# Patient Record
Sex: FEMALE | Race: BLACK OR AFRICAN AMERICAN | NOT HISPANIC OR LATINO | Employment: OTHER | RURAL
[De-identification: names, ages, dates, MRNs, and addresses within clinical notes are randomized per-mention and may not be internally consistent; named-entity substitution may affect disease eponyms.]

---

## 2019-08-05 ENCOUNTER — HISTORICAL (OUTPATIENT)
Dept: ADMINISTRATIVE | Facility: HOSPITAL | Age: 84
End: 2019-08-05

## 2019-08-06 LAB
LAB AP CLINICAL INFORMATION: NORMAL
LAB AP DIAGNOSIS - HISTORICAL: NORMAL
LAB AP GROSS PATHOLOGY - HISTORICAL: NORMAL
LAB AP SPECIMEN SUBMITTED - HISTORICAL: NORMAL

## 2019-10-01 ENCOUNTER — HISTORICAL (OUTPATIENT)
Dept: ADMINISTRATIVE | Facility: HOSPITAL | Age: 84
End: 2019-10-01

## 2020-09-03 ENCOUNTER — HISTORICAL (OUTPATIENT)
Dept: ADMINISTRATIVE | Facility: HOSPITAL | Age: 85
End: 2020-09-03

## 2020-09-03 LAB
ANION GAP SERPL CALCULATED.3IONS-SCNC: 13 MMOL/L
APTT PPP: 39.4 SECONDS (ref 25.2–37.3)
BACTERIA #/AREA URNS HPF: ABNORMAL /HPF
BASOPHILS # BLD AUTO: 0.02 X10E3/UL (ref 0–0.2)
BASOPHILS NFR BLD AUTO: 0.2 % (ref 0–1)
BILIRUB UR QL STRIP: NEGATIVE MG/DL
BUN SERPL-MCNC: 20 MG/DL (ref 7–18)
CALCIUM SERPL-MCNC: 8.8 MG/DL (ref 8.5–10.1)
CHLORIDE SERPL-SCNC: 97 MMOL/L (ref 98–107)
CK MB SERPL-MCNC: <1 NG/ML (ref 1–3.6)
CK SERPL-CCNC: 97 U/L (ref 26–192)
CLARITY UR: CLEAR
CLARITY UR: CLEAR
CO2 SERPL-SCNC: 25 MMOL/L (ref 21–32)
COLOR UR: ABNORMAL
COLOR UR: ABNORMAL
CREAT SERPL-MCNC: 1.18 MG/DL (ref 0.55–1.02)
EOSINOPHIL # BLD AUTO: 0.22 X10E3/UL (ref 0–0.5)
EOSINOPHIL NFR BLD AUTO: 2.3 % (ref 1–4)
ERYTHROCYTE [DISTWIDTH] IN BLOOD BY AUTOMATED COUNT: 16.4 % (ref 11.5–14.5)
GLUCOSE SERPL-MCNC: 207 MG/DL (ref 74–106)
GLUCOSE SERPL-MCNC: 220 MG/DL (ref 70–105)
GLUCOSE UR STRIP-MCNC: NEGATIVE MG/DL
HCT VFR BLD AUTO: 31.2 % (ref 38–47)
HGB BLD-MCNC: 10 G/DL (ref 12–16)
IMM GRANULOCYTES # BLD AUTO: 0.03 X10E3/UL (ref 0–0.04)
IMM GRANULOCYTES NFR BLD: 0.3 % (ref 0–0.4)
INR BLD: 1.08 (ref 0–3.3)
KETONES UR STRIP-SCNC: NEGATIVE MG/DL
LEUKOCYTE ESTERASE UR QL STRIP: NEGATIVE LEU/UL
LYMPHOCYTES # BLD AUTO: 0.87 X10E3/UL (ref 1–4.8)
LYMPHOCYTES NFR BLD AUTO: 9.1 % (ref 27–41)
MCH RBC QN AUTO: 24.6 PG (ref 27–31)
MCHC RBC AUTO-ENTMCNC: 32.1 G/DL (ref 32–36)
MCV RBC AUTO: 76.8 FL (ref 80–96)
MONOCYTES # BLD AUTO: 0.57 X10E3/UL (ref 0–0.8)
MONOCYTES NFR BLD AUTO: 6 % (ref 2–6)
MPC BLD CALC-MCNC: 8 FL (ref 9.4–12.4)
MUCOUS THREADS #/AREA URNS HPF: ABNORMAL /HPF
MYOGLOBIN SERPL-MCNC: 83 NG/ML (ref 13–71)
NEUTROPHILS # BLD AUTO: 7.8 X10E3/UL (ref 1.8–7.7)
NEUTROPHILS NFR BLD AUTO: 82.1 % (ref 53–65)
NITRITE UR QL STRIP: NEGATIVE
NRBC # BLD AUTO: 0 X10E3/UL (ref 0–0)
NRBC, AUTO (.00): 0 /100 (ref 0–0)
NT-PROBNP SERPL-MCNC: 1169 PG/ML (ref 1–450)
PH UR STRIP: 7 PH UNITS (ref 5–8)
PLATELET # BLD AUTO: 295 X10E3/UL (ref 150–400)
POTASSIUM SERPL-SCNC: 4.5 MMOL/L (ref 3.5–5.1)
PROT UR QL STRIP: 100 MG/DL
PROTHROMBIN TIME: 13.5 SECONDS (ref 11.7–14.7)
RBC # BLD AUTO: 4.06 X10E6/UL (ref 4.2–5.4)
RBC # UR STRIP: ABNORMAL ERY/UL
RBC #/AREA URNS HPF: ABNORMAL /HPF (ref 0–3)
SARS-COV-2 RNA AMPLIFICATION, QUAL: NEGATIVE
SODIUM SERPL-SCNC: 130 MMOL/L (ref 136–145)
SP GR UR STRIP: 1.01 (ref 1–1.03)
SQUAMOUS #/AREA URNS LPF: ABNORMAL /LPF
TRICHOMONAS #/AREA URNS HPF: ABNORMAL /HPF
TROPONIN I SERPL-MCNC: <0.017 NG/ML (ref 0–0.06)
UROBILINOGEN UR STRIP-ACNC: 0.2 EU/DL
WBC # BLD AUTO: 9.51 X10E3/UL (ref 4.5–11)
WBC #/AREA URNS HPF: ABNORMAL /HPF (ref 0–5)
YEAST #/AREA URNS HPF: ABNORMAL /HPF

## 2020-09-04 ENCOUNTER — HISTORICAL (OUTPATIENT)
Dept: ADMINISTRATIVE | Facility: HOSPITAL | Age: 85
End: 2020-09-04

## 2020-09-04 LAB
GLUCOSE SERPL-MCNC: 134 MG/DL (ref 70–105)
GLUCOSE SERPL-MCNC: 176 MG/DL (ref 70–105)
GLUCOSE SERPL-MCNC: 197 MG/DL (ref 70–105)
GLUCOSE SERPL-MCNC: 238 MG/DL (ref 70–105)

## 2020-09-05 ENCOUNTER — HISTORICAL (OUTPATIENT)
Dept: ADMINISTRATIVE | Facility: HOSPITAL | Age: 85
End: 2020-09-05

## 2020-09-05 LAB
ALBUMIN SERPL BCP-MCNC: 3 G/DL (ref 3.5–5)
ALBUMIN/GLOB SERPL: 0.8 {RATIO}
ALP SERPL-CCNC: 94 U/L (ref 55–142)
ALT SERPL W P-5'-P-CCNC: 27 U/L (ref 13–56)
ANION GAP SERPL CALCULATED.3IONS-SCNC: 13 MMOL/L
AST SERPL W P-5'-P-CCNC: 19 U/L (ref 15–37)
BASOPHILS # BLD AUTO: 0.03 X10E3/UL (ref 0–0.2)
BASOPHILS NFR BLD AUTO: 0.4 % (ref 0–1)
BILIRUB SERPL-MCNC: 0.4 MG/DL (ref 0–1.2)
BUN SERPL-MCNC: 19 MG/DL (ref 7–18)
BUN/CREAT SERPL: 15.7
CALCIUM SERPL-MCNC: 8.6 MG/DL (ref 8.5–10.1)
CHLORIDE SERPL-SCNC: 99 MMOL/L (ref 98–107)
CO2 SERPL-SCNC: 26 MMOL/L (ref 21–32)
CREAT SERPL-MCNC: 1.21 MG/DL (ref 0.55–1.02)
EOSINOPHIL # BLD AUTO: 0.31 X10E3/UL (ref 0–0.5)
EOSINOPHIL NFR BLD AUTO: 4.5 % (ref 1–4)
ERYTHROCYTE [DISTWIDTH] IN BLOOD BY AUTOMATED COUNT: 16.3 % (ref 11.5–14.5)
GLOBULIN SER-MCNC: 3.6 G/DL (ref 2–4)
GLUCOSE SERPL-MCNC: 138 MG/DL (ref 74–106)
GLUCOSE SERPL-MCNC: 154 MG/DL (ref 70–105)
GLUCOSE SERPL-MCNC: 165 MG/DL (ref 70–105)
GLUCOSE SERPL-MCNC: 186 MG/DL (ref 70–105)
GLUCOSE SERPL-MCNC: 243 MG/DL (ref 70–105)
HCT VFR BLD AUTO: 30.8 % (ref 38–47)
HGB BLD-MCNC: 9.9 G/DL (ref 12–16)
IMM GRANULOCYTES # BLD AUTO: 0.01 X10E3/UL (ref 0–0.04)
IMM GRANULOCYTES NFR BLD: 0.1 % (ref 0–0.4)
LYMPHOCYTES # BLD AUTO: 0.99 X10E3/UL (ref 1–4.8)
LYMPHOCYTES NFR BLD AUTO: 14.5 % (ref 27–41)
MCH RBC QN AUTO: 25.2 PG (ref 27–31)
MCHC RBC AUTO-ENTMCNC: 32.1 G/DL (ref 32–36)
MCV RBC AUTO: 78.4 FL (ref 80–96)
MONOCYTES # BLD AUTO: 0.59 X10E3/UL (ref 0–0.8)
MONOCYTES NFR BLD AUTO: 8.7 % (ref 2–6)
MPC BLD CALC-MCNC: 8.2 FL (ref 9.4–12.4)
NEUTROPHILS # BLD AUTO: 4.89 X10E3/UL (ref 1.8–7.7)
NEUTROPHILS NFR BLD AUTO: 71.8 % (ref 53–65)
NRBC # BLD AUTO: 0 X10E3/UL (ref 0–0)
NRBC, AUTO (.00): 0 /100 (ref 0–0)
PLATELET # BLD AUTO: 282 X10E3/UL (ref 150–400)
POTASSIUM SERPL-SCNC: 4.2 MMOL/L (ref 3.5–5.1)
PROT SERPL-MCNC: 6.6 G/DL (ref 6.4–8.2)
RBC # BLD AUTO: 3.93 X10E6/UL (ref 4.2–5.4)
SODIUM SERPL-SCNC: 134 MMOL/L (ref 136–145)
WBC # BLD AUTO: 6.82 X10E3/UL (ref 4.5–11)

## 2020-09-06 ENCOUNTER — HISTORICAL (OUTPATIENT)
Dept: ADMINISTRATIVE | Facility: HOSPITAL | Age: 85
End: 2020-09-06

## 2020-09-06 LAB
ALBUMIN SERPL BCP-MCNC: 3 G/DL (ref 3.5–5)
ALBUMIN/GLOB SERPL: 0.9 {RATIO}
ALP SERPL-CCNC: 98 U/L (ref 55–142)
ALT SERPL W P-5'-P-CCNC: 27 U/L (ref 13–56)
ANION GAP SERPL CALCULATED.3IONS-SCNC: 13 MMOL/L
AST SERPL W P-5'-P-CCNC: 20 U/L (ref 15–37)
BASOPHILS # BLD AUTO: 0.03 X10E3/UL (ref 0–0.2)
BASOPHILS NFR BLD AUTO: 0.5 % (ref 0–1)
BILIRUB SERPL-MCNC: 0.4 MG/DL (ref 0–1.2)
BUN SERPL-MCNC: 25 MG/DL (ref 7–18)
BUN/CREAT SERPL: 14.6
CALCIUM SERPL-MCNC: 8.6 MG/DL (ref 8.5–10.1)
CHLORIDE SERPL-SCNC: 100 MMOL/L (ref 98–107)
CO2 SERPL-SCNC: 27 MMOL/L (ref 21–32)
CREAT SERPL-MCNC: 1.71 MG/DL (ref 0.55–1.02)
EOSINOPHIL # BLD AUTO: 0.31 X10E3/UL (ref 0–0.5)
EOSINOPHIL NFR BLD AUTO: 4.7 % (ref 1–4)
ERYTHROCYTE [DISTWIDTH] IN BLOOD BY AUTOMATED COUNT: 16.1 % (ref 11.5–14.5)
GLOBULIN SER-MCNC: 3.5 G/DL (ref 2–4)
GLUCOSE SERPL-MCNC: 140 MG/DL (ref 74–106)
GLUCOSE SERPL-MCNC: 153 MG/DL (ref 70–105)
GLUCOSE SERPL-MCNC: 168 MG/DL (ref 70–105)
GLUCOSE SERPL-MCNC: 268 MG/DL (ref 70–105)
GLUCOSE SERPL-MCNC: 280 MG/DL (ref 70–105)
HCT VFR BLD AUTO: 35.2 % (ref 38–47)
HGB BLD-MCNC: 9.7 G/DL (ref 12–16)
IMM GRANULOCYTES # BLD AUTO: 0.02 X10E3/UL (ref 0–0.04)
IMM GRANULOCYTES NFR BLD: 0.3 % (ref 0–0.4)
LYMPHOCYTES # BLD AUTO: 1.05 X10E3/UL (ref 1–4.8)
LYMPHOCYTES NFR BLD AUTO: 16.1 % (ref 27–41)
MCH RBC QN AUTO: 24.7 PG (ref 27–31)
MCHC RBC AUTO-ENTMCNC: 27.6 G/DL (ref 32–36)
MCV RBC AUTO: 89.6 FL (ref 80–96)
MONOCYTES # BLD AUTO: 0.65 X10E3/UL (ref 0–0.8)
MONOCYTES NFR BLD AUTO: 10 % (ref 2–6)
MPC BLD CALC-MCNC: 8.3 FL (ref 9.4–12.4)
NEUTROPHILS # BLD AUTO: 4.47 X10E3/UL (ref 1.8–7.7)
NEUTROPHILS NFR BLD AUTO: 68.4 % (ref 53–65)
NRBC # BLD AUTO: 0 X10E3/UL (ref 0–0)
NRBC, AUTO (.00): 0 /100 (ref 0–0)
PLATELET # BLD AUTO: 312 X10E3/UL (ref 150–400)
POTASSIUM SERPL-SCNC: 4.5 MMOL/L (ref 3.5–5.1)
PROT SERPL-MCNC: 6.5 G/DL (ref 6.4–8.2)
RBC # BLD AUTO: 3.93 X10E6/UL (ref 4.2–5.4)
SODIUM SERPL-SCNC: 135 MMOL/L (ref 136–145)
WBC # BLD AUTO: 6.53 X10E3/UL (ref 4.5–11)

## 2020-09-07 ENCOUNTER — HISTORICAL (OUTPATIENT)
Dept: ADMINISTRATIVE | Facility: HOSPITAL | Age: 85
End: 2020-09-07

## 2020-09-07 LAB
ALBUMIN SERPL BCP-MCNC: 2.8 G/DL (ref 3.5–5)
ALBUMIN/GLOB SERPL: 0.8 {RATIO}
ALP SERPL-CCNC: 95 U/L (ref 55–142)
ALT SERPL W P-5'-P-CCNC: 25 U/L (ref 13–56)
ANION GAP SERPL CALCULATED.3IONS-SCNC: 12 MMOL/L
AST SERPL W P-5'-P-CCNC: 21 U/L (ref 15–37)
BILIRUB SERPL-MCNC: 0.4 MG/DL (ref 0–1.2)
BUN SERPL-MCNC: 32 MG/DL (ref 7–18)
BUN/CREAT SERPL: 19.2
CALCIUM SERPL-MCNC: 8.4 MG/DL (ref 8.5–10.1)
CHLORIDE SERPL-SCNC: 101 MMOL/L (ref 98–107)
CO2 SERPL-SCNC: 26 MMOL/L (ref 21–32)
CREAT SERPL-MCNC: 1.67 MG/DL (ref 0.55–1.02)
GLOBULIN SER-MCNC: 3.4 G/DL (ref 2–4)
GLUCOSE SERPL-MCNC: 135 MG/DL (ref 70–105)
GLUCOSE SERPL-MCNC: 141 MG/DL (ref 74–106)
GLUCOSE SERPL-MCNC: 161 MG/DL (ref 70–105)
GLUCOSE SERPL-MCNC: 249 MG/DL (ref 70–105)
GLUCOSE SERPL-MCNC: 286 MG/DL (ref 70–105)
POTASSIUM SERPL-SCNC: 4.7 MMOL/L (ref 3.5–5.1)
PROT SERPL-MCNC: 6.2 G/DL (ref 6.4–8.2)
SODIUM SERPL-SCNC: 134 MMOL/L (ref 136–145)

## 2020-09-08 ENCOUNTER — HISTORICAL (OUTPATIENT)
Dept: ADMINISTRATIVE | Facility: HOSPITAL | Age: 85
End: 2020-09-08

## 2020-09-08 LAB
ALBUMIN SERPL BCP-MCNC: 2.7 G/DL (ref 3.5–5)
ALBUMIN/GLOB SERPL: 0.8 {RATIO}
ALP SERPL-CCNC: 90 U/L (ref 55–142)
ALT SERPL W P-5'-P-CCNC: 25 U/L (ref 13–56)
ANION GAP SERPL CALCULATED.3IONS-SCNC: 12 MMOL/L
AST SERPL W P-5'-P-CCNC: 37 U/L (ref 15–37)
BILIRUB SERPL-MCNC: 0.4 MG/DL (ref 0–1.2)
BUN SERPL-MCNC: 33 MG/DL (ref 7–18)
BUN/CREAT SERPL: 24.3
CALCIUM SERPL-MCNC: 8.6 MG/DL (ref 8.5–10.1)
CHLORIDE SERPL-SCNC: 100 MMOL/L (ref 98–107)
CO2 SERPL-SCNC: 24 MMOL/L (ref 21–32)
CREAT SERPL-MCNC: 1.36 MG/DL (ref 0.55–1.02)
GLOBULIN SER-MCNC: 3.6 G/DL (ref 2–4)
GLUCOSE SERPL-MCNC: 206 MG/DL (ref 70–105)
GLUCOSE SERPL-MCNC: 208 MG/DL (ref 70–105)
GLUCOSE SERPL-MCNC: 214 MG/DL (ref 74–106)
GLUCOSE SERPL-MCNC: 217 MG/DL (ref 70–105)
POTASSIUM SERPL-SCNC: 5.3 MMOL/L (ref 3.5–5.1)
PROT SERPL-MCNC: 6.3 G/DL (ref 6.4–8.2)
SODIUM SERPL-SCNC: 131 MMOL/L (ref 136–145)

## 2021-03-22 RX ORDER — ALBUTEROL SULFATE 90 UG/1
AEROSOL, METERED RESPIRATORY (INHALATION)
Qty: 18 G | Refills: 2 | Status: SHIPPED | OUTPATIENT
Start: 2021-03-22 | End: 2021-07-26

## 2021-04-12 RX ORDER — TRAMADOL HYDROCHLORIDE 50 MG/1
TABLET ORAL
Qty: 100 TABLET | Refills: 1 | Status: SHIPPED | OUTPATIENT
Start: 2021-04-12 | End: 2021-04-12 | Stop reason: SDUPTHER

## 2021-04-12 RX ORDER — TRAMADOL HYDROCHLORIDE 50 MG/1
TABLET ORAL
Qty: 120 TABLET | Refills: 5 | Status: SHIPPED | OUTPATIENT
Start: 2021-04-12 | End: 2021-07-22 | Stop reason: SDUPTHER

## 2021-04-20 RX ORDER — BISOPROLOL FUMARATE AND HYDROCHLOROTHIAZIDE 5; 6.25 MG/1; MG/1
1 TABLET ORAL 2 TIMES DAILY
COMMUNITY
End: 2021-07-22 | Stop reason: SDUPTHER

## 2021-04-20 RX ORDER — BUDESONIDE AND FORMOTEROL FUMARATE DIHYDRATE 160; 4.5 UG/1; UG/1
2 AEROSOL RESPIRATORY (INHALATION) 2 TIMES DAILY
COMMUNITY
Start: 2021-03-02 | End: 2022-08-02 | Stop reason: SDUPTHER

## 2021-04-20 RX ORDER — METHOCARBAMOL 750 MG/1
2 TABLET, FILM COATED ORAL 4 TIMES DAILY PRN
COMMUNITY
End: 2022-03-07

## 2021-04-20 RX ORDER — LISINOPRIL 40 MG/1
1 TABLET ORAL 2 TIMES DAILY
COMMUNITY
End: 2021-07-22 | Stop reason: SDUPTHER

## 2021-04-20 RX ORDER — FUROSEMIDE 40 MG/1
1 TABLET ORAL DAILY
COMMUNITY
End: 2021-06-23 | Stop reason: SDUPTHER

## 2021-04-20 RX ORDER — PANTOPRAZOLE SODIUM 40 MG/1
1 TABLET, DELAYED RELEASE ORAL 2 TIMES DAILY
COMMUNITY
End: 2021-04-22 | Stop reason: SDUPTHER

## 2021-04-20 RX ORDER — HYDROCODONE BITARTRATE AND ACETAMINOPHEN 10; 325 MG/1; MG/1
1 TABLET ORAL 3 TIMES DAILY
COMMUNITY
Start: 2021-01-22 | End: 2021-04-22 | Stop reason: SDUPTHER

## 2021-04-20 RX ORDER — GABAPENTIN 400 MG/1
1 CAPSULE ORAL 4 TIMES DAILY PRN
COMMUNITY
End: 2021-07-22 | Stop reason: SDUPTHER

## 2021-04-20 RX ORDER — LACTULOSE 10 G/15ML
30 SOLUTION ORAL; RECTAL DAILY
COMMUNITY
Start: 2021-01-11 | End: 2022-05-02 | Stop reason: SDUPTHER

## 2021-04-20 RX ORDER — LEVOTHYROXINE SODIUM 112 UG/1
1 TABLET ORAL EVERY MORNING
COMMUNITY
Start: 2021-03-09 | End: 2021-07-22 | Stop reason: SDUPTHER

## 2021-04-20 RX ORDER — POTASSIUM CHLORIDE 750 MG/1
1 TABLET, EXTENDED RELEASE ORAL DAILY
COMMUNITY
End: 2022-11-02 | Stop reason: SDUPTHER

## 2021-04-20 RX ORDER — HYDRALAZINE HYDROCHLORIDE 25 MG/1
1 TABLET, FILM COATED ORAL 2 TIMES DAILY
COMMUNITY
End: 2021-07-22 | Stop reason: SDUPTHER

## 2021-04-20 RX ORDER — AMLODIPINE BESYLATE 5 MG/1
1 TABLET ORAL DAILY
COMMUNITY
End: 2021-04-22 | Stop reason: SDUPTHER

## 2021-04-22 ENCOUNTER — OFFICE VISIT (OUTPATIENT)
Dept: PRIMARY CARE CLINIC | Facility: CLINIC | Age: 86
End: 2021-04-22
Payer: COMMERCIAL

## 2021-04-22 VITALS
WEIGHT: 170 LBS | DIASTOLIC BLOOD PRESSURE: 66 MMHG | HEIGHT: 62 IN | RESPIRATION RATE: 18 BRPM | OXYGEN SATURATION: 97 % | SYSTOLIC BLOOD PRESSURE: 152 MMHG | HEART RATE: 64 BPM | BODY MASS INDEX: 31.28 KG/M2

## 2021-04-22 DIAGNOSIS — E78.5 HYPERLIPIDEMIA, UNSPECIFIED HYPERLIPIDEMIA TYPE: ICD-10-CM

## 2021-04-22 DIAGNOSIS — I10 HYPERTENSION, UNSPECIFIED TYPE: ICD-10-CM

## 2021-04-22 DIAGNOSIS — E10.9 TYPE 1 DIABETES MELLITUS WITHOUT COMPLICATION: Primary | ICD-10-CM

## 2021-04-22 DIAGNOSIS — M19.90 OSTEOARTHRITIS, UNSPECIFIED OSTEOARTHRITIS TYPE, UNSPECIFIED SITE: ICD-10-CM

## 2021-04-22 PROCEDURE — 99214 PR OFFICE/OUTPT VISIT, EST, LEVL IV, 30-39 MIN: ICD-10-PCS | Mod: ,,, | Performed by: FAMILY MEDICINE

## 2021-04-22 PROCEDURE — 99214 OFFICE O/P EST MOD 30 MIN: CPT | Mod: ,,, | Performed by: FAMILY MEDICINE

## 2021-04-22 RX ORDER — PANTOPRAZOLE SODIUM 40 MG/1
40 TABLET, DELAYED RELEASE ORAL 2 TIMES DAILY
Qty: 180 TABLET | Refills: 3 | Status: SHIPPED | OUTPATIENT
Start: 2021-04-22 | End: 2022-06-06

## 2021-04-22 RX ORDER — HYDROCODONE BITARTRATE AND ACETAMINOPHEN 10; 325 MG/1; MG/1
1 TABLET ORAL 3 TIMES DAILY
Qty: 90 TABLET | Refills: 0 | Status: SHIPPED | OUTPATIENT
Start: 2021-04-22 | End: 2021-07-22 | Stop reason: SDUPTHER

## 2021-04-22 RX ORDER — AMLODIPINE BESYLATE 5 MG/1
5 TABLET ORAL DAILY
Qty: 90 TABLET | Refills: 3 | Status: SHIPPED | OUTPATIENT
Start: 2021-04-22 | End: 2021-07-22 | Stop reason: SDUPTHER

## 2021-06-23 RX ORDER — FUROSEMIDE 40 MG/1
40 TABLET ORAL DAILY
Qty: 90 TABLET | Refills: 3 | Status: SHIPPED | OUTPATIENT
Start: 2021-06-23 | End: 2022-05-02 | Stop reason: SDUPTHER

## 2021-07-22 ENCOUNTER — OFFICE VISIT (OUTPATIENT)
Dept: PRIMARY CARE CLINIC | Facility: CLINIC | Age: 86
End: 2021-07-22
Payer: COMMERCIAL

## 2021-07-22 VITALS
RESPIRATION RATE: 14 BRPM | OXYGEN SATURATION: 96 % | TEMPERATURE: 98 F | HEART RATE: 63 BPM | SYSTOLIC BLOOD PRESSURE: 138 MMHG | DIASTOLIC BLOOD PRESSURE: 60 MMHG | HEIGHT: 65 IN | WEIGHT: 177 LBS | BODY MASS INDEX: 29.49 KG/M2

## 2021-07-22 DIAGNOSIS — E78.5 HYPERLIPIDEMIA, UNSPECIFIED HYPERLIPIDEMIA TYPE: Primary | ICD-10-CM

## 2021-07-22 DIAGNOSIS — I10 HYPERTENSION, UNSPECIFIED TYPE: ICD-10-CM

## 2021-07-22 DIAGNOSIS — M19.90 OSTEOARTHRITIS, UNSPECIFIED OSTEOARTHRITIS TYPE, UNSPECIFIED SITE: ICD-10-CM

## 2021-07-22 DIAGNOSIS — E10.9 TYPE 1 DIABETES MELLITUS WITHOUT COMPLICATION: ICD-10-CM

## 2021-07-22 PROCEDURE — 99214 PR OFFICE/OUTPT VISIT, EST, LEVL IV, 30-39 MIN: ICD-10-PCS | Mod: ,,, | Performed by: FAMILY MEDICINE

## 2021-07-22 PROCEDURE — 99214 OFFICE O/P EST MOD 30 MIN: CPT | Mod: ,,, | Performed by: FAMILY MEDICINE

## 2021-07-22 RX ORDER — GABAPENTIN 400 MG/1
400 CAPSULE ORAL 4 TIMES DAILY PRN
Qty: 360 CAPSULE | Refills: 3 | Status: SHIPPED | OUTPATIENT
Start: 2021-07-22 | End: 2022-08-02 | Stop reason: SDUPTHER

## 2021-07-22 RX ORDER — HYDRALAZINE HYDROCHLORIDE 25 MG/1
25 TABLET, FILM COATED ORAL 2 TIMES DAILY
Qty: 180 TABLET | Refills: 3 | Status: SHIPPED | OUTPATIENT
Start: 2021-07-22 | End: 2022-07-31

## 2021-07-22 RX ORDER — AMLODIPINE BESYLATE 5 MG/1
5 TABLET ORAL DAILY
Qty: 90 TABLET | Refills: 3 | Status: SHIPPED | OUTPATIENT
Start: 2021-07-22 | End: 2022-01-31 | Stop reason: SDUPTHER

## 2021-07-22 RX ORDER — TRAMADOL HYDROCHLORIDE 50 MG/1
TABLET ORAL
Qty: 120 TABLET | Refills: 5
Start: 2021-07-22 | End: 2022-02-09

## 2021-07-22 RX ORDER — LISINOPRIL 40 MG/1
40 TABLET ORAL 2 TIMES DAILY
Qty: 180 TABLET | Refills: 3 | Status: SHIPPED | OUTPATIENT
Start: 2021-07-22 | End: 2022-06-27

## 2021-07-22 RX ORDER — HYDROCODONE BITARTRATE AND ACETAMINOPHEN 10; 325 MG/1; MG/1
1 TABLET ORAL 3 TIMES DAILY
Qty: 90 TABLET | Refills: 0
Start: 2021-07-22 | End: 2021-10-28 | Stop reason: SDUPTHER

## 2021-07-22 RX ORDER — HUMAN INSULIN 100 [IU]/ML
30 INJECTION, SUSPENSION SUBCUTANEOUS 2 TIMES DAILY
COMMUNITY
Start: 2021-04-25 | End: 2022-05-02 | Stop reason: SDUPTHER

## 2021-07-22 RX ORDER — AMLODIPINE BESYLATE 5 MG/1
5 TABLET ORAL DAILY
Qty: 90 TABLET | Refills: 3 | Status: SHIPPED | OUTPATIENT
Start: 2021-07-22 | End: 2021-07-22 | Stop reason: SDUPTHER

## 2021-07-22 RX ORDER — LEVOTHYROXINE SODIUM 112 UG/1
112 TABLET ORAL EVERY MORNING
Qty: 90 TABLET | Refills: 3 | Status: SHIPPED | OUTPATIENT
Start: 2021-07-22 | End: 2021-10-27

## 2021-07-22 RX ORDER — BISOPROLOL FUMARATE AND HYDROCHLOROTHIAZIDE 5; 6.25 MG/1; MG/1
1 TABLET ORAL 2 TIMES DAILY
Qty: 180 TABLET | Refills: 3 | Status: SHIPPED | OUTPATIENT
Start: 2021-07-22 | End: 2022-07-31

## 2021-07-26 RX ORDER — ALBUTEROL SULFATE 90 UG/1
AEROSOL, METERED RESPIRATORY (INHALATION)
Qty: 18 G | Refills: 12 | Status: SHIPPED | OUTPATIENT
Start: 2021-07-26 | End: 2022-08-02 | Stop reason: SDUPTHER

## 2021-10-27 RX ORDER — LEVOTHYROXINE SODIUM 112 UG/1
TABLET ORAL
Qty: 30 TABLET | Refills: 5 | Status: SHIPPED | OUTPATIENT
Start: 2021-10-27 | End: 2022-01-11 | Stop reason: SDUPTHER

## 2021-10-28 ENCOUNTER — OFFICE VISIT (OUTPATIENT)
Dept: PRIMARY CARE CLINIC | Facility: CLINIC | Age: 86
End: 2021-10-28
Payer: COMMERCIAL

## 2021-10-28 VITALS
RESPIRATION RATE: 18 BRPM | WEIGHT: 175 LBS | BODY MASS INDEX: 29.88 KG/M2 | HEART RATE: 56 BPM | HEIGHT: 64 IN | DIASTOLIC BLOOD PRESSURE: 60 MMHG | OXYGEN SATURATION: 97 % | SYSTOLIC BLOOD PRESSURE: 132 MMHG

## 2021-10-28 DIAGNOSIS — I10 HYPERTENSION, UNSPECIFIED TYPE: ICD-10-CM

## 2021-10-28 DIAGNOSIS — M19.90 OSTEOARTHRITIS, UNSPECIFIED OSTEOARTHRITIS TYPE, UNSPECIFIED SITE: ICD-10-CM

## 2021-10-28 DIAGNOSIS — E78.5 HYPERLIPIDEMIA, UNSPECIFIED HYPERLIPIDEMIA TYPE: ICD-10-CM

## 2021-10-28 DIAGNOSIS — E10.9 TYPE 1 DIABETES MELLITUS WITHOUT COMPLICATION: ICD-10-CM

## 2021-10-28 DIAGNOSIS — Z23 NEED FOR VACCINATION: Primary | ICD-10-CM

## 2021-10-28 PROCEDURE — 99214 PR OFFICE/OUTPT VISIT, EST, LEVL IV, 30-39 MIN: ICD-10-PCS | Mod: ,,, | Performed by: FAMILY MEDICINE

## 2021-10-28 PROCEDURE — 99214 OFFICE O/P EST MOD 30 MIN: CPT | Mod: ,,, | Performed by: FAMILY MEDICINE

## 2021-10-28 PROCEDURE — G0008 ADMIN INFLUENZA VIRUS VAC: HCPCS | Mod: ,,, | Performed by: FAMILY MEDICINE

## 2021-10-28 PROCEDURE — 90662 IIV NO PRSV INCREASED AG IM: CPT | Mod: ,,, | Performed by: FAMILY MEDICINE

## 2021-10-28 PROCEDURE — 90662 FLU VACCINE - QUADRIVALENT - HIGH DOSE (65+) PRESERVATIVE FREE IM: ICD-10-PCS | Mod: ,,, | Performed by: FAMILY MEDICINE

## 2021-10-28 PROCEDURE — G0008 FLU VACCINE - QUADRIVALENT - HIGH DOSE (65+) PRESERVATIVE FREE IM: ICD-10-PCS | Mod: ,,, | Performed by: FAMILY MEDICINE

## 2021-10-28 RX ORDER — HYDROCODONE BITARTRATE AND ACETAMINOPHEN 10; 325 MG/1; MG/1
1 TABLET ORAL 3 TIMES DAILY
Qty: 90 TABLET | Refills: 0 | Status: SHIPPED | OUTPATIENT
Start: 2021-10-28 | End: 2022-01-31 | Stop reason: SDUPTHER

## 2021-12-14 ENCOUNTER — TELEPHONE (OUTPATIENT)
Dept: PRIMARY CARE CLINIC | Facility: CLINIC | Age: 86
End: 2021-12-14
Payer: COMMERCIAL

## 2022-01-11 RX ORDER — LEVOTHYROXINE SODIUM 112 UG/1
112 TABLET ORAL EVERY MORNING
Qty: 30 TABLET | Refills: 5 | Status: SHIPPED | OUTPATIENT
Start: 2022-01-11 | End: 2022-01-31 | Stop reason: SDUPTHER

## 2022-01-31 ENCOUNTER — OFFICE VISIT (OUTPATIENT)
Dept: PRIMARY CARE CLINIC | Facility: CLINIC | Age: 87
End: 2022-01-31
Payer: COMMERCIAL

## 2022-01-31 VITALS
BODY MASS INDEX: 29.71 KG/M2 | DIASTOLIC BLOOD PRESSURE: 70 MMHG | HEIGHT: 64 IN | RESPIRATION RATE: 16 BRPM | WEIGHT: 174 LBS | HEART RATE: 60 BPM | OXYGEN SATURATION: 95 % | SYSTOLIC BLOOD PRESSURE: 158 MMHG

## 2022-01-31 DIAGNOSIS — E78.5 HYPERLIPIDEMIA, UNSPECIFIED HYPERLIPIDEMIA TYPE: Primary | ICD-10-CM

## 2022-01-31 DIAGNOSIS — L60.0 INGROWN NAIL OF FIFTH TOE: ICD-10-CM

## 2022-01-31 DIAGNOSIS — E10.9 TYPE 1 DIABETES MELLITUS WITHOUT COMPLICATION: ICD-10-CM

## 2022-01-31 DIAGNOSIS — I10 HYPERTENSION, UNSPECIFIED TYPE: ICD-10-CM

## 2022-01-31 DIAGNOSIS — E11.9 TYPE 2 DIABETES MELLITUS WITHOUT COMPLICATION, WITHOUT LONG-TERM CURRENT USE OF INSULIN: ICD-10-CM

## 2022-01-31 PROCEDURE — 1101F PR PT FALLS ASSESS DOC 0-1 FALLS W/OUT INJ PAST YR: ICD-10-PCS | Mod: ,,, | Performed by: FAMILY MEDICINE

## 2022-01-31 PROCEDURE — 1101F PT FALLS ASSESS-DOCD LE1/YR: CPT | Mod: ,,, | Performed by: FAMILY MEDICINE

## 2022-01-31 PROCEDURE — 96372 PR INJECTION,THERAP/PROPH/DIAG2ST, IM OR SUBCUT: ICD-10-PCS | Mod: ,,, | Performed by: FAMILY MEDICINE

## 2022-01-31 PROCEDURE — 1159F PR MEDICATION LIST DOCUMENTED IN MEDICAL RECORD: ICD-10-PCS | Mod: ,,, | Performed by: FAMILY MEDICINE

## 2022-01-31 PROCEDURE — 3288F FALL RISK ASSESSMENT DOCD: CPT | Mod: ,,, | Performed by: FAMILY MEDICINE

## 2022-01-31 PROCEDURE — 99214 PR OFFICE/OUTPT VISIT, EST, LEVL IV, 30-39 MIN: ICD-10-PCS | Mod: 25,,, | Performed by: FAMILY MEDICINE

## 2022-01-31 PROCEDURE — 99214 OFFICE O/P EST MOD 30 MIN: CPT | Mod: 25,,, | Performed by: FAMILY MEDICINE

## 2022-01-31 PROCEDURE — 1159F MED LIST DOCD IN RCRD: CPT | Mod: ,,, | Performed by: FAMILY MEDICINE

## 2022-01-31 PROCEDURE — 3052F HG A1C>EQUAL 8.0%<EQUAL 9.0%: CPT | Mod: ,,, | Performed by: FAMILY MEDICINE

## 2022-01-31 PROCEDURE — 3288F PR FALLS RISK ASSESSMENT DOCUMENTED: ICD-10-PCS | Mod: ,,, | Performed by: FAMILY MEDICINE

## 2022-01-31 PROCEDURE — 3052F PR MOST RECENT HEMOGLOBIN A1C LEVEL 8.0 - < 9.0%: ICD-10-PCS | Mod: ,,, | Performed by: FAMILY MEDICINE

## 2022-01-31 PROCEDURE — 96372 THER/PROPH/DIAG INJ SC/IM: CPT | Mod: ,,, | Performed by: FAMILY MEDICINE

## 2022-01-31 RX ORDER — METHYLPREDNISOLONE ACETATE 80 MG/ML
80 INJECTION, SUSPENSION INTRA-ARTICULAR; INTRALESIONAL; INTRAMUSCULAR; SOFT TISSUE
Status: COMPLETED | OUTPATIENT
Start: 2022-01-31 | End: 2022-01-31

## 2022-01-31 RX ORDER — HYDROCODONE BITARTRATE AND ACETAMINOPHEN 10; 325 MG/1; MG/1
1 TABLET ORAL 3 TIMES DAILY
Qty: 90 TABLET | Refills: 0 | Status: SHIPPED | OUTPATIENT
Start: 2022-01-31 | End: 2022-05-02 | Stop reason: SDUPTHER

## 2022-01-31 RX ORDER — AMLODIPINE BESYLATE 5 MG/1
5 TABLET ORAL DAILY
Qty: 90 TABLET | Refills: 3 | Status: SHIPPED | OUTPATIENT
Start: 2022-01-31 | End: 2022-04-20

## 2022-01-31 RX ORDER — LEVOTHYROXINE SODIUM 100 UG/1
100 TABLET ORAL
Qty: 90 TABLET | Refills: 11 | Status: SHIPPED | OUTPATIENT
Start: 2022-01-31 | End: 2023-01-10

## 2022-01-31 RX ORDER — LEVOTHYROXINE SODIUM 112 UG/1
112 TABLET ORAL EVERY MORNING
Qty: 90 TABLET | Refills: 3 | Status: SHIPPED | OUTPATIENT
Start: 2022-01-31 | End: 2022-01-31

## 2022-01-31 RX ADMIN — METHYLPREDNISOLONE ACETATE 80 MG: 80 INJECTION, SUSPENSION INTRA-ARTICULAR; INTRALESIONAL; INTRAMUSCULAR; SOFT TISSUE at 10:01

## 2022-01-31 NOTE — PROGRESS NOTES
Subjective:      Patient ID: Vandana Allison is a 89 y.o. female.    Chief Complaint: Follow-up (3mon. Ck-up)    Vandana Allison a 89 y.o. female presents for follow up on all regular problems which are reviewed and discussed.   Ingrown toe nail  Problem List Items Addressed This Visit        Cardiac/Vascular    Hyperlipidemia - Primary    Hypertension       Endocrine    Type 1 diabetes mellitus without complication    Diabetes mellitus, type 2       Other    Ingrown nail of fifth toe          Past Medical History:  Past Medical History:   Diagnosis Date    Arthritis     Diabetes mellitus, type 2     Hyperlipidemia     Hypertension     Neuropathy      Past Surgical History:   Procedure Laterality Date    HYSTERECTOMY      right kidney removed      THYROIDECTOMY       Review of patient's allergies indicates:   Allergen Reactions    Aspirin      Current Outpatient Medications on File Prior to Visit   Medication Sig Dispense Refill    albuterol (PROVENTIL/VENTOLIN HFA) 90 mcg/actuation inhaler TAKE 2 PUFFS BY MOUTH EVERY 4 HOURS AS NEEDED 18 g 12    bisoprolol-hydrochlorothiazide 5-6.25 mg (ZIAC) 5-6.25 mg Tab Take 1 tablet by mouth 2 (two) times a day. 180 tablet 3    furosemide (LASIX) 40 MG tablet Take 1 tablet (40 mg total) by mouth once daily. 90 tablet 3    gabapentin (NEURONTIN) 400 MG capsule Take 1 capsule (400 mg total) by mouth 4 (four) times daily as needed (grgrgff). 360 capsule 3    hydrALAZINE (APRESOLINE) 25 MG tablet Take 1 tablet (25 mg total) by mouth 2 (two) times a day. 180 tablet 3    insulin NPH-insulin regular, 70/30, (NOVOLIN 70/30) 100 unit/mL (70-30) injection Inject 12 Units into the skin 2 (two) times a day.      lactulose (CHRONULAC) 10 gram/15 mL solution Take 30 mLs by mouth once daily.      lisinopriL (PRINIVIL,ZESTRIL) 40 MG tablet Take 1 tablet (40 mg total) by mouth 2 (two) times a day. 180 tablet 3    methocarbamoL (ROBAXIN) 750 MG Tab Take 2 tablets by mouth 4  (four) times daily as needed.      NOVOLIN N NPH U-100 INSULIN 100 unit/mL injection Inject 30 Units into the skin 2 (two) times daily.      pantoprazole (PROTONIX) 40 MG tablet Take 1 tablet (40 mg total) by mouth 2 (two) times a day. 180 tablet 3    potassium chloride SA (K-DUR,KLOR-CON) 10 MEQ tablet Take 1 tablet by mouth once daily.      SYMBICORT 160-4.5 mcg/actuation HFAA Inhale 2 puffs into the lungs 2 (two) times a day.      traMADoL (ULTRAM) 50 mg tablet TAKE 1-2 TABLETS BY MOUTH 3-4 TIMES A  tablet 5    [DISCONTINUED] amLODIPine (NORVASC) 5 MG tablet Take 1 tablet (5 mg total) by mouth once daily. 90 tablet 3    [DISCONTINUED] empagliflozin (JARDIANCE) 10 mg tablet Take 1 tablet by mouth once daily.      [DISCONTINUED] HYDROcodone-acetaminophen (NORCO)  mg per tablet Take 1 tablet by mouth 3 (three) times daily. 90 tablet 0    [DISCONTINUED] levothyroxine (SYNTHROID) 112 MCG tablet Take 1 tablet (112 mcg total) by mouth every morning. 30 tablet 5     No current facility-administered medications on file prior to visit.     Social History     Socioeconomic History    Marital status:    Tobacco Use    Smoking status: Never Smoker    Smokeless tobacco: Never Used   Substance and Sexual Activity    Alcohol use: Never    Drug use: Never     Family History   Problem Relation Age of Onset    Diabetes Mother     Heart disease Father     Diabetes Sister     Cancer Sister     Diabetes Brother        Review of Systems   Constitutional: Negative.  Negative for activity change, appetite change, chills and diaphoresis.   HENT: Negative.  Negative for congestion, ear pain, hearing loss and postnasal drip.    Eyes: Negative for itching.   Respiratory: Negative for chest tightness and shortness of breath.    Cardiovascular: Negative for chest pain.   Gastrointestinal: Negative for abdominal pain.   Endocrine: Negative for polydipsia.   Genitourinary: Negative for frequency.  "  Musculoskeletal: Negative for back pain.   Neurological: Negative for headaches.       Objective:     BP (!) 158/70 (BP Location: Left arm, Patient Position: Sitting, BP Method: Large (Manual))   Pulse 60   Resp 16   Ht 5' 4" (1.626 m)   Wt 78.9 kg (174 lb)   SpO2 95%   BMI 29.87 kg/m²     Physical Exam  Constitutional:       Appearance: Normal appearance. She is obese.   HENT:      Head: Normocephalic and atraumatic.      Right Ear: External ear normal.      Left Ear: External ear normal.      Nose: Nose normal.      Mouth/Throat:      Mouth: Mucous membranes are moist.      Pharynx: Oropharynx is clear.   Eyes:      Pupils: Pupils are equal, round, and reactive to light.   Cardiovascular:      Rate and Rhythm: Normal rate and regular rhythm.      Heart sounds: Normal heart sounds.   Pulmonary:      Effort: Pulmonary effort is normal.      Breath sounds: Normal breath sounds.   Abdominal:      Palpations: Abdomen is soft.   Musculoskeletal:      Cervical back: Normal range of motion and neck supple.   Skin:     General: Skin is warm and dry.   Neurological:      General: No focal deficit present.      Mental Status: She is alert and oriented to person, place, and time. Mental status is at baseline.   Psychiatric:         Mood and Affect: Mood normal.         Behavior: Behavior normal.         Thought Content: Thought content normal.         Judgment: Judgment normal.       Assessment:     1. Hyperlipidemia, unspecified hyperlipidemia type    2. Ingrown nail of fifth toe    3. Type 2 diabetes mellitus without complication, without long-term current use of insulin    4. Hypertension, unspecified type    5. Type 1 diabetes mellitus without complication        Plan:     Problem List Items Addressed This Visit        Cardiac/Vascular    Hyperlipidemia - Primary    Hypertension       Endocrine    Type 1 diabetes mellitus without complication    Diabetes mellitus, type 2       Other    Ingrown nail of fifth toe    "     No follow-ups on file.    decr synthroid to 100mcg  3m fu  I have discontinued Vandana Allison's levothyroxine and levothyroxine. I have also changed her empagliflozin. Additionally, I am having her start on levothyroxine. Lastly, I am having her maintain her SYMBICORT, lactulose, potassium chloride SA, methocarbamoL, insulin NPH-insulin regular (70/30), pantoprazole, furosemide, NovoLIN N NPH U-100 Insulin, lisinopriL, bisoprolol-hydrochlorothiazide 5-6.25 mg, hydrALAZINE, gabapentin, traMADoL, albuterol, amLODIPine, and HYDROcodone-acetaminophen. We will continue to administer methylPREDNISolone acetate.    Vandana was seen today for follow-up.    Diagnoses and all orders for this visit:    Hyperlipidemia, unspecified hyperlipidemia type    Ingrown nail of fifth toe    Type 2 diabetes mellitus without complication, without long-term current use of insulin    Hypertension, unspecified type    Type 1 diabetes mellitus without complication    Other orders  -     Discontinue: levothyroxine (SYNTHROID) 112 MCG tablet; Take 1 tablet (112 mcg total) by mouth every morning.  -     empagliflozin (JARDIANCE) 10 mg tablet; Take 1 tablet (10 mg total) by mouth once daily.  -     amLODIPine (NORVASC) 5 MG tablet; Take 1 tablet (5 mg total) by mouth once daily.  -     HYDROcodone-acetaminophen (NORCO)  mg per tablet; Take 1 tablet by mouth 3 (three) times daily.  -     methylPREDNISolone acetate injection 80 mg  -     levothyroxine (SYNTHROID) 100 MCG tablet; Take 1 tablet (100 mcg total) by mouth before breakfast.      Medications Ordered This Encounter   Medications    amLODIPine (NORVASC) 5 MG tablet     Sig: Take 1 tablet (5 mg total) by mouth once daily.     Dispense:  90 tablet     Refill:  3     .    empagliflozin (JARDIANCE) 10 mg tablet     Sig: Take 1 tablet (10 mg total) by mouth once daily.     Dispense:  90 tablet     Refill:  3    HYDROcodone-acetaminophen (NORCO)  mg per tablet     Sig: Take 1  tablet by mouth 3 (three) times daily.     Dispense:  90 tablet     Refill:  0     Order Specific Question:   I have reviewed the Prescription Drug Monitoring Program (PDMP) database for this patient prior to prescribing the above opioid medication     Answer:   Yes    levothyroxine (SYNTHROID) 100 MCG tablet     Sig: Take 1 tablet (100 mcg total) by mouth before breakfast.     Dispense:  90 tablet     Refill:  11    methylPREDNISolone acetate injection 80 mg     [unfilled]  No orders of the defined types were placed in this encounter.

## 2022-02-09 RX ORDER — TRAMADOL HYDROCHLORIDE 50 MG/1
TABLET ORAL
Qty: 120 TABLET | Refills: 5 | Status: SHIPPED | OUTPATIENT
Start: 2022-02-09 | End: 2022-09-07 | Stop reason: SDUPTHER

## 2022-03-07 RX ORDER — METHOCARBAMOL 750 MG/1
TABLET, FILM COATED ORAL
Qty: 240 TABLET | Refills: 1 | Status: SHIPPED | OUTPATIENT
Start: 2022-03-07 | End: 2023-01-12 | Stop reason: SDUPTHER

## 2022-03-29 RX ORDER — HYDROCORTISONE ACETATE 25 MG/1
25 SUPPOSITORY RECTAL 2 TIMES DAILY
Qty: 20 SUPPOSITORY | Refills: 0 | Status: SHIPPED | OUTPATIENT
Start: 2022-03-29 | End: 2022-04-08

## 2022-04-20 RX ORDER — AMLODIPINE BESYLATE 5 MG/1
TABLET ORAL
Qty: 90 TABLET | Refills: 3 | Status: SHIPPED | OUTPATIENT
Start: 2022-04-20 | End: 2023-04-17

## 2022-05-02 ENCOUNTER — OFFICE VISIT (OUTPATIENT)
Dept: PRIMARY CARE CLINIC | Facility: CLINIC | Age: 87
End: 2022-05-02
Payer: COMMERCIAL

## 2022-05-02 VITALS
RESPIRATION RATE: 18 BRPM | BODY MASS INDEX: 30.05 KG/M2 | OXYGEN SATURATION: 95 % | HEART RATE: 62 BPM | SYSTOLIC BLOOD PRESSURE: 148 MMHG | WEIGHT: 176 LBS | DIASTOLIC BLOOD PRESSURE: 70 MMHG | HEIGHT: 64 IN

## 2022-05-02 DIAGNOSIS — E03.9 HYPOTHYROIDISM, UNSPECIFIED TYPE: Primary | ICD-10-CM

## 2022-05-02 DIAGNOSIS — M19.90 OSTEOARTHRITIS, UNSPECIFIED OSTEOARTHRITIS TYPE, UNSPECIFIED SITE: ICD-10-CM

## 2022-05-02 DIAGNOSIS — E78.5 HYPERLIPIDEMIA, UNSPECIFIED HYPERLIPIDEMIA TYPE: ICD-10-CM

## 2022-05-02 DIAGNOSIS — E10.9 TYPE 1 DIABETES MELLITUS WITHOUT COMPLICATION: ICD-10-CM

## 2022-05-02 DIAGNOSIS — I10 HYPERTENSION, UNSPECIFIED TYPE: ICD-10-CM

## 2022-05-02 DIAGNOSIS — I95.1 ORTHOSTASIS: ICD-10-CM

## 2022-05-02 PROCEDURE — 3288F PR FALLS RISK ASSESSMENT DOCUMENTED: ICD-10-PCS | Mod: ,,, | Performed by: FAMILY MEDICINE

## 2022-05-02 PROCEDURE — 99215 PR OFFICE/OUTPT VISIT, EST, LEVL V, 40-54 MIN: ICD-10-PCS | Mod: 25,,, | Performed by: FAMILY MEDICINE

## 2022-05-02 PROCEDURE — 1159F MED LIST DOCD IN RCRD: CPT | Mod: ,,, | Performed by: FAMILY MEDICINE

## 2022-05-02 PROCEDURE — 1101F PT FALLS ASSESS-DOCD LE1/YR: CPT | Mod: ,,, | Performed by: FAMILY MEDICINE

## 2022-05-02 PROCEDURE — 99215 OFFICE O/P EST HI 40 MIN: CPT | Mod: 25,,, | Performed by: FAMILY MEDICINE

## 2022-05-02 PROCEDURE — 1101F PR PT FALLS ASSESS DOC 0-1 FALLS W/OUT INJ PAST YR: ICD-10-PCS | Mod: ,,, | Performed by: FAMILY MEDICINE

## 2022-05-02 PROCEDURE — 3052F HG A1C>EQUAL 8.0%<EQUAL 9.0%: CPT | Mod: ,,, | Performed by: FAMILY MEDICINE

## 2022-05-02 PROCEDURE — 96372 THER/PROPH/DIAG INJ SC/IM: CPT | Mod: ,,, | Performed by: FAMILY MEDICINE

## 2022-05-02 PROCEDURE — 1159F PR MEDICATION LIST DOCUMENTED IN MEDICAL RECORD: ICD-10-PCS | Mod: ,,, | Performed by: FAMILY MEDICINE

## 2022-05-02 PROCEDURE — 3052F PR MOST RECENT HEMOGLOBIN A1C LEVEL 8.0 - < 9.0%: ICD-10-PCS | Mod: ,,, | Performed by: FAMILY MEDICINE

## 2022-05-02 PROCEDURE — 96372 PR INJECTION,THERAP/PROPH/DIAG2ST, IM OR SUBCUT: ICD-10-PCS | Mod: ,,, | Performed by: FAMILY MEDICINE

## 2022-05-02 PROCEDURE — 3288F FALL RISK ASSESSMENT DOCD: CPT | Mod: ,,, | Performed by: FAMILY MEDICINE

## 2022-05-02 RX ORDER — HUMAN INSULIN 100 [IU]/ML
30 INJECTION, SUSPENSION SUBCUTANEOUS 2 TIMES DAILY
Qty: 10 ML | Refills: 11 | Status: SHIPPED | OUTPATIENT
Start: 2022-05-02 | End: 2022-08-03

## 2022-05-02 RX ORDER — METHYLPREDNISOLONE ACETATE 40 MG/ML
40 INJECTION, SUSPENSION INTRA-ARTICULAR; INTRALESIONAL; INTRAMUSCULAR; SOFT TISSUE
Status: COMPLETED | OUTPATIENT
Start: 2022-05-02 | End: 2022-05-02

## 2022-05-02 RX ORDER — HYDROCODONE BITARTRATE AND ACETAMINOPHEN 10; 325 MG/1; MG/1
1 TABLET ORAL 3 TIMES DAILY
Qty: 90 TABLET | Refills: 0 | Status: SHIPPED | OUTPATIENT
Start: 2022-05-02 | End: 2022-08-02 | Stop reason: SDUPTHER

## 2022-05-02 RX ORDER — FUROSEMIDE 40 MG/1
40 TABLET ORAL DAILY
Qty: 90 TABLET | Refills: 3 | Status: ON HOLD | OUTPATIENT
Start: 2022-05-02 | End: 2022-10-11 | Stop reason: SDUPTHER

## 2022-05-02 RX ORDER — LACTULOSE 10 G/15ML
30 SOLUTION ORAL; RECTAL DAILY
Qty: 946 ML | Refills: 11 | Status: SHIPPED | OUTPATIENT
Start: 2022-05-02 | End: 2023-12-04 | Stop reason: SDUPTHER

## 2022-05-02 RX ORDER — DEXAMETHASONE SODIUM PHOSPHATE 4 MG/ML
4 INJECTION, SOLUTION INTRA-ARTICULAR; INTRALESIONAL; INTRAMUSCULAR; INTRAVENOUS; SOFT TISSUE
Status: COMPLETED | OUTPATIENT
Start: 2022-05-02 | End: 2022-05-02

## 2022-05-02 RX ADMIN — DEXAMETHASONE SODIUM PHOSPHATE 4 MG: 4 INJECTION, SOLUTION INTRA-ARTICULAR; INTRALESIONAL; INTRAMUSCULAR; INTRAVENOUS; SOFT TISSUE at 12:05

## 2022-05-02 RX ADMIN — METHYLPREDNISOLONE ACETATE 40 MG: 40 INJECTION, SUSPENSION INTRA-ARTICULAR; INTRALESIONAL; INTRAMUSCULAR; SOFT TISSUE at 12:05

## 2022-05-02 NOTE — PROGRESS NOTES
Subjective:      Patient ID: Vandana Allison is a 89 y.o. female.    Chief Complaint: Follow-up (3mon.ck-up), Diabetes, and Hypertension    Vandana Allison a 89 y.o. female presents for follow up on all regular problems which are reviewed and discussed.   Pre syncope going to bathroom  Problem List Items Addressed This Visit        Cardiac/Vascular    Hyperlipidemia    Hypertension    Orthostasis       Endocrine    Type 1 diabetes mellitus without complication    Relevant Medications    insulin  unit/mL injection    NOVOLIN N NPH U-100 INSULIN 100 unit/mL injection       Orthopedic    Osteoarthritis      Other Visit Diagnoses     Hypothyroidism, unspecified type    -  Primary    Relevant Orders    TSH          Past Medical History:  Past Medical History:   Diagnosis Date    Arthritis     Diabetes mellitus, type 2     Hyperlipidemia     Hypertension     Neuropathy      Past Surgical History:   Procedure Laterality Date    BREAST SURGERY      Biopsy    EYE SURGERY      Remove cataracts both eyes    HYSTERECTOMY      right kidney removed      THYROIDECTOMY       Review of patient's allergies indicates:   Allergen Reactions    Aspirin      Current Outpatient Medications on File Prior to Visit   Medication Sig Dispense Refill    albuterol (PROVENTIL/VENTOLIN HFA) 90 mcg/actuation inhaler TAKE 2 PUFFS BY MOUTH EVERY 4 HOURS AS NEEDED 18 g 12    amLODIPine (NORVASC) 5 MG tablet TAKE 1 TABLET BY MOUTH EVERY DAY 90 tablet 3    bisoprolol-hydrochlorothiazide 5-6.25 mg (ZIAC) 5-6.25 mg Tab Take 1 tablet by mouth 2 (two) times a day. 180 tablet 3    empagliflozin (JARDIANCE) 10 mg tablet Take 1 tablet (10 mg total) by mouth once daily. 90 tablet 3    gabapentin (NEURONTIN) 400 MG capsule Take 1 capsule (400 mg total) by mouth 4 (four) times daily as needed (grgrgff). 360 capsule 3    hydrALAZINE (APRESOLINE) 25 MG tablet Take 1 tablet (25 mg total) by mouth 2 (two) times a day. 180 tablet 3     insulin  unit/mL injection Inject 30 Units into the skin 2 (two) times daily before meals.      insulin NPH-insulin regular, 70/30, (NOVOLIN 70/30) 100 unit/mL (70-30) injection Inject 12 Units into the skin 2 (two) times a day.      levothyroxine (SYNTHROID) 100 MCG tablet Take 1 tablet (100 mcg total) by mouth before breakfast. 90 tablet 11    lisinopriL (PRINIVIL,ZESTRIL) 40 MG tablet Take 1 tablet (40 mg total) by mouth 2 (two) times a day. 180 tablet 3    methocarbamoL (ROBAXIN) 750 MG Tab TAKE 2 TABLETS BY MOUTH 4 TIMES A DAY AS NEEDED FOR MUSCLE SPASMS 240 tablet 1    pantoprazole (PROTONIX) 40 MG tablet Take 1 tablet (40 mg total) by mouth 2 (two) times a day. 180 tablet 3    potassium chloride SA (K-DUR,KLOR-CON) 10 MEQ tablet Take 1 tablet by mouth once daily.      SYMBICORT 160-4.5 mcg/actuation HFAA Inhale 2 puffs into the lungs 2 (two) times a day.      traMADoL (ULTRAM) 50 mg tablet TAKE 1 TABLET BY MOUTH FOUR TIMES A  tablet 5    [DISCONTINUED] furosemide (LASIX) 40 MG tablet Take 1 tablet (40 mg total) by mouth once daily. 90 tablet 3    [DISCONTINUED] HYDROcodone-acetaminophen (NORCO)  mg per tablet Take 1 tablet by mouth 3 (three) times daily. 90 tablet 0    [DISCONTINUED] lactulose (CHRONULAC) 10 gram/15 mL solution Take 30 mLs by mouth once daily.      [DISCONTINUED] NOVOLIN N NPH U-100 INSULIN 100 unit/mL injection Inject 30 Units into the skin 2 (two) times daily.       No current facility-administered medications on file prior to visit.     Social History     Socioeconomic History    Marital status:    Tobacco Use    Smoking status: Never Smoker    Smokeless tobacco: Never Used   Substance and Sexual Activity    Alcohol use: Never    Drug use: Never    Sexual activity: Not Currently     Birth control/protection: None     Family History   Problem Relation Age of Onset    Diabetes Mother         Diabetic    Heart disease Father     Diabetes Sister   "   Cancer Sister         Breast cancer    Diabetes Brother     Cancer Brother         Prostate cancer    Cancer Daughter         Uterine cancer    Diabetes Sister         Diabetic       Review of Systems   Constitutional: Negative for activity change and unexpected weight change.   HENT: Negative for hearing loss, rhinorrhea and trouble swallowing.    Eyes: Negative for discharge and visual disturbance.   Respiratory: Positive for wheezing. Negative for chest tightness.    Cardiovascular: Negative for chest pain and palpitations.   Gastrointestinal: Positive for constipation. Negative for blood in stool, diarrhea and vomiting.   Endocrine: Negative for polydipsia and polyuria.   Genitourinary: Negative for difficulty urinating, dysuria, hematuria and menstrual problem.   Musculoskeletal: Positive for arthralgias and neck pain. Negative for joint swelling.   Neurological: Positive for dizziness, weakness, light-headedness and headaches.   Psychiatric/Behavioral: Negative for confusion and dysphoric mood.       Objective:     BP (!) 148/70 (BP Location: Left arm, Patient Position: Sitting, BP Method: Large (Manual))   Pulse 62   Resp 18   Ht 5' 4" (1.626 m)   Wt 79.8 kg (176 lb)   SpO2 95%   BMI 30.21 kg/m²     Physical Exam  Constitutional:       Appearance: Normal appearance. She is obese.   HENT:      Head: Normocephalic and atraumatic.      Right Ear: External ear normal.      Left Ear: External ear normal.      Nose: Nose normal.      Mouth/Throat:      Mouth: Mucous membranes are moist.      Pharynx: Oropharynx is clear.   Eyes:      Pupils: Pupils are equal, round, and reactive to light.   Cardiovascular:      Rate and Rhythm: Normal rate and regular rhythm.      Heart sounds: Normal heart sounds.   Pulmonary:      Effort: Pulmonary effort is normal.      Breath sounds: Normal breath sounds.   Abdominal:      Palpations: Abdomen is soft.   Musculoskeletal:      Cervical back: Normal range of motion " and neck supple.   Skin:     General: Skin is warm and dry.   Neurological:      General: No focal deficit present.      Mental Status: She is alert and oriented to person, place, and time. Mental status is at baseline.   Psychiatric:         Mood and Affect: Mood normal.         Behavior: Behavior normal.         Thought Content: Thought content normal.         Judgment: Judgment normal.       Assessment:     1. Hypothyroidism, unspecified type    2. Hyperlipidemia, unspecified hyperlipidemia type    3. Hypertension, unspecified type    4. Type 1 diabetes mellitus without complication    5. Osteoarthritis, unspecified osteoarthritis type, unspecified site    6. Orthostasis        Plan:     Problem List Items Addressed This Visit        Cardiac/Vascular    Hyperlipidemia    Hypertension    Orthostasis       Endocrine    Type 1 diabetes mellitus without complication    Relevant Medications    insulin  unit/mL injection    NOVOLIN N NPH U-100 INSULIN 100 unit/mL injection       Orthopedic    Osteoarthritis      Other Visit Diagnoses     Hypothyroidism, unspecified type    -  Primary    Relevant Orders    TSH        No follow-ups on file.  Lying bp 150/68  Standing 132/60  Start support hose 8a-8p daily  Shots given  Long visit >50min    I have changed Vandana CarrizalesEstefany's lactulose. I am also having her maintain her SYMBICORT, potassium chloride SA, insulin NPH-insulin regular (70/30), pantoprazole, lisinopriL, bisoprolol-hydrochlorothiazide 5-6.25 mg, hydrALAZINE, gabapentin, albuterol, empagliflozin, levothyroxine, traMADoL, methocarbamoL, amLODIPine, HYDROcodone-acetaminophen, furosemide, insulin NPH, and NovoLIN N NPH U-100 Insulin.    Vandana was seen today for follow-up, diabetes and hypertension.    Diagnoses and all orders for this visit:    Hypothyroidism, unspecified type  -     TSH; Future    Hyperlipidemia, unspecified hyperlipidemia type    Hypertension, unspecified type    Type 1 diabetes mellitus  without complication    Osteoarthritis, unspecified osteoarthritis type, unspecified site    Orthostasis    Other orders  -     HYDROcodone-acetaminophen (NORCO)  mg per tablet; Take 1 tablet by mouth 3 (three) times daily.  -     lactulose (CHRONULAC) 10 gram/15 mL solution; Take 30 mLs (20 g total) by mouth once daily.  -     furosemide (LASIX) 40 MG tablet; Take 1 tablet (40 mg total) by mouth once daily.  -     NOVOLIN N NPH U-100 INSULIN 100 unit/mL injection; Inject 30 Units into the skin 2 (two) times daily.      Medications Ordered This Encounter   Medications    furosemide (LASIX) 40 MG tablet     Sig: Take 1 tablet (40 mg total) by mouth once daily.     Dispense:  90 tablet     Refill:  3    HYDROcodone-acetaminophen (NORCO)  mg per tablet     Sig: Take 1 tablet by mouth 3 (three) times daily.     Dispense:  90 tablet     Refill:  0     Order Specific Question:   I have reviewed the Prescription Drug Monitoring Program (PDMP) database for this patient prior to prescribing the above opioid medication     Answer:   Yes    lactulose (CHRONULAC) 10 gram/15 mL solution     Sig: Take 30 mLs (20 g total) by mouth once daily.     Dispense:  946 mL     Refill:  11    NOVOLIN N NPH U-100 INSULIN 100 unit/mL injection     Sig: Inject 30 Units into the skin 2 (two) times daily.     Dispense:  10 mL     Refill:  11     [unfilled]  Orders Placed This Encounter   Procedures    TSH     Standing Status:   Future     Standing Expiration Date:   7/1/2023

## 2022-06-06 RX ORDER — PANTOPRAZOLE SODIUM 40 MG/1
TABLET, DELAYED RELEASE ORAL
Qty: 180 TABLET | Refills: 3 | Status: SHIPPED | OUTPATIENT
Start: 2022-06-06 | End: 2023-06-19

## 2022-06-27 RX ORDER — LISINOPRIL 40 MG/1
TABLET ORAL
Qty: 180 TABLET | Refills: 3 | Status: ON HOLD | OUTPATIENT
Start: 2022-06-27 | End: 2022-10-11 | Stop reason: SDUPTHER

## 2022-08-02 ENCOUNTER — OFFICE VISIT (OUTPATIENT)
Dept: PRIMARY CARE CLINIC | Facility: CLINIC | Age: 87
End: 2022-08-02
Payer: COMMERCIAL

## 2022-08-02 VITALS
HEART RATE: 64 BPM | BODY MASS INDEX: 29.53 KG/M2 | DIASTOLIC BLOOD PRESSURE: 62 MMHG | WEIGHT: 173 LBS | RESPIRATION RATE: 18 BRPM | HEIGHT: 64 IN | OXYGEN SATURATION: 98 % | SYSTOLIC BLOOD PRESSURE: 138 MMHG

## 2022-08-02 DIAGNOSIS — E10.9 TYPE 1 DIABETES MELLITUS WITHOUT COMPLICATION: ICD-10-CM

## 2022-08-02 DIAGNOSIS — I95.1 ORTHOSTASIS: ICD-10-CM

## 2022-08-02 DIAGNOSIS — I10 HYPERTENSION, UNSPECIFIED TYPE: ICD-10-CM

## 2022-08-02 DIAGNOSIS — E78.5 HYPERLIPIDEMIA, UNSPECIFIED HYPERLIPIDEMIA TYPE: Primary | ICD-10-CM

## 2022-08-02 PROCEDURE — 1159F PR MEDICATION LIST DOCUMENTED IN MEDICAL RECORD: ICD-10-PCS | Mod: ,,, | Performed by: FAMILY MEDICINE

## 2022-08-02 PROCEDURE — 1101F PR PT FALLS ASSESS DOC 0-1 FALLS W/OUT INJ PAST YR: ICD-10-PCS | Mod: ,,, | Performed by: FAMILY MEDICINE

## 2022-08-02 PROCEDURE — 1101F PT FALLS ASSESS-DOCD LE1/YR: CPT | Mod: ,,, | Performed by: FAMILY MEDICINE

## 2022-08-02 PROCEDURE — 99214 PR OFFICE/OUTPT VISIT, EST, LEVL IV, 30-39 MIN: ICD-10-PCS | Mod: 25,,, | Performed by: FAMILY MEDICINE

## 2022-08-02 PROCEDURE — 3288F PR FALLS RISK ASSESSMENT DOCUMENTED: ICD-10-PCS | Mod: ,,, | Performed by: FAMILY MEDICINE

## 2022-08-02 PROCEDURE — 1159F MED LIST DOCD IN RCRD: CPT | Mod: ,,, | Performed by: FAMILY MEDICINE

## 2022-08-02 PROCEDURE — 96372 PR INJECTION,THERAP/PROPH/DIAG2ST, IM OR SUBCUT: ICD-10-PCS | Mod: ,,, | Performed by: FAMILY MEDICINE

## 2022-08-02 PROCEDURE — 3288F FALL RISK ASSESSMENT DOCD: CPT | Mod: ,,, | Performed by: FAMILY MEDICINE

## 2022-08-02 PROCEDURE — 96372 THER/PROPH/DIAG INJ SC/IM: CPT | Mod: ,,, | Performed by: FAMILY MEDICINE

## 2022-08-02 PROCEDURE — 99214 OFFICE O/P EST MOD 30 MIN: CPT | Mod: 25,,, | Performed by: FAMILY MEDICINE

## 2022-08-02 RX ORDER — PREDNISONE 2.5 MG/1
2.5 TABLET ORAL DAILY
COMMUNITY
Start: 2022-07-22 | End: 2023-01-17

## 2022-08-02 RX ORDER — HYDRALAZINE HYDROCHLORIDE 25 MG/1
25 TABLET, FILM COATED ORAL 2 TIMES DAILY
Qty: 180 TABLET | Refills: 3 | Status: SHIPPED | OUTPATIENT
Start: 2022-08-02 | End: 2023-07-06

## 2022-08-02 RX ORDER — HYDROCORTISONE ACETATE 25 MG/1
1 SUPPOSITORY RECTAL 2 TIMES DAILY
COMMUNITY
End: 2022-08-02 | Stop reason: SDUPTHER

## 2022-08-02 RX ORDER — BISOPROLOL FUMARATE AND HYDROCHLOROTHIAZIDE 5; 6.25 MG/1; MG/1
TABLET ORAL
Qty: 180 TABLET | Refills: 3 | Status: ON HOLD | OUTPATIENT
Start: 2022-08-02 | End: 2022-10-11 | Stop reason: SDUPTHER

## 2022-08-02 RX ORDER — HYDROCODONE BITARTRATE AND ACETAMINOPHEN 10; 325 MG/1; MG/1
1 TABLET ORAL 3 TIMES DAILY
Qty: 90 TABLET | Refills: 0 | Status: SHIPPED | OUTPATIENT
Start: 2022-08-02 | End: 2022-09-06 | Stop reason: SDUPTHER

## 2022-08-02 RX ORDER — KETOROLAC TROMETHAMINE 30 MG/ML
30 INJECTION, SOLUTION INTRAMUSCULAR; INTRAVENOUS
Status: COMPLETED | OUTPATIENT
Start: 2022-08-02 | End: 2022-08-02

## 2022-08-02 RX ORDER — ALBUTEROL SULFATE 90 UG/1
2 AEROSOL, METERED RESPIRATORY (INHALATION) EVERY 4 HOURS PRN
Qty: 18 G | Refills: 12 | Status: SHIPPED | OUTPATIENT
Start: 2022-08-02

## 2022-08-02 RX ORDER — HYDROCORTISONE ACETATE 25 MG/1
25 SUPPOSITORY RECTAL 2 TIMES DAILY
Qty: 20 SUPPOSITORY | Refills: 1 | Status: SHIPPED | OUTPATIENT
Start: 2022-08-02 | End: 2023-11-02 | Stop reason: SDUPTHER

## 2022-08-02 RX ORDER — BUDESONIDE AND FORMOTEROL FUMARATE DIHYDRATE 160; 4.5 UG/1; UG/1
2 AEROSOL RESPIRATORY (INHALATION) 2 TIMES DAILY
Qty: 10.2 G | Refills: 11 | Status: SHIPPED | OUTPATIENT
Start: 2022-08-02 | End: 2023-11-29 | Stop reason: SDUPTHER

## 2022-08-02 RX ORDER — METHYLPREDNISOLONE ACETATE 40 MG/ML
40 INJECTION, SUSPENSION INTRA-ARTICULAR; INTRALESIONAL; INTRAMUSCULAR; SOFT TISSUE
Status: COMPLETED | OUTPATIENT
Start: 2022-08-02 | End: 2022-08-02

## 2022-08-02 RX ORDER — GABAPENTIN 400 MG/1
400 CAPSULE ORAL 4 TIMES DAILY PRN
Qty: 360 CAPSULE | Refills: 3 | Status: ON HOLD | OUTPATIENT
Start: 2022-08-02 | End: 2022-10-11 | Stop reason: HOSPADM

## 2022-08-02 RX ADMIN — KETOROLAC TROMETHAMINE 30 MG: 30 INJECTION, SOLUTION INTRAMUSCULAR; INTRAVENOUS at 11:08

## 2022-08-02 RX ADMIN — METHYLPREDNISOLONE ACETATE 40 MG: 40 INJECTION, SUSPENSION INTRA-ARTICULAR; INTRALESIONAL; INTRAMUSCULAR; SOFT TISSUE at 11:08

## 2022-08-02 NOTE — PROGRESS NOTES
Subjective:      Patient ID: Vandana Allison is a 89 y.o. female.    Chief Complaint: Follow-up (3mon. Ck-up), Diabetes, and Hypertension    Vandana Allison a 89 y.o. female presents for follow up on all regular problems which are reviewed and discussed.     Problem List Items Addressed This Visit        Cardiac/Vascular    Hyperlipidemia - Primary    Hypertension    Orthostasis       Endocrine    Type 1 diabetes mellitus without complication    Relevant Orders    Hemoglobin A1C    CBC Auto Differential    Comprehensive Metabolic Panel    Lipid Panel    TSH    Urinalysis          Past Medical History:  Past Medical History:   Diagnosis Date    Arthritis     Diabetes mellitus, type 2     Hyperlipidemia     Hypertension     Neuropathy      Past Surgical History:   Procedure Laterality Date    BREAST SURGERY      Biopsy    EYE SURGERY      Remove cataracts both eyes    HYSTERECTOMY      right kidney removed      THYROIDECTOMY       Review of patient's allergies indicates:   Allergen Reactions    Aspirin      Current Outpatient Medications on File Prior to Visit   Medication Sig Dispense Refill    amLODIPine (NORVASC) 5 MG tablet TAKE 1 TABLET BY MOUTH EVERY DAY 90 tablet 3    empagliflozin (JARDIANCE) 10 mg tablet Take 1 tablet (10 mg total) by mouth once daily. 90 tablet 3    furosemide (LASIX) 40 MG tablet Take 1 tablet (40 mg total) by mouth once daily. 90 tablet 3    insulin  unit/mL injection Inject 30 Units into the skin 2 (two) times daily before meals.      insulin NPH-insulin regular, 70/30, (NOVOLIN 70/30) 100 unit/mL (70-30) injection Inject 12 Units into the skin 2 (two) times a day.      lactulose (CHRONULAC) 10 gram/15 mL solution Take 30 mLs (20 g total) by mouth once daily. 946 mL 11    levothyroxine (SYNTHROID) 100 MCG tablet Take 1 tablet (100 mcg total) by mouth before breakfast. 90 tablet 11    lisinopriL (PRINIVIL,ZESTRIL) 40 MG tablet TAKE 1 TABLET TWICE A   tablet 3    methocarbamoL (ROBAXIN) 750 MG Tab TAKE 2 TABLETS BY MOUTH 4 TIMES A DAY AS NEEDED FOR MUSCLE SPASMS 240 tablet 1    NOVOLIN N NPH U-100 INSULIN 100 unit/mL injection Inject 30 Units into the skin 2 (two) times daily. 10 mL 11    pantoprazole (PROTONIX) 40 MG tablet TAKE 1 TABLET BY MOUTH TWICE A  tablet 3    potassium chloride SA (K-DUR,KLOR-CON) 10 MEQ tablet Take 1 tablet by mouth once daily.      predniSONE (DELTASONE) 2.5 MG tablet Take 2.5 mg by mouth once daily.      traMADoL (ULTRAM) 50 mg tablet TAKE 1 TABLET BY MOUTH FOUR TIMES A  tablet 5    [DISCONTINUED] albuterol (PROVENTIL/VENTOLIN HFA) 90 mcg/actuation inhaler TAKE 2 PUFFS BY MOUTH EVERY 4 HOURS AS NEEDED 18 g 12    [DISCONTINUED] bisoprolol-hydrochlorothiazide 5-6.25 mg (ZIAC) 5-6.25 mg Tab TAKE 1 TABLET TWICE A  tablet 3    [DISCONTINUED] gabapentin (NEURONTIN) 400 MG capsule Take 1 capsule (400 mg total) by mouth 4 (four) times daily as needed (grgrgff). 360 capsule 3    [DISCONTINUED] hydrALAZINE (APRESOLINE) 25 MG tablet TAKE 1 TABLET TWICE A  tablet 3    [DISCONTINUED] HYDROcodone-acetaminophen (NORCO)  mg per tablet Take 1 tablet by mouth 3 (three) times daily. 90 tablet 0    [DISCONTINUED] hydrocortisone (ANUSOL-HC) 25 mg suppository Place 1 suppository rectally 2 (two) times daily.      [DISCONTINUED] SYMBICORT 160-4.5 mcg/actuation HFAA Inhale 2 puffs into the lungs 2 (two) times a day.       No current facility-administered medications on file prior to visit.     Social History     Socioeconomic History    Marital status:    Tobacco Use    Smoking status: Never Smoker    Smokeless tobacco: Never Used   Substance and Sexual Activity    Alcohol use: Never    Drug use: Never    Sexual activity: Not Currently     Birth control/protection: None     Family History   Problem Relation Age of Onset    Diabetes Mother         Diabetic    Heart disease Father     Diabetes  "Sister     Cancer Sister         Breast cancer    Diabetes Brother     Cancer Brother         Prostate cancer    Cancer Daughter         Uterine cancer    Diabetes Sister         Diabetic       Review of Systems   Constitutional: Negative for activity change and unexpected weight change.   HENT: Negative for hearing loss, rhinorrhea and trouble swallowing.    Eyes: Negative for discharge and visual disturbance.   Respiratory: Positive for wheezing. Negative for chest tightness.    Cardiovascular: Negative for chest pain and palpitations.   Gastrointestinal: Negative for blood in stool, constipation, diarrhea and vomiting.   Endocrine: Negative for polydipsia and polyuria.   Genitourinary: Negative for difficulty urinating, dysuria, hematuria and menstrual problem.   Musculoskeletal: Positive for arthralgias. Negative for joint swelling and neck pain.   Neurological: Positive for weakness and headaches.   Hematological: Bruises/bleeds easily.   Psychiatric/Behavioral: Negative for confusion and dysphoric mood.       Objective:     /62 (BP Location: Right arm, Patient Position: Sitting, BP Method: Large (Manual))   Pulse 64   Resp 18   Ht 5' 4" (1.626 m)   Wt 78.5 kg (173 lb)   SpO2 98%   BMI 29.70 kg/m²     Physical Exam  Constitutional:       Appearance: Normal appearance. She is obese.   HENT:      Head: Normocephalic and atraumatic.      Right Ear: External ear normal.      Left Ear: External ear normal.      Nose: Nose normal.      Mouth/Throat:      Mouth: Mucous membranes are moist.      Pharynx: Oropharynx is clear.   Eyes:      Pupils: Pupils are equal, round, and reactive to light.   Cardiovascular:      Rate and Rhythm: Normal rate and regular rhythm.      Heart sounds: Normal heart sounds.   Pulmonary:      Effort: Pulmonary effort is normal.      Breath sounds: Normal breath sounds.   Abdominal:      Palpations: Abdomen is soft.   Musculoskeletal:      Cervical back: Normal range of " motion and neck supple.   Skin:     General: Skin is warm and dry.      Findings: Bruising present.   Neurological:      General: No focal deficit present.      Mental Status: She is alert and oriented to person, place, and time. Mental status is at baseline.   Psychiatric:         Mood and Affect: Mood normal.         Behavior: Behavior normal.         Thought Content: Thought content normal.         Judgment: Judgment normal.       Assessment:     1. Hyperlipidemia, unspecified hyperlipidemia type    2. Hypertension, unspecified type    3. Orthostasis    4. Type 1 diabetes mellitus without complication        Plan:     Problem List Items Addressed This Visit        Cardiac/Vascular    Hyperlipidemia - Primary    Hypertension    Orthostasis       Endocrine    Type 1 diabetes mellitus without complication    Relevant Orders    Hemoglobin A1C    CBC Auto Differential    Comprehensive Metabolic Panel    Lipid Panel    TSH    Urinalysis        No follow-ups on file.    Education on falls  Lab  3m fu  I have changed Vandana Allison's albuterol, hydrocortisone, and hydrALAZINE. I am also having her maintain her potassium chloride SA, insulin NPH-insulin regular (70/30), empagliflozin, levothyroxine, traMADoL, methocarbamoL, amLODIPine, lactulose, furosemide, insulin NPH, NovoLIN N NPH U-100 Insulin, pantoprazole, lisinopriL, predniSONE, HYDROcodone-acetaminophen, gabapentin, bisoprolol-hydrochlorothiazide 5-6.25 mg, and SYMBICORT. We will continue to administer methylPREDNISolone acetate and ketorolac.    Vandana was seen today for follow-up, diabetes and hypertension.    Diagnoses and all orders for this visit:    Hyperlipidemia, unspecified hyperlipidemia type    Hypertension, unspecified type    Orthostasis    Type 1 diabetes mellitus without complication  -     Hemoglobin A1C; Future  -     CBC Auto Differential; Future  -     Comprehensive Metabolic Panel; Future  -     Lipid Panel; Future  -     TSH; Future  -      Urinalysis; Future    Other orders  -     HYDROcodone-acetaminophen (NORCO)  mg per tablet; Take 1 tablet by mouth 3 (three) times daily.  -     albuterol (PROVENTIL/VENTOLIN HFA) 90 mcg/actuation inhaler; Inhale 2 puffs into the lungs every 4 (four) hours as needed. Rescue  -     hydrocortisone (ANUSOL-HC) 25 mg suppository; Place 1 suppository (25 mg total) rectally 2 (two) times daily.  -     gabapentin (NEURONTIN) 400 MG capsule; Take 1 capsule (400 mg total) by mouth 4 (four) times daily as needed (grgrgff).  -     hydrALAZINE (APRESOLINE) 25 MG tablet; Take 1 tablet (25 mg total) by mouth 2 (two) times daily.  -     bisoprolol-hydrochlorothiazide 5-6.25 mg (ZIAC) 5-6.25 mg Tab; TAKE 1 TABLET TWICE A DAY  -     SYMBICORT 160-4.5 mcg/actuation HFAA; Inhale 2 puffs into the lungs 2 (two) times a day.  -     methylPREDNISolone acetate injection 40 mg  -     ketorolac injection 30 mg      Medications Ordered This Encounter   Medications    albuterol (PROVENTIL/VENTOLIN HFA) 90 mcg/actuation inhaler     Sig: Inhale 2 puffs into the lungs every 4 (four) hours as needed. Rescue     Dispense:  18 g     Refill:  12    bisoprolol-hydrochlorothiazide 5-6.25 mg (ZIAC) 5-6.25 mg Tab     Sig: TAKE 1 TABLET TWICE A DAY     Dispense:  180 tablet     Refill:  3     .    gabapentin (NEURONTIN) 400 MG capsule     Sig: Take 1 capsule (400 mg total) by mouth 4 (four) times daily as needed (grgrgff).     Dispense:  360 capsule     Refill:  3    hydrALAZINE (APRESOLINE) 25 MG tablet     Sig: Take 1 tablet (25 mg total) by mouth 2 (two) times daily.     Dispense:  180 tablet     Refill:  3     .    HYDROcodone-acetaminophen (NORCO)  mg per tablet     Sig: Take 1 tablet by mouth 3 (three) times daily.     Dispense:  90 tablet     Refill:  0     Order Specific Question:   I have reviewed the Prescription Drug Monitoring Program (PDMP) database for this patient prior to prescribing the above opioid medication     Answer:    Yes    hydrocortisone (ANUSOL-HC) 25 mg suppository     Sig: Place 1 suppository (25 mg total) rectally 2 (two) times daily.     Dispense:  20 suppository     Refill:  1    ketorolac injection 30 mg    methylPREDNISolone acetate injection 40 mg    SYMBICORT 160-4.5 mcg/actuation HFAA     Sig: Inhale 2 puffs into the lungs 2 (two) times a day.     Dispense:  10.2 g     Refill:  11     [unfilled]  Orders Placed This Encounter   Procedures    Hemoglobin A1C     Standing Status:   Future     Standing Expiration Date:   10/1/2023    CBC Auto Differential     Standing Status:   Future     Standing Expiration Date:   10/1/2023    Comprehensive Metabolic Panel     Standing Status:   Future     Standing Expiration Date:   10/1/2023    Lipid Panel     Standing Status:   Future     Standing Expiration Date:   10/1/2023    TSH     Standing Status:   Future     Standing Expiration Date:   10/1/2023    Urinalysis     Standing Status:   Future     Standing Expiration Date:   10/1/2023

## 2022-08-03 ENCOUNTER — TELEPHONE (OUTPATIENT)
Dept: PRIMARY CARE CLINIC | Facility: CLINIC | Age: 87
End: 2022-08-03
Payer: COMMERCIAL

## 2022-08-03 ENCOUNTER — PATIENT MESSAGE (OUTPATIENT)
Dept: PRIMARY CARE CLINIC | Facility: CLINIC | Age: 87
End: 2022-08-03
Payer: COMMERCIAL

## 2022-08-03 RX ORDER — HUMAN INSULIN 100 [IU]/ML
40 INJECTION, SUSPENSION SUBCUTANEOUS 2 TIMES DAILY
Qty: 10 ML | Refills: 11 | Status: SHIPPED | OUTPATIENT
Start: 2022-08-03 | End: 2023-09-28

## 2022-08-03 NOTE — TELEPHONE ENCOUNTER
----- Message from Cm Larson III, DO sent at 8/3/2022  8:06 AM CDT -----  Increase N insulin from 30 bid to 40u bid

## 2022-09-06 RX ORDER — HYDROCODONE BITARTRATE AND ACETAMINOPHEN 10; 325 MG/1; MG/1
1 TABLET ORAL 3 TIMES DAILY
Qty: 90 TABLET | Refills: 0 | Status: SHIPPED | OUTPATIENT
Start: 2022-09-06 | End: 2022-11-02 | Stop reason: SDUPTHER

## 2022-09-06 RX ORDER — TRAMADOL HYDROCHLORIDE 50 MG/1
TABLET ORAL
Qty: 120 TABLET | Refills: 5 | Status: CANCELLED | OUTPATIENT
Start: 2022-09-06

## 2022-09-07 ENCOUNTER — OFFICE VISIT (OUTPATIENT)
Dept: SURGERY | Facility: CLINIC | Age: 87
End: 2022-09-07
Payer: COMMERCIAL

## 2022-09-07 VITALS — HEART RATE: 60 BPM | OXYGEN SATURATION: 98 % | WEIGHT: 173 LBS | BODY MASS INDEX: 28.82 KG/M2 | HEIGHT: 65 IN

## 2022-09-07 DIAGNOSIS — K62.5 RECTAL BLEEDING: Primary | ICD-10-CM

## 2022-09-07 PROCEDURE — 1159F MED LIST DOCD IN RCRD: CPT | Mod: CPTII,,, | Performed by: SURGERY

## 2022-09-07 PROCEDURE — 1159F PR MEDICATION LIST DOCUMENTED IN MEDICAL RECORD: ICD-10-PCS | Mod: CPTII,,, | Performed by: SURGERY

## 2022-09-07 PROCEDURE — 99213 PR OFFICE/OUTPT VISIT, EST, LEVL III, 20-29 MIN: ICD-10-PCS | Mod: S$PBB,,, | Performed by: SURGERY

## 2022-09-07 PROCEDURE — 99215 OFFICE O/P EST HI 40 MIN: CPT | Mod: PBBFAC | Performed by: SURGERY

## 2022-09-07 PROCEDURE — 1160F RVW MEDS BY RX/DR IN RCRD: CPT | Mod: CPTII,,, | Performed by: SURGERY

## 2022-09-07 PROCEDURE — 99213 OFFICE O/P EST LOW 20 MIN: CPT | Mod: S$PBB,,, | Performed by: SURGERY

## 2022-09-07 PROCEDURE — 1160F PR REVIEW ALL MEDS BY PRESCRIBER/CLIN PHARMACIST DOCUMENTED: ICD-10-PCS | Mod: CPTII,,, | Performed by: SURGERY

## 2022-09-07 RX ORDER — TRAMADOL HYDROCHLORIDE 50 MG/1
TABLET ORAL
Qty: 120 TABLET | Refills: 5 | Status: SHIPPED | OUTPATIENT
Start: 2022-09-07 | End: 2022-11-02 | Stop reason: SDUPTHER

## 2022-09-07 NOTE — PROGRESS NOTES
"Subjective:       Patient ID: Vandana Allison is a 89 y.o. female.    Chief Complaint: Consult (Rectal bleeding. Hx of hemorrhoidectomy.last C scope 5 years ago)  Patient states painless rectal bleeding.  Intermittent.  Previous hemorrhoid surgery.  Last scope greater than 5 years ago.  Denies any blood thinners.  No unexplained weight loss.  family history includes Cancer in her brother, daughter, and sister; Diabetes in her brother, mother, sister, and sister; Heart disease in her father.  Past Medical History:   Diagnosis Date    Arthritis     Diabetes mellitus, type 2     Hyperlipidemia     Hypertension     Neuropathy       Past Surgical History:   Procedure Laterality Date    BREAST SURGERY      Biopsy    EYE SURGERY      Remove cataracts both eyes    HYSTERECTOMY      right kidney removed      THYROIDECTOMY         reports that she has never smoked. She has never used smokeless tobacco. She reports that she does not drink alcohol and does not use drugs.   HPI  Review of Systems      Objective:      Pulse 60   Ht 5' 5" (1.651 m)   Wt 78.5 kg (173 lb)   SpO2 98%   BMI 28.79 kg/m²    Physical Exam  Constitutional:       Appearance: Normal appearance.   Cardiovascular:      Rate and Rhythm: Normal rate.   Genitourinary:     Rectum: Normal.      Comments: Perianal skin tags without any evidence of excoriation or bleeding or thrombosis  Musculoskeletal:         General: No swelling.      Right lower leg: No edema.      Left lower leg: No edema.   Neurological:      Mental Status: She is alert.       Assessment:       1. Rectal bleeding          Plan:       Patient is colonoscopy will range        "

## 2022-10-05 ENCOUNTER — HOSPITAL ENCOUNTER (INPATIENT)
Facility: HOSPITAL | Age: 87
LOS: 6 days | Discharge: HOME HEALTH | DRG: 378 | End: 2022-10-11
Attending: HOSPITALIST | Admitting: INTERNAL MEDICINE
Payer: COMMERCIAL

## 2022-10-05 ENCOUNTER — OFFICE VISIT (OUTPATIENT)
Dept: GASTROENTEROLOGY | Facility: CLINIC | Age: 87
End: 2022-10-05
Payer: COMMERCIAL

## 2022-10-05 VITALS
OXYGEN SATURATION: 95 % | DIASTOLIC BLOOD PRESSURE: 39 MMHG | WEIGHT: 183 LBS | HEART RATE: 53 BPM | HEIGHT: 65 IN | BODY MASS INDEX: 30.49 KG/M2 | SYSTOLIC BLOOD PRESSURE: 96 MMHG

## 2022-10-05 DIAGNOSIS — K64.4 EXTERNAL HEMORRHOIDS: ICD-10-CM

## 2022-10-05 DIAGNOSIS — T50.905A DRUG-INDUCED BRADYCARDIA: ICD-10-CM

## 2022-10-05 DIAGNOSIS — K57.30 COLON, DIVERTICULOSIS: ICD-10-CM

## 2022-10-05 DIAGNOSIS — N18.32 TYPE 2 DIABETES MELLITUS WITH STAGE 3B CHRONIC KIDNEY DISEASE, UNSPECIFIED WHETHER LONG TERM INSULIN USE: ICD-10-CM

## 2022-10-05 DIAGNOSIS — R00.1 DRUG-INDUCED BRADYCARDIA: ICD-10-CM

## 2022-10-05 DIAGNOSIS — G62.9 NEUROPATHY: ICD-10-CM

## 2022-10-05 DIAGNOSIS — I95.2 HYPOTENSION DUE TO DRUGS: ICD-10-CM

## 2022-10-05 DIAGNOSIS — N17.9 ACUTE KIDNEY INJURY: ICD-10-CM

## 2022-10-05 DIAGNOSIS — E78.5 HYPERLIPIDEMIA, UNSPECIFIED HYPERLIPIDEMIA TYPE: ICD-10-CM

## 2022-10-05 DIAGNOSIS — D62 ACUTE BLOOD LOSS ANEMIA: ICD-10-CM

## 2022-10-05 DIAGNOSIS — E87.1 DEHYDRATION WITH HYPONATREMIA: ICD-10-CM

## 2022-10-05 DIAGNOSIS — K44.9 HH (HIATUS HERNIA): ICD-10-CM

## 2022-10-05 DIAGNOSIS — I95.9 HYPOTENSION: ICD-10-CM

## 2022-10-05 DIAGNOSIS — R06.09 DYSPNEA ON EXERTION: ICD-10-CM

## 2022-10-05 DIAGNOSIS — E11.9 TYPE 2 DIABETES MELLITUS WITHOUT COMPLICATION, WITHOUT LONG-TERM CURRENT USE OF INSULIN: ICD-10-CM

## 2022-10-05 DIAGNOSIS — D50.0 IRON DEFICIENCY ANEMIA DUE TO CHRONIC BLOOD LOSS: ICD-10-CM

## 2022-10-05 DIAGNOSIS — K31.7 POLYP OF STOMACH: ICD-10-CM

## 2022-10-05 DIAGNOSIS — E07.9 THYROID DISEASE: ICD-10-CM

## 2022-10-05 DIAGNOSIS — R00.1 BRADYCARDIA: ICD-10-CM

## 2022-10-05 DIAGNOSIS — E11.22 TYPE 2 DIABETES MELLITUS WITH STAGE 3B CHRONIC KIDNEY DISEASE, UNSPECIFIED WHETHER LONG TERM INSULIN USE: ICD-10-CM

## 2022-10-05 DIAGNOSIS — M19.90 ARTHRITIS: ICD-10-CM

## 2022-10-05 DIAGNOSIS — E87.1 HYPONATREMIA: ICD-10-CM

## 2022-10-05 DIAGNOSIS — I10 HYPERTENSION, UNSPECIFIED TYPE: ICD-10-CM

## 2022-10-05 DIAGNOSIS — K62.5 RECTAL BLEEDING: ICD-10-CM

## 2022-10-05 DIAGNOSIS — R06.02 SHORTNESS OF BREATH: ICD-10-CM

## 2022-10-05 DIAGNOSIS — D64.9 ANEMIA, UNSPECIFIED TYPE: Primary | ICD-10-CM

## 2022-10-05 DIAGNOSIS — E86.0 DEHYDRATION WITH HYPONATREMIA: ICD-10-CM

## 2022-10-05 LAB
ABO + RH BLD: NORMAL
ABORH RETYPE: NORMAL
ALBUMIN SERPL BCP-MCNC: 2.9 G/DL (ref 3.5–5)
ALBUMIN/GLOB SERPL: 1 {RATIO}
ALP SERPL-CCNC: 161 U/L (ref 55–142)
ALT SERPL W P-5'-P-CCNC: 29 U/L (ref 13–56)
ANION GAP SERPL CALCULATED.3IONS-SCNC: 14 MMOL/L (ref 7–16)
APTT PPP: 25.4 SECONDS (ref 25.2–37.3)
AST SERPL W P-5'-P-CCNC: 31 U/L (ref 15–37)
BACTERIA #/AREA URNS HPF: ABNORMAL /HPF
BASOPHILS # BLD AUTO: 0.03 K/UL (ref 0–0.2)
BASOPHILS NFR BLD AUTO: 0.3 % (ref 0–1)
BILIRUB SERPL-MCNC: 0.4 MG/DL (ref ?–1.2)
BILIRUB UR QL STRIP: NEGATIVE
BLD PROD TYP BPU: NORMAL
BLOOD UNIT EXPIRATION DATE: NORMAL
BLOOD UNIT TYPE CODE: 5100
BUN SERPL-MCNC: 42 MG/DL (ref 7–18)
BUN/CREAT SERPL: 14 (ref 6–20)
CALCIUM SERPL-MCNC: 8.4 MG/DL (ref 8.5–10.1)
CHLORIDE SERPL-SCNC: 89 MMOL/L (ref 98–107)
CLARITY UR: CLEAR
CO2 SERPL-SCNC: 20 MMOL/L (ref 21–32)
COLOR UR: YELLOW
CREAT SERPL-MCNC: 3.08 MG/DL (ref 0.55–1.02)
CROSSMATCH INTERPRETATION: NORMAL
DIFFERENTIAL METHOD BLD: ABNORMAL
DISPENSE STATUS: NORMAL
EGFR (NO RACE VARIABLE) (RUSH/TITUS): 13 ML/MIN/1.73M²
EOSINOPHIL # BLD AUTO: 0.08 K/UL (ref 0–0.5)
EOSINOPHIL NFR BLD AUTO: 0.7 % (ref 1–4)
ERYTHROCYTE [DISTWIDTH] IN BLOOD BY AUTOMATED COUNT: 19.2 % (ref 11.5–14.5)
FERRITIN SERPL-MCNC: 79 NG/ML (ref 8–252)
FOLATE SERPL-MCNC: 15.9 NG/ML (ref 3.1–17.5)
GLOBULIN SER-MCNC: 2.8 G/DL (ref 2–4)
GLUCOSE SERPL-MCNC: 193 MG/DL (ref 70–105)
GLUCOSE SERPL-MCNC: 193 MG/DL (ref 74–106)
GLUCOSE UR STRIP-MCNC: 300 MG/DL
HCT VFR BLD AUTO: 20.8 % (ref 38–47)
HCT VFR BLD AUTO: 27.3 % (ref 38–47)
HGB BLD-MCNC: 6.7 G/DL (ref 12–16)
HGB BLD-MCNC: 8.9 G/DL (ref 12–16)
HYALINE CASTS #/AREA URNS LPF: ABNORMAL /LPF
IMM GRANULOCYTES # BLD AUTO: 0.12 K/UL (ref 0–0.04)
IMM GRANULOCYTES NFR BLD: 1 % (ref 0–0.4)
INDIRECT COOMBS: NORMAL
IRON SATN MFR SERPL: 4 % (ref 14–50)
IRON SERPL-MCNC: 11 ΜG/DL (ref 50–170)
KETONES UR STRIP-SCNC: NEGATIVE MG/DL
LEUKOCYTE ESTERASE UR QL STRIP: NEGATIVE
LYMPHOCYTES # BLD AUTO: 0.78 K/UL (ref 1–4.8)
LYMPHOCYTES NFR BLD AUTO: 6.5 % (ref 27–41)
MAGNESIUM SERPL-MCNC: 3 MG/DL (ref 1.7–2.3)
MCH RBC QN AUTO: 24.7 PG (ref 27–31)
MCHC RBC AUTO-ENTMCNC: 32.2 G/DL (ref 32–36)
MCV RBC AUTO: 76.8 FL (ref 80–96)
MONOCYTES # BLD AUTO: 0.58 K/UL (ref 0–0.8)
MONOCYTES NFR BLD AUTO: 4.8 % (ref 2–6)
MPC BLD CALC-MCNC: 8.2 FL (ref 9.4–12.4)
MUCOUS THREADS #/AREA URNS HPF: ABNORMAL /HPF
NEUTROPHILS # BLD AUTO: 10.41 K/UL (ref 1.8–7.7)
NEUTROPHILS NFR BLD AUTO: 86.7 % (ref 53–65)
NITRITE UR QL STRIP: NEGATIVE
NRBC # BLD AUTO: 0.07 X10E3/UL
NRBC, AUTO (.00): 0.6 %
NT-PROBNP SERPL-MCNC: 4575 PG/ML (ref 1–450)
PH UR STRIP: 5.5 PH UNITS
PLATELET # BLD AUTO: 343 K/UL (ref 150–400)
POC OCCULT BLOOD STOOL: POSITIVE
POTASSIUM SERPL-SCNC: 5.4 MMOL/L (ref 3.5–5.1)
PROT SERPL-MCNC: 5.7 G/DL (ref 6.4–8.2)
PROT UR QL STRIP: 30
RBC # BLD AUTO: 2.71 M/UL (ref 4.2–5.4)
RBC # UR STRIP: NEGATIVE /UL
RBC #/AREA URNS HPF: ABNORMAL /HPF
RH BLD: NORMAL
SARS-COV+SARS-COV-2 AG RESP QL IA.RAPID: NEGATIVE
SODIUM SERPL-SCNC: 118 MMOL/L (ref 136–145)
SP GR UR STRIP: 1.02
SQUAMOUS #/AREA URNS LPF: ABNORMAL /LPF
TIBC SERPL-MCNC: 245 ΜG/DL (ref 250–450)
TROPONIN I SERPL HS-MCNC: 8.2 PG/ML
TROPONIN I SERPL HS-MCNC: 9.1 PG/ML
TSH SERPL DL<=0.005 MIU/L-ACNC: 2.38 UIU/ML (ref 0.36–3.74)
UNIT NUMBER: NORMAL
UROBILINOGEN UR STRIP-ACNC: NORMAL MG/DL
VIT B12 SERPL-MCNC: 2223 PG/ML (ref 193–986)
WBC # BLD AUTO: 12 K/UL (ref 4.5–11)
WBC #/AREA URNS HPF: ABNORMAL /HPF

## 2022-10-05 PROCEDURE — 81001 URINALYSIS AUTO W/SCOPE: CPT | Performed by: NURSE PRACTITIONER

## 2022-10-05 PROCEDURE — 99223 1ST HOSP IP/OBS HIGH 75: CPT | Mod: ,,, | Performed by: HOSPITALIST

## 2022-10-05 PROCEDURE — 3288F FALL RISK ASSESSMENT DOCD: CPT | Mod: CPTII,,, | Performed by: NURSE PRACTITIONER

## 2022-10-05 PROCEDURE — 99283 PR EMERGENCY DEPT VISIT,LEVEL III: ICD-10-PCS | Mod: ,,, | Performed by: NURSE PRACTITIONER

## 2022-10-05 PROCEDURE — 25000003 PHARM REV CODE 250: Performed by: NURSE PRACTITIONER

## 2022-10-05 PROCEDURE — 82607 VITAMIN B-12: CPT | Performed by: HOSPITALIST

## 2022-10-05 PROCEDURE — 99283 EMERGENCY DEPT VISIT LOW MDM: CPT | Mod: ,,, | Performed by: NURSE PRACTITIONER

## 2022-10-05 PROCEDURE — 85014 HEMATOCRIT: CPT | Performed by: HOSPITALIST

## 2022-10-05 PROCEDURE — 96374 THER/PROPH/DIAG INJ IV PUSH: CPT

## 2022-10-05 PROCEDURE — 86920 COMPATIBILITY TEST SPIN: CPT | Performed by: NURSE PRACTITIONER

## 2022-10-05 PROCEDURE — 83880 ASSAY OF NATRIURETIC PEPTIDE: CPT | Performed by: NURSE PRACTITIONER

## 2022-10-05 PROCEDURE — 99223 PR INITIAL HOSPITAL CARE,LEVL III: ICD-10-PCS | Mod: ,,, | Performed by: HOSPITALIST

## 2022-10-05 PROCEDURE — 85025 COMPLETE CBC W/AUTO DIFF WBC: CPT | Performed by: NURSE PRACTITIONER

## 2022-10-05 PROCEDURE — 1159F PR MEDICATION LIST DOCUMENTED IN MEDICAL RECORD: ICD-10-PCS | Mod: CPTII,,, | Performed by: NURSE PRACTITIONER

## 2022-10-05 PROCEDURE — 36415 COLL VENOUS BLD VENIPUNCTURE: CPT | Performed by: NURSE PRACTITIONER

## 2022-10-05 PROCEDURE — 1100F PR PT FALLS ASSESS DOC 2+ FALLS/FALL W/INJURY/YR: ICD-10-PCS | Mod: CPTII,,, | Performed by: NURSE PRACTITIONER

## 2022-10-05 PROCEDURE — P9016 RBC LEUKOCYTES REDUCED: HCPCS | Performed by: NURSE PRACTITIONER

## 2022-10-05 PROCEDURE — 1159F MED LIST DOCD IN RCRD: CPT | Mod: CPTII,,, | Performed by: NURSE PRACTITIONER

## 2022-10-05 PROCEDURE — 96361 HYDRATE IV INFUSION ADD-ON: CPT

## 2022-10-05 PROCEDURE — 11000001 HC ACUTE MED/SURG PRIVATE ROOM

## 2022-10-05 PROCEDURE — 63600175 PHARM REV CODE 636 W HCPCS

## 2022-10-05 PROCEDURE — 87426 SARSCOV CORONAVIRUS AG IA: CPT | Performed by: NURSE PRACTITIONER

## 2022-10-05 PROCEDURE — 84484 ASSAY OF TROPONIN QUANT: CPT | Performed by: NURSE PRACTITIONER

## 2022-10-05 PROCEDURE — 86901 BLOOD TYPING SEROLOGIC RH(D): CPT | Performed by: NURSE PRACTITIONER

## 2022-10-05 PROCEDURE — 82728 ASSAY OF FERRITIN: CPT | Performed by: HOSPITALIST

## 2022-10-05 PROCEDURE — 84484 ASSAY OF TROPONIN QUANT: CPT | Performed by: HOSPITALIST

## 2022-10-05 PROCEDURE — 80053 COMPREHEN METABOLIC PANEL: CPT | Performed by: NURSE PRACTITIONER

## 2022-10-05 PROCEDURE — 93010 EKG 12-LEAD: ICD-10-PCS | Mod: ,,, | Performed by: INTERNAL MEDICINE

## 2022-10-05 PROCEDURE — 93010 ELECTROCARDIOGRAM REPORT: CPT | Mod: ,,, | Performed by: INTERNAL MEDICINE

## 2022-10-05 PROCEDURE — 99499 NO LOS: ICD-10-PCS | Mod: ,,, | Performed by: NURSE PRACTITIONER

## 2022-10-05 PROCEDURE — 36415 COLL VENOUS BLD VENIPUNCTURE: CPT | Performed by: HOSPITALIST

## 2022-10-05 PROCEDURE — 1100F PTFALLS ASSESS-DOCD GE2>/YR: CPT | Mod: CPTII,,, | Performed by: NURSE PRACTITIONER

## 2022-10-05 PROCEDURE — 82962 GLUCOSE BLOOD TEST: CPT

## 2022-10-05 PROCEDURE — 84443 ASSAY THYROID STIM HORMONE: CPT | Performed by: HOSPITALIST

## 2022-10-05 PROCEDURE — 36430 TRANSFUSION BLD/BLD COMPNT: CPT

## 2022-10-05 PROCEDURE — 99285 EMERGENCY DEPT VISIT HI MDM: CPT | Mod: 25

## 2022-10-05 PROCEDURE — 82746 ASSAY OF FOLIC ACID SERUM: CPT | Performed by: HOSPITALIST

## 2022-10-05 PROCEDURE — 85730 THROMBOPLASTIN TIME PARTIAL: CPT | Performed by: HOSPITALIST

## 2022-10-05 PROCEDURE — 99499 UNLISTED E&M SERVICE: CPT | Mod: ,,, | Performed by: NURSE PRACTITIONER

## 2022-10-05 PROCEDURE — 93005 ELECTROCARDIOGRAM TRACING: CPT

## 2022-10-05 PROCEDURE — 83540 ASSAY OF IRON: CPT | Performed by: HOSPITALIST

## 2022-10-05 PROCEDURE — 25000003 PHARM REV CODE 250: Performed by: HOSPITALIST

## 2022-10-05 PROCEDURE — 83735 ASSAY OF MAGNESIUM: CPT | Performed by: NURSE PRACTITIONER

## 2022-10-05 PROCEDURE — 82272 OCCULT BLD FECES 1-3 TESTS: CPT

## 2022-10-05 PROCEDURE — 3288F PR FALLS RISK ASSESSMENT DOCUMENTED: ICD-10-PCS | Mod: CPTII,,, | Performed by: NURSE PRACTITIONER

## 2022-10-05 RX ORDER — SODIUM CHLORIDE 9 MG/ML
1000 INJECTION, SOLUTION INTRAVENOUS
Status: COMPLETED | OUTPATIENT
Start: 2022-10-05 | End: 2022-10-05

## 2022-10-05 RX ORDER — LEVOTHYROXINE SODIUM 100 UG/1
100 TABLET ORAL
Status: DISCONTINUED | OUTPATIENT
Start: 2022-10-06 | End: 2022-10-11 | Stop reason: HOSPADM

## 2022-10-05 RX ORDER — SIMETHICONE 80 MG
1 TABLET,CHEWABLE ORAL 3 TIMES DAILY PRN
Status: DISCONTINUED | OUTPATIENT
Start: 2022-10-05 | End: 2022-10-11 | Stop reason: HOSPADM

## 2022-10-05 RX ORDER — GUAIFENESIN/DEXTROMETHORPHAN 100-10MG/5
10 SYRUP ORAL EVERY 6 HOURS PRN
Status: DISCONTINUED | OUTPATIENT
Start: 2022-10-05 | End: 2022-10-11 | Stop reason: HOSPADM

## 2022-10-05 RX ORDER — ATROPINE SULFATE 0.1 MG/ML
0.5 INJECTION INTRAVENOUS
Status: COMPLETED | OUTPATIENT
Start: 2022-10-05 | End: 2022-10-05

## 2022-10-05 RX ORDER — ONDANSETRON 2 MG/ML
8 INJECTION INTRAMUSCULAR; INTRAVENOUS EVERY 6 HOURS PRN
Status: DISCONTINUED | OUTPATIENT
Start: 2022-10-05 | End: 2022-10-11 | Stop reason: HOSPADM

## 2022-10-05 RX ORDER — ACETAMINOPHEN 500 MG
1000 TABLET ORAL EVERY 6 HOURS PRN
Status: DISCONTINUED | OUTPATIENT
Start: 2022-10-05 | End: 2022-10-11 | Stop reason: HOSPADM

## 2022-10-05 RX ORDER — HYDROCODONE BITARTRATE AND ACETAMINOPHEN 500; 5 MG/1; MG/1
TABLET ORAL
Status: DISCONTINUED | OUTPATIENT
Start: 2022-10-05 | End: 2022-10-11 | Stop reason: HOSPADM

## 2022-10-05 RX ORDER — ATROPINE SULFATE 0.1 MG/ML
INJECTION INTRAVENOUS
Status: COMPLETED
Start: 2022-10-05 | End: 2022-10-05

## 2022-10-05 RX ORDER — FERROUS SULFATE 325(65) MG
TABLET ORAL 2 TIMES DAILY
COMMUNITY
Start: 2022-09-26 | End: 2023-02-06 | Stop reason: SDUPTHER

## 2022-10-05 RX ORDER — SODIUM CHLORIDE 9 MG/ML
INJECTION, SOLUTION INTRAVENOUS CONTINUOUS
Status: DISCONTINUED | OUTPATIENT
Start: 2022-10-05 | End: 2022-10-07

## 2022-10-05 RX ORDER — DOPAMINE HYDROCHLORIDE 320 MG/100ML
0-20 INJECTION, SOLUTION INTRAVENOUS CONTINUOUS
Status: DISCONTINUED | OUTPATIENT
Start: 2022-10-05 | End: 2022-10-05

## 2022-10-05 RX ORDER — TRAZODONE HYDROCHLORIDE 50 MG/1
50 TABLET ORAL NIGHTLY PRN
Status: DISCONTINUED | OUTPATIENT
Start: 2022-10-05 | End: 2022-10-11 | Stop reason: HOSPADM

## 2022-10-05 RX ORDER — FUROSEMIDE 10 MG/ML
40 INJECTION INTRAMUSCULAR; INTRAVENOUS
Status: DISCONTINUED | OUTPATIENT
Start: 2022-10-06 | End: 2022-10-06

## 2022-10-05 RX ORDER — GLUCAGON 1 MG
1 KIT INJECTION
Status: DISCONTINUED | OUTPATIENT
Start: 2022-10-05 | End: 2022-10-11 | Stop reason: HOSPADM

## 2022-10-05 RX ORDER — BISACODYL 5 MG
10 TABLET, DELAYED RELEASE (ENTERIC COATED) ORAL DAILY PRN
Status: DISCONTINUED | OUTPATIENT
Start: 2022-10-05 | End: 2022-10-11 | Stop reason: HOSPADM

## 2022-10-05 RX ADMIN — SODIUM CHLORIDE: 9 INJECTION, SOLUTION INTRAVENOUS at 09:10

## 2022-10-05 RX ADMIN — ATROPINE SULFATE 0.5 MG: 0.1 INJECTION INTRAVENOUS at 05:10

## 2022-10-05 RX ADMIN — SODIUM CHLORIDE 1000 ML: 9 INJECTION, SOLUTION INTRAVENOUS at 04:10

## 2022-10-05 RX ADMIN — SODIUM CHLORIDE: 9 INJECTION, SOLUTION INTRAVENOUS at 10:10

## 2022-10-05 NOTE — PROGRESS NOTES
Vandana Allison is a 89 y.o. female here for Rectal Bleeding        PCP: Cm Larson III  Referring Provider: No referring provider defined for this encounter.     HPI:  Presents for intermittent rectal bleeding. Patient is accompanied by daughter. Patient is lethargic. Daughter reports that she has had two fall. States that she normally has HTN. BP today is 96/39 and pulse is 53. Patient states she feels like she is unable to hold her head up. Daughter states that this is a change in the patient. Advised to go to the ER. Nurse will take the patient downstairs. No ROS or physical exam performed. Patient is directed to the ER for evaluation.        ROS:  Review of Systems       PMHX:  has a past medical history of Arthritis, Diabetes mellitus, type 2, Hyperlipidemia, Hypertension, and Neuropathy.    PSHX:  has a past surgical history that includes Thyroidectomy; Hysterectomy; right kidney removed; Eye surgery; and Breast surgery.    PFHX: family history includes Cancer in her brother, daughter, and sister; Diabetes in her brother, mother, sister, and sister; Heart disease in her father.    PSlHX:  reports that she has never smoked. She has never used smokeless tobacco. She reports that she does not drink alcohol and does not use drugs.        Review of patient's allergies indicates:   Allergen Reactions    Aspirin        Medication List with Changes/Refills   Current Medications    ALBUTEROL (PROVENTIL/VENTOLIN HFA) 90 MCG/ACTUATION INHALER    Inhale 2 puffs into the lungs every 4 (four) hours as needed. Rescue    AMLODIPINE (NORVASC) 5 MG TABLET    TAKE 1 TABLET BY MOUTH EVERY DAY    BISOPROLOL-HYDROCHLOROTHIAZIDE 5-6.25 MG (ZIAC) 5-6.25 MG TAB    TAKE 1 TABLET TWICE A DAY    EMPAGLIFLOZIN (JARDIANCE) 10 MG TABLET    Take 1 tablet (10 mg total) by mouth once daily.    FERROUS SULFATE (FEOSOL) 325 MG (65 MG IRON) TAB TABLET    Take by mouth 2 (two) times daily.    FUROSEMIDE (LASIX) 40 MG TABLET    Take 1  "tablet (40 mg total) by mouth once daily.    GABAPENTIN (NEURONTIN) 400 MG CAPSULE    Take 1 capsule (400 mg total) by mouth 4 (four) times daily as needed (grgrgff).    HYDRALAZINE (APRESOLINE) 25 MG TABLET    Take 1 tablet (25 mg total) by mouth 2 (two) times daily.    HYDROCODONE-ACETAMINOPHEN (NORCO)  MG PER TABLET    Take 1 tablet by mouth 3 (three) times daily.    HYDROCORTISONE (ANUSOL-HC) 25 MG SUPPOSITORY    Place 1 suppository (25 mg total) rectally 2 (two) times daily.    INSULIN  UNIT/ML INJECTION    Inject 30 Units into the skin 2 (two) times daily before meals.    INSULIN NPH-INSULIN REGULAR, 70/30, (NOVOLIN 70/30) 100 UNIT/ML (70-30) INJECTION    Inject 12 Units into the skin 2 (two) times a day.    LACTULOSE (CHRONULAC) 10 GRAM/15 ML SOLUTION    Take 30 mLs (20 g total) by mouth once daily.    LEVOTHYROXINE (SYNTHROID) 100 MCG TABLET    Take 1 tablet (100 mcg total) by mouth before breakfast.    LISINOPRIL (PRINIVIL,ZESTRIL) 40 MG TABLET    TAKE 1 TABLET TWICE A DAY    METHOCARBAMOL (ROBAXIN) 750 MG TAB    TAKE 2 TABLETS BY MOUTH 4 TIMES A DAY AS NEEDED FOR MUSCLE SPASMS    NOVOLIN N NPH U-100 INSULIN 100 UNIT/ML INJECTION    Inject 40 Units into the skin 2 (two) times daily.    PANTOPRAZOLE (PROTONIX) 40 MG TABLET    TAKE 1 TABLET BY MOUTH TWICE A DAY    POTASSIUM CHLORIDE SA (K-DUR,KLOR-CON) 10 MEQ TABLET    Take 1 tablet by mouth once daily.    PREDNISONE (DELTASONE) 2.5 MG TABLET    Take 2.5 mg by mouth once daily.    SYMBICORT 160-4.5 MCG/ACTUATION HFAA    Inhale 2 puffs into the lungs 2 (two) times a day.    TRAMADOL (ULTRAM) 50 MG TABLET    TAKE 1 TABLET BY MOUTH FOUR TIMES A DAY        Objective Findings:  Vital Signs:  BP (!) 96/39   Pulse (!) 53   Ht 5' 5" (1.651 m)   Wt 83 kg (183 lb)   SpO2 95%   BMI 30.45 kg/m²  Body mass index is 30.45 kg/m².    Physical Exam:  Physical Exam     Labs:  Lab Results   Component Value Date    WBC 7.80 08/02/2022    HGB 11.3 (L) 08/02/2022 "    HCT 34.4 (L) 08/02/2022    MCV 76.1 (L) 08/02/2022    RDW 16.4 (H) 08/02/2022     08/02/2022    LYMPH 12.8 (L) 08/02/2022    LYMPH 1.00 08/02/2022    MONO 6.9 (H) 08/02/2022    EOS 0.21 08/02/2022    BASO 0.03 08/02/2022     Lab Results   Component Value Date     (L) 08/02/2022    K 4.7 08/02/2022    CL 93 (L) 08/02/2022    CO2 25 08/02/2022     (H) 08/02/2022    BUN 46 (H) 08/02/2022    CREATININE 1.95 (H) 08/02/2022    CALCIUM 8.9 08/02/2022    PROT 6.8 08/02/2022    ALBUMIN 3.5 08/02/2022    BILITOT 0.4 08/02/2022    ALKPHOS 161 (H) 08/02/2022    AST 15 08/02/2022    ALT 22 08/02/2022         Imaging: No results found.      Assessment:  Vandana Allison is a 89 y.o. female here with:  No diagnosis found.      Recommendations:  1.   2.     No follow-ups on file.      Order summary:       Thank you for allowing me to participate in the care of Vandana Allison.      SANJUANA Jones

## 2022-10-05 NOTE — Clinical Note
Diagnosis: Anemia, unspecified type [1855092]   Admitting Provider:: ELOY RINCON [[id number]]   Future Attending Provider: LUIS ENRIQUE ALBRECHT [[id number]]   Reason for IP Medical Treatment  (Clinical interventions that can only be accomplished in the IP setting? ) :: anemia, rectal bleeding, hyptension, bradycardia   Estimated Length of Stay:: 2 midnights   I certify that Inpatient services for greater than or equal to 2 midnights are medically necessary:: Yes   Plans for Post-Acute care--if anticipated (pick the single best option):: C. Discharge home with home health services

## 2022-10-05 NOTE — ED PROVIDER NOTES
"Encounter Date: 10/5/2022       History     Chief Complaint   Patient presents with    Hypotension     89 y/o AAF with PMH of HLD, HTN, DM, Arthritis and Neuropathy presents to the ED from GI lab with c/o weakness, fatigue and hypotension. Patient and daughter reports she was being seen today for some intermittent painless rectal bleeding. States she has hx of hemorrhoids in the past. Last colonoscopy >5 years ago and reports it was "normal". Was seen by Dr. Beatty and sent to GI by him. Daughter reports patient was out of town in [place] for 3 weeks, states she was taken to the doctor while there and treated for a UTI and also was told she was iron deficient and started on iron supplementation. Daughter reports she returned home on Sunday and since that time the patient has been weak and lethargic at times. The patient c/o right sided back pain and does have a rash to her right flank area. Patient otherwise denies any complaints besides feeling "weak". Patient does take medication for hypertension and daughter reports she took all routine medications this AM.     The history is provided by the patient, medical records and a relative.   Review of patient's allergies indicates:   Allergen Reactions    Aspirin      Past Medical History:   Diagnosis Date    Arthritis     Chronic kidney disease, stage 3b     Diabetes mellitus, type 2     Hyperlipidemia     Hypertension     Neuropathy     Thyroid disease      Past Surgical History:   Procedure Laterality Date    BREAST SURGERY      Biopsy    EYE SURGERY      Remove cataracts both eyes    HYSTERECTOMY      NEPHRECTOMY Right     THYROIDECTOMY       Family History   Problem Relation Age of Onset    Diabetes Mother         Diabetic    Heart disease Father     Diabetes Sister     Cancer Sister         Breast cancer    Diabetes Brother     Cancer Brother         Prostate cancer    Cancer Daughter         Uterine cancer    Diabetes Sister         Diabetic     Social History "     Tobacco Use    Smoking status: Never    Smokeless tobacco: Never   Substance Use Topics    Alcohol use: Never    Drug use: Never     Review of Systems   Constitutional:  Positive for fatigue. Negative for chills and fever.   Respiratory:  Negative for chest tightness and shortness of breath.    Cardiovascular:  Negative for chest pain and palpitations.   Gastrointestinal:  Positive for blood in stool. Negative for abdominal pain, constipation, diarrhea, nausea and vomiting.   Genitourinary:  Negative for dysuria and hematuria.   Musculoskeletal:  Positive for back pain.   Skin:  Positive for rash.   Neurological:  Positive for weakness. Negative for dizziness, speech difficulty, light-headedness and headaches.   All other systems reviewed and are negative.    Physical Exam     Initial Vitals [10/05/22 1517]   BP Pulse Resp Temp SpO2   (!) 90/40 (!) 45 16 97.8 °F (36.6 °C) 96 %      MAP       --         Physical Exam    Constitutional: She appears well-developed and well-nourished. She is cooperative.   Elderly AAF in no acute distress   Cardiovascular:  Regular rhythm, normal heart sounds and normal pulses.   Bradycardia present.         1+ pitting edema BLE   Pulmonary/Chest: Effort normal and breath sounds normal.   Abdominal: Abdomen is soft. Bowel sounds are normal. There is no abdominal tenderness.     Neurological: She is alert and oriented to person, place, and time. GCS eye subscore is 4. GCS verbal subscore is 5. GCS motor subscore is 6.   Skin: Skin is warm and dry. Capillary refill takes less than 2 seconds. Rash noted. Rash is vesicular (right flank).   Psychiatric: She has a normal mood and affect. Her speech is normal and behavior is normal. Judgment and thought content normal. Cognition and memory are normal.       Medical Screening Exam   See Full Note    ED Course   Procedures  Labs Reviewed   COMPREHENSIVE METABOLIC PANEL - Abnormal; Notable for the following components:       Result Value     Sodium 118 (*)     Potassium 5.4 (*)     Chloride 89 (*)     CO2 20 (*)     Glucose 193 (*)     BUN 42 (*)     Creatinine 3.08 (*)     Calcium 8.4 (*)     Total Protein 5.7 (*)     Albumin 2.9 (*)     Alk Phos 161 (*)     eGFR 13 (*)     All other components within normal limits   URINALYSIS, REFLEX TO URINE CULTURE - Abnormal; Notable for the following components:    Protein, UA 30 (*)     Glucose,  (*)     All other components within normal limits   MAGNESIUM - Abnormal; Notable for the following components:    Magnesium 3.0 (*)     All other components within normal limits   CBC WITH DIFFERENTIAL - Abnormal; Notable for the following components:    WBC 12.00 (*)     RBC 2.71 (*)     Hemoglobin 6.7 (*)     Hematocrit 20.8 (*)     MCV 76.8 (*)     MCH 24.7 (*)     RDW 19.2 (*)     MPV 8.2 (*)     Neutrophils % 86.7 (*)     Lymphocytes % 6.5 (*)     Eosinophils % 0.7 (*)     Immature Granulocytes % 1.0 (*)     nRBC, Auto 0.6 (*)     Neutrophils, Abs 10.41 (*)     Lymphocytes, Absolute 0.78 (*)     Immature Granulocytes, Absolute 0.12 (*)     nRBC, Absolute 0.07 (*)     All other components within normal limits   NT-PRO NATRIURETIC PEPTIDE - Abnormal; Notable for the following components:    ProBNP 4,575 (*)     All other components within normal limits   URINALYSIS, MICROSCOPIC - Abnormal; Notable for the following components:    Bacteria, UA Few (*)     Squamous Epithelial Cells, UA Occasional (*)     Mucus, UA Moderate (*)     Hyaline Casts, UA 25-50 (*)     All other components within normal limits   IRON AND TIBC - Abnormal; Notable for the following components:    Iron 11 (*)     Iron Saturation 4 (*)     TIBC 245 (*)     All other components within normal limits   VITAMIN B12 - Abnormal; Notable for the following components:    Vitamin B12 2,223 (*)     All other components within normal limits   TROPONIN I - Normal   SARS ANTIGEN(FORD) - Normal    Narrative:     Negative SARS-CoV results should not be  used as the sole basis for treatment or patient management decisions; negative results should be considered in the context of a patient's recent exposures, history and the presene of clinical signs and symptoms consistent with COVID-19.  Negative results should be treated as presumptive and confirmed by molecular assay, if necessary for patient management.   APTT - Normal   FERRITIN - Normal   FOLATE - Normal   TSH - Normal   CBC W/ AUTO DIFFERENTIAL    Narrative:     The following orders were created for panel order CBC auto differential.  Procedure                               Abnormality         Status                     ---------                               -----------         ------                     CBC with Differential[[id number]]        Abnormal            Final result                 Please view results for these tests on the individual orders.   EXTRA TUBES    Narrative:     The following orders were created for panel order EXTRA TUBES.  Procedure                               Abnormality         Status                     ---------                               -----------         ------                     Light Blue Top Hold[[id number]]                              In process                   Please view results for these tests on the individual orders.   LIGHT BLUE TOP HOLD   EXTRA TUBES    Narrative:     The following orders were created for panel order EXTRA TUBES.  Procedure                               Abnormality         Status                     ---------                               -----------         ------                     Lavender Top Hold[[id number]]                                In process                   Please view results for these tests on the individual orders.   LAVENDER TOP HOLD   TROPONIN I   EXTRA TUBES    Narrative:     The following orders were created for panel order EXTRA TUBES.  Procedure                               Abnormality         Status                      ---------                               -----------         ------                     Light Blue Top Hold[[id number]]                              In process                 Lavender Top Hold[[id number]]                                In process                   Please view results for these tests on the individual orders.   LIGHT BLUE TOP HOLD   LAVENDER TOP HOLD   TYPE & SCREEN   ABORH RETYPE   POCT OCCULT BLOOD (STOOL)   POCT OCCULT BLOOD (STOOL)   POCT GLUCOSE MONITORING CONTINUOUS   PREPARE RBC SOFT     EKG Readings: (Independently Interpreted)   Rhythm: Sinus Bradycardia.   ECG Results              EKG 12-lead (Final result)  Result time 10/05/22 17:13:12      Final result by Interface, [facility] (10/05/22 17:13:12)                   Narrative:    Test Reason : R00.1,    Vent. Rate : 042 BPM     Atrial Rate : 000 BPM     P-R Int : 000 ms          QRS Dur : 106 ms      QT Int : 530 ms       P-R-T Axes : 000 -41 057 degrees     QTc Int : 499 ms     SIGNIFICANT ARRHYTHMIA   Marked sinus bradycardia  Prolonged QT - consider ischemia, electrolyte imbalance, drug effects  Possible left anterior fascicular block  Abnormal ECG    Confirmed by Gopal Tamayo MD ([id number]) on 10/5/2022 5:13:03 PM    Referred By: [email or web address]   SELF           Confirmed By:Gopal Tamayo MD                                  Imaging Results              X-Ray Chest AP Portable (Final result)  Result time 10/05/22 16:04:02      Final result by Alex Padgett II, MD (10/05/22 16:04:02)                   Impression:      Findings suggest cardiac decompensation and / or pneumonitis.      Electronically signed by: Alex Padgett  Date:    10/05/2022  Time:    16:04               Narrative:    EXAMINATION:  XR CHEST AP PORTABLE    CLINICAL HISTORY:  Hypotension, unspecified    COMPARISON:  8 September 2020    FINDINGS:  The heart and mediastinum are stable in size and configuration.  The pulmonary vascularity is slightly increased with  bilateral increased interstitial lung density.  No other lung infiltrates, effusions, pneumothorax or other abnormality is demonstrated.                                       Medications   0.9%  NaCl infusion (for blood administration) (has no administration in time range)   levothyroxine tablet 100 mcg (has no administration in time range)   0.9%  NaCl infusion (has no administration in time range)   dextrose 50% injection 12.5 g (has no administration in time range)   dextrose 50% injection 25 g (has no administration in time range)   glucagon (human recombinant) injection 1 mg (has no administration in time range)   acetaminophen tablet 1,000 mg (has no administration in time range)   bisacodyL EC tablet 10 mg (has no administration in time range)   dextromethorphan-guaiFENesin  mg/5 ml liquid 10 mL (has no administration in time range)   ondansetron injection 8 mg (has no administration in time range)   simethicone chewable tablet 80 mg (has no administration in time range)   traZODone tablet 50 mg (has no administration in time range)   0.9%  NaCl infusion (0 mLs Intravenous Stopped 10/5/22 1830)   atropine injection 0.5 mg (0.5 mg Intravenous Given 10/5/22 1756)     Medical Decision Making:   ED Management:  Spoke with Dr. Sainz, hospitalist on call, will admit.                 Clinical Impression:   Final diagnoses:  [I95.9] Hypotension  [R00.1] Bradycardia  [D64.9] Anemia, unspecified type (Primary)  [K62.5] Rectal bleeding  [E87.1] Hyponatremia  [N17.9] Acute kidney injury        ED Disposition Condition    Admit Stable                NIKOS Pandya  10/05/22 2043

## 2022-10-06 ENCOUNTER — ANESTHESIA (OUTPATIENT)
Dept: GASTROENTEROLOGY | Facility: HOSPITAL | Age: 87
DRG: 378 | End: 2022-10-06
Payer: COMMERCIAL

## 2022-10-06 ENCOUNTER — ANESTHESIA EVENT (OUTPATIENT)
Dept: GASTROENTEROLOGY | Facility: HOSPITAL | Age: 87
DRG: 378 | End: 2022-10-06
Payer: COMMERCIAL

## 2022-10-06 PROBLEM — D50.0 IRON DEFICIENCY ANEMIA DUE TO CHRONIC BLOOD LOSS: Status: ACTIVE | Noted: 2022-10-06

## 2022-10-06 PROBLEM — I95.2 HYPOTENSION DUE TO DRUGS: Status: ACTIVE | Noted: 2022-10-06

## 2022-10-06 PROBLEM — K31.7 POLYP OF STOMACH: Status: ACTIVE | Noted: 2022-10-06

## 2022-10-06 PROBLEM — K44.9 HH (HIATUS HERNIA): Status: ACTIVE | Noted: 2022-10-06

## 2022-10-06 LAB
ANION GAP SERPL CALCULATED.3IONS-SCNC: 14 MMOL/L (ref 7–16)
BASOPHILS # BLD AUTO: 0.05 K/UL (ref 0–0.2)
BASOPHILS NFR BLD AUTO: 0.4 % (ref 0–1)
BUN SERPL-MCNC: 37 MG/DL (ref 7–18)
BUN/CREAT SERPL: 16 (ref 6–20)
CALCIUM SERPL-MCNC: 8.7 MG/DL (ref 8.5–10.1)
CHLORIDE SERPL-SCNC: 100 MMOL/L (ref 98–107)
CO2 SERPL-SCNC: 17 MMOL/L (ref 21–32)
CREAT SERPL-MCNC: 2.27 MG/DL (ref 0.55–1.02)
DIFFERENTIAL METHOD BLD: ABNORMAL
EGFR (NO RACE VARIABLE) (RUSH/TITUS): 19 ML/MIN/1.73M²
EOSINOPHIL # BLD AUTO: 0.2 K/UL (ref 0–0.5)
EOSINOPHIL NFR BLD AUTO: 1.8 % (ref 1–4)
ERYTHROCYTE [DISTWIDTH] IN BLOOD BY AUTOMATED COUNT: 19.3 % (ref 11.5–14.5)
GLUCOSE SERPL-MCNC: 103 MG/DL (ref 70–105)
GLUCOSE SERPL-MCNC: 109 MG/DL (ref 70–105)
GLUCOSE SERPL-MCNC: 130 MG/DL (ref 70–105)
GLUCOSE SERPL-MCNC: 150 MG/DL (ref 70–105)
GLUCOSE SERPL-MCNC: 151 MG/DL (ref 70–105)
GLUCOSE SERPL-MCNC: 75 MG/DL (ref 70–105)
GLUCOSE SERPL-MCNC: 90 MG/DL (ref 74–106)
HCT VFR BLD AUTO: 29.2 % (ref 38–47)
HGB BLD-MCNC: 9.3 G/DL (ref 12–16)
IMM GRANULOCYTES # BLD AUTO: 0.06 K/UL (ref 0–0.04)
IMM GRANULOCYTES NFR BLD: 0.5 % (ref 0–0.4)
LYMPHOCYTES # BLD AUTO: 1.19 K/UL (ref 1–4.8)
LYMPHOCYTES NFR BLD AUTO: 10.4 % (ref 27–41)
MCH RBC QN AUTO: 25.5 PG (ref 27–31)
MCHC RBC AUTO-ENTMCNC: 31.8 G/DL (ref 32–36)
MCV RBC AUTO: 80.2 FL (ref 80–96)
MONOCYTES # BLD AUTO: 0.81 K/UL (ref 0–0.8)
MONOCYTES NFR BLD AUTO: 7.1 % (ref 2–6)
MPC BLD CALC-MCNC: 8.2 FL (ref 9.4–12.4)
NEUTROPHILS # BLD AUTO: 9.11 K/UL (ref 1.8–7.7)
NEUTROPHILS NFR BLD AUTO: 79.8 % (ref 53–65)
NRBC # BLD AUTO: 0.06 X10E3/UL
NRBC, AUTO (.00): 0.5 %
PLATELET # BLD AUTO: 388 K/UL (ref 150–400)
POTASSIUM SERPL-SCNC: 4.9 MMOL/L (ref 3.5–5.1)
RBC # BLD AUTO: 3.64 M/UL (ref 4.2–5.4)
SODIUM SERPL-SCNC: 126 MMOL/L (ref 136–145)
WBC # BLD AUTO: 11.42 K/UL (ref 4.5–11)

## 2022-10-06 PROCEDURE — 99233 SBSQ HOSP IP/OBS HIGH 50: CPT | Mod: ,,, | Performed by: HOSPITALIST

## 2022-10-06 PROCEDURE — 93010 EKG 12-LEAD: ICD-10-PCS | Mod: ,,, | Performed by: HOSPITALIST

## 2022-10-06 PROCEDURE — 25000003 PHARM REV CODE 250: Performed by: HOSPITALIST

## 2022-10-06 PROCEDURE — 36415 COLL VENOUS BLD VENIPUNCTURE: CPT | Performed by: HOSPITALIST

## 2022-10-06 PROCEDURE — 88305 TISSUE EXAM BY PATHOLOGIST: CPT | Mod: 26,,, | Performed by: PATHOLOGY

## 2022-10-06 PROCEDURE — 11000001 HC ACUTE MED/SURG PRIVATE ROOM

## 2022-10-06 PROCEDURE — 99233 PR SUBSEQUENT HOSPITAL CARE,LEVL III: ICD-10-PCS | Mod: ,,, | Performed by: HOSPITALIST

## 2022-10-06 PROCEDURE — 88342 SURGICAL PATHOLOGY: ICD-10-PCS | Mod: 26,,, | Performed by: PATHOLOGY

## 2022-10-06 PROCEDURE — D9220A PRA ANESTHESIA: Mod: ,,, | Performed by: NURSE ANESTHETIST, CERTIFIED REGISTERED

## 2022-10-06 PROCEDURE — 93005 ELECTROCARDIOGRAM TRACING: CPT

## 2022-10-06 PROCEDURE — 88305 SURGICAL PATHOLOGY: ICD-10-PCS | Mod: 26,,, | Performed by: PATHOLOGY

## 2022-10-06 PROCEDURE — 88305 TISSUE EXAM BY PATHOLOGIST: CPT | Mod: SUR | Performed by: INTERNAL MEDICINE

## 2022-10-06 PROCEDURE — 63600175 PHARM REV CODE 636 W HCPCS: Performed by: NURSE ANESTHETIST, CERTIFIED REGISTERED

## 2022-10-06 PROCEDURE — 85025 COMPLETE CBC W/AUTO DIFF WBC: CPT | Performed by: HOSPITALIST

## 2022-10-06 PROCEDURE — 25000003 PHARM REV CODE 250: Performed by: NURSE ANESTHETIST, CERTIFIED REGISTERED

## 2022-10-06 PROCEDURE — 25000003 PHARM REV CODE 250: Performed by: INTERNAL MEDICINE

## 2022-10-06 PROCEDURE — 93010 ELECTROCARDIOGRAM REPORT: CPT | Mod: ,,, | Performed by: HOSPITALIST

## 2022-10-06 PROCEDURE — 88342 IMHCHEM/IMCYTCHM 1ST ANTB: CPT | Mod: 26,,, | Performed by: PATHOLOGY

## 2022-10-06 PROCEDURE — 63600175 PHARM REV CODE 636 W HCPCS: Performed by: HOSPITALIST

## 2022-10-06 PROCEDURE — D9220A PRA ANESTHESIA: ICD-10-PCS | Mod: ,,, | Performed by: NURSE ANESTHETIST, CERTIFIED REGISTERED

## 2022-10-06 PROCEDURE — 43239 EGD BIOPSY SINGLE/MULTIPLE: CPT

## 2022-10-06 PROCEDURE — 80048 BASIC METABOLIC PNL TOTAL CA: CPT | Performed by: HOSPITALIST

## 2022-10-06 PROCEDURE — 63600175 PHARM REV CODE 636 W HCPCS: Mod: JG | Performed by: INTERNAL MEDICINE

## 2022-10-06 PROCEDURE — 82962 GLUCOSE BLOOD TEST: CPT

## 2022-10-06 RX ORDER — PANTOPRAZOLE SODIUM 40 MG/1
40 TABLET, DELAYED RELEASE ORAL DAILY
Status: DISCONTINUED | OUTPATIENT
Start: 2022-10-07 | End: 2022-10-11 | Stop reason: HOSPADM

## 2022-10-06 RX ORDER — PROPOFOL 10 MG/ML
VIAL (ML) INTRAVENOUS
Status: DISCONTINUED | OUTPATIENT
Start: 2022-10-06 | End: 2022-10-06

## 2022-10-06 RX ORDER — LIDOCAINE HYDROCHLORIDE 20 MG/ML
INJECTION, SOLUTION EPIDURAL; INFILTRATION; INTRACAUDAL; PERINEURAL
Status: DISCONTINUED | OUTPATIENT
Start: 2022-10-06 | End: 2022-10-06

## 2022-10-06 RX ORDER — INSULIN ASPART 100 [IU]/ML
1-10 INJECTION, SOLUTION INTRAVENOUS; SUBCUTANEOUS
Status: DISCONTINUED | OUTPATIENT
Start: 2022-10-06 | End: 2022-10-11 | Stop reason: HOSPADM

## 2022-10-06 RX ORDER — GLUCAGON 1 MG
1 KIT INJECTION
Status: DISCONTINUED | OUTPATIENT
Start: 2022-10-06 | End: 2022-10-11 | Stop reason: HOSPADM

## 2022-10-06 RX ORDER — POLYETHYLENE GLYCOL 3350, SODIUM SULFATE ANHYDROUS, SODIUM BICARBONATE, SODIUM CHLORIDE, POTASSIUM CHLORIDE 236; 22.74; 6.74; 5.86; 2.97 G/4L; G/4L; G/4L; G/4L; G/4L
4000 POWDER, FOR SOLUTION ORAL ONCE
Status: COMPLETED | OUTPATIENT
Start: 2022-10-06 | End: 2022-10-06

## 2022-10-06 RX ORDER — GABAPENTIN 100 MG/1
100 CAPSULE ORAL 3 TIMES DAILY
Status: DISCONTINUED | OUTPATIENT
Start: 2022-10-06 | End: 2022-10-11 | Stop reason: HOSPADM

## 2022-10-06 RX ADMIN — SODIUM CHLORIDE: 9 INJECTION, SOLUTION INTRAVENOUS at 09:10

## 2022-10-06 RX ADMIN — INSULIN DETEMIR 10 UNITS: 100 INJECTION, SOLUTION SUBCUTANEOUS at 09:10

## 2022-10-06 RX ADMIN — DEXTROSE MONOHYDRATE 12.5 G: 25 INJECTION, SOLUTION INTRAVENOUS at 09:10

## 2022-10-06 RX ADMIN — SODIUM CHLORIDE 25 MG: 9 INJECTION, SOLUTION INTRAVENOUS at 12:10

## 2022-10-06 RX ADMIN — FUROSEMIDE 40 MG: 10 INJECTION, SOLUTION INTRAMUSCULAR; INTRAVENOUS at 03:10

## 2022-10-06 RX ADMIN — POLYETHYLENE GLYCOL 3350, SODIUM SULFATE ANHYDROUS, SODIUM BICARBONATE, SODIUM CHLORIDE, POTASSIUM CHLORIDE 4000 ML: 236; 22.74; 6.74; 5.86; 2.97 POWDER, FOR SOLUTION ORAL at 05:10

## 2022-10-06 RX ADMIN — ACETAMINOPHEN 1000 MG: 500 TABLET ORAL at 09:10

## 2022-10-06 RX ADMIN — GABAPENTIN 100 MG: 100 CAPSULE ORAL at 09:10

## 2022-10-06 RX ADMIN — GABAPENTIN 100 MG: 100 CAPSULE ORAL at 02:10

## 2022-10-06 RX ADMIN — LEVOTHYROXINE SODIUM 100 MCG: 100 TABLET ORAL at 05:10

## 2022-10-06 RX ADMIN — PROPOFOL 40 MG: 10 INJECTION, EMULSION INTRAVENOUS at 09:10

## 2022-10-06 RX ADMIN — SODIUM CHLORIDE 500 ML: 9 INJECTION, SOLUTION INTRAVENOUS at 05:10

## 2022-10-06 RX ADMIN — SODIUM CHLORIDE 275 MG: 9 INJECTION, SOLUTION INTRAVENOUS at 05:10

## 2022-10-06 RX ADMIN — GABAPENTIN 100 MG: 100 CAPSULE ORAL at 08:10

## 2022-10-06 RX ADMIN — LIDOCAINE HYDROCHLORIDE 50 MG: 20 INJECTION, SOLUTION EPIDURAL; INFILTRATION; INTRACAUDAL; PERINEURAL at 09:10

## 2022-10-06 NOTE — ASSESSMENT & PLAN NOTE
Patient on ultram as OP.    Will continue but will encourage tylenol usage first.    No NSAIDS.

## 2022-10-06 NOTE — DISCHARGE INSTRUCTIONS
Procedure Date  10/7/22     Impression  Overall Impression: No bleeding was seen. Pan-diverticulosis was present. Inflamed external hemorrhoids were noted without active bleeding.     Recommendation    No further screening colonoscopies necessary      High fiber diet, stool softener as needed; topical hemorrhoid ointment as needed; resume po iron supplement. Obtain tagged RBC bleeding scan if pt has further evidence of gi bleeding.     Indication  Iron deficiency anemia, blood per rectum

## 2022-10-06 NOTE — H&P
[facility] - [address] [place] [address] Medicine  History & Physical    Patient Name: Vandana Allison  MRN: [id number]  Patient Class: IP- Inpatient  Admission Date: 10/5/2022  Attending Physician: Cornel Cardoza MD   Primary Care Provider: Cm Larson III, DO         Patient information was obtained from patient, relative(s), past medical records and ER records.     Subjective:     Principal Problem:Rectal bleeding    Chief Complaint:   Chief Complaint   Patient presents with    Hypotension        HPI: 89 yo F, who appears much younger than stated age, presents to the ED from Dr. Wood's office for hypotension and bradycardia.  Patient was sent for rectal bleeding that hs been occurring since June.  Patient has same problem five years ago and had internal hemorrhoids so was referred to Dr. Beatty.  Dr. Beatty referred patient for colonoscopy to Dr. Wood.      Patient EKG shows sinus bradycardia (patient on bisoprolol) and BMP shows TRISTAN and Na 118 (patient on lasix and HCT).  She really does not have any complaints except she states that he bottoms hurts where she is bleeding.  She is very active and doesn't report generalized weakness or syncope.  Her daughter is present and says they were just following up on their clinic appointments for the rectal bleeding.  She has had no weight loss or dysphagia.  She states she was working full time up until age 82.  Her hgb is 6.7 and a transfusion of pRBC going.  I will order anemia panel and coags ASAP.      Painful blisters noted at about T12 dermatome.  Patient is on gabapentin and will continue and will consult wound care.      Past Medical History:   Diagnosis Date    Arthritis     Chronic kidney disease, stage 3b     Diabetes mellitus, type 2     Hyperlipidemia     Hypertension     Neuropathy     Thyroid disease        Past Surgical History:   Procedure Laterality Date    BREAST SURGERY      Biopsy    EYE SURGERY      Remove cataracts both  eyes    HYSTERECTOMY      NEPHRECTOMY Right     THYROIDECTOMY         Review of patient's allergies indicates:   Allergen Reactions    Aspirin        No current facility-administered medications on file prior to encounter.     Current Outpatient Medications on File Prior to Encounter   Medication Sig    albuterol (PROVENTIL/VENTOLIN HFA) 90 mcg/actuation inhaler Inhale 2 puffs into the lungs every 4 (four) hours as needed. Rescue    amLODIPine (NORVASC) 5 MG tablet TAKE 1 TABLET BY MOUTH EVERY DAY    bisoprolol-hydrochlorothiazide 5-6.25 mg (ZIAC) 5-6.25 mg Tab TAKE 1 TABLET TWICE A DAY    empagliflozin (JARDIANCE) 10 mg tablet Take 1 tablet (10 mg total) by mouth once daily.    ferrous sulfate (FEOSOL) 325 mg (65 mg iron) Tab tablet Take by mouth 2 (two) times daily.    furosemide (LASIX) 40 MG tablet Take 1 tablet (40 mg total) by mouth once daily.    gabapentin (NEURONTIN) 400 MG capsule Take 1 capsule (400 mg total) by mouth 4 (four) times daily as needed (grgrgff).    hydrALAZINE (APRESOLINE) 25 MG tablet Take 1 tablet (25 mg total) by mouth 2 (two) times daily.    HYDROcodone-acetaminophen (NORCO)  mg per tablet Take 1 tablet by mouth 3 (three) times daily.    hydrocortisone (ANUSOL-HC) 25 mg suppository Place 1 suppository (25 mg total) rectally 2 (two) times daily.    insulin  unit/mL injection Inject 30 Units into the skin 2 (two) times daily before meals.    insulin NPH-insulin regular, 70/30, (NOVOLIN 70/30) 100 unit/mL (70-30) injection Inject 12 Units into the skin 2 (two) times a day.    lactulose (CHRONULAC) 10 gram/15 mL solution Take 30 mLs (20 g total) by mouth once daily.    levothyroxine (SYNTHROID) 100 MCG tablet Take 1 tablet (100 mcg total) by mouth before breakfast.    lisinopriL (PRINIVIL,ZESTRIL) 40 MG tablet TAKE 1 TABLET TWICE A DAY    methocarbamoL (ROBAXIN) 750 MG Tab TAKE 2 TABLETS BY MOUTH 4 TIMES A DAY AS NEEDED FOR MUSCLE SPASMS    NOVOLIN N NPH U-100 INSULIN 100  unit/mL injection Inject 40 Units into the skin 2 (two) times daily.    pantoprazole (PROTONIX) 40 MG tablet TAKE 1 TABLET BY MOUTH TWICE A DAY    potassium chloride SA (K-DUR,KLOR-CON) 10 MEQ tablet Take 1 tablet by mouth once daily.    predniSONE (DELTASONE) 2.5 MG tablet Take 2.5 mg by mouth once daily.    SYMBICORT 160-4.5 mcg/actuation HFAA Inhale 2 puffs into the lungs 2 (two) times a day.    traMADoL (ULTRAM) 50 mg tablet TAKE 1 TABLET BY MOUTH FOUR TIMES A DAY     Family History       Problem Relation (Age of Onset)    Cancer Sister, Brother, Daughter    Diabetes Mother, Sister, Brother, Sister    Heart disease Father          Tobacco Use    Smoking status: Never    Smokeless tobacco: Never   Substance and Sexual Activity    Alcohol use: Never    Drug use: Never    Sexual activity: Not Currently     Birth control/protection: None     Review of Systems   Constitutional:  Negative for appetite change, chills, fatigue, fever and unexpected weight change.   HENT:  Negative for congestion, mouth sores, nosebleeds, sinus pain, sore throat and trouble swallowing.    Respiratory:  Negative for apnea, cough, chest tightness and shortness of breath.    Cardiovascular:  Negative for chest pain, palpitations and leg swelling.   Gastrointestinal:  Positive for anal bleeding and rectal pain. Negative for abdominal pain, blood in stool, constipation, diarrhea, nausea and vomiting.   Genitourinary:  Negative for decreased urine volume, difficulty urinating, dysuria and frequency.   Musculoskeletal:  Negative for arthralgias, back pain and neck pain.   Skin:  Negative for rash.   Neurological:  Negative for syncope, light-headedness and headaches.   Hematological:  Does not bruise/bleed easily.   Psychiatric/Behavioral:  Negative for confusion and suicidal ideas.    Objective:     Vital Signs (Most Recent):  Temp: 98.1 °F (36.7 °C) (10/05/22 2345)  Pulse: (!) 41 (10/05/22 2345)  Resp: 18 (10/05/22 2345)  BP: 119/77 (10/05/22  2345)  SpO2: 95 % (10/05/22 2345)   Vital Signs (24h Range):  Temp:  [97.4 °F (36.3 °C)-98.1 °F (36.7 °C)] 98.1 °F (36.7 °C)  Pulse:  [39-53] 41  Resp:  [12-21] 18  SpO2:  [88 %-98 %] 95 %  BP: ()/(39-93) 119/77     Weight: 85.4 kg (188 lb 4.8 oz)  Body mass index is 31.33 kg/m².    Physical Exam  Constitutional:       Appearance: Normal appearance.   HENT:      Head: Atraumatic.      Nose: Nose normal.      Mouth/Throat:      Mouth: Mucous membranes are moist.      Pharynx: Oropharynx is clear.   Eyes:      Conjunctiva/sclera: Conjunctivae normal.      Pupils: Pupils are equal, round, and reactive to light.   Neck:      Vascular: No carotid bruit.   Cardiovascular:      Rate and Rhythm: Normal rate and regular rhythm.      Pulses: Normal pulses.      Heart sounds: Normal heart sounds.   Pulmonary:      Effort: Pulmonary effort is normal.      Breath sounds: Normal breath sounds.   Abdominal:      General: Abdomen is flat. Bowel sounds are normal. There is no distension.      Palpations: Abdomen is soft.      Tenderness: There is no abdominal tenderness. There is no guarding.   Musculoskeletal:         General: Normal range of motion.      Cervical back: Neck supple.      Right lower leg: Edema present.      Left lower leg: Edema present.   Skin:     General: Skin is warm and dry.      Capillary Refill: Capillary refill takes less than 2 seconds.      Coloration: Skin is not jaundiced or pale.      Findings: No bruising, lesion or rash.   Neurological:      General: No focal deficit present.      Mental Status: She is alert and oriented to person, place, and time.   Psychiatric:         Mood and Affect: Mood normal.         CRANIAL NERVES     CN III, IV, VI   Pupils are equal, round, and reactive to light.     Significant Labs: All pertinent labs within the past 24 hours have been reviewed.    Significant Imaging: I have reviewed all pertinent imaging results/findings within the past 24  hours.    Assessment/Plan:     * Rectal bleeding  Hold asa  MAREN for DVT prophylaxis which will also help with the peripheral edema.    Appreciate GI eval for endoscopy      Acute blood loss anemia  Transfuse as clinically indicated  Trend H/H  Check anemia panel and coags    TRISTAN  Renally dose all meds.    No NSAIDS  Stop ACEI  IVF and follow renal function.        Drug-induced bradycardia  Stop BB.  Monitor on telemetry  Trend troponins  Check TSH      Neuropathy  Continue gabapentin      Thyroid disease  Continue synthroid  Check TSH      Dehydration with hyponatremia  Hold diuretics  Gentle hydration  Follow electrolytes      Diabetes mellitus, type 2  Patient's FSGs are uncontrolled due to hyperglycemia on current medication regimen.  Last A1c reviewed-   Lab Results   Component Value Date    HGBA1C 10.5 (H) 08/02/2022     Most recent fingerstick glucose reviewed- No results for input(s): POCTGLUCOSE in the last 24 hours.  Current correctional scale  Low  Maintain anti-hyperglycemic dose as follows-   Antihyperglycemics (From admission, onward)      None          Hold Oral hypoglycemics while patient is in the hospital.  Basal/correctional insulin while NPO for endoscopic evaluation.      Arthritis  Patient on ultram as OP.    Will continue but will encourage tylenol usage first.    No NSAIDS.        Hypertension  Hold antihypertensive agents due to hypotension.        Hyperlipidemia  Continue statin.          VTE Risk Mitigation (From admission, onward)           Ordered     Place sequential compression device  Until discontinued         10/05/22 1918                       Carlene Noe MD  Department of Hospital Medicine   [facility] - [facility] [address] [facility] Tele[facility]

## 2022-10-06 NOTE — PROGRESS NOTES
[facility] - [facility] Telemetry  Wound Care    Patient Name:  Vandana Allison   MRN:  [id number]  Date: 10/6/2022  Diagnosis: Rectal bleeding    History:     Past Medical History:   Diagnosis Date    Arthritis     Chronic kidney disease, stage 3b     Diabetes mellitus, type 2     Hyperlipidemia     Hypertension     Neuropathy     Thyroid disease        Social History     Socioeconomic History    Marital status:    Tobacco Use    Smoking status: Never    Smokeless tobacco: Never   Substance and Sexual Activity    Alcohol use: Never    Drug use: Never    Sexual activity: Not Currently     Birth control/protection: None     Social Determinants of Health     Financial Resource Strain: Low Risk     Difficulty of Paying Living Expenses: Not hard at all   Food Insecurity: No Food Insecurity    Worried About Running Out of Food in the Last Year: Never true    Ran Out of Food in the Last Year: Never true   Transportation Needs: No Transportation Needs    Lack of Transportation (Medical): No    Lack of Transportation (Non-Medical): No   Physical Activity: Insufficiently Active    Days of Exercise per Week: 1 day    Minutes of Exercise per Session: 20 min   Stress: No Stress Concern Present    Feeling of Stress : Not at all   Social Connections: Moderately Isolated    Frequency of Communication with Friends and Family: More than three times a week    Frequency of Social Gatherings with Friends and Family: More than three times a week    Attends Latter-day Services: Never    Active Member of Clubs or Organizations: Yes    Attends Club or Organization Meetings: Never    Marital Status:    Housing Stability: Low Risk     Unable to Pay for Housing in the Last Year: No    Number of Places Lived in the Last Year: 1    Unstable Housing in the Last Year: No       Precautions:     Allergies as of 10/05/2022 - Reviewed 10/05/2022   Allergen Reaction Noted    Aspirin  08/30/2017       WOC Assessment  Details/Treatment        10/06/22 1448   WOCN Assessment   WOCN Total Time (mins) 45   Visit Date 10/06/22   Visit Time 1448   Consult Type New   WOCN Speciality Wound   Wound other  (unkown etiology)   Number of Wounds 1   Intervention assessed;chart review;orders   Teaching on-going;complication;discharge        Altered Skin Integrity 10/06/22 1449 Right Buttocks Other (comment)   Date First Assessed/Time First Assessed: 10/06/22 1449   Altered Skin Integrity Present on Admission: yes  Side: Right  Location: Buttocks  Primary Wound Type: (c) Other (comment)   Wound Image    Dressing Appearance Open to air   Drainage Amount Scant   Drainage Characteristics/Odor Serous   Appearance Pink;Moist   Tissue loss description Partial thickness   Periwound Area Intact;Dry   Wound Edges Irregular;Undefined       WOC Team consulted for blisters to right flank    Narrative: Pt awake and alert lying in bed. Pt stated the blisters were on her right buttocks. See above assessment findings.      Recommendations made to primary team for Right buttocks: DAILY  1. Spray pH balancing foam onto skin the gently wipe dry.  2. Apply vitamin A&D ointment to area    *DOCUMENT ASSESSMENT FINDINGS AND DRESSING CHANGE UNDER THE FLOWSHEETS/ADULT PCS ON THE APPROPRIATE LDA* .     Orders placed.    We will sign off. Please reconsult for any additional needs.    10/06/2022

## 2022-10-06 NOTE — CONSULTS
[facility]  Gastroenterology  Consult Note    Patient Name: Vandana Allison  MRN: [id number]  Admission Date: 10/5/2022  Hospital Length of Stay: 1 days  Code Status: Full Code   Attending Provider: David Garcia MD   Consulting Provider: Jerry Wood MD  Primary Care Physician: Cm Larson III, DO  Principal Problem:Rectal bleeding    Inpatient consult to Gastroenterology  Consult performed by: Jerry Wood MD  Consult ordered by: Carlene Noe MD      Subjective:     HPI: GI consult was received re: iron def anemia and gi bleeding. EGD reveals a hiatus hernia and large gastric polyps, biopsies were obtained. Pt gives history of prior hemorrhoid surgery and more recent fall followed by lower gi bleeding.    Past Medical History:   Diagnosis Date    Arthritis     Chronic kidney disease, stage 3b     Diabetes mellitus, type 2     Hyperlipidemia     Hypertension     Neuropathy     Thyroid disease        Past Surgical History:   Procedure Laterality Date    BREAST SURGERY      Biopsy    EYE SURGERY      Remove cataracts both eyes    HYSTERECTOMY      NEPHRECTOMY Right     THYROIDECTOMY         Review of patient's allergies indicates:   Allergen Reactions    Aspirin      Family History       Problem Relation (Age of Onset)    Cancer Sister, Brother, Daughter    Diabetes Mother, Sister, Brother, Sister    Heart disease Father          Tobacco Use    Smoking status: Never    Smokeless tobacco: Never   Substance and Sexual Activity    Alcohol use: Never    Drug use: Never    Sexual activity: Not Currently     Birth control/protection: None     Review of Systems  Objective:     Vital Signs (Most Recent):  Temp: 97 °F (36.1 °C) (10/06/22 0950)  Pulse: 64 (10/06/22 0953)  Resp: (!) 23 (10/06/22 0953)  BP: (!) 128/44 (10/06/22 0950)  SpO2: 98 % (10/06/22 0953)   Vital Signs (24h Range):  Temp:  [97 °F (36.1 °C)-98.2 °F (36.8 °C)] 97 °F (36.1 °C)  Pulse:  [39-67] 64  Resp:  [12-23] 23  SpO2:   [88 %-99 %] 98 %  BP: ()/(39-93) 128/44     Weight: 85.4 kg (188 lb 4.4 oz) (10/06/22 0927)  Body mass index is 31.33 kg/m².      Intake/Output Summary (Last 24 hours) at 10/6/2022 0955  Last data filed at 10/6/2022 0949  Gross per 24 hour   Intake 412.5 ml   Output --   Net 412.5 ml       Lines/Drains/Airways       Peripheral Intravenous Line  Duration                  Peripheral IV - Single Lumen 10/06/22 0615 22 G Distal;Posterior;Left Forearm <1 day                    Physical Exam    Significant Labs:  CBC:   Recent Labs   Lab 10/05/22  1641 10/05/22  2253 10/06/22  0800   WBC 12.00*  --  11.42*   HGB 6.7* 8.9* 9.3*   HCT 20.8* 27.3* 29.2*     --  388     CMP:   Recent Labs   Lab 10/05/22  1641 10/06/22  0408   * 90   CALCIUM 8.4* 8.7   ALBUMIN 2.9*  --    PROT 5.7*  --    * 126*   K 5.4* 4.9   CO2 20* 17*   CL 89* 100   BUN 42* 37*   CREATININE 3.08* 2.27*   ALKPHOS 161*  --    ALT 29  --    AST 31  --    BILITOT 0.4  --        Significant Imaging:  Imaging results within the past 24 hours have been reviewed.    Assessment/Plan:     Active Diagnoses:    Diagnosis Date Noted POA    PRINCIPAL PROBLEM:  Rectal bleeding [K62.5] 10/05/2022 Yes    Hypotension due to drugs [I95.2] 10/06/2022 Yes    Polyp of stomach [K31.7] 10/06/2022 Unknown    HH (hiatus hernia) [K44.9] 10/06/2022 Unknown    Iron deficiency anemia due to chronic blood loss [D50.0] 10/06/2022 Unknown    Thyroid disease [E07.9]  Yes    Neuropathy [G62.9]  Yes    Acute blood loss anemia [D62] 10/05/2022 Yes    Dehydration with hyponatremia [E86.0, E87.1] 10/05/2022 Yes    Acute kidney injury [N17.9] 10/05/2022 Yes    Bradycardia [R00.1] 10/05/2022 Yes    Diabetes mellitus, type 2 [E11.9]  Yes    Arthritis [M19.90] 04/22/2021 Yes    Hyperlipidemia [E78.5] 04/22/2021 Yes    Hypertension [I10] 04/22/2021 Yes      Problems Resolved During this Admission:       Imp: iron deficiency anemia; lower gi bleeding, hiatus hernia, gastric  polyps  Rec: IV iron supplement; follow-up gastric polyp pathology; prep for colonoscopy tomorrow.    Thank you for your consult. I will follow-up with patient. Please contact us if you have any additional questions.    Jerry Wood MD  Gastroenterology  [facility]

## 2022-10-06 NOTE — ASSESSMENT & PLAN NOTE
Hold asa  MAREN for DVT prophylaxis which will also help with the peripheral edema.    Appreciate GI eval for endoscopy

## 2022-10-06 NOTE — HPI
89 yo F, who appears much younger than stated age, presents to the ED from Dr. Wood's office for hypotension and bradycardia.  Patient was sent for rectal bleeding that hs been occurring since June.  Patient has same problem five years ago and had internal hemorrhoids so was referred to Dr. Beatty.  Dr. Beatty referred patient for colonoscopy to Dr. Wood.      Patient EKG shows sinus bradycardia (patient on bisoprolol) and BMP shows TRISTAN and Na 118 (patient on lasix and HCT).  She really does not have any complaints except she states that he bottoms hurts where she is bleeding.  She is very active and doesn't report generalized weakness or syncope.  Her daughter is present and says they were just following up on their clinic appointments for the rectal bleeding.  She has had no weight loss or dysphagia.  She states she was working full time up until age 82.  Her hgb is 6.7 and a transfusion of pRBC going.  I will order anemia panel and coags ASAP.

## 2022-10-06 NOTE — PLAN OF CARE
[facility] - [address] [place], [place] [address]etry  Initial Discharge Assessment       Primary Care Provider: Cm Larson III, DO    Admission Diagnosis: Bradycardia [R00.1]  Hyponatremia [E87.1]  Rectal bleeding [K62.5]  Hypotension [I95.9]  Acute kidney injury [N17.9]  Anemia, unspecified type [D64.9]    Admission Date: 10/5/2022  Expected Discharge Date:     Discharge Barriers Identified: None    Payor: [employer] / Plan: [employer] MEDICARE HMO / Product Type: Medicare Advantage /     Extended Emergency Contact Information  Primary Emergency Contact: Claribel Morelos  Mobile Phone: [phone]  Relation: Daughter  Preferred language: English   needed? No    Discharge Plan A: Home with family  Discharge Plan B: Home with family      [id number] [address] [address]  Phone: [phone] Fax: [phone]    [facility] - [place], [place] - [address] AT [facility] Reg[facility]  [address]  [facility] [address]  Phone: [phone] Fax: [phone]    [id number] - [place], [place] - [address] AT [facility]  [address]  [facility] [address]  Phone: [phone] Fax: [phone]      Initial Assessment (most recent)       Adult Discharge Assessment - 10/06/22 1401          Discharge Assessment    Assessment Type Discharge Planning Assessment     Source of Information patient     Communicated MARTHA with patient/caregiver Date not available/Unable to determine     Lives With child(radha), adult     Do you expect to return to your current living situation? Yes     Do you have help at home or someone to help you manage your care at home? Yes     Who are your caregiver(s) and their phone number(s)? Claribel Morelos (daughter) [phone]     Prior to hospitilization cognitive status: Alert/Oriented     Current cognitive status: Alert/Oriented     Walking or Climbing Stairs Difficulty  ambulation difficulty, requires equipment     Mobility Management walker     Dressing/Bathing Difficulty none     Home Accessibility wheelchair accessible     Home Layout Able to live on 1st floor     Equipment Currently Used at Home cane, straight;walker, rolling     Readmission within 30 days? No     Patient currently being followed by outpatient case management? No     Do you currently have service(s) that help you manage your care at home? No     Do you take prescription medications? Yes     Do you have prescription coverage? Yes     Do you have any problems affording any of your prescribed medications? No     Is the patient taking medications as prescribed? yes     Who is going to help you get home at discharge? Claribel Morelos (daughter) [phone]     How do you get to doctors appointments? family or friend will provide     Are you on dialysis? No     Do you take coumadin? No     Discharge Plan A Home with family     Discharge Plan B Home with family     DME Needed Upon Discharge  none     Discharge Plan discussed with: Patient     Discharge Barriers Identified None        Physical Activity    On average, how many days per week do you engage in moderate to strenuous exercise (like a brisk walk)? 1 day     On average, how many minutes do you engage in exercise at this level? 20 min        Financial Resource Strain    How hard is it for you to pay for the very basics like food, housing, medical care, and heating? Not hard at all        Housing Stability    In the last 12 months, was there a time when you were not able to pay the mortgage or rent on time? No     In the last 12 months, how many places have you lived? 1     In the last 12 months, was there a time when you did not have a steady place to sleep or slept in a shelter (including now)? No        Transportation Needs    In the past 12 months, has lack of transportation kept you from medical appointments or from getting medications? No     In the past 12  months, has lack of transportation kept you from meetings, work, or from getting things needed for daily living? No        Food Insecurity    Within the past 12 months, you worried that your food would run out before you got the money to buy more. Never true     Within the past 12 months, the food you bought just didn't last and you didn't have money to get more. Never true        Stress    Do you feel stress - tense, restless, nervous, or anxious, or unable to sleep at night because your mind is troubled all the time - these days? Not at all        Social Connections    In a typical week, how many times do you talk on the phone with family, friends, or neighbors? More than three times a week     How often do you get together with friends or relatives? More than three times a week     How often do you attend Jewish or Sikh services? Never     Do you belong to any clubs or organizations such as Jewish groups, unions, fraternal or athletic groups, or school groups? Yes     How often do you attend meetings of the clubs or organizations you belong to? Never     Are you , , , , never , or living with a partner?         Alcohol Use    Q1: How often do you have a drink containing alcohol? Never     Q2: How many drinks containing alcohol do you have on a typical day when you are drinking? Patient does not drink     Q3: How often do you have six or more drinks on one occasion? Never        Relationship/Environment    Name(s) of Who Lives With Patient Claribel Morelos (daughter) [phone]                      Patient lives at home with her daughter. She has a walker and cane for home use. Plans to return home at discharge. IMM reviewed and signature obtained. SDOH complete. SS following for discharge needs as arise.

## 2022-10-06 NOTE — H&P
[facility]  Gastroenterology  H&P    Patient Name: Vandana Allison  MRN: [id number]  Admission Date: 10/5/2022  Code Status: Full Code    Attending Provider: David Garcia MD   Primary Care Physician: Cm Larson III, DO  Principal Problem:Rectal bleeding    Subjective:     History of Present Illness: Pt has iron deficiency anemia and hx of blood per rectum; for egd.    Past Medical History:   Diagnosis Date    Arthritis     Chronic kidney disease, stage 3b     Diabetes mellitus, type 2     Hyperlipidemia     Hypertension     Neuropathy     Thyroid disease        Past Surgical History:   Procedure Laterality Date    BREAST SURGERY      Biopsy    EYE SURGERY      Remove cataracts both eyes    HYSTERECTOMY      NEPHRECTOMY Right     THYROIDECTOMY         Review of patient's allergies indicates:   Allergen Reactions    Aspirin      Family History       Problem Relation (Age of Onset)    Cancer Sister, Brother, Daughter    Diabetes Mother, Sister, Brother, Sister    Heart disease Father          Tobacco Use    Smoking status: Never    Smokeless tobacco: Never   Substance and Sexual Activity    Alcohol use: Never    Drug use: Never    Sexual activity: Not Currently     Birth control/protection: None     Review of Systems   Respiratory: Negative.     Cardiovascular: Negative.    Gastrointestinal:  Positive for blood in stool.   Musculoskeletal:  Positive for arthralgias and back pain.   Objective:     Vital Signs (Most Recent):  Temp: 98.2 °F (36.8 °C) (10/06/22 0710)  Pulse: 67 (10/06/22 0927)  Resp: 16 (10/06/22 0927)  BP: (!) 150/51 (10/06/22 0927)  SpO2: 98 % (10/06/22 0927)   Vital Signs (24h Range):  Temp:  [97.4 °F (36.3 °C)-98.2 °F (36.8 °C)] 98.2 °F (36.8 °C)  Pulse:  [39-67] 67  Resp:  [12-21] 16  SpO2:  [88 %-98 %] 98 %  BP: ()/(39-93) 150/51     Weight: 85.4 kg (188 lb 4.4 oz) (10/06/22 0927)  Body mass index is 31.33 kg/m².      Intake/Output Summary (Last 24 hours) at 10/6/2022  0941  Last data filed at 10/5/2022 2125  Gross per 24 hour   Intake 312.5 ml   Output --   Net 312.5 ml       Lines/Drains/Airways       Peripheral Intravenous Line  Duration                  Peripheral IV - Single Lumen 10/06/22 0615 22 G Distal;Posterior;Left Forearm <1 day                    Physical Exam  Vitals reviewed.   Constitutional:       General: She is not in acute distress.     Appearance: Normal appearance. She is well-developed. She is ill-appearing.   HENT:      Head: Normocephalic and atraumatic.      Nose: Nose normal.   Eyes:      Pupils: Pupils are equal, round, and reactive to light.   Cardiovascular:      Rate and Rhythm: Normal rate and regular rhythm.   Pulmonary:      Effort: Pulmonary effort is normal.      Breath sounds: Normal breath sounds. No wheezing.   Abdominal:      General: Abdomen is flat. Bowel sounds are normal. There is no distension.      Palpations: Abdomen is soft.      Tenderness: There is no abdominal tenderness. There is no guarding.   Skin:     General: Skin is warm and dry.      Coloration: Skin is not jaundiced.   Neurological:      Mental Status: She is alert.   Psychiatric:         Attention and Perception: Attention normal.         Mood and Affect: Affect normal.         Speech: Speech normal.         Behavior: Behavior is cooperative.      Comments: Pt was calm while speaking.       Significant Labs:  CBC:   Recent Labs   Lab 10/05/22  1641 10/05/22  2253 10/06/22  0800   WBC 12.00*  --  11.42*   HGB 6.7* 8.9* 9.3*   HCT 20.8* 27.3* 29.2*     --  388     CMP:   Recent Labs   Lab 10/05/22  1641 10/06/22  0408   * 90   CALCIUM 8.4* 8.7   ALBUMIN 2.9*  --    PROT 5.7*  --    * 126*   K 5.4* 4.9   CO2 20* 17*   CL 89* 100   BUN 42* 37*   CREATININE 3.08* 2.27*   ALKPHOS 161*  --    ALT 29  --    AST 31  --    BILITOT 0.4  --        Significant Imaging:  Imaging results within the past 24 hours have been reviewed.    Assessment/Plan:     Active  Diagnoses:    Diagnosis Date Noted POA    PRINCIPAL PROBLEM:  Rectal bleeding [K62.5] 10/05/2022 Yes    Hypotension due to drugs [I95.2] 10/06/2022 Yes    Thyroid disease [E07.9]  Yes    Neuropathy [G62.9]  Yes    Acute blood loss anemia [D62] 10/05/2022 Yes    Dehydration with hyponatremia [E86.0, E87.1] 10/05/2022 Yes    Acute kidney injury [N17.9] 10/05/2022 Yes    Bradycardia [R00.1] 10/05/2022 Yes    Diabetes mellitus, type 2 [E11.9]  Yes    Arthritis [M19.90] 04/22/2021 Yes    Hyperlipidemia [E78.5] 04/22/2021 Yes    Hypertension [I10] 04/22/2021 Yes      Problems Resolved During this Admission:       Imp: iron deficiency anemia; blood per rectum  Plan: egd    Jerry Wood MD  Gastroenterology  [facility]

## 2022-10-06 NOTE — ANESTHESIA PREPROCEDURE EVALUATION
10/06/2022  Vandana Allison is a 89 y.o., female.      Pre-op Assessment    I have reviewed the Patient Summary Reports.     I have reviewed the Nursing Notes. I have reviewed the NPO Status.   I have reviewed the Medications.     Review of Systems  Anesthesia Hx:  History of prior surgery of interest to airway management or planning: Denies Family Hx of Anesthesia complications.   Denies Personal Hx of Anesthesia complications.   Hematology/Oncology:         -- Anemia:   Cardiovascular:   Hypertension hyperlipidemia    Renal/:   Chronic Renal Disease, CKD    Musculoskeletal:   Arthritis     Endocrine:   Diabetes, well controlled, type 2, using insulin Hypothyroidism           Anesthesia Plan  Type of Anesthesia, risks & benefits discussed:    Anesthesia Type: Gen Natural Airway  Intra-op Monitoring Plan: Standard ASA Monitors  Post Op Pain Control Plan: multimodal analgesia  Induction:  IV  Informed Consent: Informed consent signed with the Patient and all parties understand the risks and agree with anesthesia plan.  All questions answered. Patient consented to blood products? Yes  ASA Score: 3  Day of Surgery Review of History & Physical: I have interviewed and examined the patient. I have reviewed the patient's H&P dated: There are no significant changes.     Ready For Surgery From Anesthesia Perspective.     .    Past Medical History:   Diagnosis Date    Arthritis     Chronic kidney disease, stage 3b     Diabetes mellitus, type 2     Hyperlipidemia     Hypertension     Neuropathy     Thyroid disease        Past Surgical History:   Procedure Laterality Date    BREAST SURGERY      Biopsy    EYE SURGERY      Remove cataracts both eyes    HYSTERECTOMY      NEPHRECTOMY Right     THYROIDECTOMY         Family History   Problem Relation Age of Onset    Diabetes Mother         Diabetic    Heart  disease Father     Diabetes Sister     Cancer Sister         Breast cancer    Diabetes Brother     Cancer Brother         Prostate cancer    Cancer Daughter         Uterine cancer    Diabetes Sister         Diabetic       Social History     Socioeconomic History    Marital status:    Tobacco Use    Smoking status: Never    Smokeless tobacco: Never   Substance and Sexual Activity    Alcohol use: Never    Drug use: Never    Sexual activity: Not Currently     Birth control/protection: None       Current Facility-Administered Medications   Medication Dose Route Frequency Provider Last Rate Last Admin    0.9%  NaCl infusion (for blood administration)   Intravenous Q24H PRN NIKOS Pandya   New Bag at 10/05/22 2125    0.9%  NaCl infusion   Intravenous Continuous Carlene Noe  mL/hr at 10/05/22 2224 New Bag at 10/05/22 2224    acetaminophen tablet 1,000 mg  1,000 mg Oral Q6H PRN Carlene Noe MD        bisacodyL EC tablet 10 mg  10 mg Oral Daily PRN Carlene Noe MD        dextromethorphan-guaiFENesin  mg/5 ml liquid 10 mL  10 mL Oral Q6H PRN Carlene Noe MD        dextrose 50% injection 12.5 g  12.5 g Intravenous PRN Carlene Noe MD        dextrose 50% injection 12.5 g  12.5 g Intravenous PRN Carelne Noe MD   12.5 g at 10/06/22 0904    dextrose 50% injection 25 g  25 g Intravenous PRN Carlene Noe MD        dextrose 50% injection 25 g  25 g Intravenous PRN Carlene Noe MD        gabapentin capsule 100 mg  100 mg Oral TID Carlene Noe MD   100 mg at 10/06/22 0859    glucagon (human recombinant) injection 1 mg  1 mg Intramuscular PRN Carlene Noe MD        glucagon (human recombinant) injection 1 mg  1 mg Intramuscular PRN Carlene Noe MD        insulin aspart U-100 injection 1-10 Units  1-10 Units Subcutaneous QID (AC + HS) PRN Carlene Noe MD        insulin detemir U-100 injection 10 Units   10 Units Subcutaneous QHS Carlene Noe MD        levothyroxine tablet 100 mcg  100 mcg Oral Before breakfast Carlene Noe MD   100 mcg at 10/06/22 0523    ondansetron injection 8 mg  8 mg Intravenous Q6H PRN Carlene Noe MD        simethicone chewable tablet 80 mg  1 tablet Oral TID PRN Carlene Noe MD        traZODone tablet 50 mg  50 mg Oral Nightly PRN Carlene Noe MD           Review of patient's allergies indicates:   Allergen Reactions    Aspirin        Facility-Administered Medications as of 10/6/2022   Medication Dose Route Frequency Provider Last Rate Last Admin    0.9%  NaCl infusion (for blood administration)   Intravenous Q24H PRN NIKOS Pandya   New Bag at 10/05/22 2125    [COMPLETED] 0.9%  NaCl infusion  1,000 mL Intravenous ED 1 Time NIKOS Pandya   Stopped at 10/05/22 1830    0.9%  NaCl infusion   Intravenous Continuous Carlene Noe  mL/hr at 10/05/22 2224 New Bag at 10/05/22 2224    acetaminophen tablet 1,000 mg  1,000 mg Oral Q6H PRN Carlene Noe MD        [COMPLETED] atropine injection 0.5 mg  0.5 mg Intravenous ED 1 Time Vadim Sainz MD   0.5 mg at 10/05/22 1756    bisacodyL EC tablet 10 mg  10 mg Oral Daily PRN Carlene Noe MD        dextromethorphan-guaiFENesin  mg/5 ml liquid 10 mL  10 mL Oral Q6H PRN Carlene Noe MD        dextrose 50% injection 12.5 g  12.5 g Intravenous PRN Carlene Noe MD        dextrose 50% injection 12.5 g  12.5 g Intravenous PRN Carlene Noe MD   12.5 g at 10/06/22 0904    dextrose 50% injection 25 g  25 g Intravenous PRN Carlene Noe MD        dextrose 50% injection 25 g  25 g Intravenous PRN Carlene Noe MD        gabapentin capsule 100 mg  100 mg Oral TID Carlene Noe MD   100 mg at 10/06/22 0859    glucagon (human recombinant) injection 1 mg  1 mg Intramuscular PRN Carlene Noe MD        glucagon (human  recombinant) injection 1 mg  1 mg Intramuscular PRN Carlene Noe MD        insulin aspart U-100 injection 1-10 Units  1-10 Units Subcutaneous QID (AC + HS) PRN Carlene Noe MD        insulin detemir U-100 injection 10 Units  10 Units Subcutaneous QHS Carlene Noe MD        levothyroxine tablet 100 mcg  100 mcg Oral Before breakfast Carlene Noe MD   100 mcg at 10/06/22 0523    ondansetron injection 8 mg  8 mg Intravenous Q6H PRN Carlene Noe MD        simethicone chewable tablet 80 mg  1 tablet Oral TID PRN Carlene Noe MD        [COMPLETED] sodium chloride 0.9% bolus 500 mL  500 mL Intravenous Once Carlene Noe MD   Stopped at 10/06/22 0723    traZODone tablet 50 mg  50 mg Oral Nightly PRN Carlene Noe MD         Outpatient Medications as of 10/6/2022   Medication Sig Dispense Refill    albuterol (PROVENTIL/VENTOLIN HFA) 90 mcg/actuation inhaler Inhale 2 puffs into the lungs every 4 (four) hours as needed. Rescue 18 g 12    amLODIPine (NORVASC) 5 MG tablet TAKE 1 TABLET BY MOUTH EVERY DAY 90 tablet 3    bisoprolol-hydrochlorothiazide 5-6.25 mg (ZIAC) 5-6.25 mg Tab TAKE 1 TABLET TWICE A  tablet 3    empagliflozin (JARDIANCE) 10 mg tablet Take 1 tablet (10 mg total) by mouth once daily. 90 tablet 3    ferrous sulfate (FEOSOL) 325 mg (65 mg iron) Tab tablet Take by mouth 2 (two) times daily.      furosemide (LASIX) 40 MG tablet Take 1 tablet (40 mg total) by mouth once daily. 90 tablet 3    gabapentin (NEURONTIN) 400 MG capsule Take 1 capsule (400 mg total) by mouth 4 (four) times daily as needed (grgrgff). 360 capsule 3    hydrALAZINE (APRESOLINE) 25 MG tablet Take 1 tablet (25 mg total) by mouth 2 (two) times daily. 180 tablet 3    HYDROcodone-acetaminophen (NORCO)  mg per tablet Take 1 tablet by mouth 3 (three) times daily. 90 tablet 0    hydrocortisone (ANUSOL-HC) 25 mg suppository Place 1 suppository (25 mg total) rectally 2  (two) times daily. 20 suppository 1    insulin  unit/mL injection Inject 30 Units into the skin 2 (two) times daily before meals.      insulin NPH-insulin regular, 70/30, (NOVOLIN 70/30) 100 unit/mL (70-30) injection Inject 12 Units into the skin 2 (two) times a day.      lactulose (CHRONULAC) 10 gram/15 mL solution Take 30 mLs (20 g total) by mouth once daily. 946 mL 11    levothyroxine (SYNTHROID) 100 MCG tablet Take 1 tablet (100 mcg total) by mouth before breakfast. 90 tablet 11    lisinopriL (PRINIVIL,ZESTRIL) 40 MG tablet TAKE 1 TABLET TWICE A  tablet 3    methocarbamoL (ROBAXIN) 750 MG Tab TAKE 2 TABLETS BY MOUTH 4 TIMES A DAY AS NEEDED FOR MUSCLE SPASMS 240 tablet 1    NOVOLIN N NPH U-100 INSULIN 100 unit/mL injection Inject 40 Units into the skin 2 (two) times daily. 10 mL 11    pantoprazole (PROTONIX) 40 MG tablet TAKE 1 TABLET BY MOUTH TWICE A  tablet 3    potassium chloride SA (K-DUR,KLOR-CON) 10 MEQ tablet Take 1 tablet by mouth once daily.      predniSONE (DELTASONE) 2.5 MG tablet Take 2.5 mg by mouth once daily.      SYMBICORT 160-4.5 mcg/actuation HFAA Inhale 2 puffs into the lungs 2 (two) times a day. 10.2 g 11    traMADoL (ULTRAM) 50 mg tablet TAKE 1 TABLET BY MOUTH FOUR TIMES A  tablet 5

## 2022-10-06 NOTE — ANESTHESIA POSTPROCEDURE EVALUATION
Anesthesia Post Evaluation    Patient: Vandana Allison    Procedure(s) Performed: EGD    Final Anesthesia Type: general      Patient location during evaluation: GI PACU  Patient participation: Yes- Able to Participate  Level of consciousness: awake and alert  Post-procedure vital signs: reviewed and stable  Pain management: adequate  Airway patency: patent  JEFF mitigation strategies: Multimodal analgesia  PONV status at discharge: No PONV  Anesthetic complications: no      Cardiovascular status: blood pressure returned to baseline  Respiratory status: room air  Hydration status: euvolemic  Follow-up not needed.          Vitals Value Taken Time   /68 10/06/22 1030   Temp 36.6 °C (97.8 °F) 10/06/22 1030   Pulse 65 10/06/22 1030   Resp 20 10/06/22 1030   SpO2 96 % 10/06/22 1030         Event Time   Out of Recovery 10/06/2022 10:42:25         Pain/Dulce Score: Dulce Score: 10 (10/6/2022  9:54 AM)

## 2022-10-06 NOTE — PLAN OF CARE
Problem: Adult Inpatient Plan of Care  Goal: Plan of Care Review  Outcome: Ongoing, Progressing  Goal: Patient-Specific Goal (Individualized)  Outcome: Ongoing, Progressing  Goal: Absence of Hospital-Acquired Illness or Injury  Outcome: Ongoing, Progressing  Goal: Optimal Comfort and Wellbeing  Outcome: Ongoing, Progressing  Goal: Readiness for Transition of Care  Outcome: Ongoing, Progressing     Problem: Fall Injury Risk  Goal: Absence of Fall and Fall-Related Injury  Outcome: Ongoing, Progressing     Problem: Diabetes Comorbidity  Goal: Blood Glucose Level Within Targeted Range  Outcome: Ongoing, Progressing     Problem: Fluid and Electrolyte Imbalance (Acute Kidney Injury/Impairment)  Goal: Fluid and Electrolyte Balance  Outcome: Ongoing, Progressing     Problem: Oral Intake Inadequate (Acute Kidney Injury/Impairment)  Goal: Optimal Nutrition Intake  Outcome: Ongoing, Progressing     Problem: Renal Function Impairment (Acute Kidney Injury/Impairment)  Goal: Effective Renal Function  Outcome: Ongoing, Progressing     Problem: Impaired Wound Healing  Goal: Optimal Wound Healing  Outcome: Ongoing, Progressing

## 2022-10-06 NOTE — SUBJECTIVE & OBJECTIVE
Past Medical History:   Diagnosis Date    Arthritis     Chronic kidney disease, stage 3b     Diabetes mellitus, type 2     Hyperlipidemia     Hypertension     Neuropathy     Thyroid disease        Past Surgical History:   Procedure Laterality Date    BREAST SURGERY      Biopsy    EYE SURGERY      Remove cataracts both eyes    HYSTERECTOMY      NEPHRECTOMY Right     THYROIDECTOMY         Review of patient's allergies indicates:   Allergen Reactions    Aspirin        No current facility-administered medications on file prior to encounter.     Current Outpatient Medications on File Prior to Encounter   Medication Sig    albuterol (PROVENTIL/VENTOLIN HFA) 90 mcg/actuation inhaler Inhale 2 puffs into the lungs every 4 (four) hours as needed. Rescue    amLODIPine (NORVASC) 5 MG tablet TAKE 1 TABLET BY MOUTH EVERY DAY    bisoprolol-hydrochlorothiazide 5-6.25 mg (ZIAC) 5-6.25 mg Tab TAKE 1 TABLET TWICE A DAY    empagliflozin (JARDIANCE) 10 mg tablet Take 1 tablet (10 mg total) by mouth once daily.    ferrous sulfate (FEOSOL) 325 mg (65 mg iron) Tab tablet Take by mouth 2 (two) times daily.    furosemide (LASIX) 40 MG tablet Take 1 tablet (40 mg total) by mouth once daily.    gabapentin (NEURONTIN) 400 MG capsule Take 1 capsule (400 mg total) by mouth 4 (four) times daily as needed (grgrgff).    hydrALAZINE (APRESOLINE) 25 MG tablet Take 1 tablet (25 mg total) by mouth 2 (two) times daily.    HYDROcodone-acetaminophen (NORCO)  mg per tablet Take 1 tablet by mouth 3 (three) times daily.    hydrocortisone (ANUSOL-HC) 25 mg suppository Place 1 suppository (25 mg total) rectally 2 (two) times daily.    insulin  unit/mL injection Inject 30 Units into the skin 2 (two) times daily before meals.    insulin NPH-insulin regular, 70/30, (NOVOLIN 70/30) 100 unit/mL (70-30) injection Inject 12 Units into the skin 2 (two) times a day.    lactulose (CHRONULAC) 10 gram/15 mL solution Take 30 mLs (20 g total) by mouth once  daily.    levothyroxine (SYNTHROID) 100 MCG tablet Take 1 tablet (100 mcg total) by mouth before breakfast.    lisinopriL (PRINIVIL,ZESTRIL) 40 MG tablet TAKE 1 TABLET TWICE A DAY    methocarbamoL (ROBAXIN) 750 MG Tab TAKE 2 TABLETS BY MOUTH 4 TIMES A DAY AS NEEDED FOR MUSCLE SPASMS    NOVOLIN N NPH U-100 INSULIN 100 unit/mL injection Inject 40 Units into the skin 2 (two) times daily.    pantoprazole (PROTONIX) 40 MG tablet TAKE 1 TABLET BY MOUTH TWICE A DAY    potassium chloride SA (K-DUR,KLOR-CON) 10 MEQ tablet Take 1 tablet by mouth once daily.    predniSONE (DELTASONE) 2.5 MG tablet Take 2.5 mg by mouth once daily.    SYMBICORT 160-4.5 mcg/actuation HFAA Inhale 2 puffs into the lungs 2 (two) times a day.    traMADoL (ULTRAM) 50 mg tablet TAKE 1 TABLET BY MOUTH FOUR TIMES A DAY     Family History       Problem Relation (Age of Onset)    Cancer Sister, Brother, Daughter    Diabetes Mother, Sister, Brother, Sister    Heart disease Father          Tobacco Use    Smoking status: Never    Smokeless tobacco: Never   Substance and Sexual Activity    Alcohol use: Never    Drug use: Never    Sexual activity: Not Currently     Birth control/protection: None     Review of Systems   Constitutional:  Negative for appetite change, chills, fatigue, fever and unexpected weight change.   HENT:  Negative for congestion, mouth sores, nosebleeds, sinus pain, sore throat and trouble swallowing.    Respiratory:  Negative for apnea, cough, chest tightness and shortness of breath.    Cardiovascular:  Negative for chest pain, palpitations and leg swelling.   Gastrointestinal:  Positive for anal bleeding and rectal pain. Negative for abdominal pain, blood in stool, constipation, diarrhea, nausea and vomiting.   Genitourinary:  Negative for decreased urine volume, difficulty urinating, dysuria and frequency.   Musculoskeletal:  Negative for arthralgias, back pain and neck pain.   Skin:  Negative for rash.   Neurological:  Negative for  syncope, light-headedness and headaches.   Hematological:  Does not bruise/bleed easily.   Psychiatric/Behavioral:  Negative for confusion and suicidal ideas.    Objective:     Vital Signs (Most Recent):  Temp: 98.1 °F (36.7 °C) (10/05/22 2345)  Pulse: (!) 41 (10/05/22 2345)  Resp: 18 (10/05/22 2345)  BP: 119/77 (10/05/22 2345)  SpO2: 95 % (10/05/22 2345)   Vital Signs (24h Range):  Temp:  [97.4 °F (36.3 °C)-98.1 °F (36.7 °C)] 98.1 °F (36.7 °C)  Pulse:  [39-53] 41  Resp:  [12-21] 18  SpO2:  [88 %-98 %] 95 %  BP: ()/(39-93) 119/77     Weight: 85.4 kg (188 lb 4.8 oz)  Body mass index is 31.33 kg/m².    Physical Exam  Constitutional:       Appearance: Normal appearance.   HENT:      Head: Atraumatic.      Nose: Nose normal.      Mouth/Throat:      Mouth: Mucous membranes are moist.      Pharynx: Oropharynx is clear.   Eyes:      Conjunctiva/sclera: Conjunctivae normal.      Pupils: Pupils are equal, round, and reactive to light.   Neck:      Vascular: No carotid bruit.   Cardiovascular:      Rate and Rhythm: Normal rate and regular rhythm.      Pulses: Normal pulses.      Heart sounds: Normal heart sounds.   Pulmonary:      Effort: Pulmonary effort is normal.      Breath sounds: Normal breath sounds.   Abdominal:      General: Abdomen is flat. Bowel sounds are normal. There is no distension.      Palpations: Abdomen is soft.      Tenderness: There is no abdominal tenderness. There is no guarding.   Musculoskeletal:         General: Normal range of motion.      Cervical back: Neck supple.      Right lower leg: Edema present.      Left lower leg: Edema present.   Skin:     General: Skin is warm and dry.      Capillary Refill: Capillary refill takes less than 2 seconds.      Coloration: Skin is not jaundiced or pale.      Findings: No bruising, lesion or rash.   Neurological:      General: No focal deficit present.      Mental Status: She is alert and oriented to person, place, and time.   Psychiatric:         Mood  and Affect: Mood normal.         CRANIAL NERVES     CN III, IV, VI   Pupils are equal, round, and reactive to light.     Significant Labs: All pertinent labs within the past 24 hours have been reviewed.    Significant Imaging: I have reviewed all pertinent imaging results/findings within the past 24 hours.

## 2022-10-06 NOTE — TRANSFER OF CARE
"Anesthesia Transfer of Care Note    Patient: Vandana Allison    Procedure(s) Performed: EGD    Patient location: GI    Anesthesia Type: general    Transport from OR: Transported from OR on room air with adequate spontaneous ventilation    Post pain: adequate analgesia    Post assessment: no apparent anesthetic complications    Post vital signs: stable    Level of consciousness: awake and alert    Nausea/Vomiting: no nausea/vomiting    Complications: none    Transfer of care protocol was followed      Last vitals:   Visit Vitals  BP (!) 128/44   Pulse 65   Temp 36.1 °C (97 °F) (Oral)   Resp 14   Ht 5' 5" (1.651 m)   Wt 85.4 kg (188 lb 4.4 oz)   SpO2 99%   Breastfeeding No   BMI 31.33 kg/m²     "

## 2022-10-06 NOTE — ASSESSMENT & PLAN NOTE
Patient's FSGs are uncontrolled due to hyperglycemia on current medication regimen.  Last A1c reviewed-   Lab Results   Component Value Date    HGBA1C 10.5 (H) 08/02/2022     Most recent fingerstick glucose reviewed- No results for input(s): POCTGLUCOSE in the last 24 hours.  Current correctional scale  Low  Maintain anti-hyperglycemic dose as follows-   Antihyperglycemics (From admission, onward)    None        Hold Oral hypoglycemics while patient is in the hospital.  Basal/correctional insulin while NPO for endoscopic evaluation.

## 2022-10-06 NOTE — PLAN OF CARE
Problem: Adult Inpatient Plan of Care  Goal: Plan of Care Review  Outcome: Ongoing, Progressing  Flowsheets (Taken 10/6/2022 0015)  Plan of Care Reviewed With: patient  Goal: Optimal Comfort and Wellbeing  Outcome: Ongoing, Progressing  Intervention: Monitor Pain and Promote Comfort  Flowsheets (Taken 10/6/2022 0015)  Pain Management Interventions:   pillow support provided   position adjusted   pain management plan reviewed with patient/caregiver   quiet environment facilitated   relaxation techniques promoted  Intervention: Provide Person-Centered Care  Flowsheets (Taken 10/6/2022 0015)  Trust Relationship/Rapport:   care explained   choices provided   emotional support provided   empathic listening provided   questions answered   questions encouraged   reassurance provided   thoughts/feelings acknowledged     Problem: Fall Injury Risk  Goal: Absence of Fall and Fall-Related Injury  Outcome: Ongoing, Progressing  Intervention: Identify and Manage Contributors  Flowsheets (Taken 10/6/2022 0015)  Self-Care Promotion:   independence encouraged   safe use of adaptive equipment encouraged   BADL personal objects within reach   BADL personal routines maintained   meal set-up provided  Medication Review/Management: medications reviewed     Problem: Diabetes Comorbidity  Goal: Blood Glucose Level Within Targeted Range  Outcome: Ongoing, Progressing  Intervention: Monitor and Manage Glycemia  Flowsheets (Taken 10/6/2022 0015)  Glycemic Management:   blood glucose monitored   supplemental insulin given     Problem: Fluid and Electrolyte Imbalance (Acute Kidney Injury/Impairment)  Goal: Fluid and Electrolyte Balance  Outcome: Ongoing, Progressing  Intervention: Monitor and Manage Fluid and Electrolyte Balance  Flowsheets (Taken 10/6/2022 0015)  Fluid/Electrolyte Management: fluids provided     Problem: Oral Intake Inadequate (Acute Kidney Injury/Impairment)  Goal: Optimal Nutrition Intake  Outcome: Ongoing,  Progressing  Intervention: Promote and Optimize Nutrition  Flowsheets (Taken 10/6/2022 0015)  Oral Nutrition Promotion:   physical activity promoted   rest periods promoted   safe use of adaptive equipment encouraged   social interaction promoted     Problem: Renal Function Impairment (Acute Kidney Injury/Impairment)  Goal: Effective Renal Function  Outcome: Ongoing, Progressing  Intervention: Monitor and Support Renal Function  Flowsheets (Taken 10/6/2022 0015)  Medication Review/Management: medications reviewed

## 2022-10-07 ENCOUNTER — ANESTHESIA (OUTPATIENT)
Dept: GASTROENTEROLOGY | Facility: HOSPITAL | Age: 87
DRG: 378 | End: 2022-10-07
Payer: COMMERCIAL

## 2022-10-07 ENCOUNTER — ANESTHESIA EVENT (OUTPATIENT)
Dept: GASTROENTEROLOGY | Facility: HOSPITAL | Age: 87
DRG: 378 | End: 2022-10-07
Payer: COMMERCIAL

## 2022-10-07 PROBLEM — K57.30 COLON, DIVERTICULOSIS: Status: ACTIVE | Noted: 2022-10-07

## 2022-10-07 PROBLEM — K64.4 EXTERNAL HEMORRHOIDS: Status: ACTIVE | Noted: 2022-10-07

## 2022-10-07 LAB
ANION GAP SERPL CALCULATED.3IONS-SCNC: 13 MMOL/L (ref 7–16)
BASOPHILS # BLD AUTO: 0.03 K/UL (ref 0–0.2)
BASOPHILS NFR BLD AUTO: 0.4 % (ref 0–1)
BUN SERPL-MCNC: 24 MG/DL (ref 7–18)
BUN/CREAT SERPL: 19 (ref 6–20)
CALCIUM SERPL-MCNC: 8.2 MG/DL (ref 8.5–10.1)
CHLORIDE SERPL-SCNC: 105 MMOL/L (ref 98–107)
CO2 SERPL-SCNC: 23 MMOL/L (ref 21–32)
CREAT SERPL-MCNC: 1.27 MG/DL (ref 0.55–1.02)
DIFFERENTIAL METHOD BLD: ABNORMAL
EGFR (NO RACE VARIABLE) (RUSH/TITUS): 39 ML/MIN/1.73M²
EOSINOPHIL # BLD AUTO: 0.15 K/UL (ref 0–0.5)
EOSINOPHIL NFR BLD AUTO: 1.9 % (ref 1–4)
ERYTHROCYTE [DISTWIDTH] IN BLOOD BY AUTOMATED COUNT: 19.2 % (ref 11.5–14.5)
ESTROGEN SERPL-MCNC: NORMAL PG/ML
GLUCOSE SERPL-MCNC: 106 MG/DL (ref 74–106)
GLUCOSE SERPL-MCNC: 114 MG/DL (ref 70–105)
GLUCOSE SERPL-MCNC: 122 MG/DL (ref 70–105)
GLUCOSE SERPL-MCNC: 124 MG/DL (ref 70–105)
GLUCOSE SERPL-MCNC: 144 MG/DL (ref 70–105)
GLUCOSE SERPL-MCNC: 241 MG/DL (ref 70–105)
GLUCOSE SERPL-MCNC: 78 MG/DL (ref 70–105)
HCT VFR BLD AUTO: 25.6 % (ref 38–47)
HGB BLD-MCNC: 8.4 G/DL (ref 12–16)
IMM GRANULOCYTES # BLD AUTO: 0.05 K/UL (ref 0–0.04)
IMM GRANULOCYTES NFR BLD: 0.6 % (ref 0–0.4)
INSULIN SERPL-ACNC: NORMAL U[IU]/ML
LAB AP GROSS DESCRIPTION: NORMAL
LAB AP LABORATORY NOTES: NORMAL
LYMPHOCYTES # BLD AUTO: 0.54 K/UL (ref 1–4.8)
LYMPHOCYTES NFR BLD AUTO: 6.7 % (ref 27–41)
MCH RBC QN AUTO: 25.8 PG (ref 27–31)
MCHC RBC AUTO-ENTMCNC: 32.8 G/DL (ref 32–36)
MCV RBC AUTO: 78.5 FL (ref 80–96)
MONOCYTES # BLD AUTO: 0.57 K/UL (ref 0–0.8)
MONOCYTES NFR BLD AUTO: 7.1 % (ref 2–6)
MPC BLD CALC-MCNC: 8.1 FL (ref 9.4–12.4)
NEUTROPHILS # BLD AUTO: 6.72 K/UL (ref 1.8–7.7)
NEUTROPHILS NFR BLD AUTO: 83.3 % (ref 53–65)
NRBC # BLD AUTO: 0.05 X10E3/UL
NRBC, AUTO (.00): 0.6 %
PLATELET # BLD AUTO: 379 K/UL (ref 150–400)
POTASSIUM SERPL-SCNC: 4.4 MMOL/L (ref 3.5–5.1)
RBC # BLD AUTO: 3.26 M/UL (ref 4.2–5.4)
SODIUM SERPL-SCNC: 137 MMOL/L (ref 136–145)
T3RU NFR SERPL: NORMAL %
WBC # BLD AUTO: 8.06 K/UL (ref 4.5–11)

## 2022-10-07 PROCEDURE — 80048 BASIC METABOLIC PNL TOTAL CA: CPT | Performed by: HOSPITALIST

## 2022-10-07 PROCEDURE — 63600175 PHARM REV CODE 636 W HCPCS: Performed by: HOSPITALIST

## 2022-10-07 PROCEDURE — 11000001 HC ACUTE MED/SURG PRIVATE ROOM

## 2022-10-07 PROCEDURE — 36415 COLL VENOUS BLD VENIPUNCTURE: CPT | Performed by: HOSPITALIST

## 2022-10-07 PROCEDURE — 25000003 PHARM REV CODE 250: Performed by: NURSE ANESTHETIST, CERTIFIED REGISTERED

## 2022-10-07 PROCEDURE — 85025 COMPLETE CBC W/AUTO DIFF WBC: CPT | Performed by: HOSPITALIST

## 2022-10-07 PROCEDURE — 99232 SBSQ HOSP IP/OBS MODERATE 35: CPT | Mod: ,,, | Performed by: HOSPITALIST

## 2022-10-07 PROCEDURE — 63600175 PHARM REV CODE 636 W HCPCS: Performed by: NURSE ANESTHETIST, CERTIFIED REGISTERED

## 2022-10-07 PROCEDURE — 25000003 PHARM REV CODE 250: Performed by: INTERNAL MEDICINE

## 2022-10-07 PROCEDURE — D9220A PRA ANESTHESIA: ICD-10-PCS | Mod: ,,, | Performed by: NURSE ANESTHETIST, CERTIFIED REGISTERED

## 2022-10-07 PROCEDURE — 25000003 PHARM REV CODE 250: Performed by: HOSPITALIST

## 2022-10-07 PROCEDURE — 45378 DIAGNOSTIC COLONOSCOPY: CPT

## 2022-10-07 PROCEDURE — 82962 GLUCOSE BLOOD TEST: CPT

## 2022-10-07 PROCEDURE — D9220A PRA ANESTHESIA: Mod: ,,, | Performed by: NURSE ANESTHETIST, CERTIFIED REGISTERED

## 2022-10-07 PROCEDURE — 99232 PR SUBSEQUENT HOSPITAL CARE,LEVL II: ICD-10-PCS | Mod: ,,, | Performed by: HOSPITALIST

## 2022-10-07 RX ORDER — LANOLIN ALCOHOL/MO/W.PET/CERES
1 CREAM (GRAM) TOPICAL 2 TIMES DAILY
Status: DISCONTINUED | OUTPATIENT
Start: 2022-10-07 | End: 2022-10-11 | Stop reason: HOSPADM

## 2022-10-07 RX ORDER — HYDROCORTISONE 25 MG/G
CREAM TOPICAL 2 TIMES DAILY
Status: DISCONTINUED | OUTPATIENT
Start: 2022-10-07 | End: 2022-10-11 | Stop reason: HOSPADM

## 2022-10-07 RX ORDER — SODIUM CHLORIDE 0.9 % (FLUSH) 0.9 %
10 SYRINGE (ML) INJECTION
Status: DISCONTINUED | OUTPATIENT
Start: 2022-10-07 | End: 2022-10-11 | Stop reason: HOSPADM

## 2022-10-07 RX ORDER — PROPOFOL 10 MG/ML
VIAL (ML) INTRAVENOUS
Status: DISCONTINUED | OUTPATIENT
Start: 2022-10-07 | End: 2022-10-07

## 2022-10-07 RX ORDER — LIDOCAINE HYDROCHLORIDE 20 MG/ML
INJECTION, SOLUTION EPIDURAL; INFILTRATION; INTRACAUDAL; PERINEURAL
Status: DISCONTINUED | OUTPATIENT
Start: 2022-10-07 | End: 2022-10-07

## 2022-10-07 RX ADMIN — ACETAMINOPHEN 1000 MG: 500 TABLET ORAL at 05:10

## 2022-10-07 RX ADMIN — LEVOTHYROXINE SODIUM 100 MCG: 100 TABLET ORAL at 05:10

## 2022-10-07 RX ADMIN — GABAPENTIN 100 MG: 100 CAPSULE ORAL at 08:10

## 2022-10-07 RX ADMIN — PROPOFOL 60 MG: 10 INJECTION, EMULSION INTRAVENOUS at 03:10

## 2022-10-07 RX ADMIN — FERROUS SULFATE TAB 325 MG (65 MG ELEMENTAL FE) 1 EACH: 325 (65 FE) TAB at 08:10

## 2022-10-07 RX ADMIN — PANTOPRAZOLE SODIUM 40 MG: 40 TABLET, DELAYED RELEASE ORAL at 08:10

## 2022-10-07 RX ADMIN — PROPOFOL 30 MG: 10 INJECTION, EMULSION INTRAVENOUS at 03:10

## 2022-10-07 RX ADMIN — INSULIN DETEMIR 10 UNITS: 100 INJECTION, SOLUTION SUBCUTANEOUS at 08:10

## 2022-10-07 RX ADMIN — GABAPENTIN 100 MG: 100 CAPSULE ORAL at 09:10

## 2022-10-07 RX ADMIN — SODIUM CHLORIDE: 9 INJECTION, SOLUTION INTRAVENOUS at 03:10

## 2022-10-07 RX ADMIN — DEXTROSE MONOHYDRATE 12.5 G: 25 INJECTION, SOLUTION INTRAVENOUS at 01:10

## 2022-10-07 RX ADMIN — HYDROCORTISONE 2.5%: 25 CREAM TOPICAL at 11:10

## 2022-10-07 RX ADMIN — LIDOCAINE HYDROCHLORIDE 60 MG: 20 INJECTION, SOLUTION EPIDURAL; INFILTRATION; INTRACAUDAL; PERINEURAL at 03:10

## 2022-10-07 RX ADMIN — INSULIN ASPART 2 UNITS: 100 INJECTION, SOLUTION INTRAVENOUS; SUBCUTANEOUS at 08:10

## 2022-10-07 NOTE — PLAN OF CARE
Problem: Adult Inpatient Plan of Care  Goal: Plan of Care Review  Outcome: Ongoing, Progressing  Goal: Patient-Specific Goal (Individualized)  Outcome: Ongoing, Progressing  Goal: Absence of Hospital-Acquired Illness or Injury  Outcome: Ongoing, Progressing  Goal: Optimal Comfort and Wellbeing  Outcome: Ongoing, Progressing  Goal: Readiness for Transition of Care  Outcome: Ongoing, Progressing     Problem: Fall Injury Risk  Goal: Absence of Fall and Fall-Related Injury  Outcome: Ongoing, Progressing     Problem: Diabetes Comorbidity  Goal: Blood Glucose Level Within Targeted Range  Outcome: Ongoing, Progressing     Problem: Fluid and Electrolyte Imbalance (Acute Kidney Injury/Impairment)  Goal: Fluid and Electrolyte Balance  Outcome: Ongoing, Progressing     Problem: Oral Intake Inadequate (Acute Kidney Injury/Impairment)  Goal: Optimal Nutrition Intake  Outcome: Ongoing, Progressing     Problem: Renal Function Impairment (Acute Kidney Injury/Impairment)  Goal: Effective Renal Function  Outcome: Ongoing, Progressing     Problem: Impaired Wound Healing  Goal: Optimal Wound Healing  Outcome: Ongoing, Progressing     Problem: Violence Risk or Actual  Goal: Anger and Impulse Control  Outcome: Ongoing, Progressing  POC reviewed and continues

## 2022-10-07 NOTE — TRANSFER OF CARE
"Anesthesia Transfer of Care Note    Patient: Vandana Allison    Procedure(s) Performed: * No procedures listed *    Patient location: Other: [facility]    Anesthesia Type: general    Transport from OR: Transported from OR on room air with adequate spontaneous ventilation    Post pain: adequate analgesia    Post assessment: no apparent anesthetic complications    Post vital signs: stable    Level of consciousness: sedated and responds to stimulation    Nausea/Vomiting: no nausea/vomiting    Complications: none    Transfer of care protocol was followedComments: Good SV continue, NAD noted, VSS, RTRN      Last vitals:   Visit Vitals  BP (!) 106/51   Pulse 77   Temp 36.4 °C (97.6 °F)   Resp 20   Ht 5' 5" (1.651 m)   Wt 85.3 kg (188 lb)   SpO2 97%   Breastfeeding No   BMI 31.28 kg/m²     "

## 2022-10-07 NOTE — HOSPITAL COURSE
10/06 Records reviewed. Daughter in room. Pt last hx hemorrhoidal bleeding. More BRB pre rectum and JCM III referred to Dr Beatty and Dr Beatty to Dr Wood. BP low in office and referred to hospital EGD without source and plan colonoscopy in am. Asymptomatic bradycardia. Stopped ziac, Watch electrolytes also asymptomatic.   10/07 Seen prior to coloscopy but since report reviewed. If does well home soon. F/U again with Dr Beatty.  10/08 considering discharging patient.  When staff getting her up out of bed, she became really short of breath.  Did not drop oxygen saturation.  Similar episode earlier during her stay before going to endoscopy study.  No chest pain.  Patient states she has had these issues for several weeks.  No history of any heart disease.  Will hold discharge and check into further.  At 89 years old will try to treat conservative because of increased complications with any aggressive procedures.  10/09 SOB episode but better with breathing treatments. No chest pain. Up in room. Rule out PE. Bruce'd for VQ scan.   10/10 Looks better. Feels has wheezing episode NM VQ scan low prob. If doing well plan dc soon.    10/11: Patient sitting comfortably on side of bed this AM. She states she is feeling better and ready to d/c home. VQ scan with low probability of PE and U/S BLE negative. Denies any further bleeding. Her O2 sat on room air was 94% after standing and being off for a few minutes, patient does not qualify for home O2.

## 2022-10-07 NOTE — SUBJECTIVE & OBJECTIVE
Past Medical History:   Diagnosis Date    Arthritis     Chronic kidney disease, stage 3b     Diabetes mellitus, type 2     Hiatal hernia     Hyperlipidemia     Hypertension     Neuropathy     Thyroid disease        Past Surgical History:   Procedure Laterality Date    BREAST SURGERY      Biopsy    EYE SURGERY      Remove cataracts both eyes    HYSTERECTOMY      NEPHRECTOMY Right     THYROIDECTOMY         Review of patient's allergies indicates:   Allergen Reactions    Aspirin        No current facility-administered medications on file prior to encounter.     Current Outpatient Medications on File Prior to Encounter   Medication Sig    albuterol (PROVENTIL/VENTOLIN HFA) 90 mcg/actuation inhaler Inhale 2 puffs into the lungs every 4 (four) hours as needed. Rescue    amLODIPine (NORVASC) 5 MG tablet TAKE 1 TABLET BY MOUTH EVERY DAY    bisoprolol-hydrochlorothiazide 5-6.25 mg (ZIAC) 5-6.25 mg Tab TAKE 1 TABLET TWICE A DAY    empagliflozin (JARDIANCE) 10 mg tablet Take 1 tablet (10 mg total) by mouth once daily.    ferrous sulfate (FEOSOL) 325 mg (65 mg iron) Tab tablet Take by mouth 2 (two) times daily.    furosemide (LASIX) 40 MG tablet Take 1 tablet (40 mg total) by mouth once daily.    gabapentin (NEURONTIN) 400 MG capsule Take 1 capsule (400 mg total) by mouth 4 (four) times daily as needed (grgrgff).    hydrALAZINE (APRESOLINE) 25 MG tablet Take 1 tablet (25 mg total) by mouth 2 (two) times daily.    HYDROcodone-acetaminophen (NORCO)  mg per tablet Take 1 tablet by mouth 3 (three) times daily.    hydrocortisone (ANUSOL-HC) 25 mg suppository Place 1 suppository (25 mg total) rectally 2 (two) times daily.    insulin  unit/mL injection Inject 30 Units into the skin 2 (two) times daily before meals.    insulin NPH-insulin regular, 70/30, (NOVOLIN 70/30) 100 unit/mL (70-30) injection Inject 12 Units into the skin 2 (two) times a day.    lactulose (CHRONULAC) 10 gram/15 mL solution Take 30 mLs (20 g total)  by mouth once daily.    levothyroxine (SYNTHROID) 100 MCG tablet Take 1 tablet (100 mcg total) by mouth before breakfast.    lisinopriL (PRINIVIL,ZESTRIL) 40 MG tablet TAKE 1 TABLET TWICE A DAY    methocarbamoL (ROBAXIN) 750 MG Tab TAKE 2 TABLETS BY MOUTH 4 TIMES A DAY AS NEEDED FOR MUSCLE SPASMS    NOVOLIN N NPH U-100 INSULIN 100 unit/mL injection Inject 40 Units into the skin 2 (two) times daily.    pantoprazole (PROTONIX) 40 MG tablet TAKE 1 TABLET BY MOUTH TWICE A DAY    potassium chloride SA (K-DUR,KLOR-CON) 10 MEQ tablet Take 1 tablet by mouth once daily.    predniSONE (DELTASONE) 2.5 MG tablet Take 2.5 mg by mouth once daily.    SYMBICORT 160-4.5 mcg/actuation HFAA Inhale 2 puffs into the lungs 2 (two) times a day.    traMADoL (ULTRAM) 50 mg tablet TAKE 1 TABLET BY MOUTH FOUR TIMES A DAY     Family History       Problem Relation (Age of Onset)    Cancer Sister, Brother, Daughter    Diabetes Mother, Sister, Brother, Sister    Heart disease Father          Tobacco Use    Smoking status: Never    Smokeless tobacco: Never   Substance and Sexual Activity    Alcohol use: Never    Drug use: Never    Sexual activity: Not Currently     Birth control/protection: None     Review of Systems   Constitutional:  Negative for appetite change, chills, fatigue, fever and unexpected weight change.   HENT:  Negative for congestion, mouth sores, nosebleeds, sinus pain, sore throat and trouble swallowing.    Respiratory:  Negative for apnea, cough, chest tightness and shortness of breath.    Cardiovascular:  Negative for chest pain, palpitations and leg swelling.   Gastrointestinal:  Positive for anal bleeding and rectal pain. Negative for abdominal pain, blood in stool, constipation, diarrhea, nausea and vomiting.   Genitourinary:  Negative for decreased urine volume, difficulty urinating, dysuria and frequency.   Musculoskeletal:  Negative for arthralgias, back pain and neck pain.   Skin:  Negative for rash.   Neurological:   Negative for syncope, light-headedness and headaches.   Hematological:  Does not bruise/bleed easily.   Psychiatric/Behavioral:  Negative for confusion and suicidal ideas.    Objective:     Vital Signs (Most Recent):  Temp: 97.9 °F (36.6 °C) (10/06/22 1635)  Pulse: 68 (10/06/22 1635)  Resp: 20 (10/06/22 1635)  BP: (!) 163/66 (10/06/22 1635)  SpO2: 95 % (10/06/22 1635)   Vital Signs (24h Range):  Temp:  [97 °F (36.1 °C)-98.2 °F (36.8 °C)] 97.9 °F (36.6 °C)  Pulse:  [40-68] 68  Resp:  [14-23] 20  SpO2:  [95 %-99 %] 95 %  BP: (119-163)/(44-77) 163/66     Weight: 85.4 kg (188 lb 4.4 oz)  Body mass index is 31.33 kg/m².    Physical Exam  Constitutional:       Appearance: Normal appearance.   HENT:      Head: Atraumatic.      Nose: Nose normal.      Mouth/Throat:      Mouth: Mucous membranes are moist.      Pharynx: Oropharynx is clear.   Eyes:      Conjunctiva/sclera: Conjunctivae normal.      Pupils: Pupils are equal, round, and reactive to light.   Neck:      Vascular: No carotid bruit.   Cardiovascular:      Rate and Rhythm: Normal rate and regular rhythm.      Pulses: Normal pulses.      Heart sounds: Normal heart sounds.   Pulmonary:      Effort: Pulmonary effort is normal.      Breath sounds: Normal breath sounds.   Abdominal:      General: Abdomen is flat. Bowel sounds are normal. There is no distension.      Palpations: Abdomen is soft.      Tenderness: There is no abdominal tenderness. There is no guarding.   Musculoskeletal:         General: Normal range of motion.      Cervical back: Neck supple.      Right lower leg: Edema present.      Left lower leg: Edema present.   Skin:     General: Skin is warm and dry.      Capillary Refill: Capillary refill takes less than 2 seconds.      Coloration: Skin is not jaundiced or pale.      Findings: No bruising, lesion or rash.   Neurological:      General: No focal deficit present.      Mental Status: She is alert and oriented to person, place, and time.   Psychiatric:          Mood and Affect: Mood normal.         CRANIAL NERVES     CN III, IV, VI   Pupils are equal, round, and reactive to light.     Significant Labs: All pertinent labs within the past 24 hours have been reviewed.    Significant Imaging: I have reviewed all pertinent imaging results/findings within the past 24 hours.

## 2022-10-07 NOTE — PROGRESS NOTES
[facility] Hospital Medicine  Progress Note    Patient Name: Vandana Allison  MRN: [id number]  Patient Class: IP- Inpatient   Admission Date: 10/5/2022  Length of Stay: 1 days  Attending Physician: David Garcia MD  Primary Care Provider: Cm Larson III, DO        Subjective:     Principal Problem:Rectal bleeding        HPI:  89 yo F, who appears much younger than stated age, presents to the ED from Dr. Wood's office for hypotension and bradycardia.  Patient was sent for rectal bleeding that hs been occurring since June.  Patient has same problem five years ago and had internal hemorrhoids so was referred to Dr. Beatty.  Dr. Beatty referred patient for colonoscopy to Dr. Wood.      Patient EKG shows sinus bradycardia (patient on bisoprolol) and BMP shows TRISTAN and Na 118 (patient on lasix and HCT).  She really does not have any complaints except she states that he bottoms hurts where she is bleeding.  She is very active and doesn't report generalized weakness or syncope.  Her daughter is present and says they were just following up on their clinic appointments for the rectal bleeding.  She has had no weight loss or dysphagia.  She states she was working full time up until age 82.  Her hgb is 6.7 and a transfusion of pRBC going.  I will order anemia panel and coags ASAP.        Overview/Hospital Course:  10/06 Records reviewed. Daughter in room. Pt last hx hemorrhoidal bleeding. More BRB pre rectum and JCM III referred to Dr Beatty and Dr Beatty to Dr Wood. BP low in office and referred to hospital EGD without source and plan colonoscopy in am. Asymptomatic bradycardia. Stopped ziac, Watch electrolytes also asymptomatic.       Past Medical History:   Diagnosis Date    Arthritis     Chronic kidney disease, stage 3b     Diabetes mellitus, type 2     Hiatal hernia     Hyperlipidemia     Hypertension     Neuropathy     Thyroid disease        Past Surgical History:    Procedure Laterality Date    BREAST SURGERY      Biopsy    EYE SURGERY      Remove cataracts both eyes    HYSTERECTOMY      NEPHRECTOMY Right     THYROIDECTOMY         Review of patient's allergies indicates:   Allergen Reactions    Aspirin        No current facility-administered medications on file prior to encounter.     Current Outpatient Medications on File Prior to Encounter   Medication Sig    albuterol (PROVENTIL/VENTOLIN HFA) 90 mcg/actuation inhaler Inhale 2 puffs into the lungs every 4 (four) hours as needed. Rescue    amLODIPine (NORVASC) 5 MG tablet TAKE 1 TABLET BY MOUTH EVERY DAY    bisoprolol-hydrochlorothiazide 5-6.25 mg (ZIAC) 5-6.25 mg Tab TAKE 1 TABLET TWICE A DAY    empagliflozin (JARDIANCE) 10 mg tablet Take 1 tablet (10 mg total) by mouth once daily.    ferrous sulfate (FEOSOL) 325 mg (65 mg iron) Tab tablet Take by mouth 2 (two) times daily.    furosemide (LASIX) 40 MG tablet Take 1 tablet (40 mg total) by mouth once daily.    gabapentin (NEURONTIN) 400 MG capsule Take 1 capsule (400 mg total) by mouth 4 (four) times daily as needed (grgrgff).    hydrALAZINE (APRESOLINE) 25 MG tablet Take 1 tablet (25 mg total) by mouth 2 (two) times daily.    HYDROcodone-acetaminophen (NORCO)  mg per tablet Take 1 tablet by mouth 3 (three) times daily.    hydrocortisone (ANUSOL-HC) 25 mg suppository Place 1 suppository (25 mg total) rectally 2 (two) times daily.    insulin  unit/mL injection Inject 30 Units into the skin 2 (two) times daily before meals.    insulin NPH-insulin regular, 70/30, (NOVOLIN 70/30) 100 unit/mL (70-30) injection Inject 12 Units into the skin 2 (two) times a day.    lactulose (CHRONULAC) 10 gram/15 mL solution Take 30 mLs (20 g total) by mouth once daily.    levothyroxine (SYNTHROID) 100 MCG tablet Take 1 tablet (100 mcg total) by mouth before breakfast.    lisinopriL (PRINIVIL,ZESTRIL) 40 MG tablet TAKE 1 TABLET TWICE A DAY    methocarbamoL  (ROBAXIN) 750 MG Tab TAKE 2 TABLETS BY MOUTH 4 TIMES A DAY AS NEEDED FOR MUSCLE SPASMS    NOVOLIN N NPH U-100 INSULIN 100 unit/mL injection Inject 40 Units into the skin 2 (two) times daily.    pantoprazole (PROTONIX) 40 MG tablet TAKE 1 TABLET BY MOUTH TWICE A DAY    potassium chloride SA (K-DUR,KLOR-CON) 10 MEQ tablet Take 1 tablet by mouth once daily.    predniSONE (DELTASONE) 2.5 MG tablet Take 2.5 mg by mouth once daily.    SYMBICORT 160-4.5 mcg/actuation HFAA Inhale 2 puffs into the lungs 2 (two) times a day.    traMADoL (ULTRAM) 50 mg tablet TAKE 1 TABLET BY MOUTH FOUR TIMES A DAY     Family History       Problem Relation (Age of Onset)    Cancer Sister, Brother, Daughter    Diabetes Mother, Sister, Brother, Sister    Heart disease Father          Tobacco Use    Smoking status: Never    Smokeless tobacco: Never   Substance and Sexual Activity    Alcohol use: Never    Drug use: Never    Sexual activity: Not Currently     Birth control/protection: None     Review of Systems   Constitutional:  Negative for appetite change, chills, fatigue, fever and unexpected weight change.   HENT:  Negative for congestion, mouth sores, nosebleeds, sinus pain, sore throat and trouble swallowing.    Respiratory:  Negative for apnea, cough, chest tightness and shortness of breath.    Cardiovascular:  Negative for chest pain, palpitations and leg swelling.   Gastrointestinal:  Positive for anal bleeding and rectal pain. Negative for abdominal pain, blood in stool, constipation, diarrhea, nausea and vomiting.   Genitourinary:  Negative for decreased urine volume, difficulty urinating, dysuria and frequency.   Musculoskeletal:  Negative for arthralgias, back pain and neck pain.   Skin:  Negative for rash.   Neurological:  Negative for syncope, light-headedness and headaches.   Hematological:  Does not bruise/bleed easily.   Psychiatric/Behavioral:  Negative for confusion and suicidal ideas.    Objective:     Vital Signs  (Most Recent):  Temp: 97.9 °F (36.6 °C) (10/06/22 1635)  Pulse: 68 (10/06/22 1635)  Resp: 20 (10/06/22 1635)  BP: (!) 163/66 (10/06/22 1635)  SpO2: 95 % (10/06/22 1635)   Vital Signs (24h Range):  Temp:  [97 °F (36.1 °C)-98.2 °F (36.8 °C)] 97.9 °F (36.6 °C)  Pulse:  [40-68] 68  Resp:  [14-23] 20  SpO2:  [95 %-99 %] 95 %  BP: (119-163)/(44-77) 163/66     Weight: 85.4 kg (188 lb 4.4 oz)  Body mass index is 31.33 kg/m².    Physical Exam  Constitutional:       Appearance: Normal appearance.   HENT:      Head: Atraumatic.      Nose: Nose normal.      Mouth/Throat:      Mouth: Mucous membranes are moist.      Pharynx: Oropharynx is clear.   Eyes:      Conjunctiva/sclera: Conjunctivae normal.      Pupils: Pupils are equal, round, and reactive to light.   Neck:      Vascular: No carotid bruit.   Cardiovascular:      Rate and Rhythm: Normal rate and regular rhythm.      Pulses: Normal pulses.      Heart sounds: Normal heart sounds.   Pulmonary:      Effort: Pulmonary effort is normal.      Breath sounds: Normal breath sounds.   Abdominal:      General: Abdomen is flat. Bowel sounds are normal. There is no distension.      Palpations: Abdomen is soft.      Tenderness: There is no abdominal tenderness. There is no guarding.   Musculoskeletal:         General: Normal range of motion.      Cervical back: Neck supple.      Right lower leg: Edema present.      Left lower leg: Edema present.   Skin:     General: Skin is warm and dry.      Capillary Refill: Capillary refill takes less than 2 seconds.      Coloration: Skin is not jaundiced or pale.      Findings: No bruising, lesion or rash.   Neurological:      General: No focal deficit present.      Mental Status: She is alert and oriented to person, place, and time.   Psychiatric:         Mood and Affect: Mood normal.         CRANIAL NERVES     CN III, IV, VI   Pupils are equal, round, and reactive to light.     Significant Labs: All pertinent labs within the past 24 hours have  been reviewed.    Significant Imaging: I have reviewed all pertinent imaging results/findings within the past 24 hours.      Assessment/Plan:      * Rectal bleeding  Hold asa  MAREN for DVT prophylaxis which will also help with the peripheral edema.    Appreciate GI eval for endoscopy      Iron deficiency anemia due to chronic blood loss        Polyp of stomach        Hypotension due to drugs        Neuropathy  Continue gabapentin      Thyroid disease  Continue synthroid  Check TSH      Bradycardia  Stop BB.  Monitor on telemetry  Trend troponins  Check TSH      Dehydration with hyponatremia  Hold diuretics  Gentle hydration  Follow electrolytes      Acute blood loss anemia  Renally dose all meds.    No NSAIDS  Stop ACEI  IVF and follow renal function.        Diabetes mellitus, type 2  Patient's FSGs are uncontrolled due to hyperglycemia on current medication regimen.  Last A1c reviewed-   Lab Results   Component Value Date    HGBA1C 10.5 (H) 08/02/2022     Most recent fingerstick glucose reviewed- No results for input(s): POCTGLUCOSE in the last 24 hours.  Current correctional scale  Low  Maintain anti-hyperglycemic dose as follows-   Antihyperglycemics (From admission, onward)    None        Hold Oral hypoglycemics while patient is in the hospital.  Basal/correctional insulin while NPO for endoscopic evaluation.      Arthritis  Patient on ultram as OP.    Will continue but will encourage tylenol usage first.    No NSAIDS.        Hypertension  Hold antihypertensive agents due to hypotension.        Hyperlipidemia  Continue statin.          VTE Risk Mitigation (From admission, onward)         Ordered     Place MAREN hose  Until discontinued         10/06/22 3016                Discharge Planning   MARTHA:      Code Status: Full Code   Is the patient medically ready for discharge?:     Reason for patient still in hospital (select all that apply): Laboratory test, Treatment, Imaging and Consult recommendations  Discharge  Plan A: Home with family                  David Garcia MD  Department of Hospital Medicine   [facility] - [address] [place] [address]

## 2022-10-07 NOTE — H&P
[facility]  Gastroenterology  H&P    Patient Name: Vandana Allison  MRN: [id number]  Admission Date: 10/5/2022  Code Status: Full Code    Attending Provider: David Garcia MD   Primary Care Physician: Cm Larson III, DO  Principal Problem:Rectal bleeding    Subjective:     History of Present Illness: Pt has blood per rectum with iron deficiency anemia and rectal pain. Date of last colonoscopy is unknown.    Past Medical History:   Diagnosis Date    Arthritis     Chronic kidney disease, stage 3b     Diabetes mellitus, type 2     Hiatal hernia     Hyperlipidemia     Hypertension     Neuropathy     Thyroid disease        Past Surgical History:   Procedure Laterality Date    BREAST SURGERY      Biopsy    EYE SURGERY      Remove cataracts both eyes    HYSTERECTOMY      NEPHRECTOMY Right     THYROIDECTOMY         Review of patient's allergies indicates:   Allergen Reactions    Aspirin      Family History       Problem Relation (Age of Onset)    Cancer Sister, Brother, Daughter    Diabetes Mother, Sister, Brother, Sister    Heart disease Father          Tobacco Use    Smoking status: Never    Smokeless tobacco: Never   Substance and Sexual Activity    Alcohol use: Never    Drug use: Never    Sexual activity: Not Currently     Birth control/protection: None     Review of Systems   Respiratory: Negative.     Cardiovascular: Negative.    Gastrointestinal:  Positive for blood in stool.   Objective:     Vital Signs (Most Recent):  Temp: 97.5 °F (36.4 °C) (10/07/22 1214)  Pulse: 96 (10/07/22 1419)  Resp: 20 (10/07/22 1419)  BP: (!) 162/74 (10/07/22 1419)  SpO2: (!) 93 % (10/07/22 1419)   Vital Signs (24h Range):  Temp:  [97.5 °F (36.4 °C)-98.7 °F (37.1 °C)] 97.5 °F (36.4 °C)  Pulse:  [67-96] 96  Resp:  [18-20] 20  SpO2:  [93 %-97 %] 93 %  BP: (152-167)/(66-92) 162/74     Weight: 85.3 kg (188 lb) (10/07/22 1214)  Body mass index is 31.28 kg/m².    No intake or output data in the 24  hours ending 10/07/22 1500    Lines/Drains/Airways       Peripheral Intravenous Line  Duration                  Peripheral IV - Single Lumen 10/06/22 0615 22 G Distal;Posterior;Left Forearm 1 day                    Physical Exam  Vitals reviewed.   Constitutional:       General: She is not in acute distress.     Appearance: Normal appearance. She is well-developed. She is not ill-appearing.   HENT:      Head: Normocephalic and atraumatic.      Nose: Nose normal.   Eyes:      Pupils: Pupils are equal, round, and reactive to light.   Cardiovascular:      Rate and Rhythm: Normal rate and regular rhythm.   Pulmonary:      Effort: Pulmonary effort is normal.      Breath sounds: Normal breath sounds. No wheezing.   Abdominal:      General: Abdomen is flat. Bowel sounds are normal. There is no distension.      Palpations: Abdomen is soft.      Tenderness: There is no abdominal tenderness. There is no guarding.   Skin:     General: Skin is warm and dry.      Coloration: Skin is not jaundiced.   Neurological:      Mental Status: She is alert.   Psychiatric:         Attention and Perception: Attention normal.         Mood and Affect: Affect normal.         Speech: Speech normal.         Behavior: Behavior is cooperative.      Comments: Pt was calm while speaking.       Significant Labs:  CBC:   Recent Labs   Lab 10/05/22  1641 10/05/22  2253 10/06/22  0800 10/07/22  0323   WBC 12.00*  --  11.42* 8.06   HGB 6.7* 8.9* 9.3* 8.4*   HCT 20.8* 27.3* 29.2* 25.6*     --  388 379     CMP:   Recent Labs   Lab 10/05/22  1641 10/06/22  0408 10/07/22  0323   *   < > 106   CALCIUM 8.4*   < > 8.2*   ALBUMIN 2.9*  --   --    PROT 5.7*  --   --    *   < > 137   K 5.4*   < > 4.4   CO2 20*   < > 23   CL 89*   < > 105   BUN 42*   < > 24*   CREATININE 3.08*   < > 1.27*   ALKPHOS 161*  --   --    ALT 29  --   --    AST 31  --   --    BILITOT 0.4  --   --     < > = values in this interval not displayed.       Significant  Imaging:  Imaging results within the past 24 hours have been reviewed.    Assessment/Plan:     Active Diagnoses:    Diagnosis Date Noted POA    PRINCIPAL PROBLEM:  Rectal bleeding [K62.5] 10/05/2022 Yes    Hypotension due to drugs [I95.2] 10/06/2022 Yes    Polyp of stomach [K31.7] 10/06/2022 Yes    HH (hiatus hernia) [K44.9] 10/06/2022 Yes    Iron deficiency anemia due to chronic blood loss [D50.0] 10/06/2022 Yes    Thyroid disease [E07.9]  Yes    Neuropathy [G62.9]  Yes    Acute blood loss anemia [D62] 10/05/2022 Yes    Dehydration with hyponatremia [E86.0, E87.1] 10/05/2022 Yes    Acute kidney injury [N17.9] 10/05/2022 Yes    Bradycardia [R00.1] 10/05/2022 Yes    Diabetes mellitus, type 2 [E11.9]  Yes    Arthritis [M19.90] 04/22/2021 Yes    Hyperlipidemia [E78.5] 04/22/2021 Yes    Hypertension [I10] 04/22/2021 Yes      Problems Resolved During this Admission:       Imp: blood per rectum, rectal pain, iron deficiency anemia  Plan: colonoscopy    Jerry Wood MD  Gastroenterology  [facility] - [facility]

## 2022-10-07 NOTE — ANESTHESIA POSTPROCEDURE EVALUATION
Anesthesia Post Evaluation    Patient: Vandana Allison    Procedure(s) Performed: * No procedures listed *    Final Anesthesia Type: general      Patient location during evaluation: GI PACU  Patient participation: Yes- Able to Participate  Level of consciousness: awake and alert  Post-procedure vital signs: reviewed and stable  Pain management: adequate  Airway patency: patent    PONV status at discharge: No PONV  Anesthetic complications: no      Cardiovascular status: blood pressure returned to baseline and hemodynamically stable  Respiratory status: spontaneous ventilation  Hydration status: euvolemic  Follow-up not needed.  Comments: Pt voices appreciation for care          Vitals Value Taken Time   /54 10/07/22 1534   Temp 97.4 10/07/22 1535   Pulse 83 10/07/22 1535   Resp 25 10/07/22 1535   SpO2 96 % 10/07/22 1535   Vitals shown include unvalidated device data.      No case tracking events are documented in the log.      Pain/Dulce Score: Pain Rating Prior to Med Admin: 4 (10/6/2022  9:05 PM)  Dulce Score: 8 (10/7/2022  3:28 PM)

## 2022-10-07 NOTE — NURSING
Patient called me to the room to inquire about when she would be headed down to GI for her scope. Patient stated she hasn't eaten and she is a diabetic. I asked her was she beginning to feel bad and she said yes. Patient said she was starting to feel weak and a slight tremor was noticed. I checked patients blood sugar which resulted at 78 which she said was low for her. I called GI to see how much longer they would be and what there recommendations were. I gave her PRN 12.5 mg D50. Rechecked blood sugar and it has risen to 144. Patient reports she feels better. Patient just left floor headed down to GI. Sats 95% on room air.

## 2022-10-07 NOTE — NURSING
Patient returned from GI. No distress noted. Waiting on supper tray. Safety checks in place. Family at bedside.

## 2022-10-07 NOTE — ANESTHESIA PREPROCEDURE EVALUATION
10/07/2022  Vandana Allison is a 89 y.o., female.      Pre-op Assessment    I have reviewed the Patient Summary Reports.     I have reviewed the Nursing Notes. I have reviewed the NPO Status.   I have reviewed the Medications.     Review of Systems  Anesthesia Hx:  No problems with previous Anesthesia    Social:  Non-Smoker    Cardiovascular:   Hypertension hyperlipidemia    Renal/:   Chronic Renal Disease, CKD    Hepatic/GI:   Hiatal Hernia,    Endocrine:   Diabetes  Obesity / BMI > 30         Anesthesia Plan  Type of Anesthesia, risks & benefits discussed:    Anesthesia Type: Gen Natural Airway  Intra-op Monitoring Plan: Standard ASA Monitors  Post Op Pain Control Plan: IV/PO Opioids PRN  Induction:  IV  Informed Consent: Informed consent signed with the Patient and all parties understand the risks and agree with anesthesia plan.  All questions answered. Patient consented to blood products? Yes  ASA Score: 3  Day of Surgery Review of History & Physical: H&P Update referred to the surgeon/provider.I have interviewed and examined the patient. I have reviewed the patient's H&P dated: There are no significant changes.     Ready For Surgery From Anesthesia Perspective.     .

## 2022-10-08 LAB
ANION GAP SERPL CALCULATED.3IONS-SCNC: 10 MMOL/L (ref 7–16)
BASOPHILS # BLD AUTO: 0.02 K/UL (ref 0–0.2)
BASOPHILS NFR BLD AUTO: 0.3 % (ref 0–1)
BUN SERPL-MCNC: 17 MG/DL (ref 7–18)
BUN/CREAT SERPL: 14 (ref 6–20)
CALCIUM SERPL-MCNC: 8.4 MG/DL (ref 8.5–10.1)
CHLORIDE SERPL-SCNC: 110 MMOL/L (ref 98–107)
CO2 SERPL-SCNC: 22 MMOL/L (ref 21–32)
CREAT SERPL-MCNC: 1.24 MG/DL (ref 0.55–1.02)
CRP SERPL-MCNC: 10.7 MG/DL (ref 0–0.8)
D DIMER PPP FEU-MCNC: 4.7 ΜG/ML (ref 0–0.47)
DIFFERENTIAL METHOD BLD: ABNORMAL
EGFR (NO RACE VARIABLE) (RUSH/TITUS): 40 ML/MIN/1.73M²
EOSINOPHIL # BLD AUTO: 0.09 K/UL (ref 0–0.5)
EOSINOPHIL NFR BLD AUTO: 1.2 % (ref 1–4)
ERYTHROCYTE [DISTWIDTH] IN BLOOD BY AUTOMATED COUNT: 19.9 % (ref 11.5–14.5)
GLUCOSE SERPL-MCNC: 131 MG/DL (ref 70–105)
GLUCOSE SERPL-MCNC: 144 MG/DL (ref 70–105)
GLUCOSE SERPL-MCNC: 188 MG/DL (ref 70–105)
GLUCOSE SERPL-MCNC: 189 MG/DL (ref 74–106)
GLUCOSE SERPL-MCNC: 252 MG/DL (ref 70–105)
GLUCOSE SERPL-MCNC: 285 MG/DL (ref 70–105)
HCT VFR BLD AUTO: 26.3 % (ref 38–47)
HGB BLD-MCNC: 8.3 G/DL (ref 12–16)
IMM GRANULOCYTES # BLD AUTO: 0.04 K/UL (ref 0–0.04)
IMM GRANULOCYTES NFR BLD: 0.5 % (ref 0–0.4)
LYMPHOCYTES # BLD AUTO: 0.76 K/UL (ref 1–4.8)
LYMPHOCYTES NFR BLD AUTO: 10 % (ref 27–41)
MAGNESIUM SERPL-MCNC: 2.5 MG/DL (ref 1.7–2.3)
MCH RBC QN AUTO: 25.5 PG (ref 27–31)
MCHC RBC AUTO-ENTMCNC: 31.6 G/DL (ref 32–36)
MCV RBC AUTO: 80.7 FL (ref 80–96)
MONOCYTES # BLD AUTO: 0.79 K/UL (ref 0–0.8)
MONOCYTES NFR BLD AUTO: 10.4 % (ref 2–6)
MPC BLD CALC-MCNC: 8.1 FL (ref 9.4–12.4)
NEUTROPHILS # BLD AUTO: 5.87 K/UL (ref 1.8–7.7)
NEUTROPHILS NFR BLD AUTO: 77.6 % (ref 53–65)
NRBC # BLD AUTO: 0.04 X10E3/UL
NRBC, AUTO (.00): 0.5 %
NT-PROBNP SERPL-MCNC: 2363 PG/ML (ref 1–450)
PLATELET # BLD AUTO: 372 K/UL (ref 150–400)
POTASSIUM SERPL-SCNC: 4.3 MMOL/L (ref 3.5–5.1)
RBC # BLD AUTO: 3.26 M/UL (ref 4.2–5.4)
SODIUM SERPL-SCNC: 138 MMOL/L (ref 136–145)
TROPONIN I SERPL HS-MCNC: 16.1 PG/ML
WBC # BLD AUTO: 7.57 K/UL (ref 4.5–11)

## 2022-10-08 PROCEDURE — 84484 ASSAY OF TROPONIN QUANT: CPT | Performed by: HOSPITALIST

## 2022-10-08 PROCEDURE — 36415 COLL VENOUS BLD VENIPUNCTURE: CPT | Performed by: HOSPITALIST

## 2022-10-08 PROCEDURE — 94761 N-INVAS EAR/PLS OXIMETRY MLT: CPT

## 2022-10-08 PROCEDURE — 83880 ASSAY OF NATRIURETIC PEPTIDE: CPT | Performed by: HOSPITALIST

## 2022-10-08 PROCEDURE — 86140 C-REACTIVE PROTEIN: CPT | Performed by: HOSPITALIST

## 2022-10-08 PROCEDURE — 25000003 PHARM REV CODE 250: Performed by: HOSPITALIST

## 2022-10-08 PROCEDURE — 85025 COMPLETE CBC W/AUTO DIFF WBC: CPT | Performed by: HOSPITALIST

## 2022-10-08 PROCEDURE — 97165 OT EVAL LOW COMPLEX 30 MIN: CPT

## 2022-10-08 PROCEDURE — 97161 PT EVAL LOW COMPLEX 20 MIN: CPT

## 2022-10-08 PROCEDURE — 63600175 PHARM REV CODE 636 W HCPCS: Performed by: HOSPITALIST

## 2022-10-08 PROCEDURE — 25000242 PHARM REV CODE 250 ALT 637 W/ HCPCS: Performed by: HOSPITALIST

## 2022-10-08 PROCEDURE — 85378 FIBRIN DEGRADE SEMIQUANT: CPT | Performed by: HOSPITALIST

## 2022-10-08 PROCEDURE — 82962 GLUCOSE BLOOD TEST: CPT

## 2022-10-08 PROCEDURE — 25000003 PHARM REV CODE 250: Performed by: INTERNAL MEDICINE

## 2022-10-08 PROCEDURE — 83735 ASSAY OF MAGNESIUM: CPT | Performed by: HOSPITALIST

## 2022-10-08 PROCEDURE — 80048 BASIC METABOLIC PNL TOTAL CA: CPT | Performed by: HOSPITALIST

## 2022-10-08 PROCEDURE — 99233 PR SUBSEQUENT HOSPITAL CARE,LEVL III: ICD-10-PCS | Mod: ,,, | Performed by: HOSPITALIST

## 2022-10-08 PROCEDURE — 99233 SBSQ HOSP IP/OBS HIGH 50: CPT | Mod: ,,, | Performed by: HOSPITALIST

## 2022-10-08 PROCEDURE — 94640 AIRWAY INHALATION TREATMENT: CPT

## 2022-10-08 PROCEDURE — 11000001 HC ACUTE MED/SURG PRIVATE ROOM

## 2022-10-08 RX ORDER — HYDROCODONE BITARTRATE AND ACETAMINOPHEN 7.5; 325 MG/1; MG/1
1 TABLET ORAL EVERY 8 HOURS PRN
Status: DISCONTINUED | OUTPATIENT
Start: 2022-10-08 | End: 2022-10-11 | Stop reason: HOSPADM

## 2022-10-08 RX ORDER — IPRATROPIUM BROMIDE AND ALBUTEROL SULFATE 2.5; .5 MG/3ML; MG/3ML
3 SOLUTION RESPIRATORY (INHALATION) 4 TIMES DAILY
Status: DISCONTINUED | OUTPATIENT
Start: 2022-10-08 | End: 2022-10-10

## 2022-10-08 RX ADMIN — FERROUS SULFATE TAB 325 MG (65 MG ELEMENTAL FE) 1 EACH: 325 (65 FE) TAB at 09:10

## 2022-10-08 RX ADMIN — METHYLPREDNISOLONE SODIUM SUCCINATE 40 MG: 40 INJECTION, POWDER, FOR SOLUTION INTRAMUSCULAR; INTRAVENOUS at 12:10

## 2022-10-08 RX ADMIN — HYDROCORTISONE 2.5%: 25 CREAM TOPICAL at 09:10

## 2022-10-08 RX ADMIN — TRAZODONE HYDROCHLORIDE 50 MG: 50 TABLET ORAL at 09:10

## 2022-10-08 RX ADMIN — ACETAMINOPHEN 1000 MG: 500 TABLET ORAL at 12:10

## 2022-10-08 RX ADMIN — ACETAMINOPHEN 1000 MG: 500 TABLET ORAL at 09:10

## 2022-10-08 RX ADMIN — IPRATROPIUM BROMIDE AND ALBUTEROL SULFATE 3 ML: .5; 3 SOLUTION RESPIRATORY (INHALATION) at 06:10

## 2022-10-08 RX ADMIN — INSULIN ASPART 6 UNITS: 100 INJECTION, SOLUTION INTRAVENOUS; SUBCUTANEOUS at 12:10

## 2022-10-08 RX ADMIN — PANTOPRAZOLE SODIUM 40 MG: 40 TABLET, DELAYED RELEASE ORAL at 09:10

## 2022-10-08 RX ADMIN — IPRATROPIUM BROMIDE AND ALBUTEROL SULFATE 3 ML: .5; 3 SOLUTION RESPIRATORY (INHALATION) at 09:10

## 2022-10-08 RX ADMIN — INSULIN DETEMIR 10 UNITS: 100 INJECTION, SOLUTION SUBCUTANEOUS at 09:10

## 2022-10-08 RX ADMIN — LEVOTHYROXINE SODIUM 100 MCG: 100 TABLET ORAL at 05:10

## 2022-10-08 RX ADMIN — GABAPENTIN 100 MG: 100 CAPSULE ORAL at 09:10

## 2022-10-08 RX ADMIN — INSULIN ASPART 3 UNITS: 100 INJECTION, SOLUTION INTRAVENOUS; SUBCUTANEOUS at 09:10

## 2022-10-08 RX ADMIN — GABAPENTIN 100 MG: 100 CAPSULE ORAL at 03:10

## 2022-10-08 NOTE — ASSESSMENT & PLAN NOTE
Patient's FSGs are uncontrolled due to hyperglycemia on current medication regimen.  Last A1c reviewed-   Lab Results   Component Value Date    HGBA1C 10.5 (H) 08/02/2022     Most recent fingerstick glucose reviewed- No results for input(s): POCTGLUCOSE in the last 24 hours.  Current correctional scale  Low  Maintain anti-hyperglycemic dose as follows-   Antihyperglycemics (From admission, onward)    Start     Stop Route Frequency Ordered    10/06/22 2100  insulin detemir U-100 injection 10 Units         -- SubQ Nightly 10/06/22 0344    10/06/22 0443  insulin aspart U-100 injection 1-10 Units         -- SubQ Before meals & nightly PRN 10/06/22 0344        Hold Oral hypoglycemics while patient is in the hospital.  Basal/correctional insulin while NPO for endoscopic evaluation.      10/08 blood sugar better but will need to watch because of shortness of breath given a couple doses of steroids

## 2022-10-08 NOTE — ASSESSMENT & PLAN NOTE
Hold asa  MAREN for DVT prophylaxis which will also help with the peripheral edema.    Appreciate GI eval for endoscopy  Colonoscopy with inflamed non bleeding hemorrhoids . Also diverticulitis. If no bleeding home soon. Some SOB and check CXR.

## 2022-10-08 NOTE — SUBJECTIVE & OBJECTIVE
Past Medical History:   Diagnosis Date    Arthritis     Chronic kidney disease, stage 3b     Diabetes mellitus, type 2     Hiatal hernia     Hyperlipidemia     Hypertension     Neuropathy     Thyroid disease        Past Surgical History:   Procedure Laterality Date    BREAST SURGERY      Biopsy    EYE SURGERY      Remove cataracts both eyes    HYSTERECTOMY      NEPHRECTOMY Right     THYROIDECTOMY         Review of patient's allergies indicates:   Allergen Reactions    Aspirin        No current facility-administered medications on file prior to encounter.     Current Outpatient Medications on File Prior to Encounter   Medication Sig    albuterol (PROVENTIL/VENTOLIN HFA) 90 mcg/actuation inhaler Inhale 2 puffs into the lungs every 4 (four) hours as needed. Rescue    amLODIPine (NORVASC) 5 MG tablet TAKE 1 TABLET BY MOUTH EVERY DAY    bisoprolol-hydrochlorothiazide 5-6.25 mg (ZIAC) 5-6.25 mg Tab TAKE 1 TABLET TWICE A DAY    empagliflozin (JARDIANCE) 10 mg tablet Take 1 tablet (10 mg total) by mouth once daily.    ferrous sulfate (FEOSOL) 325 mg (65 mg iron) Tab tablet Take by mouth 2 (two) times daily.    furosemide (LASIX) 40 MG tablet Take 1 tablet (40 mg total) by mouth once daily.    gabapentin (NEURONTIN) 400 MG capsule Take 1 capsule (400 mg total) by mouth 4 (four) times daily as needed (grgrgff).    hydrALAZINE (APRESOLINE) 25 MG tablet Take 1 tablet (25 mg total) by mouth 2 (two) times daily.    HYDROcodone-acetaminophen (NORCO)  mg per tablet Take 1 tablet by mouth 3 (three) times daily.    hydrocortisone (ANUSOL-HC) 25 mg suppository Place 1 suppository (25 mg total) rectally 2 (two) times daily.    insulin  unit/mL injection Inject 30 Units into the skin 2 (two) times daily before meals.    insulin NPH-insulin regular, 70/30, (NOVOLIN 70/30) 100 unit/mL (70-30) injection Inject 12 Units into the skin 2 (two) times a day.    lactulose (CHRONULAC) 10 gram/15 mL solution Take 30 mLs (20 g total)  by mouth once daily.    levothyroxine (SYNTHROID) 100 MCG tablet Take 1 tablet (100 mcg total) by mouth before breakfast.    lisinopriL (PRINIVIL,ZESTRIL) 40 MG tablet TAKE 1 TABLET TWICE A DAY    methocarbamoL (ROBAXIN) 750 MG Tab TAKE 2 TABLETS BY MOUTH 4 TIMES A DAY AS NEEDED FOR MUSCLE SPASMS    NOVOLIN N NPH U-100 INSULIN 100 unit/mL injection Inject 40 Units into the skin 2 (two) times daily.    pantoprazole (PROTONIX) 40 MG tablet TAKE 1 TABLET BY MOUTH TWICE A DAY    potassium chloride SA (K-DUR,KLOR-CON) 10 MEQ tablet Take 1 tablet by mouth once daily.    predniSONE (DELTASONE) 2.5 MG tablet Take 2.5 mg by mouth once daily.    SYMBICORT 160-4.5 mcg/actuation HFAA Inhale 2 puffs into the lungs 2 (two) times a day.    traMADoL (ULTRAM) 50 mg tablet TAKE 1 TABLET BY MOUTH FOUR TIMES A DAY     Family History       Problem Relation (Age of Onset)    Cancer Sister, Brother, Daughter    Diabetes Mother, Sister, Brother, Sister    Heart disease Father          Tobacco Use    Smoking status: Never    Smokeless tobacco: Never   Substance and Sexual Activity    Alcohol use: Never    Drug use: Never    Sexual activity: Not Currently     Birth control/protection: None     Review of Systems   Constitutional:  Negative for appetite change, chills, fatigue, fever and unexpected weight change.   HENT:  Negative for congestion, mouth sores, nosebleeds, sinus pain, sore throat and trouble swallowing.    Respiratory:  Negative for apnea, cough, chest tightness and shortness of breath.    Cardiovascular:  Negative for chest pain, palpitations and leg swelling.   Gastrointestinal:  Positive for anal bleeding and rectal pain. Negative for abdominal pain, blood in stool, constipation, diarrhea, nausea and vomiting.   Genitourinary:  Negative for decreased urine volume, difficulty urinating, dysuria and frequency.   Musculoskeletal:  Negative for arthralgias, back pain and neck pain.   Skin:  Negative for rash.   Neurological:   Negative for syncope, light-headedness and headaches.   Hematological:  Does not bruise/bleed easily.   Psychiatric/Behavioral:  Negative for confusion and suicidal ideas.    Objective:     Vital Signs (Most Recent):  Temp: 99.3 °F (37.4 °C) (10/07/22 1915)  Pulse: 88 (10/07/22 1915)  Resp: 19 (10/07/22 1915)  BP: 106/76 (10/07/22 1915)  SpO2: 97 % (10/07/22 1915)   Vital Signs (24h Range):  Temp:  [97.5 °F (36.4 °C)-99.3 °F (37.4 °C)] 99.3 °F (37.4 °C)  Pulse:  [72-96] 88  Resp:  [18-20] 19  SpO2:  [93 %-97 %] 97 %  BP: (106-167)/(51-92) 106/76     Weight: 85.3 kg (188 lb)  Body mass index is 31.28 kg/m².    Physical Exam  Constitutional:       Appearance: Normal appearance.   HENT:      Head: Atraumatic.      Nose: Nose normal.      Mouth/Throat:      Mouth: Mucous membranes are moist.      Pharynx: Oropharynx is clear.   Eyes:      Conjunctiva/sclera: Conjunctivae normal.      Pupils: Pupils are equal, round, and reactive to light.   Neck:      Vascular: No carotid bruit.   Cardiovascular:      Rate and Rhythm: Normal rate and regular rhythm.      Pulses: Normal pulses.      Heart sounds: Normal heart sounds.   Pulmonary:      Effort: Pulmonary effort is normal.      Breath sounds: Normal breath sounds.   Abdominal:      General: Abdomen is flat. Bowel sounds are normal. There is no distension.      Palpations: Abdomen is soft.      Tenderness: There is no abdominal tenderness. There is no guarding.   Musculoskeletal:         General: Normal range of motion.      Cervical back: Neck supple.      Right lower leg: Edema present.      Left lower leg: Edema present.   Skin:     General: Skin is warm and dry.      Capillary Refill: Capillary refill takes less than 2 seconds.      Coloration: Skin is not jaundiced or pale.      Findings: No bruising, lesion or rash.   Neurological:      General: No focal deficit present.      Mental Status: She is alert and oriented to person, place, and time.   Psychiatric:          Mood and Affect: Mood normal.         CRANIAL NERVES     CN III, IV, VI   Pupils are equal, round, and reactive to light.     Significant Labs: All pertinent labs within the past 24 hours have been reviewed.    Significant Imaging: I have reviewed all pertinent imaging results/findings within the past 24 hours.    Imaging Results              X-Ray Chest AP Portable (Final result)  Result time 10/05/22 16:04:02      Final result by Alex Padgett II, MD (10/05/22 16:04:02)                   Impression:      Findings suggest cardiac decompensation and / or pneumonitis.      Electronically signed by: Alex Padgett  Date:    10/05/2022  Time:    16:04               Narrative:    EXAMINATION:  XR CHEST AP PORTABLE    CLINICAL HISTORY:  Hypotension, unspecified    COMPARISON:  8 September 2020    FINDINGS:  The heart and mediastinum are stable in size and configuration.  The pulmonary vascularity is slightly increased with bilateral increased interstitial lung density.  No other lung infiltrates, effusions, pneumothorax or other abnormality is demonstrated.                                    Intake/Output - Last 3 Shifts         10/06 0700  10/07 0659 10/07 0700  10/08 0659    Blood      IV Piggyback 100     Total Intake(mL/kg) 100 (1.2)     Net +100           Urine Occurrence 5 x 1 x    Stool Occurrence 2 x

## 2022-10-08 NOTE — NURSING
Patient called nurse to room due to bleeding noted upon wiping after bowel movement. Patient had bright red blood noted in bedside commode on tissue minimum. Patient had colonoscopy done on today and stated they scraped some of her hemmrroids , Nurse verbalized understanding and stated will report. No hemmroid cream ordered at this time    2243 nurse notified hospitalist on call and reported findings and informed of no cream available. Stated she will order    2355 Cream ordered for hemmroids and administered [facility] patient of frequency [facility]

## 2022-10-08 NOTE — SUBJECTIVE & OBJECTIVE
Past Medical History:   Diagnosis Date    Arthritis     Chronic kidney disease, stage 3b     Diabetes mellitus, type 2     Hiatal hernia     Hyperlipidemia     Hypertension     Neuropathy     Thyroid disease        Past Surgical History:   Procedure Laterality Date    BREAST SURGERY      Biopsy    EYE SURGERY      Remove cataracts both eyes    HYSTERECTOMY      NEPHRECTOMY Right     THYROIDECTOMY         Review of patient's allergies indicates:   Allergen Reactions    Aspirin        No current facility-administered medications on file prior to encounter.     Current Outpatient Medications on File Prior to Encounter   Medication Sig    albuterol (PROVENTIL/VENTOLIN HFA) 90 mcg/actuation inhaler Inhale 2 puffs into the lungs every 4 (four) hours as needed. Rescue    amLODIPine (NORVASC) 5 MG tablet TAKE 1 TABLET BY MOUTH EVERY DAY    bisoprolol-hydrochlorothiazide 5-6.25 mg (ZIAC) 5-6.25 mg Tab TAKE 1 TABLET TWICE A DAY    empagliflozin (JARDIANCE) 10 mg tablet Take 1 tablet (10 mg total) by mouth once daily.    ferrous sulfate (FEOSOL) 325 mg (65 mg iron) Tab tablet Take by mouth 2 (two) times daily.    furosemide (LASIX) 40 MG tablet Take 1 tablet (40 mg total) by mouth once daily.    gabapentin (NEURONTIN) 400 MG capsule Take 1 capsule (400 mg total) by mouth 4 (four) times daily as needed (grgrgff).    hydrALAZINE (APRESOLINE) 25 MG tablet Take 1 tablet (25 mg total) by mouth 2 (two) times daily.    HYDROcodone-acetaminophen (NORCO)  mg per tablet Take 1 tablet by mouth 3 (three) times daily.    hydrocortisone (ANUSOL-HC) 25 mg suppository Place 1 suppository (25 mg total) rectally 2 (two) times daily.    insulin  unit/mL injection Inject 30 Units into the skin 2 (two) times daily before meals.    insulin NPH-insulin regular, 70/30, (NOVOLIN 70/30) 100 unit/mL (70-30) injection Inject 12 Units into the skin 2 (two) times a day.    lactulose (CHRONULAC) 10 gram/15 mL solution  Take 30 mLs (20 g total) by mouth once daily.    levothyroxine (SYNTHROID) 100 MCG tablet Take 1 tablet (100 mcg total) by mouth before breakfast.    lisinopriL (PRINIVIL,ZESTRIL) 40 MG tablet TAKE 1 TABLET TWICE A DAY    methocarbamoL (ROBAXIN) 750 MG Tab TAKE 2 TABLETS BY MOUTH 4 TIMES A DAY AS NEEDED FOR MUSCLE SPASMS    NOVOLIN N NPH U-100 INSULIN 100 unit/mL injection Inject 40 Units into the skin 2 (two) times daily.    pantoprazole (PROTONIX) 40 MG tablet TAKE 1 TABLET BY MOUTH TWICE A DAY    potassium chloride SA (K-DUR,KLOR-CON) 10 MEQ tablet Take 1 tablet by mouth once daily.    predniSONE (DELTASONE) 2.5 MG tablet Take 2.5 mg by mouth once daily.    SYMBICORT 160-4.5 mcg/actuation HFAA Inhale 2 puffs into the lungs 2 (two) times a day.    traMADoL (ULTRAM) 50 mg tablet TAKE 1 TABLET BY MOUTH FOUR TIMES A DAY     Family History       Problem Relation (Age of Onset)    Cancer Sister, Brother, Daughter    Diabetes Mother, Sister, Brother, Sister    Heart disease Father          Tobacco Use    Smoking status: Never    Smokeless tobacco: Never   Substance and Sexual Activity    Alcohol use: Never    Drug use: Never    Sexual activity: Not Currently     Birth control/protection: None     Review of Systems   Constitutional:  Negative for appetite change, chills, fatigue, fever and unexpected weight change.   HENT:  Negative for congestion, mouth sores, nosebleeds, sinus pain, sore throat and trouble swallowing.    Respiratory:  Negative for apnea, cough, chest tightness and shortness of breath.    Cardiovascular:  Negative for chest pain, palpitations and leg swelling.   Gastrointestinal:  Positive for anal bleeding and rectal pain. Negative for abdominal pain, blood in stool, constipation, diarrhea, nausea and vomiting.   Genitourinary:  Negative for decreased urine volume, difficulty urinating, dysuria and frequency.   Musculoskeletal:  Negative for arthralgias, back pain and neck pain.   Skin:   Negative for rash.   Neurological:  Negative for syncope, light-headedness and headaches.   Hematological:  Does not bruise/bleed easily.   Psychiatric/Behavioral:  Negative for confusion and suicidal ideas.    Objective:     Vital Signs (Most Recent):  Temp: 98.4 °F (36.9 °C) (10/08/22 0730)  Pulse: 83 (10/08/22 0730)  Resp: (!) 22 (10/08/22 0730)  BP: (!) 171/78 (10/08/22 0730)  SpO2: 96 % (10/08/22 0730)   Vital Signs (24h Range):  Temp:  [97.5 °F (36.4 °C)-99.3 °F (37.4 °C)] 98.4 °F (36.9 °C)  Pulse:  [72-96] 83  Resp:  [18-22] 22  SpO2:  [93 %-98 %] 96 %  BP: (106-171)/(51-92) 171/78     Weight: 83.5 kg (184 lb 1.4 oz)  Body mass index is 30.63 kg/m².    Physical Exam  Constitutional:       Appearance: Normal appearance.   HENT:      Head: Atraumatic.      Nose: Nose normal.      Mouth/Throat:      Mouth: Mucous membranes are moist.      Pharynx: Oropharynx is clear.   Eyes:      Conjunctiva/sclera: Conjunctivae normal.      Pupils: Pupils are equal, round, and reactive to light.   Neck:      Vascular: No carotid bruit.   Cardiovascular:      Rate and Rhythm: Normal rate and regular rhythm.      Pulses: Normal pulses.      Heart sounds: Normal heart sounds.   Pulmonary:      Effort: Pulmonary effort is normal.      Breath sounds: Normal breath sounds.   Abdominal:      General: Abdomen is flat. Bowel sounds are normal. There is no distension.      Palpations: Abdomen is soft.      Tenderness: There is no abdominal tenderness. There is no guarding.   Musculoskeletal:         General: Normal range of motion.      Cervical back: Neck supple.      Right lower leg: Edema present.      Left lower leg: Edema present.   Skin:     General: Skin is warm and dry.      Capillary Refill: Capillary refill takes less than 2 seconds.      Coloration: Skin is not jaundiced or pale.      Findings: No bruising, lesion or rash.   Neurological:      General: No focal deficit present.      Mental Status: She is alert and oriented to  person, place, and time.   Psychiatric:         Mood and Affect: Mood normal.         CRANIAL NERVES     CN III, IV, VI   Pupils are equal, round, and reactive to light.     Significant Labs: All pertinent labs within the past 24 hours have been reviewed.    Significant Imaging: I have reviewed all pertinent imaging results/findings within the past 24 hours.    Imaging Results              X-Ray Chest AP Portable (Final result)  Result time 10/05/22 16:04:02      Final result by Alex Padgett II, MD (10/05/22 16:04:02)                   Impression:      Findings suggest cardiac decompensation and / or pneumonitis.      Electronically signed by: Alex Padgett  Date:    10/05/2022  Time:    16:04               Narrative:    EXAMINATION:  XR CHEST AP PORTABLE    CLINICAL HISTORY:  Hypotension, unspecified    COMPARISON:  8 September 2020    FINDINGS:  The heart and mediastinum are stable in size and configuration.  The pulmonary vascularity is slightly increased with bilateral increased interstitial lung density.  No other lung infiltrates, effusions, pneumothorax or other abnormality is demonstrated.                                    Intake/Output - Last 3 Shifts         10/06 0700  10/07 0659 10/07 0700  10/08 0659 10/08 0700  10/09 0659    Blood       IV Piggyback 100      Total Intake(mL/kg) 100 (1.2)      Net +100             Urine Occurrence 5 x 1 x 1 x    Stool Occurrence 2 x  1 x

## 2022-10-08 NOTE — PROGRESS NOTES
[facility] - [address] [place] [address] Medicine  Progress Note    Patient Name: Vandana Allison  MRN: [id number]  Patient Class: IP- Inpatient   Admission Date: 10/5/2022  Length of Stay: 3 days  Attending Physician: David Garcia MD  Primary Care Provider: Cm Larson III, DO        Subjective:     Principal Problem:Rectal bleeding        HPI:  89 yo F, who appears much younger than stated age, presents to the ED from Dr. Wood's office for hypotension and bradycardia.  Patient was sent for rectal bleeding that hs been occurring since June.  Patient has same problem five years ago and had internal hemorrhoids so was referred to Dr. Beatty.  Dr. Beatty referred patient for colonoscopy to Dr. Wood.      Patient EKG shows sinus bradycardia (patient on bisoprolol) and BMP shows TRISTAN and Na 118 (patient on lasix and HCT).  She really does not have any complaints except she states that he bottoms hurts where she is bleeding.  She is very active and doesn't report generalized weakness or syncope.  Her daughter is present and says they were just following up on their clinic appointments for the rectal bleeding.  She has had no weight loss or dysphagia.  She states she was working full time up until age 82.  Her hgb is 6.7 and a transfusion of pRBC going.  I will order anemia panel and coags ASAP.        Overview/Hospital Course:  10/06 Records reviewed. Daughter in room. Pt last hx hemorrhoidal bleeding. More BRB pre rectum and JCM III referred to Dr Beatty and Dr Beatty to Dr Wood. BP low in office and referred to hospital EGD without source and plan colonoscopy in am. Asymptomatic bradycardia. Stopped ziac, Watch electrolytes also asymptomatic.   10/07 Seen prior to coloscopy but since report reviewed. If does well home soon. F/U again with Dr Beatty.  10/08 considering discharging patient.  When staff getting her up out of bed, she became really short of breath.  Did not drop oxygen saturation.   Similar episode earlier during her stay before going to endoscopy study.  No chest pain.  Patient states she has had these issues for several weeks.  No history of any heart disease.  Will hold discharge and check into further.  At 89 years old will try to treat conservative because of increased complications with any aggressive procedures.      Past Medical History:   Diagnosis Date    Arthritis     Chronic kidney disease, stage 3b     Diabetes mellitus, type 2     Hiatal hernia     Hyperlipidemia     Hypertension     Neuropathy     Thyroid disease        Past Surgical History:   Procedure Laterality Date    BREAST SURGERY      Biopsy    EYE SURGERY      Remove cataracts both eyes    HYSTERECTOMY      NEPHRECTOMY Right     THYROIDECTOMY         Review of patient's allergies indicates:   Allergen Reactions    Aspirin        No current facility-administered medications on file prior to encounter.     Current Outpatient Medications on File Prior to Encounter   Medication Sig    albuterol (PROVENTIL/VENTOLIN HFA) 90 mcg/actuation inhaler Inhale 2 puffs into the lungs every 4 (four) hours as needed. Rescue    amLODIPine (NORVASC) 5 MG tablet TAKE 1 TABLET BY MOUTH EVERY DAY    bisoprolol-hydrochlorothiazide 5-6.25 mg (ZIAC) 5-6.25 mg Tab TAKE 1 TABLET TWICE A DAY    empagliflozin (JARDIANCE) 10 mg tablet Take 1 tablet (10 mg total) by mouth once daily.    ferrous sulfate (FEOSOL) 325 mg (65 mg iron) Tab tablet Take by mouth 2 (two) times daily.    furosemide (LASIX) 40 MG tablet Take 1 tablet (40 mg total) by mouth once daily.    gabapentin (NEURONTIN) 400 MG capsule Take 1 capsule (400 mg total) by mouth 4 (four) times daily as needed (grgrgff).    hydrALAZINE (APRESOLINE) 25 MG tablet Take 1 tablet (25 mg total) by mouth 2 (two) times daily.    HYDROcodone-acetaminophen (NORCO)  mg per tablet Take 1 tablet by mouth 3 (three) times daily.    hydrocortisone (ANUSOL-HC) 25 mg suppository  Place 1 suppository (25 mg total) rectally 2 (two) times daily.    insulin  unit/mL injection Inject 30 Units into the skin 2 (two) times daily before meals.    insulin NPH-insulin regular, 70/30, (NOVOLIN 70/30) 100 unit/mL (70-30) injection Inject 12 Units into the skin 2 (two) times a day.    lactulose (CHRONULAC) 10 gram/15 mL solution Take 30 mLs (20 g total) by mouth once daily.    levothyroxine (SYNTHROID) 100 MCG tablet Take 1 tablet (100 mcg total) by mouth before breakfast.    lisinopriL (PRINIVIL,ZESTRIL) 40 MG tablet TAKE 1 TABLET TWICE A DAY    methocarbamoL (ROBAXIN) 750 MG Tab TAKE 2 TABLETS BY MOUTH 4 TIMES A DAY AS NEEDED FOR MUSCLE SPASMS    NOVOLIN N NPH U-100 INSULIN 100 unit/mL injection Inject 40 Units into the skin 2 (two) times daily.    pantoprazole (PROTONIX) 40 MG tablet TAKE 1 TABLET BY MOUTH TWICE A DAY    potassium chloride SA (K-DUR,KLOR-CON) 10 MEQ tablet Take 1 tablet by mouth once daily.    predniSONE (DELTASONE) 2.5 MG tablet Take 2.5 mg by mouth once daily.    SYMBICORT 160-4.5 mcg/actuation HFAA Inhale 2 puffs into the lungs 2 (two) times a day.    traMADoL (ULTRAM) 50 mg tablet TAKE 1 TABLET BY MOUTH FOUR TIMES A DAY     Family History       Problem Relation (Age of Onset)    Cancer Sister, Brother, Daughter    Diabetes Mother, Sister, Brother, Sister    Heart disease Father          Tobacco Use    Smoking status: Never    Smokeless tobacco: Never   Substance and Sexual Activity    Alcohol use: Never    Drug use: Never    Sexual activity: Not Currently     Birth control/protection: None     Review of Systems   Constitutional:  Negative for appetite change, chills, fatigue, fever and unexpected weight change.   HENT:  Negative for congestion, mouth sores, nosebleeds, sinus pain, sore throat and trouble swallowing.    Respiratory:  Negative for apnea, cough, chest tightness and shortness of breath.    Cardiovascular:  Negative for chest pain, palpitations  and leg swelling.   Gastrointestinal:  Positive for anal bleeding and rectal pain. Negative for abdominal pain, blood in stool, constipation, diarrhea, nausea and vomiting.   Genitourinary:  Negative for decreased urine volume, difficulty urinating, dysuria and frequency.   Musculoskeletal:  Negative for arthralgias, back pain and neck pain.   Skin:  Negative for rash.   Neurological:  Negative for syncope, light-headedness and headaches.   Hematological:  Does not bruise/bleed easily.   Psychiatric/Behavioral:  Negative for confusion and suicidal ideas.    Objective:     Vital Signs (Most Recent):  Temp: 98.4 °F (36.9 °C) (10/08/22 0730)  Pulse: 83 (10/08/22 0730)  Resp: (!) 22 (10/08/22 0730)  BP: (!) 171/78 (10/08/22 0730)  SpO2: 96 % (10/08/22 0730)   Vital Signs (24h Range):  Temp:  [97.5 °F (36.4 °C)-99.3 °F (37.4 °C)] 98.4 °F (36.9 °C)  Pulse:  [72-96] 83  Resp:  [18-22] 22  SpO2:  [93 %-98 %] 96 %  BP: (106-171)/(51-92) 171/78     Weight: 83.5 kg (184 lb 1.4 oz)  Body mass index is 30.63 kg/m².    Physical Exam  Constitutional:       Appearance: Normal appearance.   HENT:      Head: Atraumatic.      Nose: Nose normal.      Mouth/Throat:      Mouth: Mucous membranes are moist.      Pharynx: Oropharynx is clear.   Eyes:      Conjunctiva/sclera: Conjunctivae normal.      Pupils: Pupils are equal, round, and reactive to light.   Neck:      Vascular: No carotid bruit.   Cardiovascular:      Rate and Rhythm: Normal rate and regular rhythm.      Pulses: Normal pulses.      Heart sounds: Normal heart sounds.   Pulmonary:      Effort: Pulmonary effort is normal.      Breath sounds: Normal breath sounds.   Abdominal:      General: Abdomen is flat. Bowel sounds are normal. There is no distension.      Palpations: Abdomen is soft.      Tenderness: There is no abdominal tenderness. There is no guarding.   Musculoskeletal:         General: Normal range of motion.      Cervical back: Neck supple.      Right lower leg:  Edema present.      Left lower leg: Edema present.   Skin:     General: Skin is warm and dry.      Capillary Refill: Capillary refill takes less than 2 seconds.      Coloration: Skin is not jaundiced or pale.      Findings: No bruising, lesion or rash.   Neurological:      General: No focal deficit present.      Mental Status: She is alert and oriented to person, place, and time.   Psychiatric:         Mood and Affect: Mood normal.         CRANIAL NERVES     CN III, IV, VI   Pupils are equal, round, and reactive to light.     Significant Labs: All pertinent labs within the past 24 hours have been reviewed.    Significant Imaging: I have reviewed all pertinent imaging results/findings within the past 24 hours.    Imaging Results              X-Ray Chest AP Portable (Final result)  Result time 10/05/22 16:04:02      Final result by Alex Padgett II, MD (10/05/22 16:04:02)                   Impression:      Findings suggest cardiac decompensation and / or pneumonitis.      Electronically signed by: Alex Padgett  Date:    10/05/2022  Time:    16:04               Narrative:    EXAMINATION:  XR CHEST AP PORTABLE    CLINICAL HISTORY:  Hypotension, unspecified    COMPARISON:  8 September 2020    FINDINGS:  The heart and mediastinum are stable in size and configuration.  The pulmonary vascularity is slightly increased with bilateral increased interstitial lung density.  No other lung infiltrates, effusions, pneumothorax or other abnormality is demonstrated.                                    Intake/Output - Last 3 Shifts         10/06 0700  10/07 0659 10/07 0700  10/08 0659 10/08 0700  10/09 0659    Blood       IV Piggyback 100      Total Intake(mL/kg) 100 (1.2)      Net +100             Urine Occurrence 5 x 1 x 1 x    Stool Occurrence 2 x  1 x                Assessment/Plan:      * Rectal bleeding  Hold asa  MAREN for DVT prophylaxis which will also help with the peripheral edema.    Appreciate GI eval for  endoscopy  Colonoscopy with inflamed non bleeding hemorrhoids . Also diverticulitis. If no bleeding home soon. Some SOB and check CXR.       External hemorrhoids        Colon, diverticulosis        Iron deficiency anemia due to chronic blood loss        HH (hiatus hernia)        Polyp of stomach        Hypotension due to drugs        Neuropathy  Continue gabapentin      Thyroid disease  Continue synthroid  Check TSH      Bradycardia  Stop BB.  Monitor on telemetry  Trend troponins  Check TSH      Acute kidney injury  Patient with acute kidney injury likely due to IVVD/dehydration TRISTAN is currently improving. Labs reviewed- Renal function/electrolytes with Estimated Creatinine Clearance: 29 mL/min (A) (based on SCr of 1.24 mg/dL (H)). according to latest data. Monitor urine output and serial BMP and adjust therapy as needed. Avoid nephrotoxins and renally dose meds for GFR listed above.       Dehydration with hyponatremia  Hold diuretics  Gentle hydration  Follow electrolytes      Acute blood loss anemia  Renally dose all meds.    No NSAIDS  Stop ACEI  IVF and follow renal function.        Diabetes mellitus, type 2  Patient's FSGs are uncontrolled due to hyperglycemia on current medication regimen.  Last A1c reviewed-   Lab Results   Component Value Date    HGBA1C 10.5 (H) 08/02/2022     Most recent fingerstick glucose reviewed- No results for input(s): POCTGLUCOSE in the last 24 hours.  Current correctional scale  Low  Maintain anti-hyperglycemic dose as follows-   Antihyperglycemics (From admission, onward)    Start     Stop Route Frequency Ordered    10/06/22 2100  insulin detemir U-100 injection 10 Units         -- SubQ Nightly 10/06/22 0344    10/06/22 0443  insulin aspart U-100 injection 1-10 Units         -- SubQ Before meals & nightly PRN 10/06/22 0344        Hold Oral hypoglycemics while patient is in the hospital.  Basal/correctional insulin while NPO for endoscopic evaluation.      10/08 blood sugar better  but will need to watch because of shortness of breath given a couple doses of steroids    Arthritis  Patient on ultram as OP.    Will continue but will encourage tylenol usage first.    No NSAIDS.        Hypertension  Hold antihypertensive agents due to hypotension.        Hyperlipidemia  Continue statin.          VTE Risk Mitigation (From admission, onward)         Ordered     Place MAREN hose  Until discontinued         10/06/22 0453                Discharge Planning   MARTHA: 10/8/2022     Code Status: Full Code   Is the patient medically ready for discharge?:     Reason for patient still in hospital (select all that apply): Laboratory test, Treatment, Imaging and Pending disposition  Discharge Plan A: Home with family                  David Garcia MD  Department of Hospital Medicine   [facility] - [address] [place] [address]

## 2022-10-08 NOTE — PROGRESS NOTES
[facility] - [address] [place] [address] Medicine  Progress Note    Patient Name: Vandana Allison  MRN: [id number]  Patient Class: IP- Inpatient   Admission Date: 10/5/2022  Length of Stay: 2 days  Attending Physician: David Garcia MD  Primary Care Provider: Cm Larson III, DO        Subjective:     Principal Problem:Rectal bleeding        HPI:  89 yo F, who appears much younger than stated age, presents to the ED from Dr. Wood's office for hypotension and bradycardia.  Patient was sent for rectal bleeding that hs been occurring since June.  Patient has same problem five years ago and had internal hemorrhoids so was referred to Dr. Beatty.  Dr. Beatty referred patient for colonoscopy to Dr. Wood.      Patient EKG shows sinus bradycardia (patient on bisoprolol) and BMP shows TRISTAN and Na 118 (patient on lasix and HCT).  She really does not have any complaints except she states that he bottoms hurts where she is bleeding.  She is very active and doesn't report generalized weakness or syncope.  Her daughter is present and says they were just following up on their clinic appointments for the rectal bleeding.  She has had no weight loss or dysphagia.  She states she was working full time up until age 82.  Her hgb is 6.7 and a transfusion of pRBC going.  I will order anemia panel and coags ASAP.        Overview/Hospital Course:  10/06 Records reviewed. Daughter in room. Pt last hx hemorrhoidal bleeding. More BRB pre rectum and JCM III referred to Dr Beatty and Dr Beatty to Dr Wood. BP low in office and referred to hospital EGD without source and plan colonoscopy in am. Asymptomatic bradycardia. Stopped ziac, Watch electrolytes also asymptomatic.   10/07 Seen prior to coloscopy but since report reviewed. If does well home soon. F/U again with Dr Beatty.      Past Medical History:   Diagnosis Date    Arthritis     Chronic kidney disease, stage 3b     Diabetes mellitus, type 2     Hiatal hernia      Hyperlipidemia     Hypertension     Neuropathy     Thyroid disease        Past Surgical History:   Procedure Laterality Date    BREAST SURGERY      Biopsy    EYE SURGERY      Remove cataracts both eyes    HYSTERECTOMY      NEPHRECTOMY Right     THYROIDECTOMY         Review of patient's allergies indicates:   Allergen Reactions    Aspirin        No current facility-administered medications on file prior to encounter.     Current Outpatient Medications on File Prior to Encounter   Medication Sig    albuterol (PROVENTIL/VENTOLIN HFA) 90 mcg/actuation inhaler Inhale 2 puffs into the lungs every 4 (four) hours as needed. Rescue    amLODIPine (NORVASC) 5 MG tablet TAKE 1 TABLET BY MOUTH EVERY DAY    bisoprolol-hydrochlorothiazide 5-6.25 mg (ZIAC) 5-6.25 mg Tab TAKE 1 TABLET TWICE A DAY    empagliflozin (JARDIANCE) 10 mg tablet Take 1 tablet (10 mg total) by mouth once daily.    ferrous sulfate (FEOSOL) 325 mg (65 mg iron) Tab tablet Take by mouth 2 (two) times daily.    furosemide (LASIX) 40 MG tablet Take 1 tablet (40 mg total) by mouth once daily.    gabapentin (NEURONTIN) 400 MG capsule Take 1 capsule (400 mg total) by mouth 4 (four) times daily as needed (grgrgff).    hydrALAZINE (APRESOLINE) 25 MG tablet Take 1 tablet (25 mg total) by mouth 2 (two) times daily.    HYDROcodone-acetaminophen (NORCO)  mg per tablet Take 1 tablet by mouth 3 (three) times daily.    hydrocortisone (ANUSOL-HC) 25 mg suppository Place 1 suppository (25 mg total) rectally 2 (two) times daily.    insulin  unit/mL injection Inject 30 Units into the skin 2 (two) times daily before meals.    insulin NPH-insulin regular, 70/30, (NOVOLIN 70/30) 100 unit/mL (70-30) injection Inject 12 Units into the skin 2 (two) times a day.    lactulose (CHRONULAC) 10 gram/15 mL solution Take 30 mLs (20 g total) by mouth once daily.    levothyroxine (SYNTHROID) 100 MCG tablet Take 1 tablet (100 mcg total) by mouth before  breakfast.    lisinopriL (PRINIVIL,ZESTRIL) 40 MG tablet TAKE 1 TABLET TWICE A DAY    methocarbamoL (ROBAXIN) 750 MG Tab TAKE 2 TABLETS BY MOUTH 4 TIMES A DAY AS NEEDED FOR MUSCLE SPASMS    NOVOLIN N NPH U-100 INSULIN 100 unit/mL injection Inject 40 Units into the skin 2 (two) times daily.    pantoprazole (PROTONIX) 40 MG tablet TAKE 1 TABLET BY MOUTH TWICE A DAY    potassium chloride SA (K-DUR,KLOR-CON) 10 MEQ tablet Take 1 tablet by mouth once daily.    predniSONE (DELTASONE) 2.5 MG tablet Take 2.5 mg by mouth once daily.    SYMBICORT 160-4.5 mcg/actuation HFAA Inhale 2 puffs into the lungs 2 (two) times a day.    traMADoL (ULTRAM) 50 mg tablet TAKE 1 TABLET BY MOUTH FOUR TIMES A DAY     Family History       Problem Relation (Age of Onset)    Cancer Sister, Brother, Daughter    Diabetes Mother, Sister, Brother, Sister    Heart disease Father          Tobacco Use    Smoking status: Never    Smokeless tobacco: Never   Substance and Sexual Activity    Alcohol use: Never    Drug use: Never    Sexual activity: Not Currently     Birth control/protection: None     Review of Systems   Constitutional:  Negative for appetite change, chills, fatigue, fever and unexpected weight change.   HENT:  Negative for congestion, mouth sores, nosebleeds, sinus pain, sore throat and trouble swallowing.    Respiratory:  Negative for apnea, cough, chest tightness and shortness of breath.    Cardiovascular:  Negative for chest pain, palpitations and leg swelling.   Gastrointestinal:  Positive for anal bleeding and rectal pain. Negative for abdominal pain, blood in stool, constipation, diarrhea, nausea and vomiting.   Genitourinary:  Negative for decreased urine volume, difficulty urinating, dysuria and frequency.   Musculoskeletal:  Negative for arthralgias, back pain and neck pain.   Skin:  Negative for rash.   Neurological:  Negative for syncope, light-headedness and headaches.   Hematological:  Does not bruise/bleed easily.    Psychiatric/Behavioral:  Negative for confusion and suicidal ideas.    Objective:     Vital Signs (Most Recent):  Temp: 99.3 °F (37.4 °C) (10/07/22 1915)  Pulse: 88 (10/07/22 1915)  Resp: 19 (10/07/22 1915)  BP: 106/76 (10/07/22 1915)  SpO2: 97 % (10/07/22 1915)   Vital Signs (24h Range):  Temp:  [97.5 °F (36.4 °C)-99.3 °F (37.4 °C)] 99.3 °F (37.4 °C)  Pulse:  [72-96] 88  Resp:  [18-20] 19  SpO2:  [93 %-97 %] 97 %  BP: (106-167)/(51-92) 106/76     Weight: 85.3 kg (188 lb)  Body mass index is 31.28 kg/m².    Physical Exam  Constitutional:       Appearance: Normal appearance.   HENT:      Head: Atraumatic.      Nose: Nose normal.      Mouth/Throat:      Mouth: Mucous membranes are moist.      Pharynx: Oropharynx is clear.   Eyes:      Conjunctiva/sclera: Conjunctivae normal.      Pupils: Pupils are equal, round, and reactive to light.   Neck:      Vascular: No carotid bruit.   Cardiovascular:      Rate and Rhythm: Normal rate and regular rhythm.      Pulses: Normal pulses.      Heart sounds: Normal heart sounds.   Pulmonary:      Effort: Pulmonary effort is normal.      Breath sounds: Normal breath sounds.   Abdominal:      General: Abdomen is flat. Bowel sounds are normal. There is no distension.      Palpations: Abdomen is soft.      Tenderness: There is no abdominal tenderness. There is no guarding.   Musculoskeletal:         General: Normal range of motion.      Cervical back: Neck supple.      Right lower leg: Edema present.      Left lower leg: Edema present.   Skin:     General: Skin is warm and dry.      Capillary Refill: Capillary refill takes less than 2 seconds.      Coloration: Skin is not jaundiced or pale.      Findings: No bruising, lesion or rash.   Neurological:      General: No focal deficit present.      Mental Status: She is alert and oriented to person, place, and time.   Psychiatric:         Mood and Affect: Mood normal.         CRANIAL NERVES     CN III, IV, VI   Pupils are equal, round, and  reactive to light.     Significant Labs: All pertinent labs within the past 24 hours have been reviewed.    Significant Imaging: I have reviewed all pertinent imaging results/findings within the past 24 hours.    Imaging Results              X-Ray Chest AP Portable (Final result)  Result time 10/05/22 16:04:02      Final result by Alex Padgett II, MD (10/05/22 16:04:02)                   Impression:      Findings suggest cardiac decompensation and / or pneumonitis.      Electronically signed by: Alex Padgett  Date:    10/05/2022  Time:    16:04               Narrative:    EXAMINATION:  XR CHEST AP PORTABLE    CLINICAL HISTORY:  Hypotension, unspecified    COMPARISON:  8 September 2020    FINDINGS:  The heart and mediastinum are stable in size and configuration.  The pulmonary vascularity is slightly increased with bilateral increased interstitial lung density.  No other lung infiltrates, effusions, pneumothorax or other abnormality is demonstrated.                                    Intake/Output - Last 3 Shifts         10/06 0700  10/07 0659 10/07 0700  10/08 0659    Blood      IV Piggyback 100     Total Intake(mL/kg) 100 (1.2)     Net +100           Urine Occurrence 5 x 1 x    Stool Occurrence 2 x                 Assessment/Plan:      * Rectal bleeding  Hold asa  MAREN for DVT prophylaxis which will also help with the peripheral edema.    Appreciate GI eval for endoscopy  Colonoscopy with inflamed non bleeding hemorrhoids . Also diverticulitis. If no bleeding home soon. Some SOB and check CXR.       External hemorrhoids        Colon, diverticulosis        Iron deficiency anemia due to chronic blood loss        Polyp of stomach        Hypotension due to drugs        Neuropathy  Continue gabapentin      Thyroid disease  Continue synthroid  Check TSH      Bradycardia  Stop BB.  Monitor on telemetry  Trend troponins  Check TSH      Dehydration with hyponatremia  Hold diuretics  Gentle hydration  Follow  electrolytes      Acute blood loss anemia  Renally dose all meds.    No NSAIDS  Stop ACEI  IVF and follow renal function.        Diabetes mellitus, type 2  Patient's FSGs are uncontrolled due to hyperglycemia on current medication regimen.  Last A1c reviewed-   Lab Results   Component Value Date    HGBA1C 10.5 (H) 08/02/2022     Most recent fingerstick glucose reviewed- No results for input(s): POCTGLUCOSE in the last 24 hours.  Current correctional scale  Low  Maintain anti-hyperglycemic dose as follows-   Antihyperglycemics (From admission, onward)    None        Hold Oral hypoglycemics while patient is in the hospital.  Basal/correctional insulin while NPO for endoscopic evaluation.      Arthritis  Patient on ultram as OP.    Will continue but will encourage tylenol usage first.    No NSAIDS.        Hypertension  Hold antihypertensive agents due to hypotension.        Hyperlipidemia  Continue statin.          VTE Risk Mitigation (From admission, onward)         Ordered     Place MAREN hose  Until discontinued         10/06/22 1631                Discharge Planning   MARTHA:      Code Status: Full Code   Is the patient medically ready for discharge?:     Reason for patient still in hospital (select all that apply): Laboratory test, Treatment, Imaging and Consult recommendations  Discharge Plan A: Home with family                  David Garcia MD  Department of Hospital Medicine   [facility] - [facility] [address] [facility]

## 2022-10-08 NOTE — PLAN OF CARE
Problem: Physical Therapy  Goal: Physical Therapy Goal  Description: Short Term Goals to be met by:     Patient will increase functional independence with mobility by performin. Supine to sit with independently  2. Sit to stand transfer with independently using Rolling walker  3. Bed to chair transfer with independently using Rolling walker  4. Gait  x 100 feet with independently using Rolling walker  5. Lower extremity exercise program x30 reps per handout, with assistance as needed    Long Term Goals to be met by: 2022    Pt will regain full independent functional mobility with rollator walker to return to home situation and prior activities of daily living.   Outcome: Ongoing, Progressing       Patient participated well with PT interventions but c/o generalized weakness/fatigue during mobility. Patient SOB/GREENE exacerbated by her insistence on talking through entire gait trial. Patient will benefit from skilled PT to grade/advance activity whilst pt hospitalized and with home health once medically stable for discharge.

## 2022-10-08 NOTE — PT/OT/SLP EVAL
Occupational Therapy   Evaluation    Name: Vandana Allison  MRN: [id number]  Admitting Diagnosis:  Rectal bleeding  Recent Surgery: * No surgery found *      Recommendations:     Discharge Recommendations: home with home health, home health PT, home health OT  Discharge Equipment Recommendations:  none  Barriers to discharge:  None    Assessment:     Vandana Allison is a 89 y.o. female with a medical diagnosis of Rectal bleeding.  She presents with alert and agreeable to OT evaluation. Pt c/o pain in her (B) knees, (R) shoulder and (B) hands from arthritis. Pt states she has a regimen of medications that she takes each day to keep her able to function with her arthritis, but she has not been getting any of those medications since she has been hospitalized. Performance deficits affecting function: weakness, impaired cardiopulmonary response to activity, impaired endurance, impaired functional mobility, impaired self care skills, pain.      Rehab Prognosis: Good; patient would benefit from acute skilled OT services to address these deficits and reach maximum level of function.       Plan:     Patient to be seen 5 x/week to address the above listed problems via self-care/home management, therapeutic activities, therapeutic exercises  Plan of Care Expires: 10/29/22  Plan of Care Reviewed with: patient    Subjective     Chief Complaint: Pt was admitted with GI bleed and anemia. Pt was ready for D/C, but developed increased SOB with mobility. Pt is staying for cardiac workup.  Patient/Family Comments/goals: Pt wants to be able to return home with her daughter when she is discharged.    Occupational Profile:  Living Environment: Pt lives in a single story home with a single step to enter.   Previous level of function: Pt was able to ambulate with a rollator walker and performed most of her self-care independently. Pt's daughter provides assistance as needed and performs homemaking and meal prep.  Roles and Routines: Pt  lives with her grown daughter and performs most of her self-care  Equipment Used at Home:  cane, straight, rollator  Assistance upon Discharge: Pt will have assistance from her daughter and home health    Pain/Comfort:  Pain Rating 1: 6/10  Location - Side 1: Bilateral  Location 1: knee ((R) shoulder and (B) hands)  Pain Addressed 1: Reposition, Distraction, Cessation of Activity, Nurse notified  Pain Rating Post-Intervention 1: 6/10    Patients cultural, spiritual, Orthodoxy conflicts given the current situation: no    Objective:     Communicated with: VITO Segundo prior to session.  Patient found left sidelying with telemetry, peripheral IV upon OT entry to room.    General Precautions: Standard, fall, respiratory   Orthopedic Precautions:N/A   Braces: N/A  Respiratory Status: Room air    Occupational Performance:    Bed Mobility:    Patient completed Supine to Sit with stand by assistance  Patient completed Sit to Supine with stand by assistance    Functional Mobility/Transfers:  Patient completed Sit <> Stand Transfer with stand by assistance  with  rolling walker   Functional Mobility: Pt ambulated with RW in room. Pt became very SOB when trying to talk and walk at the same time.    Activities of Daily Living:  Lower Body Dressing: independence for slip in shoes    Cognitive/Visual Perceptual:  Cognitive/Psychosocial Skills:     -       Oriented to: Person, Place, and Situation   -       Follows Commands/attention:Follows two-step commands  -       Communication: clear/fluent  -       Safety awareness/insight to disability: intact   -       Mood/Affect/Coping skills/emotional control: Cooperative and Pleasant    Physical Exam:  Balance:    -       Pt had good sitting balance and good standing balance  Edema:  None noted  Motor Planning:    -       Intact  Dominant hand:    -       Right  Upper Extremity Range of Motion:     -       Right Upper Extremity: PROM is mildly impaired, AROM of shoulder is severely  impaired  -       Left Upper Extremity: WFL  Upper Extremity Strength:    -       Right Upper Extremity: shoulder is severely impaired, elbow wrist and hand are functional  -       Left Upper Extremity: WFL  Gross motor coordination:   WFL    AMPAC 6 Click ADL:  AMPAC Total Score: 19    Treatment & Education:  Pt educated on OT role/POC.   Importance of OOB activity with staff assistance.  Importance of sitting up in the chair throughout the day as tolerated, especially for meals   Safety during functional t/f and mobility with use of RW  Importance of assisting with self-care activities   All questions/concerns answered within OT scope of practice     Patient left left sidelying with all lines intact, call button in reach, and Alex Segundo RN notified    GOALS:   Multidisciplinary Problems       Occupational Therapy Goals          Problem: Occupational Therapy    Goal Priority Disciplines Outcome Interventions   Occupational Therapy Goal     OT, PT/OT Ongoing, Progressing    Description: STG:  Pt will perform grooming with setup  Pt will bathe with setup and CGA  Pt will perform UE dressing with setup  Pt will perform LE dressing with setup and SBA  Pt will sit EOB x 10 min without assistance during dynamic activity  Pt will transfer bed/chair/bsc with SBA with RW  Pt will perform standing task x 3 min with SBA with RW  Pt will tolerate 20 minutes of tx without fatigue      LT.Restore to max I with self care and mobility.                          History:     Past Medical History:   Diagnosis Date    Arthritis     Chronic kidney disease, stage 3b     Diabetes mellitus, type 2     Hiatal hernia     Hyperlipidemia     Hypertension     Neuropathy     Thyroid disease          Past Surgical History:   Procedure Laterality Date    BREAST SURGERY      Biopsy    EYE SURGERY      Remove cataracts both eyes    HYSTERECTOMY      NEPHRECTOMY Right     THYROIDECTOMY         Time Tracking:     OT Date of Treatment:  10/08/22  OT Start Time: 0948  OT Stop Time: 1014  OT Total Time (min): 26 min    Billable Minutes:Evaluation low complexity    10/8/2022

## 2022-10-08 NOTE — ASSESSMENT & PLAN NOTE
Patient with acute kidney injury likely due to IVVD/dehydration TRISTAN is currently improving. Labs reviewed- Renal function/electrolytes with Estimated Creatinine Clearance: 29 mL/min (A) (based on SCr of 1.24 mg/dL (H)). according to latest data. Monitor urine output and serial BMP and adjust therapy as needed. Avoid nephrotoxins and renally dose meds for GFR listed above.

## 2022-10-08 NOTE — PLAN OF CARE
CM consulted for HH. Spoke to pt in room and she is unsure of one she wants to use. Called her daughter Claribel Morelos and left a message now. Awaiting call back. Will follow.    0944  Spoke with pt's daughter Claribel now. Choice obtained for [facility], referral faxed now. Will follow dc needs as arise.

## 2022-10-08 NOTE — PT/OT/SLP EVAL
"Physical Therapy Evaluation    Patient Name:  Vandana Allison   MRN:  [id number]    Recommendations:     Discharge Recommendations:  home with home health, home health PT   Discharge Equipment Recommendations: none   Barriers to discharge:  ongoing medical work up    Assessment:     Vandana Allison is a 89 y.o. female admitted with a medical diagnosis of Rectal bleeding.  She presents with the following impairments/functional limitations:  weakness, impaired endurance, impaired self care skills, impaired functional mobility, gait instability, impaired balance, decreased upper extremity function, decreased lower extremity function, pain, impaired cardiopulmonary response to activity .    Patient participated well with PT interventions but c/o generalized weakness/fatigue during mobility. Patient SOB/GREENE exacerbated by her insistence on talking through entire gait trial. O2 sats remained >= 96% on room air. Patient will benefit from skilled PT to grade/advance activity whilst pt hospitalized and with home health once medically stable for discharge.    Rehab Prognosis: Good; patient would benefit from acute skilled PT services to address these deficits and reach maximum level of function.    Recent Surgery: * No surgery found *      Plan:     During this hospitalization, patient to be seen 5 x/week to address the identified rehab impairments via gait training, therapeutic activities, therapeutic exercises and progress toward the following goals:    Plan of Care Expires:  11/08/22    Subjective     Chief Complaint: rectal bleed; SOB/GREENE  Patient/Family Comments/goals: "I just started having trouble with being short of breath like this a few weeks ago."  Pain/Comfort:  Pain Rating 1: 6/10  Location - Side 1: Bilateral  Location 1: nose (right shoulder and hand)  Pain Addressed 1: Nurse notified, Reposition, Distraction, Cessation of Activity  Pain Rating Post-Intervention 1: 6/10    Patients cultural, spiritual, " Mandaen conflicts given the current situation: no    Living Environment:  Pt lives with her daughter in a single level home, no steps to enter  Prior to admission, patients level of function was independent with a rollator walker. Increasing SOB/GREENE x 2 weeks.  Equipment used at home: rollator, cane, straight.  DME owned (not currently used): none.  Upon discharge, patient will have assistance from daughter and HHPT.    Objective:     Communicated with Alex Segundo RN prior to session.  Patient found left sidelying with telemetry, peripheral IV  upon PT entry to room.    General Precautions: Standard, fall, respiratory   Orthopedic Precautions:N/A   Braces: N/A  Respiratory Status: Room air    Exams:  Cognitive Exam:  Patient is oriented to Person, Place, Time, and Situation  Sensation:    -       Intact  RLE ROM: WFL  RLE Strength: Deficits: hip 4/5; knee 4+/5; ankle 5/5  LLE ROM: WFL  LLE Strength: Deficits: as per right    Functional Mobility:  Bed Mobility:     Supine to Sit: contact guard assistance and increased time/effort  Sit to Supine: stand by assistance and increased time and effort  Transfers:     Sit to Stand:  stand by assistance with rolling walker  Gait: 40' SBA with RW; slow leia, short step lengths, moderate SOB/GREENE O2 sats >= 96%; no LOB    Therapeutic Activities and Exercises:   Pt educated on PT role/POC.   Importance of OOB activity with staff assistance.  Importance of sitting up in the chair throughout the day as tolerated, especially for meals   Safety during functional t/f and mobility with use of RW  White board updated   Multiple self-care tasks/functional mobility completed- assistance level noted above   All questions/concerns answered within PT scope of practice     AM-PAC 6 CLICK MOBILITY  Total Score:17     Patient left right sidelying with all lines intact, call button in reach, and Alex Segundo RN notified.    GOALS:   Multidisciplinary Problems       Physical Therapy  Goals          Problem: Physical Therapy    Goal Priority Disciplines Outcome Goal Variances Interventions   Physical Therapy Goal     PT, PT/OT Ongoing, Progressing     Description: Short Term Goals to be met by:     Patient will increase functional independence with mobility by performin. Supine to sit with independently  2. Sit to stand transfer with independently using Rolling walker  3. Bed to chair transfer with independently using Rolling walker  4. Gait  x 100 feet with independently using Rolling walker  5. Lower extremity exercise program x30 reps per handout, with assistance as needed    Long Term Goals to be met by: 2022    Pt will regain full independent functional mobility with rollator walker to return to home situation and prior activities of daily living.                        History:     Past Medical History:   Diagnosis Date    Arthritis     Chronic kidney disease, stage 3b     Diabetes mellitus, type 2     Hiatal hernia     Hyperlipidemia     Hypertension     Neuropathy     Thyroid disease        Past Surgical History:   Procedure Laterality Date    BREAST SURGERY      Biopsy    EYE SURGERY      Remove cataracts both eyes    HYSTERECTOMY      NEPHRECTOMY Right     THYROIDECTOMY         Time Tracking:     PT Received On: 10/08/22  PT Start Time: 0948     PT Stop Time: 1014  PT Total Time (min): 26 min     Billable Minutes: Evaluation Low complexity      10/08/2022

## 2022-10-08 NOTE — PLAN OF CARE
Problem: Occupational Therapy  Goal: Occupational Therapy Goal  Description: STG:  Pt will perform grooming with setup  Pt will bathe with setup and CGA  Pt will perform UE dressing with setup  Pt will perform LE dressing with setup and SBA  Pt will sit EOB x 10 min without assistance during dynamic activity  Pt will transfer bed/chair/bsc with SBA with RW  Pt will perform standing task x 3 min with SBA with RW  Pt will tolerate 20 minutes of tx without fatigue      LT.Restore to max I with self care and mobility.     Outcome: Ongoing, Progressing   OT low complexity evaluation was performed today. OT will treat pt while she is hospitalized to address the above treatment goals. Pt would likely benefit from home health OT/PT services at discharge.

## 2022-10-09 PROBLEM — R06.09 DYSPNEA ON EXERTION: Status: ACTIVE | Noted: 2022-10-09

## 2022-10-09 LAB
AORTIC VALVE CUSP SEPERATION: 2 CM
AV INDEX (PROSTH): 0.76
AV MEAN GRADIENT: 5 MMHG
AV PEAK GRADIENT: 12 MMHG
AV VALVE AREA: 1.96 CM2
BSA FOR ECHO PROCEDURE: 1.98 M2
CV ECHO LV RWT: 0.61 CM
DOP CALC AO PEAK VEL: 1.7 M/S
DOP CALC AO VTI: 31.47 CM
DOP CALC LVOT AREA: 2.6 CM2
DOP CALC LVOT DIAMETER: 1.81 CM
DOP CALC LVOT STROKE VOLUME: 61.67 CM3
DOP CALCLVOT PEAK VEL VTI: 23.98 CM
E WAVE DECELERATION TIME: 125 MSEC
E/A RATIO: 1.7
E/E' RATIO: 12 M/S
ECHO LV POSTERIOR WALL: 1.02 CM (ref 0.6–1.1)
EJECTION FRACTION: 70 %
FRACTIONAL SHORTENING: 67 % (ref 28–44)
GLUCOSE SERPL-MCNC: 291 MG/DL (ref 70–105)
GLUCOSE SERPL-MCNC: 339 MG/DL (ref 70–105)
GLUCOSE SERPL-MCNC: 376 MG/DL (ref 70–105)
GLUCOSE SERPL-MCNC: 383 MG/DL (ref 70–105)
HCT VFR BLD AUTO: 25.1 % (ref 38–47)
HGB BLD-MCNC: 7.9 G/DL (ref 12–16)
INTERVENTRICULAR SEPTUM: 1.06 CM (ref 0.6–1.1)
LEFT ATRIUM SIZE: 4 CM
LEFT INTERNAL DIMENSION IN SYSTOLE: 1.1 CM (ref 2.1–4)
LEFT VENTRICLE DIASTOLIC VOLUME INDEX: 23.98 ML/M2
LEFT VENTRICLE DIASTOLIC VOLUME: 45.8 ML
LEFT VENTRICLE MASS INDEX: 54 G/M2
LEFT VENTRICLE SYSTOLIC VOLUME INDEX: 1.4 ML/M2
LEFT VENTRICLE SYSTOLIC VOLUME: 2.7 ML
LEFT VENTRICULAR INTERNAL DIMENSION IN DIASTOLE: 3.35 CM (ref 3.5–6)
LEFT VENTRICULAR MASS: 102.53 G
LV LATERAL E/E' RATIO: 12.55 M/S
LV SEPTAL E/E' RATIO: 11.5 M/S
MV PEAK A VEL: 0.81 M/S
MV PEAK E VEL: 1.38 M/S
PISA TR MAX VEL: 4.07 M/S
RIGHT ATRIAL AREA: 10.6 CM2
TDI LATERAL: 0.11 M/S
TDI SEPTAL: 0.12 M/S
TDI: 0.12 M/S
TR MAX PG: 66 MMHG
TRICUSPID ANNULAR PLANE SYSTOLIC EXCURSION: 2.57 CM
TROPONIN I SERPL HS-MCNC: 12.6 PG/ML

## 2022-10-09 PROCEDURE — 36415 COLL VENOUS BLD VENIPUNCTURE: CPT | Performed by: HOSPITALIST

## 2022-10-09 PROCEDURE — 11000001 HC ACUTE MED/SURG PRIVATE ROOM

## 2022-10-09 PROCEDURE — 82962 GLUCOSE BLOOD TEST: CPT

## 2022-10-09 PROCEDURE — 99232 SBSQ HOSP IP/OBS MODERATE 35: CPT | Mod: ,,, | Performed by: HOSPITALIST

## 2022-10-09 PROCEDURE — 94761 N-INVAS EAR/PLS OXIMETRY MLT: CPT

## 2022-10-09 PROCEDURE — 63600175 PHARM REV CODE 636 W HCPCS: Performed by: HOSPITALIST

## 2022-10-09 PROCEDURE — 94640 AIRWAY INHALATION TREATMENT: CPT

## 2022-10-09 PROCEDURE — 25000003 PHARM REV CODE 250: Performed by: NURSE PRACTITIONER

## 2022-10-09 PROCEDURE — 85018 HEMOGLOBIN: CPT | Performed by: HOSPITALIST

## 2022-10-09 PROCEDURE — 25000242 PHARM REV CODE 250 ALT 637 W/ HCPCS: Performed by: HOSPITALIST

## 2022-10-09 PROCEDURE — 84484 ASSAY OF TROPONIN QUANT: CPT | Performed by: HOSPITALIST

## 2022-10-09 PROCEDURE — 25000003 PHARM REV CODE 250: Performed by: HOSPITALIST

## 2022-10-09 PROCEDURE — 99232 PR SUBSEQUENT HOSPITAL CARE,LEVL II: ICD-10-PCS | Mod: ,,, | Performed by: HOSPITALIST

## 2022-10-09 PROCEDURE — 25000003 PHARM REV CODE 250: Performed by: INTERNAL MEDICINE

## 2022-10-09 PROCEDURE — 51798 US URINE CAPACITY MEASURE: CPT

## 2022-10-09 PROCEDURE — 85014 HEMATOCRIT: CPT | Performed by: HOSPITALIST

## 2022-10-09 RX ADMIN — METHYLPREDNISOLONE SODIUM SUCCINATE 40 MG: 40 INJECTION, POWDER, FOR SOLUTION INTRAMUSCULAR; INTRAVENOUS at 08:10

## 2022-10-09 RX ADMIN — INSULIN ASPART 5 UNITS: 100 INJECTION, SOLUTION INTRAVENOUS; SUBCUTANEOUS at 08:10

## 2022-10-09 RX ADMIN — PANTOPRAZOLE SODIUM 40 MG: 40 TABLET, DELAYED RELEASE ORAL at 08:10

## 2022-10-09 RX ADMIN — IPRATROPIUM BROMIDE AND ALBUTEROL SULFATE 3 ML: .5; 3 SOLUTION RESPIRATORY (INHALATION) at 12:10

## 2022-10-09 RX ADMIN — HYDROCORTISONE 2.5%: 25 CREAM TOPICAL at 08:10

## 2022-10-09 RX ADMIN — INSULIN ASPART 10 UNITS: 100 INJECTION, SOLUTION INTRAVENOUS; SUBCUTANEOUS at 05:10

## 2022-10-09 RX ADMIN — GABAPENTIN 100 MG: 100 CAPSULE ORAL at 08:10

## 2022-10-09 RX ADMIN — GABAPENTIN 100 MG: 100 CAPSULE ORAL at 02:10

## 2022-10-09 RX ADMIN — INSULIN ASPART 8 UNITS: 100 INJECTION, SOLUTION INTRAVENOUS; SUBCUTANEOUS at 06:10

## 2022-10-09 RX ADMIN — INSULIN ASPART 6 UNITS: 100 INJECTION, SOLUTION INTRAVENOUS; SUBCUTANEOUS at 11:10

## 2022-10-09 RX ADMIN — INSULIN DETEMIR 10 UNITS: 100 INJECTION, SOLUTION SUBCUTANEOUS at 08:10

## 2022-10-09 RX ADMIN — HYDROCODONE BITARTRATE AND ACETAMINOPHEN 1 TABLET: 7.5; 325 TABLET ORAL at 02:10

## 2022-10-09 RX ADMIN — IPRATROPIUM BROMIDE AND ALBUTEROL SULFATE 3 ML: .5; 3 SOLUTION RESPIRATORY (INHALATION) at 10:10

## 2022-10-09 RX ADMIN — FERROUS SULFATE TAB 325 MG (65 MG ELEMENTAL FE) 1 EACH: 325 (65 FE) TAB at 08:10

## 2022-10-09 RX ADMIN — IPRATROPIUM BROMIDE AND ALBUTEROL SULFATE 3 ML: .5; 3 SOLUTION RESPIRATORY (INHALATION) at 04:10

## 2022-10-09 RX ADMIN — LEVOTHYROXINE SODIUM 100 MCG: 100 TABLET ORAL at 06:10

## 2022-10-09 NOTE — PLAN OF CARE
Problem: Adult Inpatient Plan of Care  Goal: Plan of Care Review  Outcome: Ongoing, Progressing  Goal: Patient-Specific Goal (Individualized)  Outcome: Ongoing, Progressing  Flowsheets (Taken 10/9/2022 2819)  Anxieties, Fears or Concerns: leg swelling, pain management  Goal: Absence of Hospital-Acquired Illness or Injury  Outcome: Ongoing, Progressing  Goal: Optimal Comfort and Wellbeing  Outcome: Ongoing, Progressing  Goal: Readiness for Transition of Care  Outcome: Ongoing, Progressing     Problem: Fall Injury Risk  Goal: Absence of Fall and Fall-Related Injury  Outcome: Ongoing, Progressing     Problem: Diabetes Comorbidity  Goal: Blood Glucose Level Within Targeted Range  Outcome: Ongoing, Progressing     Problem: Fluid and Electrolyte Imbalance (Acute Kidney Injury/Impairment)  Goal: Fluid and Electrolyte Balance  Outcome: Ongoing, Progressing     Problem: Oral Intake Inadequate (Acute Kidney Injury/Impairment)  Goal: Optimal Nutrition Intake  Outcome: Ongoing, Progressing     Problem: Renal Function Impairment (Acute Kidney Injury/Impairment)  Goal: Effective Renal Function  Outcome: Ongoing, Progressing     Problem: Impaired Wound Healing  Goal: Optimal Wound Healing  Outcome: Ongoing, Progressing     Problem: Violence Risk or Actual  Goal: Anger and Impulse Control  Outcome: Ongoing, Progressing  POC reviewed and continues

## 2022-10-09 NOTE — NURSING
[facility] called to inform them of order placed for VQ lung scan. No answer. Will pass on to night shift to ensure they are notified tomorrow morning.

## 2022-10-09 NOTE — PLAN OF CARE
Patient is calm, cooperative, alert and oriented x3. Her blood glucose was 285-she received her scheduled 10 units and 3 units of the sliding scale. Transfers well to the bedside commode. Vitals are stable. Denied pain and additional needs.

## 2022-10-10 LAB
ANION GAP SERPL CALCULATED.3IONS-SCNC: 14 MMOL/L (ref 7–16)
ANISOCYTOSIS BLD QL SMEAR: ABNORMAL
BASOPHILS # BLD AUTO: 0.01 K/UL (ref 0–0.2)
BASOPHILS NFR BLD AUTO: 0.1 % (ref 0–1)
BUN SERPL-MCNC: 22 MG/DL (ref 7–18)
BUN/CREAT SERPL: 16 (ref 6–20)
CALCIUM SERPL-MCNC: 9.3 MG/DL (ref 8.5–10.1)
CHLORIDE SERPL-SCNC: 104 MMOL/L (ref 98–107)
CO2 SERPL-SCNC: 23 MMOL/L (ref 21–32)
CREAT SERPL-MCNC: 1.41 MG/DL (ref 0.55–1.02)
CRENATED CELLS: ABNORMAL
DIFFERENTIAL METHOD BLD: ABNORMAL
EGFR (NO RACE VARIABLE) (RUSH/TITUS): 34 ML/MIN/1.73M²
EOSINOPHIL # BLD AUTO: 0.03 K/UL (ref 0–0.5)
EOSINOPHIL NFR BLD AUTO: 0.2 % (ref 1–4)
ERYTHROCYTE [DISTWIDTH] IN BLOOD BY AUTOMATED COUNT: 20.6 % (ref 11.5–14.5)
GLUCOSE SERPL-MCNC: 191 MG/DL (ref 74–106)
GLUCOSE SERPL-MCNC: 231 MG/DL (ref 70–105)
GLUCOSE SERPL-MCNC: 390 MG/DL (ref 70–105)
GLUCOSE SERPL-MCNC: 441 MG/DL (ref 70–105)
HCT VFR BLD AUTO: 27.1 % (ref 38–47)
HGB BLD-MCNC: 8.5 G/DL (ref 12–16)
HYPOCHROMIA BLD QL SMEAR: ABNORMAL
IMM GRANULOCYTES # BLD AUTO: 0.1 K/UL (ref 0–0.04)
IMM GRANULOCYTES NFR BLD: 0.8 % (ref 0–0.4)
LYMPHOCYTES # BLD AUTO: 0.47 K/UL (ref 1–4.8)
LYMPHOCYTES NFR BLD AUTO: 3.5 % (ref 27–41)
LYMPHOCYTES NFR BLD MANUAL: 5 % (ref 27–41)
MCH RBC QN AUTO: 25.2 PG (ref 27–31)
MCHC RBC AUTO-ENTMCNC: 31.4 G/DL (ref 32–36)
MCV RBC AUTO: 80.4 FL (ref 80–96)
MONOCYTES # BLD AUTO: 0.41 K/UL (ref 0–0.8)
MONOCYTES NFR BLD AUTO: 3.1 % (ref 2–6)
MONOCYTES NFR BLD MANUAL: 3 % (ref 2–6)
MPC BLD CALC-MCNC: 7.9 FL (ref 9.4–12.4)
NEUTROPHILS # BLD AUTO: 12.22 K/UL (ref 1.8–7.7)
NEUTROPHILS NFR BLD AUTO: 92.3 % (ref 53–65)
NEUTS SEG NFR BLD MANUAL: 92 % (ref 50–62)
NRBC # BLD AUTO: 0.04 X10E3/UL
NRBC, AUTO (.00): 0.3 %
OVALOCYTES BLD QL SMEAR: ABNORMAL
PLATELET # BLD AUTO: 368 K/UL (ref 150–400)
PLATELET MORPHOLOGY: ABNORMAL
POLYCHROMASIA BLD QL SMEAR: ABNORMAL
POTASSIUM SERPL-SCNC: 5.2 MMOL/L (ref 3.5–5.1)
RBC # BLD AUTO: 3.37 M/UL (ref 4.2–5.4)
SODIUM SERPL-SCNC: 136 MMOL/L (ref 136–145)
TARGETS BLD QL SMEAR: ABNORMAL
WBC # BLD AUTO: 13.24 K/UL (ref 4.5–11)

## 2022-10-10 PROCEDURE — 11000001 HC ACUTE MED/SURG PRIVATE ROOM

## 2022-10-10 PROCEDURE — 63600175 PHARM REV CODE 636 W HCPCS: Performed by: HOSPITALIST

## 2022-10-10 PROCEDURE — 99232 SBSQ HOSP IP/OBS MODERATE 35: CPT | Mod: ,,, | Performed by: HOSPITALIST

## 2022-10-10 PROCEDURE — 36415 COLL VENOUS BLD VENIPUNCTURE: CPT | Performed by: NURSE PRACTITIONER

## 2022-10-10 PROCEDURE — 80048 BASIC METABOLIC PNL TOTAL CA: CPT | Performed by: NURSE PRACTITIONER

## 2022-10-10 PROCEDURE — 93005 ELECTROCARDIOGRAM TRACING: CPT

## 2022-10-10 PROCEDURE — 82962 GLUCOSE BLOOD TEST: CPT

## 2022-10-10 PROCEDURE — 94761 N-INVAS EAR/PLS OXIMETRY MLT: CPT

## 2022-10-10 PROCEDURE — 27000221 HC OXYGEN, UP TO 24 HOURS

## 2022-10-10 PROCEDURE — 25000003 PHARM REV CODE 250: Performed by: NURSE PRACTITIONER

## 2022-10-10 PROCEDURE — 25000003 PHARM REV CODE 250: Performed by: HOSPITALIST

## 2022-10-10 PROCEDURE — 99900035 HC TECH TIME PER 15 MIN (STAT)

## 2022-10-10 PROCEDURE — 99232 PR SUBSEQUENT HOSPITAL CARE,LEVL II: ICD-10-PCS | Mod: ,,, | Performed by: HOSPITALIST

## 2022-10-10 PROCEDURE — 93010 ELECTROCARDIOGRAM REPORT: CPT | Mod: ,,, | Performed by: HOSPITALIST

## 2022-10-10 PROCEDURE — 25000003 PHARM REV CODE 250: Performed by: INTERNAL MEDICINE

## 2022-10-10 PROCEDURE — 93010 EKG 12-LEAD: ICD-10-PCS | Mod: ,,, | Performed by: HOSPITALIST

## 2022-10-10 PROCEDURE — 25000242 PHARM REV CODE 250 ALT 637 W/ HCPCS: Performed by: HOSPITALIST

## 2022-10-10 PROCEDURE — 85025 COMPLETE CBC W/AUTO DIFF WBC: CPT | Performed by: NURSE PRACTITIONER

## 2022-10-10 PROCEDURE — 94640 AIRWAY INHALATION TREATMENT: CPT

## 2022-10-10 PROCEDURE — 97116 GAIT TRAINING THERAPY: CPT

## 2022-10-10 RX ORDER — IPRATROPIUM BROMIDE AND ALBUTEROL SULFATE 2.5; .5 MG/3ML; MG/3ML
3 SOLUTION RESPIRATORY (INHALATION)
Status: DISCONTINUED | OUTPATIENT
Start: 2022-10-10 | End: 2022-10-11 | Stop reason: HOSPADM

## 2022-10-10 RX ADMIN — HYDROCODONE BITARTRATE AND ACETAMINOPHEN 1 TABLET: 7.5; 325 TABLET ORAL at 01:10

## 2022-10-10 RX ADMIN — INSULIN ASPART 5 UNITS: 100 INJECTION, SOLUTION INTRAVENOUS; SUBCUTANEOUS at 09:10

## 2022-10-10 RX ADMIN — LEVOTHYROXINE SODIUM 100 MCG: 100 TABLET ORAL at 05:10

## 2022-10-10 RX ADMIN — IPRATROPIUM BROMIDE AND ALBUTEROL SULFATE 3 ML: .5; 3 SOLUTION RESPIRATORY (INHALATION) at 07:10

## 2022-10-10 RX ADMIN — GABAPENTIN 100 MG: 100 CAPSULE ORAL at 09:10

## 2022-10-10 RX ADMIN — FERROUS SULFATE TAB 325 MG (65 MG ELEMENTAL FE) 1 EACH: 325 (65 FE) TAB at 09:10

## 2022-10-10 RX ADMIN — METHYLPREDNISOLONE SODIUM SUCCINATE 40 MG: 40 INJECTION, POWDER, FOR SOLUTION INTRAMUSCULAR; INTRAVENOUS at 09:10

## 2022-10-10 RX ADMIN — IPRATROPIUM BROMIDE AND ALBUTEROL SULFATE 3 ML: .5; 3 SOLUTION RESPIRATORY (INHALATION) at 05:10

## 2022-10-10 RX ADMIN — INSULIN DETEMIR 10 UNITS: 100 INJECTION, SOLUTION SUBCUTANEOUS at 09:10

## 2022-10-10 RX ADMIN — INSULIN ASPART 4 UNITS: 100 INJECTION, SOLUTION INTRAVENOUS; SUBCUTANEOUS at 05:10

## 2022-10-10 RX ADMIN — GABAPENTIN 100 MG: 100 CAPSULE ORAL at 05:10

## 2022-10-10 RX ADMIN — HYDROCORTISONE 2.5%: 25 CREAM TOPICAL at 09:10

## 2022-10-10 RX ADMIN — IPRATROPIUM BROMIDE AND ALBUTEROL SULFATE 3 ML: .5; 3 SOLUTION RESPIRATORY (INHALATION) at 11:10

## 2022-10-10 RX ADMIN — PANTOPRAZOLE SODIUM 40 MG: 40 TABLET, DELAYED RELEASE ORAL at 09:10

## 2022-10-10 NOTE — SUBJECTIVE & OBJECTIVE
Past Medical History:   Diagnosis Date    Arthritis     Chronic kidney disease, stage 3b     Diabetes mellitus, type 2     Hiatal hernia     Hyperlipidemia     Hypertension     Neuropathy     Thyroid disease        Past Surgical History:   Procedure Laterality Date    BREAST SURGERY      Biopsy    EYE SURGERY      Remove cataracts both eyes    HYSTERECTOMY      NEPHRECTOMY Right     THYROIDECTOMY         Review of patient's allergies indicates:   Allergen Reactions    Aspirin        No current facility-administered medications on file prior to encounter.     Current Outpatient Medications on File Prior to Encounter   Medication Sig    albuterol (PROVENTIL/VENTOLIN HFA) 90 mcg/actuation inhaler Inhale 2 puffs into the lungs every 4 (four) hours as needed. Rescue    amLODIPine (NORVASC) 5 MG tablet TAKE 1 TABLET BY MOUTH EVERY DAY    bisoprolol-hydrochlorothiazide 5-6.25 mg (ZIAC) 5-6.25 mg Tab TAKE 1 TABLET TWICE A DAY    empagliflozin (JARDIANCE) 10 mg tablet Take 1 tablet (10 mg total) by mouth once daily.    ferrous sulfate (FEOSOL) 325 mg (65 mg iron) Tab tablet Take by mouth 2 (two) times daily.    furosemide (LASIX) 40 MG tablet Take 1 tablet (40 mg total) by mouth once daily.    gabapentin (NEURONTIN) 400 MG capsule Take 1 capsule (400 mg total) by mouth 4 (four) times daily as needed (grgrgff).    hydrALAZINE (APRESOLINE) 25 MG tablet Take 1 tablet (25 mg total) by mouth 2 (two) times daily.    HYDROcodone-acetaminophen (NORCO)  mg per tablet Take 1 tablet by mouth 3 (three) times daily.    hydrocortisone (ANUSOL-HC) 25 mg suppository Place 1 suppository (25 mg total) rectally 2 (two) times daily.    insulin  unit/mL injection Inject 30 Units into the skin 2 (two) times daily before meals.    insulin NPH-insulin regular, 70/30, (NOVOLIN 70/30) 100 unit/mL (70-30) injection Inject 12 Units into the skin 2 (two) times a day.    lactulose (CHRONULAC) 10 gram/15 mL solution  Take 30 mLs (20 g total) by mouth once daily.    levothyroxine (SYNTHROID) 100 MCG tablet Take 1 tablet (100 mcg total) by mouth before breakfast.    lisinopriL (PRINIVIL,ZESTRIL) 40 MG tablet TAKE 1 TABLET TWICE A DAY    methocarbamoL (ROBAXIN) 750 MG Tab TAKE 2 TABLETS BY MOUTH 4 TIMES A DAY AS NEEDED FOR MUSCLE SPASMS    NOVOLIN N NPH U-100 INSULIN 100 unit/mL injection Inject 40 Units into the skin 2 (two) times daily.    pantoprazole (PROTONIX) 40 MG tablet TAKE 1 TABLET BY MOUTH TWICE A DAY    potassium chloride SA (K-DUR,KLOR-CON) 10 MEQ tablet Take 1 tablet by mouth once daily.    predniSONE (DELTASONE) 2.5 MG tablet Take 2.5 mg by mouth once daily.    SYMBICORT 160-4.5 mcg/actuation HFAA Inhale 2 puffs into the lungs 2 (two) times a day.    traMADoL (ULTRAM) 50 mg tablet TAKE 1 TABLET BY MOUTH FOUR TIMES A DAY     Family History       Problem Relation (Age of Onset)    Cancer Sister, Brother, Daughter    Diabetes Mother, Sister, Brother, Sister    Heart disease Father          Tobacco Use    Smoking status: Never    Smokeless tobacco: Never   Substance and Sexual Activity    Alcohol use: Never    Drug use: Never    Sexual activity: Not Currently     Birth control/protection: None     Review of Systems   Constitutional:  Negative for appetite change, chills, fatigue, fever and unexpected weight change.   HENT:  Negative for congestion, mouth sores, nosebleeds, sinus pain, sore throat and trouble swallowing.    Respiratory:  Negative for apnea, cough, chest tightness and shortness of breath.    Cardiovascular:  Negative for chest pain, palpitations and leg swelling.   Gastrointestinal:  Positive for anal bleeding and rectal pain. Negative for abdominal pain, blood in stool, constipation, diarrhea, nausea and vomiting.   Genitourinary:  Negative for decreased urine volume, difficulty urinating, dysuria and frequency.   Musculoskeletal:  Negative for arthralgias, back pain and neck pain.   Skin:   Negative for rash.   Neurological:  Negative for syncope, light-headedness and headaches.   Hematological:  Does not bruise/bleed easily.   Psychiatric/Behavioral:  Negative for confusion and suicidal ideas.    Objective:     Vital Signs (Most Recent):  Temp: 98.2 °F (36.8 °C) (10/09/22 1910)  Pulse: 101 (10/09/22 1910)  Resp: 16 (10/09/22 1910)  BP: (!) 149/66 (10/09/22 1910)  SpO2: 96 % (10/09/22 1910)   Vital Signs (24h Range):  Temp:  [98.2 °F (36.8 °C)-98.6 °F (37 °C)] 98.2 °F (36.8 °C)  Pulse:  [] 101  Resp:  [16-20] 16  SpO2:  [95 %-98 %] 96 %  BP: (133-149)/(61-70) 149/66     Weight: 83.5 kg (184 lb 1.4 oz)  Body mass index is 30.63 kg/m².    Physical Exam  Constitutional:       Appearance: Normal appearance.   HENT:      Head: Atraumatic.      Nose: Nose normal.      Mouth/Throat:      Mouth: Mucous membranes are moist.      Pharynx: Oropharynx is clear.   Eyes:      Conjunctiva/sclera: Conjunctivae normal.      Pupils: Pupils are equal, round, and reactive to light.   Neck:      Vascular: No carotid bruit.   Cardiovascular:      Rate and Rhythm: Normal rate and regular rhythm.      Pulses: Normal pulses.      Heart sounds: Normal heart sounds.   Pulmonary:      Effort: Pulmonary effort is normal.      Breath sounds: Normal breath sounds.   Abdominal:      General: Abdomen is flat. Bowel sounds are normal. There is no distension.      Palpations: Abdomen is soft.      Tenderness: There is no abdominal tenderness. There is no guarding.   Musculoskeletal:         General: Normal range of motion.      Cervical back: Neck supple.      Right lower leg: Edema present.      Left lower leg: Edema present.   Skin:     General: Skin is warm and dry.      Capillary Refill: Capillary refill takes less than 2 seconds.      Coloration: Skin is not jaundiced or pale.      Findings: No bruising, lesion or rash.   Neurological:      General: No focal deficit present.      Mental Status: She is alert and oriented to  person, place, and time.   Psychiatric:         Mood and Affect: Mood normal.         CRANIAL NERVES     CN III, IV, VI   Pupils are equal, round, and reactive to light.     Significant Labs: All pertinent labs within the past 24 hours have been reviewed.    Significant Imaging: I have reviewed all pertinent imaging results/findings within the past 24 hours.    Imaging Results              X-Ray Chest AP Portable (Final result)  Result time 10/05/22 16:04:02      Final result by Alex Padgett II, MD (10/05/22 16:04:02)                   Impression:      Findings suggest cardiac decompensation and / or pneumonitis.      Electronically signed by: Alex Padgett  Date:    10/05/2022  Time:    16:04               Narrative:    EXAMINATION:  XR CHEST AP PORTABLE    CLINICAL HISTORY:  Hypotension, unspecified    COMPARISON:  8 September 2020    FINDINGS:  The heart and mediastinum are stable in size and configuration.  The pulmonary vascularity is slightly increased with bilateral increased interstitial lung density.  No other lung infiltrates, effusions, pneumothorax or other abnormality is demonstrated.                                    Intake/Output - Last 3 Shifts         10/08 0700  10/09 0659 10/09 0700  10/10 0659          Urine Occurrence 3 x     Stool Occurrence 4 x

## 2022-10-10 NOTE — PLAN OF CARE
Problem: Adult Inpatient Plan of Care  Goal: Plan of Care Review  Outcome: Ongoing, Progressing  Goal: Patient-Specific Goal (Individualized)  Outcome: Ongoing, Progressing  Goal: Absence of Hospital-Acquired Illness or Injury  Outcome: Ongoing, Progressing  Goal: Optimal Comfort and Wellbeing  Outcome: Ongoing, Progressing  Goal: Readiness for Transition of Care  Outcome: Ongoing, Progressing     Problem: Fall Injury Risk  Goal: Absence of Fall and Fall-Related Injury  Outcome: Ongoing, Progressing     Problem: Diabetes Comorbidity  Goal: Blood Glucose Level Within Targeted Range  Outcome: Ongoing, Progressing     Problem: Fluid and Electrolyte Imbalance (Acute Kidney Injury/Impairment)  Goal: Fluid and Electrolyte Balance  Outcome: Ongoing, Progressing     Problem: Oral Intake Inadequate (Acute Kidney Injury/Impairment)  Goal: Optimal Nutrition Intake  Outcome: Ongoing, Progressing     Problem: Renal Function Impairment (Acute Kidney Injury/Impairment)  Goal: Effective Renal Function  Outcome: Ongoing, Progressing     Problem: Impaired Wound Healing  Goal: Optimal Wound Healing  Outcome: Ongoing, Progressing     Problem: Violence Risk or Actual  Goal: Anger and Impulse Control  Outcome: Ongoing, Progressing

## 2022-10-10 NOTE — PLAN OF CARE
Problem: Adult Inpatient Plan of Care  Goal: Plan of Care Review  Outcome: Ongoing, Progressing  Flowsheets (Taken 10/9/2022 2012)  Plan of Care Reviewed With: patient  Goal: Optimal Comfort and Wellbeing  Outcome: Ongoing, Progressing  Intervention: Monitor Pain and Promote Comfort  Flowsheets (Taken 10/9/2022 2012)  Pain Management Interventions:   pillow support provided   position adjusted   pain management plan reviewed with patient/caregiver   quiet environment facilitated   relaxation techniques promoted  Intervention: Provide Person-Centered Care  Flowsheets (Taken 10/9/2022 2012)  Trust Relationship/Rapport:   care explained   questions encouraged   choices provided   reassurance provided   empathic listening provided   emotional support provided   thoughts/feelings acknowledged   questions answered     Problem: Fall Injury Risk  Goal: Absence of Fall and Fall-Related Injury  Outcome: Ongoing, Progressing  Intervention: Identify and Manage Contributors  Flowsheets (Taken 10/9/2022 2012)  Self-Care Promotion:   independence encouraged   BADL personal objects within reach   BADL personal routines maintained   meal set-up provided   safe use of adaptive equipment encouraged  Medication Review/Management: medications reviewed     Problem: Diabetes Comorbidity  Goal: Blood Glucose Level Within Targeted Range  Outcome: Ongoing, Progressing  Intervention: Monitor and Manage Glycemia  Flowsheets (Taken 10/9/2022 2012)  Glycemic Management:   blood glucose monitored   supplemental insulin given     Problem: Oral Intake Inadequate (Acute Kidney Injury/Impairment)  Goal: Optimal Nutrition Intake  Outcome: Ongoing, Progressing  Intervention: Promote and Optimize Nutrition  Flowsheets (Taken 10/9/2022 2012)  Oral Nutrition Promotion:   physical activity promoted   rest periods promoted   safe use of adaptive equipment encouraged   social interaction promoted     Problem: Impaired Wound Healing  Goal: Optimal Wound  Healing  Outcome: Ongoing, Progressing  Intervention: Promote Wound Healing  Flowsheets (Taken 10/9/2022 2012)  Oral Nutrition Promotion:   physical activity promoted   rest periods promoted   safe use of adaptive equipment encouraged   social interaction promoted  Pain Management Interventions:   pillow support provided   position adjusted   pain management plan reviewed with patient/caregiver   quiet environment facilitated   relaxation techniques promoted

## 2022-10-10 NOTE — PT/OT/SLP PROGRESS
"Physical Therapy Treatment    Patient Name:  Vandana Allison   MRN:  [id number]    Recommendations:     Discharge Recommendations:  home with home health, home health PT, home health OT   Discharge Equipment Recommendations: none   Barriers to discharge:  ongoing medical treatment/work up    Assessment:     Vandana Allison is a 89 y.o. female admitted with a medical diagnosis of Rectal bleeding.  She presents with the following impairments/functional limitations:  weakness, impaired endurance, impaired functional mobility, impaired self care skills, pain, impaired cardiopulmonary response to activity .    Patient demonstrated SOB/GREENE after gait trial but O2 sats >= 95% on room air. After gait trial, transporter arrived to take pt for VQ scan.     Rehab Prognosis: Good; patient would benefit from acute skilled PT services to address these deficits and reach maximum level of function.    Recent Surgery: * No surgery found *      Plan:     During this hospitalization, patient to be seen 5 x/week to address the identified rehab impairments via gait training, therapeutic activities, therapeutic exercises and progress toward the following goals:    Plan of Care Expires:  11/08/22    Subjective     Chief Complaint: rectal bleed  Patient/Family Comments/goals: "I just want to finish up these tests and go home."  Pain/Comfort:  Pain Rating 1: 4/10  Location - Side 1: Bilateral  Location 1: knee  Pain Addressed 1: Reposition, Distraction, Cessation of Activity  Pain Rating Post-Intervention 1: 2/10      Objective:     Communicated with Javi Augustine LPN prior to session.  Patient found sitting edge of bed with telemetry, peripheral IV upon PT entry to room. O2 sats 97% at rest on room air    General Precautions: Standard, fall, respiratory   Orthopedic Precautions:N/A   Braces: N/A  Respiratory Status: Room air     Functional Mobility:  Bed Mobility:     Sit to Supine: modified independence  Transfers:     Sit to Stand:  " modified independence with rolling walker  Gait: 40' with RW mod I; reciprocal pattern, mild axial flexion, slow leia, SOB/GREENE but O2 sats >= 95%; no LOB or lightheadedness      AM-PAC 6 CLICK MOBILITY  Turning over in bed (including adjusting bedclothes, sheets and blankets)?: 4  Sitting down on and standing up from a chair with arms (e.g., wheelchair, bedside commode, etc.): 4  Moving from lying on back to sitting on the side of the bed?: 4  Moving to and from a bed to a chair (including a wheelchair)?: 4  Need to walk in hospital room?: 4  Climbing 3-5 steps with a railing?: 3  Basic Mobility Total Score: 23       Therapeutic Activities and Exercises:   Gait as above  Planned LE ex deferred as transporter arrived to take pt to VQ scan.    Patient left supine with Javi Augustine LPN notified and transporter present to take pt for her test.    GOALS:   Multidisciplinary Problems       Physical Therapy Goals          Problem: Physical Therapy    Goal Priority Disciplines Outcome Goal Variances Interventions   Physical Therapy Goal     PT, PT/OT Ongoing, Progressing     Description: Short Term Goals to be met by:     Patient will increase functional independence with mobility by performin. Supine to sit with independently  2. Sit to stand transfer with independently using Rolling walker  3. Bed to chair transfer with independently using Rolling walker  4. Gait  x 100 feet with independently using Rolling walker  5. Lower extremity exercise program x30 reps per handout, with assistance as needed    Long Term Goals to be met by: 2022    Pt will regain full independent functional mobility with rollator walker to return to home situation and prior activities of daily living.                        Time Tracking:     PT Received On: 10/10/22  PT Start Time: 1117     PT Stop Time: 1132  PT Total Time (min): 15 min     Billable Minutes: Gait Training 15 minutes    Treatment Type: Treatment  PT/PTA:  PT     PTA Visit Number: 0     10/10/2022

## 2022-10-10 NOTE — PT/OT/SLP PROGRESS
"Occupational Therapy      Patient Name:  Vandana Allison   MRN:  [id number]    Patient not seen today secondary to Patient unwilling to participate (OT attempted 2x, pt declined each time due to having "breathing episodes", panic attacks.). Will follow-up 10/11/2022.    10/10/2022  "

## 2022-10-10 NOTE — PROGRESS NOTES
[facility] - [address] [place] [address] Medicine  Progress Note    Patient Name: Vandana Allison  MRN: [id number]  Patient Class: IP- Inpatient   Admission Date: 10/5/2022  Length of Stay: 4 days  Attending Physician: David Garcia MD  Primary Care Provider: Cm Larson III, DO        Subjective:     Principal Problem:Rectal bleeding        HPI:  89 yo F, who appears much younger than stated age, presents to the ED from Dr. Wood's office for hypotension and bradycardia.  Patient was sent for rectal bleeding that hs been occurring since June.  Patient has same problem five years ago and had internal hemorrhoids so was referred to Dr. Beatty.  Dr. Beatty referred patient for colonoscopy to Dr. Wood.      Patient EKG shows sinus bradycardia (patient on bisoprolol) and BMP shows TRISTAN and Na 118 (patient on lasix and HCT).  She really does not have any complaints except she states that he bottoms hurts where she is bleeding.  She is very active and doesn't report generalized weakness or syncope.  Her daughter is present and says they were just following up on their clinic appointments for the rectal bleeding.  She has had no weight loss or dysphagia.  She states she was working full time up until age 82.  Her hgb is 6.7 and a transfusion of pRBC going.  I will order anemia panel and coags ASAP.        Overview/Hospital Course:  10/06 Records reviewed. Daughter in room. Pt last hx hemorrhoidal bleeding. More BRB pre rectum and JCM III referred to Dr Beatty and Dr Beatty to Dr Wood. BP low in office and referred to hospital EGD without source and plan colonoscopy in am. Asymptomatic bradycardia. Stopped ziac, Watch electrolytes also asymptomatic.   10/07 Seen prior to coloscopy but since report reviewed. If does well home soon. F/U again with Dr Beatty.  10/08 considering discharging patient.  When staff getting her up out of bed, she became really short of breath.  Did not drop oxygen saturation.   Similar episode earlier during her stay before going to endoscopy study.  No chest pain.  Patient states she has had these issues for several weeks.  No history of any heart disease.  Will hold discharge and check into further.  At 89 years old will try to treat conservative because of increased complications with any aggressive procedures.  10/09 SOB episode but better with breathing treatments. No chest pain. Up in room. Rule out PE. Bruce'd for VQ scan.       Past Medical History:   Diagnosis Date    Arthritis     Chronic kidney disease, stage 3b     Diabetes mellitus, type 2     Hiatal hernia     Hyperlipidemia     Hypertension     Neuropathy     Thyroid disease        Past Surgical History:   Procedure Laterality Date    BREAST SURGERY      Biopsy    EYE SURGERY      Remove cataracts both eyes    HYSTERECTOMY      NEPHRECTOMY Right     THYROIDECTOMY         Review of patient's allergies indicates:   Allergen Reactions    Aspirin        No current facility-administered medications on file prior to encounter.     Current Outpatient Medications on File Prior to Encounter   Medication Sig    albuterol (PROVENTIL/VENTOLIN HFA) 90 mcg/actuation inhaler Inhale 2 puffs into the lungs every 4 (four) hours as needed. Rescue    amLODIPine (NORVASC) 5 MG tablet TAKE 1 TABLET BY MOUTH EVERY DAY    bisoprolol-hydrochlorothiazide 5-6.25 mg (ZIAC) 5-6.25 mg Tab TAKE 1 TABLET TWICE A DAY    empagliflozin (JARDIANCE) 10 mg tablet Take 1 tablet (10 mg total) by mouth once daily.    ferrous sulfate (FEOSOL) 325 mg (65 mg iron) Tab tablet Take by mouth 2 (two) times daily.    furosemide (LASIX) 40 MG tablet Take 1 tablet (40 mg total) by mouth once daily.    gabapentin (NEURONTIN) 400 MG capsule Take 1 capsule (400 mg total) by mouth 4 (four) times daily as needed (grgrgff).    hydrALAZINE (APRESOLINE) 25 MG tablet Take 1 tablet (25 mg total) by mouth 2 (two) times daily.    HYDROcodone-acetaminophen (NORCO)   mg per tablet Take 1 tablet by mouth 3 (three) times daily.    hydrocortisone (ANUSOL-HC) 25 mg suppository Place 1 suppository (25 mg total) rectally 2 (two) times daily.    insulin  unit/mL injection Inject 30 Units into the skin 2 (two) times daily before meals.    insulin NPH-insulin regular, 70/30, (NOVOLIN 70/30) 100 unit/mL (70-30) injection Inject 12 Units into the skin 2 (two) times a day.    lactulose (CHRONULAC) 10 gram/15 mL solution Take 30 mLs (20 g total) by mouth once daily.    levothyroxine (SYNTHROID) 100 MCG tablet Take 1 tablet (100 mcg total) by mouth before breakfast.    lisinopriL (PRINIVIL,ZESTRIL) 40 MG tablet TAKE 1 TABLET TWICE A DAY    methocarbamoL (ROBAXIN) 750 MG Tab TAKE 2 TABLETS BY MOUTH 4 TIMES A DAY AS NEEDED FOR MUSCLE SPASMS    NOVOLIN N NPH U-100 INSULIN 100 unit/mL injection Inject 40 Units into the skin 2 (two) times daily.    pantoprazole (PROTONIX) 40 MG tablet TAKE 1 TABLET BY MOUTH TWICE A DAY    potassium chloride SA (K-DUR,KLOR-CON) 10 MEQ tablet Take 1 tablet by mouth once daily.    predniSONE (DELTASONE) 2.5 MG tablet Take 2.5 mg by mouth once daily.    SYMBICORT 160-4.5 mcg/actuation HFAA Inhale 2 puffs into the lungs 2 (two) times a day.    traMADoL (ULTRAM) 50 mg tablet TAKE 1 TABLET BY MOUTH FOUR TIMES A DAY     Family History       Problem Relation (Age of Onset)    Cancer Sister, Brother, Daughter    Diabetes Mother, Sister, Brother, Sister    Heart disease Father          Tobacco Use    Smoking status: Never    Smokeless tobacco: Never   Substance and Sexual Activity    Alcohol use: Never    Drug use: Never    Sexual activity: Not Currently     Birth control/protection: None     Review of Systems   Constitutional:  Negative for appetite change, chills, fatigue, fever and unexpected weight change.   HENT:  Negative for congestion, mouth sores, nosebleeds, sinus pain, sore throat and trouble swallowing.    Respiratory:  Negative  for apnea, cough, chest tightness and shortness of breath.    Cardiovascular:  Negative for chest pain, palpitations and leg swelling.   Gastrointestinal:  Positive for anal bleeding and rectal pain. Negative for abdominal pain, blood in stool, constipation, diarrhea, nausea and vomiting.   Genitourinary:  Negative for decreased urine volume, difficulty urinating, dysuria and frequency.   Musculoskeletal:  Negative for arthralgias, back pain and neck pain.   Skin:  Negative for rash.   Neurological:  Negative for syncope, light-headedness and headaches.   Hematological:  Does not bruise/bleed easily.   Psychiatric/Behavioral:  Negative for confusion and suicidal ideas.    Objective:     Vital Signs (Most Recent):  Temp: 98.2 °F (36.8 °C) (10/09/22 1910)  Pulse: 101 (10/09/22 1910)  Resp: 16 (10/09/22 1910)  BP: (!) 149/66 (10/09/22 1910)  SpO2: 96 % (10/09/22 1910)   Vital Signs (24h Range):  Temp:  [98.2 °F (36.8 °C)-98.6 °F (37 °C)] 98.2 °F (36.8 °C)  Pulse:  [] 101  Resp:  [16-20] 16  SpO2:  [95 %-98 %] 96 %  BP: (133-149)/(61-70) 149/66     Weight: 83.5 kg (184 lb 1.4 oz)  Body mass index is 30.63 kg/m².    Physical Exam  Constitutional:       Appearance: Normal appearance.   HENT:      Head: Atraumatic.      Nose: Nose normal.      Mouth/Throat:      Mouth: Mucous membranes are moist.      Pharynx: Oropharynx is clear.   Eyes:      Conjunctiva/sclera: Conjunctivae normal.      Pupils: Pupils are equal, round, and reactive to light.   Neck:      Vascular: No carotid bruit.   Cardiovascular:      Rate and Rhythm: Normal rate and regular rhythm.      Pulses: Normal pulses.      Heart sounds: Normal heart sounds.   Pulmonary:      Effort: Pulmonary effort is normal.      Breath sounds: Normal breath sounds.   Abdominal:      General: Abdomen is flat. Bowel sounds are normal. There is no distension.      Palpations: Abdomen is soft.      Tenderness: There is no abdominal tenderness. There is no guarding.    Musculoskeletal:         General: Normal range of motion.      Cervical back: Neck supple.      Right lower leg: Edema present.      Left lower leg: Edema present.   Skin:     General: Skin is warm and dry.      Capillary Refill: Capillary refill takes less than 2 seconds.      Coloration: Skin is not jaundiced or pale.      Findings: No bruising, lesion or rash.   Neurological:      General: No focal deficit present.      Mental Status: She is alert and oriented to person, place, and time.   Psychiatric:         Mood and Affect: Mood normal.         CRANIAL NERVES     CN III, IV, VI   Pupils are equal, round, and reactive to light.     Significant Labs: All pertinent labs within the past 24 hours have been reviewed.    Significant Imaging: I have reviewed all pertinent imaging results/findings within the past 24 hours.    Imaging Results              X-Ray Chest AP Portable (Final result)  Result time 10/05/22 16:04:02      Final result by Alex Padgett II, MD (10/05/22 16:04:02)                   Impression:      Findings suggest cardiac decompensation and / or pneumonitis.      Electronically signed by: Alex Padgett  Date:    10/05/2022  Time:    16:04               Narrative:    EXAMINATION:  XR CHEST AP PORTABLE    CLINICAL HISTORY:  Hypotension, unspecified    COMPARISON:  8 September 2020    FINDINGS:  The heart and mediastinum are stable in size and configuration.  The pulmonary vascularity is slightly increased with bilateral increased interstitial lung density.  No other lung infiltrates, effusions, pneumothorax or other abnormality is demonstrated.                                    Intake/Output - Last 3 Shifts         10/08 0700  10/09 0659 10/09 0700  10/10 0659          Urine Occurrence 3 x     Stool Occurrence 4 x                 Assessment/Plan:      * Rectal bleeding  Hold asa  MAREN for DVT prophylaxis which will also help with the peripheral edema.    Appreciate GI eval for  endoscopy  Colonoscopy with inflamed non bleeding hemorrhoids . Also diverticulitis. If no bleeding home soon. Some SOB and check CXR.       Dyspnea on exertion  Helped with breathing treatment  Check VQ scan    External hemorrhoids        Colon, diverticulosis        Iron deficiency anemia due to chronic blood loss        HH (hiatus hernia)        Polyp of stomach        Hypotension due to drugs        Neuropathy  Continue gabapentin      Thyroid disease  Continue synthroid  Check TSH      Bradycardia  Stop BB.  Monitor on telemetry  Trend troponins  Check TSH      Acute kidney injury  Patient with acute kidney injury likely due to IVVD/dehydration TRISTAN is currently improving. Labs reviewed- Renal function/electrolytes with Estimated Creatinine Clearance: 29 mL/min (A) (based on SCr of 1.24 mg/dL (H)). according to latest data. Monitor urine output and serial BMP and adjust therapy as needed. Avoid nephrotoxins and renally dose meds for GFR listed above.       Dehydration with hyponatremia  Hold diuretics  Gentle hydration  Follow electrolytes      Acute blood loss anemia  Renally dose all meds.    No NSAIDS  Stop ACEI  IVF and follow renal function.        Diabetes mellitus, type 2  Patient's FSGs are uncontrolled due to hyperglycemia on current medication regimen.  Last A1c reviewed-   Lab Results   Component Value Date    HGBA1C 10.5 (H) 08/02/2022     Most recent fingerstick glucose reviewed- No results for input(s): POCTGLUCOSE in the last 24 hours.  Current correctional scale  Low  Maintain anti-hyperglycemic dose as follows-   Antihyperglycemics (From admission, onward)    Start     Stop Route Frequency Ordered    10/06/22 2100  insulin detemir U-100 injection 10 Units         -- SubQ Nightly 10/06/22 0344    10/06/22 0443  insulin aspart U-100 injection 1-10 Units         -- SubQ Before meals & nightly PRN 10/06/22 0344        Hold Oral hypoglycemics while patient is in the hospital.  Basal/correctional  insulin while NPO for endoscopic evaluation.      10/08 blood sugar better but will need to watch because of shortness of breath given a couple doses of steroids    Arthritis  Patient on ultram as OP.    Will continue but will encourage tylenol usage first.    No NSAIDS.        Hypertension  Hold antihypertensive agents due to hypotension.        Hyperlipidemia  Continue statin.          VTE Risk Mitigation (From admission, onward)         Ordered     Place MAREN hose  Until discontinued         10/06/22 0456                Discharge Planning   MARTHA: 10/8/2022     Code Status: Full Code   Is the patient medically ready for discharge?:     Reason for patient still in hospital (select all that apply): Laboratory test, Treatment, Imaging, PT / OT recommendations and Pending disposition  Discharge Plan A: Home with family                  David Garcia MD  Department of Hospital Medicine   [facility] - [facility] [address] [facility] Telemetry

## 2022-10-11 VITALS
SYSTOLIC BLOOD PRESSURE: 175 MMHG | DIASTOLIC BLOOD PRESSURE: 85 MMHG | HEART RATE: 98 BPM | OXYGEN SATURATION: 94 % | HEIGHT: 65 IN | BODY MASS INDEX: 30.67 KG/M2 | WEIGHT: 184.06 LBS | TEMPERATURE: 98 F | RESPIRATION RATE: 18 BRPM

## 2022-10-11 PROBLEM — K62.5 RECTAL BLEEDING: Status: RESOLVED | Noted: 2022-10-05 | Resolved: 2022-10-11

## 2022-10-11 PROBLEM — R00.1 BRADYCARDIA: Status: RESOLVED | Noted: 2022-10-05 | Resolved: 2022-10-11

## 2022-10-11 LAB
ANION GAP SERPL CALCULATED.3IONS-SCNC: 11 MMOL/L (ref 7–16)
BASOPHILS # BLD AUTO: 0.01 K/UL (ref 0–0.2)
BASOPHILS NFR BLD AUTO: 0.1 % (ref 0–1)
BUN SERPL-MCNC: 22 MG/DL (ref 7–18)
BUN/CREAT SERPL: 17 (ref 6–20)
CALCIUM SERPL-MCNC: 9.1 MG/DL (ref 8.5–10.1)
CHLORIDE SERPL-SCNC: 109 MMOL/L (ref 98–107)
CO2 SERPL-SCNC: 22 MMOL/L (ref 21–32)
CREAT SERPL-MCNC: 1.29 MG/DL (ref 0.55–1.02)
DIFFERENTIAL METHOD BLD: ABNORMAL
EGFR (NO RACE VARIABLE) (RUSH/TITUS): 38 ML/MIN/1.73M²
EOSINOPHIL # BLD AUTO: 0.02 K/UL (ref 0–0.5)
EOSINOPHIL NFR BLD AUTO: 0.2 % (ref 1–4)
ERYTHROCYTE [DISTWIDTH] IN BLOOD BY AUTOMATED COUNT: 20.6 % (ref 11.5–14.5)
GLUCOSE SERPL-MCNC: 123 MG/DL (ref 74–106)
GLUCOSE SERPL-MCNC: 140 MG/DL (ref 70–105)
GLUCOSE SERPL-MCNC: 255 MG/DL (ref 70–105)
HCT VFR BLD AUTO: 25.1 % (ref 38–47)
HGB BLD-MCNC: 7.9 G/DL (ref 12–16)
IMM GRANULOCYTES # BLD AUTO: 0.06 K/UL (ref 0–0.04)
IMM GRANULOCYTES NFR BLD: 0.7 % (ref 0–0.4)
LYMPHOCYTES # BLD AUTO: 0.6 K/UL (ref 1–4.8)
LYMPHOCYTES NFR BLD AUTO: 6.5 % (ref 27–41)
MCH RBC QN AUTO: 25.6 PG (ref 27–31)
MCHC RBC AUTO-ENTMCNC: 31.5 G/DL (ref 32–36)
MCV RBC AUTO: 81.2 FL (ref 80–96)
MONOCYTES # BLD AUTO: 0.83 K/UL (ref 0–0.8)
MONOCYTES NFR BLD AUTO: 9 % (ref 2–6)
MPC BLD CALC-MCNC: 8.1 FL (ref 9.4–12.4)
NEUTROPHILS # BLD AUTO: 7.68 K/UL (ref 1.8–7.7)
NEUTROPHILS NFR BLD AUTO: 83.5 % (ref 53–65)
NRBC # BLD AUTO: 0.02 X10E3/UL
NRBC, AUTO (.00): 0.2 %
PLATELET # BLD AUTO: 322 K/UL (ref 150–400)
POTASSIUM SERPL-SCNC: 4.7 MMOL/L (ref 3.5–5.1)
RBC # BLD AUTO: 3.09 M/UL (ref 4.2–5.4)
SODIUM SERPL-SCNC: 137 MMOL/L (ref 136–145)
WBC # BLD AUTO: 9.2 K/UL (ref 4.5–11)

## 2022-10-11 PROCEDURE — 25000242 PHARM REV CODE 250 ALT 637 W/ HCPCS: Performed by: HOSPITALIST

## 2022-10-11 PROCEDURE — 25000003 PHARM REV CODE 250: Performed by: HOSPITALIST

## 2022-10-11 PROCEDURE — 36415 COLL VENOUS BLD VENIPUNCTURE: CPT | Performed by: NURSE PRACTITIONER

## 2022-10-11 PROCEDURE — 99239 HOSP IP/OBS DSCHRG MGMT >30: CPT | Mod: ,,, | Performed by: HOSPITALIST

## 2022-10-11 PROCEDURE — 80048 BASIC METABOLIC PNL TOTAL CA: CPT | Performed by: NURSE PRACTITIONER

## 2022-10-11 PROCEDURE — 25000003 PHARM REV CODE 250: Performed by: INTERNAL MEDICINE

## 2022-10-11 PROCEDURE — 27000221 HC OXYGEN, UP TO 24 HOURS

## 2022-10-11 PROCEDURE — 82962 GLUCOSE BLOOD TEST: CPT

## 2022-10-11 PROCEDURE — 63600175 PHARM REV CODE 636 W HCPCS: Performed by: HOSPITALIST

## 2022-10-11 PROCEDURE — 25000003 PHARM REV CODE 250: Performed by: NURSE PRACTITIONER

## 2022-10-11 PROCEDURE — 94640 AIRWAY INHALATION TREATMENT: CPT

## 2022-10-11 PROCEDURE — 94761 N-INVAS EAR/PLS OXIMETRY MLT: CPT

## 2022-10-11 PROCEDURE — 85025 COMPLETE CBC W/AUTO DIFF WBC: CPT | Performed by: NURSE PRACTITIONER

## 2022-10-11 PROCEDURE — 99900035 HC TECH TIME PER 15 MIN (STAT)

## 2022-10-11 PROCEDURE — 99239 PR HOSPITAL DISCHARGE DAY,>30 MIN: ICD-10-PCS | Mod: ,,, | Performed by: HOSPITALIST

## 2022-10-11 RX ORDER — LISINOPRIL 40 MG/1
40 TABLET ORAL 2 TIMES DAILY
Qty: 180 TABLET | Refills: 3
Start: 2022-10-11 | End: 2023-02-27 | Stop reason: SDUPTHER

## 2022-10-11 RX ORDER — FUROSEMIDE 40 MG/1
40 TABLET ORAL DAILY
Qty: 90 TABLET | Refills: 3 | Status: SHIPPED | OUTPATIENT
Start: 2022-10-11 | End: 2023-11-02 | Stop reason: DRUGHIGH

## 2022-10-11 RX ORDER — BISOPROLOL FUMARATE AND HYDROCHLOROTHIAZIDE 5; 6.25 MG/1; MG/1
TABLET ORAL
Qty: 180 TABLET | Refills: 3
Start: 2022-10-11 | End: 2023-05-01 | Stop reason: SDUPTHER

## 2022-10-11 RX ORDER — GABAPENTIN 100 MG/1
100 CAPSULE ORAL 3 TIMES DAILY
Qty: 90 CAPSULE | Refills: 0 | Status: SHIPPED | OUTPATIENT
Start: 2022-10-11 | End: 2023-05-08 | Stop reason: DRUGHIGH

## 2022-10-11 RX ADMIN — INSULIN ASPART 6 UNITS: 100 INJECTION, SOLUTION INTRAVENOUS; SUBCUTANEOUS at 11:10

## 2022-10-11 RX ADMIN — IPRATROPIUM BROMIDE AND ALBUTEROL SULFATE 3 ML: .5; 3 SOLUTION RESPIRATORY (INHALATION) at 09:10

## 2022-10-11 RX ADMIN — GABAPENTIN 100 MG: 100 CAPSULE ORAL at 09:10

## 2022-10-11 RX ADMIN — IPRATROPIUM BROMIDE AND ALBUTEROL SULFATE 3 ML: .5; 3 SOLUTION RESPIRATORY (INHALATION) at 11:10

## 2022-10-11 RX ADMIN — HYDROCODONE BITARTRATE AND ACETAMINOPHEN 1 TABLET: 7.5; 325 TABLET ORAL at 09:10

## 2022-10-11 RX ADMIN — FERROUS SULFATE TAB 325 MG (65 MG ELEMENTAL FE) 1 EACH: 325 (65 FE) TAB at 09:10

## 2022-10-11 RX ADMIN — PANTOPRAZOLE SODIUM 40 MG: 40 TABLET, DELAYED RELEASE ORAL at 09:10

## 2022-10-11 RX ADMIN — LEVOTHYROXINE SODIUM 100 MCG: 100 TABLET ORAL at 05:10

## 2022-10-11 NOTE — ASSESSMENT & PLAN NOTE
Hold antihypertensive agents due to hypotension.      Hypotension resolved, will resume home meds that are not nephrotoxic.

## 2022-10-11 NOTE — PROGRESS NOTES
[facility] - [address] [place] [address] Medicine  Progress Note    Patient Name: Vandana Allison  MRN: [id number]  Patient Class: IP- Inpatient   Admission Date: 10/5/2022  Length of Stay: 5 days  Attending Physician: David Garcia MD  Primary Care Provider: Cm Larson III, DO        Subjective:     Principal Problem:Rectal bleeding        HPI:  89 yo F, who appears much younger than stated age, presents to the ED from Dr. Wood's office for hypotension and bradycardia.  Patient was sent for rectal bleeding that hs been occurring since June.  Patient has same problem five years ago and had internal hemorrhoids so was referred to Dr. Beatty.  Dr. Beatty referred patient for colonoscopy to Dr. Wood.      Patient EKG shows sinus bradycardia (patient on bisoprolol) and BMP shows TRISTAN and Na 118 (patient on lasix and HCT).  She really does not have any complaints except she states that he bottoms hurts where she is bleeding.  She is very active and doesn't report generalized weakness or syncope.  Her daughter is present and says they were just following up on their clinic appointments for the rectal bleeding.  She has had no weight loss or dysphagia.  She states she was working full time up until age 82.  Her hgb is 6.7 and a transfusion of pRBC going.  I will order anemia panel and coags ASAP.        Overview/Hospital Course:  10/06 Records reviewed. Daughter in room. Pt last hx hemorrhoidal bleeding. More BRB pre rectum and JCM III referred to Dr Beatty and Dr Beatty to Dr Wood. BP low in office and referred to hospital EGD without source and plan colonoscopy in am. Asymptomatic bradycardia. Stopped ziac, Watch electrolytes also asymptomatic.   10/07 Seen prior to coloscopy but since report reviewed. If does well home soon. F/U again with Dr Beatty.  10/08 considering discharging patient.  When staff getting her up out of bed, she became really short of breath.  Did not drop oxygen saturation.   Similar episode earlier during her stay before going to endoscopy study.  No chest pain.  Patient states she has had these issues for several weeks.  No history of any heart disease.  Will hold discharge and check into further.  At 89 years old will try to treat conservative because of increased complications with any aggressive procedures.  10/09 SOB episode but better with breathing treatments. No chest pain. Up in room. Rule out PE. Bruce'd for VQ scan.   10/10 Looks better. Feels has wheezing episode NM VQ scan low prob. If doing well plan dc soon.      Past Medical History:   Diagnosis Date    Arthritis     Chronic kidney disease, stage 3b     Diabetes mellitus, type 2     Hiatal hernia     Hyperlipidemia     Hypertension     Neuropathy     Thyroid disease        Past Surgical History:   Procedure Laterality Date    BREAST SURGERY      Biopsy    EYE SURGERY      Remove cataracts both eyes    HYSTERECTOMY      NEPHRECTOMY Right     THYROIDECTOMY         Review of patient's allergies indicates:   Allergen Reactions    Aspirin        No current facility-administered medications on file prior to encounter.     Current Outpatient Medications on File Prior to Encounter   Medication Sig    albuterol (PROVENTIL/VENTOLIN HFA) 90 mcg/actuation inhaler Inhale 2 puffs into the lungs every 4 (four) hours as needed. Rescue    amLODIPine (NORVASC) 5 MG tablet TAKE 1 TABLET BY MOUTH EVERY DAY    bisoprolol-hydrochlorothiazide 5-6.25 mg (ZIAC) 5-6.25 mg Tab TAKE 1 TABLET TWICE A DAY    empagliflozin (JARDIANCE) 10 mg tablet Take 1 tablet (10 mg total) by mouth once daily.    ferrous sulfate (FEOSOL) 325 mg (65 mg iron) Tab tablet Take by mouth 2 (two) times daily.    furosemide (LASIX) 40 MG tablet Take 1 tablet (40 mg total) by mouth once daily.    gabapentin (NEURONTIN) 400 MG capsule Take 1 capsule (400 mg total) by mouth 4 (four) times daily as needed (grgrgff).    hydrALAZINE (APRESOLINE) 25 MG tablet  Take 1 tablet (25 mg total) by mouth 2 (two) times daily.    HYDROcodone-acetaminophen (NORCO)  mg per tablet Take 1 tablet by mouth 3 (three) times daily.    hydrocortisone (ANUSOL-HC) 25 mg suppository Place 1 suppository (25 mg total) rectally 2 (two) times daily.    insulin  unit/mL injection Inject 30 Units into the skin 2 (two) times daily before meals.    insulin NPH-insulin regular, 70/30, (NOVOLIN 70/30) 100 unit/mL (70-30) injection Inject 12 Units into the skin 2 (two) times a day.    lactulose (CHRONULAC) 10 gram/15 mL solution Take 30 mLs (20 g total) by mouth once daily.    levothyroxine (SYNTHROID) 100 MCG tablet Take 1 tablet (100 mcg total) by mouth before breakfast.    lisinopriL (PRINIVIL,ZESTRIL) 40 MG tablet TAKE 1 TABLET TWICE A DAY    methocarbamoL (ROBAXIN) 750 MG Tab TAKE 2 TABLETS BY MOUTH 4 TIMES A DAY AS NEEDED FOR MUSCLE SPASMS    NOVOLIN N NPH U-100 INSULIN 100 unit/mL injection Inject 40 Units into the skin 2 (two) times daily.    pantoprazole (PROTONIX) 40 MG tablet TAKE 1 TABLET BY MOUTH TWICE A DAY    potassium chloride SA (K-DUR,KLOR-CON) 10 MEQ tablet Take 1 tablet by mouth once daily.    predniSONE (DELTASONE) 2.5 MG tablet Take 2.5 mg by mouth once daily.    SYMBICORT 160-4.5 mcg/actuation HFAA Inhale 2 puffs into the lungs 2 (two) times a day.    traMADoL (ULTRAM) 50 mg tablet TAKE 1 TABLET BY MOUTH FOUR TIMES A DAY     Family History       Problem Relation (Age of Onset)    Cancer Sister, Brother, Daughter    Diabetes Mother, Sister, Brother, Sister    Heart disease Father          Tobacco Use    Smoking status: Never    Smokeless tobacco: Never   Substance and Sexual Activity    Alcohol use: Never    Drug use: Never    Sexual activity: Not Currently     Birth control/protection: None     Review of Systems   Constitutional:  Negative for appetite change, chills, fatigue, fever and unexpected weight change.   HENT:  Negative for congestion, mouth  sores, nosebleeds, sinus pain, sore throat and trouble swallowing.    Respiratory:  Negative for apnea, cough, chest tightness and shortness of breath.    Cardiovascular:  Negative for chest pain, palpitations and leg swelling.   Gastrointestinal:  Positive for anal bleeding and rectal pain. Negative for abdominal pain, blood in stool, constipation, diarrhea, nausea and vomiting.   Genitourinary:  Negative for decreased urine volume, difficulty urinating, dysuria and frequency.   Musculoskeletal:  Negative for arthralgias, back pain and neck pain.   Skin:  Negative for rash.   Neurological:  Negative for syncope, light-headedness and headaches.   Hematological:  Does not bruise/bleed easily.   Psychiatric/Behavioral:  Negative for confusion and suicidal ideas.    Objective:     Vital Signs (Most Recent):  Temp: 98.4 °F (36.9 °C) (10/10/22 2000)  Pulse: 106 (10/10/22 2000)  Resp: 20 (10/10/22 2000)  BP: (!) 161/88 (notified nurse) (10/10/22 2000)  SpO2: 100 % (10/10/22 2000)   Vital Signs (24h Range):  Temp:  [98 °F (36.7 °C)-98.4 °F (36.9 °C)] 98.4 °F (36.9 °C)  Pulse:  [] 106  Resp:  [18-20] 20  SpO2:  [94 %-100 %] 100 %  BP: (135-182)/(67-90) 161/88     Weight: 83.5 kg (184 lb 1.4 oz)  Body mass index is 30.63 kg/m².    Physical Exam  Constitutional:       Appearance: Normal appearance.   HENT:      Head: Atraumatic.      Nose: Nose normal.      Mouth/Throat:      Mouth: Mucous membranes are moist.      Pharynx: Oropharynx is clear.   Eyes:      Conjunctiva/sclera: Conjunctivae normal.      Pupils: Pupils are equal, round, and reactive to light.   Neck:      Vascular: No carotid bruit.   Cardiovascular:      Rate and Rhythm: Normal rate and regular rhythm.      Pulses: Normal pulses.      Heart sounds: Normal heart sounds.   Pulmonary:      Effort: Pulmonary effort is normal.      Breath sounds: Normal breath sounds.   Abdominal:      General: Abdomen is flat. Bowel sounds are normal. There is no distension.       Palpations: Abdomen is soft.      Tenderness: There is no abdominal tenderness. There is no guarding.   Musculoskeletal:         General: Normal range of motion.      Cervical back: Neck supple.      Right lower leg: Edema present.      Left lower leg: Edema present.   Skin:     General: Skin is warm and dry.      Capillary Refill: Capillary refill takes less than 2 seconds.      Coloration: Skin is not jaundiced or pale.      Findings: No bruising, lesion or rash.   Neurological:      General: No focal deficit present.      Mental Status: She is alert and oriented to person, place, and time.   Psychiatric:         Mood and Affect: Mood normal.         CRANIAL NERVES     CN III, IV, VI   Pupils are equal, round, and reactive to light.     Significant Labs: All pertinent labs within the past 24 hours have been reviewed.    Significant Imaging: I have reviewed all pertinent imaging results/findings within the past 24 hours.    Imaging Results              X-Ray Chest AP Portable (Final result)  Result time 10/05/22 16:04:02      Final result by Alex Padgett II, MD (10/05/22 16:04:02)                   Impression:      Findings suggest cardiac decompensation and / or pneumonitis.      Electronically signed by: Alex Padgett  Date:    10/05/2022  Time:    16:04               Narrative:    EXAMINATION:  XR CHEST AP PORTABLE    CLINICAL HISTORY:  Hypotension, unspecified    COMPARISON:  8 September 2020    FINDINGS:  The heart and mediastinum are stable in size and configuration.  The pulmonary vascularity is slightly increased with bilateral increased interstitial lung density.  No other lung infiltrates, effusions, pneumothorax or other abnormality is demonstrated.                                    Intake/Output - Last 3 Shifts         10/09 0700  10/10 0659 10/10 0700  10/11 0659    P.O. 240     Total Intake(mL/kg) 240 (2.9)     Net +240           Stool Occurrence  2 x                Assessment/Plan:       * Rectal bleeding  Hold asa  MAREN for DVT prophylaxis which will also help with the peripheral edema.    Appreciate GI eval for endoscopy  Colonoscopy with inflamed non bleeding hemorrhoids . Also diverticulitis. If no bleeding home soon. Some SOB and check CXR.       Dyspnea on exertion  Helped with breathing treatment  Check VQ scan    External hemorrhoids        Colon, diverticulosis        Iron deficiency anemia due to chronic blood loss        HH (hiatus hernia)        Polyp of stomach        Hypotension due to drugs        Neuropathy  Continue gabapentin      Thyroid disease  Continue synthroid  Check TSH      Bradycardia  Stop BB.  Monitor on telemetry  Trend troponins  Check TSH      Acute kidney injury  Patient with acute kidney injury likely due to IVVD/dehydration TRISTAN is currently improving. Labs reviewed- Renal function/electrolytes with Estimated Creatinine Clearance: 29 mL/min (A) (based on SCr of 1.24 mg/dL (H)). according to latest data. Monitor urine output and serial BMP and adjust therapy as needed. Avoid nephrotoxins and renally dose meds for GFR listed above.       Dehydration with hyponatremia  Hold diuretics  Gentle hydration  Follow electrolytes      Acute blood loss anemia  Renally dose all meds.    No NSAIDS  Stop ACEI  IVF and follow renal function.        Diabetes mellitus, type 2  Patient's FSGs are uncontrolled due to hyperglycemia on current medication regimen.  Last A1c reviewed-   Lab Results   Component Value Date    HGBA1C 10.5 (H) 08/02/2022     Most recent fingerstick glucose reviewed- No results for input(s): POCTGLUCOSE in the last 24 hours.  Current correctional scale  Low  Maintain anti-hyperglycemic dose as follows-   Antihyperglycemics (From admission, onward)    Start     Stop Route Frequency Ordered    10/06/22 2100  insulin detemir U-100 injection 10 Units         -- SubQ Nightly 10/06/22 0344    10/06/22 0443  insulin aspart U-100 injection 1-10 Units         -- SubQ  Before meals & nightly PRN 10/06/22 0344        Hold Oral hypoglycemics while patient is in the hospital.  Basal/correctional insulin while NPO for endoscopic evaluation.      10/08 blood sugar better but will need to watch because of shortness of breath given a couple doses of steroids    Arthritis  Patient on ultram as OP.    Will continue but will encourage tylenol usage first.    No NSAIDS.        Hypertension  Hold antihypertensive agents due to hypotension.        Hyperlipidemia  Continue statin.          VTE Risk Mitigation (From admission, onward)         Ordered     Place MAREN hose  Until discontinued         10/06/22 0456                Discharge Planning   MARTHA: 10/8/2022     Code Status: Full Code   Is the patient medically ready for discharge?:     Reason for patient still in hospital (select all that apply): Laboratory test, Treatment and Imaging  Discharge Plan A: Home with family                  David Garcia MD  Department of [facility] Medicine   [facility] - [facility] Telemetry

## 2022-10-11 NOTE — PLAN OF CARE
Problem: Adult Inpatient Plan of Care  Goal: Plan of Care Review  Outcome: Met  Goal: Patient-Specific Goal (Individualized)  Outcome: Met  Goal: Absence of Hospital-Acquired Illness or Injury  Outcome: Met  Goal: Optimal Comfort and Wellbeing  Outcome: Met  Goal: Readiness for Transition of Care  Outcome: Met     Problem: Fall Injury Risk  Goal: Absence of Fall and Fall-Related Injury  Outcome: Met     Problem: Diabetes Comorbidity  Goal: Blood Glucose Level Within Targeted Range  Outcome: Met     Problem: Fluid and Electrolyte Imbalance (Acute Kidney Injury/Impairment)  Goal: Fluid and Electrolyte Balance  Outcome: Met     Problem: Oral Intake Inadequate (Acute Kidney Injury/Impairment)  Goal: Optimal Nutrition Intake  Outcome: Met     Problem: Renal Function Impairment (Acute Kidney Injury/Impairment)  Goal: Effective Renal Function  Outcome: Met     Problem: Impaired Wound Healing  Goal: Optimal Wound Healing  Outcome: Met     Problem: Violence Risk or Actual  Goal: Anger and Impulse Control  Outcome: Met

## 2022-10-11 NOTE — ASSESSMENT & PLAN NOTE
Patient with acute kidney injury likely due to IVVD/dehydration TRISTAN is currently improving. Labs reviewed- Renal function/electrolytes with Estimated Creatinine Clearance: 27.8 mL/min (A) (based on SCr of 1.29 mg/dL (H)). according to latest data. Monitor urine output and serial BMP and adjust therapy as needed. Avoid nephrotoxins and renally dose meds for GFR listed above.

## 2022-10-11 NOTE — PLAN OF CARE
[facility] - [facility] Telemetry  Discharge Final Note    Primary Care Provider: Cm Larson III, DO    Expected Discharge Date: 10/11/2022    Final Discharge Note (most recent)       Final Note - 10/11/22 1247          Final Note    Assessment Type Final Discharge Note     Anticipated Discharge Disposition Home-Health Care Svc        Post-Acute Status    Post-Acute Authorization Home Health     Home Health Status Set-up Complete/Auth obtained     Patient choice form signed by patient/caregiver List with quality metrics by geographic area provided;List from CMS Compare;List from System Post-Acute Care     Discharge Delays None known at this time                   Patient discharging home today. Discharge information faxed to [facility].     Important Message from Medicare  Important Message from Medicare regarding Discharge Appeal Rights: Given to patient/caregiver, Explained to patient/caregiver, Signed/date by patient/caregiver     Date IMM was signed: 10/11/22  Time IMM was signed: 1248     Follow-up providers       Cm Larson III, DO   Specialty: Family Medicine   Relationship: PCP - General    [address] [place] [address] [facility]  [place] [address]   Phone: [phone]       Next Steps: Schedule an appointment as soon as possible for a visit in 1 week(s)    Instructions: Hospital discharge follow up              After-discharge care                [facility] Medical Care       [facility]   Service: Home Health Services    [address] [facility] [place] [address]   Phone: [phone]

## 2022-10-11 NOTE — PLAN OF CARE
Problem: Adult Inpatient Plan of Care  Goal: Plan of Care Review  Outcome: Ongoing, Progressing  Flowsheets (Taken 10/10/2022 2247)  Plan of Care Reviewed With: patient  Goal: Optimal Comfort and Wellbeing  Outcome: Ongoing, Progressing  Intervention: Monitor Pain and Promote Comfort  Flowsheets (Taken 10/10/2022 2247)  Pain Management Interventions:   pillow support provided   position adjusted   pain management plan reviewed with patient/caregiver   quiet environment facilitated   relaxation techniques promoted  Intervention: Provide Person-Centered Care  Flowsheets (Taken 10/10/2022 2247)  Trust Relationship/Rapport:   care explained   choices provided   questions encouraged   reassurance provided   emotional support provided   thoughts/feelings acknowledged   empathic listening provided   questions answered     Problem: Fall Injury Risk  Goal: Absence of Fall and Fall-Related Injury  Outcome: Ongoing, Progressing  Intervention: Identify and Manage Contributors  Flowsheets (Taken 10/10/2022 2247)  Self-Care Promotion:   independence encouraged   BADL personal objects within reach   BADL personal routines maintained   meal set-up provided   safe use of adaptive equipment encouraged  Medication Review/Management: medications reviewed     Problem: Diabetes Comorbidity  Goal: Blood Glucose Level Within Targeted Range  Outcome: Ongoing, Progressing  Intervention: Monitor and Manage Glycemia  Flowsheets (Taken 10/10/2022 2247)  Glycemic Management:   blood glucose monitored   supplemental insulin given     Problem: Fluid and Electrolyte Imbalance (Acute Kidney Injury/Impairment)  Goal: Fluid and Electrolyte Balance  Outcome: Ongoing, Progressing  Intervention: Monitor and Manage Fluid and Electrolyte Balance  Flowsheets (Taken 10/10/2022 2247)  Fluid/Electrolyte Management: fluids provided     Problem: Impaired Wound Healing  Goal: Optimal Wound Healing  Outcome: Ongoing, Progressing  Intervention: Promote Wound  Healing  Flowsheets (Taken 10/10/2022 2243)  Oral Nutrition Promotion:   physical activity promoted   rest periods promoted   safe use of adaptive equipment encouraged   social interaction promoted  Sleep/Rest Enhancement:   awakenings minimized   noise level reduced   regular sleep/rest pattern promoted   relaxation techniques promoted  Pain Management Interventions:   pillow support provided   position adjusted   pain management plan reviewed with patient/caregiver   quiet environment facilitated   relaxation techniques promoted

## 2022-10-11 NOTE — SUBJECTIVE & OBJECTIVE
Past Medical History:   Diagnosis Date    Arthritis     Chronic kidney disease, stage 3b     Diabetes mellitus, type 2     Hiatal hernia     Hyperlipidemia     Hypertension     Neuropathy     Thyroid disease        Past Surgical History:   Procedure Laterality Date    BREAST SURGERY      Biopsy    EYE SURGERY      Remove cataracts both eyes    HYSTERECTOMY      NEPHRECTOMY Right     THYROIDECTOMY         Review of patient's allergies indicates:   Allergen Reactions    Aspirin        No current facility-administered medications on file prior to encounter.     Current Outpatient Medications on File Prior to Encounter   Medication Sig    albuterol (PROVENTIL/VENTOLIN HFA) 90 mcg/actuation inhaler Inhale 2 puffs into the lungs every 4 (four) hours as needed. Rescue    amLODIPine (NORVASC) 5 MG tablet TAKE 1 TABLET BY MOUTH EVERY DAY    bisoprolol-hydrochlorothiazide 5-6.25 mg (ZIAC) 5-6.25 mg Tab TAKE 1 TABLET TWICE A DAY    empagliflozin (JARDIANCE) 10 mg tablet Take 1 tablet (10 mg total) by mouth once daily.    ferrous sulfate (FEOSOL) 325 mg (65 mg iron) Tab tablet Take by mouth 2 (two) times daily.    furosemide (LASIX) 40 MG tablet Take 1 tablet (40 mg total) by mouth once daily.    gabapentin (NEURONTIN) 400 MG capsule Take 1 capsule (400 mg total) by mouth 4 (four) times daily as needed (grgrgff).    hydrALAZINE (APRESOLINE) 25 MG tablet Take 1 tablet (25 mg total) by mouth 2 (two) times daily.    HYDROcodone-acetaminophen (NORCO)  mg per tablet Take 1 tablet by mouth 3 (three) times daily.    hydrocortisone (ANUSOL-HC) 25 mg suppository Place 1 suppository (25 mg total) rectally 2 (two) times daily.    insulin  unit/mL injection Inject 30 Units into the skin 2 (two) times daily before meals.    insulin NPH-insulin regular, 70/30, (NOVOLIN 70/30) 100 unit/mL (70-30) injection Inject 12 Units into the skin 2 (two) times a day.    lactulose (CHRONULAC) 10 gram/15 mL solution  Take 30 mLs (20 g total) by mouth once daily.    levothyroxine (SYNTHROID) 100 MCG tablet Take 1 tablet (100 mcg total) by mouth before breakfast.    lisinopriL (PRINIVIL,ZESTRIL) 40 MG tablet TAKE 1 TABLET TWICE A DAY    methocarbamoL (ROBAXIN) 750 MG Tab TAKE 2 TABLETS BY MOUTH 4 TIMES A DAY AS NEEDED FOR MUSCLE SPASMS    NOVOLIN N NPH U-100 INSULIN 100 unit/mL injection Inject 40 Units into the skin 2 (two) times daily.    pantoprazole (PROTONIX) 40 MG tablet TAKE 1 TABLET BY MOUTH TWICE A DAY    potassium chloride SA (K-DUR,KLOR-CON) 10 MEQ tablet Take 1 tablet by mouth once daily.    predniSONE (DELTASONE) 2.5 MG tablet Take 2.5 mg by mouth once daily.    SYMBICORT 160-4.5 mcg/actuation HFAA Inhale 2 puffs into the lungs 2 (two) times a day.    traMADoL (ULTRAM) 50 mg tablet TAKE 1 TABLET BY MOUTH FOUR TIMES A DAY     Family History       Problem Relation (Age of Onset)    Cancer Sister, Brother, Daughter    Diabetes Mother, Sister, Brother, Sister    Heart disease Father          Tobacco Use    Smoking status: Never    Smokeless tobacco: Never   Substance and Sexual Activity    Alcohol use: Never    Drug use: Never    Sexual activity: Not Currently     Birth control/protection: None     Review of Systems   Constitutional:  Negative for appetite change, chills, fatigue, fever and unexpected weight change.   HENT:  Negative for congestion, mouth sores, nosebleeds, sinus pain, sore throat and trouble swallowing.    Respiratory:  Negative for apnea, cough, chest tightness and shortness of breath.    Cardiovascular:  Negative for chest pain, palpitations and leg swelling.   Gastrointestinal:  Positive for anal bleeding and rectal pain. Negative for abdominal pain, blood in stool, constipation, diarrhea, nausea and vomiting.   Genitourinary:  Negative for decreased urine volume, difficulty urinating, dysuria and frequency.   Musculoskeletal:  Negative for arthralgias, back pain and neck pain.   Skin:   Negative for rash.   Neurological:  Negative for syncope, light-headedness and headaches.   Hematological:  Does not bruise/bleed easily.   Psychiatric/Behavioral:  Negative for confusion and suicidal ideas.    Objective:     Vital Signs (Most Recent):  Temp: 98.4 °F (36.9 °C) (10/10/22 2000)  Pulse: 106 (10/10/22 2000)  Resp: 20 (10/10/22 2000)  BP: (!) 161/88 (notified nurse) (10/10/22 2000)  SpO2: 100 % (10/10/22 2000)   Vital Signs (24h Range):  Temp:  [98 °F (36.7 °C)-98.4 °F (36.9 °C)] 98.4 °F (36.9 °C)  Pulse:  [] 106  Resp:  [18-20] 20  SpO2:  [94 %-100 %] 100 %  BP: (135-182)/(67-90) 161/88     Weight: 83.5 kg (184 lb 1.4 oz)  Body mass index is 30.63 kg/m².    Physical Exam  Constitutional:       Appearance: Normal appearance.   HENT:      Head: Atraumatic.      Nose: Nose normal.      Mouth/Throat:      Mouth: Mucous membranes are moist.      Pharynx: Oropharynx is clear.   Eyes:      Conjunctiva/sclera: Conjunctivae normal.      Pupils: Pupils are equal, round, and reactive to light.   Neck:      Vascular: No carotid bruit.   Cardiovascular:      Rate and Rhythm: Normal rate and regular rhythm.      Pulses: Normal pulses.      Heart sounds: Normal heart sounds.   Pulmonary:      Effort: Pulmonary effort is normal.      Breath sounds: Normal breath sounds.   Abdominal:      General: Abdomen is flat. Bowel sounds are normal. There is no distension.      Palpations: Abdomen is soft.      Tenderness: There is no abdominal tenderness. There is no guarding.   Musculoskeletal:         General: Normal range of motion.      Cervical back: Neck supple.      Right lower leg: Edema present.      Left lower leg: Edema present.   Skin:     General: Skin is warm and dry.      Capillary Refill: Capillary refill takes less than 2 seconds.      Coloration: Skin is not jaundiced or pale.      Findings: No bruising, lesion or rash.   Neurological:      General: No focal deficit present.      Mental Status: She is  alert and oriented to person, place, and time.   Psychiatric:         Mood and Affect: Mood normal.         CRANIAL NERVES     CN III, IV, VI   Pupils are equal, round, and reactive to light.     Significant Labs: All pertinent labs within the past 24 hours have been reviewed.    Significant Imaging: I have reviewed all pertinent imaging results/findings within the past 24 hours.    Imaging Results              X-Ray Chest AP Portable (Final result)  Result time 10/05/22 16:04:02      Final result by Alex Padgett II, MD (10/05/22 16:04:02)                   Impression:      Findings suggest cardiac decompensation and / or pneumonitis.      Electronically signed by: Alex Padgett  Date:    10/05/2022  Time:    16:04               Narrative:    EXAMINATION:  XR CHEST AP PORTABLE    CLINICAL HISTORY:  Hypotension, unspecified    COMPARISON:  8 September 2020    FINDINGS:  The heart and mediastinum are stable in size and configuration.  The pulmonary vascularity is slightly increased with bilateral increased interstitial lung density.  No other lung infiltrates, effusions, pneumothorax or other abnormality is demonstrated.                                    Intake/Output - Last 3 Shifts         10/09 0700  10/10 0659 10/10 0700  10/11 0659    P.O. 240     Total Intake(mL/kg) 240 (2.9)     Net +240           Stool Occurrence  2 x

## 2022-10-11 NOTE — DISCHARGE SUMMARY
[facility] - [address] [place] [address] Medicine  Discharge Summary      Patient Name: Vandana Allison  MRN: [id number]  Patient Class: IP- Inpatient  Admission Date: 10/5/2022  Hospital Length of Stay: 6 days  Discharge Date and Time:  10/11/2022 12:02 PM  Attending Physician: David Garcia MD   Discharging Provider: NIKOS Dhaliwal  Primary Care Provider: Cm Larson III, DO      HPI:   89 yo F, who appears much younger than stated age, presents to the ED from Dr. Wood's office for hypotension and bradycardia.  Patient was sent for rectal bleeding that hs been occurring since June.  Patient has same problem five years ago and had internal hemorrhoids so was referred to Dr. Beatty.  Dr. Beatty referred patient for colonoscopy to Dr. Wood.      Patient EKG shows sinus bradycardia (patient on bisoprolol) and BMP shows TRISTAN and Na 118 (patient on lasix and HCT).  She really does not have any complaints except she states that he bottoms hurts where she is bleeding.  She is very active and doesn't report generalized weakness or syncope.  Her daughter is present and says they were just following up on their clinic appointments for the rectal bleeding.  She has had no weight loss or dysphagia.  She states she was working full time up until age 82.  Her hgb is 6.7 and a transfusion of pRBC going.  I will order anemia panel and coags ASAP.        * No surgery found *      Hospital Course:   10/06 Records reviewed. Daughter in room. Pt last hx hemorrhoidal bleeding. More BRB pre rectum and JCM III referred to Dr Beatty and Dr Beatty to Dr Wood. BP low in office and referred to hospital EGD without source and plan colonoscopy in am. Asymptomatic bradycardia. Stopped ziac, Watch electrolytes also asymptomatic.   10/07 Seen prior to coloscopy but since report reviewed. If does well home soon. F/U again with Dr Beatty.  10/08 considering discharging patient.  When staff getting her up out of bed, she  became really short of breath.  Did not drop oxygen saturation.  Similar episode earlier during her stay before going to endoscopy study.  No chest pain.  Patient states she has had these issues for several weeks.  No history of any heart disease.  Will hold discharge and check into further.  At 89 years old will try to treat conservative because of increased complications with any aggressive procedures.  10/09 SOB episode but better with breathing treatments. No chest pain. Up in room. Rule out PE. Bruce'd for VQ scan.   10/10 Looks better. Feels has wheezing episode NM VQ scan low prob. If doing well plan dc soon.    10/11: Patient sitting comfortably on side of bed this AM. She states she is feeling better and ready to d/c home. VQ scan with low probability of PE and U/S BLE negative. Denies any further bleeding. Her O2 sat on room air was 94% after standing and being off for a few minutes, patient does not qualify for home O2.        Goals of Care Treatment Preferences:  Code Status: Full Code      Consults:   Consults (From admission, onward)        Status Ordering Provider     Inpatient consult to Social Work  Once        Provider:  (Not yet assigned)    Completed SUSAN MAS     Inpatient consult to Gastroenterology  Once        Provider:  (Not yet assigned)    Completed ELOY RINCON          Dyspnea on exertion  Helped with breathing treatment  D-Dimer 4.7  VQ scan low probability  U/S BLE negative    Hypotension due to drugs  Resolved    Neuropathy  Continue gabapentin      Thyroid disease  Continue synthroid  TSH 2.380      Acute kidney injury  Patient with acute kidney injury likely due to IVVD/dehydration TRISTAN is currently improving. Labs reviewed- Renal function/electrolytes with Estimated Creatinine Clearance: 27.8 mL/min (A) (based on SCr of 1.29 mg/dL (H)). according to latest data. Monitor urine output and serial BMP and adjust therapy as needed. Avoid nephrotoxins and renally dose meds for  GFR listed above.       Dehydration with hyponatremia  Hold diuretics  Gentle hydration  Follow electrolytes      Acute blood loss anemia  Renally dose all meds.    No NSAIDS  Stop ACEI  IVF and follow renal function.        Diabetes mellitus, type 2  Patient's FSGs are uncontrolled due to hyperglycemia on current medication regimen.  Last A1c reviewed-   Lab Results   Component Value Date    HGBA1C 10.5 (H) 08/02/2022     Most recent fingerstick glucose reviewed- No results for input(s): POCTGLUCOSE in the last 24 hours.  Current correctional scale  Low  Maintain anti-hyperglycemic dose as follows-   Antihyperglycemics (From admission, onward)    Start     Stop Route Frequency Ordered    10/06/22 2100  insulin detemir U-100 injection 10 Units         -- SubQ Nightly 10/06/22 0344    10/06/22 0443  insulin aspart U-100 injection 1-10 Units         -- SubQ Before meals & nightly PRN 10/06/22 0344        Hold Oral hypoglycemics while patient is in the hospital.  Basal/correctional insulin while NPO for endoscopic evaluation.      10/08 blood sugar better but will need to watch because of shortness of breath given a couple doses of steroids    Arthritis  Patient on ultram as OP.    Will continue but will encourage tylenol usage first.    No NSAIDS.        Hypertension  Hold antihypertensive agents due to hypotension.      Hypotension resolved, will resume home meds that are not nephrotoxic.       Hyperlipidemia  Continue statin.          Final Active Diagnoses:    Diagnosis Date Noted POA    Dyspnea on exertion [R06.09] 10/09/2022 Yes    Colon, diverticulosis [K57.30] 10/07/2022 Yes    External hemorrhoids [K64.4] 10/07/2022 Yes    Hypotension due to drugs [I95.2] 10/06/2022 Yes    Polyp of stomach [K31.7] 10/06/2022 Yes    HH (hiatus hernia) [K44.9] 10/06/2022 Yes    Iron deficiency anemia due to chronic blood loss [D50.0] 10/06/2022 Yes    Thyroid disease [E07.9]  Yes    Neuropathy [G62.9]  Yes    Acute  blood loss anemia [D62] 10/05/2022 Yes    Dehydration with hyponatremia [E86.0, E87.1] 10/05/2022 Yes    Acute kidney injury [N17.9] 10/05/2022 Yes    Diabetes mellitus, type 2 [E11.9]  Yes    Arthritis [M19.90] 04/22/2021 Yes    Hyperlipidemia [E78.5] 04/22/2021 Yes    Hypertension [I10] 04/22/2021 Yes      Problems Resolved During this Admission:    Diagnosis Date Noted Date Resolved POA    PRINCIPAL PROBLEM:  Rectal bleeding [K62.5] 10/05/2022 10/11/2022 Yes    Bradycardia [R00.1] 10/05/2022 10/11/2022 Yes       Discharged Condition: stable    Disposition: Home or Self Care    Follow Up:   Follow-up Information     Cm Larson III, DO. Schedule an appointment as soon as possible for a visit in 1 week(s).    Specialty: Family Medicine  Why: Hospital discharge follow up  Contact information:  [address] [place] [address] Primary Care  [facility] [address]  [phone]                       Patient Instructions:      Diet diabetic     Notify your health care provider if you experience any of the following:  temperature >100.4     Notify your health care provider if you experience any of the following:  persistent nausea and vomiting or diarrhea     Notify your health care provider if you experience any of the following:  difficulty breathing or increased cough     Notify your health care provider if you experience any of the following:  persistent dizziness, light-headedness, or visual disturbances     Notify your health care provider if you experience any of the following:  increased confusion or weakness     Activity as tolerated       Significant Diagnostic Studies: Labs:   BMP:   Recent Labs   Lab 10/10/22  0818 10/11/22  0355   * 123*    137   K 5.2* 4.7    109*   CO2 23 22   BUN 22* 22*   CREATININE 1.41* 1.29*   CALCIUM 9.3 9.1   , CBC   Recent Labs   Lab 10/10/22  1123 10/11/22  0355   WBC 13.24* 9.20   HGB 8.5* 7.9*   HCT 27.1* 25.1*    322   , A1C:   Recent Labs    Lab 08/02/22  1153   HGBA1C 10.5*        Medications:  Reconciled Home Medications:      Medication List      CHANGE how you take these medications    bisoprolol-hydrochlorothiazide 5-6.25 mg 5-6.25 mg Tab  Commonly known as: ZIAC  TAKE 1 TABLET TWICE A DAY    HOLD THIS MEDICATION UNTIL YOU FOLLOW UP WITH YOUR PCP  What changed: additional instructions     empagliflozin 10 mg tablet  Commonly known as: JARDIANCE  Take 1 tablet (10 mg total) by mouth once daily. HOLD THIS MEDICATION UNTIL YOU FOLLOW UP WITH YOUR PCP.  What changed: additional instructions     furosemide 40 MG tablet  Commonly known as: LASIX  Take 1 tablet (40 mg total) by mouth once daily. HOLD THIS MEDICATION UNTIL YOUR FOLLOW UP WITH YOUR PCP  What changed: additional instructions     gabapentin 100 MG capsule  Commonly known as: NEURONTIN  Take 1 capsule (100 mg total) by mouth 3 (three) times daily.  What changed:   · medication strength  · how much to take  · when to take this  · reasons to take this     lisinopriL 40 MG tablet  Commonly known as: PRINIVIL,ZESTRIL  Take 1 tablet (40 mg total) by mouth 2 (two) times daily. HOLD THIS MEDICATION UNTIL YOU FOLLOW UP WITH YOUR PCP  What changed: additional instructions     NovoLIN N NPH U-100 Insulin 100 unit/mL injection  Generic drug: insulin NPH  Inject 40 Units into the skin 2 (two) times daily.  What changed: Another medication with the same name was removed. Continue taking this medication, and follow the directions you see here.        CONTINUE taking these medications    albuterol 90 mcg/actuation inhaler  Commonly known as: PROVENTIL/VENTOLIN HFA  Inhale 2 puffs into the lungs every 4 (four) hours as needed. Rescue     amLODIPine 5 MG tablet  Commonly known as: NORVASC  TAKE 1 TABLET BY MOUTH EVERY DAY     ferrous sulfate 325 mg (65 mg iron) Tab tablet  Commonly known as: FEOSOL  Take by mouth 2 (two) times daily.     hydrALAZINE 25 MG tablet  Commonly known as: APRESOLINE  Take 1 tablet  (25 mg total) by mouth 2 (two) times daily.     HYDROcodone-acetaminophen  mg per tablet  Commonly known as: NORCO  Take 1 tablet by mouth 3 (three) times daily.     hydrocortisone 25 mg suppository  Commonly known as: ANUSOL-HC  Place 1 suppository (25 mg total) rectally 2 (two) times daily.     lactulose 10 gram/15 mL solution  Commonly known as: CHRONULAC  Take 30 mLs (20 g total) by mouth once daily.     levothyroxine 100 MCG tablet  Commonly known as: SYNTHROID  Take 1 tablet (100 mcg total) by mouth before breakfast.     methocarbamoL 750 MG Tab  Commonly known as: ROBAXIN  TAKE 2 TABLETS BY MOUTH 4 TIMES A DAY AS NEEDED FOR MUSCLE SPASMS     pantoprazole 40 MG tablet  Commonly known as: PROTONIX  TAKE 1 TABLET BY MOUTH TWICE A DAY     potassium chloride SA 10 MEQ tablet  Commonly known as: K-DUR,KLOR-CON M  Take 1 tablet by mouth once daily.     predniSONE 2.5 MG tablet  Commonly known as: DELTASONE  Take 2.5 mg by mouth once daily.     SYMBICORT 160-4.5 mcg/actuation Hfaa  Generic drug: budesonide-formoterol 160-4.5 mcg  Inhale 2 puffs into the lungs 2 (two) times a day.     traMADoL 50 mg tablet  Commonly known as: ULTRAM  TAKE 1 TABLET BY MOUTH FOUR TIMES A DAY        STOP taking these medications    insulin NPH-insulin regular (70/30) 100 unit/mL (70-30) injection            Indwelling Lines/Drains at time of discharge:   Lines/Drains/Airways     None             Discussed/reviewed discharge plan/medications with Dr. Garcia prior to patient's discharge.     Time spent on the discharge of patient: Greater than 30 minutes         NIKOS Dhaliwal  Department of Hospital Medicine  [facility] - [facility] Tele[facility]

## 2022-10-12 ENCOUNTER — TELEPHONE (OUTPATIENT)
Dept: PRIMARY CARE CLINIC | Facility: CLINIC | Age: 87
End: 2022-10-12

## 2022-10-12 NOTE — TELEPHONE ENCOUNTER
Spoke with pt's daughter Claribel for TCC call. Daughter reports pt is doing okay. Daughter reports pt lives with her and she provides support. She reports pt is a little weaker after this hospital visit. She denies further rectal bleeding. She reports pt gets short winded with activity but states inhalers help. Pt was referred to , daughter states she has been contacted. Pt has a cane and RW, denies any other DME needs. Meds reconciled. Reminded to take meds to PCP visit. Daughter denies any needs at this time. Instructed to contact HH or pt's dr for changes in her condition.

## 2022-10-17 ENCOUNTER — HOSPITAL ENCOUNTER (OUTPATIENT)
Dept: RADIOLOGY | Facility: HOSPITAL | Age: 87
Discharge: HOME | End: 2022-10-17
Attending: FAMILY MEDICINE
Payer: COMMERCIAL

## 2022-10-17 ENCOUNTER — TELEPHONE (OUTPATIENT)
Dept: PRIMARY CARE CLINIC | Facility: CLINIC | Age: 87
End: 2022-10-17
Payer: COMMERCIAL

## 2022-10-17 ENCOUNTER — OFFICE VISIT (OUTPATIENT)
Dept: PRIMARY CARE CLINIC | Facility: CLINIC | Age: 87
End: 2022-10-17
Payer: COMMERCIAL

## 2022-10-17 VITALS
WEIGHT: 177 LBS | HEIGHT: 65 IN | HEART RATE: 97 BPM | OXYGEN SATURATION: 96 % | BODY MASS INDEX: 29.49 KG/M2 | RESPIRATION RATE: 18 BRPM | DIASTOLIC BLOOD PRESSURE: 80 MMHG | SYSTOLIC BLOOD PRESSURE: 156 MMHG

## 2022-10-17 DIAGNOSIS — I95.2 HYPOTENSION DUE TO DRUGS: ICD-10-CM

## 2022-10-17 DIAGNOSIS — E78.5 HYPERLIPIDEMIA, UNSPECIFIED HYPERLIPIDEMIA TYPE: ICD-10-CM

## 2022-10-17 DIAGNOSIS — N17.9 ACUTE KIDNEY INJURY: ICD-10-CM

## 2022-10-17 DIAGNOSIS — M79.10 MYALGIA: Primary | ICD-10-CM

## 2022-10-17 DIAGNOSIS — I10 HYPERTENSION, UNSPECIFIED TYPE: ICD-10-CM

## 2022-10-17 DIAGNOSIS — E86.0 DEHYDRATION WITH HYPONATREMIA: ICD-10-CM

## 2022-10-17 DIAGNOSIS — E87.1 DEHYDRATION WITH HYPONATREMIA: ICD-10-CM

## 2022-10-17 DIAGNOSIS — D62 ACUTE BLOOD LOSS ANEMIA: ICD-10-CM

## 2022-10-17 DIAGNOSIS — M79.10 MYALGIA: ICD-10-CM

## 2022-10-17 PROCEDURE — 1111F DSCHRG MED/CURRENT MED MERGE: CPT | Mod: ,,, | Performed by: FAMILY MEDICINE

## 2022-10-17 PROCEDURE — 1159F MED LIST DOCD IN RCRD: CPT | Mod: ,,, | Performed by: FAMILY MEDICINE

## 2022-10-17 PROCEDURE — 96372 PR INJECTION,THERAP/PROPH/DIAG2ST, IM OR SUBCUT: ICD-10-PCS | Mod: ,,, | Performed by: FAMILY MEDICINE

## 2022-10-17 PROCEDURE — 73502 XR HIP WITH PELVIS WHEN PERFORMED, 2 OR 3  VIEWS RIGHT: ICD-10-PCS | Mod: 26,RT,, | Performed by: STUDENT IN AN ORGANIZED HEALTH CARE EDUCATION/TRAINING PROGRAM

## 2022-10-17 PROCEDURE — 96372 THER/PROPH/DIAG INJ SC/IM: CPT | Mod: ,,, | Performed by: FAMILY MEDICINE

## 2022-10-17 PROCEDURE — 1100F PR PT FALLS ASSESS DOC 2+ FALLS/FALL W/INJURY/YR: ICD-10-PCS | Mod: ,,, | Performed by: FAMILY MEDICINE

## 2022-10-17 PROCEDURE — 99495 TRANSJ CARE MGMT MOD F2F 14D: CPT | Mod: 25,,, | Performed by: FAMILY MEDICINE

## 2022-10-17 PROCEDURE — 73502 X-RAY EXAM HIP UNI 2-3 VIEWS: CPT | Mod: TC,RT

## 2022-10-17 PROCEDURE — 1100F PTFALLS ASSESS-DOCD GE2>/YR: CPT | Mod: ,,, | Performed by: FAMILY MEDICINE

## 2022-10-17 PROCEDURE — 3288F FALL RISK ASSESSMENT DOCD: CPT | Mod: ,,, | Performed by: FAMILY MEDICINE

## 2022-10-17 PROCEDURE — 1111F PR DISCHARGE MEDS RECONCILED W/ CURRENT OUTPATIENT MED LIST: ICD-10-PCS | Mod: ,,, | Performed by: FAMILY MEDICINE

## 2022-10-17 PROCEDURE — 99495 TCM SERVICES (MODERATE COMPLEXITY): ICD-10-PCS | Mod: 25,,, | Performed by: FAMILY MEDICINE

## 2022-10-17 PROCEDURE — 1159F PR MEDICATION LIST DOCUMENTED IN MEDICAL RECORD: ICD-10-PCS | Mod: ,,, | Performed by: FAMILY MEDICINE

## 2022-10-17 PROCEDURE — 3288F PR FALLS RISK ASSESSMENT DOCUMENTED: ICD-10-PCS | Mod: ,,, | Performed by: FAMILY MEDICINE

## 2022-10-17 PROCEDURE — 73502 X-RAY EXAM HIP UNI 2-3 VIEWS: CPT | Mod: 26,RT,, | Performed by: STUDENT IN AN ORGANIZED HEALTH CARE EDUCATION/TRAINING PROGRAM

## 2022-10-17 RX ORDER — DEXAMETHASONE SODIUM PHOSPHATE 4 MG/ML
4 INJECTION, SOLUTION INTRA-ARTICULAR; INTRALESIONAL; INTRAMUSCULAR; INTRAVENOUS; SOFT TISSUE
Status: COMPLETED | OUTPATIENT
Start: 2022-10-17 | End: 2022-10-17

## 2022-10-17 RX ORDER — KETOROLAC TROMETHAMINE 30 MG/ML
30 INJECTION, SOLUTION INTRAMUSCULAR; INTRAVENOUS ONCE
Status: COMPLETED | OUTPATIENT
Start: 2022-10-17 | End: 2022-10-17

## 2022-10-17 RX ORDER — METHYLPREDNISOLONE ACETATE 40 MG/ML
40 INJECTION, SUSPENSION INTRA-ARTICULAR; INTRALESIONAL; INTRAMUSCULAR; SOFT TISSUE
Status: COMPLETED | OUTPATIENT
Start: 2022-10-17 | End: 2022-10-17

## 2022-10-17 RX ADMIN — METHYLPREDNISOLONE ACETATE 40 MG: 40 INJECTION, SUSPENSION INTRA-ARTICULAR; INTRALESIONAL; INTRAMUSCULAR; SOFT TISSUE at 02:10

## 2022-10-17 RX ADMIN — DEXAMETHASONE SODIUM PHOSPHATE 4 MG: 4 INJECTION, SOLUTION INTRA-ARTICULAR; INTRALESIONAL; INTRAMUSCULAR; INTRAVENOUS; SOFT TISSUE at 02:10

## 2022-10-17 RX ADMIN — KETOROLAC TROMETHAMINE 30 MG: 30 INJECTION, SOLUTION INTRAMUSCULAR; INTRAVENOUS at 02:10

## 2022-10-17 NOTE — PROGRESS NOTES
Subjective:      Patient ID: Vandana Allison is a 89 y.o. female.    Chief Complaint: Transitional Care    Vandana Allison a 89 y.o. female presents for follow up on all regular problems which are reviewed and discussed.   Admitted for sob, hypotension better now  Meds reconciled--restart jardiance and lisinopril.  Consider restart ziac, lasix next visit  Had been stable  R hip pain wants shot  Problem List Items Addressed This Visit          Cardiac/Vascular    Hyperlipidemia    Hypertension    Hypotension due to drugs       Renal/    Dehydration with hyponatremia    Acute kidney injury       Oncology    Acute blood loss anemia     Other Visit Diagnoses       Myalgia    -  Primary    Relevant Orders    SARS-CoV2 (COVID) with FLU Antigen    X-Ray Hip 2 or 3 views Right (with Pelvis when performed)            Past Medical History:  Past Medical History:   Diagnosis Date    Arthritis     Chronic kidney disease, stage 3b     Diabetes mellitus, type 2     Hiatal hernia     Hyperlipidemia     Hypertension     Neuropathy     Thyroid disease      Past Surgical History:   Procedure Laterality Date    BREAST SURGERY      Biopsy    EYE SURGERY      Remove cataracts both eyes    HYSTERECTOMY      NEPHRECTOMY Right     THYROIDECTOMY       Review of patient's allergies indicates:   Allergen Reactions    Aspirin      Current Outpatient Medications on File Prior to Visit   Medication Sig Dispense Refill    albuterol (PROVENTIL/VENTOLIN HFA) 90 mcg/actuation inhaler Inhale 2 puffs into the lungs every 4 (four) hours as needed. Rescue 18 g 12    amLODIPine (NORVASC) 5 MG tablet TAKE 1 TABLET BY MOUTH EVERY DAY 90 tablet 3    ferrous sulfate (FEOSOL) 325 mg (65 mg iron) Tab tablet Take by mouth 2 (two) times daily.      gabapentin (NEURONTIN) 100 MG capsule Take 1 capsule (100 mg total) by mouth 3 (three) times daily. 90 capsule 0    hydrALAZINE (APRESOLINE) 25 MG tablet Take 1 tablet (25 mg total) by mouth 2 (two) times daily.  180 tablet 3    HYDROcodone-acetaminophen (NORCO)  mg per tablet Take 1 tablet by mouth 3 (three) times daily. (Patient taking differently: Take 1 tablet by mouth every 8 (eight) hours as needed.) 90 tablet 0    lactulose (CHRONULAC) 10 gram/15 mL solution Take 30 mLs (20 g total) by mouth once daily. 946 mL 11    levothyroxine (SYNTHROID) 100 MCG tablet Take 1 tablet (100 mcg total) by mouth before breakfast. 90 tablet 11    methocarbamoL (ROBAXIN) 750 MG Tab TAKE 2 TABLETS BY MOUTH 4 TIMES A DAY AS NEEDED FOR MUSCLE SPASMS 240 tablet 1    NOVOLIN N NPH U-100 INSULIN 100 unit/mL injection Inject 40 Units into the skin 2 (two) times daily. 10 mL 11    pantoprazole (PROTONIX) 40 MG tablet TAKE 1 TABLET BY MOUTH TWICE A  tablet 3    potassium chloride SA (K-DUR,KLOR-CON) 10 MEQ tablet Take 1 tablet by mouth once daily.      predniSONE (DELTASONE) 2.5 MG tablet Take 2.5 mg by mouth once daily.      SYMBICORT 160-4.5 mcg/actuation HFAA Inhale 2 puffs into the lungs 2 (two) times a day. 10.2 g 11    traMADoL (ULTRAM) 50 mg tablet TAKE 1 TABLET BY MOUTH FOUR TIMES A  tablet 5    bisoprolol-hydrochlorothiazide 5-6.25 mg (ZIAC) 5-6.25 mg Tab TAKE 1 TABLET TWICE A DAY    HOLD THIS MEDICATION UNTIL YOU FOLLOW UP WITH YOUR PCP (Patient not taking: No sig reported) 180 tablet 3    empagliflozin (JARDIANCE) 10 mg tablet Take 1 tablet (10 mg total) by mouth once daily. HOLD THIS MEDICATION UNTIL YOU FOLLOW UP WITH YOUR PCP. (Patient not taking: No sig reported) 90 tablet 3    furosemide (LASIX) 40 MG tablet Take 1 tablet (40 mg total) by mouth once daily. HOLD THIS MEDICATION UNTIL YOUR FOLLOW UP WITH YOUR PCP (Patient not taking: No sig reported) 90 tablet 3    hydrocortisone (ANUSOL-HC) 25 mg suppository Place 1 suppository (25 mg total) rectally 2 (two) times daily. (Patient not taking: No sig reported) 20 suppository 1    lisinopriL (PRINIVIL,ZESTRIL) 40 MG tablet Take 1 tablet (40 mg total) by mouth 2  (two) times daily. HOLD THIS MEDICATION UNTIL YOU FOLLOW UP WITH YOUR PCP (Patient not taking: No sig reported) 180 tablet 3     No current facility-administered medications on file prior to visit.     Social History     Socioeconomic History    Marital status:    Tobacco Use    Smoking status: Never    Smokeless tobacco: Never   Substance and Sexual Activity    Alcohol use: Never    Drug use: Never    Sexual activity: Not Currently     Birth control/protection: None     Social Determinants of Health     Financial Resource Strain: Low Risk     Difficulty of Paying Living Expenses: Not hard at all   Food Insecurity: No Food Insecurity    Worried About Running Out of Food in the Last Year: Never true    Ran Out of Food in the Last Year: Never true   Transportation Needs: No Transportation Needs    Lack of Transportation (Medical): No    Lack of Transportation (Non-Medical): No   Physical Activity: Insufficiently Active    Days of Exercise per Week: 1 day    Minutes of Exercise per Session: 20 min   Stress: No Stress Concern Present    Feeling of Stress : Not at all   Social Connections: Moderately Isolated    Frequency of Communication with Friends and Family: More than three times a week    Frequency of Social Gatherings with Friends and Family: More than three times a week    Attends Alevism Services: Never    Active Member of Clubs or Organizations: Yes    Attends Club or Organization Meetings: Never    Marital Status:    Housing Stability: Low Risk     Unable to Pay for Housing in the Last Year: No    Number of Places Lived in the Last Year: 1    Unstable Housing in the Last Year: No     Family History   Problem Relation Age of Onset    Diabetes Mother         Diabetic    Heart disease Father     Diabetes Sister     Cancer Sister         Breast cancer    Diabetes Brother     Cancer Brother         Prostate cancer    Cancer Daughter         Uterine cancer    Diabetes Sister         Diabetic  "      Review of Systems   Constitutional: Negative.  Negative for activity change, appetite change, chills and diaphoresis.   HENT: Negative.  Negative for congestion, ear pain, hearing loss and postnasal drip.    Eyes:  Negative for itching.   Respiratory:  Positive for shortness of breath. Negative for chest tightness.    Cardiovascular:  Negative for chest pain.   Gastrointestinal:  Negative for abdominal pain.   Endocrine: Negative for polydipsia.   Genitourinary:  Negative for frequency.   Musculoskeletal:  Positive for arthralgias, joint swelling and myalgias. Negative for back pain.   Neurological:  Negative for headaches.     Objective:     BP (!) 156/80 (BP Location: Left arm, Patient Position: Sitting, BP Method: Large (Manual))   Pulse 97   Resp 18   Ht 5' 5" (1.651 m)   Wt 80.3 kg (177 lb)   SpO2 96%   BMI 29.45 kg/m²     Physical Exam  Constitutional:       Appearance: Normal appearance. She is obese.   HENT:      Head: Normocephalic and atraumatic.      Right Ear: External ear normal.      Left Ear: External ear normal.      Nose: Nose normal.      Mouth/Throat:      Mouth: Mucous membranes are moist.      Pharynx: Oropharynx is clear.   Eyes:      Pupils: Pupils are equal, round, and reactive to light.   Cardiovascular:      Rate and Rhythm: Normal rate and regular rhythm.      Heart sounds: Normal heart sounds.   Pulmonary:      Effort: Pulmonary effort is normal.      Breath sounds: Normal breath sounds.   Abdominal:      Palpations: Abdomen is soft.   Musculoskeletal:         General: Tenderness present.      Cervical back: Normal range of motion and neck supple.   Skin:     General: Skin is warm and dry.   Neurological:      General: No focal deficit present.      Mental Status: She is alert and oriented to person, place, and time. Mental status is at baseline.   Psychiatric:         Mood and Affect: Mood normal.         Behavior: Behavior normal.         Thought Content: Thought content " normal.         Judgment: Judgment normal.       1. Myalgia    2. Hyperlipidemia, unspecified hyperlipidemia type    3. Hypertension, unspecified type    4. Hypotension due to drugs    5. Dehydration with hyponatremia    6. Acute kidney injury    7. Acute blood loss anemia        Plan:     Problem List Items Addressed This Visit          Cardiac/Vascular    Hyperlipidemia    Hypertension    Hypotension due to drugs       Renal/    Dehydration with hyponatremia    Acute kidney injury       Oncology    Acute blood loss anemia     Other Visit Diagnoses       Myalgia    -  Primary    Relevant Orders    SARS-CoV2 (COVID) with FLU Antigen    X-Ray Hip 2 or 3 views Right (with Pelvis when performed)          No follow-ups on file.  2 week fu     I am having Vandana Allison maintain her potassium chloride SA, levothyroxine, methocarbamoL, amLODIPine, lactulose, pantoprazole, predniSONE, albuterol, hydrocortisone, hydrALAZINE, SYMBICORT, NovoLIN N NPH U-100 Insulin, HYDROcodone-acetaminophen, traMADoL, ferrous sulfate, lisinopriL, bisoprolol-hydrochlorothiazide 5-6.25 mg, gabapentin, empagliflozin, and furosemide. We administered ketorolac, dexAMETHasone, and methylPREDNISolone acetate.    Vandana was seen today for transitional care.    Diagnoses and all orders for this visit:    Myalgia  -     SARS-CoV2 (COVID) with FLU Antigen; Future  -     X-Ray Hip 2 or 3 views Right (with Pelvis when performed); Future    Hyperlipidemia, unspecified hyperlipidemia type    Hypertension, unspecified type    Hypotension due to drugs    Dehydration with hyponatremia    Acute kidney injury    Acute blood loss anemia    Other orders  -     ketorolac injection 30 mg  -     dexAMETHasone injection 4 mg  -     methylPREDNISolone acetate injection 40 mg    Medications Ordered This Encounter   Medications    dexAMETHasone injection 4 mg    ketorolac injection 30 mg    methylPREDNISolone acetate injection 40 mg     [unfilled]  Orders Placed  This Encounter   Procedures    X-Ray Hip 2 or 3 views Right (with Pelvis when performed)     Standing Status:   Future     Number of Occurrences:   1     Standing Expiration Date:   10/17/2023     Order Specific Question:   May the Radiologist modify the order per protocol to meet the clinical needs of the patient?     Answer:   Yes     Order Specific Question:   Release to patient     Answer:   Immediate    SARS-CoV2 (COVID) with FLU Antigen     Standing Status:   Future     Number of Occurrences:   1     Standing Expiration Date:   12/16/2023         Answers submitted by the patient for this visit:  Shortness of Breath Questionnaire (Submitted on 10/12/2022)  Chief Complaint: Shortness of breath  Aggravating factors: any activity  Treatments tried: beta agonist inhalers  asthma: Yes  allergies: Yes  COPD: Yes  pneumonia: No  aspirin allergies: Yes  CAD: No  DVT: No  heart failure: No  PE: No  recent surgery: No  bronchiolitis: No  chronic lung disease: No

## 2022-11-02 ENCOUNTER — OFFICE VISIT (OUTPATIENT)
Dept: PRIMARY CARE CLINIC | Facility: CLINIC | Age: 87
End: 2022-11-02
Payer: COMMERCIAL

## 2022-11-02 VITALS
WEIGHT: 172 LBS | HEIGHT: 65 IN | HEART RATE: 81 BPM | BODY MASS INDEX: 28.66 KG/M2 | RESPIRATION RATE: 18 BRPM | SYSTOLIC BLOOD PRESSURE: 140 MMHG | OXYGEN SATURATION: 98 % | DIASTOLIC BLOOD PRESSURE: 82 MMHG

## 2022-11-02 DIAGNOSIS — R06.09 DYSPNEA ON EXERTION: ICD-10-CM

## 2022-11-02 DIAGNOSIS — Z23 NEED FOR VACCINATION: Primary | ICD-10-CM

## 2022-11-02 DIAGNOSIS — E78.5 HYPERLIPIDEMIA, UNSPECIFIED HYPERLIPIDEMIA TYPE: ICD-10-CM

## 2022-11-02 DIAGNOSIS — I95.2 HYPOTENSION DUE TO DRUGS: ICD-10-CM

## 2022-11-02 DIAGNOSIS — I10 HYPERTENSION, UNSPECIFIED TYPE: ICD-10-CM

## 2022-11-02 PROCEDURE — 3288F FALL RISK ASSESSMENT DOCD: CPT | Mod: ,,, | Performed by: FAMILY MEDICINE

## 2022-11-02 PROCEDURE — 99214 OFFICE O/P EST MOD 30 MIN: CPT | Mod: ,,, | Performed by: FAMILY MEDICINE

## 2022-11-02 PROCEDURE — G0008 FLU VACCINE - QUADRIVALENT - ADJUVANTED: ICD-10-PCS | Mod: ,,, | Performed by: FAMILY MEDICINE

## 2022-11-02 PROCEDURE — 1101F PT FALLS ASSESS-DOCD LE1/YR: CPT | Mod: ,,, | Performed by: FAMILY MEDICINE

## 2022-11-02 PROCEDURE — 90694 FLU VACCINE - QUADRIVALENT - ADJUVANTED: ICD-10-PCS | Mod: ,,, | Performed by: FAMILY MEDICINE

## 2022-11-02 PROCEDURE — G0008 ADMIN INFLUENZA VIRUS VAC: HCPCS | Mod: ,,, | Performed by: FAMILY MEDICINE

## 2022-11-02 PROCEDURE — 3288F PR FALLS RISK ASSESSMENT DOCUMENTED: ICD-10-PCS | Mod: ,,, | Performed by: FAMILY MEDICINE

## 2022-11-02 PROCEDURE — 1159F PR MEDICATION LIST DOCUMENTED IN MEDICAL RECORD: ICD-10-PCS | Mod: ,,, | Performed by: FAMILY MEDICINE

## 2022-11-02 PROCEDURE — 90694 VACC AIIV4 NO PRSRV 0.5ML IM: CPT | Mod: ,,, | Performed by: FAMILY MEDICINE

## 2022-11-02 PROCEDURE — 1111F DSCHRG MED/CURRENT MED MERGE: CPT | Mod: ,,, | Performed by: FAMILY MEDICINE

## 2022-11-02 PROCEDURE — 1111F PR DISCHARGE MEDS RECONCILED W/ CURRENT OUTPATIENT MED LIST: ICD-10-PCS | Mod: ,,, | Performed by: FAMILY MEDICINE

## 2022-11-02 PROCEDURE — 1159F MED LIST DOCD IN RCRD: CPT | Mod: ,,, | Performed by: FAMILY MEDICINE

## 2022-11-02 PROCEDURE — 99214 PR OFFICE/OUTPT VISIT, EST, LEVL IV, 30-39 MIN: ICD-10-PCS | Mod: ,,, | Performed by: FAMILY MEDICINE

## 2022-11-02 PROCEDURE — 1101F PR PT FALLS ASSESS DOC 0-1 FALLS W/OUT INJ PAST YR: ICD-10-PCS | Mod: ,,, | Performed by: FAMILY MEDICINE

## 2022-11-02 RX ORDER — POTASSIUM CHLORIDE 750 MG/1
10 TABLET, EXTENDED RELEASE ORAL DAILY
Qty: 90 TABLET | Refills: 3 | Status: SHIPPED | OUTPATIENT
Start: 2022-11-02 | End: 2024-02-07

## 2022-11-02 RX ORDER — HYDROCODONE BITARTRATE AND ACETAMINOPHEN 10; 325 MG/1; MG/1
1 TABLET ORAL EVERY 8 HOURS PRN
Qty: 90 TABLET | Refills: 0 | Status: SHIPPED | OUTPATIENT
Start: 2022-11-02 | End: 2023-02-06 | Stop reason: SDUPTHER

## 2022-11-02 RX ORDER — TRAMADOL HYDROCHLORIDE 50 MG/1
TABLET ORAL
Qty: 120 TABLET | Refills: 5 | Status: SHIPPED | OUTPATIENT
Start: 2022-11-02 | End: 2023-05-08 | Stop reason: SDUPTHER

## 2022-11-02 NOTE — PROGRESS NOTES
Subjective:      Patient ID: Vandana Allison is a 89 y.o. female.    Chief Complaint: Follow-up (3mon. Ck-up)    Vandana Allison a 89 y.o. female presents for follow up on all regular problems which are reviewed and discussed.   -5 lbs  doing well  bp starting to elevate  daughter trish angelo here  Problem List Items Addressed This Visit          Cardiac/Vascular    Hyperlipidemia    Hypertension    Hypotension due to drugs    Dyspnea on exertion     Other Visit Diagnoses       Need for vaccination    -  Primary            Past Medical History:  Past Medical History:   Diagnosis Date    Arthritis     Chronic kidney disease, stage 3b     Diabetes mellitus, type 2     Hiatal hernia     Hyperlipidemia     Hypertension     Neuropathy     Thyroid disease      Past Surgical History:   Procedure Laterality Date    BREAST SURGERY      Biopsy    EYE SURGERY      Remove cataracts both eyes    HYSTERECTOMY      NEPHRECTOMY Right     THYROIDECTOMY       Review of patient's allergies indicates:   Allergen Reactions    Aspirin      Current Outpatient Medications on File Prior to Visit   Medication Sig Dispense Refill    albuterol (PROVENTIL/VENTOLIN HFA) 90 mcg/actuation inhaler Inhale 2 puffs into the lungs every 4 (four) hours as needed. Rescue 18 g 12    amLODIPine (NORVASC) 5 MG tablet TAKE 1 TABLET BY MOUTH EVERY DAY 90 tablet 3    ferrous sulfate (FEOSOL) 325 mg (65 mg iron) Tab tablet Take by mouth 2 (two) times daily.      gabapentin (NEURONTIN) 100 MG capsule Take 1 capsule (100 mg total) by mouth 3 (three) times daily. 90 capsule 0    hydrALAZINE (APRESOLINE) 25 MG tablet Take 1 tablet (25 mg total) by mouth 2 (two) times daily. 180 tablet 3    HYDROcodone-acetaminophen (NORCO)  mg per tablet Take 1 tablet by mouth 3 (three) times daily. (Patient taking differently: Take 1 tablet by mouth every 8 (eight) hours as needed.) 90 tablet 0    lactulose (CHRONULAC) 10 gram/15 mL solution Take 30 mLs (20 g total) by  mouth once daily. 946 mL 11    levothyroxine (SYNTHROID) 100 MCG tablet Take 1 tablet (100 mcg total) by mouth before breakfast. 90 tablet 11    methocarbamoL (ROBAXIN) 750 MG Tab TAKE 2 TABLETS BY MOUTH 4 TIMES A DAY AS NEEDED FOR MUSCLE SPASMS 240 tablet 1    NOVOLIN N NPH U-100 INSULIN 100 unit/mL injection Inject 40 Units into the skin 2 (two) times daily. 10 mL 11    pantoprazole (PROTONIX) 40 MG tablet TAKE 1 TABLET BY MOUTH TWICE A  tablet 3    potassium chloride SA (K-DUR,KLOR-CON) 10 MEQ tablet Take 1 tablet by mouth once daily.      predniSONE (DELTASONE) 2.5 MG tablet Take 2.5 mg by mouth once daily.      SYMBICORT 160-4.5 mcg/actuation HFAA Inhale 2 puffs into the lungs 2 (two) times a day. 10.2 g 11    traMADoL (ULTRAM) 50 mg tablet TAKE 1 TABLET BY MOUTH FOUR TIMES A  tablet 5    bisoprolol-hydrochlorothiazide 5-6.25 mg (ZIAC) 5-6.25 mg Tab TAKE 1 TABLET TWICE A DAY    HOLD THIS MEDICATION UNTIL YOU FOLLOW UP WITH YOUR PCP (Patient not taking: Reported on 10/12/2022) 180 tablet 3    empagliflozin (JARDIANCE) 10 mg tablet Take 1 tablet (10 mg total) by mouth once daily. HOLD THIS MEDICATION UNTIL YOU FOLLOW UP WITH YOUR PCP. (Patient not taking: Reported on 10/12/2022) 90 tablet 3    furosemide (LASIX) 40 MG tablet Take 1 tablet (40 mg total) by mouth once daily. HOLD THIS MEDICATION UNTIL YOUR FOLLOW UP WITH YOUR PCP (Patient not taking: Reported on 10/12/2022) 90 tablet 3    hydrocortisone (ANUSOL-HC) 25 mg suppository Place 1 suppository (25 mg total) rectally 2 (two) times daily. (Patient not taking: Reported on 10/12/2022) 20 suppository 1    lisinopriL (PRINIVIL,ZESTRIL) 40 MG tablet Take 1 tablet (40 mg total) by mouth 2 (two) times daily. HOLD THIS MEDICATION UNTIL YOU FOLLOW UP WITH YOUR PCP (Patient not taking: Reported on 10/12/2022) 180 tablet 3     No current facility-administered medications on file prior to visit.     Social History     Socioeconomic History    Marital  status:    Tobacco Use    Smoking status: Never    Smokeless tobacco: Never   Substance and Sexual Activity    Alcohol use: Never    Drug use: Never    Sexual activity: Not Currently     Birth control/protection: None     Social Determinants of Health     Financial Resource Strain: Low Risk     Difficulty of Paying Living Expenses: Not hard at all   Food Insecurity: No Food Insecurity    Worried About Running Out of Food in the Last Year: Never true    Ran Out of Food in the Last Year: Never true   Transportation Needs: No Transportation Needs    Lack of Transportation (Medical): No    Lack of Transportation (Non-Medical): No   Physical Activity: Insufficiently Active    Days of Exercise per Week: 1 day    Minutes of Exercise per Session: 20 min   Stress: No Stress Concern Present    Feeling of Stress : Not at all   Social Connections: Moderately Isolated    Frequency of Communication with Friends and Family: More than three times a week    Frequency of Social Gatherings with Friends and Family: More than three times a week    Attends Catholic Services: Never    Active Member of Clubs or Organizations: Yes    Attends Club or Organization Meetings: Never    Marital Status:    Housing Stability: Low Risk     Unable to Pay for Housing in the Last Year: No    Number of Places Lived in the Last Year: 1    Unstable Housing in the Last Year: No     Family History   Problem Relation Age of Onset    Diabetes Mother         Diabetic    Heart disease Father     Diabetes Sister     Cancer Sister         Breast cancer    Diabetes Brother     Cancer Brother         Prostate cancer    Cancer Daughter         Uterine cancer    Diabetes Sister         Diabetic       Review of Systems   Constitutional: Negative.  Negative for activity change, appetite change, chills, diaphoresis and unexpected weight change.   HENT: Negative.  Negative for congestion, ear pain, hearing loss, postnasal drip, rhinorrhea and trouble  "swallowing.    Eyes:  Negative for discharge, itching and visual disturbance.   Respiratory:  Negative for chest tightness, shortness of breath and wheezing.    Cardiovascular:  Negative for chest pain and palpitations.   Gastrointestinal:  Positive for blood in stool and constipation. Negative for abdominal pain, diarrhea and vomiting.   Endocrine: Negative for polydipsia and polyuria.   Genitourinary:  Negative for difficulty urinating, dysuria, frequency, hematuria and menstrual problem.   Musculoskeletal:  Positive for arthralgias and joint swelling. Negative for back pain and neck pain.   Neurological:  Negative for weakness and headaches.   Psychiatric/Behavioral:  Negative for confusion and dysphoric mood.      Objective:     BP (!) 140/82 (BP Location: Left arm, Patient Position: Sitting, BP Method: Large (Manual))   Pulse 81   Resp 18   Ht 5' 5" (1.651 m)   Wt 78 kg (172 lb)   SpO2 98%   BMI 28.62 kg/m²     Physical Exam  Constitutional:       Appearance: Normal appearance. She is obese.   HENT:      Head: Normocephalic and atraumatic.      Right Ear: External ear normal.      Left Ear: External ear normal.      Nose: Nose normal.      Mouth/Throat:      Mouth: Mucous membranes are moist.      Pharynx: Oropharynx is clear.   Eyes:      Pupils: Pupils are equal, round, and reactive to light.   Cardiovascular:      Rate and Rhythm: Normal rate and regular rhythm.      Heart sounds: Normal heart sounds.   Pulmonary:      Effort: Pulmonary effort is normal. No respiratory distress.      Breath sounds: No stridor. No wheezing, rhonchi or rales.   Chest:      Chest wall: No tenderness.   Abdominal:      Palpations: Abdomen is soft.   Musculoskeletal:      Cervical back: Normal range of motion and neck supple.   Skin:     General: Skin is warm and dry.   Neurological:      General: No focal deficit present.      Mental Status: She is alert and oriented to person, place, and time. Mental status is at " baseline.   Psychiatric:         Mood and Affect: Mood normal.         Behavior: Behavior normal.         Thought Content: Thought content normal.         Judgment: Judgment normal.       1. Need for vaccination    2. Hypertension, unspecified type    3. Hyperlipidemia, unspecified hyperlipidemia type    4. Hypotension due to drugs    5. Dyspnea on exertion        Plan:     Problem List Items Addressed This Visit          Cardiac/Vascular    Hyperlipidemia    Hypertension    Hypotension due to drugs    Dyspnea on exertion     Other Visit Diagnoses       Need for vaccination    -  Primary          No follow-ups on file.  Resume ziac  continue to hold lasix  3m fu  Pt has mobility limitation impairing feeding, toileting, bathing w/in home. Limitation can't be resolved using cane/walker due to fall risk.  Wheelchair will signif. Improve pt's.ability to do mradls. Pt is megha an able to self propel wheelchr.      I am having Vandana Allison maintain her potassium chloride SA, levothyroxine, methocarbamoL, amLODIPine, lactulose, pantoprazole, predniSONE, albuterol, hydrocortisone, hydrALAZINE, SYMBICORT, NovoLIN N NPH U-100 Insulin, HYDROcodone-acetaminophen, traMADoL, ferrous sulfate, lisinopriL, bisoprolol-hydrochlorothiazide 5-6.25 mg, gabapentin, empagliflozin, and furosemide.    Vandana was seen today for follow-up.    Diagnoses and all orders for this visit:    Need for vaccination  -     Influenza (FLUAD) - Quadrivalent (Adjuvanted) *Preferred* (65+) (PF)    Hypertension, unspecified type    Hyperlipidemia, unspecified hyperlipidemia type    Hypotension due to drugs    Dyspnea on exertion       [unfilled]  Orders Placed This Encounter   Procedures    Influenza (FLUAD) - Quadrivalent (Adjuvanted) *Preferred* (65+) (PF)

## 2022-11-09 DIAGNOSIS — Z71.89 COMPLEX CARE COORDINATION: ICD-10-CM

## 2023-01-12 RX ORDER — METHOCARBAMOL 750 MG/1
TABLET, FILM COATED ORAL
Qty: 240 TABLET | Refills: 1 | Status: SHIPPED | OUTPATIENT
Start: 2023-01-12 | End: 2023-08-08 | Stop reason: SDUPTHER

## 2023-01-16 PROBLEM — N17.9 ACUTE KIDNEY INJURY: Status: RESOLVED | Noted: 2022-10-05 | Resolved: 2023-01-16

## 2023-02-06 ENCOUNTER — OFFICE VISIT (OUTPATIENT)
Dept: PRIMARY CARE CLINIC | Facility: CLINIC | Age: 88
End: 2023-02-06
Payer: COMMERCIAL

## 2023-02-06 VITALS
HEIGHT: 65 IN | DIASTOLIC BLOOD PRESSURE: 60 MMHG | RESPIRATION RATE: 18 BRPM | WEIGHT: 165 LBS | OXYGEN SATURATION: 97 % | SYSTOLIC BLOOD PRESSURE: 136 MMHG | HEART RATE: 61 BPM | BODY MASS INDEX: 27.49 KG/M2

## 2023-02-06 DIAGNOSIS — E11.9 TYPE 2 DIABETES MELLITUS WITHOUT COMPLICATION, WITHOUT LONG-TERM CURRENT USE OF INSULIN: Primary | ICD-10-CM

## 2023-02-06 DIAGNOSIS — K64.4 EXTERNAL HEMORRHOIDS: ICD-10-CM

## 2023-02-06 DIAGNOSIS — E07.9 THYROID DISEASE: ICD-10-CM

## 2023-02-06 DIAGNOSIS — M19.90 ARTHRITIS: ICD-10-CM

## 2023-02-06 DIAGNOSIS — G62.9 NEUROPATHY: ICD-10-CM

## 2023-02-06 DIAGNOSIS — K44.9 HH (HIATUS HERNIA): ICD-10-CM

## 2023-02-06 PROCEDURE — 96372 PR INJECTION,THERAP/PROPH/DIAG2ST, IM OR SUBCUT: ICD-10-PCS | Mod: ,,, | Performed by: FAMILY MEDICINE

## 2023-02-06 PROCEDURE — 99214 OFFICE O/P EST MOD 30 MIN: CPT | Mod: 25,,, | Performed by: FAMILY MEDICINE

## 2023-02-06 PROCEDURE — 3288F FALL RISK ASSESSMENT DOCD: CPT | Mod: ,,, | Performed by: FAMILY MEDICINE

## 2023-02-06 PROCEDURE — 1101F PT FALLS ASSESS-DOCD LE1/YR: CPT | Mod: ,,, | Performed by: FAMILY MEDICINE

## 2023-02-06 PROCEDURE — 1159F PR MEDICATION LIST DOCUMENTED IN MEDICAL RECORD: ICD-10-PCS | Mod: ,,, | Performed by: FAMILY MEDICINE

## 2023-02-06 PROCEDURE — 1101F PR PT FALLS ASSESS DOC 0-1 FALLS W/OUT INJ PAST YR: ICD-10-PCS | Mod: ,,, | Performed by: FAMILY MEDICINE

## 2023-02-06 PROCEDURE — 99214 PR OFFICE/OUTPT VISIT, EST, LEVL IV, 30-39 MIN: ICD-10-PCS | Mod: 25,,, | Performed by: FAMILY MEDICINE

## 2023-02-06 PROCEDURE — 96372 THER/PROPH/DIAG INJ SC/IM: CPT | Mod: ,,, | Performed by: FAMILY MEDICINE

## 2023-02-06 PROCEDURE — 3288F PR FALLS RISK ASSESSMENT DOCUMENTED: ICD-10-PCS | Mod: ,,, | Performed by: FAMILY MEDICINE

## 2023-02-06 PROCEDURE — 1159F MED LIST DOCD IN RCRD: CPT | Mod: ,,, | Performed by: FAMILY MEDICINE

## 2023-02-06 RX ORDER — METHYLPREDNISOLONE ACETATE 80 MG/ML
80 INJECTION, SUSPENSION INTRA-ARTICULAR; INTRALESIONAL; INTRAMUSCULAR; SOFT TISSUE
Status: COMPLETED | OUTPATIENT
Start: 2023-02-06 | End: 2023-02-06

## 2023-02-06 RX ORDER — LEVOTHYROXINE SODIUM 100 UG/1
100 TABLET ORAL
Qty: 90 TABLET | Refills: 3 | Status: SHIPPED | OUTPATIENT
Start: 2023-02-06 | End: 2023-08-08 | Stop reason: SDUPTHER

## 2023-02-06 RX ORDER — HYDROCODONE BITARTRATE AND ACETAMINOPHEN 10; 325 MG/1; MG/1
1 TABLET ORAL EVERY 8 HOURS PRN
Qty: 90 TABLET | Refills: 0 | Status: SHIPPED | OUTPATIENT
Start: 2023-02-06 | End: 2023-03-23 | Stop reason: SDUPTHER

## 2023-02-06 RX ORDER — KETOROLAC TROMETHAMINE 30 MG/ML
30 INJECTION, SOLUTION INTRAMUSCULAR; INTRAVENOUS
Status: COMPLETED | OUTPATIENT
Start: 2023-02-06 | End: 2023-02-06

## 2023-02-06 RX ORDER — DEXAMETHASONE SODIUM PHOSPHATE 4 MG/ML
4 INJECTION, SOLUTION INTRA-ARTICULAR; INTRALESIONAL; INTRAMUSCULAR; INTRAVENOUS; SOFT TISSUE
Status: COMPLETED | OUTPATIENT
Start: 2023-02-06 | End: 2023-02-06

## 2023-02-06 RX ORDER — FERROUS SULFATE 325(65) MG
325 TABLET ORAL 2 TIMES DAILY
Qty: 120 TABLET | Refills: 3 | Status: SHIPPED | OUTPATIENT
Start: 2023-02-06

## 2023-02-06 RX ADMIN — DEXAMETHASONE SODIUM PHOSPHATE 4 MG: 4 INJECTION, SOLUTION INTRA-ARTICULAR; INTRALESIONAL; INTRAMUSCULAR; INTRAVENOUS; SOFT TISSUE at 01:02

## 2023-02-06 RX ADMIN — METHYLPREDNISOLONE ACETATE 80 MG: 80 INJECTION, SUSPENSION INTRA-ARTICULAR; INTRALESIONAL; INTRAMUSCULAR; SOFT TISSUE at 01:02

## 2023-02-06 RX ADMIN — KETOROLAC TROMETHAMINE 30 MG: 30 INJECTION, SOLUTION INTRAMUSCULAR; INTRAVENOUS at 01:02

## 2023-02-06 NOTE — PROGRESS NOTES
Subjective:      Patient ID: Vandana Allison is a 89 y.o. female.    Chief Complaint: Follow-up (3mon. Ck-up)    Vandana Allison a 89 y.o. female presents for follow up on all regular problems which are reviewed and discussed.     Problem List Items Addressed This Visit          Neuro    Neuropathy       Endocrine    Diabetes mellitus, type 2 - Primary    Relevant Orders    CBC Auto Differential    Comprehensive Metabolic Panel    Lipid Panel    TSH    Urinalysis    Hemoglobin A1C    Thyroid disease       GI    HH (hiatus hernia)    External hemorrhoids       Orthopedic    Arthritis       Past Medical History:  Past Medical History:   Diagnosis Date    Arthritis     Chronic kidney disease, stage 3b     Diabetes mellitus, type 2     Hiatal hernia     Hyperlipidemia     Hypertension     Neuropathy     Thyroid disease      Past Surgical History:   Procedure Laterality Date    BREAST SURGERY      Biopsy    EYE SURGERY      Remove cataracts both eyes    HYSTERECTOMY      NEPHRECTOMY Right     THYROIDECTOMY       Review of patient's allergies indicates:   Allergen Reactions    Aspirin      Current Outpatient Medications on File Prior to Visit   Medication Sig Dispense Refill    albuterol (PROVENTIL/VENTOLIN HFA) 90 mcg/actuation inhaler Inhale 2 puffs into the lungs every 4 (four) hours as needed. Rescue 18 g 12    amLODIPine (NORVASC) 5 MG tablet TAKE 1 TABLET BY MOUTH EVERY DAY 90 tablet 3    bisoprolol-hydrochlorothiazide 5-6.25 mg (ZIAC) 5-6.25 mg Tab TAKE 1 TABLET TWICE A DAY    HOLD THIS MEDICATION UNTIL YOU FOLLOW UP WITH YOUR  tablet 3    empagliflozin (JARDIANCE) 10 mg tablet Take 1 tablet (10 mg total) by mouth once daily. HOLD THIS MEDICATION UNTIL YOU FOLLOW UP WITH YOUR PCP. 90 tablet 3    gabapentin (NEURONTIN) 100 MG capsule Take 1 capsule (100 mg total) by mouth 3 (three) times daily. 90 capsule 0    hydrALAZINE (APRESOLINE) 25 MG tablet Take 1 tablet (25 mg total) by mouth 2 (two) times daily.  180 tablet 3    lisinopriL (PRINIVIL,ZESTRIL) 40 MG tablet Take 1 tablet (40 mg total) by mouth 2 (two) times daily. HOLD THIS MEDICATION UNTIL YOU FOLLOW UP WITH YOUR  tablet 3    methocarbamoL (ROBAXIN) 750 MG Tab TAKE 2 TABLETS BY MOUTH 4 TIMES A DAY AS NEEDED FOR MUSCLE SPASMS  Strength: 750 mg 240 tablet 1    NOVOLIN N NPH U-100 INSULIN 100 unit/mL injection Inject 40 Units into the skin 2 (two) times daily. 10 mL 11    pantoprazole (PROTONIX) 40 MG tablet TAKE 1 TABLET BY MOUTH TWICE A  tablet 3    potassium chloride SA (K-DUR,KLOR-CON M) 10 MEQ tablet Take 1 tablet (10 mEq total) by mouth once daily. 90 tablet 3    predniSONE (DELTASONE) 2.5 MG tablet TAKE 1 TABLET BY MOUTH EVERY DAY 90 tablet 4    SYMBICORT 160-4.5 mcg/actuation HFAA Inhale 2 puffs into the lungs 2 (two) times a day. 10.2 g 11    traMADoL (ULTRAM) 50 mg tablet TAKE 1 TABLET BY MOUTH FOUR TIMES A  tablet 5    [DISCONTINUED] ferrous sulfate (FEOSOL) 325 mg (65 mg iron) Tab tablet Take by mouth 2 (two) times daily.      [DISCONTINUED] HYDROcodone-acetaminophen (NORCO)  mg per tablet Take 1 tablet by mouth every 8 (eight) hours as needed. 90 tablet 0    [DISCONTINUED] levothyroxine (SYNTHROID) 100 MCG tablet TAKE 1 TABLET BEFORE       BREAKFAST 90 tablet 3    furosemide (LASIX) 40 MG tablet Take 1 tablet (40 mg total) by mouth once daily. HOLD THIS MEDICATION UNTIL YOUR FOLLOW UP WITH YOUR PCP (Patient not taking: Reported on 10/12/2022) 90 tablet 3    hydrocortisone (ANUSOL-HC) 25 mg suppository Place 1 suppository (25 mg total) rectally 2 (two) times daily. (Patient not taking: Reported on 10/12/2022) 20 suppository 1    lactulose (CHRONULAC) 10 gram/15 mL solution Take 30 mLs (20 g total) by mouth once daily. (Patient not taking: Reported on 2/6/2023) 946 mL 11     No current facility-administered medications on file prior to visit.     Social History     Socioeconomic History    Marital status:    Tobacco  Use    Smoking status: Never    Smokeless tobacco: Never   Substance and Sexual Activity    Alcohol use: Never    Drug use: Never    Sexual activity: Not Currently     Birth control/protection: None     Social Determinants of Health     Financial Resource Strain: Low Risk     Difficulty of Paying Living Expenses: Not hard at all   Food Insecurity: No Food Insecurity    Worried About Running Out of Food in the Last Year: Never true    Ran Out of Food in the Last Year: Never true   Transportation Needs: No Transportation Needs    Lack of Transportation (Medical): No    Lack of Transportation (Non-Medical): No   Physical Activity: Insufficiently Active    Days of Exercise per Week: 1 day    Minutes of Exercise per Session: 20 min   Stress: No Stress Concern Present    Feeling of Stress : Not at all   Social Connections: Moderately Isolated    Frequency of Communication with Friends and Family: More than three times a week    Frequency of Social Gatherings with Friends and Family: More than three times a week    Attends [employer] Services: Never    Active Member of Clubs or Organizations: Yes    Attends Club or Organization Meetings: Never    Marital Status:    Housing Stability: Low Risk     Unable to Pay for Housing in the Last Year: No    Number of Places Lived in the Last Year: 1    Unstable Housing in the Last Year: No     Family History   Problem Relation Age of Onset    Diabetes Mother         Diabetic    Heart disease Father     Diabetes Sister     Cancer Sister         Breast cancer    Diabetes Brother     Cancer Brother         Prostate cancer    Cancer Daughter         Uterine cancer    Diabetes Sister         Diabetic       Review of Systems   Constitutional: Negative.  Negative for activity change, appetite change, chills, diaphoresis and unexpected weight change.   HENT: Negative.  Negative for congestion, ear pain, hearing loss, postnasal drip, rhinorrhea and trouble swallowing.    Eyes:  Negative  "for discharge, itching and visual disturbance.   Respiratory:  Negative for chest tightness, shortness of breath and wheezing.    Cardiovascular:  Negative for chest pain and palpitations.   Gastrointestinal:  Negative for abdominal pain, blood in stool, constipation, diarrhea and vomiting.   Endocrine: Negative for polydipsia and polyuria.   Genitourinary:  Negative for difficulty urinating, dysuria, frequency, hematuria and menstrual problem.   Musculoskeletal:  Positive for arthralgias and joint swelling. Negative for back pain and neck pain.   Neurological:  Positive for weakness and headaches.   Psychiatric/Behavioral:  Negative for confusion and dysphoric mood.      Objective:     /60 (BP Location: Left arm, Patient Position: Sitting, BP Method: Large (Manual))   Pulse 61   Resp 18   Ht 5' 5" (1.651 m)   Wt 74.8 kg (165 lb)   SpO2 97%   BMI 27.46 kg/m²     Physical Exam  Constitutional:       Appearance: Normal appearance. She is obese.   HENT:      Head: Normocephalic and atraumatic.      Right Ear: External ear normal.      Left Ear: External ear normal.      Nose: Nose normal.      Mouth/Throat:      Mouth: Mucous membranes are moist.      Pharynx: Oropharynx is clear.   Eyes:      Pupils: Pupils are equal, round, and reactive to light.   Cardiovascular:      Rate and Rhythm: Normal rate and regular rhythm.      Heart sounds: Normal heart sounds.   Pulmonary:      Effort: Pulmonary effort is normal.      Breath sounds: Normal breath sounds.   Abdominal:      Palpations: Abdomen is soft.   Musculoskeletal:         General: Deformity present. No swelling, tenderness or signs of injury.      Cervical back: Normal range of motion and neck supple.   Skin:     General: Skin is warm and dry.   Neurological:      General: No focal deficit present.      Mental Status: She is alert and oriented to person, place, and time. Mental status is at baseline.   Psychiatric:         Mood and Affect: Mood normal.   "       Behavior: Behavior normal.         Thought Content: Thought content normal.         Judgment: Judgment normal.       1. Type 2 diabetes mellitus without complication, without long-term current use of insulin    2. Neuropathy    3. Thyroid disease    4. HH (hiatus hernia)    5. External hemorrhoids    6. Arthritis        Plan:     Problem List Items Addressed This Visit          Neuro    Neuropathy       Endocrine    Diabetes mellitus, type 2 - Primary    Relevant Orders    CBC Auto Differential    Comprehensive Metabolic Panel    Lipid Panel    TSH    Urinalysis    Hemoglobin A1C    Thyroid disease       GI    HH (hiatus hernia)    External hemorrhoids       Orthopedic    Arthritis     No follow-ups on file.  3m fu    I have changed Vandana Allison's ferrous sulfate, levothyroxine, and HYDROcodone-acetaminophen. I am also having her maintain her amLODIPine, lactulose, pantoprazole, albuterol, hydrocortisone, hydrALAZINE, SYMBICORT, NovoLIN N NPH U-100 Insulin, lisinopriL, bisoprolol-hydrochlorothiazide 5-6.25 mg, gabapentin, empagliflozin, furosemide, potassium chloride SA, traMADoL, methocarbamoL, and predniSONE. We will continue to administer dexAMETHasone, methylPREDNISolone acetate, and ketorolac.    Vandana was seen today for follow-up.    Diagnoses and all orders for this visit:    Type 2 diabetes mellitus without complication, without long-term current use of insulin  -     CBC Auto Differential; Future  -     Comprehensive Metabolic Panel; Future  -     Lipid Panel; Future  -     TSH; Future  -     Urinalysis; Future  -     Hemoglobin A1C; Future    Neuropathy    Thyroid disease    HH (hiatus hernia)    External hemorrhoids    Arthritis    Other orders  -     ferrous sulfate (FEOSOL) 325 mg (65 mg iron) Tab tablet; Take 1 tablet (325 mg total) by mouth 2 (two) times daily.  -     levothyroxine (SYNTHROID) 100 MCG tablet; Take 1 tablet (100 mcg total) by mouth before breakfast.  -      HYDROcodone-acetaminophen (NORCO)  mg per tablet; Take 1 tablet by mouth every 8 (eight) hours as needed for Pain.  -     dexAMETHasone injection 4 mg  -     methylPREDNISolone acetate injection 80 mg  -     ketorolac injection 30 mg      Medications Ordered This Encounter   Medications    dexAMETHasone injection 4 mg    ferrous sulfate (FEOSOL) 325 mg (65 mg iron) Tab tablet     Sig: Take 1 tablet (325 mg total) by mouth 2 (two) times daily.     Dispense:  120 tablet     Refill:  3    HYDROcodone-acetaminophen (NORCO)  mg per tablet     Sig: Take 1 tablet by mouth every 8 (eight) hours as needed for Pain.     Dispense:  90 tablet     Refill:  0     n/a      Order Specific Question:   I have reviewed the Prescription Drug Monitoring Program (PDMP) database for this patient prior to prescribing the above opioid medication     Answer:   Yes    ketorolac injection 30 mg    levothyroxine (SYNTHROID) 100 MCG tablet     Sig: Take 1 tablet (100 mcg total) by mouth before breakfast.     Dispense:  90 tablet     Refill:  3    methylPREDNISolone acetate injection 80 mg     [unfilled]  Orders Placed This Encounter   Procedures    CBC Auto Differential     Standing Status:   Future     Standing Expiration Date:   4/6/2024    Comprehensive Metabolic Panel     Standing Status:   Future     Standing Expiration Date:   4/6/2024    Lipid Panel     Standing Status:   Future     Standing Expiration Date:   4/6/2024    TSH     Standing Status:   Future     Standing Expiration Date:   4/6/2024    Urinalysis     Standing Status:   Future     Standing Expiration Date:   4/6/2024     Order Specific Question:   Collection Type     Answer:   Urine, Clean Catch    Hemoglobin A1C     Standing Status:   Future     Standing Expiration Date:   4/6/2024

## 2023-02-07 ENCOUNTER — TELEPHONE (OUTPATIENT)
Dept: PRIMARY CARE CLINIC | Facility: CLINIC | Age: 88
End: 2023-02-07
Payer: COMMERCIAL

## 2023-02-07 NOTE — TELEPHONE ENCOUNTER
----- Message from Cm Larson III, DO sent at 2/7/2023  8:08 AM CST -----  Labs look great let know thx

## 2023-02-27 RX ORDER — SYRINGE-NEEDLE,INSULIN,0.5 ML 30 GX5/16"
1 SYRINGE, EMPTY DISPOSABLE MISCELLANEOUS 2 TIMES DAILY
Qty: 100 EACH | Refills: 3 | Status: SHIPPED | OUTPATIENT
Start: 2023-02-27

## 2023-02-27 RX ORDER — LISINOPRIL 40 MG/1
40 TABLET ORAL 2 TIMES DAILY
Qty: 180 TABLET | Refills: 3 | Status: SHIPPED | OUTPATIENT
Start: 2023-02-27 | End: 2023-12-04

## 2023-03-27 RX ORDER — HYDROCODONE BITARTRATE AND ACETAMINOPHEN 10; 325 MG/1; MG/1
1 TABLET ORAL EVERY 8 HOURS PRN
Qty: 90 TABLET | Refills: 0 | Status: SHIPPED | OUTPATIENT
Start: 2023-03-27 | End: 2023-05-08 | Stop reason: SDUPTHER

## 2023-04-17 RX ORDER — AMLODIPINE BESYLATE 5 MG/1
TABLET ORAL
Qty: 90 TABLET | Refills: 3 | Status: SHIPPED | OUTPATIENT
Start: 2023-04-17 | End: 2023-08-08 | Stop reason: SDUPTHER

## 2023-05-01 RX ORDER — BISOPROLOL FUMARATE AND HYDROCHLOROTHIAZIDE 5; 6.25 MG/1; MG/1
TABLET ORAL
Qty: 180 TABLET | Refills: 3 | Status: ON HOLD | OUTPATIENT
Start: 2023-05-01 | End: 2024-02-12 | Stop reason: HOSPADM

## 2023-05-08 ENCOUNTER — OFFICE VISIT (OUTPATIENT)
Dept: PRIMARY CARE CLINIC | Facility: CLINIC | Age: 88
End: 2023-05-08
Payer: COMMERCIAL

## 2023-05-08 VITALS
DIASTOLIC BLOOD PRESSURE: 68 MMHG | OXYGEN SATURATION: 97 % | BODY MASS INDEX: 29.82 KG/M2 | HEIGHT: 65 IN | WEIGHT: 179 LBS | HEART RATE: 78 BPM | RESPIRATION RATE: 18 BRPM | SYSTOLIC BLOOD PRESSURE: 146 MMHG

## 2023-05-08 DIAGNOSIS — I10 HYPERTENSION, UNSPECIFIED TYPE: ICD-10-CM

## 2023-05-08 DIAGNOSIS — E78.5 HYPERLIPIDEMIA, UNSPECIFIED HYPERLIPIDEMIA TYPE: Primary | ICD-10-CM

## 2023-05-08 DIAGNOSIS — E11.9 TYPE 2 DIABETES MELLITUS WITHOUT COMPLICATION, WITHOUT LONG-TERM CURRENT USE OF INSULIN: ICD-10-CM

## 2023-05-08 DIAGNOSIS — I95.2 HYPOTENSION DUE TO DRUGS: ICD-10-CM

## 2023-05-08 PROCEDURE — 3288F FALL RISK ASSESSMENT DOCD: CPT | Mod: ,,, | Performed by: FAMILY MEDICINE

## 2023-05-08 PROCEDURE — 99214 OFFICE O/P EST MOD 30 MIN: CPT | Mod: 25,,, | Performed by: FAMILY MEDICINE

## 2023-05-08 PROCEDURE — 96372 PR INJECTION,THERAP/PROPH/DIAG2ST, IM OR SUBCUT: ICD-10-PCS | Mod: ,,, | Performed by: FAMILY MEDICINE

## 2023-05-08 PROCEDURE — 3288F PR FALLS RISK ASSESSMENT DOCUMENTED: ICD-10-PCS | Mod: ,,, | Performed by: FAMILY MEDICINE

## 2023-05-08 PROCEDURE — 96372 THER/PROPH/DIAG INJ SC/IM: CPT | Mod: ,,, | Performed by: FAMILY MEDICINE

## 2023-05-08 PROCEDURE — 1159F PR MEDICATION LIST DOCUMENTED IN MEDICAL RECORD: ICD-10-PCS | Mod: ,,, | Performed by: FAMILY MEDICINE

## 2023-05-08 PROCEDURE — 99214 PR OFFICE/OUTPT VISIT, EST, LEVL IV, 30-39 MIN: ICD-10-PCS | Mod: 25,,, | Performed by: FAMILY MEDICINE

## 2023-05-08 PROCEDURE — 1101F PR PT FALLS ASSESS DOC 0-1 FALLS W/OUT INJ PAST YR: ICD-10-PCS | Mod: ,,, | Performed by: FAMILY MEDICINE

## 2023-05-08 PROCEDURE — 1159F MED LIST DOCD IN RCRD: CPT | Mod: ,,, | Performed by: FAMILY MEDICINE

## 2023-05-08 PROCEDURE — 1101F PT FALLS ASSESS-DOCD LE1/YR: CPT | Mod: ,,, | Performed by: FAMILY MEDICINE

## 2023-05-08 RX ORDER — TRAMADOL HYDROCHLORIDE 50 MG/1
TABLET ORAL
Qty: 120 TABLET | Refills: 5 | Status: SHIPPED | OUTPATIENT
Start: 2023-05-08 | End: 2024-01-17

## 2023-05-08 RX ORDER — DEXAMETHASONE SODIUM PHOSPHATE 4 MG/ML
4 INJECTION, SOLUTION INTRA-ARTICULAR; INTRALESIONAL; INTRAMUSCULAR; INTRAVENOUS; SOFT TISSUE
Status: COMPLETED | OUTPATIENT
Start: 2023-05-08 | End: 2023-05-08

## 2023-05-08 RX ORDER — FUROSEMIDE 20 MG/1
20 TABLET ORAL 2 TIMES DAILY PRN
Qty: 60 TABLET | Refills: 11 | Status: SHIPPED | OUTPATIENT
Start: 2023-05-08 | End: 2023-12-04 | Stop reason: SDUPTHER

## 2023-05-08 RX ORDER — METHYLPREDNISOLONE ACETATE 80 MG/ML
80 INJECTION, SUSPENSION INTRA-ARTICULAR; INTRALESIONAL; INTRAMUSCULAR; SOFT TISSUE
Status: COMPLETED | OUTPATIENT
Start: 2023-05-08 | End: 2023-05-08

## 2023-05-08 RX ORDER — GABAPENTIN 400 MG/1
400 CAPSULE ORAL 4 TIMES DAILY PRN
COMMUNITY
Start: 2022-12-06 | End: 2023-05-22

## 2023-05-08 RX ORDER — HYDROCODONE BITARTRATE AND ACETAMINOPHEN 10; 325 MG/1; MG/1
1 TABLET ORAL EVERY 8 HOURS PRN
Qty: 90 TABLET | Refills: 0 | Status: SHIPPED | OUTPATIENT
Start: 2023-05-08 | End: 2023-07-31 | Stop reason: SDUPTHER

## 2023-05-08 RX ADMIN — DEXAMETHASONE SODIUM PHOSPHATE 4 MG: 4 INJECTION, SOLUTION INTRA-ARTICULAR; INTRALESIONAL; INTRAMUSCULAR; INTRAVENOUS; SOFT TISSUE at 01:05

## 2023-05-08 RX ADMIN — METHYLPREDNISOLONE ACETATE 80 MG: 80 INJECTION, SUSPENSION INTRA-ARTICULAR; INTRALESIONAL; INTRAMUSCULAR; SOFT TISSUE at 01:05

## 2023-05-08 NOTE — PROGRESS NOTES
Subjective:      Patient ID: Vandana Allison is a 89 y.o. female.    Chief Complaint: Follow-up (3mon. Ck-up) and Diabetes    Vandana Allison a 89 y.o. female presents for follow up on all regular problems which are reviewed and discussed.     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       Endocrine    Diabetes mellitus, type 2       Past Medical History:  Past Medical History:   Diagnosis Date    Arthritis     Chronic kidney disease, stage 3b     Diabetes mellitus, type 2     Hiatal hernia     Hyperlipidemia     Hypertension     Neuropathy     Thyroid disease      Past Surgical History:   Procedure Laterality Date    BREAST SURGERY      Biopsy    EYE SURGERY      Remove cataracts both eyes    HYSTERECTOMY      NEPHRECTOMY Right     THYROIDECTOMY       Review of patient's allergies indicates:   Allergen Reactions    Aspirin      Current Outpatient Medications on File Prior to Visit   Medication Sig Dispense Refill    albuterol (PROVENTIL/VENTOLIN HFA) 90 mcg/actuation inhaler Inhale 2 puffs into the lungs every 4 (four) hours as needed. Rescue 18 g 12    amLODIPine (NORVASC) 5 MG tablet TAKE 1 TABLET BY MOUTH EVERY DAY 90 tablet 3    bisoprolol-hydrochlorothiazide 5-6.25 mg (ZIAC) 5-6.25 mg Tab TAKE 1 TABLET TWICE A DAY    HOLD THIS MEDICATION UNTIL YOU FOLLOW UP WITH YOUR  tablet 3    ferrous sulfate (FEOSOL) 325 mg (65 mg iron) Tab tablet Take 1 tablet (325 mg total) by mouth 2 (two) times daily. 120 tablet 3    gabapentin (NEURONTIN) 400 MG capsule Take 400 mg by mouth 4 (four) times daily as needed.      hydrALAZINE (APRESOLINE) 25 MG tablet Take 1 tablet (25 mg total) by mouth 2 (two) times daily. 180 tablet 3    insulin syringe-needle U-100 1 mL 30 gauge x 5/16 Syrg 1 Syringe by Misc.(Non-Drug; Combo Route) route 2 (two) times daily. 100 each 3    lactulose (CHRONULAC) 10 gram/15 mL solution Take 30 mLs  (20 g total) by mouth once daily. 946 mL 11    levothyroxine (SYNTHROID) 100 MCG tablet Take 1 tablet (100 mcg total) by mouth before breakfast. 90 tablet 3    lisinopriL (PRINIVIL,ZESTRIL) 40 MG tablet Take 1 tablet (40 mg total) by mouth 2 (two) times daily. HOLD THIS MEDICATION UNTIL YOU FOLLOW UP WITH YOUR  tablet 3    methocarbamoL (ROBAXIN) 750 MG Tab TAKE 2 TABLETS BY MOUTH 4 TIMES A DAY AS NEEDED FOR MUSCLE SPASMS  Strength: 750 mg 240 tablet 1    NOVOLIN N NPH U-100 INSULIN 100 unit/mL injection Inject 40 Units into the skin 2 (two) times daily. 10 mL 11    pantoprazole (PROTONIX) 40 MG tablet TAKE 1 TABLET BY MOUTH TWICE A  tablet 3    potassium chloride SA (K-DUR,KLOR-CON M) 10 MEQ tablet Take 1 tablet (10 mEq total) by mouth once daily. 90 tablet 3    predniSONE (DELTASONE) 2.5 MG tablet TAKE 1 TABLET BY MOUTH EVERY DAY 90 tablet 4    SYMBICORT 160-4.5 mcg/actuation HFAA Inhale 2 puffs into the lungs 2 (two) times a day. 10.2 g 11    [DISCONTINUED] empagliflozin (JARDIANCE) 10 mg tablet Take 1 tablet (10 mg total) by mouth once daily. HOLD THIS MEDICATION UNTIL YOU FOLLOW UP WITH YOUR PCP. 90 tablet 3    [DISCONTINUED] HYDROcodone-acetaminophen (NORCO)  mg per tablet Take 1 tablet by mouth every 8 (eight) hours as needed for Pain. 90 tablet 0    [DISCONTINUED] traMADoL (ULTRAM) 50 mg tablet TAKE 1 TABLET BY MOUTH FOUR TIMES A  tablet 5    furosemide (LASIX) 40 MG tablet Take 1 tablet (40 mg total) by mouth once daily. HOLD THIS MEDICATION UNTIL YOUR FOLLOW UP WITH YOUR PCP (Patient not taking: Reported on 10/12/2022) 90 tablet 3    hydrocortisone (ANUSOL-HC) 25 mg suppository Place 1 suppository (25 mg total) rectally 2 (two) times daily. (Patient not taking: Reported on 10/12/2022) 20 suppository 1    [DISCONTINUED] gabapentin (NEURONTIN) 100 MG capsule Take 1 capsule (100 mg total) by mouth 3 (three) times daily. 90 capsule 0     No current facility-administered medications  on file prior to visit.     Social History     Socioeconomic History    Marital status:    Tobacco Use    Smoking status: Never    Smokeless tobacco: Never   Substance and Sexual Activity    Alcohol use: Never    Drug use: Never    Sexual activity: Not Currently     Birth control/protection: None     Social Determinants of Health     Financial Resource Strain: Low Risk     Difficulty of Paying Living Expenses: Not hard at all   Food Insecurity: No Food Insecurity    Worried About Running Out of Food in the Last Year: Never true    Ran Out of Food in the Last Year: Never true   Transportation Needs: No Transportation Needs    Lack of Transportation (Medical): No    Lack of Transportation (Non-Medical): No   Physical Activity: Insufficiently Active    Days of Exercise per Week: 1 day    Minutes of Exercise per Session: 20 min   Stress: No Stress Concern Present    Feeling of Stress : Not at all   Social Connections: Moderately Isolated    Frequency of Communication with Friends and Family: More than three times a week    Frequency of Social Gatherings with Friends and Family: More than three times a week    Attends [employer] Services: Never    Active Member of Clubs or Organizations: Yes    Attends Club or Organization Meetings: Never    Marital Status:    Housing Stability: Low Risk     Unable to Pay for Housing in the Last Year: No    Number of Places Lived in the Last Year: 1    Unstable Housing in the Last Year: No     Family History   Problem Relation Age of Onset    Diabetes Mother         Diabetic    Heart disease Father     Diabetes Sister     Cancer Sister         Breast cancer    Diabetes Brother     Cancer Brother         Prostate cancer    Cancer Daughter         Uterine cancer    Diabetes Sister         Diabetic       Review of Systems   Constitutional:  Negative for activity change and unexpected weight change.   HENT:  Positive for trouble swallowing. Negative for hearing loss and  "rhinorrhea.    Eyes:  Negative for discharge and visual disturbance.   Respiratory:  Negative for chest tightness and wheezing.    Cardiovascular:  Negative for chest pain and palpitations.   Gastrointestinal:  Positive for constipation. Negative for blood in stool, diarrhea and vomiting.   Endocrine: Negative for polydipsia and polyuria.   Genitourinary:  Negative for difficulty urinating, dysuria, hematuria and menstrual problem.   Musculoskeletal:  Positive for arthralgias, joint swelling and neck pain.   Neurological:  Negative for weakness and headaches.   Psychiatric/Behavioral:  Negative for confusion and dysphoric mood.      Objective:     BP (!) 146/68 (BP Location: Left arm)   Pulse 78   Resp 18   Ht 5' 5" (1.651 m)   Wt 81.2 kg (179 lb)   SpO2 97%   BMI 29.79 kg/m²     Physical Exam  Constitutional:       Appearance: Normal appearance. She is obese.   HENT:      Head: Normocephalic and atraumatic.      Right Ear: External ear normal.      Left Ear: External ear normal.      Nose: Nose normal.      Mouth/Throat:      Mouth: Mucous membranes are moist.      Pharynx: Oropharynx is clear.   Eyes:      Pupils: Pupils are equal, round, and reactive to light.   Cardiovascular:      Rate and Rhythm: Normal rate and regular rhythm.      Heart sounds: Normal heart sounds. No murmur heard.    No gallop.   Pulmonary:      Effort: Pulmonary effort is normal. No respiratory distress.      Breath sounds: Normal breath sounds. No wheezing.   Abdominal:      Palpations: Abdomen is soft.   Musculoskeletal:      Cervical back: Normal range of motion and neck supple.   Skin:     General: Skin is warm and dry.   Neurological:      General: No focal deficit present.      Mental Status: She is alert and oriented to person, place, and time. Mental status is at baseline.   Psychiatric:         Mood and Affect: Mood normal.         Behavior: Behavior normal.         Thought Content: Thought content normal.         Judgment: " Judgment normal.     Assessment:     1. Hyperlipidemia, unspecified hyperlipidemia type    2. Hypertension, unspecified type    3. 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  Lasix rx refilled  Fu prn    I am having Vandana Allison start on furosemide. I am also having her maintain her lactulose, pantoprazole, albuterol, hydrocortisone, hydrALAZINE, SYMBICORT, NovoLIN N NPH U-100 Insulin, furosemide, potassium chloride SA, methocarbamoL, predniSONE, ferrous sulfate, levothyroxine, lisinopriL, insulin syringe-needle U-100, amLODIPine, bisoprolol-hydrochlorothiazide 5-6.25 mg, gabapentin, empagliflozin, HYDROcodone-acetaminophen, and traMADoL. We administered dexAMETHasone and methylPREDNISolone acetate.    Vandana was seen today for follow-up and diabetes.    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 HOLD THIS MEDICATION UNTIL YOU FOLLOW UP WITH YOUR PCP.  -     HYDROcodone-acetaminophen (NORCO)  mg per tablet; Take 1 tablet by mouth every 8 (eight) hours as needed for Pain.  -     traMADoL (ULTRAM) 50 mg tablet; TAKE 1 TABLET BY MOUTH FOUR TIMES A DAY  -     dexAMETHasone injection 4 mg  -     methylPREDNISolone acetate injection 80 mg  -     furosemide (LASIX) 20 MG tablet; Take 1 tablet (20 mg total) by mouth 2 (two) times daily as needed.      Medications Ordered This Encounter   Medications     dexAMETHasone injection 4 mg    empagliflozin (JARDIANCE) 10 mg tablet     Sig: Take 1 tablet (10 mg total) by mouth once daily. HOLD THIS MEDICATION UNTIL YOU FOLLOW UP WITH YOUR PCP.     Dispense:  90 tablet     Refill:  3    furosemide (LASIX) 20 MG tablet     Sig: Take 1 tablet (20 mg total) by mouth 2 (two) times daily as needed.     Dispense:  60 tablet     Refill:  11    HYDROcodone-acetaminophen (NORCO)  mg per tablet     Sig: Take 1 tablet by mouth every 8 (eight) hours as needed for Pain.     Dispense:  90 tablet     Refill:  0     n/a      Order Specific Question:   I have reviewed the Prescription Drug Monitoring Program (PDMP) database for this patient prior to prescribing the above opioid medication     Answer:   Yes    methylPREDNISolone acetate injection 80 mg    traMADoL (ULTRAM) 50 mg tablet     Sig: TAKE 1 TABLET BY MOUTH FOUR TIMES A DAY     Dispense:  120 tablet     Refill:  5     This request is for a new prescription for a controlled substance as required by Federal/State law.     Order Specific Question:   I have reviewed the Prescription Drug Monitoring Program (PDMP) database for this patient prior to prescribing the above opioid medication     Answer:   Yes     [unfilled]  No orders of the defined types were placed in this encounter.

## 2023-05-18 RX ORDER — GABAPENTIN 400 MG/1
400 CAPSULE ORAL 4 TIMES DAILY PRN
Qty: 120 CAPSULE | Refills: 2 | Status: CANCELLED | OUTPATIENT
Start: 2023-05-18

## 2023-05-22 RX ORDER — GABAPENTIN 400 MG/1
CAPSULE ORAL
Qty: 360 CAPSULE | Refills: 4 | Status: SHIPPED | OUTPATIENT
Start: 2023-05-22 | End: 2023-05-25 | Stop reason: SDUPTHER

## 2023-05-22 RX ORDER — GABAPENTIN 400 MG/1
CAPSULE ORAL
Qty: 360 CAPSULE | Refills: 4 | Status: SHIPPED | OUTPATIENT
Start: 2023-05-22 | End: 2023-05-22 | Stop reason: SDUPTHER

## 2023-05-25 RX ORDER — GABAPENTIN 400 MG/1
CAPSULE ORAL
Qty: 360 CAPSULE | Refills: 4 | Status: SHIPPED | OUTPATIENT
Start: 2023-05-25 | End: 2023-06-06 | Stop reason: SDUPTHER

## 2023-06-05 ENCOUNTER — TELEPHONE (OUTPATIENT)
Dept: PRIMARY CARE CLINIC | Facility: CLINIC | Age: 88
End: 2023-06-05
Payer: COMMERCIAL

## 2023-06-05 NOTE — TELEPHONE ENCOUNTER
----- Message from Kelsea Villegas sent at 6/5/2023  8:10 AM CDT -----  Patient is having rectal bleeding.  Please give a call back.  Phone number is [phone]

## 2023-06-06 ENCOUNTER — OFFICE VISIT (OUTPATIENT)
Dept: PRIMARY CARE CLINIC | Facility: CLINIC | Age: 88
End: 2023-06-06
Payer: COMMERCIAL

## 2023-06-06 VITALS
HEIGHT: 63 IN | OXYGEN SATURATION: 97 % | DIASTOLIC BLOOD PRESSURE: 66 MMHG | RESPIRATION RATE: 18 BRPM | HEART RATE: 61 BPM | BODY MASS INDEX: 30.48 KG/M2 | SYSTOLIC BLOOD PRESSURE: 160 MMHG | WEIGHT: 172 LBS

## 2023-06-06 DIAGNOSIS — D50.0 IRON DEFICIENCY ANEMIA DUE TO CHRONIC BLOOD LOSS: ICD-10-CM

## 2023-06-06 DIAGNOSIS — E07.9 THYROID DISEASE: ICD-10-CM

## 2023-06-06 DIAGNOSIS — K31.7 POLYP OF STOMACH: ICD-10-CM

## 2023-06-06 DIAGNOSIS — E11.9 TYPE 2 DIABETES MELLITUS WITHOUT COMPLICATION, WITHOUT LONG-TERM CURRENT USE OF INSULIN: Primary | ICD-10-CM

## 2023-06-06 DIAGNOSIS — K44.9 HIATAL HERNIA: ICD-10-CM

## 2023-06-06 DIAGNOSIS — D62 ACUTE BLOOD LOSS ANEMIA: ICD-10-CM

## 2023-06-06 PROCEDURE — 99214 OFFICE O/P EST MOD 30 MIN: CPT | Mod: ,,, | Performed by: FAMILY MEDICINE

## 2023-06-06 PROCEDURE — 1101F PT FALLS ASSESS-DOCD LE1/YR: CPT | Mod: ,,, | Performed by: FAMILY MEDICINE

## 2023-06-06 PROCEDURE — 3288F FALL RISK ASSESSMENT DOCD: CPT | Mod: ,,, | Performed by: FAMILY MEDICINE

## 2023-06-06 PROCEDURE — 99214 PR OFFICE/OUTPT VISIT, EST, LEVL IV, 30-39 MIN: ICD-10-PCS | Mod: ,,, | Performed by: FAMILY MEDICINE

## 2023-06-06 PROCEDURE — 1159F MED LIST DOCD IN RCRD: CPT | Mod: ,,, | Performed by: FAMILY MEDICINE

## 2023-06-06 PROCEDURE — 3288F PR FALLS RISK ASSESSMENT DOCUMENTED: ICD-10-PCS | Mod: ,,, | Performed by: FAMILY MEDICINE

## 2023-06-06 PROCEDURE — 1101F PR PT FALLS ASSESS DOC 0-1 FALLS W/OUT INJ PAST YR: ICD-10-PCS | Mod: ,,, | Performed by: FAMILY MEDICINE

## 2023-06-06 PROCEDURE — 1159F PR MEDICATION LIST DOCUMENTED IN MEDICAL RECORD: ICD-10-PCS | Mod: ,,, | Performed by: FAMILY MEDICINE

## 2023-06-06 RX ORDER — GABAPENTIN 400 MG/1
CAPSULE ORAL
Qty: 360 CAPSULE | Refills: 4 | Status: SHIPPED | OUTPATIENT
Start: 2023-06-06 | End: 2024-01-17

## 2023-06-06 RX ORDER — HYDROCORTISONE 25 MG/G
CREAM TOPICAL 2 TIMES DAILY
Qty: 28 G | Refills: 12 | Status: SHIPPED | OUTPATIENT
Start: 2023-06-06

## 2023-06-06 NOTE — PROGRESS NOTES
Subjective:      Patient ID: Vandana Allison is a 89 y.o. female.    Chief Complaint: Rectal Bleeding (Started Sunday)    Vandana chavez 89 y.o. female presents for follow up on all regular problems which are reviewed and discussed.   Not using lactulose regularly  Problem List Items Addressed This Visit          Oncology    Acute blood loss anemia    Iron deficiency anemia due to chronic blood loss       Endocrine    Diabetes mellitus, type 2 - Primary    Relevant Orders    CBC Auto Differential    Comprehensive Metabolic Panel    Lipid Panel    TSH    Urinalysis    Iron and TIBC    Reticulocytes    Methylmalonic Acid, Serum    Folate    Hemoglobin A1C    Type & Screen    Thyroid disease       GI    Polyp of stomach    Hiatal hernia       Past Medical History:  Past Medical History:   Diagnosis Date    Arthritis     Chronic kidney disease, stage 3b     Diabetes mellitus, type 2     Hiatal hernia     Hyperlipidemia     Hypertension     Neuropathy     Thyroid disease      Past Surgical History:   Procedure Laterality Date    BREAST SURGERY      Biopsy    EYE SURGERY      Remove cataracts both eyes    HYSTERECTOMY      NEPHRECTOMY Right     THYROIDECTOMY       Review of patient's allergies indicates:   Allergen Reactions    Aspirin      Current Outpatient Medications on File Prior to Visit   Medication Sig Dispense Refill    albuterol (PROVENTIL/VENTOLIN HFA) 90 mcg/actuation inhaler Inhale 2 puffs into the lungs every 4 (four) hours as needed. Rescue 18 g 12    amLODIPine (NORVASC) 5 MG tablet TAKE 1 TABLET BY MOUTH EVERY DAY 90 tablet 3    bisoprolol-hydrochlorothiazide 5-6.25 mg (ZIAC) 5-6.25 mg Tab TAKE 1 TABLET TWICE A DAY    HOLD THIS MEDICATION UNTIL YOU FOLLOW UP WITH YOUR  tablet 3    empagliflozin (JARDIANCE) 10 mg tablet Take 1 tablet (10 mg total) by mouth once daily. HOLD THIS MEDICATION UNTIL YOU FOLLOW UP WITH YOUR PCP. 90 tablet 3    ferrous sulfate (FEOSOL) 325 mg (65 mg iron) Tab tablet  Take 1 tablet (325 mg total) by mouth 2 (two) times daily. 120 tablet 3    furosemide (LASIX) 20 MG tablet Take 1 tablet (20 mg total) by mouth 2 (two) times daily as needed. 60 tablet 11    gabapentin (NEURONTIN) 400 MG capsule TAKE 1 CAPSULE BY MOUTH 4 TIMES A DAY AS NEEDED 360 capsule 4    hydrALAZINE (APRESOLINE) 25 MG tablet Take 1 tablet (25 mg total) by mouth 2 (two) times daily. 180 tablet 3    HYDROcodone-acetaminophen (NORCO)  mg per tablet Take 1 tablet by mouth every 8 (eight) hours as needed for Pain. 90 tablet 0    insulin syringe-needle U-100 1 mL 30 gauge x 5/16 Syrg 1 Syringe by Misc.(Non-Drug; Combo Route) route 2 (two) times daily. 100 each 3    lactulose (CHRONULAC) 10 gram/15 mL solution Take 30 mLs (20 g total) by mouth once daily. 946 mL 11    levothyroxine (SYNTHROID) 100 MCG tablet Take 1 tablet (100 mcg total) by mouth before breakfast. 90 tablet 3    lisinopriL (PRINIVIL,ZESTRIL) 40 MG tablet Take 1 tablet (40 mg total) by mouth 2 (two) times daily. HOLD THIS MEDICATION UNTIL YOU FOLLOW UP WITH YOUR  tablet 3    methocarbamoL (ROBAXIN) 750 MG Tab TAKE 2 TABLETS BY MOUTH 4 TIMES A DAY AS NEEDED FOR MUSCLE SPASMS  Strength: 750 mg 240 tablet 1    NOVOLIN N NPH U-100 INSULIN 100 unit/mL injection Inject 40 Units into the skin 2 (two) times daily. 10 mL 11    pantoprazole (PROTONIX) 40 MG tablet TAKE 1 TABLET BY MOUTH TWICE A  tablet 3    potassium chloride SA (K-DUR,KLOR-CON M) 10 MEQ tablet Take 1 tablet (10 mEq total) by mouth once daily. 90 tablet 3    predniSONE (DELTASONE) 2.5 MG tablet TAKE 1 TABLET BY MOUTH EVERY DAY 90 tablet 4    SYMBICORT 160-4.5 mcg/actuation HFAA Inhale 2 puffs into the lungs 2 (two) times a day. 10.2 g 11    traMADoL (ULTRAM) 50 mg tablet TAKE 1 TABLET BY MOUTH FOUR TIMES A  tablet 5    furosemide (LASIX) 40 MG tablet Take 1 tablet (40 mg total) by mouth once daily. HOLD THIS MEDICATION UNTIL YOUR FOLLOW UP WITH YOUR PCP (Patient not  taking: Reported on 10/12/2022) 90 tablet 3    hydrocortisone (ANUSOL-HC) 25 mg suppository Place 1 suppository (25 mg total) rectally 2 (two) times daily. (Patient not taking: Reported on 10/12/2022) 20 suppository 1     No current facility-administered medications on file prior to visit.     Social History     Socioeconomic History    Marital status:    Tobacco Use    Smoking status: Never    Smokeless tobacco: Never   Substance and Sexual Activity    Alcohol use: Never    Drug use: Never    Sexual activity: Not Currently     Birth control/protection: None     Social Determinants of Health     Financial Resource Strain: Low Risk     Difficulty of Paying Living Expenses: Not hard at all   Food Insecurity: No Food Insecurity    Worried About Running Out of Food in the Last Year: Never true    Ran Out of Food in the Last Year: Never true   Transportation Needs: No Transportation Needs    Lack of Transportation (Medical): No    Lack of Transportation (Non-Medical): No   Physical Activity: Insufficiently Active    Days of Exercise per Week: 1 day    Minutes of Exercise per Session: 20 min   Stress: No Stress Concern Present    Feeling of Stress : Not at all   Social Connections: Moderately Isolated    Frequency of Communication with Friends and Family: More than three times a week    Frequency of Social Gatherings with Friends and Family: More than three times a week    Attends Orthodox Services: Never    Active Member of Clubs or Organizations: Yes    Attends Club or Organization Meetings: Never    Marital Status:    Housing Stability: Low Risk     Unable to Pay for Housing in the Last Year: No    Number of Places Lived in the Last Year: 1    Unstable Housing in the Last Year: No     Family History   Problem Relation Age of Onset    Diabetes Mother         Diabetic    Heart disease Father     Diabetes Sister     Cancer Sister         Breast cancer    Diabetes Brother     Cancer Brother         Prostate  "cancer    Cancer Daughter         Uterine cancer    Diabetes Sister         Diabetic       Review of Systems   Constitutional: Negative.  Negative for activity change, appetite change, chills and diaphoresis.   HENT: Negative.  Negative for congestion, ear pain, hearing loss and postnasal drip.    Eyes:  Negative for itching.   Respiratory:  Negative for chest tightness and shortness of breath.    Cardiovascular:  Negative for chest pain.   Gastrointestinal:  Positive for abdominal pain, anal bleeding, constipation and rectal pain.   Endocrine: Negative for polydipsia.   Genitourinary:  Negative for frequency.   Musculoskeletal:  Negative for back pain.   Neurological:  Positive for headaches.     Objective:     BP (!) 160/66 (BP Location: Left arm)   Pulse 61   Resp 18   Ht 5' 3" (1.6 m)   Wt 78 kg (172 lb)   SpO2 97%   BMI 30.47 kg/m²     Physical Exam  Constitutional:       Appearance: Normal appearance. She is obese.   HENT:      Head: Normocephalic and atraumatic.      Right Ear: External ear normal.      Left Ear: External ear normal.      Nose: Nose normal.      Mouth/Throat:      Mouth: Mucous membranes are moist.      Pharynx: Oropharynx is clear.   Eyes:      Pupils: Pupils are equal, round, and reactive to light.   Cardiovascular:      Rate and Rhythm: Normal rate and regular rhythm.      Heart sounds: Normal heart sounds. No murmur heard.  Pulmonary:      Effort: Pulmonary effort is normal. No respiratory distress.      Breath sounds: Normal breath sounds. No wheezing.   Abdominal:      General: There is no distension.      Palpations: Abdomen is soft.      Tenderness: There is no abdominal tenderness.   Musculoskeletal:      Cervical back: Normal range of motion and neck supple.   Skin:     General: Skin is warm and dry.   Neurological:      General: No focal deficit present.      Mental Status: She is alert and oriented to person, place, and time. Mental status is at baseline.   Psychiatric:    "      Mood and Affect: Mood normal.         Behavior: Behavior normal.         Thought Content: Thought content normal.         Judgment: Judgment normal.       1. Type 2 diabetes mellitus without complication, without long-term current use of insulin    2. Acute blood loss anemia    3. Iron deficiency anemia due to chronic blood loss    4. Thyroid disease    5. Polyp of stomach    6. Hiatal hernia        Plan:     Problem List Items Addressed This Visit          Oncology    Acute blood loss anemia    Iron deficiency anemia due to chronic blood loss       Endocrine    Diabetes mellitus, type 2 - Primary    Relevant Orders    CBC Auto Differential    Comprehensive Metabolic Panel    Lipid Panel    TSH    Urinalysis    Iron and TIBC    Reticulocytes    Methylmalonic Acid, Serum    Folate    Hemoglobin A1C    Type & Screen    Thyroid disease       GI    Polyp of stomach    Hiatal hernia     No follow-ups on file.  Take lactulose daily  Lab  Has fu august    I am having Vandana Allison start on hydrocortisone. I am also having her maintain her lactulose, pantoprazole, albuterol, hydrocortisone, hydrALAZINE, SYMBICORT, NovoLIN N NPH U-100 Insulin, furosemide, potassium chloride SA, methocarbamoL, predniSONE, ferrous sulfate, levothyroxine, lisinopriL, insulin syringe-needle U-100, amLODIPine, bisoprolol-hydrochlorothiazide 5-6.25 mg, empagliflozin, HYDROcodone-acetaminophen, traMADoL, furosemide, and gabapentin.    Vandana was seen today for rectal bleeding.    Diagnoses and all orders for this visit:    Type 2 diabetes mellitus without complication, without long-term current use of insulin  -     CBC Auto Differential; Future  -     Comprehensive Metabolic Panel; Future  -     Lipid Panel; Future  -     TSH; Future  -     Urinalysis; Future  -     Iron and TIBC; Future  -     Reticulocytes; Future  -     Methylmalonic Acid, Serum; Future  -     Folate; Future  -     Hemoglobin A1C; Future  -     Type & Screen;  Future    Acute blood loss anemia    Iron deficiency anemia due to chronic blood loss    Thyroid disease    Polyp of stomach    Hiatal hernia    Other orders  -     hydrocortisone (ANUSOL-HC) 2.5 % rectal cream; Place rectally 2 (two) times daily.      Medications Ordered This Encounter   Medications    hydrocortisone (ANUSOL-HC) 2.5 % rectal cream     Sig: Place rectally 2 (two) times daily.     Dispense:  28 g     Refill:  12     [unfilled]  Orders Placed This Encounter   Procedures    CBC Auto Differential     Standing Status:   Future     Standing Expiration Date:   8/4/2024    Comprehensive Metabolic Panel     Standing Status:   Future     Standing Expiration Date:   8/4/2024    Lipid Panel     Standing Status:   Future     Standing Expiration Date:   8/4/2024    TSH     Standing Status:   Future     Standing Expiration Date:   8/4/2024    Urinalysis     Standing Status:   Future     Standing Expiration Date:   8/4/2024     Order Specific Question:   Collection Type     Answer:   Urine, Clean Catch    Iron and TIBC     Standing Status:   Future     Standing Expiration Date:   12/6/2024    Reticulocytes     Standing Status:   Future     Standing Expiration Date:   12/6/2024    Methylmalonic Acid, Serum     Standing Status:   Future     Standing Expiration Date:   8/4/2024    Folate     Standing Status:   Future     Standing Expiration Date:   8/4/2024    Hemoglobin A1C     Standing Status:   Future     Standing Expiration Date:   8/4/2024    Type & Screen     Standing Status:   Future     Standing Expiration Date:   8/4/2024

## 2023-06-07 ENCOUNTER — TELEPHONE (OUTPATIENT)
Dept: PRIMARY CARE CLINIC | Facility: CLINIC | Age: 88
End: 2023-06-07
Payer: COMMERCIAL

## 2023-06-07 ENCOUNTER — TELEPHONE (OUTPATIENT)
Dept: GASTROENTEROLOGY | Facility: CLINIC | Age: 88
End: 2023-06-07
Payer: COMMERCIAL

## 2023-06-07 NOTE — TELEPHONE ENCOUNTER
Patient saw PCP yesterday for this. See office visit encounter and todays telephone encounter.          ----- Message from Huyen Llanos sent at 6/6/2023  1:10 PM CDT -----  Pt left message if nurse would call about bleeding hemmorhoids and what to do. [phone]

## 2023-06-07 NOTE — TELEPHONE ENCOUNTER
----- Message from Cm Larson III, DO sent at 6/7/2023  8:00 AM CDT -----  Labs good. Blood count only down a hair. Iron level is coming up.  Sugar is great. Stay on regular meds

## 2023-06-09 DIAGNOSIS — Z71.89 COMPLEX CARE COORDINATION: ICD-10-CM

## 2023-06-19 RX ORDER — PANTOPRAZOLE SODIUM 40 MG/1
TABLET, DELAYED RELEASE ORAL
Qty: 180 TABLET | Refills: 3 | Status: SHIPPED | OUTPATIENT
Start: 2023-06-19

## 2023-07-31 RX ORDER — HYDROCODONE BITARTRATE AND ACETAMINOPHEN 10; 325 MG/1; MG/1
1 TABLET ORAL EVERY 8 HOURS PRN
Qty: 90 TABLET | Refills: 0 | Status: SHIPPED | OUTPATIENT
Start: 2023-07-31 | End: 2023-11-06 | Stop reason: SDUPTHER

## 2023-08-08 ENCOUNTER — OFFICE VISIT (OUTPATIENT)
Dept: PRIMARY CARE CLINIC | Facility: CLINIC | Age: 88
End: 2023-08-08
Payer: COMMERCIAL

## 2023-08-08 VITALS
DIASTOLIC BLOOD PRESSURE: 64 MMHG | SYSTOLIC BLOOD PRESSURE: 138 MMHG | BODY MASS INDEX: 31.54 KG/M2 | HEART RATE: 75 BPM | RESPIRATION RATE: 18 BRPM | WEIGHT: 178 LBS | OXYGEN SATURATION: 97 % | HEIGHT: 63 IN

## 2023-08-08 DIAGNOSIS — E11.9 TYPE 2 DIABETES MELLITUS WITHOUT COMPLICATION, WITHOUT LONG-TERM CURRENT USE OF INSULIN: ICD-10-CM

## 2023-08-08 DIAGNOSIS — G62.9 NEUROPATHY: ICD-10-CM

## 2023-08-08 DIAGNOSIS — I10 HYPERTENSION, UNSPECIFIED TYPE: ICD-10-CM

## 2023-08-08 DIAGNOSIS — M19.90 OSTEOARTHRITIS, UNSPECIFIED OSTEOARTHRITIS TYPE, UNSPECIFIED SITE: ICD-10-CM

## 2023-08-08 DIAGNOSIS — E78.5 HYPERLIPIDEMIA, UNSPECIFIED HYPERLIPIDEMIA TYPE: Primary | ICD-10-CM

## 2023-08-08 DIAGNOSIS — D50.0 IRON DEFICIENCY ANEMIA DUE TO CHRONIC BLOOD LOSS: ICD-10-CM

## 2023-08-08 PROCEDURE — 3288F FALL RISK ASSESSMENT DOCD: CPT | Mod: ,,, | Performed by: FAMILY MEDICINE

## 2023-08-08 PROCEDURE — 99214 OFFICE O/P EST MOD 30 MIN: CPT | Mod: 25,,, | Performed by: FAMILY MEDICINE

## 2023-08-08 PROCEDURE — 96372 PR INJECTION,THERAP/PROPH/DIAG2ST, IM OR SUBCUT: ICD-10-PCS | Mod: ,,, | Performed by: FAMILY MEDICINE

## 2023-08-08 PROCEDURE — 99214 PR OFFICE/OUTPT VISIT, EST, LEVL IV, 30-39 MIN: ICD-10-PCS | Mod: 25,,, | Performed by: FAMILY MEDICINE

## 2023-08-08 PROCEDURE — 1159F MED LIST DOCD IN RCRD: CPT | Mod: ,,, | Performed by: FAMILY MEDICINE

## 2023-08-08 PROCEDURE — 1159F PR MEDICATION LIST DOCUMENTED IN MEDICAL RECORD: ICD-10-PCS | Mod: ,,, | Performed by: FAMILY MEDICINE

## 2023-08-08 PROCEDURE — 1101F PR PT FALLS ASSESS DOC 0-1 FALLS W/OUT INJ PAST YR: ICD-10-PCS | Mod: ,,, | Performed by: FAMILY MEDICINE

## 2023-08-08 PROCEDURE — 1101F PT FALLS ASSESS-DOCD LE1/YR: CPT | Mod: ,,, | Performed by: FAMILY MEDICINE

## 2023-08-08 PROCEDURE — 3288F PR FALLS RISK ASSESSMENT DOCUMENTED: ICD-10-PCS | Mod: ,,, | Performed by: FAMILY MEDICINE

## 2023-08-08 PROCEDURE — 96372 THER/PROPH/DIAG INJ SC/IM: CPT | Mod: ,,, | Performed by: FAMILY MEDICINE

## 2023-08-08 RX ORDER — LEVOTHYROXINE SODIUM 100 UG/1
100 TABLET ORAL
Qty: 90 TABLET | Refills: 3 | Status: SHIPPED | OUTPATIENT
Start: 2023-08-08

## 2023-08-08 RX ORDER — METHYLPREDNISOLONE ACETATE 80 MG/ML
80 INJECTION, SUSPENSION INTRA-ARTICULAR; INTRALESIONAL; INTRAMUSCULAR; SOFT TISSUE
Status: COMPLETED | OUTPATIENT
Start: 2023-08-08 | End: 2023-08-08

## 2023-08-08 RX ORDER — METHOCARBAMOL 750 MG/1
TABLET, FILM COATED ORAL
Qty: 240 TABLET | Refills: 1 | Status: SHIPPED | OUTPATIENT
Start: 2023-08-08

## 2023-08-08 RX ORDER — AMLODIPINE BESYLATE 5 MG/1
5 TABLET ORAL DAILY
Qty: 90 TABLET | Refills: 3 | Status: SHIPPED | OUTPATIENT
Start: 2023-08-08

## 2023-08-08 RX ORDER — HYDRALAZINE HYDROCHLORIDE 25 MG/1
25 TABLET, FILM COATED ORAL 2 TIMES DAILY
Qty: 180 TABLET | Refills: 3 | Status: ON HOLD | OUTPATIENT
Start: 2023-08-08 | End: 2024-02-12

## 2023-08-08 RX ORDER — DEXAMETHASONE SODIUM PHOSPHATE 4 MG/ML
4 INJECTION, SOLUTION INTRA-ARTICULAR; INTRALESIONAL; INTRAMUSCULAR; INTRAVENOUS; SOFT TISSUE
Status: COMPLETED | OUTPATIENT
Start: 2023-08-08 | End: 2023-08-08

## 2023-08-08 RX ADMIN — METHYLPREDNISOLONE ACETATE 80 MG: 80 INJECTION, SUSPENSION INTRA-ARTICULAR; INTRALESIONAL; INTRAMUSCULAR; SOFT TISSUE at 01:08

## 2023-08-08 RX ADMIN — DEXAMETHASONE SODIUM PHOSPHATE 4 MG: 4 INJECTION, SOLUTION INTRA-ARTICULAR; INTRALESIONAL; INTRAMUSCULAR; INTRAVENOUS; SOFT TISSUE at 01:08

## 2023-08-09 NOTE — PROGRESS NOTES
Subjective:      Patient ID: Vandana Allison is a 89 y.o. female.    Chief Complaint: Follow-up (3mon. Ck-up) and Diabetes    Vandana Allison a 89 y.o. female presents for follow up on all regular problems which are reviewed and discussed.     Problem List Items Addressed This Visit          Neuro    Neuropathy       Cardiac/Vascular    Hyperlipidemia - Primary    Hypertension       Oncology    Iron deficiency anemia due to chronic blood loss       Endocrine    Diabetes mellitus, type 2       Orthopedic    DJD (degenerative joint disease)       Past Medical History:  Past Medical History:   Diagnosis Date    Arthritis     Chronic kidney disease, stage 3b     Diabetes mellitus, type 2     Hiatal hernia     Hyperlipidemia     Hypertension     Neuropathy     Thyroid disease      Past Surgical History:   Procedure Laterality Date    BREAST SURGERY      Biopsy    EYE SURGERY      Remove cataracts both eyes    HYSTERECTOMY      NEPHRECTOMY Right     THYROIDECTOMY       Review of patient's allergies indicates:   Allergen Reactions    Aspirin      Current Outpatient Medications on File Prior to Visit   Medication Sig Dispense Refill    albuterol (PROVENTIL/VENTOLIN HFA) 90 mcg/actuation inhaler Inhale 2 puffs into the lungs every 4 (four) hours as needed. Rescue 18 g 12    bisoprolol-hydrochlorothiazide 5-6.25 mg (ZIAC) 5-6.25 mg Tab TAKE 1 TABLET TWICE A DAY    HOLD THIS MEDICATION UNTIL YOU FOLLOW UP WITH YOUR  tablet 3    empagliflozin (JARDIANCE) 10 mg tablet Take 1 tablet (10 mg total) by mouth once daily. HOLD THIS MEDICATION UNTIL YOU FOLLOW UP WITH YOUR PCP. 90 tablet 3    ferrous sulfate (FEOSOL) 325 mg (65 mg iron) Tab tablet Take 1 tablet (325 mg total) by mouth 2 (two) times daily. 120 tablet 3    furosemide (LASIX) 20 MG tablet Take 1 tablet (20 mg total) by mouth 2 (two) times daily as needed. 60 tablet 11    gabapentin (NEURONTIN) 400 MG capsule TAKE 1 CAPSULE BY MOUTH 4 TIMES A DAY AS NEEDED 360  capsule 4    HYDROcodone-acetaminophen (NORCO)  mg per tablet Take 1 tablet by mouth every 8 (eight) hours as needed for Pain. 90 tablet 0    hydrocortisone (ANUSOL-HC) 2.5 % rectal cream Place rectally 2 (two) times daily. 28 g 12    insulin syringe-needle U-100 1 mL 30 gauge x 5/16 Syrg 1 Syringe by Misc.(Non-Drug; Combo Route) route 2 (two) times daily. 100 each 3    lactulose (CHRONULAC) 10 gram/15 mL solution Take 30 mLs (20 g total) by mouth once daily. 946 mL 11    lisinopriL (PRINIVIL,ZESTRIL) 40 MG tablet Take 1 tablet (40 mg total) by mouth 2 (two) times daily. HOLD THIS MEDICATION UNTIL YOU FOLLOW UP WITH YOUR  tablet 3    NOVOLIN N NPH U-100 INSULIN 100 unit/mL injection Inject 40 Units into the skin 2 (two) times daily. 10 mL 11    pantoprazole (PROTONIX) 40 MG tablet TAKE 1 TABLET BY MOUTH TWICE A  tablet 3    potassium chloride SA (K-DUR,KLOR-CON M) 10 MEQ tablet Take 1 tablet (10 mEq total) by mouth once daily. 90 tablet 3    predniSONE (DELTASONE) 2.5 MG tablet TAKE 1 TABLET BY MOUTH EVERY DAY 90 tablet 4    SYMBICORT 160-4.5 mcg/actuation HFAA Inhale 2 puffs into the lungs 2 (two) times a day. 10.2 g 11    traMADoL (ULTRAM) 50 mg tablet TAKE 1 TABLET BY MOUTH FOUR TIMES A  tablet 5    furosemide (LASIX) 40 MG tablet Take 1 tablet (40 mg total) by mouth once daily. HOLD THIS MEDICATION UNTIL YOUR FOLLOW UP WITH YOUR PCP (Patient not taking: Reported on 10/12/2022) 90 tablet 3    hydrocortisone (ANUSOL-HC) 25 mg suppository Place 1 suppository (25 mg total) rectally 2 (two) times daily. (Patient not taking: Reported on 10/12/2022) 20 suppository 1     No current facility-administered medications on file prior to visit.     Social History     Socioeconomic History    Marital status:    Tobacco Use    Smoking status: Never    Smokeless tobacco: Never   Substance and Sexual Activity    Alcohol use: Never    Drug use: Never    Sexual activity: Not Currently     Birth  control/protection: None     Social Determinants of Health     Financial Resource Strain: Low Risk  (10/6/2022)    Overall Financial Resource Strain (CARDIA)     Difficulty of Paying Living Expenses: Not hard at all   Food Insecurity: No Food Insecurity (10/6/2022)    Hunger Vital Sign     Worried About Running Out of Food in the Last Year: Never true     Ran Out of Food in the Last Year: Never true   Transportation Needs: No Transportation Needs (10/6/2022)    PRAPARE - Transportation     Lack of Transportation (Medical): No     Lack of Transportation (Non-Medical): No   Physical Activity: Insufficiently Active (10/6/2022)    Exercise Vital Sign     Days of Exercise per Week: 1 day     Minutes of Exercise per Session: 20 min   Stress: No Stress Concern Present (10/6/2022)    [employer] - Occupational Stress Questionnaire     Feeling of Stress : Not at all   Social Connections: Moderately Isolated (10/6/2022)    Social Connection and Isolation Panel [NHANES]     Frequency of Communication with Friends and Family: More than three times a week     Frequency of Social Gatherings with Friends and Family: More than three times a week     Attends Bahai Services: Never     Active Member of Clubs or Organizations: Yes     Attends Club or Organization Meetings: Never     Marital Status:    Housing Stability: Low Risk  (10/6/2022)    Housing Stability Vital Sign     Unable to Pay for Housing in the Last Year: No     Number of Places Lived in the Last Year: 1     Unstable Housing in the Last Year: No     Family History   Problem Relation Age of Onset    Diabetes Mother         Diabetic    Heart disease Father     Diabetes Sister     Cancer Sister         Breast cancer    Diabetes Brother     Cancer Brother         Prostate cancer    Cancer Daughter         Uterine cancer    Diabetes Sister         Diabetic       Review of Systems   Constitutional: Negative.  Negative for activity  "change, appetite change, chills and diaphoresis.   HENT: Negative.  Negative for congestion, ear pain, hearing loss and postnasal drip.    Eyes:  Negative for itching.   Respiratory:  Negative for chest tightness and shortness of breath.    Cardiovascular:  Negative for chest pain.   Gastrointestinal:  Negative for abdominal pain.   Endocrine: Negative for polydipsia.   Genitourinary:  Negative for frequency.   Musculoskeletal:  Negative for back pain.   Neurological:  Negative for headaches.       Objective:     /64 (BP Location: Left arm, Patient Position: Sitting, BP Method: Large (Manual))   Pulse 75   Resp 18   Ht 5' 3" (1.6 m)   Wt 80.7 kg (178 lb)   SpO2 97%   BMI 31.53 kg/m²     Physical Exam  Constitutional:       Appearance: Normal appearance. She is obese.   HENT:      Head: Normocephalic and atraumatic.      Right Ear: External ear normal.      Left Ear: External ear normal.      Nose: Nose normal.      Mouth/Throat:      Mouth: Mucous membranes are moist.      Pharynx: Oropharynx is clear.   Eyes:      Pupils: Pupils are equal, round, and reactive to light.   Neck:      Vascular: No carotid bruit.   Cardiovascular:      Rate and Rhythm: Normal rate and regular rhythm.      Heart sounds: Normal heart sounds. No murmur heard.     No gallop.   Pulmonary:      Effort: Pulmonary effort is normal. No respiratory distress.      Breath sounds: Normal breath sounds. No wheezing or rales.   Abdominal:      Palpations: Abdomen is soft.   Musculoskeletal:      Cervical back: Normal range of motion and neck supple.   Skin:     General: Skin is warm and dry.   Neurological:      General: No focal deficit present.      Mental Status: She is alert and oriented to person, place, and time. Mental status is at baseline.   Psychiatric:         Mood and Affect: Mood normal.         Behavior: Behavior normal.         Thought Content: Thought content normal.         Judgment: Judgment normal.         1. " Hyperlipidemia, unspecified hyperlipidemia type    2. Hypertension, unspecified type    3. Iron deficiency anemia due to chronic blood loss    4. Type 2 diabetes mellitus without complication, without long-term current use of insulin    5. Neuropathy    6. Osteoarthritis, unspecified osteoarthritis type, unspecified site        Plan:     Problem List Items Addressed This Visit          Neuro    Neuropathy       Cardiac/Vascular    Hyperlipidemia - Primary    Hypertension       Oncology    Iron deficiency anemia due to chronic blood loss       Endocrine    Diabetes mellitus, type 2       Orthopedic    DJD (degenerative joint disease)     No follow-ups on file.  Pain meds reviewed    I have changed Vandana Allison's amLODIPine and hydrALAZINE. I am also having her maintain her lactulose, albuterol, hydrocortisone, SYMBICORT, NovoLIN N NPH U-100 Insulin, furosemide, potassium chloride SA, predniSONE, ferrous sulfate, lisinopriL, insulin syringe-needle U-100, bisoprolol-hydrochlorothiazide 5-6.25 mg, empagliflozin, traMADoL, furosemide, gabapentin, hydrocortisone, pantoprazole, HYDROcodone-acetaminophen, methocarbamoL, and levothyroxine. We administered dexAMETHasone and methylPREDNISolone acetate.    Vandana was seen today for follow-up and diabetes.    Diagnoses and all orders for this visit:    Hyperlipidemia, unspecified hyperlipidemia type    Hypertension, unspecified type    Iron deficiency anemia due to chronic blood loss    Type 2 diabetes mellitus without complication, without long-term current use of insulin    Neuropathy    Osteoarthritis, unspecified osteoarthritis type, unspecified site    Other orders  -     amLODIPine (NORVASC) 5 MG tablet; Take 1 tablet (5 mg total) by mouth once daily.  -     methocarbamoL (ROBAXIN) 750 MG Tab; TAKE 2 TABLETS BY MOUTH 4 TIMES A DAY AS NEEDED FOR MUSCLE SPASMS  Strength: 750 mg  -     hydrALAZINE (APRESOLINE) 25 MG tablet; Take 1 tablet (25 mg total) by mouth 2 (two)  times daily.  -     levothyroxine (SYNTHROID) 100 MCG tablet; Take 1 tablet (100 mcg total) by mouth before breakfast.  -     dexAMETHasone injection 4 mg  -     methylPREDNISolone acetate injection 80 mg      Medications Ordered This Encounter   Medications    amLODIPine (NORVASC) 5 MG tablet     Sig: Take 1 tablet (5 mg total) by mouth once daily.     Dispense:  90 tablet     Refill:  3     .    dexAMETHasone injection 4 mg    hydrALAZINE (APRESOLINE) 25 MG tablet     Sig: Take 1 tablet (25 mg total) by mouth 2 (two) times daily.     Dispense:  180 tablet     Refill:  3     .    levothyroxine (SYNTHROID) 100 MCG tablet     Sig: Take 1 tablet (100 mcg total) by mouth before breakfast.     Dispense:  90 tablet     Refill:  3    methocarbamoL (ROBAXIN) 750 MG Tab     Sig: TAKE 2 TABLETS BY MOUTH 4 TIMES A DAY AS NEEDED FOR MUSCLE SPASMS  Strength: 750 mg     Dispense:  240 tablet     Refill:  1    methylPREDNISolone acetate injection 80 mg     [unfilled]  No orders of the defined types were placed in this encounter.

## 2023-09-28 RX ORDER — HUMAN INSULIN 100 [IU]/ML
40 INJECTION, SUSPENSION SUBCUTANEOUS 2 TIMES DAILY
Qty: 30 ML | Refills: 2 | Status: ON HOLD | OUTPATIENT
Start: 2023-09-28 | End: 2024-02-12

## 2023-11-02 ENCOUNTER — OFFICE VISIT (OUTPATIENT)
Dept: PRIMARY CARE CLINIC | Facility: CLINIC | Age: 88
End: 2023-11-02
Payer: COMMERCIAL

## 2023-11-02 VITALS
DIASTOLIC BLOOD PRESSURE: 60 MMHG | WEIGHT: 167 LBS | OXYGEN SATURATION: 97 % | SYSTOLIC BLOOD PRESSURE: 132 MMHG | HEART RATE: 93 BPM | HEIGHT: 63 IN | BODY MASS INDEX: 29.59 KG/M2 | RESPIRATION RATE: 18 BRPM

## 2023-11-02 DIAGNOSIS — K44.9 HIATAL HERNIA: ICD-10-CM

## 2023-11-02 DIAGNOSIS — E11.9 TYPE 2 DIABETES MELLITUS WITHOUT COMPLICATION, WITHOUT LONG-TERM CURRENT USE OF INSULIN: Primary | ICD-10-CM

## 2023-11-02 DIAGNOSIS — E07.9 THYROID DISEASE: ICD-10-CM

## 2023-11-02 DIAGNOSIS — M19.90 OSTEOARTHRITIS, UNSPECIFIED OSTEOARTHRITIS TYPE, UNSPECIFIED SITE: ICD-10-CM

## 2023-11-02 DIAGNOSIS — Z23 FLU VACCINE NEED: ICD-10-CM

## 2023-11-02 PROCEDURE — 96372 PR INJECTION,THERAP/PROPH/DIAG2ST, IM OR SUBCUT: ICD-10-PCS | Mod: ,,, | Performed by: FAMILY MEDICINE

## 2023-11-02 PROCEDURE — 3288F PR FALLS RISK ASSESSMENT DOCUMENTED: ICD-10-PCS | Mod: ,,, | Performed by: FAMILY MEDICINE

## 2023-11-02 PROCEDURE — 90694 FLU VACCINE - QUADRIVALENT - ADJUVANTED: ICD-10-PCS | Mod: ,,, | Performed by: FAMILY MEDICINE

## 2023-11-02 PROCEDURE — G0008 ADMIN INFLUENZA VIRUS VAC: HCPCS | Mod: ,,, | Performed by: FAMILY MEDICINE

## 2023-11-02 PROCEDURE — 90694 VACC AIIV4 NO PRSRV 0.5ML IM: CPT | Mod: ,,, | Performed by: FAMILY MEDICINE

## 2023-11-02 PROCEDURE — 3288F FALL RISK ASSESSMENT DOCD: CPT | Mod: ,,, | Performed by: FAMILY MEDICINE

## 2023-11-02 PROCEDURE — 1159F PR MEDICATION LIST DOCUMENTED IN MEDICAL RECORD: ICD-10-PCS | Mod: ,,, | Performed by: FAMILY MEDICINE

## 2023-11-02 PROCEDURE — 99214 PR OFFICE/OUTPT VISIT, EST, LEVL IV, 30-39 MIN: ICD-10-PCS | Mod: 25,,, | Performed by: FAMILY MEDICINE

## 2023-11-02 PROCEDURE — 99214 OFFICE O/P EST MOD 30 MIN: CPT | Mod: 25,,, | Performed by: FAMILY MEDICINE

## 2023-11-02 PROCEDURE — 1159F MED LIST DOCD IN RCRD: CPT | Mod: ,,, | Performed by: FAMILY MEDICINE

## 2023-11-02 PROCEDURE — 96372 THER/PROPH/DIAG INJ SC/IM: CPT | Mod: ,,, | Performed by: FAMILY MEDICINE

## 2023-11-02 PROCEDURE — G0008 FLU VACCINE - QUADRIVALENT - ADJUVANTED: ICD-10-PCS | Mod: ,,, | Performed by: FAMILY MEDICINE

## 2023-11-02 PROCEDURE — 1101F PT FALLS ASSESS-DOCD LE1/YR: CPT | Mod: ,,, | Performed by: FAMILY MEDICINE

## 2023-11-02 PROCEDURE — 1101F PR PT FALLS ASSESS DOC 0-1 FALLS W/OUT INJ PAST YR: ICD-10-PCS | Mod: ,,, | Performed by: FAMILY MEDICINE

## 2023-11-02 RX ORDER — DEXAMETHASONE SODIUM PHOSPHATE 4 MG/ML
4 INJECTION, SOLUTION INTRA-ARTICULAR; INTRALESIONAL; INTRAMUSCULAR; INTRAVENOUS; SOFT TISSUE
Status: COMPLETED | OUTPATIENT
Start: 2023-11-02 | End: 2023-11-02

## 2023-11-02 RX ORDER — METHYLPREDNISOLONE ACETATE 80 MG/ML
80 INJECTION, SUSPENSION INTRA-ARTICULAR; INTRALESIONAL; INTRAMUSCULAR; SOFT TISSUE
Status: COMPLETED | OUTPATIENT
Start: 2023-11-02 | End: 2023-11-02

## 2023-11-02 RX ADMIN — METHYLPREDNISOLONE ACETATE 80 MG: 80 INJECTION, SUSPENSION INTRA-ARTICULAR; INTRALESIONAL; INTRAMUSCULAR; SOFT TISSUE at 11:11

## 2023-11-02 RX ADMIN — DEXAMETHASONE SODIUM PHOSPHATE 4 MG: 4 INJECTION, SOLUTION INTRA-ARTICULAR; INTRALESIONAL; INTRAMUSCULAR; INTRAVENOUS; SOFT TISSUE at 11:11

## 2023-11-02 NOTE — PROGRESS NOTES
Subjective:      Patient ID: Vandana Allison is a 89 y.o. female.    Chief Complaint: Follow-up (3mon. Ck-up), Hyperlipidemia, and Diabetes    Vandana Allison a 89 y.o. female presents for follow up on all regular problems which are reviewed and discussed.   Tcc visit meds reconciled  Problem List Items Addressed This Visit          ID    Flu vaccine need    Relevant Orders    Influenza (FLUAD) - Quadrivalent (Adjuvanted) *Preferred* (65+) (PF) (Completed)       Endocrine    Diabetes mellitus, type 2 - Primary    Relevant Orders    CBC Auto Differential    Comprehensive Metabolic Panel    Lipid Panel    TSH    Urinalysis    Iron and TIBC    Hemoglobin A1C    Thyroid disease       GI    Hiatal hernia       Orthopedic    DJD (degenerative joint disease)       Past Medical History:  Past Medical History:   Diagnosis Date    Arthritis     Chronic kidney disease, stage 3b     Diabetes mellitus, type 2     Hiatal hernia     Hyperlipidemia     Hypertension     Neuropathy     Thyroid disease      Past Surgical History:   Procedure Laterality Date    BREAST SURGERY      Biopsy    EYE SURGERY      Remove cataracts both eyes    HYSTERECTOMY      NEPHRECTOMY Right     THYROIDECTOMY       Review of patient's allergies indicates:   Allergen Reactions    Aspirin      Current Outpatient Medications on File Prior to Visit   Medication Sig Dispense Refill    amLODIPine (NORVASC) 5 MG tablet Take 1 tablet (5 mg total) by mouth once daily. 90 tablet 3    empagliflozin (JARDIANCE) 10 mg tablet Take 1 tablet (10 mg total) by mouth once daily. HOLD THIS MEDICATION UNTIL YOU FOLLOW UP WITH YOUR PCP. 90 tablet 3    furosemide (LASIX) 20 MG tablet Take 1 tablet (20 mg total) by mouth 2 (two) times daily as needed. (Patient taking differently: Take 20 mg by mouth once daily.) 60 tablet 11    gabapentin (NEURONTIN) 400 MG capsule TAKE 1 CAPSULE BY MOUTH 4 TIMES A DAY AS NEEDED 360 capsule 4    hydrALAZINE (APRESOLINE) 25 MG tablet Take 1  tablet (25 mg total) by mouth 2 (two) times daily. (Patient taking differently: Take 2 tablets by mouth 2 (two) times daily.) 180 tablet 3    HYDROcodone-acetaminophen (NORCO)  mg per tablet Take 1 tablet by mouth every 8 (eight) hours as needed for Pain. 90 tablet 0    hydrocortisone (ANUSOL-HC) 2.5 % rectal cream Place rectally 2 (two) times daily. 28 g 12    insulin syringe-needle U-100 1 mL 30 gauge x 5/16 Syrg 1 Syringe by Misc.(Non-Drug; Combo Route) route 2 (two) times daily. 100 each 3    lactulose (CHRONULAC) 10 gram/15 mL solution Take 30 mLs (20 g total) by mouth once daily. 946 mL 11    levothyroxine (SYNTHROID) 100 MCG tablet Take 1 tablet (100 mcg total) by mouth before breakfast. 90 tablet 3    methocarbamoL (ROBAXIN) 750 MG Tab TAKE 2 TABLETS BY MOUTH 4 TIMES A DAY AS NEEDED FOR MUSCLE SPASMS  Strength: 750 mg 240 tablet 1    NOVOLIN N NPH U-100 INSULIN 100 unit/mL injection INJECT 40 UNITS INTO THE SKIN 2 (TWO) TIMES DAILY. 30 mL 2    pantoprazole (PROTONIX) 40 MG tablet TAKE 1 TABLET BY MOUTH TWICE A  tablet 3    predniSONE (DELTASONE) 2.5 MG tablet TAKE 1 TABLET BY MOUTH EVERY DAY 90 tablet 4    SYMBICORT 160-4.5 mcg/actuation HFAA Inhale 2 puffs into the lungs 2 (two) times a day. 10.2 g 11    traMADoL (ULTRAM) 50 mg tablet TAKE 1 TABLET BY MOUTH FOUR TIMES A  tablet 5    albuterol (PROVENTIL/VENTOLIN HFA) 90 mcg/actuation inhaler Inhale 2 puffs into the lungs every 4 (four) hours as needed. Rescue (Patient not taking: Reported on 11/2/2023) 18 g 12    bisoprolol-hydrochlorothiazide 5-6.25 mg (ZIAC) 5-6.25 mg Tab TAKE 1 TABLET TWICE A DAY    HOLD THIS MEDICATION UNTIL YOU FOLLOW UP WITH YOUR PCP (Patient not taking: Reported on 11/2/2023) 180 tablet 3    ferrous sulfate (FEOSOL) 325 mg (65 mg iron) Tab tablet Take 1 tablet (325 mg total) by mouth 2 (two) times daily. (Patient not taking: Reported on 11/2/2023) 120 tablet 3    lisinopriL (PRINIVIL,ZESTRIL) 40 MG tablet Take 1  tablet (40 mg total) by mouth 2 (two) times daily. HOLD THIS MEDICATION UNTIL YOU FOLLOW UP WITH YOUR PCP (Patient not taking: Reported on 11/2/2023) 180 tablet 3    potassium chloride SA (K-DUR,KLOR-CON M) 10 MEQ tablet Take 1 tablet (10 mEq total) by mouth once daily. (Patient not taking: Reported on 11/2/2023) 90 tablet 3    [DISCONTINUED] furosemide (LASIX) 40 MG tablet Take 1 tablet (40 mg total) by mouth once daily. HOLD THIS MEDICATION UNTIL YOUR FOLLOW UP WITH YOUR PCP (Patient not taking: Reported on 10/12/2022) 90 tablet 3    [DISCONTINUED] hydrocortisone (ANUSOL-HC) 25 mg suppository Place 1 suppository (25 mg total) rectally 2 (two) times daily. 20 suppository 1     No current facility-administered medications on file prior to visit.     Social History     Socioeconomic History    Marital status:    Tobacco Use    Smoking status: Never    Smokeless tobacco: Never   Substance and Sexual Activity    Alcohol use: Never    Drug use: Never    Sexual activity: Not Currently     Birth control/protection: None     Social Determinants of Health     Financial Resource Strain: Low Risk  (10/6/2022)    Overall Financial Resource Strain (CARDIA)     Difficulty of Paying Living Expenses: Not hard at all   Food Insecurity: No Food Insecurity (10/6/2022)    Hunger Vital Sign     Worried About Running Out of Food in the Last Year: Never true     Ran Out of Food in the Last Year: Never true   Transportation Needs: No Transportation Needs (10/6/2022)    PRAPARE - Transportation     Lack of Transportation (Medical): No     Lack of Transportation (Non-Medical): No   Physical Activity: Insufficiently Active (10/6/2022)    Exercise Vital Sign     Days of Exercise per Week: 1 day     Minutes of Exercise per Session: 20 min   Stress: No Stress Concern Present (10/6/2022)    [employer] - Occupational Stress Questionnaire     Feeling of Stress : Not at all   Social Connections: Moderately  "Isolated (10/6/2022)    Social Connection and Isolation Panel [NHANES]     Frequency of Communication with Friends and Family: More than three times a week     Frequency of Social Gatherings with Friends and Family: More than three times a week     Attends Hinduism Services: Never     Active Member of Clubs or Organizations: Yes     Attends Club or Organization Meetings: Never     Marital Status:    Housing Stability: Low Risk  (10/6/2022)    Housing Stability Vital Sign     Unable to Pay for Housing in the Last Year: No     Number of Places Lived in the Last Year: 1     Unstable Housing in the Last Year: No     Family History   Problem Relation Age of Onset    Diabetes Mother         Diabetic    Heart disease Father     Diabetes Sister     Cancer Sister         Breast cancer    Diabetes Brother     Cancer Brother         Prostate cancer    Cancer Daughter         Uterine cancer    Diabetes Sister         Diabetic       Review of Systems   Constitutional: Negative.  Negative for activity change, appetite change, chills and diaphoresis.   HENT: Negative.  Negative for congestion, ear pain, hearing loss and postnasal drip.    Eyes:  Negative for itching.   Respiratory:  Negative for chest tightness and shortness of breath.    Cardiovascular:  Negative for chest pain.   Gastrointestinal:  Negative for abdominal pain.   Endocrine: Negative for polydipsia.   Genitourinary:  Negative for frequency.   Musculoskeletal:  Negative for back pain.   Neurological:  Negative for headaches.     Objective:     /60 (BP Location: Left arm, Patient Position: Sitting, BP Method: Large (Manual))   Pulse 93   Resp 18   Ht 5' 3" (1.6 m)   Wt 75.8 kg (167 lb)   SpO2 97%   BMI 29.58 kg/m²     Physical Exam  Constitutional:       Appearance: Normal appearance. She is obese.   HENT:      Head: Normocephalic and atraumatic.      Right Ear: External ear normal.      Left Ear: External ear normal.      Nose: Nose normal.      " Mouth/Throat:      Mouth: Mucous membranes are moist.      Pharynx: Oropharynx is clear.   Eyes:      Pupils: Pupils are equal, round, and reactive to light.   Cardiovascular:      Rate and Rhythm: Normal rate and regular rhythm.      Heart sounds: Normal heart sounds. No murmur heard.     No gallop.   Pulmonary:      Effort: Pulmonary effort is normal. No respiratory distress.      Breath sounds: Normal breath sounds. No wheezing.   Abdominal:      Palpations: Abdomen is soft.   Musculoskeletal:      Cervical back: Normal range of motion and neck supple.   Skin:     General: Skin is warm and dry.   Neurological:      General: No focal deficit present.      Mental Status: She is alert and oriented to person, place, and time. Mental status is at baseline.   Psychiatric:         Mood and Affect: Mood normal.         Behavior: Behavior normal.         Thought Content: Thought content normal.         Judgment: Judgment normal.   Assessment:     1. Type 2 diabetes mellitus without complication, without long-term current use of insulin    2. Flu vaccine need    3. Osteoarthritis, unspecified osteoarthritis type, unspecified site    4. Hiatal hernia    5. Thyroid disease        Plan:     Problem List Items Addressed This Visit          ID    Flu vaccine need    Relevant Orders    Influenza (FLUAD) - Quadrivalent (Adjuvanted) *Preferred* (65+) (PF) (Completed)       Endocrine    Diabetes mellitus, type 2 - Primary    Relevant Orders    CBC Auto Differential    Comprehensive Metabolic Panel    Lipid Panel    TSH    Urinalysis    Iron and TIBC    Hemoglobin A1C    Thyroid disease       GI    Hiatal hernia       Orthopedic    DJD (degenerative joint disease)     No follow-ups on file.  Lab owo  Call pt/daughter  1m fu    I am having Vandana Katzms maintain her lactulose, albuterol, SYMBICORT, potassium chloride SA, predniSONE, ferrous sulfate, lisinopriL, insulin syringe-needle U-100, bisoprolol-hydrochlorothiazide 5-6.25  mg, empagliflozin, traMADoL, furosemide, gabapentin, hydrocortisone, pantoprazole, HYDROcodone-acetaminophen, amLODIPine, methocarbamoL, hydrALAZINE, levothyroxine, and NovoLIN N NPH U-100 Insulin. We administered dexAMETHasone and methylPREDNISolone acetate.    Vandana was seen today for follow-up, hyperlipidemia and diabetes.    Diagnoses and all orders for this visit:    Type 2 diabetes mellitus without complication, without long-term current use of insulin  -     CBC Auto Differential; Future  -     Comprehensive Metabolic Panel; Standing  -     Lipid Panel; Standing  -     TSH; Standing  -     Urinalysis; Standing  -     Iron and TIBC; Future  -     Hemoglobin A1C; Future    Flu vaccine need  -     Influenza (FLUAD) - Quadrivalent (Adjuvanted) *Preferred* (65+) (PF)    Osteoarthritis, unspecified osteoarthritis type, unspecified site    Hiatal hernia    Thyroid disease    Other orders  -     dexAMETHasone injection 4 mg  -     methylPREDNISolone acetate injection 80 mg      Medications Ordered This Encounter   Medications    dexAMETHasone injection 4 mg    methylPREDNISolone acetate injection 80 mg     [unfilled]  Orders Placed This Encounter   Procedures    Influenza (FLUAD) - Quadrivalent (Adjuvanted) *Preferred* (65+) (PF)    CBC Auto Differential     Standing Status:   Future     Number of Occurrences:   1     Standing Expiration Date:   12/31/2024    Comprehensive Metabolic Panel     Standing Status:   Standing     Number of Occurrences:   4     Standing Expiration Date:   11/2/2024    Lipid Panel     Standing Status:   Standing     Number of Occurrences:   2     Standing Expiration Date:   12/31/2024    TSH     Standing Status:   Standing     Number of Occurrences:   2     Standing Expiration Date:   12/31/2024    Urinalysis     Standing Status:   Standing     Number of Occurrences:   1     Standing Expiration Date:   5/2/2025     Order Specific Question:   Collection Type     Answer:   Urine, Clean Catch     Iron and TIBC     Standing Status:   Future     Number of Occurrences:   1     Standing Expiration Date:   5/2/2025    Hemoglobin A1C     Standing Status:   Future     Number of Occurrences:   1     Standing Expiration Date:   12/31/2024

## 2023-11-06 RX ORDER — HYDROCODONE BITARTRATE AND ACETAMINOPHEN 10; 325 MG/1; MG/1
1 TABLET ORAL EVERY 8 HOURS PRN
Qty: 90 TABLET | Refills: 0 | Status: SHIPPED | OUTPATIENT
Start: 2023-11-06 | End: 2023-12-04 | Stop reason: SDUPTHER

## 2023-11-06 NOTE — TELEPHONE ENCOUNTER
----- Message from Cm Larson III, DO sent at 11/3/2023  9:21 AM CDT -----  Labs look really good.  Resume ezfe 1 po on mwf  Resume ziac 1 qd  Stay off kcl and lisinopril for now

## 2023-11-14 ENCOUNTER — PATIENT MESSAGE (OUTPATIENT)
Dept: ADMINISTRATIVE | Facility: HOSPITAL | Age: 88
End: 2023-11-14

## 2023-11-27 ENCOUNTER — PATIENT OUTREACH (OUTPATIENT)
Dept: ADMINISTRATIVE | Facility: HOSPITAL | Age: 88
End: 2023-11-27

## 2023-11-27 NOTE — PROGRESS NOTES
Health maintenance record review for population health care gaps    Noted to upcoming appointment: 1 mo *Diabetes Urine Scree, Vaccines and Eye Exam due for compliance*     Vandana Allison  [employer] User13 days ago       Patient Questionnaire Submission  --------------------------------     Questionnaire: Preventative Care Screenings     Question: Would you like help scheduling your screenings?  Answer:   No     Question: Have you completed any of these tests at a non-[facility] location recently?  Answer:   Yes     Question: Tell us the test and location it was done.  Answer:   [facility], 2023       [employer] User  Vandana Allison13 days ago     CI  Dear Ms. Allison,     Regular health screenings are one of the most important things you can do for your health. These medical tests help find problems before they start.     You are due for the health screening(s) below:      Due Now  Topic Last Completed Next Steps   Urine Screening    Learn more   Eye Exam

## 2023-11-29 RX ORDER — BUDESONIDE AND FORMOTEROL FUMARATE DIHYDRATE 160; 4.5 UG/1; UG/1
2 AEROSOL RESPIRATORY (INHALATION) 2 TIMES DAILY
Qty: 10.2 G | Refills: 11 | Status: SHIPPED | OUTPATIENT
Start: 2023-11-29 | End: 2024-04-01

## 2023-12-04 ENCOUNTER — OFFICE VISIT (OUTPATIENT)
Dept: PRIMARY CARE CLINIC | Facility: CLINIC | Age: 88
End: 2023-12-04
Payer: COMMERCIAL

## 2023-12-04 VITALS
SYSTOLIC BLOOD PRESSURE: 138 MMHG | HEIGHT: 63 IN | OXYGEN SATURATION: 95 % | WEIGHT: 165 LBS | BODY MASS INDEX: 29.23 KG/M2 | RESPIRATION RATE: 18 BRPM | HEART RATE: 64 BPM | DIASTOLIC BLOOD PRESSURE: 60 MMHG

## 2023-12-04 DIAGNOSIS — E11.9 TYPE 2 DIABETES MELLITUS WITHOUT COMPLICATION, WITHOUT LONG-TERM CURRENT USE OF INSULIN: ICD-10-CM

## 2023-12-04 DIAGNOSIS — E86.0 DEHYDRATION WITH HYPONATREMIA: ICD-10-CM

## 2023-12-04 DIAGNOSIS — D62 ACUTE BLOOD LOSS ANEMIA: ICD-10-CM

## 2023-12-04 DIAGNOSIS — G62.9 NEUROPATHY: Primary | ICD-10-CM

## 2023-12-04 DIAGNOSIS — E78.5 HYPERLIPIDEMIA, UNSPECIFIED HYPERLIPIDEMIA TYPE: ICD-10-CM

## 2023-12-04 DIAGNOSIS — E87.1 DEHYDRATION WITH HYPONATREMIA: ICD-10-CM

## 2023-12-04 PROCEDURE — 99214 PR OFFICE/OUTPT VISIT, EST, LEVL IV, 30-39 MIN: ICD-10-PCS | Mod: ,,, | Performed by: FAMILY MEDICINE

## 2023-12-04 PROCEDURE — 1159F PR MEDICATION LIST DOCUMENTED IN MEDICAL RECORD: ICD-10-PCS | Mod: ,,, | Performed by: FAMILY MEDICINE

## 2023-12-04 PROCEDURE — 1101F PT FALLS ASSESS-DOCD LE1/YR: CPT | Mod: ,,, | Performed by: FAMILY MEDICINE

## 2023-12-04 PROCEDURE — 3288F FALL RISK ASSESSMENT DOCD: CPT | Mod: ,,, | Performed by: FAMILY MEDICINE

## 2023-12-04 PROCEDURE — 3288F PR FALLS RISK ASSESSMENT DOCUMENTED: ICD-10-PCS | Mod: ,,, | Performed by: FAMILY MEDICINE

## 2023-12-04 PROCEDURE — 99214 OFFICE O/P EST MOD 30 MIN: CPT | Mod: ,,, | Performed by: FAMILY MEDICINE

## 2023-12-04 PROCEDURE — 1159F MED LIST DOCD IN RCRD: CPT | Mod: ,,, | Performed by: FAMILY MEDICINE

## 2023-12-04 PROCEDURE — 1101F PR PT FALLS ASSESS DOC 0-1 FALLS W/OUT INJ PAST YR: ICD-10-PCS | Mod: ,,, | Performed by: FAMILY MEDICINE

## 2023-12-04 RX ORDER — HYDROCODONE BITARTRATE AND ACETAMINOPHEN 10; 325 MG/1; MG/1
1 TABLET ORAL EVERY 8 HOURS PRN
Qty: 90 TABLET | Refills: 0 | Status: SHIPPED | OUTPATIENT
Start: 2023-12-04 | End: 2024-02-05 | Stop reason: SDUPTHER

## 2023-12-04 RX ORDER — LACTULOSE 10 G/15ML
30 SOLUTION ORAL; RECTAL DAILY
Qty: 946 ML | Refills: 11 | Status: SHIPPED | OUTPATIENT
Start: 2023-12-04 | End: 2023-12-04 | Stop reason: SDUPTHER

## 2023-12-04 RX ORDER — LACTULOSE 10 G/15ML
30 SOLUTION ORAL; RECTAL DAILY
Qty: 946 ML | Refills: 11 | Status: SHIPPED | OUTPATIENT
Start: 2023-12-04

## 2023-12-04 RX ORDER — FUROSEMIDE 20 MG/1
20 TABLET ORAL DAILY
Qty: 90 TABLET | Refills: 3 | Status: SHIPPED | OUTPATIENT
Start: 2023-12-04 | End: 2024-12-03

## 2023-12-04 NOTE — PROGRESS NOTES
Subjective:      Patient ID: Vandana Allison is a 89 y.o. female.    Chief Complaint: Follow-up (1mon. Ck-up) and Diabetes    Vandana Allison a 89 y.o. female presents for follow up on all regular problems which are reviewed and discussed.     Problem List Items Addressed This Visit    None      Past Medical History:  Past Medical History:   Diagnosis Date    Arthritis     Chronic kidney disease, stage 3b     Diabetes mellitus, type 2     Hiatal hernia     Hyperlipidemia     Hypertension     Neuropathy     Thyroid disease      Past Surgical History:   Procedure Laterality Date    BREAST SURGERY      Biopsy    EYE SURGERY      Remove cataracts both eyes    HYSTERECTOMY      NEPHRECTOMY Right     THYROIDECTOMY       Review of patient's allergies indicates:   Allergen Reactions    Aspirin      Current Outpatient Medications on File Prior to Visit   Medication Sig Dispense Refill    albuterol (PROVENTIL/VENTOLIN HFA) 90 mcg/actuation inhaler Inhale 2 puffs into the lungs every 4 (four) hours as needed. Rescue 18 g 12    amLODIPine (NORVASC) 5 MG tablet Take 1 tablet (5 mg total) by mouth once daily. 90 tablet 3    bisoprolol-hydrochlorothiazide 5-6.25 mg (ZIAC) 5-6.25 mg Tab TAKE 1 TABLET TWICE A DAY    HOLD THIS MEDICATION UNTIL YOU FOLLOW UP WITH YOUR PCP (Patient taking differently: Take 1 tablet by mouth once daily. TAKE 1 TABLET TWICE A DAY    HOLD THIS MEDICATION UNTIL YOU FOLLOW UP WITH YOUR PCP) 180 tablet 3    empagliflozin (JARDIANCE) 10 mg tablet Take 1 tablet (10 mg total) by mouth once daily. HOLD THIS MEDICATION UNTIL YOU FOLLOW UP WITH YOUR PCP. 90 tablet 3    ferrous sulfate (FEOSOL) 325 mg (65 mg iron) Tab tablet Take 1 tablet (325 mg total) by mouth 2 (two) times daily. 120 tablet 3    gabapentin (NEURONTIN) 400 MG capsule TAKE 1 CAPSULE BY MOUTH 4 TIMES A DAY AS NEEDED 360 capsule 4    hydrALAZINE (APRESOLINE) 25 MG tablet Take 1 tablet (25 mg total) by mouth 2 (two) times  daily. (Patient taking differently: Take 2 tablets by mouth 2 (two) times daily.) 180 tablet 3    hydrocortisone (ANUSOL-HC) 2.5 % rectal cream Place rectally 2 (two) times daily. 28 g 12    insulin syringe-needle U-100 1 mL 30 gauge x 5/16 Syrg 1 Syringe by Misc.(Non-Drug; Combo Route) route 2 (two) times daily. 100 each 3    levothyroxine (SYNTHROID) 100 MCG tablet Take 1 tablet (100 mcg total) by mouth before breakfast. 90 tablet 3    methocarbamoL (ROBAXIN) 750 MG Tab TAKE 2 TABLETS BY MOUTH 4 TIMES A DAY AS NEEDED FOR MUSCLE SPASMS  Strength: 750 mg 240 tablet 1    NOVOLIN N NPH U-100 INSULIN 100 unit/mL injection INJECT 40 UNITS INTO THE SKIN 2 (TWO) TIMES DAILY. 30 mL 2    pantoprazole (PROTONIX) 40 MG tablet TAKE 1 TABLET BY MOUTH TWICE A  tablet 3    predniSONE (DELTASONE) 2.5 MG tablet TAKE 1 TABLET BY MOUTH EVERY DAY 90 tablet 4    SYMBICORT 160-4.5 mcg/actuation HFAA Inhale 2 puffs into the lungs 2 (two) times a day. 10.2 g 11    traMADoL (ULTRAM) 50 mg tablet TAKE 1 TABLET BY MOUTH FOUR TIMES A  tablet 5    [DISCONTINUED] furosemide (LASIX) 20 MG tablet Take 1 tablet (20 mg total) by mouth 2 (two) times daily as needed. (Patient taking differently: Take 20 mg by mouth once daily.) 60 tablet 11    [DISCONTINUED] HYDROcodone-acetaminophen (NORCO)  mg per tablet Take 1 tablet by mouth every 8 (eight) hours as needed for Pain. 90 tablet 0    [DISCONTINUED] lactulose (CHRONULAC) 10 gram/15 mL solution Take 30 mLs (20 g total) by mouth once daily. 946 mL 11    potassium chloride SA (K-DUR,KLOR-CON M) 10 MEQ tablet Take 1 tablet (10 mEq total) by mouth once daily. (Patient not taking: Reported on 11/2/2023) 90 tablet 3    [DISCONTINUED] lisinopriL (PRINIVIL,ZESTRIL) 40 MG tablet Take 1 tablet (40 mg total) by mouth 2 (two) times daily. HOLD THIS MEDICATION UNTIL YOU FOLLOW UP WITH YOUR PCP (Patient not taking: Reported on 11/2/2023) 180 tablet 3     No current  facility-administered medications on file prior to visit.     Social History     Socioeconomic History    Marital status:    Tobacco Use    Smoking status: Never    Smokeless tobacco: Never   Substance and Sexual Activity    Alcohol use: Never    Drug use: Never    Sexual activity: Not Currently     Birth control/protection: None     Social Determinants of Health     Financial Resource Strain: Low Risk  (10/6/2022)    Overall Financial Resource Strain (CARDIA)     Difficulty of Paying Living Expenses: Not hard at all   Food Insecurity: No Food Insecurity (10/6/2022)    Hunger Vital Sign     Worried About Running Out of Food in the Last Year: Never true     Ran Out of Food in the Last Year: Never true   Transportation Needs: No Transportation Needs (10/6/2022)    PRAPARE - Transportation     Lack of Transportation (Medical): No     Lack of Transportation (Non-Medical): No   Physical Activity: Insufficiently Active (10/6/2022)    Exercise Vital Sign     Days of Exercise per Week: 1 day     Minutes of Exercise per Session: 20 min   Stress: No Stress Concern Present (10/6/2022)    [employer] - Occupational Stress Questionnaire     Feeling of Stress : Not at all   Social Connections: Moderately Isolated (10/6/2022)    Social Connection and Isolation Panel [NHANES]     Frequency of Communication with Friends and Family: More than three times a week     Frequency of Social Gatherings with Friends and Family: More than three times a week     Attends Holiness Services: Never     Active Member of Clubs or Organizations: Yes     Attends Club or Organization Meetings: Never     Marital Status:    Housing Stability: Low Risk  (10/6/2022)    Housing Stability Vital Sign     Unable to Pay for Housing in the Last Year: No     Number of Places Lived in the Last Year: 1     Unstable Housing in the Last Year: No     Family History   Problem Relation Age of Onset     "Diabetes Mother         Diabetic    Heart disease Father     Diabetes Sister     Cancer Sister         Breast cancer    Diabetes Brother     Cancer Brother         Prostate cancer    Cancer Daughter         Uterine cancer    Diabetes Sister         Diabetic       Review of Systems   Constitutional: Negative.  Negative for activity change, appetite change, chills and diaphoresis.   HENT: Negative.  Negative for congestion, ear pain, hearing loss and postnasal drip.    Eyes:  Negative for itching.   Respiratory:  Negative for chest tightness and shortness of breath.    Cardiovascular:  Negative for chest pain.   Gastrointestinal:  Negative for abdominal pain.   Endocrine: Negative for polydipsia.   Genitourinary:  Negative for frequency.   Musculoskeletal:  Negative for back pain.   Neurological:  Negative for headaches.     Objective:     /60 (BP Location: Left arm, Patient Position: Sitting, BP Method: Large (Manual))   Pulse 64   Resp 18   Ht 5' 3" (1.6 m)   Wt 74.8 kg (165 lb)   SpO2 95%   BMI 29.23 kg/m²     Physical Exam  Constitutional:       Appearance: Normal appearance. She is obese. She is not ill-appearing or diaphoretic.   HENT:      Head: Normocephalic and atraumatic.      Right Ear: External ear normal.      Left Ear: External ear normal.      Nose: Nose normal.      Mouth/Throat:      Mouth: Mucous membranes are moist.      Pharynx: Oropharynx is clear.   Eyes:      Pupils: Pupils are equal, round, and reactive to light.   Cardiovascular:      Rate and Rhythm: Normal rate and regular rhythm.      Heart sounds: Normal heart sounds. No murmur heard.     No gallop.   Pulmonary:      Effort: Pulmonary effort is normal. No respiratory distress.      Breath sounds: Normal breath sounds. No wheezing or rales.   Abdominal:      Palpations: Abdomen is soft.   Musculoskeletal:      Cervical back: Normal range of motion and neck supple.   Skin:     General: Skin is warm and dry.   Neurological: "      General: No focal deficit present.      Mental Status: She is alert and oriented to person, place, and time. Mental status is at baseline.   Psychiatric:         Mood and Affect: Mood normal.         Behavior: Behavior normal.         Thought Content: Thought content normal.         Judgment: Judgment normal.   Assessment:     No diagnosis found.    Plan:     Problem List Items Addressed This Visit    None    No follow-ups on file.      I have changed Vandana Alliosn's furosemide. I am also having her maintain her albuterol, potassium chloride SA, predniSONE, ferrous sulfate, insulin syringe-needle U-100, bisoprolol-hydrochlorothiazide 5-6.25 mg, empagliflozin, traMADoL, gabapentin, hydrocortisone, pantoprazole, amLODIPine, methocarbamoL, hydrALAZINE, levothyroxine, NovoLIN N NPH U-100 Insulin, SYMBICORT, HYDROcodone-acetaminophen, and lactulose.    There are no diagnoses linked to this encounter.  Medications Ordered This Encounter   Medications    furosemide (LASIX) 20 MG tablet     Sig: Take 1 tablet (20 mg total) by mouth once daily.     Dispense:  90 tablet     Refill:  3    HYDROcodone-acetaminophen (NORCO)  mg per tablet     Sig: Take 1 tablet by mouth every 8 (eight) hours as needed for Pain.     Dispense:  90 tablet     Refill:  0     n/a      Order Specific Question:   I have reviewed the Prescription Drug Monitoring Program (PDMP) database for this patient prior to prescribing the above opioid medication     Answer:   Yes    lactulose (CHRONULAC) 10 gram/15 mL solution     Sig: Take 30 mLs (20 g total) by mouth once daily.     Dispense:  946 mL     Refill:  11     [unfilled]  No orders of the defined types were placed in this encounter.

## 2024-01-09 DIAGNOSIS — Z71.89 COMPLEX CARE COORDINATION: ICD-10-CM

## 2024-01-17 RX ORDER — TRAMADOL HYDROCHLORIDE 50 MG/1
TABLET ORAL
Qty: 120 TABLET | Refills: 4 | Status: SHIPPED | OUTPATIENT
Start: 2024-01-17 | End: 2024-02-21

## 2024-01-17 RX ORDER — GABAPENTIN 400 MG/1
CAPSULE ORAL
Qty: 360 CAPSULE | Refills: 4 | Status: SHIPPED | OUTPATIENT
Start: 2024-01-17

## 2024-02-05 ENCOUNTER — OFFICE VISIT (OUTPATIENT)
Dept: PRIMARY CARE CLINIC | Facility: CLINIC | Age: 89
End: 2024-02-05
Payer: COMMERCIAL

## 2024-02-05 VITALS
RESPIRATION RATE: 16 BRPM | WEIGHT: 182 LBS | BODY MASS INDEX: 32.25 KG/M2 | HEART RATE: 64 BPM | OXYGEN SATURATION: 95 % | SYSTOLIC BLOOD PRESSURE: 130 MMHG | DIASTOLIC BLOOD PRESSURE: 60 MMHG | HEIGHT: 63 IN

## 2024-02-05 DIAGNOSIS — K57.30 COLON, DIVERTICULOSIS: ICD-10-CM

## 2024-02-05 DIAGNOSIS — E87.1 DEHYDRATION WITH HYPONATREMIA: ICD-10-CM

## 2024-02-05 DIAGNOSIS — I10 HYPERTENSION, UNSPECIFIED TYPE: ICD-10-CM

## 2024-02-05 DIAGNOSIS — E78.5 HYPERLIPIDEMIA, UNSPECIFIED HYPERLIPIDEMIA TYPE: ICD-10-CM

## 2024-02-05 DIAGNOSIS — E86.0 DEHYDRATION WITH HYPONATREMIA: ICD-10-CM

## 2024-02-05 DIAGNOSIS — G62.9 NEUROPATHY: Primary | ICD-10-CM

## 2024-02-05 DIAGNOSIS — M19.90 OSTEOARTHRITIS, UNSPECIFIED OSTEOARTHRITIS TYPE, UNSPECIFIED SITE: ICD-10-CM

## 2024-02-05 DIAGNOSIS — E07.9 THYROID DISEASE: ICD-10-CM

## 2024-02-05 DIAGNOSIS — E11.9 TYPE 2 DIABETES MELLITUS WITHOUT COMPLICATION, WITHOUT LONG-TERM CURRENT USE OF INSULIN: ICD-10-CM

## 2024-02-05 PROCEDURE — 3288F FALL RISK ASSESSMENT DOCD: CPT | Mod: ,,, | Performed by: FAMILY MEDICINE

## 2024-02-05 PROCEDURE — 96372 THER/PROPH/DIAG INJ SC/IM: CPT | Mod: ,,, | Performed by: FAMILY MEDICINE

## 2024-02-05 PROCEDURE — 1101F PT FALLS ASSESS-DOCD LE1/YR: CPT | Mod: ,,, | Performed by: FAMILY MEDICINE

## 2024-02-05 PROCEDURE — 1159F MED LIST DOCD IN RCRD: CPT | Mod: ,,, | Performed by: FAMILY MEDICINE

## 2024-02-05 PROCEDURE — 99214 OFFICE O/P EST MOD 30 MIN: CPT | Mod: 25,,, | Performed by: FAMILY MEDICINE

## 2024-02-05 RX ORDER — DEXAMETHASONE SODIUM PHOSPHATE 4 MG/ML
4 INJECTION, SOLUTION INTRA-ARTICULAR; INTRALESIONAL; INTRAMUSCULAR; INTRAVENOUS; SOFT TISSUE
Status: COMPLETED | OUTPATIENT
Start: 2024-02-05 | End: 2024-02-05

## 2024-02-05 RX ORDER — HYDROCODONE BITARTRATE AND ACETAMINOPHEN 10; 325 MG/1; MG/1
1 TABLET ORAL EVERY 8 HOURS PRN
Qty: 90 TABLET | Refills: 0 | Status: SHIPPED | OUTPATIENT
Start: 2024-02-05 | End: 2024-04-01 | Stop reason: SDUPTHER

## 2024-02-05 RX ORDER — KETOROLAC TROMETHAMINE 30 MG/ML
60 INJECTION, SOLUTION INTRAMUSCULAR; INTRAVENOUS
Status: COMPLETED | OUTPATIENT
Start: 2024-02-05 | End: 2024-02-05

## 2024-02-05 RX ORDER — METHYLPREDNISOLONE ACETATE 80 MG/ML
80 INJECTION, SUSPENSION INTRA-ARTICULAR; INTRALESIONAL; INTRAMUSCULAR; SOFT TISSUE
Status: COMPLETED | OUTPATIENT
Start: 2024-02-05 | End: 2024-02-05

## 2024-02-05 RX ADMIN — METHYLPREDNISOLONE ACETATE 80 MG: 80 INJECTION, SUSPENSION INTRA-ARTICULAR; INTRALESIONAL; INTRAMUSCULAR; SOFT TISSUE at 02:02

## 2024-02-05 RX ADMIN — KETOROLAC TROMETHAMINE 60 MG: 30 INJECTION, SOLUTION INTRAMUSCULAR; INTRAVENOUS at 02:02

## 2024-02-05 RX ADMIN — DEXAMETHASONE SODIUM PHOSPHATE 4 MG: 4 INJECTION, SOLUTION INTRA-ARTICULAR; INTRALESIONAL; INTRAMUSCULAR; INTRAVENOUS; SOFT TISSUE at 02:02

## 2024-02-05 NOTE — PROGRESS NOTES
Subjective:      Patient ID: Vandana Allison is a 89 y.o. female.    Chief Complaint: Hip Pain, Leg Pain, Headache (Across forehead ), Knee Pain, Extremity Weakness, and Rectal Bleeding (Hospital couldn't figure out where it was coming from both times )    Vandana Allison a 89 y.o. female presents for follow up on all regular problems which are reviewed and discussed.     Problem List Items Addressed This Visit          Neuro    Neuropathy - Primary       Cardiac/Vascular    Hyperlipidemia    Hypertension       Renal/    Dehydration with hyponatremia       Endocrine    Diabetes mellitus, type 2    Thyroid disease       GI    Colon, diverticulosis       Orthopedic    DJD (degenerative joint disease)       Past Medical History:  Past Medical History:   Diagnosis Date    Arthritis     Chronic kidney disease, stage 3b     Diabetes mellitus, type 2     Hiatal hernia     Hyperlipidemia     Hypertension     Neuropathy     Thyroid disease      Past Surgical History:   Procedure Laterality Date    BREAST SURGERY      Biopsy    EYE SURGERY      Remove cataracts both eyes    HYSTERECTOMY      NEPHRECTOMY Right     THYROIDECTOMY       Review of patient's allergies indicates:   Allergen Reactions    Aspirin      Current Outpatient Medications on File Prior to Visit   Medication Sig Dispense Refill    albuterol (PROVENTIL/VENTOLIN HFA) 90 mcg/actuation inhaler Inhale 2 puffs into the lungs every 4 (four) hours as needed. Rescue 18 g 12    amLODIPine (NORVASC) 5 MG tablet Take 1 tablet (5 mg total) by mouth once daily. 90 tablet 3    bisoprolol-hydrochlorothiazide 5-6.25 mg (ZIAC) 5-6.25 mg Tab TAKE 1 TABLET TWICE A DAY    HOLD THIS MEDICATION UNTIL YOU FOLLOW UP WITH YOUR PCP (Patient taking differently: Take 1 tablet by mouth once daily. TAKE 1 TABLET TWICE A DAY    HOLD THIS MEDICATION UNTIL YOU FOLLOW UP WITH YOUR PCP) 180 tablet 3    empagliflozin (JARDIANCE) 10 mg tablet Take 1 tablet (10 mg total) by mouth once daily.  HOLD THIS MEDICATION UNTIL YOU FOLLOW UP WITH YOUR PCP. 90 tablet 3    ferrous sulfate (FEOSOL) 325 mg (65 mg iron) Tab tablet Take 1 tablet (325 mg total) by mouth 2 (two) times daily. 120 tablet 3    furosemide (LASIX) 20 MG tablet Take 1 tablet (20 mg total) by mouth once daily. 90 tablet 3    gabapentin (NEURONTIN) 400 MG capsule TAKE 1 CAPSULE BY MOUTH FOUR TIMES A DAY AS NEEDED 360 capsule 4    hydrALAZINE (APRESOLINE) 25 MG tablet Take 1 tablet (25 mg total) by mouth 2 (two) times daily. (Patient taking differently: Take 2 tablets by mouth 2 (two) times daily.) 180 tablet 3    hydrocortisone (ANUSOL-HC) 2.5 % rectal cream Place rectally 2 (two) times daily. 28 g 12    insulin syringe-needle U-100 1 mL 30 gauge x 5/16 Syrg 1 Syringe by Misc.(Non-Drug; Combo Route) route 2 (two) times daily. 100 each 3    lactulose (CHRONULAC) 10 gram/15 mL solution Take 30 mLs (20 g total) by mouth once daily. 946 mL 11    levothyroxine (SYNTHROID) 100 MCG tablet Take 1 tablet (100 mcg total) by mouth before breakfast. 90 tablet 3    methocarbamoL (ROBAXIN) 750 MG Tab TAKE 2 TABLETS BY MOUTH 4 TIMES A DAY AS NEEDED FOR MUSCLE SPASMS  Strength: 750 mg 240 tablet 1    NOVOLIN N NPH U-100 INSULIN 100 unit/mL injection INJECT 40 UNITS INTO THE SKIN 2 (TWO) TIMES DAILY. 30 mL 2    pantoprazole (PROTONIX) 40 MG tablet TAKE 1 TABLET BY MOUTH TWICE A  tablet 3    predniSONE (DELTASONE) 2.5 MG tablet TAKE 1 TABLET BY MOUTH EVERY DAY 90 tablet 4    SYMBICORT 160-4.5 mcg/actuation HFAA Inhale 2 puffs into the lungs 2 (two) times a day. 10.2 g 11    traMADoL (ULTRAM) 50 mg tablet TAKE 1 TABLET BY MOUTH FOUR TIMES A  tablet 4    [DISCONTINUED] HYDROcodone-acetaminophen (NORCO)  mg per tablet Take 1 tablet by mouth every 8 (eight) hours as needed for Pain. 90 tablet 0    potassium chloride SA (K-DUR,KLOR-CON M) 10 MEQ tablet Take 1 tablet (10 mEq total) by mouth once daily. (Patient not taking: Reported on 11/2/2023) 90  tablet 3     No current facility-administered medications on file prior to visit.     Social History     Socioeconomic History    Marital status:    Tobacco Use    Smoking status: Never    Smokeless tobacco: Never   Substance and Sexual Activity    Alcohol use: Never    Drug use: Never    Sexual activity: Not Currently     Birth control/protection: None     Social Determinants of Health     Financial Resource Strain: Low Risk  (10/6/2022)    Overall Financial Resource Strain (CARDIA)     Difficulty of Paying Living Expenses: Not hard at all   Food Insecurity: No Food Insecurity (2/5/2024)    Hunger Vital Sign     Worried About Running Out of Food in the Last Year: Never true     Ran Out of Food in the Last Year: Never true   Transportation Needs: No Transportation Needs (2/5/2024)    PRAPARE - Transportation     Lack of Transportation (Medical): No     Lack of Transportation (Non-Medical): No   Physical Activity: Insufficiently Active (10/6/2022)    Exercise Vital Sign     Days of Exercise per Week: 1 day     Minutes of Exercise per Session: 20 min   Stress: No Stress Concern Present (2/5/2024)    [employer] [place] - Occupational Stress Questionnaire     Feeling of Stress : Not at all   Social Connections: Unknown (2/5/2024)    Social Connection and Isolation Panel [NHANES]     Frequency of Communication with Friends and Family: More than three times a week     Frequency of Social Gatherings with Friends and Family: Once a week     Active Member of Clubs or Organizations: No     Attends Club or Organization Meetings: Never     Marital Status:    Housing Stability: Unknown (2/5/2024)    Housing Stability Vital Sign     Unable to Pay for Housing in the Last Year: No     Unstable Housing in the Last Year: No     Family History   Problem Relation Age of Onset    Diabetes Mother         Diabetic    Heart disease Father     Diabetes Sister     Cancer Sister         Breast cancer     "Diabetes Brother     Cancer Brother         Prostate cancer    Cancer Daughter         Uterine cancer    Diabetes Sister         Diabetic       Review of Systems   Constitutional: Negative.  Negative for activity change, appetite change, chills and diaphoresis.   HENT: Negative.  Negative for congestion, ear pain, hearing loss and postnasal drip.    Eyes:  Negative for itching.   Respiratory:  Negative for chest tightness and shortness of breath.    Cardiovascular:  Negative for chest pain.   Gastrointestinal:  Negative for abdominal pain.   Endocrine: Negative for polydipsia.   Genitourinary:  Negative for frequency.   Musculoskeletal:  Positive for arthralgias, gait problem, joint swelling and myalgias. Negative for back pain.   Neurological:  Negative for headaches.       Objective:     /60 (BP Location: Left arm, Patient Position: Sitting, BP Method: Large (Manual))   Pulse 64   Resp 16   Ht 5' 3" (1.6 m)   Wt 82.6 kg (182 lb)   SpO2 95%   BMI 32.24 kg/m²     Physical Exam  Constitutional:       Appearance: Normal appearance. She is obese.   HENT:      Head: Normocephalic and atraumatic.      Right Ear: External ear normal.      Left Ear: External ear normal.      Nose: Nose normal.      Mouth/Throat:      Mouth: Mucous membranes are moist.      Pharynx: Oropharynx is clear.   Eyes:      Pupils: Pupils are equal, round, and reactive to light.   Cardiovascular:      Rate and Rhythm: Normal rate and regular rhythm.      Heart sounds: Normal heart sounds. No murmur heard.     No gallop.   Pulmonary:      Effort: Pulmonary effort is normal. No respiratory distress.      Breath sounds: Normal breath sounds. No wheezing or rales.   Abdominal:      General: There is no distension.      Palpations: Abdomen is soft.      Tenderness: There is no abdominal tenderness.   Musculoskeletal:      Cervical back: Normal range of motion and neck supple.   Skin:     General: Skin is warm and dry.   Neurological:      " General: No focal deficit present.      Mental Status: She is alert and oriented to person, place, and time. Mental status is at baseline.   Psychiatric:         Mood and Affect: Mood normal.         Behavior: Behavior normal.         Thought Content: Thought content normal.         Judgment: Judgment normal.         1. Neuropathy    2. Hyperlipidemia, unspecified hyperlipidemia type    3. Hypertension, unspecified type    4. Dehydration with hyponatremia    5. Type 2 diabetes mellitus without complication, without long-term current use of insulin    6. Thyroid disease    7. Colon, diverticulosis    8. Osteoarthritis, unspecified osteoarthritis type, unspecified site        Plan:     Problem List Items Addressed This Visit          Neuro    Neuropathy - Primary       Cardiac/Vascular    Hyperlipidemia    Hypertension       Renal/    Dehydration with hyponatremia       Endocrine    Diabetes mellitus, type 2    Thyroid disease       GI    Colon, diverticulosis       Orthopedic    DJD (degenerative joint disease)     No follow-ups on file.  Shots  rx  has fu    I am having Vandana Allison maintain her albuterol, potassium chloride SA, predniSONE, ferrous sulfate, insulin syringe-needle U-100, bisoprolol-hydrochlorothiazide 5-6.25 mg, empagliflozin, hydrocortisone, pantoprazole, amLODIPine, methocarbamoL, hydrALAZINE, levothyroxine, NovoLIN N NPH U-100 Insulin, SYMBICORT, furosemide, lactulose, gabapentin, traMADoL, and HYDROcodone-acetaminophen. We will continue to administer ketorolac, dexAMETHasone, and methylPREDNISolone acetate.    Vandana was seen today for hip pain, leg pain, headache, knee pain, extremity weakness and rectal bleeding.    Diagnoses and all orders for this visit:    Neuropathy    Hyperlipidemia, unspecified hyperlipidemia type    Hypertension, unspecified type    Dehydration with hyponatremia    Type 2 diabetes mellitus without complication, without long-term current use of insulin    Thyroid  disease    Colon, diverticulosis    Osteoarthritis, unspecified osteoarthritis type, unspecified site    Other orders  -     ketorolac injection 60 mg  -     dexAMETHasone injection 4 mg  -     methylPREDNISolone acetate injection 80 mg  -     HYDROcodone-acetaminophen (NORCO)  mg per tablet; Take 1 tablet by mouth every 8 (eight) hours as needed for Pain.      Medications Ordered This Encounter   Medications    dexAMETHasone injection 4 mg    HYDROcodone-acetaminophen (NORCO)  mg per tablet     Sig: Take 1 tablet by mouth every 8 (eight) hours as needed for Pain.     Dispense:  90 tablet     Refill:  0     n/a      Order Specific Question:   I have reviewed the Prescription Drug Monitoring Program (PDMP) database for this patient prior to prescribing the above opioid medication     Answer:   Yes    ketorolac injection 60 mg    methylPREDNISolone acetate injection 80 mg     [unfilled]  No orders of the defined types were placed in this encounter.

## 2024-02-07 ENCOUNTER — HOSPITAL ENCOUNTER (INPATIENT)
Facility: HOSPITAL | Age: 89
LOS: 3 days | Discharge: HOME | DRG: 641 | End: 2024-02-12
Attending: FAMILY MEDICINE | Admitting: HOSPITALIST
Payer: COMMERCIAL

## 2024-02-07 DIAGNOSIS — I10 ESSENTIAL (PRIMARY) HYPERTENSION: ICD-10-CM

## 2024-02-07 DIAGNOSIS — N17.9 AKI (ACUTE KIDNEY INJURY): ICD-10-CM

## 2024-02-07 DIAGNOSIS — E11.9 TYPE 2 DIABETES MELLITUS WITHOUT COMPLICATION, WITHOUT LONG-TERM CURRENT USE OF INSULIN: ICD-10-CM

## 2024-02-07 DIAGNOSIS — I49.9 DYSRHYTHMIA: ICD-10-CM

## 2024-02-07 DIAGNOSIS — I07.1 TRICUSPID VALVE INSUFFICIENCY, UNSPECIFIED ETIOLOGY: ICD-10-CM

## 2024-02-07 DIAGNOSIS — E87.1 HYPONATREMIA WITH EXCESS EXTRACELLULAR FLUID VOLUME: ICD-10-CM

## 2024-02-07 DIAGNOSIS — R07.9 CHEST PAIN: ICD-10-CM

## 2024-02-07 DIAGNOSIS — K44.9 HIATAL HERNIA WITH GERD: ICD-10-CM

## 2024-02-07 DIAGNOSIS — E87.1 HYPONATREMIA: ICD-10-CM

## 2024-02-07 DIAGNOSIS — M19.90 ARTHRITIS: ICD-10-CM

## 2024-02-07 DIAGNOSIS — N18.32 CHRONIC KIDNEY DISEASE, STAGE 3B: ICD-10-CM

## 2024-02-07 DIAGNOSIS — G62.9 NEUROPATHY: ICD-10-CM

## 2024-02-07 DIAGNOSIS — K64.4 BLEEDING EXTERNAL HEMORRHOIDS: ICD-10-CM

## 2024-02-07 DIAGNOSIS — E11.22 TYPE 2 DIABETES MELLITUS WITH STAGE 3B CHRONIC KIDNEY DISEASE, WITHOUT LONG-TERM CURRENT USE OF INSULIN: ICD-10-CM

## 2024-02-07 DIAGNOSIS — I07.1 TRICUSPID REGURGITATION: ICD-10-CM

## 2024-02-07 DIAGNOSIS — N18.32 TYPE 2 DIABETES MELLITUS WITH STAGE 3B CHRONIC KIDNEY DISEASE, WITHOUT LONG-TERM CURRENT USE OF INSULIN: ICD-10-CM

## 2024-02-07 DIAGNOSIS — J44.9 CHRONIC OBSTRUCTIVE PULMONARY DISEASE, UNSPECIFIED COPD TYPE: Primary | ICD-10-CM

## 2024-02-07 DIAGNOSIS — E89.0 POST-OPERATIVE HYPOTHYROIDISM: ICD-10-CM

## 2024-02-07 DIAGNOSIS — R54 SENILE ASTHENIA: ICD-10-CM

## 2024-02-07 DIAGNOSIS — K21.9 HIATAL HERNIA WITH GERD: ICD-10-CM

## 2024-02-07 PROBLEM — E03.9 HYPOTHYROIDISM (ACQUIRED): Status: ACTIVE | Noted: 2024-02-07

## 2024-02-07 LAB
25(OH)D3 SERPL-MCNC: 38.1 NG/ML
ALBUMIN SERPL BCP-MCNC: 3.4 G/DL (ref 3.5–5)
ALBUMIN/GLOB SERPL: 0.9 {RATIO}
ALP SERPL-CCNC: 145 U/L (ref 55–142)
ALT SERPL W P-5'-P-CCNC: 42 U/L (ref 13–56)
ANION GAP SERPL CALCULATED.3IONS-SCNC: 11 MMOL/L (ref 7–16)
ANISOCYTOSIS BLD QL SMEAR: ABNORMAL
AST SERPL W P-5'-P-CCNC: 49 U/L (ref 15–37)
BASOPHILS # BLD AUTO: 0.03 K/UL (ref 0–0.2)
BASOPHILS NFR BLD AUTO: 0.2 % (ref 0–1)
BILIRUB SERPL-MCNC: 0.3 MG/DL (ref ?–1.2)
BUN SERPL-MCNC: 35 MG/DL (ref 7–18)
BUN/CREAT SERPL: 17 (ref 6–20)
CALCIUM SERPL-MCNC: 9 MG/DL (ref 8.5–10.1)
CHLORIDE SERPL-SCNC: 92 MMOL/L (ref 98–107)
CHOLEST SERPL-MCNC: 249 MG/DL (ref 0–200)
CHOLEST/HDLC SERPL: 3.5 {RATIO}
CO2 SERPL-SCNC: 22 MMOL/L (ref 21–32)
CREAT SERPL-MCNC: 2.01 MG/DL (ref 0.55–1.02)
DIFFERENTIAL METHOD BLD: ABNORMAL
EGFR (NO RACE VARIABLE) (RUSH/TITUS): 22 ML/MIN/1.73M2
EOSINOPHIL # BLD AUTO: 0.09 K/UL (ref 0–0.5)
EOSINOPHIL NFR BLD AUTO: 0.7 % (ref 1–4)
ERYTHROCYTE [DISTWIDTH] IN BLOOD BY AUTOMATED COUNT: 19.3 % (ref 11.5–14.5)
EST. AVERAGE GLUCOSE BLD GHB EST-MCNC: 174 MG/DL
FOLATE SERPL-MCNC: >20 NG/ML (ref 3.1–17.5)
GLOBULIN SER-MCNC: 3.6 G/DL (ref 2–4)
GLUCOSE SERPL-MCNC: 146 MG/DL (ref 74–106)
HBA1C MFR BLD HPLC: 7.7 % (ref 4.5–6.6)
HCT VFR BLD AUTO: 32.4 % (ref 38–47)
HDLC SERPL-MCNC: 71 MG/DL (ref 40–60)
HGB BLD-MCNC: 10.5 G/DL (ref 12–16)
IMM GRANULOCYTES # BLD AUTO: 0.06 K/UL (ref 0–0.04)
IMM GRANULOCYTES NFR BLD: 0.5 % (ref 0–0.4)
IRON SATN MFR SERPL: 21 % (ref 14–50)
IRON SERPL-MCNC: 78 ΜG/DL (ref 50–170)
LDLC SERPL CALC-MCNC: 137 MG/DL
LDLC/HDLC SERPL: 1.9 {RATIO}
LYMPHOCYTES # BLD AUTO: 0.65 K/UL (ref 1–4.8)
LYMPHOCYTES NFR BLD AUTO: 5.2 % (ref 27–41)
MCH RBC QN AUTO: 24.6 PG (ref 27–31)
MCHC RBC AUTO-ENTMCNC: 32.4 G/DL (ref 32–36)
MCV RBC AUTO: 75.9 FL (ref 80–96)
MICROCYTES BLD QL SMEAR: ABNORMAL
MONOCYTES # BLD AUTO: 0.71 K/UL (ref 0–0.8)
MONOCYTES NFR BLD AUTO: 5.7 % (ref 2–6)
MPC BLD CALC-MCNC: 7.8 FL (ref 9.4–12.4)
NEUTROPHILS # BLD AUTO: 11.02 K/UL (ref 1.8–7.7)
NEUTROPHILS NFR BLD AUTO: 87.7 % (ref 53–65)
NONHDLC SERPL-MCNC: 178 MG/DL
NRBC # BLD AUTO: 0 X10E3/UL
NRBC, AUTO (.00): 0 %
NT-PROBNP SERPL-MCNC: 2664 PG/ML (ref 1–450)
OHS QRS DURATION: 108 MS
OHS QTC CALCULATION: 471 MS
OVALOCYTES BLD QL SMEAR: ABNORMAL
PLATELET # BLD AUTO: 343 K/UL (ref 150–400)
PLATELET MORPHOLOGY: ABNORMAL
POLYCHROMASIA BLD QL SMEAR: ABNORMAL
POTASSIUM SERPL-SCNC: 4.8 MMOL/L (ref 3.5–5.1)
PROT SERPL-MCNC: 7 G/DL (ref 6.4–8.2)
RBC # BLD AUTO: 4.27 M/UL (ref 4.2–5.4)
SODIUM SERPL-SCNC: 120 MMOL/L (ref 136–145)
TARGETS BLD QL SMEAR: ABNORMAL
TIBC SERPL-MCNC: 368 ΜG/DL (ref 250–450)
TRIGL SERPL-MCNC: 206 MG/DL (ref 35–150)
TROPONIN I SERPL DL<=0.01 NG/ML-MCNC: 12.1 PG/ML
TROPONIN I SERPL DL<=0.01 NG/ML-MCNC: 12.8 PG/ML
TSH SERPL DL<=0.005 MIU/L-ACNC: 1.97 UIU/ML (ref 0.36–3.74)
VIT B12 SERPL-MCNC: >6000 PG/ML (ref 193–986)
VLDLC SERPL-MCNC: 41 MG/DL
WBC # BLD AUTO: 12.56 K/UL (ref 4.5–11)

## 2024-02-07 PROCEDURE — 82306 VITAMIN D 25 HYDROXY: CPT | Performed by: FAMILY MEDICINE

## 2024-02-07 PROCEDURE — 96374 THER/PROPH/DIAG INJ IV PUSH: CPT

## 2024-02-07 PROCEDURE — 80053 COMPREHEN METABOLIC PANEL: CPT | Performed by: FAMILY MEDICINE

## 2024-02-07 PROCEDURE — 82746 ASSAY OF FOLIC ACID SERUM: CPT | Performed by: FAMILY MEDICINE

## 2024-02-07 PROCEDURE — 93010 ELECTROCARDIOGRAM REPORT: CPT | Mod: ,,, | Performed by: HOSPITALIST

## 2024-02-07 PROCEDURE — 25000003 PHARM REV CODE 250: Performed by: HOSPITALIST

## 2024-02-07 PROCEDURE — 99223 1ST HOSP IP/OBS HIGH 75: CPT | Mod: ,,, | Performed by: HOSPITALIST

## 2024-02-07 PROCEDURE — 96361 HYDRATE IV INFUSION ADD-ON: CPT

## 2024-02-07 PROCEDURE — 85025 COMPLETE CBC W/AUTO DIFF WBC: CPT | Performed by: FAMILY MEDICINE

## 2024-02-07 PROCEDURE — 99285 EMERGENCY DEPT VISIT HI MDM: CPT | Mod: 25

## 2024-02-07 PROCEDURE — 93005 ELECTROCARDIOGRAM TRACING: CPT

## 2024-02-07 PROCEDURE — 83540 ASSAY OF IRON: CPT | Performed by: FAMILY MEDICINE

## 2024-02-07 PROCEDURE — 25000242 PHARM REV CODE 250 ALT 637 W/ HCPCS: Performed by: FAMILY MEDICINE

## 2024-02-07 PROCEDURE — 84443 ASSAY THYROID STIM HORMONE: CPT | Performed by: FAMILY MEDICINE

## 2024-02-07 PROCEDURE — 80061 LIPID PANEL: CPT | Performed by: FAMILY MEDICINE

## 2024-02-07 PROCEDURE — 63600175 PHARM REV CODE 636 W HCPCS: Performed by: FAMILY MEDICINE

## 2024-02-07 PROCEDURE — 94761 N-INVAS EAR/PLS OXIMETRY MLT: CPT

## 2024-02-07 PROCEDURE — 83036 HEMOGLOBIN GLYCOSYLATED A1C: CPT | Performed by: FAMILY MEDICINE

## 2024-02-07 PROCEDURE — G0378 HOSPITAL OBSERVATION PER HR: HCPCS

## 2024-02-07 PROCEDURE — 36415 COLL VENOUS BLD VENIPUNCTURE: CPT | Performed by: FAMILY MEDICINE

## 2024-02-07 PROCEDURE — 99900035 HC TECH TIME PER 15 MIN (STAT)

## 2024-02-07 PROCEDURE — 83880 ASSAY OF NATRIURETIC PEPTIDE: CPT | Performed by: FAMILY MEDICINE

## 2024-02-07 PROCEDURE — 99284 EMERGENCY DEPT VISIT MOD MDM: CPT | Mod: ,,, | Performed by: FAMILY MEDICINE

## 2024-02-07 PROCEDURE — 84484 ASSAY OF TROPONIN QUANT: CPT | Mod: 91 | Performed by: FAMILY MEDICINE

## 2024-02-07 RX ORDER — IPRATROPIUM BROMIDE AND ALBUTEROL SULFATE 2.5; .5 MG/3ML; MG/3ML
3 SOLUTION RESPIRATORY (INHALATION)
Status: COMPLETED | OUTPATIENT
Start: 2024-02-07 | End: 2024-02-07

## 2024-02-07 RX ORDER — PANTOPRAZOLE SODIUM 40 MG/1
40 TABLET, DELAYED RELEASE ORAL 2 TIMES DAILY
Status: DISCONTINUED | OUTPATIENT
Start: 2024-02-07 | End: 2024-02-12 | Stop reason: HOSPADM

## 2024-02-07 RX ORDER — TALC
6 POWDER (GRAM) TOPICAL NIGHTLY PRN
Status: DISCONTINUED | OUTPATIENT
Start: 2024-02-07 | End: 2024-02-12 | Stop reason: HOSPADM

## 2024-02-07 RX ORDER — INSULIN ASPART 100 [IU]/ML
0-10 INJECTION, SOLUTION INTRAVENOUS; SUBCUTANEOUS
Status: DISCONTINUED | OUTPATIENT
Start: 2024-02-07 | End: 2024-02-12 | Stop reason: HOSPADM

## 2024-02-07 RX ORDER — AMLODIPINE BESYLATE 5 MG/1
5 TABLET ORAL DAILY
Status: DISCONTINUED | OUTPATIENT
Start: 2024-02-08 | End: 2024-02-09

## 2024-02-07 RX ORDER — PREDNISONE 2.5 MG/1
2.5 TABLET ORAL DAILY
Status: DISCONTINUED | OUTPATIENT
Start: 2024-02-08 | End: 2024-02-08

## 2024-02-07 RX ORDER — BUDESONIDE 0.25 MG/2ML
0.5 INHALANT ORAL 2 TIMES DAILY
Status: DISCONTINUED | OUTPATIENT
Start: 2024-02-08 | End: 2024-02-07 | Stop reason: CLARIF

## 2024-02-07 RX ORDER — METHYLPREDNISOLONE SOD SUCC 125 MG
125 VIAL (EA) INJECTION
Status: COMPLETED | OUTPATIENT
Start: 2024-02-07 | End: 2024-02-07

## 2024-02-07 RX ORDER — LEVOTHYROXINE SODIUM 100 UG/1
100 TABLET ORAL
Status: DISCONTINUED | OUTPATIENT
Start: 2024-02-08 | End: 2024-02-12 | Stop reason: HOSPADM

## 2024-02-07 RX ORDER — OXYCODONE HYDROCHLORIDE 5 MG/1
5 TABLET ORAL ONCE
Status: COMPLETED | OUTPATIENT
Start: 2024-02-07 | End: 2024-02-07

## 2024-02-07 RX ORDER — PNV NO.95/FERROUS FUM/FOLIC AC 28MG-0.8MG
100 TABLET ORAL DAILY
Status: DISCONTINUED | OUTPATIENT
Start: 2024-02-08 | End: 2024-02-12 | Stop reason: HOSPADM

## 2024-02-07 RX ORDER — SODIUM CHLORIDE 9 MG/ML
INJECTION, SOLUTION INTRAVENOUS CONTINUOUS
Status: DISCONTINUED | OUTPATIENT
Start: 2024-02-07 | End: 2024-02-08

## 2024-02-07 RX ORDER — HYDRALAZINE HYDROCHLORIDE 50 MG/1
50 TABLET, FILM COATED ORAL 3 TIMES DAILY
Status: DISCONTINUED | OUTPATIENT
Start: 2024-02-08 | End: 2024-02-11

## 2024-02-07 RX ORDER — LEVALBUTEROL INHALATION SOLUTION 1.25 MG/3ML
1.25 SOLUTION RESPIRATORY (INHALATION) EVERY 12 HOURS
Status: DISCONTINUED | OUTPATIENT
Start: 2024-02-08 | End: 2024-02-12

## 2024-02-07 RX ORDER — HYDROCODONE BITARTRATE AND ACETAMINOPHEN 10; 325 MG/1; MG/1
1 TABLET ORAL EVERY 6 HOURS PRN
Status: DISCONTINUED | OUTPATIENT
Start: 2024-02-07 | End: 2024-02-09

## 2024-02-07 RX ORDER — AMOXICILLIN 500 MG
1 CAPSULE ORAL DAILY
COMMUNITY

## 2024-02-07 RX ORDER — ACETAMINOPHEN 500 MG
5000 TABLET ORAL DAILY
Status: DISCONTINUED | OUTPATIENT
Start: 2024-02-08 | End: 2024-02-12 | Stop reason: HOSPADM

## 2024-02-07 RX ORDER — DEXTROMETHORPHAN HYDROBROMIDE, GUAIFENESIN 5; 100 MG/5ML; MG/5ML
650 LIQUID ORAL EVERY 8 HOURS
COMMUNITY

## 2024-02-07 RX ORDER — SIMETHICONE 80 MG
1 TABLET,CHEWABLE ORAL 3 TIMES DAILY PRN
Status: DISCONTINUED | OUTPATIENT
Start: 2024-02-07 | End: 2024-02-12 | Stop reason: HOSPADM

## 2024-02-07 RX ORDER — ACETAMINOPHEN 500 MG
5000 TABLET ORAL DAILY
COMMUNITY

## 2024-02-07 RX ORDER — BISACODYL 5 MG
10 TABLET, DELAYED RELEASE (ENTERIC COATED) ORAL DAILY PRN
Status: DISCONTINUED | OUTPATIENT
Start: 2024-02-07 | End: 2024-02-12 | Stop reason: HOSPADM

## 2024-02-07 RX ORDER — GUAIFENESIN AND DEXTROMETHORPHAN HYDROBROMIDE 10; 100 MG/5ML; MG/5ML
10 SYRUP ORAL EVERY 6 HOURS PRN
Status: DISCONTINUED | OUTPATIENT
Start: 2024-02-07 | End: 2024-02-12 | Stop reason: HOSPADM

## 2024-02-07 RX ORDER — HYDRALAZINE HYDROCHLORIDE 50 MG/1
50 TABLET, FILM COATED ORAL 2 TIMES DAILY
Status: DISCONTINUED | OUTPATIENT
Start: 2024-02-07 | End: 2024-02-07

## 2024-02-07 RX ORDER — TRAZODONE HYDROCHLORIDE 50 MG/1
50 TABLET ORAL NIGHTLY PRN
Status: DISCONTINUED | OUTPATIENT
Start: 2024-02-07 | End: 2024-02-12 | Stop reason: HOSPADM

## 2024-02-07 RX ORDER — CYANOCOBALAMIN (VITAMIN B-12) 1000 MCG
1 TABLET, SUBLINGUAL SUBLINGUAL DAILY
COMMUNITY

## 2024-02-07 RX ORDER — BUDESONIDE 0.25 MG/2ML
0.5 INHALANT ORAL EVERY 12 HOURS
Status: DISCONTINUED | OUTPATIENT
Start: 2024-02-08 | End: 2024-02-12 | Stop reason: HOSPADM

## 2024-02-07 RX ORDER — CALCIUM CARBONATE 260MG(650)
400 TABLET,CHEWABLE ORAL
COMMUNITY

## 2024-02-07 RX ORDER — IBUPROFEN 200 MG
24 TABLET ORAL
Status: DISCONTINUED | OUTPATIENT
Start: 2024-02-07 | End: 2024-02-12 | Stop reason: HOSPADM

## 2024-02-07 RX ORDER — GLUCAGON 1 MG
1 KIT INJECTION
Status: DISCONTINUED | OUTPATIENT
Start: 2024-02-07 | End: 2024-02-12 | Stop reason: HOSPADM

## 2024-02-07 RX ORDER — ACETAMINOPHEN 500 MG
1000 TABLET ORAL EVERY 6 HOURS PRN
Status: DISCONTINUED | OUTPATIENT
Start: 2024-02-07 | End: 2024-02-07

## 2024-02-07 RX ORDER — ONDANSETRON HYDROCHLORIDE 2 MG/ML
8 INJECTION, SOLUTION INTRAVENOUS EVERY 6 HOURS PRN
Status: DISCONTINUED | OUTPATIENT
Start: 2024-02-07 | End: 2024-02-12 | Stop reason: HOSPADM

## 2024-02-07 RX ORDER — IBUPROFEN 200 MG
16 TABLET ORAL
Status: DISCONTINUED | OUTPATIENT
Start: 2024-02-07 | End: 2024-02-12 | Stop reason: HOSPADM

## 2024-02-07 RX ADMIN — IPRATROPIUM BROMIDE AND ALBUTEROL SULFATE 3 ML: .5; 3 SOLUTION RESPIRATORY (INHALATION) at 02:02

## 2024-02-07 RX ADMIN — PANTOPRAZOLE SODIUM 40 MG: 40 TABLET, DELAYED RELEASE ORAL at 10:02

## 2024-02-07 RX ADMIN — SODIUM CHLORIDE: 9 INJECTION, SOLUTION INTRAVENOUS at 07:02

## 2024-02-07 RX ADMIN — OXYCODONE 5 MG: 5 TABLET ORAL at 10:02

## 2024-02-07 RX ADMIN — METHYLPREDNISOLONE SODIUM SUCCINATE 125 MG: 125 INJECTION, POWDER, FOR SOLUTION INTRAMUSCULAR; INTRAVENOUS at 02:02

## 2024-02-07 RX ADMIN — ACETAMINOPHEN 1000 MG: 500 TABLET ORAL at 08:02

## 2024-02-07 NOTE — ED PROVIDER NOTES
Encounter Date: 2/7/2024       History     Chief Complaint   Patient presents with    Shortness of Breath     This is the patient comes in who is short of breath.  Oxygen saturations 94-95%.  Blood pressure stable patient with audible wheeze        Review of patient's allergies indicates:   Allergen Reactions    Aspirin      Past Medical History:   Diagnosis Date    Arthritis     Bleeding external hemorrhoids     Chronic kidney disease, stage 3b     baseline creat 1.5    COPD (chronic obstructive pulmonary disease)     senile emphysema    Diabetes mellitus, type 2     DNR (do not resuscitate) 02/07/2024    discussed with VITO Christianson present    Essential (primary) hypertension     Hiatal hernia with GERD     Neuropathy     Post-operative hypothyroidism     Severe pulmonary hypertension 10/08/2022    EF  70 % and normal diastolic function     Past Surgical History:   Procedure Laterality Date    BREAST SURGERY      Biopsy    EYE SURGERY      Remove cataracts both eyes    HYSTERECTOMY      NEPHRECTOMY Right     THYROIDECTOMY       Family History   Problem Relation Age of Onset    Diabetes Mother         Diabetic    Heart disease Father     Diabetes Sister     Cancer Sister         Breast cancer    Diabetes Brother     Cancer Brother         Prostate cancer    Cancer Daughter         Uterine cancer    Diabetes Sister         Diabetic     Social History     Tobacco Use    Smoking status: Never    Smokeless tobacco: Never   Substance Use Topics    Alcohol use: Never    Drug use: Never     Review of Systems   Constitutional: Negative.  Negative for fever.   HENT: Negative.  Negative for sore throat.    Eyes: Negative.    Respiratory:  Positive for wheezing. Negative for shortness of breath.    Cardiovascular: Negative.  Negative for chest pain.   Gastrointestinal: Negative.  Negative for nausea.   Endocrine: Negative.    Genitourinary: Negative.  Negative for dysuria.   Musculoskeletal: Negative.  Negative for back pain.    Skin: Negative.  Negative for rash.   Allergic/Immunologic: Negative.    Neurological: Negative.  Negative for weakness.   Hematological: Negative.  Does not bruise/bleed easily.   Psychiatric/Behavioral: Negative.     All other systems reviewed and are negative.      Physical Exam     Initial Vitals [02/07/24 1353]   BP Pulse Resp Temp SpO2   (!) 194/83 64 18 98 °F (36.7 °C) 95 %      MAP       --         Physical Exam    Constitutional: She appears well-developed and well-nourished.   HENT:   Head: Normocephalic and atraumatic.   Right Ear: External ear normal.   Left Ear: External ear normal.   Nose: Nose normal.   Mouth/Throat: Oropharynx is clear and moist.   Eyes: Conjunctivae and EOM are normal. Pupils are equal, round, and reactive to light.   Neck: Neck supple.   Normal range of motion.  Cardiovascular:  Normal rate, regular rhythm, normal heart sounds and intact distal pulses.           Pulmonary/Chest: She has wheezes.   Abdominal: Abdomen is soft. Bowel sounds are normal.   Genitourinary:    Vagina and uterus normal.     Musculoskeletal:         General: Normal range of motion.      Cervical back: Normal range of motion and neck supple.     Neurological: She is alert and oriented to person, place, and time. She has normal strength and normal reflexes.   Skin: Skin is warm. Capillary refill takes less than 2 seconds.   Psychiatric: She has a normal mood and affect. Her behavior is normal. Judgment and thought content normal.         Medical Screening Exam   See Full Note    ED Course   Procedures  Labs Reviewed   COMPREHENSIVE METABOLIC PANEL - Abnormal; Notable for the following components:       Result Value    Sodium 120 (*)     Chloride 92 (*)     Glucose 146 (*)     BUN 35 (*)     Creatinine 2.01 (*)     Albumin 3.4 (*)     Alk Phos 145 (*)     AST 49 (*)     eGFR 22 (*)     All other components within normal limits   NT-PRO NATRIURETIC PEPTIDE - Abnormal; Notable for the following components:     ProBNP 2,664 (*)     All other components within normal limits   TROPONIN I - Normal   TROPONIN I - Normal        ECG Results              EKG 12-lead (Final result)        Collection Time Result Time QRS Duration OHS QTC Calculation    02/07/24 14:41:40 02/07/24 15:21:46 108 471                     Final result by Interface, Lab In [facility] (02/07/24 15:21:55)                   Narrative:    Test Reason : R07.9,    Vent. Rate : 049 BPM     Atrial Rate : 000 BPM     P-R Int : 000 ms          QRS Dur : 108 ms      QT Int : 490 ms       P-R-T Axes : 000 -47 043 degrees     QTc Int : 471 ms    Atrial fibrillation with slow ventricular response  Borderline prolonged QT interval  Marked left axis deviation  Abnormal ECG    Confirmed by Jose JULIAN, David RAYGOZA ([id number]) on 2/7/2024 3:21:41 PM    Referred By: FELIPA   SELF           Confirmed By:David Garcia MD                                  Imaging Results              X-Ray Chest AP Portable (Final result)  Result time 02/07/24 13:57:19      Final result by Gurdeep Quiroga DO (02/07/24 13:57:19)                   Impression:      Diffuse interstitial and patchy airspace opacities scattered throughout the lungs, correlate with fluid status      Electronically signed by: Gurdeep Quiroga  Date:    02/07/2024  Time:    13:57               Narrative:    EXAMINATION:  XR CHEST AP PORTABLE    CLINICAL HISTORY:  Chest Pain    TECHNIQUE:  XR CHEST AP PORTABLE    COMPARISON:  10/8/22    FINDINGS:  No lines or tubes.    Diffuse interstitial and patchy airspace opacities scattered throughout the lungs, correlate with fluid status    Normal pleura.    Cardiac silhouette is similar to comparison exam.    No obvious acute bone findings.                                       Medications   bisacodyL EC tablet 10 mg (has no administration in time range)   dextromethorphan-guaiFENesin  mg/5 ml liquid 10 mL (has no administration in time range)   melatonin tablet 6 mg (has no  administration in time range)   ondansetron injection 8 mg (has no administration in time range)   simethicone chewable tablet 80 mg (has no administration in time range)   traZODone tablet 50 mg (has no administration in time range)   amLODIPine tablet 5 mg (5 mg Oral Given 2/8/24 0902)   cholecalciferol (vitamin D3) 125 mcg (5,000 unit) tablet 5,000 Units (5,000 Units Oral Given 2/8/24 0902)   cyanocobalamin tablet 100 mcg (100 mcg Oral Given 2/8/24 0902)   HYDROcodone-acetaminophen  mg per tablet 1 tablet (1 tablet Oral Given 2/8/24 1440)   levothyroxine tablet 100 mcg (100 mcg Oral Given 2/8/24 0617)   pantoprazole EC tablet 40 mg (40 mg Oral Given 2/8/24 0902)   levalbuterol nebulizer solution 1.25 mg (1.25 mg Nebulization Given 2/8/24 2002)   budesonide nebulizer solution 0.5 mg (0.5 mg Nebulization Given 2/8/24 2006)   hydrALAZINE tablet 50 mg (50 mg Oral Given 2/8/24 1440)   glucose chewable tablet 16 g (has no administration in time range)   glucose chewable tablet 24 g (has no administration in time range)   glucagon (human recombinant) injection 1 mg (has no administration in time range)   insulin aspart U-100 injection 0-10 Units (6 Units Subcutaneous Given 2/8/24 1253)   dextrose 10% bolus 125 mL 125 mL (has no administration in time range)   dextrose 10% bolus 250 mL 250 mL (has no administration in time range)   insulin detemir U-100 injection 12 Units (has no administration in time range)   furosemide injection 40 mg (has no administration in time range)   predniSONE tablet 20 mg (has no administration in time range)   cefTRIAXone (Rocephin) 1 g in dextrose 5 % in water (D5W) 100 mL IVPB (MB+) (has no administration in time range)   azithromycin (ZITHROMAX) 500 mg in dextrose 5 % (D5W) 250 mL IVPB (has no administration in time range)   albuterol-ipratropium 2.5 mg-0.5 mg/3 mL nebulizer solution 3 mL (3 mLs Nebulization Given 2/7/24 1441)   methylPREDNISolone sodium succinate injection 125 mg  (125 mg Intravenous Given 2/7/24 1441)   oxyCODONE immediate release tablet 5 mg (5 mg Oral Given 2/7/24 1483)     Medical Decision Making  Amount and/or Complexity of Data Reviewed  Labs: ordered.  Radiology: ordered.    Risk  Prescription drug management.                          Medical Decision Making:   Initial Assessment:   Patient in with shortness for breath audible wheezing  Differential Diagnosis:   Hyponatremia with shortness for breath             Clinical Impression:   Final diagnoses:  [R07.9] Chest pain  [E87.1] Hyponatremia  [I49.9] Dysrhythmia  [I07.1] Tricuspid regurgitation  [E87.1] Hyponatremia with excess extracellular fluid volume [E87.1] (Primary)  [I07.1] Tricuspid valve insufficiency, unspecified etiology [I07.1]  [N17.9] TRISTAN (acute kidney injury) [N17.9]  [R54] Senile asthenia [R54]  [M19.90] Arthritis [M19.90]  [K64.4] Bleeding external hemorrhoids [K64.4]  [N18.32] Chronic kidney disease, stage 3b [N18.32]  [J44.9] Chronic obstructive pulmonary disease, unspecified COPD type [J44.9]  [G62.9] Neuropathy [G62.9]  [E89.0] Post-operative hypothyroidism [E89.0]  [K44.9, K21.9] Hiatal hernia with GERD [K44.9, K21.9]  [E11.22, N18.32] Type 2 diabetes mellitus with stage 3b chronic kidney disease, without long-term current use of insulin [E11.22, N18.32]  [I10] Essential (primary) hypertension [I10]        ED Disposition Condition    Observation                 Juan Youngblood DO  02/08/24 2040

## 2024-02-08 PROBLEM — J44.9 COPD (CHRONIC OBSTRUCTIVE PULMONARY DISEASE): Status: ACTIVE | Noted: 2024-02-08

## 2024-02-08 PROBLEM — E89.0 POST-OPERATIVE HYPOTHYROIDISM: Status: ACTIVE | Noted: 2024-02-08

## 2024-02-08 PROBLEM — I07.1 TRICUSPID VALVE INSUFFICIENCY: Status: ACTIVE | Noted: 2024-02-08

## 2024-02-08 PROBLEM — N17.9 AKI (ACUTE KIDNEY INJURY): Status: ACTIVE | Noted: 2024-02-08

## 2024-02-08 LAB
ANION GAP SERPL CALCULATED.3IONS-SCNC: 10 MMOL/L (ref 7–16)
ANISOCYTOSIS BLD QL SMEAR: NORMAL
AORTIC ROOT ANNULUS: 2.22 CM
AORTIC VALVE CUSP SEPERATION: 1.86 CM
AV INDEX (PROSTH): 0.62
AV MEAN GRADIENT: 4 MMHG
AV PEAK GRADIENT: 8 MMHG
AV VALVE AREA BY VELOCITY RATIO: 1.84 CM²
AV VALVE AREA: 1.91 CM²
AV VELOCITY RATIO: 0.6
BASOPHILS # BLD AUTO: 0 K/UL (ref 0–0.2)
BASOPHILS NFR BLD AUTO: 0 % (ref 0–1)
BILIRUB UR QL STRIP: NEGATIVE
BSA FOR ECHO PROCEDURE: 1.95 M2
BUN SERPL-MCNC: 38 MG/DL (ref 7–18)
BUN/CREAT SERPL: 22 (ref 6–20)
CALCIUM SERPL-MCNC: 9 MG/DL (ref 8.5–10.1)
CHLORIDE SERPL-SCNC: 98 MMOL/L (ref 98–107)
CLARITY UR: CLEAR
CO2 SERPL-SCNC: 26 MMOL/L (ref 21–32)
COLOR UR: COLORLESS
CREAT SERPL-MCNC: 1.69 MG/DL (ref 0.55–1.02)
CRENATED CELLS: NORMAL
CV ECHO LV RWT: 0.49 CM
DIFFERENTIAL METHOD BLD: ABNORMAL
DOP CALC AO PEAK VEL: 1.37 M/S
DOP CALC AO VTI: 35.2 CM
DOP CALC LVOT AREA: 3.1 CM2
DOP CALC LVOT DIAMETER: 1.98 CM
DOP CALC LVOT PEAK VEL: 0.82 M/S
DOP CALC LVOT STROKE VOLUME: 67.09 CM3
DOP CALC MV VTI: 39.9 CM
DOP CALCLVOT PEAK VEL VTI: 21.8 CM
E WAVE DECELERATION TIME: 183.69 MSEC
E/A RATIO: 1.91
E/E' RATIO: 14.43 M/S
ECHO LV POSTERIOR WALL: 0.94 CM (ref 0.6–1.1)
EGFR (NO RACE VARIABLE) (RUSH/TITUS): 27 ML/MIN/1.73M2
EJECTION FRACTION: 60 %
EOSINOPHIL # BLD AUTO: 0 K/UL (ref 0–0.5)
EOSINOPHIL NFR BLD AUTO: 0 % (ref 1–4)
ERYTHROCYTE [DISTWIDTH] IN BLOOD BY AUTOMATED COUNT: 19.3 % (ref 11.5–14.5)
FRACTIONAL SHORTENING: 30 % (ref 28–44)
GLUCOSE SERPL-MCNC: 212 MG/DL (ref 70–105)
GLUCOSE SERPL-MCNC: 233 MG/DL (ref 70–105)
GLUCOSE SERPL-MCNC: 245 MG/DL (ref 74–106)
GLUCOSE SERPL-MCNC: 253 MG/DL (ref 70–105)
GLUCOSE SERPL-MCNC: 290 MG/DL (ref 70–105)
GLUCOSE UR STRIP-MCNC: >1000 MG/DL
HCT VFR BLD AUTO: 30.1 % (ref 38–47)
HGB BLD-MCNC: 9.9 G/DL (ref 12–16)
IMM GRANULOCYTES # BLD AUTO: 0.04 K/UL (ref 0–0.04)
IMM GRANULOCYTES NFR BLD: 0.6 % (ref 0–0.4)
INTERVENTRICULAR SEPTUM: 0.87 CM (ref 0.6–1.1)
IVC DIAMETER: 1.66 CM
KETONES UR STRIP-SCNC: NEGATIVE MG/DL
LA MAJOR: 3.57 CM
LEFT ATRIUM SIZE: 3.06 CM
LEFT INTERNAL DIMENSION IN SYSTOLE: 2.66 CM (ref 2.1–4)
LEFT VENTRICLE DIASTOLIC VOLUME INDEX: 32.24 ML/M2
LEFT VENTRICLE DIASTOLIC VOLUME: 61.9 ML
LEFT VENTRICLE MASS INDEX: 53 G/M2
LEFT VENTRICLE SYSTOLIC VOLUME INDEX: 13.5 ML/M2
LEFT VENTRICLE SYSTOLIC VOLUME: 25.94 ML
LEFT VENTRICULAR INTERNAL DIMENSION IN DIASTOLE: 3.8 CM (ref 3.5–6)
LEFT VENTRICULAR MASS: 101.84 G
LEUKOCYTE ESTERASE UR QL STRIP: NEGATIVE
LV LATERAL E/E' RATIO: 14.43 M/S
LV SEPTAL E/E' RATIO: 14.43 M/S
LVOT MG: 1.58 MMHG
LVOT MV: 0.61 CM/S
LYMPHOCYTES # BLD AUTO: 0.44 K/UL (ref 1–4.8)
LYMPHOCYTES NFR BLD AUTO: 6.1 % (ref 27–41)
MAGNESIUM SERPL-MCNC: 2.8 MG/DL (ref 1.7–2.3)
MCH RBC QN AUTO: 24.4 PG (ref 27–31)
MCHC RBC AUTO-ENTMCNC: 32.9 G/DL (ref 32–36)
MCV RBC AUTO: 74.3 FL (ref 80–96)
MICROCYTES BLD QL SMEAR: NORMAL
MONOCYTES # BLD AUTO: 0.22 K/UL (ref 0–0.8)
MONOCYTES NFR BLD AUTO: 3 % (ref 2–6)
MPC BLD CALC-MCNC: 8.6 FL (ref 9.4–12.4)
MV MEAN GRADIENT: 4 MMHG
MV PEAK A VEL: 0.53 M/S
MV PEAK E VEL: 1.01 M/S
MV PEAK GRADIENT: 11 MMHG
MV STENOSIS PRESSURE HALF TIME: 72.27 MS
MV VALVE AREA BY CONTINUITY EQUATION: 1.68 CM2
MV VALVE AREA P 1/2 METHOD: 3.04 CM2
NEUTROPHILS # BLD AUTO: 6.55 K/UL (ref 1.8–7.7)
NEUTROPHILS NFR BLD AUTO: 90.3 % (ref 53–65)
NITRITE UR QL STRIP: NEGATIVE
NRBC # BLD AUTO: 0 X10E3/UL
NRBC, AUTO (.00): 0 %
NT-PROBNP SERPL-MCNC: 3234 PG/ML (ref 1–450)
OVALOCYTES BLD QL SMEAR: NORMAL
PH UR STRIP: 7.5 PH UNITS
PISA TR MAX VEL: 2.9 M/S
PLATELET # BLD AUTO: 281 K/UL (ref 150–400)
PLATELET MORPHOLOGY: NORMAL
POLYCHROMASIA BLD QL SMEAR: NORMAL
POTASSIUM SERPL-SCNC: 5 MMOL/L (ref 3.5–5.1)
PREALB SERPL NEPH-MCNC: 23 MG/DL (ref 20–40)
PROT UR QL STRIP: 70
PV PEAK GRADIENT: 2 MMHG
PV PEAK VELOCITY: 0.71 M/S
RA MAJOR: 3.71 CM
RA PRESSURE ESTIMATED: 8 MMHG
RBC # BLD AUTO: 4.05 M/UL (ref 4.2–5.4)
RBC # UR STRIP: NEGATIVE /UL
RBC #/AREA URNS HPF: 1 /HPF
RIGHT VENTRICULAR END-DIASTOLIC DIMENSION: 3.5 CM
RV TB RVSP: 11 MMHG
SODIUM SERPL-SCNC: 129 MMOL/L (ref 136–145)
SP GR UR STRIP: 1.01
SQUAMOUS #/AREA URNS LPF: ABNORMAL /HPF
TDI LATERAL: 0.07 M/S
TDI SEPTAL: 0.07 M/S
TDI: 0.07 M/S
TR MAX PG: 34 MMHG
TRICUSPID ANNULAR PLANE SYSTOLIC EXCURSION: 2.05 CM
TV REST PULMONARY ARTERY PRESSURE: 42 MMHG
UROBILINOGEN UR STRIP-ACNC: NORMAL MG/DL
WBC # BLD AUTO: 7.25 K/UL (ref 4.5–11)
WBC #/AREA URNS HPF: <1 /HPF
Z-SCORE OF LEFT VENTRICULAR DIMENSION IN END DIASTOLE: -3.55
Z-SCORE OF LEFT VENTRICULAR DIMENSION IN END SYSTOLE: -1.79

## 2024-02-08 PROCEDURE — 94640 AIRWAY INHALATION TREATMENT: CPT | Mod: XB

## 2024-02-08 PROCEDURE — 80048 BASIC METABOLIC PNL TOTAL CA: CPT | Performed by: HOSPITALIST

## 2024-02-08 PROCEDURE — 25000003 PHARM REV CODE 250: Performed by: HOSPITALIST

## 2024-02-08 PROCEDURE — 83735 ASSAY OF MAGNESIUM: CPT | Performed by: HOSPITALIST

## 2024-02-08 PROCEDURE — 25000242 PHARM REV CODE 250 ALT 637 W/ HCPCS: Performed by: HOSPITALIST

## 2024-02-08 PROCEDURE — 81001 URINALYSIS AUTO W/SCOPE: CPT | Performed by: HOSPITALIST

## 2024-02-08 PROCEDURE — 93005 ELECTROCARDIOGRAM TRACING: CPT

## 2024-02-08 PROCEDURE — 96361 HYDRATE IV INFUSION ADD-ON: CPT

## 2024-02-08 PROCEDURE — 83880 ASSAY OF NATRIURETIC PEPTIDE: CPT | Performed by: HOSPITALIST

## 2024-02-08 PROCEDURE — 82962 GLUCOSE BLOOD TEST: CPT

## 2024-02-08 PROCEDURE — 94761 N-INVAS EAR/PLS OXIMETRY MLT: CPT

## 2024-02-08 PROCEDURE — 93010 ELECTROCARDIOGRAM REPORT: CPT | Mod: ,,, | Performed by: HOSPITALIST

## 2024-02-08 PROCEDURE — 99233 SBSQ HOSP IP/OBS HIGH 50: CPT | Mod: ,,, | Performed by: HOSPITALIST

## 2024-02-08 PROCEDURE — 63600175 PHARM REV CODE 636 W HCPCS: Performed by: HOSPITALIST

## 2024-02-08 PROCEDURE — 96367 TX/PROPH/DG ADDL SEQ IV INF: CPT

## 2024-02-08 PROCEDURE — 99900035 HC TECH TIME PER 15 MIN (STAT)

## 2024-02-08 PROCEDURE — 96375 TX/PRO/DX INJ NEW DRUG ADDON: CPT

## 2024-02-08 PROCEDURE — 85025 COMPLETE CBC W/AUTO DIFF WBC: CPT | Performed by: HOSPITALIST

## 2024-02-08 PROCEDURE — G0378 HOSPITAL OBSERVATION PER HR: HCPCS

## 2024-02-08 PROCEDURE — 84134 ASSAY OF PREALBUMIN: CPT | Performed by: HOSPITALIST

## 2024-02-08 PROCEDURE — 96365 THER/PROPH/DIAG IV INF INIT: CPT | Mod: 59

## 2024-02-08 PROCEDURE — 96372 THER/PROPH/DIAG INJ SC/IM: CPT | Performed by: HOSPITALIST

## 2024-02-08 RX ORDER — PREDNISONE 20 MG/1
20 TABLET ORAL DAILY
Status: DISCONTINUED | OUTPATIENT
Start: 2024-02-09 | End: 2024-02-10

## 2024-02-08 RX ORDER — FUROSEMIDE 10 MG/ML
40 INJECTION INTRAMUSCULAR; INTRAVENOUS
Status: DISCONTINUED | OUTPATIENT
Start: 2024-02-08 | End: 2024-02-10

## 2024-02-08 RX ADMIN — HYDRALAZINE HYDROCHLORIDE 50 MG: 50 TABLET, FILM COATED ORAL at 02:02

## 2024-02-08 RX ADMIN — AMLODIPINE BESYLATE 5 MG: 5 TABLET ORAL at 09:02

## 2024-02-08 RX ADMIN — PANTOPRAZOLE SODIUM 40 MG: 40 TABLET, DELAYED RELEASE ORAL at 09:02

## 2024-02-08 RX ADMIN — BUDESONIDE 0.5 MG: 0.25 INHALANT RESPIRATORY (INHALATION) at 08:02

## 2024-02-08 RX ADMIN — HYDROCODONE BITARTRATE AND ACETAMINOPHEN 1 TABLET: 10; 325 TABLET ORAL at 06:02

## 2024-02-08 RX ADMIN — LEVALBUTEROL HYDROCHLORIDE 1.25 MG: 1.25 SOLUTION RESPIRATORY (INHALATION) at 08:02

## 2024-02-08 RX ADMIN — INSULIN DETEMIR 12 UNITS: 100 INJECTION, SOLUTION SUBCUTANEOUS at 11:02

## 2024-02-08 RX ADMIN — FUROSEMIDE 40 MG: 10 INJECTION, SOLUTION INTRAVENOUS at 09:02

## 2024-02-08 RX ADMIN — INSULIN ASPART 4 UNITS: 100 INJECTION, SOLUTION INTRAVENOUS; SUBCUTANEOUS at 09:02

## 2024-02-08 RX ADMIN — CEFTRIAXONE 1 G: 1 INJECTION, POWDER, FOR SOLUTION INTRAMUSCULAR; INTRAVENOUS at 09:02

## 2024-02-08 RX ADMIN — SODIUM CHLORIDE: 9 INJECTION, SOLUTION INTRAVENOUS at 12:02

## 2024-02-08 RX ADMIN — CHOLECALCIFEROL TAB 125 MCG (5000 UNIT) 5000 UNITS: 125 TAB at 09:02

## 2024-02-08 RX ADMIN — LEVOTHYROXINE SODIUM 100 MCG: 100 TABLET ORAL at 06:02

## 2024-02-08 RX ADMIN — INSULIN DETEMIR 10 UNITS: 100 INJECTION, SOLUTION SUBCUTANEOUS at 09:02

## 2024-02-08 RX ADMIN — PREDNISONE 2.5 MG: 2.5 TABLET ORAL at 09:02

## 2024-02-08 RX ADMIN — HYDRALAZINE HYDROCHLORIDE 50 MG: 50 TABLET, FILM COATED ORAL at 09:02

## 2024-02-08 RX ADMIN — INSULIN ASPART 6 UNITS: 100 INJECTION, SOLUTION INTRAVENOUS; SUBCUTANEOUS at 12:02

## 2024-02-08 RX ADMIN — INSULIN ASPART 3 UNITS: 100 INJECTION, SOLUTION INTRAVENOUS; SUBCUTANEOUS at 11:02

## 2024-02-08 RX ADMIN — AZITHROMYCIN MONOHYDRATE 500 MG: 500 INJECTION, POWDER, LYOPHILIZED, FOR SOLUTION INTRAVENOUS at 10:02

## 2024-02-08 RX ADMIN — HYDROCODONE BITARTRATE AND ACETAMINOPHEN 1 TABLET: 10; 325 TABLET ORAL at 10:02

## 2024-02-08 RX ADMIN — Medication 100 MCG: at 09:02

## 2024-02-08 RX ADMIN — HYDROCODONE BITARTRATE AND ACETAMINOPHEN 1 TABLET: 10; 325 TABLET ORAL at 02:02

## 2024-02-08 NOTE — ASSESSMENT & PLAN NOTE
Chronic, controlled. Latest blood pressure and vitals reviewed-     Temp:  [97.8 °F (36.6 °C)-98.2 °F (36.8 °C)]   Pulse:  [52-72]   Resp:  [14-22]   BP: (149-194)/(68-85)   SpO2:  [94 %-96 %] .   Home meds for hypertension were reviewed and noted below.   Hypertension Medications               amLODIPine (NORVASC) 5 MG tablet Take 1 tablet (5 mg total) by mouth once daily.    bisoprolol-hydrochlorothiazide 5-6.25 mg (ZIAC) 5-6.25 mg Tab TAKE 1 TABLET TWICE A DAY    HOLD THIS MEDICATION UNTIL YOU FOLLOW UP WITH YOUR PCP    furosemide (LASIX) 20 MG tablet Take 1 tablet (20 mg total) by mouth once daily.    hydrALAZINE (APRESOLINE) 25 MG tablet Take 1 tablet (25 mg total) by mouth 2 (two) times daily.            While in the hospital, will manage blood pressure as follows; Adjust home antihypertensive regimen as follows- stop BB and lasix and HCT but increase hydralazine to TID and continue norvasc for PHTN.      Will utilize p.r.n. blood pressure medication only if patient's blood pressure greater than 140/90 and she develops symptoms such as worsening chest pain or shortness of breath.

## 2024-02-08 NOTE — ASSESSMENT & PLAN NOTE
"Patient's FSGs are controlled on current medication regimen.  Last A1c reviewed-   Lab Results   Component Value Date    HGBA1C 7.7 (H) 02/07/2024     Most recent fingerstick glucose reviewed- No results for input(s): "POCTGLUCOSE" in the last 24 hours.  Current correctional scale  Medium  Maintain anti-hyperglycemic dose as follows-   Antihyperglycemics (From admission, onward)      Start     Stop Route Frequency Ordered    02/08/24 0900  insulin detemir U-100 injection 10 Units         -- SubQ Daily 02/07/24 2132 02/07/24 2232  insulin aspart U-100 injection 0-10 Units         -- SubQ Before meals & nightly PRN 02/07/24 2132          Hold Oral hypoglycemics while patient is in the hospital.  "

## 2024-02-08 NOTE — H&P
[facility]  [facility] Medicine  History & Physical    Patient Name: Vandana Allison  MRN: [id number]  Patient Class: OP- Observation  Admission Date: 2/7/2024  Attending Physician: Vadim Sainz MD   Primary Care Provider: mC Larson III, DO         Patient information was obtained from patient, relative(s), past medical records, and ER records.     Subjective:     Principal Problem:Hyponatremia with excess extracellular fluid volume    Chief Complaint:   Chief Complaint   Patient presents with    Shortness of Breath        HPI: 89 yo F (looks younger than stated age) presents to [facility] ED with worsening dyspnea and fatigue.  Daughter states that her mother has COPD and her PCP has her on symbicort and she had moved the MDI and patient had missed several doses and thought she hear some wheezing.  Patient does have rales bilaterally.  No productive cough, chest pain or palpitations.  No syncope or lightheadedness but does get an occasional HA across forehead with no N/V or vision change or focal weakness.  Patient had CXR that showed pulmonary edema bilaterally.  Will check a BNP.  Initial trops 12 with no change in trend.  EKG shows sinus rhythm with PACs but read as atrial fib with SVR (on bisoprolol).  Will monitor on telemetry and repeat EKG in AM.      Patient has severe pulmonary hypertension (echo 10/3/22) which is most likely the etiology of her fatigue and worsening dyspnea.  Severe tricuspid regurgitation noted.  She is on norvasc as an OP and will continue. She was hospitalized in [place] late last year and went to SNF rehab and really seemed to blossom with the social activity.  Her daughter states that her sister lives in [facility] and brother in [place] and her mom has lived with her for the past six years.  Her Na had been low and K high when hospitalized at that time and had resolved with dc of K and she was on lasix and HCT at that time as well.  She states that both  were recently restarted by her PCP and now her Na is 120 again.  She also has creat of 2 and normally is 1.55.   She had been on zestoretic and the ACEI was discontinued due to concern for cough.  She was started on bisoprolol and HCT and now she is bradycardic, which may also be contributing to the fatigue.          Past Medical History:   Diagnosis Date    Arthritis     Bleeding external hemorrhoids     Chronic kidney disease, stage 3b     baseline creat 1.5    Diabetes mellitus, type 2     DNR (do not resuscitate) 02/07/2024    discussed with VITO Christianson present    Essential (primary) hypertension     Hiatal hernia with GERD     Neuropathy     Post-operative hypothyroidism     Severe pulmonary hypertension 10/08/2022    EF  70 % and normal diastolic function       Past Surgical History:   Procedure Laterality Date    BREAST SURGERY      Biopsy    EYE SURGERY      Remove cataracts both eyes    HYSTERECTOMY      NEPHRECTOMY Right     THYROIDECTOMY         Review of patient's allergies indicates:   Allergen Reactions    Aspirin        No current facility-administered medications on file prior to encounter.     Current Outpatient Medications on File Prior to Encounter   Medication Sig    acetaminophen (TYLENOL) 650 MG TbSR Take 650 mg by mouth every 8 (eight) hours.    albuterol (PROVENTIL/VENTOLIN HFA) 90 mcg/actuation inhaler Inhale 2 puffs into the lungs every 4 (four) hours as needed. Rescue    amLODIPine (NORVASC) 5 MG tablet Take 1 tablet (5 mg total) by mouth once daily.    bisoprolol-hydrochlorothiazide 5-6.25 mg (ZIAC) 5-6.25 mg Tab TAKE 1 TABLET TWICE A DAY    HOLD THIS MEDICATION UNTIL YOU FOLLOW UP WITH YOUR PCP (Patient taking differently: Take 1 tablet by mouth once daily. TAKE 1 TABLET TWICE A DAY    HOLD THIS MEDICATION UNTIL YOU FOLLOW UP WITH YOUR PCP)    cholecalciferol, vitamin D3, (VITAMIN D3) 125 mcg (5,000 unit) Tab Take 5,000 Units by mouth once daily.    cyanocobalamin, vitamin B-12, 500  mcg Subl Place 1 tablet under the tongue once daily.    empagliflozin (JARDIANCE) 10 mg tablet Take 1 tablet (10 mg total) by mouth once daily. HOLD THIS MEDICATION UNTIL YOU FOLLOW UP WITH YOUR PCP.    ferrous sulfate (FEOSOL) 325 mg (65 mg iron) Tab tablet Take 1 tablet (325 mg total) by mouth 2 (two) times daily. (Patient taking differently: Take 325 mg by mouth every Mon, Wed, Fri.)    furosemide (LASIX) 20 MG tablet Take 1 tablet (20 mg total) by mouth once daily.    gabapentin (NEURONTIN) 400 MG capsule TAKE 1 CAPSULE BY MOUTH FOUR TIMES A DAY AS NEEDED    hydrALAZINE (APRESOLINE) 25 MG tablet Take 1 tablet (25 mg total) by mouth 2 (two) times daily. (Patient taking differently: Take 2 tablets by mouth 2 (two) times daily.)    HYDROcodone-acetaminophen (NORCO)  mg per tablet Take 1 tablet by mouth every 8 (eight) hours as needed for Pain.    hydrocortisone (ANUSOL-HC) 2.5 % rectal cream Place rectally 2 (two) times daily.    insulin syringe-needle U-100 1 mL 30 gauge x 5/16 Syrg 1 Syringe by Misc.(Non-Drug; Combo Route) route 2 (two) times daily.    lactulose (CHRONULAC) 10 gram/15 mL solution Take 30 mLs (20 g total) by mouth once daily.    levothyroxine (SYNTHROID) 100 MCG tablet Take 1 tablet (100 mcg total) by mouth before breakfast.    magnesium citrate 100 mg Tab Take 400 mg by mouth as needed.    methocarbamoL (ROBAXIN) 750 MG Tab TAKE 2 TABLETS BY MOUTH 4 TIMES A DAY AS NEEDED FOR MUSCLE SPASMS  Strength: 750 mg (Patient taking differently: Take 750 mg by mouth once daily. TAKE 2 TABLETS BY MOUTH 4 TIMES A DAY AS NEEDED FOR MUSCLE SPASMS  Strength: 750 mg)    NOVOLIN N NPH U-100 INSULIN 100 unit/mL injection INJECT 40 UNITS INTO THE SKIN 2 (TWO) TIMES DAILY. (Patient taking differently: Inject 20-25 Units into the skin once daily.)    omega-3 fatty acids/fish oil (FISH OIL-OMEGA-3 FATTY ACIDS) 300-1,000 mg capsule Take 1 capsule by mouth once daily.    pantoprazole (PROTONIX) 40 MG tablet TAKE 1  TABLET BY MOUTH TWICE A DAY    predniSONE (DELTASONE) 2.5 MG tablet TAKE 1 TABLET BY MOUTH EVERY DAY    SYMBICORT 160-4.5 mcg/actuation HFAA Inhale 2 puffs into the lungs 2 (two) times a day.    traMADoL (ULTRAM) 50 mg tablet TAKE 1 TABLET BY MOUTH FOUR TIMES A DAY (Patient taking differently: Take 50 mg by mouth every 12 (twelve) hours as needed. TAKE 1 TABLET BY MOUTH FOUR TIMES A DAY)     Family History       Problem Relation (Age of Onset)    Cancer Sister, Brother, Daughter    Diabetes Mother, Sister, Brother, Sister    Heart disease Father          Tobacco Use    Smoking status: Never    Smokeless tobacco: Never   Substance and Sexual Activity    Alcohol use: Never    Drug use: Never    Sexual activity: Not Currently     Birth control/protection: None     Review of Systems   Constitutional:  Positive for fatigue. Negative for appetite change, chills, fever and unexpected weight change.   HENT:  Negative for congestion, mouth sores, nosebleeds, sinus pain, sore throat and trouble swallowing.    Eyes:  Negative for visual disturbance.   Respiratory:  Positive for shortness of breath. Negative for apnea, cough and chest tightness.    Cardiovascular:  Negative for chest pain, palpitations and leg swelling.   Gastrointestinal:  Negative for abdominal pain, blood in stool, constipation, diarrhea, nausea and vomiting.   Endocrine: Negative for polyuria.   Genitourinary:  Negative for decreased urine volume, difficulty urinating, dysuria and frequency.   Musculoskeletal:  Negative for arthralgias, back pain and neck pain.   Skin:  Negative for rash.   Neurological:  Negative for syncope, light-headedness and headaches.   Hematological:  Does not bruise/bleed easily.   Psychiatric/Behavioral:  Negative for confusion and suicidal ideas.      Objective:     Vital Signs (Most Recent):  Temp: 97.8 °F (36.6 °C) (02/07/24 2255)  Pulse: 67 (02/07/24 2255)  Resp: 20 (02/07/24 2255)  BP: (!) 163/79 (02/07/24 2255)  SpO2: (!) 94  % (02/07/24 2303) Vital Signs (24h Range):  Temp:  [97.8 °F (36.6 °C)-98.2 °F (36.8 °C)] 97.8 °F (36.6 °C)  Pulse:  [52-72] 67  Resp:  [14-22] 20  SpO2:  [94 %-96 %] 94 %  BP: (149-194)/(68-85) 163/79     Weight: 82.6 kg (182 lb)  Body mass index is 30.29 kg/m².     Physical Exam  Vitals and nursing note reviewed. Exam conducted with a chaperone present.   Constitutional:       General: She is not in acute distress.     Appearance: She is ill-appearing. She is not toxic-appearing.   HENT:      Head: Atraumatic.      Mouth/Throat:      Mouth: Mucous membranes are moist.      Pharynx: Oropharynx is clear.   Eyes:      Conjunctiva/sclera: Conjunctivae normal.      Pupils: Pupils are equal, round, and reactive to light.   Cardiovascular:      Rate and Rhythm: Bradycardia present. Rhythm irregular.      Pulses: Normal pulses.      Heart sounds: Murmur heard.   Pulmonary:      Effort: Pulmonary effort is normal.      Breath sounds: Rhonchi and rales present. No wheezing.   Abdominal:      General: Abdomen is flat. Bowel sounds are normal. There is no distension.      Palpations: Abdomen is soft.      Tenderness: There is no abdominal tenderness. There is no right CVA tenderness, left CVA tenderness or guarding.   Musculoskeletal:         General: No deformity or signs of injury. Normal range of motion.      Right lower leg: Edema present.      Left lower leg: Edema present.   Skin:     General: Skin is warm and dry.      Coloration: Skin is not jaundiced or pale.      Findings: No bruising, lesion or rash.   Neurological:      General: No focal deficit present.      Mental Status: She is alert and oriented to person, place, and time.   Psychiatric:         Mood and Affect: Mood normal.              CRANIAL NERVES     CN III, IV, VI   Pupils are equal, round, and reactive to light.       Significant Labs: All pertinent labs within the past 24 hours have been reviewed.    Significant Imaging: I have reviewed all pertinent  imaging results/findings within the past 24 hours.  Assessment/Plan:     * Hyponatremia with excess extracellular fluid volume  Will stop lasix and hct and gentle hydrate.  Patient has some mild BLE edema and bilateral crackles are auscultated.  Will check BNP and an echo to eval PHTN and worsening of tricuspid valve regurge.  If Na not improving, I would recommend checking a serum osmol, urine osmol and urine Na after being off the double diuretics and any salt/water wasting drugs  for 24 hours for accurate data.      Recent Labs   Lab 02/07/24  1442   *     Patient with ongoing condition that without treatment could result in loss of life or bodily function.       TRISTAN (acute kidney injury)  Will hold lasix and hctz and gently hydrate and follow renal function and electrolytes.  Will hold all nephrotoxic agents and continue norco for arthritis avoiding NSAIDS.   Daughter states patient was given lasix by PCP because she had been having decreased UOP.  Apparently had some urinary retention when hospitalized in [place] and had a rivera with large amount of UOP.  Will check a post void residual.  Creat 2 and baseline is 1.5.  Will check UA with reflex culture.      Senile asthenia  Most likely a combination of worsening pulmonary hypertension and polypharmacy with norco, high dose gabapentin and ultram and BB.        Essential (primary) hypertension  Chronic, controlled. Latest blood pressure and vitals reviewed-     Temp:  [97.8 °F (36.6 °C)-98.2 °F (36.8 °C)]   Pulse:  [52-72]   Resp:  [14-22]   BP: (149-194)/(68-85)   SpO2:  [94 %-96 %] .   Home meds for hypertension were reviewed and noted below.   Hypertension Medications               amLODIPine (NORVASC) 5 MG tablet Take 1 tablet (5 mg total) by mouth once daily.    bisoprolol-hydrochlorothiazide 5-6.25 mg (ZIAC) 5-6.25 mg Tab TAKE 1 TABLET TWICE A DAY    HOLD THIS MEDICATION UNTIL YOU FOLLOW UP WITH YOUR PCP    furosemide (LASIX) 20 MG tablet Take 1 tablet  (20 mg total) by mouth once daily.    hydrALAZINE (APRESOLINE) 25 MG tablet Take 1 tablet (25 mg total) by mouth 2 (two) times daily.            While in the hospital, will manage blood pressure as follows; Adjust home antihypertensive regimen as follows- stop BB and lasix and HCT but increase hydralazine to TID and continue norvasc for PHTN.      Will utilize p.r.n. blood pressure medication only if patient's blood pressure greater than 140/90 and she develops symptoms such as worsening chest pain or shortness of breath.    COPD (chronic obstructive pulmonary disease)  Patient will need PFTs as OP.  May be senile emphysema but most likely dypnea from PHTN.  Will continue inhaled steriods and bronchodilators.  Patient is on symbicort as OP and daughter feels this has helped the wheeze/dyspnea.      Post-operative hypothyroidism  TSH normal.  Will continue home synthroid.        Bleeding external hemorrhoids  Patient is on lactulose to soften stool and has been having multilpe bowel movement daily that may also be contributing to the fatigue and possible volume depletion.  Will hold lactulose and consider stool softner or miralax.        Arthritis  Continue home norco and avoid NSAIDS.  Patient is on low dose steriods daily and may have been started for presumed COPD.        Hiatal hernia with GERD  Continue home PPI      Chronic kidney disease, stage 3b  Creatine stable for now. BMP reviewed- noted Estimated Creatinine Clearance: 17 mL/min (A) (based on SCr of 2.01 mg/dL (H)). according to latest data. Based on current GFR, CKD stage is stage 3 - GFR 30-59.  Monitor UOP and serial BMP and adjust therapy as needed. Renally dose meds. Avoid nephrotoxic medications and procedures.    Neuropathy  Decrease gabapentin dose.        Diabetes mellitus, type 2  Patient's FSGs are controlled on current medication regimen.  Last A1c reviewed-   Lab Results   Component Value Date    HGBA1C 7.7 (H) 02/07/2024     Most recent  "fingerstick glucose reviewed- No results for input(s): "POCTGLUCOSE" in the last 24 hours.  Current correctional scale  Medium  Maintain anti-hyperglycemic dose as follows-   Antihyperglycemics (From admission, onward)      Start     Stop Route Frequency Ordered    02/08/24 0900  insulin detemir U-100 injection 10 Units         -- SubQ Daily 02/07/24 2132 02/07/24 2232  insulin aspart U-100 injection 0-10 Units         -- SubQ Before meals & nightly PRN 02/07/24 2132          Hold Oral hypoglycemics while patient is in the hospital.      VTE Risk Mitigation (From admission, onward)           Ordered     Place MAREN hose  Until discontinued         02/07/24 1824                       On 02/08/2024, patient should be placed in hospital observation services under my care.             Carlene Noe MD  Department of Hospital Medicine  [facility]          "

## 2024-02-08 NOTE — ASSESSMENT & PLAN NOTE
Will stop lasix and hct and gentle hydrate.  Patient has some mild BLE edema and bilateral crackles are auscultated.  Will check BNP and an echo to eval PHTN and worsening of tricuspid valve regurge.  If Na not improving, I would recommend checking a serum osmol, urine osmol and urine Na after being off the double diuretics and any salt/water wasting drugs  for 24 hours for accurate data.      Recent Labs   Lab 02/07/24  1442   *

## 2024-02-08 NOTE — ASSESSMENT & PLAN NOTE
Most likely a combination of worsening pulmonary hypertension and polypharmacy with norco, high dose gabapentin and ultram and BB.

## 2024-02-08 NOTE — ASSESSMENT & PLAN NOTE
Continue home norco and avoid NSAIDS.  Patient is on low dose steriods daily and may have been started for presumed COPD.

## 2024-02-08 NOTE — ASSESSMENT & PLAN NOTE
Patient will need PFTs as OP.  May be senile emphysema but most likely dypnea from PHTN.  Will continue inhaled steriods and bronchodilators.  Patient is on symbicort as OP and daughter feels this has helped the wheeze/dyspnea.

## 2024-02-08 NOTE — ASSESSMENT & PLAN NOTE
Creatine stable for now. BMP reviewed- noted Estimated Creatinine Clearance: 17 mL/min (A) (based on SCr of 2.01 mg/dL (H)). according to latest data. Based on current GFR, CKD stage is stage 3 - GFR 30-59.  Monitor UOP and serial BMP and adjust therapy as needed. Renally dose meds. Avoid nephrotoxic medications and procedures.

## 2024-02-08 NOTE — PLAN OF CARE
[facility]  Initial Discharge Assessment       Primary Care Provider: Cm Larson III, DO    Admission Diagnosis: Hyponatremia [E87.1]  Chest pain [R07.9]    Admission Date: 2/7/2024  Expected Discharge Date:     Transition of Care Barriers: None    Payor: [employer] / Plan: [employer] MEDICARE HMO / Product Type: Medicare Advantage /     Extended Emergency Contact Information  Primary Emergency Contact: Claribel Morelos  Mobile Phone: [phone]  Relation: Daughter  Preferred language: English   needed? No    Discharge Plan A: Home with family  Discharge Plan B: Home with family      [id number] [place] [id number]  Phone: [phone] Fax: [phone]    [facility] [place] - [facility] AT [facility]  [facility]  [place] [address]  Phone: [phone] Fax: [phone]    [id number] [place] - [address] AT [facility]  [address]  [facility] [address]  Phone: [phone] Fax: [phone]    [facility] [address] [place]  [address] [facility] [address]  Phone: [phone] Fax: [phone]      Initial Assessment (most recent)       Adult Discharge Assessment - 02/08/24 1204          Discharge Assessment    Assessment Type Discharge Planning Assessment     Source of Information patient     Communicated MARTHA with patient/caregiver Date not available/Unable to determine     People in Home child(radha), adult     Do you expect to return to your current living situation? Yes     Prior to hospitilization cognitive status: Alert/Oriented     Current cognitive status: Alert/Oriented     Walking or Climbing Stairs Difficulty no     Dressing/Bathing Difficulty no     Home Accessibility wheelchair accessible     Home Layout Able to live on 1st floor     Equipment Currently Used  at Home cane, straight;walker, rolling;rollator;wheelchair;shower chair     Patient currently being followed by outpatient case management? No     Do you currently have service(s) that help you manage your care at home? No     Do you take prescription medications? Yes     Do you have prescription coverage? Yes     Coverage [employer]     Do you have any problems affording any of your prescribed medications? No     Is the patient taking medications as prescribed? yes     Who is going to help you get home at discharge? daughter     How do you get to doctors appointments? family or friend will provide     Are you on dialysis? No     Do you take coumadin? No     Discharge Plan A Home with family     Discharge Plan B Home with family     DME Needed Upon Discharge  none     Discharge Plan discussed with: Patient     Transition of Care Barriers None        Physical Activity    On average, how many days per week do you engage in moderate to strenuous exercise (like a brisk walk)? 0 days     On average, how many minutes do you engage in exercise at this level? 0 min        Financial Resource Strain    How hard is it for you to pay for the very basics like food, housing, medical care, and heating? Not hard at all        Housing Stability    In the last 12 months, was there a time when you were not able to pay the mortgage or rent on time? No     In the last 12 months, how many places have you lived? 1     In the last 12 months, was there a time when you did not have a steady place to sleep or slept in a shelter (including now)? No        Transportation Needs    In the past 12 months, has lack of transportation kept you from medical appointments or from getting medications? No     In the past 12 months, has lack of transportation kept you from meetings, work, or from getting things needed for daily living? No        Food Insecurity    Within the past 12 months, you worried that your food would run out before you got the money to  buy more. Never true     Within the past 12 months, the food you bought just didn't last and you didn't have money to get more. Never true        Stress    Do you feel stress - tense, restless, nervous, or anxious, or unable to sleep at night because your mind is troubled all the time - these days? Not at all        Social Connections    In a typical week, how many times do you talk on the phone with family, friends, or neighbors? More than three times a week     How often do you get together with friends or relatives? More than three times a week     How often do you attend [employer]ian or Jewish services? Never     Do you belong to any clubs or organizations such as [employer]ian groups, unions, fraternal or athletic groups, or school groups? No     How often do you attend meetings of the clubs or organizations you belong to? Never     Are you , , , , never , or living with a partner?         Alcohol Use    Q1: How often do you have a drink containing alcohol? Never     Q2: How many drinks containing alcohol do you have on a typical day when you are drinking? Patient does not drink     Q3: How often do you have six or more drinks on one occasion? Never                   Pt lives at home with her daughter. Pt is not current with home health. Pt has needed dme pta. Discharge plan is to return home with no needs at this time. SS will follow for discharge needs as they arise.

## 2024-02-08 NOTE — HPI
89 yo F (looks younger than stated age) presents to [facility] ED with worsening dyspnea and fatigue.  Daughter states that her mother has COPD and her PCP has her on symbicort and she had moved the MDI and patient had missed several doses and thought she hear some wheezing.  Patient does have rales bilaterally.  No productive cough, chest pain or palpitations.  No syncope or lightheadedness but does get an occasional HA across forehead with no N/V or vision change or focal weakness.  Patient had CXR that showed pulmonary edema bilaterally.  Will check a BNP.  Initial trops 12 with no change in trend.  EKG shows sinus rhythm with PACs but read as atrial fib with SVR (on bisoprolol).  Will monitor on telemetry and repeat EKG in AM.      Patient has severe pulmonary hypertension (echo 10/3/22) which is most likely the etiology of her fatigue and worsening dyspnea.  Severe tricuspid regurgitation noted.  She is on norvasc as an OP and will continue. She was hospitalized in [place] late last year and went to SNF rehab and really seemed to blossom with the social activity.  Her daughter states that her sister lives in [facility] and brother in [place] and her mom has lived with her for the past six years.  Her Na had been low and K high when hospitalized at that time and had resolved with dc of K and she was on lasix and HCT at that time as well.  She states that both were recently restarted by her PCP and now her Na is 120 again.  She also has creat of 2 and normally is 1.55.   She had been on zestoretic and the ACEI was discontinued due to concern for cough.  She was started on bisoprolol and HCT and now she is bradycardic, which may also be contributing to the fatigue.

## 2024-02-08 NOTE — ASSESSMENT & PLAN NOTE
Patient is on lactulose to soften stool and has been having multilpe bowel movement daily that may also be contributing to the fatigue and possible volume depletion.  Will hold lactulose and consider stool softner or miralax.

## 2024-02-08 NOTE — ASSESSMENT & PLAN NOTE
Will hold lasix and hctz and gently hydrate and follow renal function and electrolytes.  Will hold all nephrotoxic agents and continue norco for arthritis avoiding NSAIDS.   Daughter states patient was given lasix by PCP because she had been having decreased UOP.  Apparently had some urinary retention when hospitalized in [place] and had a rivera with large amount of UOP.  Will check a post void residual.  Creat 2 and baseline is 1.5.  Will check UA with reflex culture.

## 2024-02-08 NOTE — PHARMACY MED REC
"Admission Medication History     The home medication history was taken by Chelsea Ngo.    You may go to "Admission" then "Reconcile Home Medications" tabs to review and/or act upon these items.     The home medication list has been updated by the Pharmacy department.   Please read ALL comments highlighted in yellow.   Please address this information as you see fit.    Feel free to contact us if you have any questions or require assistance.  Medications Added:  Vitamin D3 5,000 units  Fish Oil  Tylenol 650 mg  Vitamin B12 sublingual  Magnesium citrate      The medications listed below were removed from the home medication list. Please reorder if appropriate:  Potassium 10 MEQ    Medications listed below were obtained from: Patient/family and Analytic software- [employer]  (Not in a hospital admission)        Current Outpatient Medications on File Prior to Encounter   Medication Sig Dispense Refill Last Dose    acetaminophen (TYLENOL) 650 MG TbSR Take 650 mg by mouth every 8 (eight) hours.   2/7/2024    albuterol (PROVENTIL/VENTOLIN HFA) 90 mcg/actuation inhaler Inhale 2 puffs into the lungs every 4 (four) hours as needed. Rescue 18 g 12 2/7/2024    amLODIPine (NORVASC) 5 MG tablet Take 1 tablet (5 mg total) by mouth once daily. 90 tablet 3 2/7/2024    bisoprolol-hydrochlorothiazide 5-6.25 mg (ZIAC) 5-6.25 mg Tab TAKE 1 TABLET TWICE A DAY    HOLD THIS MEDICATION UNTIL YOU FOLLOW UP WITH YOUR PCP (Patient taking differently: Take 1 tablet by mouth once daily. TAKE 1 TABLET TWICE A DAY    HOLD THIS MEDICATION UNTIL YOU FOLLOW UP WITH YOUR PCP) 180 tablet 3 2/7/2024    cholecalciferol, vitamin D3, (VITAMIN D3) 125 mcg (5,000 unit) Tab Take 5,000 Units by mouth once daily.   2/7/2024    cyanocobalamin, vitamin B-12, 500 mcg Subl Place 1 tablet under the tongue once daily.   2/7/2024    empagliflozin (JARDIANCE) 10 mg tablet Take 1 tablet (10 mg total) by mouth once daily. HOLD THIS MEDICATION UNTIL YOU FOLLOW UP WITH YOUR " PCP. 90 tablet 3 2/7/2024    ferrous sulfate (FEOSOL) 325 mg (65 mg iron) Tab tablet Take 1 tablet (325 mg total) by mouth 2 (two) times daily. (Patient taking differently: Take 325 mg by mouth every Mon, Wed, Fri.) 120 tablet 3 2/7/2024    furosemide (LASIX) 20 MG tablet Take 1 tablet (20 mg total) by mouth once daily. 90 tablet 3 2/7/2024    gabapentin (NEURONTIN) 400 MG capsule TAKE 1 CAPSULE BY MOUTH FOUR TIMES A DAY AS NEEDED 360 capsule 4 2/7/2024    hydrALAZINE (APRESOLINE) 25 MG tablet Take 1 tablet (25 mg total) by mouth 2 (two) times daily. (Patient taking differently: Take 2 tablets by mouth 2 (two) times daily.) 180 tablet 3 2/7/2024    HYDROcodone-acetaminophen (NORCO)  mg per tablet Take 1 tablet by mouth every 8 (eight) hours as needed for Pain. 90 tablet 0 2/7/2024    hydrocortisone (ANUSOL-HC) 2.5 % rectal cream Place rectally 2 (two) times daily. 28 g 12 2/7/2024    insulin syringe-needle U-100 1 mL 30 gauge x 5/16 Syrg 1 Syringe by Misc.(Non-Drug; Combo Route) route 2 (two) times daily. 100 each 3 2/7/2024    lactulose (CHRONULAC) 10 gram/15 mL solution Take 30 mLs (20 g total) by mouth once daily. 946 mL 11 2/7/2024    levothyroxine (SYNTHROID) 100 MCG tablet Take 1 tablet (100 mcg total) by mouth before breakfast. 90 tablet 3 2/7/2024    magnesium citrate 100 mg Tab Take 400 mg by mouth as needed.   2/7/2024    methocarbamoL (ROBAXIN) 750 MG Tab TAKE 2 TABLETS BY MOUTH 4 TIMES A DAY AS NEEDED FOR MUSCLE SPASMS  Strength: 750 mg (Patient taking differently: Take 750 mg by mouth once daily. TAKE 2 TABLETS BY MOUTH 4 TIMES A DAY AS NEEDED FOR MUSCLE SPASMS  Strength: 750 mg) 240 tablet 1 2/7/2024    NOVOLIN N NPH U-100 INSULIN 100 unit/mL injection INJECT 40 UNITS INTO THE SKIN 2 (TWO) TIMES DAILY. (Patient taking differently: Inject 20-25 Units into the skin once daily.) 30 mL 2 2/7/2024    omega-3 fatty acids/fish oil (FISH OIL-OMEGA-3 FATTY ACIDS) 300-1,000 mg capsule Take 1 capsule by  mouth once daily.   2/7/2024    pantoprazole (PROTONIX) 40 MG tablet TAKE 1 TABLET BY MOUTH TWICE A  tablet 3 2/7/2024    predniSONE (DELTASONE) 2.5 MG tablet TAKE 1 TABLET BY MOUTH EVERY DAY 90 tablet 4 2/7/2024    SYMBICORT 160-4.5 mcg/actuation HFAA Inhale 2 puffs into the lungs 2 (two) times a day. 10.2 g 11 2/7/2024    traMADoL (ULTRAM) 50 mg tablet TAKE 1 TABLET BY MOUTH FOUR TIMES A DAY (Patient taking differently: Take 50 mg by mouth every 12 (twelve) hours as needed. TAKE 1 TABLET BY MOUTH FOUR TIMES A DAY) 120 tablet 4 2/7/2024    [DISCONTINUED] potassium chloride SA (K-DUR,KLOR-CON M) 10 MEQ tablet Take 1 tablet (10 mEq total) by mouth once daily. 90 tablet 3          Potential issues to be addressed PRIOR TO DISCHARGE  No issues identified.    Chelsea Ngo  Pharmacy Tech Specialist - Medication History  EXT. [phone]    .

## 2024-02-08 NOTE — SUBJECTIVE & OBJECTIVE
Past Medical History:   Diagnosis Date    Arthritis     Bleeding external hemorrhoids     Chronic kidney disease, stage 3b     baseline creat 1.5    Diabetes mellitus, type 2     DNR (do not resuscitate) 02/07/2024    discussed with VITO Christianson present    Essential (primary) hypertension     Hiatal hernia with GERD     Neuropathy     Post-operative hypothyroidism     Severe pulmonary hypertension 10/08/2022    EF  70 % and normal diastolic function       Past Surgical History:   Procedure Laterality Date    BREAST SURGERY      Biopsy    EYE SURGERY      Remove cataracts both eyes    HYSTERECTOMY      NEPHRECTOMY Right     THYROIDECTOMY         Review of patient's allergies indicates:   Allergen Reactions    Aspirin        No current facility-administered medications on file prior to encounter.     Current Outpatient Medications on File Prior to Encounter   Medication Sig    acetaminophen (TYLENOL) 650 MG TbSR Take 650 mg by mouth every 8 (eight) hours.    albuterol (PROVENTIL/VENTOLIN HFA) 90 mcg/actuation inhaler Inhale 2 puffs into the lungs every 4 (four) hours as needed. Rescue    amLODIPine (NORVASC) 5 MG tablet Take 1 tablet (5 mg total) by mouth once daily.    bisoprolol-hydrochlorothiazide 5-6.25 mg (ZIAC) 5-6.25 mg Tab TAKE 1 TABLET TWICE A DAY    HOLD THIS MEDICATION UNTIL YOU FOLLOW UP WITH YOUR PCP (Patient taking differently: Take 1 tablet by mouth once daily. TAKE 1 TABLET TWICE A DAY    HOLD THIS MEDICATION UNTIL YOU FOLLOW UP WITH YOUR PCP)    cholecalciferol, vitamin D3, (VITAMIN D3) 125 mcg (5,000 unit) Tab Take 5,000 Units by mouth once daily.    cyanocobalamin, vitamin B-12, 500 mcg Subl Place 1 tablet under the tongue once daily.    empagliflozin (JARDIANCE) 10 mg tablet Take 1 tablet (10 mg total) by mouth once daily. HOLD THIS MEDICATION UNTIL YOU FOLLOW UP WITH YOUR PCP.    ferrous sulfate (FEOSOL) 325 mg (65 mg iron) Tab tablet Take 1 tablet (325 mg total) by mouth 2 (two) times daily.  (Patient taking differently: Take 325 mg by mouth every Mon, Wed, Fri.)    furosemide (LASIX) 20 MG tablet Take 1 tablet (20 mg total) by mouth once daily.    gabapentin (NEURONTIN) 400 MG capsule TAKE 1 CAPSULE BY MOUTH FOUR TIMES A DAY AS NEEDED    hydrALAZINE (APRESOLINE) 25 MG tablet Take 1 tablet (25 mg total) by mouth 2 (two) times daily. (Patient taking differently: Take 2 tablets by mouth 2 (two) times daily.)    HYDROcodone-acetaminophen (NORCO)  mg per tablet Take 1 tablet by mouth every 8 (eight) hours as needed for Pain.    hydrocortisone (ANUSOL-HC) 2.5 % rectal cream Place rectally 2 (two) times daily.    insulin syringe-needle U-100 1 mL 30 gauge x 5/16 Syrg 1 Syringe by Misc.(Non-Drug; Combo Route) route 2 (two) times daily.    lactulose (CHRONULAC) 10 gram/15 mL solution Take 30 mLs (20 g total) by mouth once daily.    levothyroxine (SYNTHROID) 100 MCG tablet Take 1 tablet (100 mcg total) by mouth before breakfast.    magnesium citrate 100 mg Tab Take 400 mg by mouth as needed.    methocarbamoL (ROBAXIN) 750 MG Tab TAKE 2 TABLETS BY MOUTH 4 TIMES A DAY AS NEEDED FOR MUSCLE SPASMS  Strength: 750 mg (Patient taking differently: Take 750 mg by mouth once daily. TAKE 2 TABLETS BY MOUTH 4 TIMES A DAY AS NEEDED FOR MUSCLE SPASMS  Strength: 750 mg)    NOVOLIN N NPH U-100 INSULIN 100 unit/mL injection INJECT 40 UNITS INTO THE SKIN 2 (TWO) TIMES DAILY. (Patient taking differently: Inject 20-25 Units into the skin once daily.)    omega-3 fatty acids/fish oil (FISH OIL-OMEGA-3 FATTY ACIDS) 300-1,000 mg capsule Take 1 capsule by mouth once daily.    pantoprazole (PROTONIX) 40 MG tablet TAKE 1 TABLET BY MOUTH TWICE A DAY    predniSONE (DELTASONE) 2.5 MG tablet TAKE 1 TABLET BY MOUTH EVERY DAY    SYMBICORT 160-4.5 mcg/actuation HFAA Inhale 2 puffs into the lungs 2 (two) times a day.    traMADoL (ULTRAM) 50 mg tablet TAKE 1 TABLET BY MOUTH FOUR TIMES A DAY (Patient taking differently: Take 50 mg by mouth  every 12 (twelve) hours as needed. TAKE 1 TABLET BY MOUTH FOUR TIMES A DAY)     Family History       Problem Relation (Age of Onset)    Cancer Sister, Brother, Daughter    Diabetes Mother, Sister, Brother, Sister    Heart disease Father          Tobacco Use    Smoking status: Never    Smokeless tobacco: Never   Substance and Sexual Activity    Alcohol use: Never    Drug use: Never    Sexual activity: Not Currently     Birth control/protection: None     Review of Systems   Constitutional:  Positive for fatigue. Negative for appetite change, chills, fever and unexpected weight change.   HENT:  Negative for congestion, mouth sores, nosebleeds, sinus pain, sore throat and trouble swallowing.    Eyes:  Negative for visual disturbance.   Respiratory:  Positive for shortness of breath. Negative for apnea, cough and chest tightness.    Cardiovascular:  Negative for chest pain, palpitations and leg swelling.   Gastrointestinal:  Negative for abdominal pain, blood in stool, constipation, diarrhea, nausea and vomiting.   Endocrine: Negative for polyuria.   Genitourinary:  Negative for decreased urine volume, difficulty urinating, dysuria and frequency.   Musculoskeletal:  Negative for arthralgias, back pain and neck pain.   Skin:  Negative for rash.   Neurological:  Negative for syncope, light-headedness and headaches.   Hematological:  Does not bruise/bleed easily.   Psychiatric/Behavioral:  Negative for confusion and suicidal ideas.      Objective:     Vital Signs (Most Recent):  Temp: 97.8 °F (36.6 °C) (02/07/24 2255)  Pulse: 67 (02/07/24 2255)  Resp: 20 (02/07/24 2255)  BP: (!) 163/79 (02/07/24 2255)  SpO2: (!) 94 % (02/07/24 2303) Vital Signs (24h Range):  Temp:  [97.8 °F (36.6 °C)-98.2 °F (36.8 °C)] 97.8 °F (36.6 °C)  Pulse:  [52-72] 67  Resp:  [14-22] 20  SpO2:  [94 %-96 %] 94 %  BP: (149-194)/(68-85) 163/79     Weight: 82.6 kg (182 lb)  Body mass index is 30.29 kg/m².     Physical Exam  Vitals and nursing note  reviewed. Exam conducted with a chaperone present.   Constitutional:       General: She is not in acute distress.     Appearance: She is ill-appearing. She is not toxic-appearing.   HENT:      Head: Atraumatic.      Mouth/Throat:      Mouth: Mucous membranes are moist.      Pharynx: Oropharynx is clear.   Eyes:      Conjunctiva/sclera: Conjunctivae normal.      Pupils: Pupils are equal, round, and reactive to light.   Cardiovascular:      Rate and Rhythm: Bradycardia present. Rhythm irregular.      Pulses: Normal pulses.      Heart sounds: Murmur heard.   Pulmonary:      Effort: Pulmonary effort is normal.      Breath sounds: Rhonchi and rales present. No wheezing.   Abdominal:      General: Abdomen is flat. Bowel sounds are normal. There is no distension.      Palpations: Abdomen is soft.      Tenderness: There is no abdominal tenderness. There is no right CVA tenderness, left CVA tenderness or guarding.   Musculoskeletal:         General: No deformity or signs of injury. Normal range of motion.      Right lower leg: Edema present.      Left lower leg: Edema present.   Skin:     General: Skin is warm and dry.      Coloration: Skin is not jaundiced or pale.      Findings: No bruising, lesion or rash.   Neurological:      General: No focal deficit present.      Mental Status: She is alert and oriented to person, place, and time.   Psychiatric:         Mood and Affect: Mood normal.              CRANIAL NERVES     CN III, IV, VI   Pupils are equal, round, and reactive to light.       Significant Labs: All pertinent labs within the past 24 hours have been reviewed.    Significant Imaging: I have reviewed all pertinent imaging results/findings within the past 24 hours.

## 2024-02-09 LAB
ALBUMIN SERPL BCP-MCNC: 3.5 G/DL (ref 3.5–5)
ANION GAP SERPL CALCULATED.3IONS-SCNC: 7 MMOL/L (ref 7–16)
BUN SERPL-MCNC: 36 MG/DL (ref 7–18)
BUN/CREAT SERPL: 22 (ref 6–20)
CALCIUM SERPL-MCNC: 9.6 MG/DL (ref 8.5–10.1)
CHLORIDE SERPL-SCNC: 99 MMOL/L (ref 98–107)
CO2 SERPL-SCNC: 30 MMOL/L (ref 21–32)
CREAT SERPL-MCNC: 1.61 MG/DL (ref 0.55–1.02)
EGFR (NO RACE VARIABLE) (RUSH/TITUS): 29 ML/MIN/1.73M2
GLUCOSE SERPL-MCNC: 110 MG/DL (ref 70–105)
GLUCOSE SERPL-MCNC: 259 MG/DL (ref 70–105)
GLUCOSE SERPL-MCNC: 295 MG/DL (ref 70–105)
GLUCOSE SERPL-MCNC: 379 MG/DL (ref 70–105)
GLUCOSE SERPL-MCNC: 97 MG/DL (ref 74–106)
PHOSPHATE SERPL-MCNC: 3.2 MG/DL (ref 2.5–4.5)
POTASSIUM SERPL-SCNC: 3.9 MMOL/L (ref 3.5–5.1)
SODIUM SERPL-SCNC: 132 MMOL/L (ref 136–145)

## 2024-02-09 PROCEDURE — 99233 SBSQ HOSP IP/OBS HIGH 50: CPT | Mod: ,,, | Performed by: HOSPITALIST

## 2024-02-09 PROCEDURE — G0378 HOSPITAL OBSERVATION PER HR: HCPCS

## 2024-02-09 PROCEDURE — 94640 AIRWAY INHALATION TREATMENT: CPT

## 2024-02-09 PROCEDURE — 96376 TX/PRO/DX INJ SAME DRUG ADON: CPT

## 2024-02-09 PROCEDURE — 80069 RENAL FUNCTION PANEL: CPT | Performed by: HOSPITALIST

## 2024-02-09 PROCEDURE — 82962 GLUCOSE BLOOD TEST: CPT

## 2024-02-09 PROCEDURE — 25000242 PHARM REV CODE 250 ALT 637 W/ HCPCS: Performed by: HOSPITALIST

## 2024-02-09 PROCEDURE — 63600175 PHARM REV CODE 636 W HCPCS: Performed by: HOSPITALIST

## 2024-02-09 PROCEDURE — 25000003 PHARM REV CODE 250: Performed by: HOSPITALIST

## 2024-02-09 PROCEDURE — 11000001 HC ACUTE MED/SURG PRIVATE ROOM

## 2024-02-09 PROCEDURE — 99900035 HC TECH TIME PER 15 MIN (STAT)

## 2024-02-09 PROCEDURE — 94761 N-INVAS EAR/PLS OXIMETRY MLT: CPT

## 2024-02-09 PROCEDURE — 97165 OT EVAL LOW COMPLEX 30 MIN: CPT

## 2024-02-09 PROCEDURE — 97161 PT EVAL LOW COMPLEX 20 MIN: CPT

## 2024-02-09 RX ORDER — METOPROLOL TARTRATE 25 MG/1
12.5 TABLET ORAL 2 TIMES DAILY
Status: DISCONTINUED | OUTPATIENT
Start: 2024-02-09 | End: 2024-02-11

## 2024-02-09 RX ORDER — AMLODIPINE BESYLATE 10 MG/1
10 TABLET ORAL DAILY
Status: DISCONTINUED | OUTPATIENT
Start: 2024-02-10 | End: 2024-02-12 | Stop reason: HOSPADM

## 2024-02-09 RX ORDER — HYDROCODONE BITARTRATE AND ACETAMINOPHEN 5; 325 MG/1; MG/1
2 TABLET ORAL EVERY 4 HOURS PRN
Status: DISCONTINUED | OUTPATIENT
Start: 2024-02-09 | End: 2024-02-12 | Stop reason: HOSPADM

## 2024-02-09 RX ADMIN — AZITHROMYCIN MONOHYDRATE 500 MG: 500 INJECTION, POWDER, LYOPHILIZED, FOR SOLUTION INTRAVENOUS at 09:02

## 2024-02-09 RX ADMIN — PANTOPRAZOLE SODIUM 40 MG: 40 TABLET, DELAYED RELEASE ORAL at 09:02

## 2024-02-09 RX ADMIN — FUROSEMIDE 40 MG: 10 INJECTION, SOLUTION INTRAVENOUS at 04:02

## 2024-02-09 RX ADMIN — HYDRALAZINE HYDROCHLORIDE 50 MG: 50 TABLET, FILM COATED ORAL at 04:02

## 2024-02-09 RX ADMIN — INSULIN DETEMIR 12 UNITS: 100 INJECTION, SOLUTION SUBCUTANEOUS at 09:02

## 2024-02-09 RX ADMIN — INSULIN ASPART 10 UNITS: 100 INJECTION, SOLUTION INTRAVENOUS; SUBCUTANEOUS at 05:02

## 2024-02-09 RX ADMIN — CEFTRIAXONE 1 G: 1 INJECTION, POWDER, FOR SOLUTION INTRAMUSCULAR; INTRAVENOUS at 09:02

## 2024-02-09 RX ADMIN — BUDESONIDE 0.5 MG: 0.25 INHALANT RESPIRATORY (INHALATION) at 07:02

## 2024-02-09 RX ADMIN — HYDROCODONE BITARTRATE AND ACETAMINOPHEN 1 TABLET: 10; 325 TABLET ORAL at 11:02

## 2024-02-09 RX ADMIN — TRAZODONE HYDROCHLORIDE 50 MG: 50 TABLET ORAL at 09:02

## 2024-02-09 RX ADMIN — HYDROCODONE BITARTRATE AND ACETAMINOPHEN 2 TABLET: 5; 325 TABLET ORAL at 09:02

## 2024-02-09 RX ADMIN — CHOLECALCIFEROL TAB 125 MCG (5000 UNIT) 5000 UNITS: 125 TAB at 08:02

## 2024-02-09 RX ADMIN — LEVALBUTEROL HYDROCHLORIDE 1.25 MG: 1.25 SOLUTION RESPIRATORY (INHALATION) at 08:02

## 2024-02-09 RX ADMIN — Medication 100 MCG: at 08:02

## 2024-02-09 RX ADMIN — CEFTRIAXONE 1 G: 1 INJECTION, POWDER, FOR SOLUTION INTRAMUSCULAR; INTRAVENOUS at 11:02

## 2024-02-09 RX ADMIN — METOPROLOL TARTRATE 12.5 MG: 25 TABLET, FILM COATED ORAL at 09:02

## 2024-02-09 RX ADMIN — LEVOTHYROXINE SODIUM 100 MCG: 100 TABLET ORAL at 06:02

## 2024-02-09 RX ADMIN — AMLODIPINE BESYLATE 5 MG: 5 TABLET ORAL at 08:02

## 2024-02-09 RX ADMIN — LEVALBUTEROL HYDROCHLORIDE 1.25 MG: 1.25 SOLUTION RESPIRATORY (INHALATION) at 07:02

## 2024-02-09 RX ADMIN — HYDRALAZINE HYDROCHLORIDE 50 MG: 50 TABLET, FILM COATED ORAL at 08:02

## 2024-02-09 RX ADMIN — FUROSEMIDE 40 MG: 10 INJECTION, SOLUTION INTRAVENOUS at 06:02

## 2024-02-09 RX ADMIN — PREDNISONE 20 MG: 20 TABLET ORAL at 08:02

## 2024-02-09 RX ADMIN — PANTOPRAZOLE SODIUM 40 MG: 40 TABLET, DELAYED RELEASE ORAL at 08:02

## 2024-02-09 RX ADMIN — BUDESONIDE 0.5 MG: 0.25 INHALANT RESPIRATORY (INHALATION) at 08:02

## 2024-02-09 RX ADMIN — HYDRALAZINE HYDROCHLORIDE 50 MG: 50 TABLET, FILM COATED ORAL at 09:02

## 2024-02-09 RX ADMIN — HYDROCODONE BITARTRATE AND ACETAMINOPHEN 1 TABLET: 10; 325 TABLET ORAL at 04:02

## 2024-02-09 RX ADMIN — INSULIN ASPART 3 UNITS: 100 INJECTION, SOLUTION INTRAVENOUS; SUBCUTANEOUS at 09:02

## 2024-02-09 NOTE — ASSESSMENT & PLAN NOTE
Will stop lasix and hct and gentle hydrate.  Patient has some mild BLE edema and bilateral crackles are auscultated.  Will check BNP and an echo to eval PHTN and worsening of tricuspid valve regurge.  If Na not improving, I would recommend checking a serum osmol, urine osmol and urine Na after being off the double diuretics and any salt/water wasting drugs  for 24 hours for accurate data.      2/8: started lasix back and will monitor renal function carefully.  She states her main concern was shortness of breath likely related to diastolic heart failure.  BNP elevated.      2/9: improving.  Off HCTZ and Jardiance.     Recent Labs   Lab 02/09/24  0838   *

## 2024-02-09 NOTE — SUBJECTIVE & OBJECTIVE
Interval History:     Review of Systems   Constitutional:  Positive for fatigue. Negative for appetite change, chills, fever and unexpected weight change.   HENT:  Negative for congestion, mouth sores, nosebleeds, sinus pain, sore throat and trouble swallowing.    Eyes:  Negative for visual disturbance.   Respiratory:  Positive for shortness of breath. Negative for apnea, cough and chest tightness.    Cardiovascular:  Negative for chest pain, palpitations and leg swelling.   Gastrointestinal:  Negative for abdominal pain, blood in stool, constipation, diarrhea, nausea and vomiting.   Endocrine: Negative for polyuria.   Genitourinary:  Negative for decreased urine volume, difficulty urinating, dysuria and frequency.   Musculoskeletal:  Negative for arthralgias, back pain and neck pain.   Skin:  Negative for rash.   Neurological:  Negative for syncope, light-headedness and headaches.   Hematological:  Does not bruise/bleed easily.   Psychiatric/Behavioral:  Negative for confusion and suicidal ideas.      Objective:     Vital Signs (Most Recent):  Temp: 98.6 °F (37 °C) (02/08/24 1944)  Pulse: 66 (02/08/24 2006)  Resp: 16 (02/08/24 2006)  BP: (!) 149/68 (02/08/24 1944)  SpO2: 100 % (02/08/24 2006) Vital Signs (24h Range):  Temp:  [97.2 °F (36.2 °C)-98.6 °F (37 °C)] 98.6 °F (37 °C)  Pulse:  [66-71] 66  Resp:  [16-20] 16  SpO2:  [93 %-100 %] 100 %  BP: (149-185)/(68-79) 149/68     Weight: 82.6 kg (182 lb)  Body mass index is 29.38 kg/m².    Intake/Output Summary (Last 24 hours) at 2/8/2024 2030  Last data filed at 2/8/2024 0620  Gross per 24 hour   Intake --   Output 1800 ml   Net -1800 ml         Physical Exam  Vitals and nursing note reviewed. Exam conducted with a chaperone present.   Constitutional:       General: She is not in acute distress.     Appearance: She is ill-appearing. She is not toxic-appearing.   HENT:      Head: Atraumatic.      Mouth/Throat:      Mouth: Mucous membranes are moist.      Pharynx:  Oropharynx is clear.   Eyes:      Conjunctiva/sclera: Conjunctivae normal.      Pupils: Pupils are equal, round, and reactive to light.   Cardiovascular:      Rate and Rhythm: Bradycardia present. Rhythm irregular.      Pulses: Normal pulses.      Heart sounds: Murmur heard.   Pulmonary:      Effort: Pulmonary effort is normal.      Breath sounds: Rhonchi and rales present. No wheezing.   Abdominal:      General: Abdomen is flat. Bowel sounds are normal. There is no distension.      Palpations: Abdomen is soft.      Tenderness: There is no abdominal tenderness. There is no right CVA tenderness, left CVA tenderness or guarding.   Musculoskeletal:         General: No deformity or signs of injury. Normal range of motion.      Right lower leg: Edema present.      Left lower leg: Edema present.   Skin:     General: Skin is warm and dry.      Coloration: Skin is not jaundiced or pale.      Findings: No bruising, lesion or rash.   Neurological:      General: No focal deficit present.      Mental Status: She is alert and oriented to person, place, and time.   Psychiatric:         Mood and Affect: Mood normal.             Significant Labs: All pertinent labs within the past 24 hours have been reviewed.    Significant Imaging: I have reviewed all pertinent imaging results/findings within the past 24 hours.

## 2024-02-09 NOTE — PLAN OF CARE
Problem: Adult Inpatient Plan of Care  Goal: Absence of Hospital-Acquired Illness or Injury  Outcome: Ongoing, Progressing     Problem: Adult Inpatient Plan of Care  Goal: Optimal Comfort and Wellbeing  Outcome: Ongoing, Progressing     Problem: Diabetes Comorbidity  Goal: Blood Glucose Level Within Targeted Range  Outcome: Ongoing, Progressing     Problem: Fall Injury Risk  Goal: Absence of Fall and Fall-Related Injury  Outcome: Ongoing, Progressing

## 2024-02-09 NOTE — PT/OT/SLP EVAL
Physical Therapy Evaluation     Patient Name: Vandana Allison   MRN: [id number]  Recent Surgery: * No surgery found *      Recommendations:     Discharge Recommendations: Low Intensity Therapy   Discharge Equipment Recommendations: none   Barriers to discharge: Ongoing medical treatment    Assessment:     Vandana Allison is a 89 y.o. female admitted with a medical diagnosis of Hyponatremia with excess extracellular fluid volume. She presents with the following impairments/functional limitations: weakness, impaired endurance, impaired functional mobility, gait instability. Pt demonstrates good mobility with rolling walker. Pt plans to d/c home tomorrow. She has all needed equipment. Should be okay to return home when medically ready    Rehab Prognosis: Good; patient would benefit from acute PT services to address these deficits and reach maximum level of function.    Plan:     During this hospitalization, patient to be seen 5 x/week to address the above listed problems via gait training, therapeutic activities, therapeutic exercises    Plan of Care Expires: 03/09/24    Subjective     Chief Complaint: weakness  Patient Comments/Goals: Pt is agreeable to PT   Pain/Comfort:  Pain Rating 1: 0/10  Location - Side 1: Bilateral  Location 1: thigh  Pain Addressed 1: Pre-medicate for activity  Pain Rating Post-Intervention 1: 2/10    Social History:  Living Environment: Patient lives with their daughter in a single story home with number of outside stair(s): 1  Prior Level of Function: Prior to admission, patient ambulated household distances using rollator. Daughter assisted with some ADLs  Equipment Used at Home: rollator, shower chair, raised toilet  DME owned (not currently used): none  Assistance Upon Discharge: family    Objective:     Communicated with CHAI Zavaleta RN prior to session. Patient found HOB elevated with peripheral IV, PureWick, telemetry upon PT entry to room.    General Precautions: Standard, fall    Orthopedic Precautions: N/A   Braces: N/A    Respiratory Status: Room air    Exams:  Cognition: Patient is oriented to Person, Place, Time, Situation  RLE ROM: WFL  RLE Strength:  4/5  LLE ROM: WFL  LLE Strength:  4/5  Gross Motor Coordination: WFL  Sensation:    -       Intact    Functional Mobility:  Gait belt applied - Yes  Bed Mobility  Scooting: contact guard assistance  Supine to Sit: contact guard assistance for trunk management  Sit to Supine: contact guard assistance for LE management  Transfers  Sit to Stand: contact guard assistance with rolling walker  Gait  Patient ambulated 30 ft with rolling walker and contact guard assistance. Patient demonstrates decreased step length and decreased leia. . All lines remained intact throughout ambulation trail.  Balance  Sitting: independence  Standing: contact guard assistance      Therapeutic Activities and Exercises:   Patient educated on role of acute care PT and PT POC, safety while in hospital including calling nurse for mobility, and call light usage      AM-PAC 6 CLICK MOBILITY  Total Score:21    Patient left HOB elevated with all lines intact and call button in reach.    GOALS:   Multidisciplinary Problems       Physical Therapy Goals          Problem: Physical Therapy    Goal Priority Disciplines Outcome Goal Variances Interventions   Physical Therapy Goal     PT, PT/OT Ongoing, Progressing     Description: Short term goals:  1. Supine to sit with Modified Archer  2. Sit to stand transfer with Modified Archer  3. Bed to chair transfer with Modified Archer using Rolling Walker  4. Gait  x 100 feet with Modified Archer using Rolling Walker.     Long term goals:  Pt will return home to [facility] with all mobility                         History:     Past Medical History:   Diagnosis Date    Arthritis     Bleeding external hemorrhoids     Chronic kidney disease, stage 3b     baseline creat 1.5    COPD (chronic obstructive pulmonary  disease)     senile emphysema    Diabetes mellitus, type 2     DNR (do not resuscitate) 02/07/2024    discussed with RN Stephenie Christianson present    Essential (primary) hypertension     Hiatal hernia with GERD     Neuropathy     Post-operative hypothyroidism     Severe pulmonary hypertension 10/08/2022    EF  70 % and normal diastolic function       Past Surgical History:   Procedure Laterality Date    BREAST SURGERY      Biopsy    EYE SURGERY      Remove cataracts both eyes    HYSTERECTOMY      NEPHRECTOMY Right     THYROIDECTOMY         Time Tracking:     PT Received On: 02/09/24  PT Start Time: 1309  PT Stop Time: 1325  PT Total Time (min): 16 min     Billable Minutes: Evaluation low complexity    2/9/2024

## 2024-02-09 NOTE — PLAN OF CARE
Problem: Occupational Therapy  Goal: Occupational Therapy Goal  Description: STG:  Pt will perform grooming with setup  Pt will bathe with [place]o with setup at EOB  Pt will perform UE dressing with Josselin  Pt will perform LE dressing with Jericho  Pt will sit EOB x 10 min with SBA   Pt will transfer bed/chair/bsc with SBA with RW  Pt will perform standing task x 2 min with SBA with RW assistance  Pt will tolerate 15 minutes of tx without fatigue      LT.Restore to max I with self care and mobility.      Outcome: Ongoing, Progressing

## 2024-02-09 NOTE — PROGRESS NOTES
[facility]  [facility] Medicine  Progress Note    Patient Name: Vandana Allison  MRN: [id number]  Patient Class: OP- Observation   Admission Date: 2/7/2024  Length of Stay: 0 days  Attending Physician: Vadim Sainz MD  Primary Care Provider: Cm Larson III, DO        Subjective:     Principal Problem:Hyponatremia with excess extracellular fluid volume    HPI:  89 yo F (looks younger than stated age) presents to [facility] ED with worsening dyspnea and fatigue.  Daughter states that her mother has COPD and her PCP has her on symbicort and she had moved the MDI and patient had missed several doses and thought she hear some wheezing.  Patient does have rales bilaterally.  No productive cough, chest pain or palpitations.  No syncope or lightheadedness but does get an occasional HA across forehead with no N/V or vision change or focal weakness.  Patient had CXR that showed pulmonary edema bilaterally.  Will check a BNP.  Initial trops 12 with no change in trend.  EKG shows sinus rhythm with PACs but read as atrial fib with SVR (on bisoprolol).  Will monitor on telemetry and repeat EKG in AM.      Patient has severe pulmonary hypertension (echo 10/3/22) which is most likely the etiology of her fatigue and worsening dyspnea.  Severe tricuspid regurgitation noted.  She is on norvasc as an OP and will continue. She was hospitalized in [place] late last year and went to SNF rehab and really seemed to blossom with the social activity.  Her daughter states that her sister lives in [facility] and brother in [place] and her mom has lived with her for the past six years.  Her Na had been low and K high when hospitalized at that time and had resolved with dc of K and she was on lasix and HCT at that time as well.  She states that both were recently restarted by her PCP and now her Na is 120 again.  She also has creat of 2 and normally is 1.55.   She had been on zestoretic and the ACEI was discontinued due  to concern for cough.  She was started on bisoprolol and HCT and now she is bradycardic, which may also be contributing to the fatigue.          Overview/Hospital Course:  No notes on file    Interval History:     Review of Systems   Constitutional:  Positive for fatigue. Negative for appetite change, chills, fever and unexpected weight change.   HENT:  Negative for congestion, mouth sores, nosebleeds, sinus pain, sore throat and trouble swallowing.    Eyes:  Negative for visual disturbance.   Respiratory:  Positive for shortness of breath. Negative for apnea, cough and chest tightness.    Cardiovascular:  Negative for chest pain, palpitations and leg swelling.   Gastrointestinal:  Negative for abdominal pain, blood in stool, constipation, diarrhea, nausea and vomiting.   Endocrine: Negative for polyuria.   Genitourinary:  Negative for decreased urine volume, difficulty urinating, dysuria and frequency.   Musculoskeletal:  Negative for arthralgias, back pain and neck pain.   Skin:  Negative for rash.   Neurological:  Negative for syncope, light-headedness and headaches.   Hematological:  Does not bruise/bleed easily.   Psychiatric/Behavioral:  Negative for confusion and suicidal ideas.      Objective:     Vital Signs (Most Recent):  Temp: 98.6 °F (37 °C) (02/08/24 1944)  Pulse: 66 (02/08/24 2006)  Resp: 16 (02/08/24 2006)  BP: (!) 149/68 (02/08/24 1944)  SpO2: 100 % (02/08/24 2006) Vital Signs (24h Range):  Temp:  [97.2 °F (36.2 °C)-98.6 °F (37 °C)] 98.6 °F (37 °C)  Pulse:  [66-71] 66  Resp:  [16-20] 16  SpO2:  [93 %-100 %] 100 %  BP: (149-185)/(68-79) 149/68     Weight: 82.6 kg (182 lb)  Body mass index is 29.38 kg/m².    Intake/Output Summary (Last 24 hours) at 2/8/2024 2030  Last data filed at 2/8/2024 0620  Gross per 24 hour   Intake --   Output 1800 ml   Net -1800 ml         Physical Exam  Vitals and nursing note reviewed. Exam conducted with a chaperone present.   Constitutional:       General: She is not in  acute distress.     Appearance: She is ill-appearing. She is not toxic-appearing.   HENT:      Head: Atraumatic.      Mouth/Throat:      Mouth: Mucous membranes are moist.      Pharynx: Oropharynx is clear.   Eyes:      Conjunctiva/sclera: Conjunctivae normal.      Pupils: Pupils are equal, round, and reactive to light.   Cardiovascular:      Rate and Rhythm: Bradycardia present. Rhythm irregular.      Pulses: Normal pulses.      Heart sounds: Murmur heard.   Pulmonary:      Effort: Pulmonary effort is normal.      Breath sounds: Rhonchi and rales present. No wheezing.   Abdominal:      General: Abdomen is flat. Bowel sounds are normal. There is no distension.      Palpations: Abdomen is soft.      Tenderness: There is no abdominal tenderness. There is no right CVA tenderness, left CVA tenderness or guarding.   Musculoskeletal:         General: No deformity or signs of injury. Normal range of motion.      Right lower leg: Edema present.      Left lower leg: Edema present.   Skin:     General: Skin is warm and dry.      Coloration: Skin is not jaundiced or pale.      Findings: No bruising, lesion or rash.   Neurological:      General: No focal deficit present.      Mental Status: She is alert and oriented to person, place, and time.   Psychiatric:         Mood and Affect: Mood normal.             Significant Labs: All pertinent labs within the past 24 hours have been reviewed.    Significant Imaging: I have reviewed all pertinent imaging results/findings within the past 24 hours.    Assessment/Plan:      * Hyponatremia with excess extracellular fluid volume  Will stop lasix and hct and gentle hydrate.  Patient has some mild BLE edema and bilateral crackles are auscultated.  Will check BNP and an echo to eval PHTN and worsening of tricuspid valve regurge.  If Na not improving, I would recommend checking a serum osmol, urine osmol and urine Na after being off the double diuretics and any salt/water wasting drugs  for  24 hours for accurate data.      2/8: started lasix back and will monitor renal function carefully.  She states her main concern was shortness of breath likely related to diastolic heart failure.  BNP elevated.      Recent Labs   Lab 02/08/24  0529   *         COPD (chronic obstructive pulmonary disease)  Patient will need PFTs as OP.  May be senile emphysema but most likely dypnea from PHTN.  Will continue inhaled steriods and bronchodilators.  Patient is on symbicort as OP and daughter feels this has helped the wheeze/dyspnea.      2/8: add ceftriaxone/azithromycin to prevent readmission for COPD exacerbation.      Post-operative hypothyroidism  TSH normal.  Will continue home synthroid.        TRISTAN (acute kidney injury)  Will hold lasix and hctz and gently hydrate and follow renal function and electrolytes.  Will hold all nephrotoxic agents and continue norco for arthritis avoiding NSAIDS.   Daughter states patient was given lasix by PCP because she had been having decreased UOP.  Apparently had some urinary retention when hospitalized in [place] and had a rivera with large amount of UOP.  Will check a post void residual.  Creat 2 and baseline is 1.5.  Will check UA with reflex culture.      Senile asthenia  Most likely a combination of worsening pulmonary hypertension and polypharmacy with norco, high dose gabapentin and ultram and BB.        Bleeding external hemorrhoids  Patient is on lactulose to soften stool and has been having multilpe bowel movement daily that may also be contributing to the fatigue and possible volume depletion.  Will hold lactulose and consider stool softner or miralax.        Essential (primary) hypertension  Chronic, controlled. Latest blood pressure and vitals reviewed-     Temp:  [97.8 °F (36.6 °C)-98.2 °F (36.8 °C)]   Pulse:  [52-72]   Resp:  [14-22]   BP: (149-194)/(68-85)   SpO2:  [94 %-96 %] .   Home meds for hypertension were reviewed and noted below.   Hypertension  "Medications               amLODIPine (NORVASC) 5 MG tablet Take 1 tablet (5 mg total) by mouth once daily.    bisoprolol-hydrochlorothiazide 5-6.25 mg (ZIAC) 5-6.25 mg Tab TAKE 1 TABLET TWICE A DAY    HOLD THIS MEDICATION UNTIL YOU FOLLOW UP WITH YOUR PCP    furosemide (LASIX) 20 MG tablet Take 1 tablet (20 mg total) by mouth once daily.    hydrALAZINE (APRESOLINE) 25 MG tablet Take 1 tablet (25 mg total) by mouth 2 (two) times daily.            While in the hospital, will manage blood pressure as follows; Adjust home antihypertensive regimen as follows- stop BB and lasix and HCT but increase hydralazine to TID and continue norvasc for PHTN.      Will utilize p.r.n. blood pressure medication only if patient's blood pressure greater than 140/90 and she develops symptoms such as worsening chest pain or shortness of breath.    Arthritis  Continue home norco and avoid NSAIDS.  Patient is on low dose steriods daily and may have been started for presumed COPD.        Hiatal hernia with GERD  Continue home PPI      Chronic kidney disease, stage 3b  Creatine stable for now. BMP reviewed- noted Estimated Creatinine Clearance: 17 mL/min (A) (based on SCr of 2.01 mg/dL (H)). according to latest data. Based on current GFR, CKD stage is stage 3 - GFR 30-59.  Monitor UOP and serial BMP and adjust therapy as needed. Renally dose meds. Avoid nephrotoxic medications and procedures.    Neuropathy  Decrease gabapentin dose.        Diabetes mellitus, type 2  Patient's FSGs are controlled on current medication regimen.  Last A1c reviewed-   Lab Results   Component Value Date    HGBA1C 7.7 (H) 02/07/2024     Most recent fingerstick glucose reviewed- No results for input(s): "POCTGLUCOSE" in the last 24 hours.  Current correctional scale  Medium  Maintain anti-hyperglycemic dose as follows-   Antihyperglycemics (From admission, onward)      Start     Stop Route Frequency Ordered    02/08/24 0900  insulin detemir U-100 injection 10 Units   "       -- SubQ Daily 02/07/24 2132 02/07/24 2232  insulin aspart U-100 injection 0-10 Units         -- SubQ Before meals & nightly PRN 02/07/24 2132          Hold Oral hypoglycemics while patient is in the hospital.      VTE Risk Mitigation (From admission, onward)           Ordered     Place MAREN hose  Until discontinued         02/07/24 1824                    Discharge Planning   MARTHA:      Code Status: DNR   Is the patient medically ready for discharge?:     Reason for patient still in hospital (select all that apply): Treatment and Imaging  Discharge Plan A: Home with family                  Vadim Sainz MD  Department of Hospital Medicine   [facility]

## 2024-02-09 NOTE — SUBJECTIVE & OBJECTIVE
Interval History:     Review of Systems   Constitutional:  Positive for fatigue. Negative for appetite change, chills, fever and unexpected weight change.   HENT:  Negative for congestion, mouth sores, nosebleeds, sinus pain, sore throat and trouble swallowing.    Eyes:  Negative for visual disturbance.   Respiratory:  Positive for shortness of breath. Negative for apnea, cough and chest tightness.    Cardiovascular:  Negative for chest pain, palpitations and leg swelling.   Gastrointestinal:  Negative for abdominal pain, blood in stool, constipation, diarrhea, nausea and vomiting.   Endocrine: Negative for polyuria.   Genitourinary:  Negative for decreased urine volume, difficulty urinating, dysuria and frequency.   Musculoskeletal:  Negative for arthralgias, back pain and neck pain.   Skin:  Negative for rash.   Neurological:  Negative for syncope, light-headedness and headaches.   Hematological:  Does not bruise/bleed easily.   Psychiatric/Behavioral:  Negative for confusion and suicidal ideas.      Objective:     Vital Signs (Most Recent):  Temp: 98.1 °F (36.7 °C) (02/09/24 1513)  Pulse: 77 (02/09/24 1513)  Resp: 18 (02/09/24 1624)  BP: (!) 163/77 (02/09/24 1513)  SpO2: 97 % (02/09/24 1513) Vital Signs (24h Range):  Temp:  [97.9 °F (36.6 °C)-98.6 °F (37 °C)] 98.1 °F (36.7 °C)  Pulse:  [66-82] 77  Resp:  [16-18] 18  SpO2:  [95 %-100 %] 97 %  BP: (141-190)/(68-79) 163/77     Weight: 82.6 kg (182 lb)  Body mass index is 29.38 kg/m².    Intake/Output Summary (Last 24 hours) at 2/9/2024 1736  Last data filed at 2/9/2024 1620  Gross per 24 hour   Intake 1400 ml   Output 4100 ml   Net -2700 ml         Physical Exam  Vitals and nursing note reviewed. Exam conducted with a chaperone present.   Constitutional:       General: She is not in acute distress.     Appearance: She is ill-appearing. She is not toxic-appearing.   HENT:      Head: Atraumatic.      Mouth/Throat:      Mouth: Mucous membranes are moist.      Pharynx:  Oropharynx is clear.   Eyes:      Conjunctiva/sclera: Conjunctivae normal.      Pupils: Pupils are equal, round, and reactive to light.   Cardiovascular:      Rate and Rhythm: Bradycardia present. Rhythm irregular.      Pulses: Normal pulses.      Heart sounds: Murmur heard.   Pulmonary:      Effort: Pulmonary effort is normal.      Breath sounds: Rhonchi and rales present. No wheezing.   Abdominal:      General: Abdomen is flat. Bowel sounds are normal. There is no distension.      Palpations: Abdomen is soft.      Tenderness: There is no abdominal tenderness. There is no right CVA tenderness, left CVA tenderness or guarding.   Musculoskeletal:         General: No deformity or signs of injury. Normal range of motion.      Right lower leg: Edema present.      Left lower leg: Edema present.   Skin:     General: Skin is warm and dry.      Coloration: Skin is not jaundiced or pale.      Findings: No bruising, lesion or rash.   Neurological:      General: No focal deficit present.      Mental Status: She is alert and oriented to person, place, and time.   Psychiatric:         Mood and Affect: Mood normal.             Significant Labs: All pertinent labs within the past 24 hours have been reviewed.    Significant Imaging: I have reviewed all pertinent imaging results/findings within the past 24 hours.

## 2024-02-09 NOTE — ASSESSMENT & PLAN NOTE
Patient will need PFTs as OP.  May be senile emphysema but most likely dypnea from PHTN.  Will continue inhaled steriods and bronchodilators.  Patient is on symbicort as OP and daughter feels this has helped the wheeze/dyspnea.      2/8: add ceftriaxone/azithromycin to prevent readmission for COPD exacerbation.      2/9: f/u with Pulmonology as outpatient.  Orders placed.

## 2024-02-09 NOTE — ASSESSMENT & PLAN NOTE
Will stop lasix and hct and gentle hydrate.  Patient has some mild BLE edema and bilateral crackles are auscultated.  Will check BNP and an echo to eval PHTN and worsening of tricuspid valve regurge.  If Na not improving, I would recommend checking a serum osmol, urine osmol and urine Na after being off the double diuretics and any salt/water wasting drugs  for 24 hours for accurate data.      2/8: started lasix back and will monitor renal function carefully.  She states her main concern was shortness of breath likely related to diastolic heart failure.  BNP elevated.      Recent Labs   Lab 02/08/24  0529   *

## 2024-02-09 NOTE — PT/OT/SLP EVAL
Occupational Therapy   Evaluation    Name: Vandana Allison  MRN: [id number]  Admitting Diagnosis: Hyponatremia with excess extracellular fluid volume  Recent Surgery: * No surgery found *      Recommendations:     Discharge Recommendations: Low Intensity Therapy  Discharge Equipment Recommendations:  none  Barriers to discharge:  None    Assessment:     Vandana Allison is a 89 y.o. female with a medical diagnosis of Hyponatremia with excess extracellular fluid volume.  She presents with weakness and decline in ADLs. Performance deficits affecting function: impaired endurance, weakness, impaired self care skills, impaired functional mobility, gait instability, impaired balance.      Rehab Prognosis: Good; patient would benefit from acute skilled OT services to address these deficits and reach maximum level of function.       Plan:     Patient to be seen 5 x/week to address the above listed problems via self-care/home management, therapeutic activities, therapeutic exercises  Plan of Care Expires: 03/08/24  Plan of Care Reviewed with: patient    Subjective     Chief Complaint: hyponatremia   Patient/Family Comments/goals: pt agreeable to OT eval    Occupational Profile:  Living Environment: pt lives with daughter in one story home with one step to enter  Previous level of function: utilized rollator walker for functional mobility; receives assist with ADL tasks as needed  Roles and Routines: assist with self care  Equipment Used at Home: rollator, shower chair, raised toilet  Assistance upon Discharge: home with home health    Pain/Comfort:  Pain Rating 1: 0/10    Patients cultural, spiritual, Uatsdin conflicts given the current situation: no    Objective:     Communicated with: VITO Broderick prior to session.  Patient found HOB elevated with peripheral IV, PureWick, telemetry upon OT entry to room.    General Precautions: Standard, fall  Orthopedic Precautions: N/A  Braces: N/A  Respiratory Status: Room  air    Occupational Performance:    Bed Mobility:    Patient completed Supine to Sit with stand by assistance  Patient completed Sit to Supine with stand by assistance    Functional Mobility/Transfers:  Patient completed Sit <> Stand Transfer with contact guard assistance  with  rolling walker   Functional Mobility: pt performed functional mobility to door and back to bed with CGA with RW    Activities of Daily Living:  Upper Body Dressing: minimum assistance to rich gown as robe    Cognitive/Visual Perceptual:  Cognitive/Psychosocial Skills:     -       Oriented to: Person, Place, Time, and Situation   -       Follows Commands/attention:Follows multistep  commands  -       Mood/Affect/Coping skills/emotional control: Cooperative    Physical Exam:  Balance:    -       WFL with RW  Upper Extremity Range of Motion:     -       Right Upper Extremity: WFL  -       Left Upper Extremity: WFL  Upper Extremity Strength:    -       Right Upper Extremity: WFL  -       Left Upper Extremity: WFL  Gross motor coordination:   WFL    AMPAC 6 Click ADL:  AMPAC Total Score: 22    Treatment & Education:  Pt educated on OT role/POC.   Importance of OOB activity with staff assistance.  Importance of sitting up in the chair throughout the day as tolerated, especially for meals   Safety during functional t/f and mobility with use of RW  Importance of assisting with self-care activities   All questions/concerns answered within OT scope of practice      Patient left HOB elevated with all lines intact, call button in reach, and VITO Broderick notified    GOALS:   Multidisciplinary Problems       Occupational Therapy Goals          Problem: Occupational Therapy    Goal Priority Disciplines Outcome Interventions   Occupational Therapy Goal     OT, PT/OT Ongoing, Progressing    Description: STG:  Pt will perform grooming with setup  Pt will bathe with [place] with setup at EOB  Pt will perform UE dressing with Josselin  Pt will perform LE dressing with  [place]  Pt will sit EOB x 10 min with SBA   Pt will transfer bed/chair/bsc with SBA with RW  Pt will perform standing task x 2 min with SBA with RW assistance  Pt will tolerate 15 minutes of tx without fatigue      LT.Restore to max I with self care and mobility.                           History:     Past Medical History:   Diagnosis Date    Arthritis     Bleeding external hemorrhoids     Chronic kidney disease, stage 3b     baseline creat 1.5    COPD (chronic obstructive pulmonary disease)     senile emphysema    Diabetes mellitus, type 2     DNR (do not resuscitate) 2024    discussed with VITO Christianson present    Essential (primary) hypertension     Hiatal hernia with GERD     Neuropathy     Post-operative hypothyroidism     Severe pulmonary hypertension 10/08/2022    EF  70 % and normal diastolic function         Past Surgical History:   Procedure Laterality Date    BREAST SURGERY      Biopsy    EYE SURGERY      Remove cataracts both eyes    HYSTERECTOMY      NEPHRECTOMY Right     THYROIDECTOMY         Time Tracking:     OT Date of Treatment: 24  OT Start Time: 1309  OT Stop Time: 1325  OT Total Time (min): 16 min    Billable Minutes:Evaluation OT min complexity eval    2024

## 2024-02-10 PROBLEM — M06.9 RHEUMATOID ARTHRITIS: Status: ACTIVE | Noted: 2024-02-07

## 2024-02-10 LAB
ANION GAP SERPL CALCULATED.3IONS-SCNC: 7 MMOL/L (ref 7–16)
ANISOCYTOSIS BLD QL SMEAR: ABNORMAL
BASOPHILS # BLD AUTO: 0.01 K/UL (ref 0–0.2)
BASOPHILS NFR BLD AUTO: 0.1 % (ref 0–1)
BUN SERPL-MCNC: 40 MG/DL (ref 7–18)
BUN/CREAT SERPL: 26 (ref 6–20)
CALCIUM SERPL-MCNC: 9.1 MG/DL (ref 8.5–10.1)
CHLORIDE SERPL-SCNC: 100 MMOL/L (ref 98–107)
CO2 SERPL-SCNC: 29 MMOL/L (ref 21–32)
CREAT SERPL-MCNC: 1.56 MG/DL (ref 0.55–1.02)
DIFFERENTIAL METHOD BLD: ABNORMAL
EGFR (NO RACE VARIABLE) (RUSH/TITUS): 30 ML/MIN/1.73M2
EOSINOPHIL # BLD AUTO: 0.03 K/UL (ref 0–0.5)
EOSINOPHIL NFR BLD AUTO: 0.3 % (ref 1–4)
ERYTHROCYTE [DISTWIDTH] IN BLOOD BY AUTOMATED COUNT: 19.6 % (ref 11.5–14.5)
GLUCOSE SERPL-MCNC: 184 MG/DL (ref 70–105)
GLUCOSE SERPL-MCNC: 209 MG/DL (ref 74–106)
GLUCOSE SERPL-MCNC: 317 MG/DL (ref 70–105)
GLUCOSE SERPL-MCNC: 456 MG/DL (ref 70–105)
HCT VFR BLD AUTO: 32.7 % (ref 38–47)
HGB BLD-MCNC: 10.8 G/DL (ref 12–16)
IMM GRANULOCYTES # BLD AUTO: 0.04 K/UL (ref 0–0.04)
IMM GRANULOCYTES NFR BLD: 0.4 % (ref 0–0.4)
LYMPHOCYTES # BLD AUTO: 0.75 K/UL (ref 1–4.8)
LYMPHOCYTES NFR BLD AUTO: 8.4 % (ref 27–41)
MCH RBC QN AUTO: 24.3 PG (ref 27–31)
MCHC RBC AUTO-ENTMCNC: 33 G/DL (ref 32–36)
MCV RBC AUTO: 73.6 FL (ref 80–96)
MICROCYTES BLD QL SMEAR: ABNORMAL
MONOCYTES # BLD AUTO: 0.83 K/UL (ref 0–0.8)
MONOCYTES NFR BLD AUTO: 9.2 % (ref 2–6)
MPC BLD CALC-MCNC: 8.1 FL (ref 9.4–12.4)
NEUTROPHILS # BLD AUTO: 7.32 K/UL (ref 1.8–7.7)
NEUTROPHILS NFR BLD AUTO: 81.6 % (ref 53–65)
NRBC # BLD AUTO: 0 X10E3/UL
NRBC, AUTO (.00): 0 %
PLATELET # BLD AUTO: 310 K/UL (ref 150–400)
PLATELET MORPHOLOGY: ABNORMAL
POTASSIUM SERPL-SCNC: 4.2 MMOL/L (ref 3.5–5.1)
RBC # BLD AUTO: 4.44 M/UL (ref 4.2–5.4)
SODIUM SERPL-SCNC: 132 MMOL/L (ref 136–145)
WBC # BLD AUTO: 8.98 K/UL (ref 4.5–11)

## 2024-02-10 PROCEDURE — 25000003 PHARM REV CODE 250: Performed by: HOSPITALIST

## 2024-02-10 PROCEDURE — 63600175 PHARM REV CODE 636 W HCPCS: Performed by: HOSPITALIST

## 2024-02-10 PROCEDURE — 25000242 PHARM REV CODE 250 ALT 637 W/ HCPCS: Performed by: HOSPITALIST

## 2024-02-10 PROCEDURE — 80048 BASIC METABOLIC PNL TOTAL CA: CPT | Performed by: HOSPITALIST

## 2024-02-10 PROCEDURE — 99900035 HC TECH TIME PER 15 MIN (STAT)

## 2024-02-10 PROCEDURE — 82962 GLUCOSE BLOOD TEST: CPT

## 2024-02-10 PROCEDURE — 99233 SBSQ HOSP IP/OBS HIGH 50: CPT | Mod: ,,, | Performed by: HOSPITALIST

## 2024-02-10 PROCEDURE — 25000003 PHARM REV CODE 250

## 2024-02-10 PROCEDURE — 11000001 HC ACUTE MED/SURG PRIVATE ROOM

## 2024-02-10 PROCEDURE — 94761 N-INVAS EAR/PLS OXIMETRY MLT: CPT

## 2024-02-10 PROCEDURE — 94640 AIRWAY INHALATION TREATMENT: CPT

## 2024-02-10 PROCEDURE — 85025 COMPLETE CBC W/AUTO DIFF WBC: CPT | Performed by: HOSPITALIST

## 2024-02-10 RX ORDER — PREDNISONE 5 MG/1
5 TABLET ORAL DAILY
Status: DISCONTINUED | OUTPATIENT
Start: 2024-02-10 | End: 2024-02-12 | Stop reason: HOSPADM

## 2024-02-10 RX ORDER — LACTULOSE 10 G/15ML
20 SOLUTION ORAL DAILY
Status: DISCONTINUED | OUTPATIENT
Start: 2024-02-10 | End: 2024-02-10

## 2024-02-10 RX ORDER — DICLOFENAC SODIUM 10 MG/G
4 GEL TOPICAL 2 TIMES DAILY
Status: DISCONTINUED | OUTPATIENT
Start: 2024-02-10 | End: 2024-02-12 | Stop reason: HOSPADM

## 2024-02-10 RX ORDER — FUROSEMIDE 20 MG/1
20 TABLET ORAL DAILY
Status: DISCONTINUED | OUTPATIENT
Start: 2024-02-10 | End: 2024-02-10

## 2024-02-10 RX ORDER — LIDOCAINE HYDROCHLORIDE 20 MG/ML
JELLY TOPICAL 2 TIMES DAILY
Status: DISCONTINUED | OUTPATIENT
Start: 2024-02-10 | End: 2024-02-12 | Stop reason: HOSPADM

## 2024-02-10 RX ORDER — FUROSEMIDE 40 MG/1
40 TABLET ORAL DAILY
Status: DISCONTINUED | OUTPATIENT
Start: 2024-02-10 | End: 2024-02-12 | Stop reason: HOSPADM

## 2024-02-10 RX ORDER — GABAPENTIN 100 MG/1
200 CAPSULE ORAL 3 TIMES DAILY
Status: DISCONTINUED | OUTPATIENT
Start: 2024-02-10 | End: 2024-02-12 | Stop reason: HOSPADM

## 2024-02-10 RX ORDER — LIDOCAINE HYDROCHLORIDE 20 MG/ML
JELLY TOPICAL 2 TIMES DAILY
Status: DISCONTINUED | OUTPATIENT
Start: 2024-02-10 | End: 2024-02-10

## 2024-02-10 RX ADMIN — INSULIN DETEMIR 6 UNITS: 100 INJECTION, SOLUTION SUBCUTANEOUS at 08:02

## 2024-02-10 RX ADMIN — Medication 100 MCG: at 08:02

## 2024-02-10 RX ADMIN — PREDNISONE 5 MG: 5 TABLET ORAL at 08:02

## 2024-02-10 RX ADMIN — AMLODIPINE BESYLATE 10 MG: 10 TABLET ORAL at 08:02

## 2024-02-10 RX ADMIN — LEVALBUTEROL HYDROCHLORIDE 1.25 MG: 1.25 SOLUTION RESPIRATORY (INHALATION) at 08:02

## 2024-02-10 RX ADMIN — FUROSEMIDE 40 MG: 10 INJECTION, SOLUTION INTRAVENOUS at 06:02

## 2024-02-10 RX ADMIN — LACTULOSE 20 G: 20 SOLUTION ORAL at 08:02

## 2024-02-10 RX ADMIN — GABAPENTIN 200 MG: 100 CAPSULE ORAL at 12:02

## 2024-02-10 RX ADMIN — BUDESONIDE 0.5 MG: 0.25 INHALANT RESPIRATORY (INHALATION) at 07:02

## 2024-02-10 RX ADMIN — BUDESONIDE 0.5 MG: 0.25 INHALANT RESPIRATORY (INHALATION) at 08:02

## 2024-02-10 RX ADMIN — INSULIN ASPART 2 UNITS: 100 INJECTION, SOLUTION INTRAVENOUS; SUBCUTANEOUS at 05:02

## 2024-02-10 RX ADMIN — LEVALBUTEROL HYDROCHLORIDE 1.25 MG: 1.25 SOLUTION RESPIRATORY (INHALATION) at 07:02

## 2024-02-10 RX ADMIN — LIDOCAINE HYDROCHLORIDE 11 ML: 20 JELLY TOPICAL at 09:02

## 2024-02-10 RX ADMIN — HYDRALAZINE HYDROCHLORIDE 50 MG: 50 TABLET, FILM COATED ORAL at 05:02

## 2024-02-10 RX ADMIN — GABAPENTIN 200 MG: 100 CAPSULE ORAL at 02:02

## 2024-02-10 RX ADMIN — HYDRALAZINE HYDROCHLORIDE 50 MG: 50 TABLET, FILM COATED ORAL at 08:02

## 2024-02-10 RX ADMIN — METOPROLOL TARTRATE 12.5 MG: 25 TABLET, FILM COATED ORAL at 08:02

## 2024-02-10 RX ADMIN — PANTOPRAZOLE SODIUM 40 MG: 40 TABLET, DELAYED RELEASE ORAL at 08:02

## 2024-02-10 RX ADMIN — CEFTRIAXONE 1 G: 1 INJECTION, POWDER, FOR SOLUTION INTRAMUSCULAR; INTRAVENOUS at 09:02

## 2024-02-10 RX ADMIN — HYDROCODONE BITARTRATE AND ACETAMINOPHEN 2 TABLET: 5; 325 TABLET ORAL at 02:02

## 2024-02-10 RX ADMIN — AZITHROMYCIN MONOHYDRATE 500 MG: 500 INJECTION, POWDER, LYOPHILIZED, FOR SOLUTION INTRAVENOUS at 08:02

## 2024-02-10 RX ADMIN — METOPROLOL TARTRATE 12.5 MG: 25 TABLET, FILM COATED ORAL at 09:02

## 2024-02-10 RX ADMIN — GABAPENTIN 200 MG: 100 CAPSULE ORAL at 08:02

## 2024-02-10 RX ADMIN — CHOLECALCIFEROL TAB 125 MCG (5000 UNIT) 5000 UNITS: 125 TAB at 08:02

## 2024-02-10 RX ADMIN — INSULIN DETEMIR 20 UNITS: 100 INJECTION, SOLUTION SUBCUTANEOUS at 09:02

## 2024-02-10 RX ADMIN — LEVOTHYROXINE SODIUM 100 MCG: 100 TABLET ORAL at 06:02

## 2024-02-10 RX ADMIN — DICLOFENAC SODIUM 4 G: 10 GEL TOPICAL at 09:02

## 2024-02-10 RX ADMIN — HYDRALAZINE HYDROCHLORIDE 50 MG: 50 TABLET, FILM COATED ORAL at 09:02

## 2024-02-10 RX ADMIN — GABAPENTIN 200 MG: 100 CAPSULE ORAL at 09:02

## 2024-02-10 RX ADMIN — FUROSEMIDE 40 MG: 40 TABLET ORAL at 02:02

## 2024-02-10 RX ADMIN — HYDROCODONE BITARTRATE AND ACETAMINOPHEN 2 TABLET: 5; 325 TABLET ORAL at 08:02

## 2024-02-10 RX ADMIN — PANTOPRAZOLE SODIUM 40 MG: 40 TABLET, DELAYED RELEASE ORAL at 09:02

## 2024-02-10 RX ADMIN — INSULIN ASPART 8 UNITS: 100 INJECTION, SOLUTION INTRAVENOUS; SUBCUTANEOUS at 12:02

## 2024-02-10 NOTE — PROGRESS NOTES
[facility]  [facility] Medicine  Progress Note    Patient Name: Vandaan Allison  MRN: [id number]  Patient Class: IP- Inpatient   Admission Date: 2/7/2024  Length of Stay: 0 days  Attending Physician: Vadim Sainz MD  Primary Care Provider: Cm Larson III, DO        Subjective:     Principal Problem:Hyponatremia with excess extracellular fluid volume    HPI:  89 yo F (looks younger than stated age) presents to [facility] ED with worsening dyspnea and fatigue.  Daughter states that her mother has COPD and her PCP has her on symbicort and she had moved the MDI and patient had missed several doses and thought she hear some wheezing.  Patient does have rales bilaterally.  No productive cough, chest pain or palpitations.  No syncope or lightheadedness but does get an occasional HA across forehead with no N/V or vision change or focal weakness.  Patient had CXR that showed pulmonary edema bilaterally.  Will check a BNP.  Initial trops 12 with no change in trend.  EKG shows sinus rhythm with PACs but read as atrial fib with SVR (on bisoprolol).  Will monitor on telemetry and repeat EKG in AM.      Patient has severe pulmonary hypertension (echo 10/3/22) which is most likely the etiology of her fatigue and worsening dyspnea.  Severe tricuspid regurgitation noted.  She is on norvasc as an OP and will continue. She was hospitalized in [place] late last year and went to SNF rehab and really seemed to blossom with the social activity.  Her daughter states that her sister lives in [facility] and brother in [place] and her mom has lived with her for the past six years.  Her Na had been low and K high when hospitalized at that time and had resolved with dc of K and she was on lasix and HCT at that time as well.  She states that both were recently restarted by her PCP and now her Na is 120 again.  She also has creat of 2 and normally is 1.55.   She had been on zestoretic and the ACEI was discontinued due to  concern for cough.  She was started on bisoprolol and HCT and now she is bradycardic, which may also be contributing to the fatigue.          Overview/Hospital Course:  No notes on file    Interval History:     Review of Systems   Constitutional:  Positive for fatigue. Negative for appetite change, chills, fever and unexpected weight change.   HENT:  Negative for congestion, mouth sores, nosebleeds, sinus pain, sore throat and trouble swallowing.    Eyes:  Negative for visual disturbance.   Respiratory:  Positive for shortness of breath. Negative for apnea, cough and chest tightness.    Cardiovascular:  Negative for chest pain, palpitations and leg swelling.   Gastrointestinal:  Negative for abdominal pain, blood in stool, constipation, diarrhea, nausea and vomiting.   Endocrine: Negative for polyuria.   Genitourinary:  Negative for decreased urine volume, difficulty urinating, dysuria and frequency.   Musculoskeletal:  Negative for arthralgias, back pain and neck pain.   Skin:  Negative for rash.   Neurological:  Negative for syncope, light-headedness and headaches.   Hematological:  Does not bruise/bleed easily.   Psychiatric/Behavioral:  Negative for confusion and suicidal ideas.      Objective:     Vital Signs (Most Recent):  Temp: 98.1 °F (36.7 °C) (02/09/24 1513)  Pulse: 77 (02/09/24 1513)  Resp: 18 (02/09/24 1624)  BP: (!) 163/77 (02/09/24 1513)  SpO2: 97 % (02/09/24 1513) Vital Signs (24h Range):  Temp:  [97.9 °F (36.6 °C)-98.6 °F (37 °C)] 98.1 °F (36.7 °C)  Pulse:  [66-82] 77  Resp:  [16-18] 18  SpO2:  [95 %-100 %] 97 %  BP: (141-190)/(68-79) 163/77     Weight: 82.6 kg (182 lb)  Body mass index is 29.38 kg/m².    Intake/Output Summary (Last 24 hours) at 2/9/2024 1736  Last data filed at 2/9/2024 1620  Gross per 24 hour   Intake 1400 ml   Output 4100 ml   Net -2700 ml         Physical Exam  Vitals and nursing note reviewed. Exam conducted with a chaperone present.   Constitutional:       General: She is not  in acute distress.     Appearance: She is ill-appearing. She is not toxic-appearing.   HENT:      Head: Atraumatic.      Mouth/Throat:      Mouth: Mucous membranes are moist.      Pharynx: Oropharynx is clear.   Eyes:      Conjunctiva/sclera: Conjunctivae normal.      Pupils: Pupils are equal, round, and reactive to light.   Cardiovascular:      Rate and Rhythm: Bradycardia present. Rhythm irregular.      Pulses: Normal pulses.      Heart sounds: Murmur heard.   Pulmonary:      Effort: Pulmonary effort is normal.      Breath sounds: Rhonchi and rales present. No wheezing.   Abdominal:      General: Abdomen is flat. Bowel sounds are normal. There is no distension.      Palpations: Abdomen is soft.      Tenderness: There is no abdominal tenderness. There is no right CVA tenderness, left CVA tenderness or guarding.   Musculoskeletal:         General: No deformity or signs of injury. Normal range of motion.      Right lower leg: Edema present.      Left lower leg: Edema present.   Skin:     General: Skin is warm and dry.      Coloration: Skin is not jaundiced or pale.      Findings: No bruising, lesion or rash.   Neurological:      General: No focal deficit present.      Mental Status: She is alert and oriented to person, place, and time.   Psychiatric:         Mood and Affect: Mood normal.             Significant Labs: All pertinent labs within the past 24 hours have been reviewed.    Significant Imaging: I have reviewed all pertinent imaging results/findings within the past 24 hours.    Assessment/Plan:      * Hyponatremia with excess extracellular fluid volume  Will stop lasix and hct and gentle hydrate.  Patient has some mild BLE edema and bilateral crackles are auscultated.  Will check BNP and an echo to eval PHTN and worsening of tricuspid valve regurge.  If Na not improving, I would recommend checking a serum osmol, urine osmol and urine Na after being off the double diuretics and any salt/water wasting drugs   for 24 hours for accurate data.      2/8: started lasix back and will monitor renal function carefully.  She states her main concern was shortness of breath likely related to diastolic heart failure.  BNP elevated.      2/9: improving.  Off HCTZ and Jardiance.     Recent Labs   Lab 02/09/24  0838   *         COPD (chronic obstructive pulmonary disease)  Patient will need PFTs as OP.  May be senile emphysema but most likely dypnea from PHTN.  Will continue inhaled steriods and bronchodilators.  Patient is on symbicort as OP and daughter feels this has helped the wheeze/dyspnea.      2/8: add ceftriaxone/azithromycin to prevent readmission for COPD exacerbation.      2/9: f/u with Pulmonology as outpatient.  Orders placed.     Post-operative hypothyroidism  TSH normal.  Will continue home synthroid.        TRISTAN (acute kidney injury)  Will hold lasix and hctz and gently hydrate and follow renal function and electrolytes.  Will hold all nephrotoxic agents and continue norco for arthritis avoiding NSAIDS.   Daughter states patient was given lasix by PCP because she had been having decreased UOP.  Apparently had some urinary retention when hospitalized in [place] and had a rivera with large amount of UOP.  Will check a post void residual.  Creat 2 and baseline is 1.5.  Will check UA with reflex culture.      Senile asthenia  Most likely a combination of worsening pulmonary hypertension and polypharmacy with norco, high dose gabapentin and ultram and BB.        Bleeding external hemorrhoids  Patient is on lactulose to soften stool and has been having multilpe bowel movement daily that may also be contributing to the fatigue and possible volume depletion.  Will hold lactulose and consider stool softner or miralax.        Essential (primary) hypertension  Chronic, controlled. Latest blood pressure and vitals reviewed-     Temp:  [97.8 °F (36.6 °C)-98.2 °F (36.8 °C)]   Pulse:  [52-72]   Resp:  [14-22]   BP:  "(149-194)/(68-85)   SpO2:  [94 %-96 %] .   Home meds for hypertension were reviewed and noted below.   Hypertension Medications               amLODIPine (NORVASC) 5 MG tablet Take 1 tablet (5 mg total) by mouth once daily.    bisoprolol-hydrochlorothiazide 5-6.25 mg (ZIAC) 5-6.25 mg Tab TAKE 1 TABLET TWICE A DAY    HOLD THIS MEDICATION UNTIL YOU FOLLOW UP WITH YOUR PCP    furosemide (LASIX) 20 MG tablet Take 1 tablet (20 mg total) by mouth once daily.    hydrALAZINE (APRESOLINE) 25 MG tablet Take 1 tablet (25 mg total) by mouth 2 (two) times daily.            While in the hospital, will manage blood pressure as follows; Adjust home antihypertensive regimen as follows- stop BB and lasix and HCT but increase hydralazine to TID and continue norvasc for PHTN.      Will utilize p.r.n. blood pressure medication only if patient's blood pressure greater than 140/90 and she develops symptoms such as worsening chest pain or shortness of breath.    Arthritis  Continue home norco and avoid NSAIDS.  Patient is on low dose steriods daily and may have been started for presumed COPD.        Hiatal hernia with GERD  Continue home PPI      Chronic kidney disease, stage 3b  Creatine stable for now. BMP reviewed- noted Estimated Creatinine Clearance: 17 mL/min (A) (based on SCr of 2.01 mg/dL (H)). according to latest data. Based on current GFR, CKD stage is stage 3 - GFR 30-59.  Monitor UOP and serial BMP and adjust therapy as needed. Renally dose meds. Avoid nephrotoxic medications and procedures.    Neuropathy  Decrease gabapentin dose.        Diabetes mellitus, type 2  Patient's FSGs are controlled on current medication regimen.  Last A1c reviewed-   Lab Results   Component Value Date    HGBA1C 7.7 (H) 02/07/2024     Most recent fingerstick glucose reviewed- No results for input(s): "POCTGLUCOSE" in the last 24 hours.  Current correctional scale  Medium  Maintain anti-hyperglycemic dose as follows-   Antihyperglycemics (From " admission, onward)      Start     Stop Route Frequency Ordered    02/08/24 0900  insulin detemir U-100 injection 10 Units         -- SubQ Daily 02/07/24 2132 02/07/24 2232  insulin aspart U-100 injection 0-10 Units         -- SubQ Before meals & nightly PRN 02/07/24 2132          Hold Oral hypoglycemics while patient is in the hospital.      VTE Risk Mitigation (From admission, onward)           Ordered     Place MAREN hose  Until discontinued         02/07/24 1824                    Discharge Planning   MARTHA:      Code Status: DNR   Is the patient medically ready for discharge?:     Reason for patient still in hospital (select all that apply): Treatment  Discharge Plan A: Home with family                  Vadim Sainz MD  Department of Hospital Medicine   [facility]

## 2024-02-11 LAB
ANION GAP SERPL CALCULATED.3IONS-SCNC: 10 MMOL/L (ref 7–16)
ANISOCYTOSIS BLD QL SMEAR: NORMAL
BASOPHILS # BLD AUTO: 0.01 K/UL (ref 0–0.2)
BASOPHILS NFR BLD AUTO: 0.1 % (ref 0–1)
BUN SERPL-MCNC: 46 MG/DL (ref 7–18)
BUN/CREAT SERPL: 24 (ref 6–20)
CALCIUM SERPL-MCNC: 8.8 MG/DL (ref 8.5–10.1)
CHLORIDE SERPL-SCNC: 98 MMOL/L (ref 98–107)
CO2 SERPL-SCNC: 27 MMOL/L (ref 21–32)
CREAT SERPL-MCNC: 1.95 MG/DL (ref 0.55–1.02)
DIFFERENTIAL METHOD BLD: ABNORMAL
EGFR (NO RACE VARIABLE) (RUSH/TITUS): 23 ML/MIN/1.73M2
EOSINOPHIL # BLD AUTO: 0.13 K/UL (ref 0–0.5)
EOSINOPHIL NFR BLD AUTO: 1.4 % (ref 1–4)
ERYTHROCYTE [DISTWIDTH] IN BLOOD BY AUTOMATED COUNT: 19.6 % (ref 11.5–14.5)
GLUCOSE SERPL-MCNC: 231 MG/DL (ref 70–105)
GLUCOSE SERPL-MCNC: 241 MG/DL (ref 70–105)
GLUCOSE SERPL-MCNC: 250 MG/DL (ref 70–105)
GLUCOSE SERPL-MCNC: 291 MG/DL (ref 70–105)
GLUCOSE SERPL-MCNC: 340 MG/DL (ref 74–106)
HCT VFR BLD AUTO: 31.6 % (ref 38–47)
HGB BLD-MCNC: 10.2 G/DL (ref 12–16)
HYPOCHROMIA BLD QL SMEAR: NORMAL
IMM GRANULOCYTES # BLD AUTO: 0.05 K/UL (ref 0–0.04)
IMM GRANULOCYTES NFR BLD: 0.5 % (ref 0–0.4)
LYMPHOCYTES # BLD AUTO: 0.82 K/UL (ref 1–4.8)
LYMPHOCYTES NFR BLD AUTO: 8.9 % (ref 27–41)
MAGNESIUM SERPL-MCNC: 2.4 MG/DL (ref 1.7–2.3)
MCH RBC QN AUTO: 24.2 PG (ref 27–31)
MCHC RBC AUTO-ENTMCNC: 32.3 G/DL (ref 32–36)
MCV RBC AUTO: 74.9 FL (ref 80–96)
MICROCYTES BLD QL SMEAR: NORMAL
MONOCYTES # BLD AUTO: 0.75 K/UL (ref 0–0.8)
MONOCYTES NFR BLD AUTO: 8.2 % (ref 2–6)
MPC BLD CALC-MCNC: 8.3 FL (ref 9.4–12.4)
NEUTROPHILS # BLD AUTO: 7.42 K/UL (ref 1.8–7.7)
NEUTROPHILS NFR BLD AUTO: 80.9 % (ref 53–65)
NRBC # BLD AUTO: 0 X10E3/UL
NRBC, AUTO (.00): 0 %
OVALOCYTES BLD QL SMEAR: NORMAL
PLATELET # BLD AUTO: 326 K/UL (ref 150–400)
PLATELET MORPHOLOGY: NORMAL
POLYCHROMASIA BLD QL SMEAR: NORMAL
POTASSIUM SERPL-SCNC: 4.2 MMOL/L (ref 3.5–5.1)
RBC # BLD AUTO: 4.22 M/UL (ref 4.2–5.4)
SODIUM SERPL-SCNC: 131 MMOL/L (ref 136–145)
WBC # BLD AUTO: 9.18 K/UL (ref 4.5–11)

## 2024-02-11 PROCEDURE — 80048 BASIC METABOLIC PNL TOTAL CA: CPT | Performed by: HOSPITALIST

## 2024-02-11 PROCEDURE — 11000001 HC ACUTE MED/SURG PRIVATE ROOM

## 2024-02-11 PROCEDURE — 63600175 PHARM REV CODE 636 W HCPCS: Performed by: HOSPITALIST

## 2024-02-11 PROCEDURE — 25000003 PHARM REV CODE 250

## 2024-02-11 PROCEDURE — 94640 AIRWAY INHALATION TREATMENT: CPT

## 2024-02-11 PROCEDURE — 25000003 PHARM REV CODE 250: Performed by: HOSPITALIST

## 2024-02-11 PROCEDURE — 85025 COMPLETE CBC W/AUTO DIFF WBC: CPT | Performed by: HOSPITALIST

## 2024-02-11 PROCEDURE — 94761 N-INVAS EAR/PLS OXIMETRY MLT: CPT

## 2024-02-11 PROCEDURE — 25000242 PHARM REV CODE 250 ALT 637 W/ HCPCS: Performed by: HOSPITALIST

## 2024-02-11 PROCEDURE — 99900035 HC TECH TIME PER 15 MIN (STAT)

## 2024-02-11 PROCEDURE — 82962 GLUCOSE BLOOD TEST: CPT

## 2024-02-11 PROCEDURE — 83735 ASSAY OF MAGNESIUM: CPT | Performed by: HOSPITALIST

## 2024-02-11 PROCEDURE — 99233 SBSQ HOSP IP/OBS HIGH 50: CPT | Mod: ,,, | Performed by: HOSPITALIST

## 2024-02-11 RX ORDER — METOPROLOL SUCCINATE 25 MG/1
25 TABLET, EXTENDED RELEASE ORAL DAILY
Status: DISCONTINUED | OUTPATIENT
Start: 2024-02-12 | End: 2024-02-12 | Stop reason: HOSPADM

## 2024-02-11 RX ORDER — HYDRALAZINE HYDROCHLORIDE 50 MG/1
50 TABLET, FILM COATED ORAL
Status: DISCONTINUED | OUTPATIENT
Start: 2024-02-12 | End: 2024-02-12 | Stop reason: HOSPADM

## 2024-02-11 RX ADMIN — HYDRALAZINE HYDROCHLORIDE 50 MG: 50 TABLET, FILM COATED ORAL at 03:02

## 2024-02-11 RX ADMIN — BUDESONIDE 0.5 MG: 0.25 INHALANT RESPIRATORY (INHALATION) at 07:02

## 2024-02-11 RX ADMIN — DICLOFENAC SODIUM 4 G: 10 GEL TOPICAL at 08:02

## 2024-02-11 RX ADMIN — GABAPENTIN 200 MG: 100 CAPSULE ORAL at 09:02

## 2024-02-11 RX ADMIN — PREDNISONE 5 MG: 5 TABLET ORAL at 08:02

## 2024-02-11 RX ADMIN — LIDOCAINE HYDROCHLORIDE 11 ML: 20 JELLY TOPICAL at 09:02

## 2024-02-11 RX ADMIN — METOPROLOL TARTRATE 12.5 MG: 25 TABLET, FILM COATED ORAL at 08:02

## 2024-02-11 RX ADMIN — GABAPENTIN 200 MG: 100 CAPSULE ORAL at 08:02

## 2024-02-11 RX ADMIN — LEVALBUTEROL HYDROCHLORIDE 1.25 MG: 1.25 SOLUTION RESPIRATORY (INHALATION) at 07:02

## 2024-02-11 RX ADMIN — Medication 100 MCG: at 08:02

## 2024-02-11 RX ADMIN — LIDOCAINE HYDROCHLORIDE 11 ML: 20 JELLY TOPICAL at 08:02

## 2024-02-11 RX ADMIN — HYDRALAZINE HYDROCHLORIDE 50 MG: 50 TABLET, FILM COATED ORAL at 08:02

## 2024-02-11 RX ADMIN — INSULIN ASPART 6 UNITS: 100 INJECTION, SOLUTION INTRAVENOUS; SUBCUTANEOUS at 12:02

## 2024-02-11 RX ADMIN — CEFTRIAXONE 1 G: 1 INJECTION, POWDER, FOR SOLUTION INTRAMUSCULAR; INTRAVENOUS at 09:02

## 2024-02-11 RX ADMIN — DICLOFENAC SODIUM 4 G: 10 GEL TOPICAL at 09:02

## 2024-02-11 RX ADMIN — AZITHROMYCIN MONOHYDRATE 500 MG: 500 INJECTION, POWDER, LYOPHILIZED, FOR SOLUTION INTRAVENOUS at 09:02

## 2024-02-11 RX ADMIN — INSULIN ASPART 4 UNITS: 100 INJECTION, SOLUTION INTRAVENOUS; SUBCUTANEOUS at 08:02

## 2024-02-11 RX ADMIN — INSULIN ASPART 4 UNITS: 100 INJECTION, SOLUTION INTRAVENOUS; SUBCUTANEOUS at 05:02

## 2024-02-11 RX ADMIN — LEVOTHYROXINE SODIUM 100 MCG: 100 TABLET ORAL at 06:02

## 2024-02-11 RX ADMIN — EMPAGLIFLOZIN 10 MG: 10 TABLET, FILM COATED ORAL at 12:02

## 2024-02-11 RX ADMIN — AMLODIPINE BESYLATE 10 MG: 10 TABLET ORAL at 08:02

## 2024-02-11 RX ADMIN — INSULIN DETEMIR 20 UNITS: 100 INJECTION, SOLUTION SUBCUTANEOUS at 09:02

## 2024-02-11 RX ADMIN — CHOLECALCIFEROL TAB 125 MCG (5000 UNIT) 5000 UNITS: 125 TAB at 08:02

## 2024-02-11 RX ADMIN — FUROSEMIDE 40 MG: 40 TABLET ORAL at 08:02

## 2024-02-11 RX ADMIN — PANTOPRAZOLE SODIUM 40 MG: 40 TABLET, DELAYED RELEASE ORAL at 08:02

## 2024-02-11 RX ADMIN — PANTOPRAZOLE SODIUM 40 MG: 40 TABLET, DELAYED RELEASE ORAL at 09:02

## 2024-02-11 RX ADMIN — GABAPENTIN 200 MG: 100 CAPSULE ORAL at 03:02

## 2024-02-11 RX ADMIN — HYDROCODONE BITARTRATE AND ACETAMINOPHEN 2 TABLET: 5; 325 TABLET ORAL at 01:02

## 2024-02-11 RX ADMIN — CEFTRIAXONE 1 G: 1 INJECTION, POWDER, FOR SOLUTION INTRAMUSCULAR; INTRAVENOUS at 08:02

## 2024-02-11 NOTE — ASSESSMENT & PLAN NOTE
Chronic, controlled. Latest blood pressure and vitals reviewed-     Temp:  [97.2 °F (36.2 °C)-98.4 °F (36.9 °C)]   Pulse:  [63-76]   Resp:  [16-18]   BP: (121-161)/(67-77)   SpO2:  [92 %-96 %] .   Home meds for hypertension were reviewed and noted below.   Hypertension Medications               amLODIPine (NORVASC) 5 MG tablet Take 1 tablet (5 mg total) by mouth once daily.    bisoprolol-hydrochlorothiazide 5-6.25 mg (ZIAC) 5-6.25 mg Tab TAKE 1 TABLET TWICE A DAY    HOLD THIS MEDICATION UNTIL YOU FOLLOW UP WITH YOUR PCP    furosemide (LASIX) 20 MG tablet Take 1 tablet (20 mg total) by mouth once daily.    hydrALAZINE (APRESOLINE) 25 MG tablet Take 1 tablet (25 mg total) by mouth 2 (two) times daily.            While in the hospital, will manage blood pressure as follows; Adjust home antihypertensive regimen as follows- stop BB and lasix and HCT but increase hydralazine to TID and continue norvasc for PHTN.      Will utilize p.r.n. blood pressure medication only if patient's blood pressure greater than 140/90 and she develops symptoms such as worsening chest pain or shortness of breath.    2/11: BP meds include norvasc 10, Toprol 25mg, Furosemide 20mg, Hydralazine 50mg bid.

## 2024-02-11 NOTE — SUBJECTIVE & OBJECTIVE
Interval History:     Review of Systems   Constitutional:  Positive for fatigue. Negative for appetite change, chills, fever and unexpected weight change.   HENT:  Negative for congestion, mouth sores, nosebleeds, sinus pain, sore throat and trouble swallowing.    Eyes:  Negative for visual disturbance.   Respiratory:  Positive for shortness of breath. Negative for apnea, cough and chest tightness.    Cardiovascular:  Negative for chest pain, palpitations and leg swelling.   Gastrointestinal:  Negative for abdominal pain, blood in stool, constipation, diarrhea, nausea and vomiting.   Endocrine: Negative for polyuria.   Genitourinary:  Negative for decreased urine volume, difficulty urinating, dysuria and frequency.   Musculoskeletal:  Negative for arthralgias, back pain and neck pain.   Skin:  Negative for rash.   Neurological:  Negative for syncope, light-headedness and headaches.   Hematological:  Does not bruise/bleed easily.   Psychiatric/Behavioral:  Negative for confusion and suicidal ideas.      Objective:     Vital Signs (Most Recent):  Temp: 97.8 °F (36.6 °C) (02/10/24 1856)  Pulse: 73 (02/10/24 1856)  Resp: 20 (02/10/24 1411)  BP: (!) 148/75 (02/10/24 1856)  SpO2: 95 % (02/10/24 1856) Vital Signs (24h Range):  Temp:  [97.7 °F (36.5 °C)-98.1 °F (36.7 °C)] 97.8 °F (36.6 °C)  Pulse:  [71-98] 73  Resp:  [16-20] 20  SpO2:  [94 %-98 %] 95 %  BP: (136-171)/(69-87) 148/75     Weight: 82.6 kg (182 lb)  Body mass index is 29.38 kg/m².    Intake/Output Summary (Last 24 hours) at 2/10/2024 1953  Last data filed at 2/10/2024 1730  Gross per 24 hour   Intake 1430 ml   Output 1800 ml   Net -370 ml         Physical Exam  Vitals and nursing note reviewed. Exam conducted with a chaperone present.   Constitutional:       General: She is not in acute distress.     Appearance: She is not ill-appearing or toxic-appearing.   HENT:      Head: Atraumatic.      Mouth/Throat:      Mouth: Mucous membranes are moist.      Pharynx:  Oropharynx is clear.   Eyes:      Conjunctiva/sclera: Conjunctivae normal.      Pupils: Pupils are equal, round, and reactive to light.   Cardiovascular:      Rate and Rhythm: Bradycardia present. Rhythm irregular.      Pulses: Normal pulses.      Heart sounds: Murmur heard.   Pulmonary:      Effort: Pulmonary effort is normal.      Breath sounds: Rhonchi and rales present. No wheezing.   Abdominal:      General: Abdomen is flat. Bowel sounds are normal. There is no distension.      Palpations: Abdomen is soft.      Tenderness: There is no abdominal tenderness. There is no right CVA tenderness, left CVA tenderness or guarding.   Musculoskeletal:         General: No deformity or signs of injury. Normal range of motion.      Right lower leg: Edema present.      Left lower leg: Edema present.   Skin:     General: Skin is warm and dry.      Coloration: Skin is not jaundiced or pale.      Findings: No bruising, lesion or rash.   Neurological:      General: No focal deficit present.      Mental Status: She is alert and oriented to person, place, and time.   Psychiatric:         Mood and Affect: Mood normal.             Significant Labs: All pertinent labs within the past 24 hours have been reviewed.    Significant Imaging: I have reviewed all pertinent imaging results/findings within the past 24 hours.

## 2024-02-11 NOTE — PROGRESS NOTES
[facility]  [facility] Medicine  Progress Note    Patient Name: Vandana Allison  MRN: [id number]  Patient Class: IP- Inpatient   Admission Date: 2/7/2024  Length of Stay: 2 days  Attending Physician: Vadim Sainz MD  Primary Care Provider: Cm Larson III, DO        Subjective:     Principal Problem:Hyponatremia with excess extracellular fluid volume    HPI:  89 yo F (looks younger than stated age) presents to [facility] ED with worsening dyspnea and fatigue.  Daughter states that her mother has COPD and her PCP has her on symbicort and she had moved the MDI and patient had missed several doses and thought she hear some wheezing.  Patient does have rales bilaterally.  No productive cough, chest pain or palpitations.  No syncope or lightheadedness but does get an occasional HA across forehead with no N/V or vision change or focal weakness.  Patient had CXR that showed pulmonary edema bilaterally.  Will check a BNP.  Initial trops 12 with no change in trend.  EKG shows sinus rhythm with PACs but read as atrial fib with SVR (on bisoprolol).  Will monitor on telemetry and repeat EKG in AM.      Patient has severe pulmonary hypertension (echo 10/3/22) which is most likely the etiology of her fatigue and worsening dyspnea.  Severe tricuspid regurgitation noted.  She is on norvasc as an OP and will continue. She was hospitalized in [place] late last year and went to SNF rehab and really seemed to blossom with the social activity.  Her daughter states that her sister lives in [facility] and brother in [place] and her mom has lived with her for the past six years.  Her Na had been low and K high when hospitalized at that time and had resolved with dc of K and she was on lasix and HCT at that time as well.  She states that both were recently restarted by her PCP and now her Na is 120 again.  She also has creat of 2 and normally is 1.55.   She had been on zestoretic and the ACEI was discontinued due to  concern for cough.  She was started on bisoprolol and HCT and now she is bradycardic, which may also be contributing to the fatigue.          Overview/Hospital Course:  No notes on file    Interval History:     Review of Systems   Constitutional:  Positive for fatigue. Negative for appetite change, chills, fever and unexpected weight change.   HENT:  Negative for congestion, mouth sores, nosebleeds, sinus pain, sore throat and trouble swallowing.    Eyes:  Negative for visual disturbance.   Respiratory:  Positive for shortness of breath. Negative for apnea, cough and chest tightness.    Cardiovascular:  Negative for chest pain, palpitations and leg swelling.   Gastrointestinal:  Negative for abdominal pain, blood in stool, constipation, diarrhea, nausea and vomiting.   Endocrine: Negative for polyuria.   Genitourinary:  Negative for decreased urine volume, difficulty urinating, dysuria and frequency.   Musculoskeletal:  Negative for arthralgias, back pain and neck pain.   Skin:  Negative for rash.   Neurological:  Negative for syncope, light-headedness and headaches.   Hematological:  Does not bruise/bleed easily.   Psychiatric/Behavioral:  Negative for confusion and suicidal ideas.      Objective:     Vital Signs (Most Recent):  Temp: 98.2 °F (36.8 °C) (02/11/24 1434)  Pulse: 74 (02/11/24 1434)  Resp: 18 (02/11/24 1434)  BP: 134/77 (02/11/24 1434)  SpO2: 96 % (02/11/24 1434) Vital Signs (24h Range):  Temp:  [97.2 °F (36.2 °C)-98.4 °F (36.9 °C)] 98.2 °F (36.8 °C)  Pulse:  [63-76] 74  Resp:  [16-18] 18  SpO2:  [92 %-96 %] 96 %  BP: (121-161)/(67-77) 134/77     Weight: 82.6 kg (182 lb)  Body mass index is 29.38 kg/m².    Intake/Output Summary (Last 24 hours) at 2/11/2024 1723  Last data filed at 2/11/2024 1228  Gross per 24 hour   Intake 1440 ml   Output 600 ml   Net 840 ml         Physical Exam  Vitals and nursing note reviewed. Exam conducted with a chaperone present.   Constitutional:       General: She is not in  acute distress.     Appearance: She is not ill-appearing or toxic-appearing.   HENT:      Head: Atraumatic.      Mouth/Throat:      Mouth: Mucous membranes are moist.      Pharynx: Oropharynx is clear.   Eyes:      Conjunctiva/sclera: Conjunctivae normal.      Pupils: Pupils are equal, round, and reactive to light.   Cardiovascular:      Rate and Rhythm: Bradycardia present. Rhythm irregular.      Pulses: Normal pulses.      Heart sounds: Murmur heard.   Pulmonary:      Effort: Pulmonary effort is normal.      Breath sounds: Rhonchi and rales present. No wheezing.   Abdominal:      General: Abdomen is flat. Bowel sounds are normal. There is no distension.      Palpations: Abdomen is soft.      Tenderness: There is no abdominal tenderness. There is no right CVA tenderness, left CVA tenderness or guarding.   Musculoskeletal:         General: No deformity or signs of injury. Normal range of motion.      Right lower leg: Edema present.      Left lower leg: Edema present.   Skin:     General: Skin is warm and dry.      Coloration: Skin is not jaundiced or pale.      Findings: No bruising, lesion or rash.   Neurological:      General: No focal deficit present.      Mental Status: She is alert and oriented to person, place, and time.   Psychiatric:         Mood and Affect: Mood normal.             Significant Labs: All pertinent labs within the past 24 hours have been reviewed.    Significant Imaging: I have reviewed all pertinent imaging results/findings within the past 24 hours.    Assessment/Plan:      * Hyponatremia with excess extracellular fluid volume  Will stop lasix and hct and gentle hydrate.  Patient has some mild BLE edema and bilateral crackles are auscultated.  Will check BNP and an echo to eval PHTN and worsening of tricuspid valve regurge.  If Na not improving, I would recommend checking a serum osmol, urine osmol and urine Na after being off the double diuretics and any salt/water wasting drugs  for 24  hours for accurate data.      2/8: started lasix back and will monitor renal function carefully.  She states her main concern was shortness of breath likely related to diastolic heart failure.  BNP elevated.      2/9: improving.  Off HCTZ and Jardiance.     2/10: improved.      2/11: improved. Restarted Jardiance to assist with blood glucose control.    Recent Labs   Lab 02/11/24  0300   *         COPD (chronic obstructive pulmonary disease)  Patient will need PFTs as OP.  May be senile emphysema but most likely dypnea from PHTN.  Will continue inhaled steriods and bronchodilators.  Patient is on symbicort as OP and daughter feels this has helped the wheeze/dyspnea.      2/8: add ceftriaxone/azithromycin to prevent readmission for COPD exacerbation.      2/9: f/u with Pulmonology as outpatient.  Orders placed.   PFT and pulmonology follow-up.     Rheumatoid arthritis  Continue home norco and avoid NSAIDS.  Patient is on low dose steriods daily and may have been started for presumed COPD.    RA in hands and knees.    Rheumatology consult on discharge.   She takes 5mg prednisone daily.          Tricuspid valve insufficiency  May follow-up with cardiology.     Results for orders placed during the hospital encounter of 02/07/24    Echo    Interpretation Summary    Left Ventricle: The left ventricle is normal in size. Normal wall thickness. There is normal systolic function with a visually estimated ejection fraction of 55 - 70%. Ejection fraction by visual approximation is 60%.    Right Ventricle: Normal right ventricular cavity size.    Aortic Valve: The aortic valve is a trileaflet valve. There is mild aortic valve sclerosis. Mildly calcified cusps.    Mitral Valve: There is mild bileaflet sclerosis. There is mild regurgitation with an eccentric jet.    Tricuspid Valve: There is mild regurgitation.    IVC/SVC: Intermediate venous pressure at 8 mmHg.    Pericardium: There is a trivial  effusion.        Post-operative hypothyroidism  TSH normal.  Will continue home synthroid.        TRISTAN (acute kidney injury)  Will hold lasix and hctz and gently hydrate and follow renal function and electrolytes.  Will hold all nephrotoxic agents and continue norco for arthritis avoiding NSAIDS.   Daughter states patient was given lasix by PCP because she had been having decreased UOP.  Apparently had some urinary retention when hospitalized in [place] and had a rivera with large amount of UOP.  Will check a post void residual.  Creat 2 and baseline is 1.5.  Will check UA with reflex culture.      Senile asthenia  Most likely a combination of worsening pulmonary hypertension and polypharmacy with norco, high dose gabapentin and ultram and BB.        Bleeding external hemorrhoids  Patient is on lactulose to soften stool and has been having multilpe bowel movement daily that may also be contributing to the fatigue and possible volume depletion.  Will hold lactulose and consider stool softner or miralax.        Essential (primary) hypertension  Chronic, controlled. Latest blood pressure and vitals reviewed-     Temp:  [97.2 °F (36.2 °C)-98.4 °F (36.9 °C)]   Pulse:  [63-76]   Resp:  [16-18]   BP: (121-161)/(67-77)   SpO2:  [92 %-96 %] .   Home meds for hypertension were reviewed and noted below.   Hypertension Medications               amLODIPine (NORVASC) 5 MG tablet Take 1 tablet (5 mg total) by mouth once daily.    bisoprolol-hydrochlorothiazide 5-6.25 mg (ZIAC) 5-6.25 mg Tab TAKE 1 TABLET TWICE A DAY    HOLD THIS MEDICATION UNTIL YOU FOLLOW UP WITH YOUR PCP    furosemide (LASIX) 20 MG tablet Take 1 tablet (20 mg total) by mouth once daily.    hydrALAZINE (APRESOLINE) 25 MG tablet Take 1 tablet (25 mg total) by mouth 2 (two) times daily.            While in the hospital, will manage blood pressure as follows; Adjust home antihypertensive regimen as follows- stop BB and lasix and HCT but increase hydralazine to TID and  "continue norvasc for PHTN.      Will utilize p.r.n. blood pressure medication only if patient's blood pressure greater than 140/90 and she develops symptoms such as worsening chest pain or shortness of breath.    2/11: BP meds include norvasc 10, Toprol 25mg, Furosemide 20mg, Hydralazine 50mg bid.     Hiatal hernia with GERD  Continue home PPI      Chronic kidney disease, stage 3b  Creatine stable for now. BMP reviewed- noted Estimated Creatinine Clearance: 17 mL/min (A) (based on SCr of 2.01 mg/dL (H)). according to latest data. Based on current GFR, CKD stage is stage 3 - GFR 30-59.  Monitor UOP and serial BMP and adjust therapy as needed. Renally dose meds. Avoid nephrotoxic medications and procedures.    Neuropathy  Decrease gabapentin dose.        Diabetes mellitus, type 2  Patient's FSGs are controlled on current medication regimen.  Last A1c reviewed-   Lab Results   Component Value Date    HGBA1C 7.7 (H) 02/07/2024     Most recent fingerstick glucose reviewed- No results for input(s): "POCTGLUCOSE" in the last 24 hours.  Current correctional scale  Medium  Maintain anti-hyperglycemic dose as follows-   Antihyperglycemics (From admission, onward)      Start     Stop Route Frequency Ordered    02/11/24 1130  empagliflozin (Jardiance) tablet 10 mg        Question Answer Comment   Does this patient have a diagnosis of heart failure? Yes    Does this patient have type 1 diabetes or diabetic ketoacidosis? No    Does this patient have symptomatic hypotension? No    Is the patient NPO or pending major surgery in next 3 days or less? No        -- Oral Daily 02/11/24 1016    02/10/24 2100  insulin detemir U-100 injection 20 Units         -- SubQ Nightly 02/10/24 1330    02/07/24 2232  insulin aspart U-100 injection 0-10 Units         -- SubQ Before meals & nightly PRN 02/07/24 2132          Hold Oral hypoglycemics while patient is in the hospital.    2/10: can take detemir 20mg daily and jardiance.      2/11: 20 units " bid Novolog and jardiance.            VTE Risk Mitigation (From admission, onward)           Ordered     Place MAREN hose  Until discontinued         02/07/24 0094                    Discharge Planning   MARTHA:      Code Status: DNR   Is the patient medically ready for discharge?:     Reason for patient still in hospital (select all that apply): Treatment  Discharge Plan A: Home with family                  Vadim Sainz MD  Department of Hospital Medicine   [facility]

## 2024-02-11 NOTE — PROGRESS NOTES
[facility]  [facility] Medicine  Progress Note    Patient Name: Vandana Allison  MRN: [id number]  Patient Class: IP- Inpatient   Admission Date: 2/7/2024  Length of Stay: 1 days  Attending Physician: Vadim Sainz MD  Primary Care Provider: Cm Larson III, DO        Subjective:     Principal Problem:Hyponatremia with excess extracellular fluid volume      HPI:  89 yo F (looks younger than stated age) presents to [facility] ED with worsening dyspnea and fatigue.  Daughter states that her mother has COPD and her PCP has her on symbicort and she had moved the MDI and patient had missed several doses and thought she hear some wheezing.  Patient does have rales bilaterally.  No productive cough, chest pain or palpitations.  No syncope or lightheadedness but does get an occasional HA across forehead with no N/V or vision change or focal weakness.  Patient had CXR that showed pulmonary edema bilaterally.  Will check a BNP.  Initial trops 12 with no change in trend.  EKG shows sinus rhythm with PACs but read as atrial fib with SVR (on bisoprolol).  Will monitor on telemetry and repeat EKG in AM.      Patient has severe pulmonary hypertension (echo 10/3/22) which is most likely the etiology of her fatigue and worsening dyspnea.  Severe tricuspid regurgitation noted.  She is on norvasc as an OP and will continue. She was hospitalized in [place] late last year and went to SNF rehab and really seemed to blossom with the social activity.  Her daughter states that her sister lives in [place] and brother in [place] and her mom has lived with her for the past six years.  Her Na had been low and K high when hospitalized at that time and had resolved with dc of K and she was on lasix and HCT at that time as well.  She states that both were recently restarted by her PCP and now her Na is 120 again.  She also has creat of 2 and normally is 1.55.   She had been on zestoretic and the ACEI was discontinued due  to concern for cough.  She was started on bisoprolol and HCT and now she is bradycardic, which may also be contributing to the fatigue.          Overview/Hospital Course:  No notes on file    Interval History:     Review of Systems   Constitutional:  Positive for fatigue. Negative for appetite change, chills, fever and unexpected weight change.   HENT:  Negative for congestion, mouth sores, nosebleeds, sinus pain, sore throat and trouble swallowing.    Eyes:  Negative for visual disturbance.   Respiratory:  Positive for shortness of breath. Negative for apnea, cough and chest tightness.    Cardiovascular:  Negative for chest pain, palpitations and leg swelling.   Gastrointestinal:  Negative for abdominal pain, blood in stool, constipation, diarrhea, nausea and vomiting.   Endocrine: Negative for polyuria.   Genitourinary:  Negative for decreased urine volume, difficulty urinating, dysuria and frequency.   Musculoskeletal:  Negative for arthralgias, back pain and neck pain.   Skin:  Negative for rash.   Neurological:  Negative for syncope, light-headedness and headaches.   Hematological:  Does not bruise/bleed easily.   Psychiatric/Behavioral:  Negative for confusion and suicidal ideas.      Objective:     Vital Signs (Most Recent):  Temp: 97.8 °F (36.6 °C) (02/10/24 1856)  Pulse: 73 (02/10/24 1856)  Resp: 20 (02/10/24 1411)  BP: (!) 148/75 (02/10/24 1856)  SpO2: 95 % (02/10/24 1856) Vital Signs (24h Range):  Temp:  [97.7 °F (36.5 °C)-98.1 °F (36.7 °C)] 97.8 °F (36.6 °C)  Pulse:  [71-98] 73  Resp:  [16-20] 20  SpO2:  [94 %-98 %] 95 %  BP: (136-171)/(69-87) 148/75     Weight: 82.6 kg (182 lb)  Body mass index is 29.38 kg/m².    Intake/Output Summary (Last 24 hours) at 2/10/2024 1953  Last data filed at 2/10/2024 1730  Gross per 24 hour   Intake 1430 ml   Output 1800 ml   Net -370 ml         Physical Exam  Vitals and nursing note reviewed. Exam conducted with a chaperone present.   Constitutional:       General: She is  not in acute distress.     Appearance: She is not ill-appearing or toxic-appearing.   HENT:      Head: Atraumatic.      Mouth/Throat:      Mouth: Mucous membranes are moist.      Pharynx: Oropharynx is clear.   Eyes:      Conjunctiva/sclera: Conjunctivae normal.      Pupils: Pupils are equal, round, and reactive to light.   Cardiovascular:      Rate and Rhythm: Bradycardia present. Rhythm irregular.      Pulses: Normal pulses.      Heart sounds: Murmur heard.   Pulmonary:      Effort: Pulmonary effort is normal.      Breath sounds: Rhonchi and rales present. No wheezing.   Abdominal:      General: Abdomen is flat. Bowel sounds are normal. There is no distension.      Palpations: Abdomen is soft.      Tenderness: There is no abdominal tenderness. There is no right CVA tenderness, left CVA tenderness or guarding.   Musculoskeletal:         General: No deformity or signs of injury. Normal range of motion.      Right lower leg: Edema present.      Left lower leg: Edema present.   Skin:     General: Skin is warm and dry.      Coloration: Skin is not jaundiced or pale.      Findings: No bruising, lesion or rash.   Neurological:      General: No focal deficit present.      Mental Status: She is alert and oriented to person, place, and time.   Psychiatric:         Mood and Affect: Mood normal.             Significant Labs: All pertinent labs within the past 24 hours have been reviewed.    Significant Imaging: I have reviewed all pertinent imaging results/findings within the past 24 hours.    Assessment/Plan:      * Hyponatremia with excess extracellular fluid volume  Will stop lasix and hct and gentle hydrate.  Patient has some mild BLE edema and bilateral crackles are auscultated.  Will check BNP and an echo to eval PHTN and worsening of tricuspid valve regurge.  If Na not improving, I would recommend checking a serum osmol, urine osmol and urine Na after being off the double diuretics and any salt/water wasting drugs  for  24 hours for accurate data.      2/8: started lasix back and will monitor renal function carefully.  She states her main concern was shortness of breath likely related to diastolic heart failure.  BNP elevated.      2/9: improving.  Off HCTZ and Jardiance.     2/10: improved.      Recent Labs   Lab 02/10/24  0641   *         COPD (chronic obstructive pulmonary disease)  Patient will need PFTs as OP.  May be senile emphysema but most likely dypnea from PHTN.  Will continue inhaled steriods and bronchodilators.  Patient is on symbicort as OP and daughter feels this has helped the wheeze/dyspnea.      2/8: add ceftriaxone/azithromycin to prevent readmission for COPD exacerbation.      2/9: f/u with Pulmonology as outpatient.  Orders placed.   PFT and pulmonology follow-up.     Tricuspid valve insufficiency  May follow-up with cardiology.     Results for orders placed during the hospital encounter of 02/07/24    Echo    Interpretation Summary    Left Ventricle: The left ventricle is normal in size. Normal wall thickness. There is normal systolic function with a visually estimated ejection fraction of 55 - 70%. Ejection fraction by visual approximation is 60%.    Right Ventricle: Normal right ventricular cavity size.    Aortic Valve: The aortic valve is a trileaflet valve. There is mild aortic valve sclerosis. Mildly calcified cusps.    Mitral Valve: There is mild bileaflet sclerosis. There is mild regurgitation with an eccentric jet.    Tricuspid Valve: There is mild regurgitation.    IVC/SVC: Intermediate venous pressure at 8 mmHg.    Pericardium: There is a trivial effusion.        Post-operative hypothyroidism  TSH normal.  Will continue home synthroid.        TRISTAN (acute kidney injury)  Will hold lasix and hctz and gently hydrate and follow renal function and electrolytes.  Will hold all nephrotoxic agents and continue norco for arthritis avoiding NSAIDS.   Daughter states patient was given lasix by PCP because  she had been having decreased UOP.  Apparently had some urinary retention when hospitalized in [place] and had a rivera with large amount of UOP.  Will check a post void residual.  Creat 2 and baseline is 1.5.  Will check UA with reflex culture.      Senile asthenia  Most likely a combination of worsening pulmonary hypertension and polypharmacy with norco, high dose gabapentin and ultram and BB.        Bleeding external hemorrhoids  Patient is on lactulose to soften stool and has been having multilpe bowel movement daily that may also be contributing to the fatigue and possible volume depletion.  Will hold lactulose and consider stool softner or miralax.        Essential (primary) hypertension  Chronic, controlled. Latest blood pressure and vitals reviewed-     Temp:  [97.8 °F (36.6 °C)-98.2 °F (36.8 °C)]   Pulse:  [52-72]   Resp:  [14-22]   BP: (149-194)/(68-85)   SpO2:  [94 %-96 %] .   Home meds for hypertension were reviewed and noted below.   Hypertension Medications               amLODIPine (NORVASC) 5 MG tablet Take 1 tablet (5 mg total) by mouth once daily.    bisoprolol-hydrochlorothiazide 5-6.25 mg (ZIAC) 5-6.25 mg Tab TAKE 1 TABLET TWICE A DAY    HOLD THIS MEDICATION UNTIL YOU FOLLOW UP WITH YOUR PCP    furosemide (LASIX) 20 MG tablet Take 1 tablet (20 mg total) by mouth once daily.    hydrALAZINE (APRESOLINE) 25 MG tablet Take 1 tablet (25 mg total) by mouth 2 (two) times daily.            While in the hospital, will manage blood pressure as follows; Adjust home antihypertensive regimen as follows- stop BB and lasix and HCT but increase hydralazine to TID and continue norvasc for PHTN.      Will utilize p.r.n. blood pressure medication only if patient's blood pressure greater than 140/90 and she develops symptoms such as worsening chest pain or shortness of breath.    Rheumatoid arthritis  Continue home norco and avoid NSAIDS.  Patient is on low dose steriods daily and may have been started for presumed  "COPD.    RA in hands and knees.    Rheumatology consult on discharge.   She takes 5mg prednisone daily.          Hiatal hernia with GERD  Continue home PPI      Chronic kidney disease, stage 3b  Creatine stable for now. BMP reviewed- noted Estimated Creatinine Clearance: 17 mL/min (A) (based on SCr of 2.01 mg/dL (H)). according to latest data. Based on current GFR, CKD stage is stage 3 - GFR 30-59.  Monitor UOP and serial BMP and adjust therapy as needed. Renally dose meds. Avoid nephrotoxic medications and procedures.    Neuropathy  Decrease gabapentin dose.        Diabetes mellitus, type 2  Patient's FSGs are controlled on current medication regimen.  Last A1c reviewed-   Lab Results   Component Value Date    HGBA1C 7.7 (H) 02/07/2024     Most recent fingerstick glucose reviewed- No results for input(s): "POCTGLUCOSE" in the last 24 hours.  Current correctional scale  Medium  Maintain anti-hyperglycemic dose as follows-   Antihyperglycemics (From admission, onward)      Start     Stop Route Frequency Ordered    02/10/24 2100  insulin detemir U-100 injection 20 Units         -- SubQ Nightly 02/10/24 1330    02/07/24 2232  insulin aspart U-100 injection 0-10 Units         -- SubQ Before meals & nightly PRN 02/07/24 2132          Hold Oral hypoglycemics while patient is in the hospital.    2/10: can take detemir 20mg daily and jardiance.            VTE Risk Mitigation (From admission, onward)           Ordered     Place MAREN hose  Until discontinued         02/07/24 1824                    Discharge Planning   MARTHA:      Code Status: DNR   Is the patient medically ready for discharge?:     Reason for patient still in hospital (select all that apply): Treatment  Discharge Plan A: Home with family                  Vadim Sainz MD  Department of Hospital Medicine   [facility]    "

## 2024-02-11 NOTE — SUBJECTIVE & OBJECTIVE
Interval History:     Review of Systems   Constitutional:  Positive for fatigue. Negative for appetite change, chills, fever and unexpected weight change.   HENT:  Negative for congestion, mouth sores, nosebleeds, sinus pain, sore throat and trouble swallowing.    Eyes:  Negative for visual disturbance.   Respiratory:  Positive for shortness of breath. Negative for apnea, cough and chest tightness.    Cardiovascular:  Negative for chest pain, palpitations and leg swelling.   Gastrointestinal:  Negative for abdominal pain, blood in stool, constipation, diarrhea, nausea and vomiting.   Endocrine: Negative for polyuria.   Genitourinary:  Negative for decreased urine volume, difficulty urinating, dysuria and frequency.   Musculoskeletal:  Negative for arthralgias, back pain and neck pain.   Skin:  Negative for rash.   Neurological:  Negative for syncope, light-headedness and headaches.   Hematological:  Does not bruise/bleed easily.   Psychiatric/Behavioral:  Negative for confusion and suicidal ideas.      Objective:     Vital Signs (Most Recent):  Temp: 98.2 °F (36.8 °C) (02/11/24 1434)  Pulse: 74 (02/11/24 1434)  Resp: 18 (02/11/24 1434)  BP: 134/77 (02/11/24 1434)  SpO2: 96 % (02/11/24 1434) Vital Signs (24h Range):  Temp:  [97.2 °F (36.2 °C)-98.4 °F (36.9 °C)] 98.2 °F (36.8 °C)  Pulse:  [63-76] 74  Resp:  [16-18] 18  SpO2:  [92 %-96 %] 96 %  BP: (121-161)/(67-77) 134/77     Weight: 82.6 kg (182 lb)  Body mass index is 29.38 kg/m².    Intake/Output Summary (Last 24 hours) at 2/11/2024 1723  Last data filed at 2/11/2024 1228  Gross per 24 hour   Intake 1440 ml   Output 600 ml   Net 840 ml         Physical Exam  Vitals and nursing note reviewed. Exam conducted with a chaperone present.   Constitutional:       General: She is not in acute distress.     Appearance: She is not ill-appearing or toxic-appearing.   HENT:      Head: Atraumatic.      Mouth/Throat:      Mouth: Mucous membranes are moist.      Pharynx:  Oropharynx is clear.   Eyes:      Conjunctiva/sclera: Conjunctivae normal.      Pupils: Pupils are equal, round, and reactive to light.   Cardiovascular:      Rate and Rhythm: Bradycardia present. Rhythm irregular.      Pulses: Normal pulses.      Heart sounds: Murmur heard.   Pulmonary:      Effort: Pulmonary effort is normal.      Breath sounds: Rhonchi and rales present. No wheezing.   Abdominal:      General: Abdomen is flat. Bowel sounds are normal. There is no distension.      Palpations: Abdomen is soft.      Tenderness: There is no abdominal tenderness. There is no right CVA tenderness, left CVA tenderness or guarding.   Musculoskeletal:         General: No deformity or signs of injury. Normal range of motion.      Right lower leg: Edema present.      Left lower leg: Edema present.   Skin:     General: Skin is warm and dry.      Coloration: Skin is not jaundiced or pale.      Findings: No bruising, lesion or rash.   Neurological:      General: No focal deficit present.      Mental Status: She is alert and oriented to person, place, and time.   Psychiatric:         Mood and Affect: Mood normal.             Significant Labs: All pertinent labs within the past 24 hours have been reviewed.    Significant Imaging: I have reviewed all pertinent imaging results/findings within the past 24 hours.

## 2024-02-11 NOTE — PLAN OF CARE
Problem: Adult Inpatient Plan of Care  Goal: Plan of Care Review  Outcome: Ongoing, Progressing  Goal: Patient-Specific Goal (Individualized)  Outcome: Ongoing, Progressing  Goal: Absence of Hospital-Acquired Illness or Injury  Outcome: Ongoing, Progressing  Goal: Optimal Comfort and Wellbeing  Outcome: Ongoing, Progressing  Goal: Readiness for Transition of Care  Outcome: Ongoing, Progressing     Problem: Diabetes Comorbidity  Goal: Blood Glucose Level Within Targeted Range  Outcome: Ongoing, Progressing     Problem: Fall Injury Risk  Goal: Absence of Fall and Fall-Related Injury  Outcome: Ongoing, Progressing     Problem: Skin Injury Risk Increased  Goal: Skin Health and Integrity  Outcome: Ongoing, Progressing     Problem: Fluid and Electrolyte Imbalance (Acute Kidney Injury/Impairment)  Goal: Fluid and Electrolyte Balance  Outcome: Ongoing, Progressing     Problem: Oral Intake Inadequate (Acute Kidney Injury/Impairment)  Goal: Optimal Nutrition Intake  Outcome: Ongoing, Progressing     Problem: Renal Function Impairment (Acute Kidney Injury/Impairment)  Goal: Effective Renal Function  Outcome: Ongoing, Progressing     Problem: Gas Exchange Impaired  Goal: Optimal Gas Exchange  Outcome: Ongoing, Progressing

## 2024-02-11 NOTE — PLAN OF CARE
Problem: Diabetes Comorbidity  Goal: Blood Glucose Level Within Targeted Range  Intervention: Monitor and Manage Glycemia  Flowsheets (Taken 2/10/2024 1833)  Glycemic Management: blood glucose monitored

## 2024-02-11 NOTE — ASSESSMENT & PLAN NOTE
Will stop lasix and hct and gentle hydrate.  Patient has some mild BLE edema and bilateral crackles are auscultated.  Will check BNP and an echo to eval PHTN and worsening of tricuspid valve regurge.  If Na not improving, I would recommend checking a serum osmol, urine osmol and urine Na after being off the double diuretics and any salt/water wasting drugs  for 24 hours for accurate data.      2/8: started lasix back and will monitor renal function carefully.  She states her main concern was shortness of breath likely related to diastolic heart failure.  BNP elevated.      2/9: improving.  Off HCTZ and Jardiance.     2/10: improved.      2/11: improved. Restarted Jardiance to assist with blood glucose control.    Recent Labs   Lab 02/11/24  0300   *

## 2024-02-11 NOTE — ASSESSMENT & PLAN NOTE
Patient will need PFTs as OP.  May be senile emphysema but most likely dypnea from PHTN.  Will continue inhaled steriods and bronchodilators.  Patient is on symbicort as OP and daughter feels this has helped the wheeze/dyspnea.      2/8: add ceftriaxone/azithromycin to prevent readmission for COPD exacerbation.      2/9: f/u with Pulmonology as outpatient.  Orders placed.   PFT and pulmonology follow-up.

## 2024-02-11 NOTE — ASSESSMENT & PLAN NOTE
May follow-up with cardiology.     Results for orders placed during the hospital encounter of 02/07/24    Echo    Interpretation Summary    Left Ventricle: The left ventricle is normal in size. Normal wall thickness. There is normal systolic function with a visually estimated ejection fraction of 55 - 70%. Ejection fraction by visual approximation is 60%.    Right Ventricle: Normal right ventricular cavity size.    Aortic Valve: The aortic valve is a trileaflet valve. There is mild aortic valve sclerosis. Mildly calcified cusps.    Mitral Valve: There is mild bileaflet sclerosis. There is mild regurgitation with an eccentric jet.    Tricuspid Valve: There is mild regurgitation.    IVC/SVC: Intermediate venous pressure at 8 mmHg.    Pericardium: There is a trivial effusion.

## 2024-02-11 NOTE — ASSESSMENT & PLAN NOTE
Will stop lasix and hct and gentle hydrate.  Patient has some mild BLE edema and bilateral crackles are auscultated.  Will check BNP and an echo to eval PHTN and worsening of tricuspid valve regurge.  If Na not improving, I would recommend checking a serum osmol, urine osmol and urine Na after being off the double diuretics and any salt/water wasting drugs  for 24 hours for accurate data.      2/8: started lasix back and will monitor renal function carefully.  She states her main concern was shortness of breath likely related to diastolic heart failure.  BNP elevated.      2/9: improving.  Off HCTZ and Jardiance.     2/10: improved.      Recent Labs   Lab 02/10/24  0641   *

## 2024-02-11 NOTE — ASSESSMENT & PLAN NOTE
Continue home norco and avoid NSAIDS.  Patient is on low dose steriods daily and may have been started for presumed COPD.    RA in hands and knees.    Rheumatology consult on discharge.   She takes 5mg prednisone daily.

## 2024-02-11 NOTE — ASSESSMENT & PLAN NOTE
"Patient's FSGs are controlled on current medication regimen.  Last A1c reviewed-   Lab Results   Component Value Date    HGBA1C 7.7 (H) 02/07/2024     Most recent fingerstick glucose reviewed- No results for input(s): "POCTGLUCOSE" in the last 24 hours.  Current correctional scale  Medium  Maintain anti-hyperglycemic dose as follows-   Antihyperglycemics (From admission, onward)      Start     Stop Route Frequency Ordered    02/11/24 1130  empagliflozin (Jardiance) tablet 10 mg        Question Answer Comment   Does this patient have a diagnosis of heart failure? Yes    Does this patient have type 1 diabetes or diabetic ketoacidosis? No    Does this patient have symptomatic hypotension? No    Is the patient NPO or pending major surgery in next 3 days or less? No        -- Oral Daily 02/11/24 1016    02/10/24 2100  insulin detemir U-100 injection 20 Units         -- SubQ Nightly 02/10/24 1330    02/07/24 2232  insulin aspart U-100 injection 0-10 Units         -- SubQ Before meals & nightly PRN 02/07/24 2132          Hold Oral hypoglycemics while patient is in the hospital.    2/10: can take detemir 20mg daily and jardiance.      2/11: 20 units bid Novolog and jardiance.        "

## 2024-02-11 NOTE — ASSESSMENT & PLAN NOTE
"Patient's FSGs are controlled on current medication regimen.  Last A1c reviewed-   Lab Results   Component Value Date    HGBA1C 7.7 (H) 02/07/2024     Most recent fingerstick glucose reviewed- No results for input(s): "POCTGLUCOSE" in the last 24 hours.  Current correctional scale  Medium  Maintain anti-hyperglycemic dose as follows-   Antihyperglycemics (From admission, onward)      Start     Stop Route Frequency Ordered    02/10/24 2100  insulin detemir U-100 injection 20 Units         -- SubQ Nightly 02/10/24 1330    02/07/24 2232  insulin aspart U-100 injection 0-10 Units         -- SubQ Before meals & nightly PRN 02/07/24 2132          Hold Oral hypoglycemics while patient is in the hospital.    2/10: can take detemir 20mg daily and jardiance.        "

## 2024-02-12 ENCOUNTER — TELEPHONE (OUTPATIENT)
Dept: RHEUMATOLOGY | Facility: CLINIC | Age: 89
End: 2024-02-12
Payer: COMMERCIAL

## 2024-02-12 VITALS
TEMPERATURE: 98 F | BODY MASS INDEX: 29.27 KG/M2 | OXYGEN SATURATION: 95 % | HEIGHT: 66 IN | HEART RATE: 87 BPM | RESPIRATION RATE: 17 BRPM | SYSTOLIC BLOOD PRESSURE: 146 MMHG | WEIGHT: 182.13 LBS | DIASTOLIC BLOOD PRESSURE: 76 MMHG

## 2024-02-12 LAB
ANION GAP SERPL CALCULATED.3IONS-SCNC: 6 MMOL/L (ref 7–16)
BASOPHILS # BLD AUTO: 0.02 K/UL (ref 0–0.2)
BASOPHILS NFR BLD AUTO: 0.2 % (ref 0–1)
BUN SERPL-MCNC: 53 MG/DL (ref 7–18)
BUN/CREAT SERPL: 29 (ref 6–20)
CALCIUM SERPL-MCNC: 8.9 MG/DL (ref 8.5–10.1)
CHLORIDE SERPL-SCNC: 103 MMOL/L (ref 98–107)
CO2 SERPL-SCNC: 29 MMOL/L (ref 21–32)
CREAT SERPL-MCNC: 1.81 MG/DL (ref 0.55–1.02)
DIFFERENTIAL METHOD BLD: ABNORMAL
EGFR (NO RACE VARIABLE) (RUSH/TITUS): 25 ML/MIN/1.73M2
EOSINOPHIL # BLD AUTO: 0.17 K/UL (ref 0–0.5)
EOSINOPHIL NFR BLD AUTO: 1.9 % (ref 1–4)
ERYTHROCYTE [DISTWIDTH] IN BLOOD BY AUTOMATED COUNT: 19.5 % (ref 11.5–14.5)
FERRITIN SERPL-MCNC: 56 NG/ML (ref 8–252)
GLUCOSE SERPL-MCNC: 134 MG/DL (ref 70–105)
GLUCOSE SERPL-MCNC: 201 MG/DL (ref 74–106)
GLUCOSE SERPL-MCNC: 260 MG/DL (ref 70–105)
HCT VFR BLD AUTO: 30.9 % (ref 38–47)
HGB BLD-MCNC: 10 G/DL (ref 12–16)
IMM GRANULOCYTES # BLD AUTO: 0.03 K/UL (ref 0–0.04)
IMM GRANULOCYTES NFR BLD: 0.3 % (ref 0–0.4)
LYMPHOCYTES # BLD AUTO: 0.81 K/UL (ref 1–4.8)
LYMPHOCYTES NFR BLD AUTO: 8.9 % (ref 27–41)
MAGNESIUM SERPL-MCNC: 2.5 MG/DL (ref 1.7–2.3)
MCH RBC QN AUTO: 24.4 PG (ref 27–31)
MCHC RBC AUTO-ENTMCNC: 32.4 G/DL (ref 32–36)
MCV RBC AUTO: 75.6 FL (ref 80–96)
MONOCYTES # BLD AUTO: 0.67 K/UL (ref 0–0.8)
MONOCYTES NFR BLD AUTO: 7.4 % (ref 2–6)
MPC BLD CALC-MCNC: 8.1 FL (ref 9.4–12.4)
NEUTROPHILS # BLD AUTO: 7.4 K/UL (ref 1.8–7.7)
NEUTROPHILS NFR BLD AUTO: 81.3 % (ref 53–65)
NRBC # BLD AUTO: 0 X10E3/UL
NRBC, AUTO (.00): 0 %
PLATELET # BLD AUTO: 291 K/UL (ref 150–400)
POTASSIUM SERPL-SCNC: 4.4 MMOL/L (ref 3.5–5.1)
RBC # BLD AUTO: 4.09 M/UL (ref 4.2–5.4)
SODIUM SERPL-SCNC: 134 MMOL/L (ref 136–145)
WBC # BLD AUTO: 9.1 K/UL (ref 4.5–11)

## 2024-02-12 PROCEDURE — 85025 COMPLETE CBC W/AUTO DIFF WBC: CPT | Performed by: HOSPITALIST

## 2024-02-12 PROCEDURE — 80048 BASIC METABOLIC PNL TOTAL CA: CPT | Performed by: HOSPITALIST

## 2024-02-12 PROCEDURE — 25000242 PHARM REV CODE 250 ALT 637 W/ HCPCS: Performed by: HOSPITALIST

## 2024-02-12 PROCEDURE — 97535 SELF CARE MNGMENT TRAINING: CPT

## 2024-02-12 PROCEDURE — 83735 ASSAY OF MAGNESIUM: CPT | Performed by: HOSPITALIST

## 2024-02-12 PROCEDURE — 97110 THERAPEUTIC EXERCISES: CPT

## 2024-02-12 PROCEDURE — 82728 ASSAY OF FERRITIN: CPT | Performed by: HOSPITALIST

## 2024-02-12 PROCEDURE — 82962 GLUCOSE BLOOD TEST: CPT

## 2024-02-12 PROCEDURE — 99239 HOSP IP/OBS DSCHRG MGMT >30: CPT | Mod: ,,, | Performed by: HOSPITALIST

## 2024-02-12 PROCEDURE — 94640 AIRWAY INHALATION TREATMENT: CPT

## 2024-02-12 PROCEDURE — 25000003 PHARM REV CODE 250

## 2024-02-12 PROCEDURE — 94761 N-INVAS EAR/PLS OXIMETRY MLT: CPT

## 2024-02-12 PROCEDURE — 25000003 PHARM REV CODE 250: Performed by: HOSPITALIST

## 2024-02-12 PROCEDURE — 99900035 HC TECH TIME PER 15 MIN (STAT)

## 2024-02-12 PROCEDURE — 63600175 PHARM REV CODE 636 W HCPCS: Performed by: HOSPITALIST

## 2024-02-12 RX ORDER — ALBUTEROL SULFATE 0.83 MG/ML
2.5 SOLUTION RESPIRATORY (INHALATION) EVERY 12 HOURS
Status: DISCONTINUED | OUTPATIENT
Start: 2024-02-12 | End: 2024-02-12 | Stop reason: HOSPADM

## 2024-02-12 RX ORDER — HUMAN INSULIN 100 [IU]/ML
20-25 INJECTION, SUSPENSION SUBCUTANEOUS
Start: 2024-02-12 | End: 2024-04-17

## 2024-02-12 RX ORDER — HYDRALAZINE HYDROCHLORIDE 25 MG/1
50 TABLET, FILM COATED ORAL 2 TIMES DAILY
Start: 2024-02-12

## 2024-02-12 RX ORDER — METOPROLOL SUCCINATE 25 MG/1
25 TABLET, EXTENDED RELEASE ORAL DAILY
Qty: 90 TABLET | Refills: 1 | Status: SHIPPED | OUTPATIENT
Start: 2024-02-12 | End: 2024-04-25 | Stop reason: SDUPTHER

## 2024-02-12 RX ADMIN — FUROSEMIDE 40 MG: 40 TABLET ORAL at 08:02

## 2024-02-12 RX ADMIN — LIDOCAINE HYDROCHLORIDE: 20 JELLY TOPICAL at 09:02

## 2024-02-12 RX ADMIN — HYDRALAZINE HYDROCHLORIDE 50 MG: 50 TABLET ORAL at 06:02

## 2024-02-12 RX ADMIN — BUDESONIDE 0.5 MG: 0.25 INHALANT RESPIRATORY (INHALATION) at 07:02

## 2024-02-12 RX ADMIN — METOPROLOL SUCCINATE 25 MG: 25 TABLET, EXTENDED RELEASE ORAL at 08:02

## 2024-02-12 RX ADMIN — BISACODYL 10 MG: 5 TABLET, COATED ORAL at 06:02

## 2024-02-12 RX ADMIN — CHOLECALCIFEROL TAB 125 MCG (5000 UNIT) 5000 UNITS: 125 TAB at 08:02

## 2024-02-12 RX ADMIN — GABAPENTIN 200 MG: 100 CAPSULE ORAL at 08:02

## 2024-02-12 RX ADMIN — PREDNISONE 5 MG: 5 TABLET ORAL at 08:02

## 2024-02-12 RX ADMIN — LEVOTHYROXINE SODIUM 100 MCG: 100 TABLET ORAL at 06:02

## 2024-02-12 RX ADMIN — DICLOFENAC SODIUM 4 G: 10 GEL TOPICAL at 09:02

## 2024-02-12 RX ADMIN — ALBUTEROL SULFATE 2.5 MG: 2.5 SOLUTION RESPIRATORY (INHALATION) at 07:02

## 2024-02-12 RX ADMIN — PANTOPRAZOLE SODIUM 40 MG: 40 TABLET, DELAYED RELEASE ORAL at 08:02

## 2024-02-12 RX ADMIN — Medication 100 MCG: at 08:02

## 2024-02-12 RX ADMIN — AMLODIPINE BESYLATE 10 MG: 10 TABLET ORAL at 08:02

## 2024-02-12 RX ADMIN — EMPAGLIFLOZIN 10 MG: 10 TABLET, FILM COATED ORAL at 08:02

## 2024-02-12 NOTE — PT/OT/SLP PROGRESS
Occupational Therapy   Treatment    Name: Vandana Allison  MRN: [id number]  Admitting Diagnosis:  Hyponatremia with excess extracellular fluid volume       Recommendations:     Discharge Recommendations: Low Intensity Therapy  Discharge Equipment Recommendations:  none  Barriers to discharge:  None    Assessment:     Vandana Allison is a 89 y.o. female with a medical diagnosis of Hyponatremia with excess extracellular fluid volume.  She presents with weakness and decline in ADLs. Performance deficits affecting function are impaired endurance, weakness, impaired self care skills, impaired functional mobility, gait instability, impaired balance.     Rehab Prognosis:  Good; patient would benefit from acute skilled OT services to address these deficits and reach maximum level of function.       Plan:     Patient to be seen 5 x/week to address the above listed problems via self-care/home management, therapeutic activities, therapeutic exercises  Plan of Care Expires: 03/08/24  Plan of Care Reviewed with: patient    Subjective     Chief Complaint: hyponatremia  Patient/Family Comments/goals: pt agreeable to OT tx  Pain/Comfort:  Pain Rating 1: 0/10    Objective:     Communicated with: VITO Trinidad prior to session.  Patient found HOB elevated with PureWick, telemetry upon OT entry to room.    General Precautions: Standard, fall    Orthopedic Precautions:N/A  Braces: N/A  Respiratory Status: Room air     Occupational Performance:     Bed Mobility:    Patient completed Supine to Sit with stand by assistance  Patient completed Sit to Supine with stand by assistance     Functional Mobility/Transfers:  Patient completed Sit <> Stand Transfer with contact guard assistance  with  hand-held assist   Patient completed Toilet Transfer Stand Pivot technique with contact guard assistance with  hand-held assist  Functional Mobility: not performed    Activities of Daily Living:  Toileting: minimum assistance to perform toilet  hygiene      [facility] 6 Click ADL:      Treatment & Education:  Pt performed ADL training as listed above.   Pt performed 2 sets x 15 reps each bicep curls 1#db, chest press 1#db, IR/ER 1#db, rows yellow tband, and tricep press yellow tband in order to improve BUE strength and endurance to perform ADL and ADL t/f with less assist.     Patient left HOB elevated with all lines intact, call button in reach, and VITO Trinidad notified    GOALS:   Multidisciplinary Problems       Occupational Therapy Goals          Problem: Occupational Therapy    Goal Priority Disciplines Outcome Interventions   Occupational Therapy Goal     OT, PT/OT Ongoing, Progressing    Description: STG:  Pt will perform grooming with setup  Pt will bathe with [place]o with setup at EOB  Pt will perform UE dressing with Josselin  Pt will perform LE dressing with Jericho  Pt will sit EOB x 10 min with SBA   Pt will transfer bed/chair/bsc with SBA with RW  Pt will perform standing task x 2 min with SBA with RW assistance  Pt will tolerate 15 minutes of tx without fatigue      LT.Restore to max I with self care and mobility.                           Time Tracking:     OT Date of Treatment: 24  OT Start Time: 953  OT Stop Time:   OT Total Time (min): 46 min    Billable Minutes:Self Care/Home Management 15 minutes  Therapeutic Exercise 25 minutes    OT/FAIZA: OT          2024

## 2024-02-12 NOTE — PLAN OF CARE
Chart reviewed. Pt's blood pressure and diabetic medications restarted. Pt improved and likely for discharge home today.

## 2024-02-12 NOTE — ASSESSMENT & PLAN NOTE
Creatine stable for now. BMP reviewed- noted Estimated Creatinine Clearance: 19.2 mL/min (A) (based on SCr of 1.81 mg/dL (H)). according to latest data. Based on current GFR, CKD stage is stage 3 - GFR 30-59.  Monitor UOP and serial BMP and adjust therapy as needed. Renally dose meds. Avoid nephrotoxic medications and procedures.

## 2024-02-12 NOTE — ASSESSMENT & PLAN NOTE
Will stop lasix and hct and gentle hydrate.  Patient has some mild BLE edema and bilateral crackles are auscultated.  Will check BNP and an echo to eval PHTN and worsening of tricuspid valve regurge.  If Na not improving, I would recommend checking a serum osmol, urine osmol and urine Na after being off the double diuretics and any salt/water wasting drugs  for 24 hours for accurate data.      2/8: started lasix back and will monitor renal function carefully.  She states her main concern was shortness of breath likely related to diastolic heart failure.  BNP elevated.      2/9: improving.  Off HCTZ and Jardiance.     2/10: improved.      2/11: improved. Restarted Jardiance to assist with blood glucose control.    Recent Labs   Lab 02/12/24  0252   *

## 2024-02-12 NOTE — ASSESSMENT & PLAN NOTE
May follow-up with cardiology.     Results for orders placed during the hospital encounter of 02/07/24    Echo    Interpretation Summary    Left Ventricle: The left ventricle is normal in size. Normal wall thickness. There is normal systolic function with a visually estimated ejection fraction of 55 - 70%. Ejection fraction by visual approximation is 60%.    Right Ventricle: Normal right ventricular cavity size.    Aortic Valve: The aortic valve is a trileaflet valve. There is mild aortic valve sclerosis. Mildly calcified cusps.    Mitral Valve: There is mild bileaflet sclerosis. There is mild regurgitation with an eccentric jet.    Tricuspid Valve: There is mild regurgitation.    IVC/SVC: Intermediate venous pressure at 8 mmHg.    Pericardium: There is a trivial effusion.

## 2024-02-12 NOTE — TELEPHONE ENCOUNTER
----- Message from Nolvia Sofia sent at 2/12/2024 10:03 AM CST -----  Sade with [facility] [place] calling to schedule pt a hospital f/u for 4 weeks - please call pt with appt date and time - call back #  [phone]

## 2024-02-12 NOTE — ASSESSMENT & PLAN NOTE
Chronic, controlled. Latest blood pressure and vitals reviewed-     Temp:  [97.6 °F (36.4 °C)-98.5 °F (36.9 °C)]   Pulse:  [70-83]   Resp:  [17-20]   BP: (129-174)/(73-79)   SpO2:  [95 %-96 %] .   Home meds for hypertension were reviewed and noted below.   Hypertension Medications               amLODIPine (NORVASC) 5 MG tablet Take 1 tablet (5 mg total) by mouth once daily.    bisoprolol-hydrochlorothiazide 5-6.25 mg (ZIAC) 5-6.25 mg Tab TAKE 1 TABLET TWICE A DAY    HOLD THIS MEDICATION UNTIL YOU FOLLOW UP WITH YOUR PCP    furosemide (LASIX) 20 MG tablet Take 1 tablet (20 mg total) by mouth once daily.    hydrALAZINE (APRESOLINE) 25 MG tablet Take 1 tablet (25 mg total) by mouth 2 (two) times daily.            While in the hospital, will manage blood pressure as follows; Adjust home antihypertensive regimen as follows- stop BB and lasix and HCT but increase hydralazine to TID and continue norvasc for PHTN.      Will utilize p.r.n. blood pressure medication only if patient's blood pressure greater than 140/90 and she develops symptoms such as worsening chest pain or shortness of breath.    2/11: BP meds include norvasc 10, Toprol 25mg, Furosemide 20mg, Hydralazine 50mg bid.

## 2024-02-12 NOTE — NURSING
Discharge instructions reviewed with patient and daughter Claribel; and copy given to patient. Patient and Claribel voiced understanding regarding:meds, appt., signs and symptoms to report to physician.

## 2024-02-12 NOTE — DISCHARGE SUMMARY
[facility]  [facility] Medicine  Discharge Summary      Patient Name: Vandana Allison  MRN: [id number]  JOHN: [id number]  Patient Class: IP- Inpatient  Admission Date: 2/7/2024  Hospital Length of Stay: 3 days  Discharge Date and Time:  02/12/2024 1:22 PM  Attending Physician: Vadim Sainz MD   Discharging Provider: Vadim Sainz MD  Primary Care Provider: Cm Larson III, DO    Primary Care Team: Networked reference to record PCT     HPI:   89 yo F (looks younger than stated age) presents to [facility] ED with worsening dyspnea and fatigue.  Daughter states that her mother has COPD and her PCP has her on symbicort and she had moved the MD[place] and patient had missed several doses and thought she hear some wheezing.  Patient does have rales bilaterally.  No productive cough, chest pain or palpitations.  No syncope or lightheadedness but does get an occasional HA across forehead with no N/V or vision change or focal weakness.  Patient had CXR that showed pulmonary edema bilaterally.  Will check a BNP.  Initial trops 12 with no change in trend.  EKG shows sinus rhythm with PACs but read as atrial fib with SVR (on bisoprolol).  Will monitor on telemetry and repeat EKG in AM.      Patient has severe pulmonary hypertension (echo 10/3/22) which is most likely the etiology of her fatigue and worsening dyspnea.  Severe tricuspid regurgitation noted.  She is on norvasc as an OP and will continue. She was hospitalized in [place] late last year and went to SNF rehab and really seemed to blossom with the social activity.  Her daughter states that her sister lives in [facility] and brother in [place] and her mom has lived with her for the past six years.  Her Na had been low and K high when hospitalized at that time and had resolved with dc of K and she was on lasix and HCT at that time as well.  She states that both were recently restarted by her PCP and now her Na is 120 again.  She also has creat of  2 and normally is 1.55.   She had been on zestoretic and the ACEI was discontinued due to concern for cough.  She was started on bisoprolol and HCT and now she is bradycardic, which may also be contributing to the fatigue.          * No surgery found *      Hospital Course:   No notes on file     Goals of Care Treatment Preferences:  Code Status: DNR      Consults:     Neuro  Neuropathy  Decrease gabapentin dose.        Pulmonary  COPD (chronic obstructive pulmonary disease)  Patient will need PFTs as OP.  May be senile emphysema but most likely dypnea from PHTN.  Will continue inhaled steriods and bronchodilators.  Patient is on symbicort as OP and daughter feels this has helped the wheeze/dyspnea.      2/8: add ceftriaxone/azithromycin to prevent readmission for COPD exacerbation.      2/9: f/u with Pulmonology as outpatient.  Orders placed.   PFT and pulmonology follow-up.     Cardiac/Vascular  Tricuspid valve insufficiency  May follow-up with cardiology.     Results for orders placed during the hospital encounter of 02/07/24    Echo    Interpretation Summary    Left Ventricle: The left ventricle is normal in size. Normal wall thickness. There is normal systolic function with a visually estimated ejection fraction of 55 - 70%. Ejection fraction by visual approximation is 60%.    Right Ventricle: Normal right ventricular cavity size.    Aortic Valve: The aortic valve is a trileaflet valve. There is mild aortic valve sclerosis. Mildly calcified cusps.    Mitral Valve: There is mild bileaflet sclerosis. There is mild regurgitation with an eccentric jet.    Tricuspid Valve: There is mild regurgitation.    IVC/SVC: Intermediate venous pressure at 8 mmHg.    Pericardium: There is a trivial effusion.        Essential (primary) hypertension  Chronic, controlled. Latest blood pressure and vitals reviewed-     Temp:  [97.6 °F (36.4 °C)-98.5 °F (36.9 °C)]   Pulse:  [70-83]   Resp:  [17-20]   BP: (129-174)/(73-79)   SpO2:   [95 %-96 %] .   Home meds for hypertension were reviewed and noted below.   Hypertension Medications               amLODIPine (NORVASC) 5 MG tablet Take 1 tablet (5 mg total) by mouth once daily.    bisoprolol-hydrochlorothiazide 5-6.25 mg (ZIAC) 5-6.25 mg Tab TAKE 1 TABLET TWICE A DAY    HOLD THIS MEDICATION UNTIL YOU FOLLOW UP WITH YOUR PCP    furosemide (LASIX) 20 MG tablet Take 1 tablet (20 mg total) by mouth once daily.    hydrALAZINE (APRESOLINE) 25 MG tablet Take 1 tablet (25 mg total) by mouth 2 (two) times daily.            While in the hospital, will manage blood pressure as follows; Adjust home antihypertensive regimen as follows- stop BB and lasix and HCT but increase hydralazine to TID and continue norvasc for PHTN.      Will utilize p.r.n. blood pressure medication only if patient's blood pressure greater than 140/90 and she develops symptoms such as worsening chest pain or shortness of breath.    2/11: BP meds include norvasc 10, Toprol 25mg, Furosemide 20mg, Hydralazine 50mg bid.     Renal/  * Hyponatremia with excess extracellular fluid volume  Will stop lasix and hct and gentle hydrate.  Patient has some mild BLE edema and bilateral crackles are auscultated.  Will check BNP and an echo to eval PHTN and worsening of tricuspid valve regurge.  If Na not improving, I would recommend checking a serum osmol, urine osmol and urine Na after being off the double diuretics and any salt/water wasting drugs  for 24 hours for accurate data.      2/8: started lasix back and will monitor renal function carefully.  She states her main concern was shortness of breath likely related to diastolic heart failure.  BNP elevated.      2/9: improving.  Off HCTZ and Jardiance.     2/10: improved.      2/11: improved. Restarted Jardiance to assist with blood glucose control.    Recent Labs   Lab 02/12/24  0252   *         TRISTAN (acute kidney injury)  Will hold lasix and hctz and gently hydrate and follow renal  "function and electrolytes.  Will hold all nephrotoxic agents and continue norco for arthritis avoiding NSAIDS.   Daughter states patient was given lasix by PCP because she had been having decreased UOP.  Apparently had some urinary retention when hospitalized in [place] and had a rivera with large amount of UOP.  Will check a post void residual.  Creat 2 and baseline is 1.5.  Will check UA with reflex culture.      Chronic kidney disease, stage 3b  Creatine stable for now. BMP reviewed- noted Estimated Creatinine Clearance: 19.2 mL/min (A) (based on SCr of 1.81 mg/dL (H)). according to latest data. Based on current GFR, CKD stage is stage 3 - GFR 30-59.  Monitor UOP and serial BMP and adjust therapy as needed. Renally dose meds. Avoid nephrotoxic medications and procedures.    Immunology/Multi System  Rheumatoid arthritis  Continue home norco and avoid NSAIDS.  Patient is on low dose steriods daily and may have been started for presumed COPD.    RA in hands and knees.    Rheumatology consult on discharge.   She takes 5mg prednisone daily.          Endocrine  Post-operative hypothyroidism  TSH normal.  Will continue home synthroid.        Diabetes mellitus, type 2  Patient's FSGs are controlled on current medication regimen.  Last A1c reviewed-   Lab Results   Component Value Date    HGBA1C 7.7 (H) 02/07/2024     Most recent fingerstick glucose reviewed- No results for input(s): "POCTGLUCOSE" in the last 24 hours.  Current correctional scale  Medium  Maintain anti-hyperglycemic dose as follows-   Antihyperglycemics (From admission, onward)      Start     Stop Route Frequency Ordered    02/11/24 1130  empagliflozin (Jardiance) tablet 10 mg        Question Answer Comment   Does this patient have a diagnosis of heart failure? Yes    Does this patient have type 1 diabetes or diabetic ketoacidosis? No    Does this patient have symptomatic hypotension? No    Is the patient NPO or pending major surgery in next 3 days or less? " No        -- Oral Daily 02/11/24 1016    02/10/24 2100  insulin detemir U-100 injection 20 Units         -- SubQ Nightly 02/10/24 1330    02/07/24 2232  insulin aspart U-100 injection 0-10 Units         -- SubQ Before meals & nightly PRN 02/07/24 2132          Hold Oral hypoglycemics while patient is in the hospital.    2/10: can take detemir 20mg daily and jardiance.      2/11: 20 units bid Novolog and jardiance.          GI  Bleeding external hemorrhoids  Patient is on lactulose to soften stool and has been having multilpe bowel movement daily that may also be contributing to the fatigue and possible volume depletion.  Will hold lactulose and consider stool softner or miralax.        Hiatal hernia with GERD  Continue home PPI      Other  Senile asthenia  Most likely a combination of worsening pulmonary hypertension and polypharmacy with norco, high dose gabapentin and ultram and BB.          Final Active Diagnoses:    Diagnosis Date Noted POA    PRINCIPAL PROBLEM:  Hyponatremia with excess extracellular fluid volume [E87.1] 02/07/2024 Yes    COPD (chronic obstructive pulmonary disease) [J44.9] 02/08/2024 Yes    Rheumatoid arthritis [M06.9] 02/07/2024 Yes    TRISTAN (acute kidney injury) [N17.9] 02/08/2024 Yes    Post-operative hypothyroidism [E89.0] 02/08/2024 Yes    Tricuspid valve insufficiency [I07.1] 02/08/2024 Yes    Chronic kidney disease, stage 3b [N18.32] 02/07/2024 Yes    Hiatal hernia with GERD [K44.9, K21.9] 02/07/2024 Yes    Essential (primary) hypertension [I10] 02/07/2024 Yes    Bleeding external hemorrhoids [K64.4] 02/07/2024 Yes    Senile asthenia [R54] 02/07/2024 Yes    Neuropathy [G62.9]  Yes    Diabetes mellitus, type 2 [E11.9]  Yes      Problems Resolved During this Admission:       Discharged Condition: fair    Disposition: Home or Self Care    Follow Up:   Follow-up Information       Maribell Hernandez MD Follow up in 2 week(s).    Specialty: Pulmonary Disease  Why: hospital follow-up with LFTs prior  "for "COPD".   She has rheumatoid arthritis.  Frequent, severe shortness of breath.  Please follow up on February 26 at 2:00  Contact information:  [address] [facility] [place] [address]  [phone]               Cm Larson III, DO Follow up in 1 week(s).    Specialty: Family Medicine  Why: hospital follow-up for shortness of breath and hyponatremia.  Please follow up on February 19 at 10:00  Contact information:  [address] [facility]  [place] [address]  [phone]               Aj Price MD Follow up in 4 week(s).    Specialty: Rheumatology  Why: rheumatology follow-up.  Nurse will call patient with appt.  Contact information:  [address] [facility] [place] [address]  [phone]                           Patient Instructions:      Complete PFT w/ bronchodilator   Standing Status: Future Standing Exp. Date: 02/09/25     Order Specific Question Answer Comments   Release to patient Immediate        Significant Diagnostic Studies: Labs: BMP:   Recent Labs   Lab 02/11/24 0300 02/12/24  0252   * 201*   * 134*   K 4.2 4.4   CL 98 103   CO2 27 29   BUN 46* 53*   CREATININE 1.95* 1.81*   CALCIUM 8.8 8.9   MG 2.4* 2.5*    and CBC   Recent Labs   Lab 02/11/24 0300 02/12/24  0252   WBC 9.18 9.10   HGB 10.2* 10.0*   HCT 31.6* 30.9*    291       Pending Diagnostic Studies:       Procedure Component Value Units Date/Time    EKG 12-lead [[id number]]     Order Status: Sent Lab Status: No result            Medications:  Reconciled Home Medications:      Medication List        START taking these medications      metoprolol succinate 25 MG 24 hr tablet  Commonly known as: TOPROL-XL  Take 1 tablet (25 mg total) by mouth once daily.            CHANGE how you take these medications      ferrous sulfate 325 mg (65 mg iron) Tab tablet  Commonly known as: FEOSOL  Take 1 tablet (325 mg total) by mouth 2 (two) times daily.  What changed: when to take this     methocarbamoL 750 MG " Tab  Commonly known as: ROBAXIN  TAKE 2 TABLETS BY MOUTH 4 TIMES A DAY AS NEEDED FOR MUSCLE SPASMS  Strength: 750 mg  What changed:   how much to take  how to take this  when to take this     NovoLIN N NPH U-100 Insulin 100 unit/mL injection  Generic drug: insulin NPH  Inject 20-25 Units into the skin 2 (two) times daily before meals.  What changed:   how much to take  when to take this     traMADoL 50 mg tablet  Commonly known as: ULTRAM  TAKE 1 TABLET BY MOUTH FOUR TIMES A DAY  What changed:   when to take this  reasons to take this  additional instructions            CONTINUE taking these medications      acetaminophen 650 MG Tbsr  Commonly known as: TYLENOL  Take 650 mg by mouth every 8 (eight) hours.     albuterol 90 mcg/actuation inhaler  Commonly known as: PROVENTIL/VENTOLIN HFA  Inhale 2 puffs into the lungs every 4 (four) hours as needed. Rescue     amLODIPine 5 MG tablet  Commonly known as: NORVASC  Take 1 tablet (5 mg total) by mouth once daily.     cyanocobalamin (vitamin B-12) 500 mcg Subl  Place 1 tablet under the tongue once daily.     empagliflozin 10 mg tablet  Commonly known as: Jardiance  Take 1 tablet (10 mg total) by mouth once daily. HOLD THIS MEDICATION UNTIL YOU FOLLOW UP WITH YOUR PCP.     fish oil-omega-3 fatty acids 300-1,000 mg capsule  Take 1 capsule by mouth once daily.     furosemide 20 MG tablet  Commonly known as: LASIX  Take 1 tablet (20 mg total) by mouth once daily.     gabapentin 400 MG capsule  Commonly known as: NEURONTIN  TAKE 1 CAPSULE BY MOUTH FOUR TIMES A DAY AS NEEDED     hydrALAZINE 25 MG tablet  Commonly known as: APRESOLINE  Take 2 tablets (50 mg total) by mouth 2 (two) times daily.     HYDROcodone-acetaminophen  mg per tablet  Commonly known as: NORCO  Take 1 tablet by mouth every 8 (eight) hours as needed for Pain.     hydrocortisone 2.5 % rectal cream  Commonly known as: ANUSOL-HC  Place rectally 2 (two) times daily.     insulin syringe-needle U-100 1 mL 30  gauge x 5/16 Syrg  1 Syringe by Misc.(Non-Drug; Combo Route) route 2 (two) times daily.     lactulose 10 gram/15 mL solution  Commonly known as: CHRONULAC  Take 30 mLs (20 g total) by mouth once daily.     levothyroxine 100 MCG tablet  Commonly known as: SYNTHROID  Take 1 tablet (100 mcg total) by mouth before breakfast.     magnesium citrate 100 mg Tab  Take 400 mg by mouth as needed.     pantoprazole 40 MG tablet  Commonly known as: PROTONIX  TAKE 1 TABLET BY MOUTH TWICE A DAY     predniSONE 2.5 MG tablet  Commonly known as: DELTASONE  TAKE 1 TABLET BY MOUTH EVERY DAY     SYMBICORT 160-4.5 mcg/actuation Hfaa  Generic drug: budesonide-formoterol 160-4.5 mcg  Inhale 2 puffs into the lungs 2 (two) times a day.     VITAMIN D3 125 mcg (5,000 unit) Tab  Generic drug: cholecalciferol (vitamin D3)  Take 5,000 Units by mouth once daily.            STOP taking these medications      bisoprolol-hydrochlorothiazide 5-6.25 mg 5-6.25 mg Tab  Commonly known as: ZIAC              Indwelling Lines/Drains at time of discharge:   Lines/Drains/Airways       Drain  Duration             Female External Urinary Catheter w/ Suction 02/07/24 2030 4 days                    Time spent on the discharge of patient: 45 minutes         Vadim Sainz MD  Department of Hospital Medicine  [facility]

## 2024-02-12 NOTE — PLAN OF CARE
Problem: Adult Inpatient Plan of Care  Goal: Absence of Hospital-Acquired Illness or Injury  Outcome: Ongoing, Progressing     Problem: Adult Inpatient Plan of Care  Goal: Optimal Comfort and Wellbeing  Outcome: Ongoing, Progressing     Problem: Diabetes Comorbidity  Goal: Blood Glucose Level Within Targeted Range  Outcome: Ongoing, Progressing     Problem: Fall Injury Risk  Goal: Absence of Fall and Fall-Related Injury  Outcome: Ongoing, Progressing     Problem: Fluid and Electrolyte Imbalance (Acute Kidney Injury/Impairment)  Goal: Fluid and Electrolyte Balance  Outcome: Ongoing, Progressing     Problem: Renal Function Impairment (Acute Kidney Injury/Impairment)  Goal: Effective Renal Function  Outcome: Ongoing, Progressing

## 2024-02-12 NOTE — TELEPHONE ENCOUNTER
Tried to call patient but went straight to voicemail. VM left. Can have new patient appointment with NIKOS Gomez

## 2024-02-13 ENCOUNTER — TELEPHONE (OUTPATIENT)
Dept: ORTHOPEDICS | Facility: HOSPITAL | Age: 89
End: 2024-02-13
Payer: COMMERCIAL

## 2024-02-13 ENCOUNTER — PATIENT OUTREACH (OUTPATIENT)
Dept: ADMINISTRATIVE | Facility: CLINIC | Age: 89
End: 2024-02-13

## 2024-02-13 RX ORDER — LIDOCAINE 50 MG/G
1 PATCH TOPICAL DAILY
Qty: 60 PATCH | Refills: 12 | Status: SHIPPED | OUTPATIENT
Start: 2024-02-13 | End: 2024-03-05

## 2024-02-13 NOTE — PROGRESS NOTES
C3 nurse spoke with Vandana Allison' daughter, Claribel,  for a TCC post hospital discharge follow up call. The patient has a scheduled HOSFU appointment with Cm Larson III,   on 2/19/2024 @ 1000.         Gala Chauhan(Resident)

## 2024-02-13 NOTE — PROGRESS NOTES
Patient's daughter, Claribel, states patient is complaining of bilateral knee pain.   Claribel states nurses were applying Lidocaine 2% gel to patient's knees in the hospital and it helped with the pain.    Claribel states she was instructed to ask her pcp about getting a prescriptions for this medication.   Inbox message sent to Dr. Larson staff regarding this medication

## 2024-02-15 ENCOUNTER — TELEPHONE (OUTPATIENT)
Dept: ORTHOPEDICS | Facility: HOSPITAL | Age: 89
End: 2024-02-15
Payer: COMMERCIAL

## 2024-02-15 LAB
OHS QRS DURATION: 108 MS
OHS QTC CALCULATION: 460 MS

## 2024-02-15 NOTE — PLAN OF CARE
"[facility]  Discharge Final Note    Primary Care Provider: Cm Larson III, DO    Expected Discharge Date: 2/12/2024    Final Discharge Note (most recent)       Final Note - 02/15/24 0834          Final Note    Assessment Type Final Discharge Note     Anticipated Discharge Disposition Home or Self Care                     Important Message from Medicare  Important Message from Medicare regarding Discharge Appeal Rights: Given to patient/caregiver, Signed/date by patient/caregiver, Explained to patient/caregiver     Date IMM was signed: 02/08/24  Time IMM was signed: 1219    Contact Info       Maribell Hernandez MD   Specialty: Pulmonary Disease    [address] [place] [address]   Phone: [phone]       Next Steps: Follow up in 2 week(s)    Instructions: hospital follow-up with LFTs prior for "COPD".   She has rheumatoid arthritis.  Frequent, severe shortness of breath.  Please follow up on February 26 at 2:00    Cm Larson III, DO   Specialty: Family Medicine   Relationship: PCP - General    [address] [place] [address] Primary Care  [place] [address]   Phone: [phone]       Next Steps: Follow up in 1 week(s)    Instructions: hospital follow-up for shortness of breath and hyponatremia.  Please follow up on February 19 at 10:00    Aj Price MD   Specialty: Rheumatology    [address] [place] [address]   Phone: [phone]       Next Steps: Follow up in 4 week(s)    Instructions: rheumatology follow-up.  Nurse will call patient with appt.          Pt discharged home with no needs.   "

## 2024-02-15 NOTE — TELEPHONE ENCOUNTER
Patients states doing well,no chest pain or shortness of breath voiced, no complaints or concerns voiced. Will keep all follow up appts.

## 2024-02-19 ENCOUNTER — OFFICE VISIT (OUTPATIENT)
Dept: PRIMARY CARE CLINIC | Facility: CLINIC | Age: 89
End: 2024-02-19
Payer: COMMERCIAL

## 2024-02-19 VITALS
BODY MASS INDEX: 27 KG/M2 | HEIGHT: 66 IN | RESPIRATION RATE: 18 BRPM | HEART RATE: 77 BPM | WEIGHT: 168 LBS | SYSTOLIC BLOOD PRESSURE: 140 MMHG | OXYGEN SATURATION: 96 % | DIASTOLIC BLOOD PRESSURE: 72 MMHG

## 2024-02-19 DIAGNOSIS — J44.9 CHRONIC OBSTRUCTIVE PULMONARY DISEASE, UNSPECIFIED COPD TYPE: ICD-10-CM

## 2024-02-19 DIAGNOSIS — G62.9 NEUROPATHY: Primary | ICD-10-CM

## 2024-02-19 DIAGNOSIS — E87.1 HYPONATREMIA WITH EXCESS EXTRACELLULAR FLUID VOLUME: ICD-10-CM

## 2024-02-19 DIAGNOSIS — M06.9 RHEUMATOID ARTHRITIS, INVOLVING UNSPECIFIED SITE, UNSPECIFIED WHETHER RHEUMATOID FACTOR PRESENT: ICD-10-CM

## 2024-02-19 DIAGNOSIS — I10 ESSENTIAL (PRIMARY) HYPERTENSION: ICD-10-CM

## 2024-02-19 PROCEDURE — 1101F PT FALLS ASSESS-DOCD LE1/YR: CPT | Mod: ,,, | Performed by: FAMILY MEDICINE

## 2024-02-19 PROCEDURE — 99214 OFFICE O/P EST MOD 30 MIN: CPT | Mod: ,,, | Performed by: FAMILY MEDICINE

## 2024-02-19 PROCEDURE — 1111F DSCHRG MED/CURRENT MED MERGE: CPT | Mod: ,,, | Performed by: FAMILY MEDICINE

## 2024-02-19 PROCEDURE — 3288F FALL RISK ASSESSMENT DOCD: CPT | Mod: ,,, | Performed by: FAMILY MEDICINE

## 2024-02-19 PROCEDURE — 1159F MED LIST DOCD IN RCRD: CPT | Mod: ,,, | Performed by: FAMILY MEDICINE

## 2024-02-19 NOTE — PROGRESS NOTES
Subjective:      Patient ID: Vandana Allison is a 89 y.o. female.    Chief Complaint: Transitional Care and Shortness of Breath    Vandana Allison a 89 y.o. female presents for follow up on all regular problems which are reviewed and discussed.   Tcc visit. Meds reconciled  Problem List Items Addressed This Visit          Neuro    Neuropathy - Primary       Pulmonary    COPD (chronic obstructive pulmonary disease)    Overview     senile emphysema            Cardiac/Vascular    Essential (primary) hypertension       Renal/    Hyponatremia with excess extracellular fluid volume       Immunology/Multi System    Rheumatoid arthritis       Past Medical History:  Past Medical History:   Diagnosis Date    Arthritis     Bleeding external hemorrhoids     Chronic kidney disease, stage 3b     baseline creat 1.5    COPD (chronic obstructive pulmonary disease)     senile emphysema    Diabetes mellitus, type 2     DNR (do not resuscitate) 02/07/2024    discussed with VITO Christianson present    Essential (primary) hypertension     Hiatal hernia with GERD     Neuropathy     Post-operative hypothyroidism     Severe pulmonary hypertension 10/08/2022    EF  70 % and normal diastolic function     Past Surgical History:   Procedure Laterality Date    BREAST SURGERY      Biopsy    EYE SURGERY      Remove cataracts both eyes    HYSTERECTOMY      NEPHRECTOMY Right     THYROIDECTOMY       Review of patient's allergies indicates:   Allergen Reactions    Aspirin      Current Outpatient Medications on File Prior to Visit   Medication Sig Dispense Refill    acetaminophen (TYLENOL) 650 MG TbSR Take 650 mg by mouth every 8 (eight) hours.      albuterol (PROVENTIL/VENTOLIN HFA) 90 mcg/actuation inhaler Inhale 2 puffs into the lungs every 4 (four) hours as needed. Rescue 18 g 12    amLODIPine (NORVASC) 5 MG tablet Take 1 tablet (5 mg total) by mouth once daily. 90 tablet 3    cholecalciferol, vitamin D3, (VITAMIN D3) 125 mcg (5,000 unit) Tab  Take 5,000 Units by mouth once daily.      cyanocobalamin, vitamin B-12, 500 mcg Subl Place 1 tablet under the tongue once daily.      empagliflozin (JARDIANCE) 10 mg tablet Take 1 tablet (10 mg total) by mouth once daily. HOLD THIS MEDICATION UNTIL YOU FOLLOW UP WITH YOUR PCP. 90 tablet 3    ferrous sulfate (FEOSOL) 325 mg (65 mg iron) Tab tablet Take 1 tablet (325 mg total) by mouth 2 (two) times daily. (Patient taking differently: Take 325 mg by mouth 3 (three) times a week. On Monday, Wednesday and Friday) 120 tablet 3    furosemide (LASIX) 20 MG tablet Take 1 tablet (20 mg total) by mouth once daily. 90 tablet 3    gabapentin (NEURONTIN) 400 MG capsule TAKE 1 CAPSULE BY MOUTH FOUR TIMES A DAY AS NEEDED 360 capsule 4    hydrALAZINE (APRESOLINE) 25 MG tablet Take 2 tablets (50 mg total) by mouth 2 (two) times daily.      HYDROcodone-acetaminophen (NORCO)  mg per tablet Take 1 tablet by mouth every 8 (eight) hours as needed for Pain. 90 tablet 0    hydrocortisone (ANUSOL-HC) 2.5 % rectal cream Place rectally 2 (two) times daily. 28 g 12    insulin NPH (NOVOLIN N NPH U-100 INSULIN) 100 unit/mL injection Inject 20-25 Units into the skin 2 (two) times daily before meals.      insulin syringe-needle U-100 1 mL 30 gauge x 5/16 Syrg 1 Syringe by Misc.(Non-Drug; Combo Route) route 2 (two) times daily. 100 each 3    lactulose (CHRONULAC) 10 gram/15 mL solution Take 30 mLs (20 g total) by mouth once daily. 946 mL 11    levothyroxine (SYNTHROID) 100 MCG tablet Take 1 tablet (100 mcg total) by mouth before breakfast. 90 tablet 3    LIDOcaine (LIDODERM) 5 % Place 1 patch onto the skin once daily. Remove & Discard patch within 24 hours or as directed by MD Infante patch 12    magnesium citrate 100 mg Tab Take 400 mg by mouth as needed.      methocarbamoL (ROBAXIN) 750 MG Tab TAKE 2 TABLETS BY MOUTH 4 TIMES A DAY AS NEEDED FOR MUSCLE SPASMS  Strength: 750 mg (Patient taking differently: Take 750 mg by mouth once daily. TAKE 2  TABLETS BY MOUTH 4 TIMES A DAY AS NEEDED FOR MUSCLE SPASMS  Strength: 750 mg) 240 tablet 1    metoprolol succinate (TOPROL-XL) 25 MG 24 hr tablet Take 1 tablet (25 mg total) by mouth once daily. 90 tablet 1    omega-3 fatty acids/fish oil (FISH OIL-OMEGA-3 FATTY ACIDS) 300-1,000 mg capsule Take 1 capsule by mouth once daily.      pantoprazole (PROTONIX) 40 MG tablet TAKE 1 TABLET BY MOUTH TWICE A  tablet 3    predniSONE (DELTASONE) 2.5 MG tablet TAKE 1 TABLET BY MOUTH EVERY DAY 90 tablet 4    SYMBICORT 160-4.5 mcg/actuation HFAA Inhale 2 puffs into the lungs 2 (two) times a day. 10.2 g 11    traMADoL (ULTRAM) 50 mg tablet TAKE 1 TABLET BY MOUTH FOUR TIMES A DAY (Patient taking differently: Take 50 mg by mouth every 12 (twelve) hours as needed. TAKE 1 TABLET BY MOUTH FOUR TIMES A DAY) 120 tablet 4     No current facility-administered medications on file prior to visit.     Social History     Socioeconomic History    Marital status:    Tobacco Use    Smoking status: Never    Smokeless tobacco: Never   Substance and Sexual Activity    Alcohol use: Never    Drug use: Never    Sexual activity: Not Currently     Birth control/protection: None     Social Determinants of Health     Financial Resource Strain: Low Risk  (2/10/2024)    Overall Financial Resource Strain (CARDIA)     Difficulty of Paying Living Expenses: Not hard at all   Food Insecurity: No Food Insecurity (2/10/2024)    Hunger Vital Sign     Worried About Running Out of Food in the Last Year: Never true     Ran Out of Food in the Last Year: Never true   Transportation Needs: No Transportation Needs (2/10/2024)    PRAPARE - Transportation     Lack of Transportation (Medical): No     Lack of Transportation (Non-Medical): No   Physical Activity: Inactive (2/8/2024)    Exercise Vital Sign     Days of Exercise per Week: 0 days     Minutes of Exercise per Session: 0 min   Stress: No Stress Concern Present (2/10/2024)    [place] of  "Occupational Health - Occupational Stress Questionnaire     Feeling of Stress : Not at all   Social Connections: Socially Isolated (2/8/2024)    Social Connection and Isolation Panel [NHANES]     Frequency of Communication with Friends and Family: More than three times a week     Frequency of Social Gatherings with Friends and Family: More than three times a week     Attends Voodoo Services: Never     Active Member of Clubs or Organizations: No     Attends Club or Organization Meetings: Never     Marital Status:    Housing Stability: Low Risk  (2/10/2024)    Housing Stability Vital Sign     Unable to Pay for Housing in the Last Year: No     Number of Places Lived in the Last Year: 1     Unstable Housing in the Last Year: No     Family History   Problem Relation Age of Onset    Diabetes Mother         Diabetic    Heart disease Father     Diabetes Sister     Cancer Sister         Breast cancer    Diabetes Brother     Cancer Brother         Prostate cancer    Cancer Daughter         Uterine cancer    Diabetes Sister         Diabetic       Review of Systems   Constitutional: Negative.  Negative for activity change, appetite change, chills and diaphoresis.   HENT: Negative.  Negative for congestion, ear pain, hearing loss and postnasal drip.    Eyes:  Negative for itching.   Respiratory:  Positive for wheezing. Negative for chest tightness and shortness of breath.    Cardiovascular:  Positive for leg swelling. Negative for chest pain.   Gastrointestinal:  Negative for abdominal pain.   Endocrine: Negative for polydipsia.   Genitourinary:  Negative for frequency.   Musculoskeletal:  Negative for back pain.   Neurological:  Negative for headaches.   Psychiatric/Behavioral:  Positive for decreased concentration.        Objective:     BP (!) 140/72 (BP Location: Left arm)   Pulse 77   Resp 18   Ht 5' 6" (1.676 m)   Wt 76.2 kg (168 lb)   SpO2 96%   BMI 27.12 kg/m²     Physical Exam  Constitutional:       " Appearance: Normal appearance. She is obese.   HENT:      Head: Normocephalic and atraumatic.      Right Ear: External ear normal.      Left Ear: External ear normal.      Nose: Nose normal.      Mouth/Throat:      Mouth: Mucous membranes are moist.      Pharynx: Oropharynx is clear.   Eyes:      Pupils: Pupils are equal, round, and reactive to light.   Cardiovascular:      Rate and Rhythm: Normal rate and regular rhythm.      Heart sounds: Normal heart sounds.   Pulmonary:      Effort: Pulmonary effort is normal. No respiratory distress.      Breath sounds: Normal breath sounds. No wheezing or rales.   Abdominal:      Palpations: Abdomen is soft.   Musculoskeletal:      Cervical back: Normal range of motion and neck supple.   Skin:     General: Skin is warm and dry.   Neurological:      General: No focal deficit present.      Mental Status: She is alert and oriented to person, place, and time. Mental status is at baseline.   Psychiatric:         Mood and Affect: Mood normal.         Behavior: Behavior normal.         Thought Content: Thought content normal.         Judgment: Judgment normal.         1. Neuropathy    2. Chronic obstructive pulmonary disease, unspecified COPD type    3. Essential (primary) hypertension    4. Hyponatremia with excess extracellular fluid volume    5. Rheumatoid arthritis, involving unspecified site, unspecified whether rheumatoid factor present        Plan:     Problem List Items Addressed This Visit          Neuro    Neuropathy - Primary       Pulmonary    COPD (chronic obstructive pulmonary disease)       Cardiac/Vascular    Essential (primary) hypertension       Renal/    Hyponatremia with excess extracellular fluid volume       Immunology/Multi System    Rheumatoid arthritis     No follow-ups on file.    Fu 2m  I am having Vandana Allison maintain her albuterol, predniSONE, ferrous sulfate, insulin syringe-needle U-100, empagliflozin, hydrocortisone, pantoprazole, amLODIPine,  methocarbamoL, levothyroxine, SYMBICORT, furosemide, lactulose, gabapentin, traMADoL, HYDROcodone-acetaminophen, cholecalciferol (vitamin D3), fish oil-omega-3 fatty acids, acetaminophen, cyanocobalamin (vitamin B-12), magnesium citrate, metoprolol succinate, NovoLIN N NPH U-100 Insulin, hydrALAZINE, and LIDOcaine.    Vandana was seen today for transitional care and shortness of breath.    Diagnoses and all orders for this visit:    Neuropathy    Chronic obstructive pulmonary disease, unspecified COPD type    Essential (primary) hypertension    Hyponatremia with excess extracellular fluid volume    Rheumatoid arthritis, involving unspecified site, unspecified whether rheumatoid factor present         [unfilled]  No orders of the defined types were placed in this encounter.

## 2024-02-20 ENCOUNTER — CLINICAL SUPPORT (OUTPATIENT)
Dept: PULMONOLOGY | Facility: HOSPITAL | Age: 89
End: 2024-02-20
Attending: HOSPITALIST
Payer: COMMERCIAL

## 2024-02-20 ENCOUNTER — OFFICE VISIT (OUTPATIENT)
Dept: PULMONOLOGY | Facility: CLINIC | Age: 89
End: 2024-02-20
Payer: COMMERCIAL

## 2024-02-20 VITALS — OXYGEN SATURATION: 97 %

## 2024-02-20 VITALS
WEIGHT: 170 LBS | RESPIRATION RATE: 16 BRPM | OXYGEN SATURATION: 97 % | DIASTOLIC BLOOD PRESSURE: 66 MMHG | HEART RATE: 72 BPM | HEIGHT: 66 IN | SYSTOLIC BLOOD PRESSURE: 159 MMHG | BODY MASS INDEX: 27.32 KG/M2

## 2024-02-20 DIAGNOSIS — J44.9 CHRONIC OBSTRUCTIVE PULMONARY DISEASE, UNSPECIFIED COPD TYPE: ICD-10-CM

## 2024-02-20 DIAGNOSIS — J45.40 MODERATE PERSISTENT ASTHMA WITHOUT COMPLICATION: ICD-10-CM

## 2024-02-20 DIAGNOSIS — M06.9 RHEUMATOID ARTHRITIS, INVOLVING UNSPECIFIED SITE, UNSPECIFIED WHETHER RHEUMATOID FACTOR PRESENT: Primary | ICD-10-CM

## 2024-02-20 DIAGNOSIS — I27.20 PULMONARY HYPERTENSION: ICD-10-CM

## 2024-02-20 DIAGNOSIS — J84.9 INTERSTITIAL PULMONARY DISEASE, UNSPECIFIED: ICD-10-CM

## 2024-02-20 PROCEDURE — 3288F FALL RISK ASSESSMENT DOCD: CPT | Mod: CPTII,,, | Performed by: STUDENT IN AN ORGANIZED HEALTH CARE EDUCATION/TRAINING PROGRAM

## 2024-02-20 PROCEDURE — 94729 DIFFUSING CAPACITY: CPT | Mod: 26,,, | Performed by: STUDENT IN AN ORGANIZED HEALTH CARE EDUCATION/TRAINING PROGRAM

## 2024-02-20 PROCEDURE — 99215 OFFICE O/P EST HI 40 MIN: CPT | Mod: PBBFAC | Performed by: STUDENT IN AN ORGANIZED HEALTH CARE EDUCATION/TRAINING PROGRAM

## 2024-02-20 PROCEDURE — 1111F DSCHRG MED/CURRENT MED MERGE: CPT | Mod: CPTII,,, | Performed by: STUDENT IN AN ORGANIZED HEALTH CARE EDUCATION/TRAINING PROGRAM

## 2024-02-20 PROCEDURE — 1159F MED LIST DOCD IN RCRD: CPT | Mod: CPTII,,, | Performed by: STUDENT IN AN ORGANIZED HEALTH CARE EDUCATION/TRAINING PROGRAM

## 2024-02-20 PROCEDURE — 94729 DIFFUSING CAPACITY: CPT

## 2024-02-20 PROCEDURE — 1126F AMNT PAIN NOTED NONE PRSNT: CPT | Mod: CPTII,,, | Performed by: STUDENT IN AN ORGANIZED HEALTH CARE EDUCATION/TRAINING PROGRAM

## 2024-02-20 PROCEDURE — 1101F PT FALLS ASSESS-DOCD LE1/YR: CPT | Mod: CPTII,,, | Performed by: STUDENT IN AN ORGANIZED HEALTH CARE EDUCATION/TRAINING PROGRAM

## 2024-02-20 PROCEDURE — 99204 OFFICE O/P NEW MOD 45 MIN: CPT | Mod: S$PBB,25,, | Performed by: STUDENT IN AN ORGANIZED HEALTH CARE EDUCATION/TRAINING PROGRAM

## 2024-02-20 PROCEDURE — 94726 PLETHYSMOGRAPHY LUNG VOLUMES: CPT | Mod: 26,,, | Performed by: STUDENT IN AN ORGANIZED HEALTH CARE EDUCATION/TRAINING PROGRAM

## 2024-02-20 PROCEDURE — 94060 EVALUATION OF WHEEZING: CPT

## 2024-02-20 PROCEDURE — 94060 EVALUATION OF WHEEZING: CPT | Mod: 26,,, | Performed by: STUDENT IN AN ORGANIZED HEALTH CARE EDUCATION/TRAINING PROGRAM

## 2024-02-20 PROCEDURE — 94726 PLETHYSMOGRAPHY LUNG VOLUMES: CPT

## 2024-02-20 RX ORDER — PREDNISONE 10 MG/1
10 TABLET ORAL DAILY
Qty: 30 TABLET | Refills: 11 | Status: SHIPPED | OUTPATIENT
Start: 2024-02-20 | End: 2024-03-19

## 2024-02-20 NOTE — ASSESSMENT & PLAN NOTE
89 yo F who is a life long non smoker presents for evaluation of shortness of breath. There is an ongoing concern for airway disease in this patient who has had respiratory distress a/w wheezing improving with inhaler therapy. She has chronic steroid therapy which may be contributing to her BD testing on PFTs; she otherwise has no obstruction and to this end she has no COPD. Plan for management of asthma with Symbicort BID and albuterol PRN. Inhaler teaching provided during this visit with excellent technique achieved with teaching; she was instructed to rinse and spit following ICS-LABA use.

## 2024-02-20 NOTE — PROGRESS NOTES
[facility]  Pulmonology  NEW VISIT     Patient Name:  Vandana Allison  Primary Care Provider: Cm Larson III, DO  Date of Service: 02/20/2024  Reason for Referral: COPD    Chief Complaint: Shortness of breath    SUBJECTIVE   HPI:  Vandana Allison is a 89 y.o. female with asthma, RA on prednisone, hypothyroidism, s/p R nephrectomy and DIIM who presents today upon referral with complaints of intermittent shortness of breath. She is accompanied by her daughter.     Estefany reports feeling well today. She states that her breathing has much improved with initiation of breathing treatments. She has wheezing that is audible when her breathing is at its worst. She currently uses Symbicort twice daily and albuterol as needed. She reports being diagnosed with RA many years ago. She has ongoing hand and knee pain associated with this. She is currently taking Prednisone 5 mg daily for management of her symptoms and is pending establishing with Rheumatology. She has frequent and large volume UOP on her Lasix therapy.  She had a recent admission 02/07-12/2024 where she was found to have hyponatremia and acute respiratory distress and was managed for hypervolemic hyponatremia and AECOPD.     Past Medical History:   Diagnosis Date    Arthritis     Bleeding external hemorrhoids     Chronic kidney disease, stage 3b     baseline creat 1.5    COPD (chronic obstructive pulmonary disease)     senile emphysema    Diabetes mellitus, type 2     DNR (do not resuscitate) 02/07/2024    discussed with VITO Christianson present    Essential (primary) hypertension     Hiatal hernia with GERD     Neuropathy     Post-operative hypothyroidism     Severe pulmonary hypertension 10/08/2022    EF  70 % and normal diastolic function       Past Surgical History:   Procedure Laterality Date    BREAST SURGERY      Biopsy    EYE SURGERY      Remove cataracts both eyes    HYSTERECTOMY      NEPHRECTOMY Right 1960    THYROIDECTOMY          Family History   Problem Relation Age of Onset    Diabetes Mother         Diabetic    Heart disease Father     Diabetes Sister     Cancer Sister         Breast cancer    Diabetes Brother     Cancer Brother         Prostate cancer    Cancer Daughter         Uterine cancer    Diabetes Sister         Diabetic        Social History     Socioeconomic History    Marital status:    Tobacco Use    Smoking status: Never    Smokeless tobacco: Never   Substance and Sexual Activity    Alcohol use: Never    Drug use: Never    Sexual activity: Not Currently     Birth control/protection: None     Social Determinants of Health     Financial Resource Strain: Low Risk  (2/10/2024)    Overall Financial Resource Strain (CARDIA)     Difficulty of Paying Living Expenses: Not hard at all   Food Insecurity: No Food Insecurity (2/10/2024)    Hunger Vital Sign     Worried About Running Out of Food in the Last Year: Never true     Ran Out of Food in the Last Year: Never true   Transportation Needs: No Transportation Needs (2/10/2024)    PRAPARE - Transportation     Lack of Transportation (Medical): No     Lack of Transportation (Non-Medical): No   Physical Activity: Inactive (2/8/2024)    Exercise Vital Sign     Days of Exercise per Week: 0 days     Minutes of Exercise per Session: 0 min   Stress: No Stress Concern Present (2/10/2024)    [place] of Occupational Health - Occupational Stress Questionnaire     Feeling of Stress : Not at all   Social Connections: Socially Isolated (2/8/2024)    Social Connection and Isolation Panel [NHANES]     Frequency of Communication with Friends and Family: More than three times a week     Frequency of Social Gatherings with Friends and Family: More than three times a week     Attends Mormon Services: Never     Active Member of Clubs or Organizations: No     Attends Club or Organization Meetings: Never     Marital Status:    Housing Stability: Low Risk  (2/10/2024)    Housing  "Stability Vital Sign     Unable to Pay for Housing in the Last Year: No     Number of Places Lived in the Last Year: 1     Unstable Housing in the Last Year: No       Social History     Social History Narrative    Not on file       Review of patient's allergies indicates:   Allergen Reactions    Aspirin         Medications: Medications reviewed to include over the counter medications.    Review of Systems: A focused ROS was completed and found to be negative except for that mentioned above.      OBJECTIVE   PHYSICAL EXAM:  Vitals:    02/20/24 1308 02/20/24 1312   BP: (!) 172/79 (!) 159/66   BP Location: Right arm Left arm   Patient Position: Sitting Sitting   BP Method: Medium (Automatic) Medium (Automatic)   Pulse: 72    Resp: 16    SpO2: 97%    Weight: 77.1 kg (170 lb)    Height: 5' 6" (1.676 m)         GENERAL: NAD  HEENT: normocephalic, non-icteric conjunctivae, moist oral mucosa  RESPIRATORY: bibasilar inspiratory crackles, no wheezing, rales or rhonchi, on RA  CARDIOVASCULAR: Regular rate and rhythm, no murmurs rubs or gallops.  SKIN: erythema over PIP and MCP of hands  MUSCULOSKELETAL: No clubbing or cyanosis; swelling of bilateral MCP, no pedal edema  NEUROLOGIC: AO ×3, no gross deficits    LABS:  Lab studies reviewed and notable for H/H 10/30.9, SEos , SCr 1.81, CO2 29, NTproBNP 3234    IMAGING:  Imaging reviewed and notable for CXR 02/2024 with bilateral patchy opacities and interstitium enhancement R>L that is progressed when compared to serial CXR from 2020 and 10/2022    TTE:  02/2024:  LVEF 60%, normal RV size, TAPSE 2.05, normal LA size, normal RA size, mild MR, mild TR, trace CT, trivial pericardial effusion, PASP 42  10/2022:  LVEF 70%, normal diastolic function, TAPSE 2.57, mild MR, moderate-to-severe TR, PASP 66, normal pericardium    LUNG FUNCTION TESTING:      SPIROMETRY/PFT:  02/2024 Pre Post   FVC 1.68/82% 1.77/87%   FEV1 1.19 1.27   FEV1/FVC 71 72   TLC 3.21/-3.31    FRC  2.03/-1.77  "   RV 1.42/-2.99    DLCO 8.84    There is no obstruction, there is restriction, all lung volumes are reduced, there is a severe diffusion deficit    ASSESSMENT & PLAN     1. Rheumatoid arthritis, involving unspecified site, unspecified whether rheumatoid factor present    2. Interstitial pulmonary disease, unspecified  -     CT Chest Without Contrast; Future; Expected date: 02/20/2024    3. Moderate persistent asthma without complication  Assessment & Plan:  89 yo F who is a life long non smoker presents for evaluation of shortness of breath. There is an ongoing concern for airway disease in this patient who has had respiratory distress a/w wheezing improving with inhaler therapy. She has chronic steroid therapy which may be contributing to her BD testing on PFTs; she otherwise has no obstruction and to this end she has no COPD. Plan for management of asthma with Symbicort BID and albuterol PRN. Inhaler teaching provided during this visit with excellent technique achieved with teaching; she was instructed to rinse and spit following ICS-LABA use.      4. Pulmonary hypertension  Assessment & Plan:  She has pulmonary hypertension implied by her serial TTE with interval improvement in severity of TR and thusly PASP to 42 from 66. She has a long standing history of RA with concern for pulmonary involvement upon review of serial CXR. She has proBNP elevation with peak correlating with severe TR on TTE. Currently with good UOP on lasix therapy. PFTs with restricton and severe diffusion deficit. Will hold on invasive diagnostics at this time upon discussion. Will increase prednisone to 10 mg Qday and obtain CT Chest to assess parenchymal involvement of RA.       Other orders  -     predniSONE (DELTASONE) 10 MG tablet; Take 1 tablet (10 mg total) by mouth once daily.  Dispense: 30 tablet; Refill: 11           Follow up in about 4 weeks (around 3/19/2024) for CT CHEST RESULTS, Routine follow up.      Case was discussed with  patient; all questions were answered to patient's satisfaction and patient verbalized understanding.     Maribell Hernandez MD  Pulmonary Medicine  [facility]  Phone: [phone]

## 2024-02-20 NOTE — PROCEDURES
PFT REPORT:  SPIROMETRY:  The pre-BD FVC is normal, 1.68L/-1.64 Z score.  The pre-BD FEV1 is decreased, 1.19L/-1.80 Z score.  Thre pre-BD FEV1/FVC ratio is 71/-0.55 Z score.    The post-BD FVC is normal, 1.77L/-1.45 Z score.  The post-BD FEV1 is normal, 1.27L/-1.59 Z score.  The post-BD FEV1/FVC ratio is 72/-0.44 Z score.    The is no significant bronchodilator effect.  The flow volume loop is narrowed.    LUNG VOLUMES:  The TLC is decreased, 3.21L/-3.31 Z score.  The FRC is decreased, 2.03L/-1.72 Z score.  The RV is decreased, 1.42L/-2.66 Z score.    DIFFUSION CAPACITY:  The diffusion capacity is corrected for hemoglobin and is decreased, 8.84/-4.60 Z score.    Interpretation:  Note, reference values for this patient are based on age of 89.  The post-BD spirometry shows no obstructive defect.  There is no significant response to bronchodilators. Lack of a bronchodilator response in the lab does not predict clinical response to bronchodilator therapy.  The flow volume loop is narrowed consistent with restriction.   There is a moderate restrictive defect. All lung volumes are decreased.  There is a severe diffusion defect. Decreased diffusion capacity is suggestive of pulmonary vascular disease, emphysema, interstitial lung disease and/or anemia. Clinical correlation is advised.       Maribell Hernandez MD  Pulmonary Medicine  [facility]

## 2024-02-20 NOTE — ASSESSMENT & PLAN NOTE
She has pulmonary hypertension implied by her serial TTE with interval improvement in severity of TR and thusly PASP to 42 from 66. She has a long standing history of RA with concern for pulmonary involvement upon review of serial CXR. She has proBNP elevation with peak correlating with severe TR on TTE. Currently with good UOP on lasix therapy. PFTs with restricton and severe diffusion deficit. Will hold on invasive diagnostics at this time upon discussion. Will increase prednisone to 10 mg Qday and obtain CT Chest to assess parenchymal involvement of RA.

## 2024-02-21 ENCOUNTER — TELEPHONE (OUTPATIENT)
Dept: PULMONOLOGY | Facility: CLINIC | Age: 89
End: 2024-02-21
Payer: COMMERCIAL

## 2024-02-21 RX ORDER — TRAMADOL HYDROCHLORIDE 50 MG/1
TABLET ORAL
Qty: 120 TABLET | Refills: 4 | Status: SHIPPED | OUTPATIENT
Start: 2024-02-21

## 2024-02-21 NOTE — TELEPHONE ENCOUNTER
Patient daughter called & voiced that in clinic yesterday she verbalized patient was taking 5mg of Prednisone & Dr. Hernandez told patient to double up & take 10mg. Received a call this morning from the daughter & she voiced patient is taking 2.5mg of Prednison instead of 5mg. Wanted to know to still take the 10mg or take 5mg. Spoke to  voiced to take 10mg of Prednisone. Verbalized to the daughter. Daughter understood. No other questions or concerns at this time.

## 2024-03-07 ENCOUNTER — OFFICE VISIT (OUTPATIENT)
Dept: RHEUMATOLOGY | Facility: CLINIC | Age: 89
End: 2024-03-07
Payer: COMMERCIAL

## 2024-03-07 VITALS
SYSTOLIC BLOOD PRESSURE: 128 MMHG | OXYGEN SATURATION: 93 % | BODY MASS INDEX: 27.32 KG/M2 | HEART RATE: 67 BPM | RESPIRATION RATE: 16 BRPM | DIASTOLIC BLOOD PRESSURE: 72 MMHG | WEIGHT: 170 LBS | HEIGHT: 66 IN

## 2024-03-07 DIAGNOSIS — M25.50 POLYARTHRALGIA: ICD-10-CM

## 2024-03-07 DIAGNOSIS — M06.9 RHEUMATOID ARTHRITIS, INVOLVING UNSPECIFIED SITE, UNSPECIFIED WHETHER RHEUMATOID FACTOR PRESENT: Primary | ICD-10-CM

## 2024-03-07 PROCEDURE — 1101F PT FALLS ASSESS-DOCD LE1/YR: CPT | Mod: CPTII,,, | Performed by: NURSE PRACTITIONER

## 2024-03-07 PROCEDURE — 1111F DSCHRG MED/CURRENT MED MERGE: CPT | Mod: CPTII,,, | Performed by: NURSE PRACTITIONER

## 2024-03-07 PROCEDURE — 99215 OFFICE O/P EST HI 40 MIN: CPT | Mod: PBBFAC | Performed by: NURSE PRACTITIONER

## 2024-03-07 PROCEDURE — 99204 OFFICE O/P NEW MOD 45 MIN: CPT | Mod: S$PBB,,, | Performed by: NURSE PRACTITIONER

## 2024-03-07 PROCEDURE — 1125F AMNT PAIN NOTED PAIN PRSNT: CPT | Mod: CPTII,,, | Performed by: NURSE PRACTITIONER

## 2024-03-07 PROCEDURE — 3288F FALL RISK ASSESSMENT DOCD: CPT | Mod: CPTII,,, | Performed by: NURSE PRACTITIONER

## 2024-03-07 PROCEDURE — 1159F MED LIST DOCD IN RCRD: CPT | Mod: CPTII,,, | Performed by: NURSE PRACTITIONER

## 2024-03-07 NOTE — PROGRESS NOTES
"    RHEUMATOLOGY OUTPATIENT CLINIC NOTE    Subjective:       Patient ID: Vandana Allison is a 89 y.o. female.    Chief Complaint: Establish Care (Referred from inpt service) and Polyarthralgia (Ongoing for years now. States pain is worse in her knees and feets. Has tramadol, gabapentin, norco and robaxin which helps )       History of Present Illness: 89 y/o female (who looks much younger than current age) presents to the clinic as a new patient referral from Dr. Monica Sainz for complaints of polyarthralgia.     Has one kidney due to kidney stone in the 60's.   Past Medical History:   Diagnosis Date    Arthritis     Bleeding external hemorrhoids     Chronic kidney disease, stage 3b     baseline creat 1.5    COPD (chronic obstructive pulmonary disease)     senile emphysema    Diabetes mellitus, type 2     DNR (do not resuscitate) 02/07/2024    discussed with VITO Christianson present    Essential (primary) hypertension     Hiatal hernia with GERD     Neuropathy     Post-operative hypothyroidism     Severe pulmonary hypertension 10/08/2022    EF  70 % and normal diastolic function     Social History     Tobacco Use    Smoking status: Never    Smokeless tobacco: Never   Substance Use Topics    Alcohol use: Never     Review of patient's allergies indicates:   Allergen Reactions    Aspirin        Objective:   /72 (BP Location: Left arm, Patient Position: Sitting, BP Method: Large (Manual))   Pulse 67   Resp 16   Ht 5' 6" (1.676 m)   Wt 77.1 kg (170 lb)   SpO2 (!) 93%   BMI 27.44 kg/m²     Immunization History   Administered Date(s) Administered    COVID-19, MRNA, LN-S, PF (MODERNA FULL 0.5 ML DOSE) 03/03/2021, 04/02/2021, 06/09/2022    Influenza (FLUAD) - Quadrivalent - Adjuvanted - PF *Preferred* (65+) 11/02/2022, 11/02/2023    Influenza - Quadrivalent - High Dose - PF (65 years and older) 10/28/2021       Current Outpatient Medications   Medication Instructions    acetaminophen (TYLENOL) 650 mg, Oral, " Every 8 hours    albuterol (PROVENTIL/VENTOLIN HFA) 90 mcg/actuation inhaler 2 puffs, Inhalation, Every 4 hours PRN, Rescue    amLODIPine (NORVASC) 5 mg, Oral, Daily    cholecalciferol (vitamin D3) (VITAMIN D3) 5,000 Units, Oral, Daily    cyanocobalamin, vitamin B-12, 500 mcg Subl 1 tablet, Sublingual, Daily    empagliflozin (JARDIANCE) 10 mg, Oral, Daily, HOLD THIS MEDICATION UNTIL YOU FOLLOW UP WITH YOUR PCP.    ferrous sulfate (FEOSOL) 325 mg, Oral, 2 times daily    furosemide (LASIX) 20 mg, Oral, Daily    gabapentin (NEURONTIN) 400 MG capsule TAKE 1 CAPSULE BY MOUTH FOUR TIMES A DAY AS NEEDED    hydrALAZINE (APRESOLINE) 50 mg, Oral, 2 times daily    HYDROcodone-acetaminophen (NORCO)  mg per tablet 1 tablet, Oral, Every 8 hours PRN    hydrocortisone (ANUSOL-HC) 2.5 % rectal cream Rectal, 2 times daily    insulin syringe-needle U-100 1 mL 30 gauge x 5/16 Syrg 1 Syringe, Misc.(Non-Drug; Combo Route), 2 times daily    lactulose (CHRONULAC) 20 g, Oral, Daily    levothyroxine (SYNTHROID) 100 mcg, Oral, Before breakfast    magnesium citrate 400 mg, Oral, As needed (PRN)    methocarbamoL (ROBAXIN) 750 MG Tab TAKE 2 TABLETS BY MOUTH 4 TIMES A DAY AS NEEDED FOR MUSCLE SPASMS<BR>Strength: 750 mg    metoprolol succinate (TOPROL-XL) 25 mg, Oral, Daily    NovoLIN N NPH U-100 Insulin 20-25 Units, Subcutaneous, 2 times daily before meals    omega-3 fatty acids/fish oil (FISH OIL-OMEGA-3 FATTY ACIDS) 300-1,000 mg capsule 1 capsule, Oral, Daily    pantoprazole (PROTONIX) 40 MG tablet TAKE 1 TABLET BY MOUTH TWICE A DAY    predniSONE (DELTASONE) 10 mg, Oral, Daily    SYMBICORT 160-4.5 mcg/actuation HFAA 2 puffs, Inhalation, 2 times daily    traMADoL (ULTRAM) 50 mg tablet TAKE 1 TABLET BY MOUTH FOUR TIMES A DAY      RHEUM LABS  Lab Results   Component Value Date    CRP 10.70 (H) 10/08/2022   pending    Assessment:     Review of Systems   Constitutional:  Negative for fever.   Eyes:  Negative for redness.   Respiratory:   Positive for shortness of breath. Negative for cough.    Cardiovascular:  Negative for chest pain.   Gastrointestinal:  Positive for constipation. Negative for diarrhea.   Genitourinary:  Negative for dysuria.   Musculoskeletal:  Positive for back pain and joint pain. Negative for falls.   Skin:  Negative for rash.   Neurological:  Positive for headaches.   Endo/Heme/Allergies:  Does not bruise/bleed easily.        Physical Exam   Constitutional: She is oriented to person, place, and time. No distress.   HENT:   Head: Normocephalic.   Pulmonary/Chest: Effort normal.   Musculoskeletal:         General: Tenderness present.      Cervical back: No rigidity.      Comments: In wheelchair during exam, daughter states that patient ambulates at home with walker.   Neurological: She is alert and oriented to person, place, and time.   Skin: Skin is warm.   Psychiatric: Her behavior is normal. Mood normal.   Vitals reviewed.          I have reviewed the following:     Details / Date    []   Labs No labs drawn for review    []   ACR Score     [x]   RAPID 3 or CDAI score  See below    []   Imaging     []   Cardiology Procedures     [x]   Other Chart Review     [facility]    3/4/2024 11:32 AM CST - Filed by Patient   Please select yes or no for the following symptoms you have experienced in the last week.   Fever? No   Eye redness? No   Mouth sores? No   Headaches? Yes   Shortness of breath? Yes   Chest pain? No   Trouble swallowing? No   Diarrhea? No   Constipation? Yes   Unexpected weight change? No   Genital sores? No   Painful urination? No   During the last 3 days, have you had a skin rash? No   Bruises or bleeds easily? No   Cough? No   Over the last week were you able to:   Dress yourself, including tying shoelaces and doing buttons? With some difficulty   Get in and out of bed? Without any difficulty   Lift a full cup or glass to your mouth? Without any difficulty   Walk outdoors on flat ground? With some  difficulty   Wash and dry your entire body? With some difficulty   Bend down to  clothing from the floor? With some difficulty   Turn regular faucets on and off? Without any difficulty   Get in and out of a car, bus, train, or airplane? With some difficulty   Walk two miles or three kilometers, if you wish? With much difficulty   Participate in recreational activities and sports Unable to do   Get a good nights sleep? With some difficulty   Deal with feelings of anxiety or being nervous? Without any difficulty   Deal with feelings of depression or feeling blue? Without any difficulty   How much pain have you had because of your condition over the past week?  8.0   When you awakened in the morning OVER THE LAST WEEK, did you feel stiff? Yes   If Yes, please indicate the number of hours until you are as limber as you will be for the day 1   How much of a problem has UNUSUAL fatigue or tiredness been for you OVER THE PAST WEEK? 5.5   Considering all the ways in which illness and health conditions may effect you at this time, please indication below how you are doin   MDHAQ Score (range: 0 - 3) 1   Psychologic Score (range: 0 - 10) 1.1   Pain Score (range: 0 - 10) 8   Fatigue Score (range: 0 - 10) 5.5   Global Health Score (range: 0 - 10) 8   Rapid 3 Functional Status (FN) (range: 0 - 10) 3.33   RAPID 3 Pain Score (range: 0 - 10) 8   RAPID 3 Global Health Score (range: 0 - 10) 8   Rapid 3 Total Score (range: 0 - 30) 19.33 (High Severity)   RAPID3 Score (range: 0 - 10) 6.44        Plan:          1. Rheumatoid arthritis, involving unspecified site, unspecified whether rheumatoid factor present    2. Polyarthralgia      Rheum Screening Labs today  Follow up with Dr. Aj Price to establish plan of care    45 minutes of total time spent on the encounter, which includes face to face time and non-face to face time preparing to see the patient (eg, review of tests), Obtaining and/or reviewing separately  obtained history, Documenting clinical information in the electronic or other health record, Independently interpreting results (not separately reported) and communicating results to the patient/family/caregiver, or Care coordination (not separately reported).            NIKOS Dhaliwal  [facility] - RHEUMATOLOGY  [address] [facility] [address]  [phone]

## 2024-03-19 ENCOUNTER — OFFICE VISIT (OUTPATIENT)
Dept: PULMONOLOGY | Facility: CLINIC | Age: 89
End: 2024-03-19
Payer: COMMERCIAL

## 2024-03-19 ENCOUNTER — HOSPITAL ENCOUNTER (OUTPATIENT)
Dept: RADIOLOGY | Facility: HOSPITAL | Age: 89
Discharge: HOME | End: 2024-03-19
Attending: STUDENT IN AN ORGANIZED HEALTH CARE EDUCATION/TRAINING PROGRAM
Payer: COMMERCIAL

## 2024-03-19 VITALS
HEIGHT: 66 IN | HEART RATE: 71 BPM | WEIGHT: 170 LBS | RESPIRATION RATE: 20 BRPM | OXYGEN SATURATION: 96 % | BODY MASS INDEX: 27.32 KG/M2 | SYSTOLIC BLOOD PRESSURE: 150 MMHG | DIASTOLIC BLOOD PRESSURE: 69 MMHG

## 2024-03-19 DIAGNOSIS — J84.9 INTERSTITIAL PULMONARY DISEASE, UNSPECIFIED: ICD-10-CM

## 2024-03-19 DIAGNOSIS — J45.40 MODERATE PERSISTENT ASTHMA WITHOUT COMPLICATION: Primary | ICD-10-CM

## 2024-03-19 DIAGNOSIS — I27.20 PULMONARY HYPERTENSION: ICD-10-CM

## 2024-03-19 PROCEDURE — 99215 OFFICE O/P EST HI 40 MIN: CPT | Mod: PBBFAC,25 | Performed by: STUDENT IN AN ORGANIZED HEALTH CARE EDUCATION/TRAINING PROGRAM

## 2024-03-19 PROCEDURE — 99214 OFFICE O/P EST MOD 30 MIN: CPT | Mod: S$PBB,,, | Performed by: STUDENT IN AN ORGANIZED HEALTH CARE EDUCATION/TRAINING PROGRAM

## 2024-03-19 PROCEDURE — 1159F MED LIST DOCD IN RCRD: CPT | Mod: CPTII,,, | Performed by: STUDENT IN AN ORGANIZED HEALTH CARE EDUCATION/TRAINING PROGRAM

## 2024-03-19 PROCEDURE — 1101F PT FALLS ASSESS-DOCD LE1/YR: CPT | Mod: CPTII,,, | Performed by: STUDENT IN AN ORGANIZED HEALTH CARE EDUCATION/TRAINING PROGRAM

## 2024-03-19 PROCEDURE — 71250 CT THORAX DX C-: CPT | Mod: TC

## 2024-03-19 PROCEDURE — 3288F FALL RISK ASSESSMENT DOCD: CPT | Mod: CPTII,,, | Performed by: STUDENT IN AN ORGANIZED HEALTH CARE EDUCATION/TRAINING PROGRAM

## 2024-03-19 PROCEDURE — 71250 CT THORAX DX C-: CPT | Mod: 26,,, | Performed by: RADIOLOGY

## 2024-03-19 PROCEDURE — 1126F AMNT PAIN NOTED NONE PRSNT: CPT | Mod: CPTII,,, | Performed by: STUDENT IN AN ORGANIZED HEALTH CARE EDUCATION/TRAINING PROGRAM

## 2024-03-19 RX ORDER — PREDNISONE 2.5 MG/1
5 TABLET ORAL DAILY
Qty: 90 TABLET | Refills: 6
Start: 2024-03-19 | End: 2024-04-01

## 2024-03-19 NOTE — PROGRESS NOTES
[facility]  Pulmonology  ESTABLISHED VISIT     Patient Name:  Vandana Allison  Primary Care Provider: Cm Larson III, DO  Date of Service: 03/19/2024  Reason for Referral: COPD    Chief Complaint: Shortness of breath    SUBJECTIVE   HPI:  Vandana Allison is a 89 y.o. female with asthma, RA on prednisone, hypothyroidism, s/p R nephrectomy and DIIM who presents today for follow up of intermittent shortness of breath. She is accompanied by her daughter.     Estefany reports feeling well on this assessment. She has some intermittent shortness of breath but it is non limiting. Since last visit, she has taken prednisone 10 mg daily (usual dose is 2.5mg) as discussed and has noticed an increase in her finger stick glucose levels. She is interested in results of her CT.    Initial HPI  Estefany reports feeling well today. She states that her breathing has much improved with initiation of breathing treatments. She has wheezing that is audible when her breathing is at its worst. She currently uses Symbicort twice daily and albuterol as needed. She reports being diagnosed with RA many years ago. She has ongoing hand and knee pain associated with this. She is currently taking Prednisone 5 mg daily for management of her symptoms and is pending establishing with Rheumatology. She has frequent and large volume UOP on her Lasix therapy.  She had a recent admission 02/07-12/2024 where she was found to have hyponatremia and acute respiratory distress and was managed for hypervolemic hyponatremia and AECOPD.     Past Medical History:   Diagnosis Date    Arthritis     Bleeding external hemorrhoids     Chronic kidney disease, stage 3b     baseline creat 1.5    COPD (chronic obstructive pulmonary disease)     senile emphysema    Diabetes mellitus, type 2     DNR (do not resuscitate) 02/07/2024    discussed with VITO Christianson present    Essential (primary) hypertension     Hiatal hernia with GERD     Neuropathy      Post-operative hypothyroidism     Severe pulmonary hypertension 10/08/2022    EF  70 % and normal diastolic function       Past Surgical History:   Procedure Laterality Date    BREAST SURGERY      Biopsy    EYE SURGERY      Remove cataracts both eyes    HYSTERECTOMY      NEPHRECTOMY Right 1960    THYROIDECTOMY         Family History   Problem Relation Age of Onset    Diabetes Mother         Diabetic    Heart disease Father     Diabetes Sister     Cancer Sister         Breast cancer    Diabetes Brother     Cancer Brother         Prostate cancer    Cancer Daughter         Uterine cancer    Diabetes Sister         Diabetic        Social History     Socioeconomic History    Marital status:    Tobacco Use    Smoking status: Never    Smokeless tobacco: Never   Substance and Sexual Activity    Alcohol use: Never    Drug use: Never    Sexual activity: Not Currently     Birth control/protection: None     Social Determinants of Health     Financial Resource Strain: Low Risk  (2/10/2024)    Overall Financial Resource Strain (CARDIA)     Difficulty of Paying Living Expenses: Not hard at all   Food Insecurity: No Food Insecurity (2/10/2024)    Hunger Vital Sign     Worried About Running Out of Food in the Last Year: Never true     Ran Out of Food in the Last Year: Never true   Transportation Needs: No Transportation Needs (2/10/2024)    PRAPARE - Transportation     Lack of Transportation (Medical): No     Lack of Transportation (Non-Medical): No   Physical Activity: Inactive (2/8/2024)    Exercise Vital Sign     Days of Exercise per Week: 0 days     Minutes of Exercise per Session: 0 min   Stress: No Stress Concern Present (2/10/2024)    [employer] [place] [employer] - Occupational Stress Questionnaire     Feeling of Stress : Not at all   Social Connections: Socially Isolated (2/8/2024)    Social Connection and Isolation Panel [NHANES]     Frequency of Communication with Friends and Family: More than three  "times a week     Frequency of Social Gatherings with Friends and Family: More than three times a week     Attends Worship Services: Never     Active Member of Clubs or Organizations: No     Attends Club or Organization Meetings: Never     Marital Status:    Housing Stability: Low Risk  (2/10/2024)    Housing Stability Vital Sign     Unable to Pay for Housing in the Last Year: No     Number of Places Lived in the Last Year: 1     Unstable Housing in the Last Year: No       Social History     Social History Narrative    Not on file       Review of patient's allergies indicates:   Allergen Reactions    Aspirin         Medications: Medications reviewed to include over the counter medications.    Review of Systems: A focused ROS was completed and found to be negative except for that mentioned above.      OBJECTIVE   PHYSICAL EXAM:  Vitals:    03/19/24 1420   BP: (!) 150/69   BP Location: Left arm   Patient Position: Sitting   BP Method: Large (Automatic)   Pulse: 71   Resp: 20   SpO2: 96%   Weight: 77.1 kg (169 lb 15.6 oz)   Height: 5' 6" (1.676 m)        GENERAL: NAD  HEENT: normocephalic, non-icteric conjunctivae, moist oral mucosa  RESPIRATORY: bibasilar inspiratory crackles, no wheezing, rales or rhonchi, on RA  CARDIOVASCULAR: Regular rate and rhythm, no murmurs rubs or gallops.  SKIN: erythema over PIP and MCP of hands  MUSCULOSKELETAL: No clubbing or cyanosis; swelling of bilateral MCP, no pedal edema  NEUROLOGIC: AO ×3, no gross deficits    LABS:  Lab studies reviewed and notable for H/H 10/30.9, SEos , SCr 1.81, CO2 29, NTproBNP 3234    IMAGING:  Imaging reviewed and notable for CXR 02/2024 with bilateral patchy opacities and interstitium enhancement R>L that is progressed when compared to serial CXR from 2020 and 10/2022 CT Chest 03/2024 with no GGO or consolidation, linear scarring in LLL with associated mild cylindrical bronchiectasis      TTE:  02/2024:  LVEF 60%, normal RV size, TAPSE 2.05, " normal LA size, normal RA size, mild MR, mild TR, trace WV, trivial pericardial effusion, PASP 42  10/2022:  LVEF 70%, normal diastolic function, TAPSE 2.57, mild MR, moderate-to-severe TR, PASP 66, normal pericardium    LUNG FUNCTION TESTING:      SPIROMETRY/PFT:  02/2024 Pre Post   FVC 1.68/-1.64 1.77/-1.45   FEV1 1.19/-1.80 1.27/-1.59   FEV1/FVC 71/-0.55 72/-0.44   TLC 3.21/-3.31    FRC  2.03/-1.72    RV 1.42/-2.66    DLCO 8.84/-4.60    There is no obstruction, there is restriction, all lung volumes are reduced, there is a severe diffusion deficit; DLCO was not repeatable    ASSESSMENT & PLAN     1. Moderate persistent asthma without complication  Assessment & Plan:  89 yo F who is a life long non smoker presents for evaluation of shortness of breath. There is an ongoing concern for airway disease in this patient who has had respiratory distress a/w wheezing improving with inhaler therapy. She has chronic steroid therapy which may be contributing to her BD testing on PFTs; she otherwise has no obstruction and to this end she has no COPD. She was started on management of asthma with Symbicort BID and albuterol PRN. This will be continued for management of reactive airways.      2. Pulmonary hypertension  Assessment & Plan:  She has pulmonary hypertension implied by her serial TTE with interval improvement in severity of TR and thusly PASP to 42 from 66. CT Chest is unremarkable for DPLD with clear lung fields making serial CXR findings consistent with pulmonary edema. This is further supported with NTproBNP elevation with peak correlating with severe TR on TTE. Currently with good UOP on lasix therapy and euvolemic on exam and chest imaging. PFTs with restricton and severe diffusion deficit that is consistent with pulmonary vascular disease. Will hold on invasive diagnostics at this time upon discussion with family on initial consultation. She is well controlled with current diuretic therapy  - PH management with  diuretic regimen in this 89 yo with controlled symptoms   - decrease prednisone to 5 mg Qday for RA management; f/u with Rheumatology  - cont Lasix QDay      Other orders  -     predniSONE (DELTASONE) 2.5 MG tablet; Take 2 tablets (5 mg total) by mouth once daily.  Dispense: 90 tablet; Refill: 6             Follow up if symptoms worsen or fail to improve.      Case was discussed with patient; all questions were answered to patient's satisfaction and patient verbalized understanding.     Maribell Hernandez MD  Pulmonary Medicine  [facility]  Phone: [phone]

## 2024-03-20 NOTE — ASSESSMENT & PLAN NOTE
She has pulmonary hypertension implied by her serial TTE with interval improvement in severity of TR and thusly PASP to 42 from 66. CT Chest is unremarkable for DPLD with clear lung fields making serial CXR findings consistent with pulmonary edema. This is further supported with NTproBNP elevation with peak correlating with severe TR on TTE. Currently with good UOP on lasix therapy and euvolemic on exam and chest imaging. PFTs with restricton and severe diffusion deficit that is consistent with pulmonary vascular disease. Will hold on invasive diagnostics at this time upon discussion with family on initial consultation. She is well controlled with current diuretic therapy  - PH management with diuretic regimen in this 89 yo with controlled symptoms   - decrease prednisone to 5 mg Qday for RA management; f/u with Rheumatology  - cont Lasix QDay

## 2024-03-20 NOTE — ASSESSMENT & PLAN NOTE
89 yo F who is a life long non smoker presents for evaluation of shortness of breath. There is an ongoing concern for airway disease in this patient who has had respiratory distress a/w wheezing improving with inhaler therapy. She has chronic steroid therapy which may be contributing to her BD testing on PFTs; she otherwise has no obstruction and to this end she has no COPD. She was started on management of asthma with Symbicort BID and albuterol PRN. This will be continued for management of reactive airways.

## 2024-04-01 RX ORDER — HYDROCODONE BITARTRATE AND ACETAMINOPHEN 10; 325 MG/1; MG/1
1 TABLET ORAL EVERY 8 HOURS PRN
Qty: 90 TABLET | Refills: 0 | Status: SHIPPED | OUTPATIENT
Start: 2024-04-01 | End: 2024-04-25 | Stop reason: SDUPTHER

## 2024-04-01 RX ORDER — BUDESONIDE AND FORMOTEROL FUMARATE DIHYDRATE 80; 4.5 UG/1; UG/1
2 AEROSOL RESPIRATORY (INHALATION) 2 TIMES DAILY
Qty: 10 G | Refills: 13 | Status: ON HOLD | OUTPATIENT
Start: 2024-04-01 | End: 2024-04-30

## 2024-04-01 RX ORDER — PREDNISONE 2.5 MG/1
2.5 TABLET ORAL
Qty: 90 TABLET | Refills: 4 | Status: SHIPPED | OUTPATIENT
Start: 2024-04-01 | End: 2024-04-25 | Stop reason: DRUGHIGH

## 2024-04-03 ENCOUNTER — TELEPHONE (OUTPATIENT)
Dept: RHEUMATOLOGY | Facility: CLINIC | Age: 89
End: 2024-04-03
Payer: COMMERCIAL

## 2024-04-03 NOTE — TELEPHONE ENCOUNTER
Meghan Sherman sent patient in-basket message to notify them of cancellation of Rheumatology appointment due to Dr Price no longer practicing at [facility]. Patient was instructed to visit [email or web address] or call [phone] to work with our local Access to Care team to schedule an appointment with the provider who will best fit their needs.

## 2024-04-17 RX ORDER — HUMAN INSULIN 100 [IU]/ML
INJECTION, SUSPENSION SUBCUTANEOUS
Qty: 30 ML | Refills: 2 | Status: SHIPPED | OUTPATIENT
Start: 2024-04-17

## 2024-04-19 RX ORDER — HYDROCORTISONE 25 MG/G
CREAM TOPICAL 2 TIMES DAILY
Qty: 28 G | Refills: 12 | Status: SHIPPED | OUTPATIENT
Start: 2024-04-19

## 2024-04-25 ENCOUNTER — OFFICE VISIT (OUTPATIENT)
Dept: PRIMARY CARE CLINIC | Facility: CLINIC | Age: 89
End: 2024-04-25
Payer: COMMERCIAL

## 2024-04-25 VITALS
SYSTOLIC BLOOD PRESSURE: 130 MMHG | WEIGHT: 180 LBS | RESPIRATION RATE: 18 BRPM | DIASTOLIC BLOOD PRESSURE: 78 MMHG | BODY MASS INDEX: 28.93 KG/M2 | OXYGEN SATURATION: 95 % | HEART RATE: 89 BPM | HEIGHT: 66 IN

## 2024-04-25 DIAGNOSIS — G62.9 NEUROPATHY: Primary | ICD-10-CM

## 2024-04-25 DIAGNOSIS — N18.32 CHRONIC KIDNEY DISEASE, STAGE 3B: ICD-10-CM

## 2024-04-25 DIAGNOSIS — I10 ESSENTIAL (PRIMARY) HYPERTENSION: ICD-10-CM

## 2024-04-25 DIAGNOSIS — N18.32 TYPE 2 DIABETES MELLITUS WITH STAGE 3B CHRONIC KIDNEY DISEASE, WITHOUT LONG-TERM CURRENT USE OF INSULIN: ICD-10-CM

## 2024-04-25 DIAGNOSIS — J45.40 MODERATE PERSISTENT ASTHMA WITHOUT COMPLICATION: ICD-10-CM

## 2024-04-25 DIAGNOSIS — E11.22 TYPE 2 DIABETES MELLITUS WITH STAGE 3B CHRONIC KIDNEY DISEASE, WITHOUT LONG-TERM CURRENT USE OF INSULIN: ICD-10-CM

## 2024-04-25 PROCEDURE — 1101F PT FALLS ASSESS-DOCD LE1/YR: CPT | Mod: ,,, | Performed by: FAMILY MEDICINE

## 2024-04-25 PROCEDURE — 3288F FALL RISK ASSESSMENT DOCD: CPT | Mod: ,,, | Performed by: FAMILY MEDICINE

## 2024-04-25 PROCEDURE — 99214 OFFICE O/P EST MOD 30 MIN: CPT | Mod: 25,,, | Performed by: FAMILY MEDICINE

## 2024-04-25 PROCEDURE — 96372 THER/PROPH/DIAG INJ SC/IM: CPT | Mod: ,,, | Performed by: FAMILY MEDICINE

## 2024-04-25 RX ORDER — METOPROLOL SUCCINATE 25 MG/1
25 TABLET, EXTENDED RELEASE ORAL DAILY
Qty: 90 TABLET | Refills: 3 | Status: SHIPPED | OUTPATIENT
Start: 2024-04-25 | End: 2025-04-25

## 2024-04-25 RX ORDER — DEXAMETHASONE SODIUM PHOSPHATE 4 MG/ML
4 INJECTION, SOLUTION INTRA-ARTICULAR; INTRALESIONAL; INTRAMUSCULAR; INTRAVENOUS; SOFT TISSUE
Status: COMPLETED | OUTPATIENT
Start: 2024-04-25 | End: 2024-04-25

## 2024-04-25 RX ORDER — FERROUS SULFATE 325(65) MG
325 TABLET ORAL
Qty: 36 TABLET | Refills: 3 | Status: SHIPPED | OUTPATIENT
Start: 2024-04-26

## 2024-04-25 RX ORDER — PREDNISONE 5 MG/1
5 TABLET ORAL DAILY
Qty: 90 TABLET | Refills: 3 | Status: SHIPPED | OUTPATIENT
Start: 2024-04-25

## 2024-04-25 RX ORDER — METHYLPREDNISOLONE ACETATE 80 MG/ML
80 INJECTION, SUSPENSION INTRA-ARTICULAR; INTRALESIONAL; INTRAMUSCULAR; SOFT TISSUE
Status: COMPLETED | OUTPATIENT
Start: 2024-04-25 | End: 2024-04-25

## 2024-04-25 RX ORDER — KETOROLAC TROMETHAMINE 30 MG/ML
30 INJECTION, SOLUTION INTRAMUSCULAR; INTRAVENOUS
Status: DISCONTINUED | OUTPATIENT
Start: 2024-04-25 | End: 2024-04-25

## 2024-04-25 RX ORDER — AMLODIPINE BESYLATE 5 MG/1
5 TABLET ORAL DAILY
Qty: 90 TABLET | Refills: 3 | Status: SHIPPED | OUTPATIENT
Start: 2024-04-25

## 2024-04-25 RX ORDER — KETOROLAC TROMETHAMINE 30 MG/ML
30 INJECTION, SOLUTION INTRAMUSCULAR; INTRAVENOUS
Status: COMPLETED | OUTPATIENT
Start: 2024-04-25 | End: 2024-04-25

## 2024-04-25 RX ORDER — PREDNISONE 5 MG/1
5 TABLET ORAL DAILY
COMMUNITY
End: 2024-04-25 | Stop reason: SDUPTHER

## 2024-04-25 RX ORDER — HYDROCODONE BITARTRATE AND ACETAMINOPHEN 10; 325 MG/1; MG/1
1 TABLET ORAL EVERY 8 HOURS PRN
Qty: 90 TABLET | Refills: 0 | Status: SHIPPED | OUTPATIENT
Start: 2024-04-25 | End: 2024-06-17 | Stop reason: SDUPTHER

## 2024-04-25 RX ADMIN — KETOROLAC TROMETHAMINE 30 MG: 30 INJECTION, SOLUTION INTRAMUSCULAR; INTRAVENOUS at 11:04

## 2024-04-25 RX ADMIN — METHYLPREDNISOLONE ACETATE 80 MG: 80 INJECTION, SUSPENSION INTRA-ARTICULAR; INTRALESIONAL; INTRAMUSCULAR; SOFT TISSUE at 11:04

## 2024-04-25 RX ADMIN — DEXAMETHASONE SODIUM PHOSPHATE 4 MG: 4 INJECTION, SOLUTION INTRA-ARTICULAR; INTRALESIONAL; INTRAMUSCULAR; INTRAVENOUS; SOFT TISSUE at 11:04

## 2024-04-27 ENCOUNTER — HOSPITAL ENCOUNTER (INPATIENT)
Facility: HOSPITAL | Age: 89
LOS: 3 days | Discharge: HOME | DRG: 194 | End: 2024-04-30
Attending: EMERGENCY MEDICINE | Admitting: HOSPITALIST
Payer: COMMERCIAL

## 2024-04-27 DIAGNOSIS — I50.9 CONGESTIVE HEART FAILURE, UNSPECIFIED HF CHRONICITY, UNSPECIFIED HEART FAILURE TYPE: ICD-10-CM

## 2024-04-27 DIAGNOSIS — N18.32 TYPE 2 DIABETES MELLITUS WITH STAGE 3B CHRONIC KIDNEY DISEASE, WITHOUT LONG-TERM CURRENT USE OF INSULIN: ICD-10-CM

## 2024-04-27 DIAGNOSIS — E11.22 TYPE 2 DIABETES MELLITUS WITH STAGE 3B CHRONIC KIDNEY DISEASE, WITHOUT LONG-TERM CURRENT USE OF INSULIN: ICD-10-CM

## 2024-04-27 DIAGNOSIS — R13.19 ESOPHAGEAL DYSPHAGIA: ICD-10-CM

## 2024-04-27 DIAGNOSIS — R07.9 CHEST PAIN: ICD-10-CM

## 2024-04-27 DIAGNOSIS — R06.00 DYSPNEA: ICD-10-CM

## 2024-04-27 DIAGNOSIS — R06.02 SHORTNESS OF BREATH: ICD-10-CM

## 2024-04-27 DIAGNOSIS — I10 ESSENTIAL (PRIMARY) HYPERTENSION: ICD-10-CM

## 2024-04-27 DIAGNOSIS — J18.9 PNEUMONIA DUE TO INFECTIOUS ORGANISM, UNSPECIFIED LATERALITY, UNSPECIFIED PART OF LUNG: Primary | ICD-10-CM

## 2024-04-27 DIAGNOSIS — J98.01 BRONCHOSPASM: ICD-10-CM

## 2024-04-27 PROBLEM — J45.41 ACUTE EXACERBATION OF MODERATE PERSISTENT EXTRINSIC ASTHMA: Status: ACTIVE | Noted: 2024-04-27

## 2024-04-27 PROBLEM — N18.4 CKD (CHRONIC KIDNEY DISEASE), STAGE IV: Status: ACTIVE | Noted: 2024-04-27

## 2024-04-27 LAB
ALBUMIN SERPL BCP-MCNC: 3.7 G/DL (ref 3.5–5)
ALBUMIN/GLOB SERPL: 1.1 {RATIO}
ALP SERPL-CCNC: 136 U/L (ref 55–142)
ALT SERPL W P-5'-P-CCNC: 29 U/L (ref 13–56)
ANION GAP SERPL CALCULATED.3IONS-SCNC: 10 MMOL/L (ref 7–16)
AST SERPL W P-5'-P-CCNC: 25 U/L (ref 15–37)
BASOPHILS # BLD AUTO: 0.04 K/UL (ref 0–0.2)
BASOPHILS NFR BLD AUTO: 0.2 % (ref 0–1)
BILIRUB SERPL-MCNC: 0.5 MG/DL (ref ?–1.2)
BUN SERPL-MCNC: 30 MG/DL (ref 7–18)
BUN/CREAT SERPL: 15 (ref 6–20)
CALCIUM SERPL-MCNC: 9.8 MG/DL (ref 8.5–10.1)
CHLORIDE SERPL-SCNC: 106 MMOL/L (ref 98–107)
CO2 SERPL-SCNC: 27 MMOL/L (ref 21–32)
CREAT SERPL-MCNC: 1.95 MG/DL (ref 0.55–1.02)
DIFFERENTIAL METHOD BLD: ABNORMAL
EGFR (NO RACE VARIABLE) (RUSH/TITUS): 23 ML/MIN/1.73M2
EOSINOPHIL # BLD AUTO: 0.12 K/UL (ref 0–0.5)
EOSINOPHIL NFR BLD AUTO: 0.7 % (ref 1–4)
ERYTHROCYTE [DISTWIDTH] IN BLOOD BY AUTOMATED COUNT: 20.7 % (ref 11.5–14.5)
GLOBULIN SER-MCNC: 3.5 G/DL (ref 2–4)
GLUCOSE SERPL-MCNC: 122 MG/DL (ref 74–106)
GLUCOSE SERPL-MCNC: 191 MG/DL (ref 70–105)
GLUCOSE SERPL-MCNC: 289 MG/DL (ref 70–105)
GLUCOSE SERPL-MCNC: 372 MG/DL (ref 70–105)
GLUCOSE SERPL-MCNC: 393 MG/DL (ref 70–105)
HCO3 UR-SCNC: 27.8 MMOL/L (ref 21–28)
HCT VFR BLD AUTO: 36.4 % (ref 38–47)
HCT VFR BLD CALC: 37 % (ref 35–51)
HGB BLD-MCNC: 11.1 G/DL (ref 12–16)
IMM GRANULOCYTES # BLD AUTO: 0.08 K/UL (ref 0–0.04)
IMM GRANULOCYTES NFR BLD: 0.5 % (ref 0–0.4)
LDH SERPL L TO P-CCNC: 0.8 MMOL/L (ref 0.3–1.2)
LYMPHOCYTES # BLD AUTO: 0.96 K/UL (ref 1–4.8)
LYMPHOCYTES NFR BLD AUTO: 6 % (ref 27–41)
MAGNESIUM SERPL-MCNC: 2.7 MG/DL (ref 1.7–2.3)
MCH RBC QN AUTO: 25.3 PG (ref 27–31)
MCHC RBC AUTO-ENTMCNC: 30.5 G/DL (ref 32–36)
MCV RBC AUTO: 82.9 FL (ref 80–96)
MONOCYTES # BLD AUTO: 0.9 K/UL (ref 0–0.8)
MONOCYTES NFR BLD AUTO: 5.6 % (ref 2–6)
MPC BLD CALC-MCNC: 8.2 FL (ref 9.4–12.4)
NEUTROPHILS # BLD AUTO: 13.95 K/UL (ref 1.8–7.7)
NEUTROPHILS NFR BLD AUTO: 87 % (ref 53–65)
NRBC # BLD AUTO: 0 X10E3/UL
NRBC, AUTO (.00): 0 %
NT-PROBNP SERPL-MCNC: 1616 PG/ML (ref 1–450)
PCO2 BLDA: 41 MMHG (ref 35–48)
PH SMN: 7.44 [PH] (ref 7.35–7.45)
PLATELET # BLD AUTO: 297 K/UL (ref 150–400)
PO2 BLDA: 78 MMHG (ref 83–108)
POC BASE EXCESS: 3.3 MMOL/L (ref -2–3)
POC CO2: 29.1 MMOL/L
POC IONIZED CALCIUM: 1.15 MMOL/L (ref 1.15–1.35)
POC SATURATED O2: 96 % (ref 95–98)
POCT GLUCOSE: 128 MG/DL (ref 60–95)
POTASSIUM BLD-SCNC: 4 MMOL/L (ref 3.4–4.5)
POTASSIUM SERPL-SCNC: 3.9 MMOL/L (ref 3.5–5.1)
PROT SERPL-MCNC: 7.2 G/DL (ref 6.4–8.2)
RBC # BLD AUTO: 4.39 M/UL (ref 4.2–5.4)
SARS-COV-2 RDRP RESP QL NAA+PROBE: NEGATIVE
SODIUM BLD-SCNC: 135 MMOL/L (ref 136–145)
SODIUM SERPL-SCNC: 139 MMOL/L (ref 136–145)
TROPONIN I SERPL DL<=0.01 NG/ML-MCNC: 22.2 PG/ML
WBC # BLD AUTO: 16.05 K/UL (ref 4.5–11)

## 2024-04-27 PROCEDURE — 82330 ASSAY OF CALCIUM: CPT

## 2024-04-27 PROCEDURE — 87040 BLOOD CULTURE FOR BACTERIA: CPT | Performed by: EMERGENCY MEDICINE

## 2024-04-27 PROCEDURE — 25000242 PHARM REV CODE 250 ALT 637 W/ HCPCS: Performed by: HOSPITALIST

## 2024-04-27 PROCEDURE — 63600175 PHARM REV CODE 636 W HCPCS: Performed by: INTERNAL MEDICINE

## 2024-04-27 PROCEDURE — 85014 HEMATOCRIT: CPT

## 2024-04-27 PROCEDURE — 63600175 PHARM REV CODE 636 W HCPCS: Performed by: HOSPITALIST

## 2024-04-27 PROCEDURE — 80053 COMPREHEN METABOLIC PANEL: CPT | Performed by: EMERGENCY MEDICINE

## 2024-04-27 PROCEDURE — 25000003 PHARM REV CODE 250: Performed by: EMERGENCY MEDICINE

## 2024-04-27 PROCEDURE — 83880 ASSAY OF NATRIURETIC PEPTIDE: CPT | Performed by: EMERGENCY MEDICINE

## 2024-04-27 PROCEDURE — 25000003 PHARM REV CODE 250: Performed by: INTERNAL MEDICINE

## 2024-04-27 PROCEDURE — 63600175 PHARM REV CODE 636 W HCPCS: Performed by: EMERGENCY MEDICINE

## 2024-04-27 PROCEDURE — 87635 SARS-COV-2 COVID-19 AMP PRB: CPT | Performed by: INTERNAL MEDICINE

## 2024-04-27 PROCEDURE — 99285 EMERGENCY DEPT VISIT HI MDM: CPT | Mod: 25

## 2024-04-27 PROCEDURE — 93010 ELECTROCARDIOGRAM REPORT: CPT | Mod: ,,, | Performed by: HOSPITALIST

## 2024-04-27 PROCEDURE — 82803 BLOOD GASES ANY COMBINATION: CPT

## 2024-04-27 PROCEDURE — 84295 ASSAY OF SERUM SODIUM: CPT

## 2024-04-27 PROCEDURE — 25000242 PHARM REV CODE 250 ALT 637 W/ HCPCS: Performed by: EMERGENCY MEDICINE

## 2024-04-27 PROCEDURE — 83735 ASSAY OF MAGNESIUM: CPT | Performed by: EMERGENCY MEDICINE

## 2024-04-27 PROCEDURE — 25000242 PHARM REV CODE 250 ALT 637 W/ HCPCS: Performed by: INTERNAL MEDICINE

## 2024-04-27 PROCEDURE — 27000221 HC OXYGEN, UP TO 24 HOURS

## 2024-04-27 PROCEDURE — 99900035 HC TECH TIME PER 15 MIN (STAT)

## 2024-04-27 PROCEDURE — 11000001 HC ACUTE MED/SURG PRIVATE ROOM

## 2024-04-27 PROCEDURE — 85025 COMPLETE CBC W/AUTO DIFF WBC: CPT | Performed by: EMERGENCY MEDICINE

## 2024-04-27 PROCEDURE — 93005 ELECTROCARDIOGRAM TRACING: CPT

## 2024-04-27 PROCEDURE — 84484 ASSAY OF TROPONIN QUANT: CPT | Performed by: EMERGENCY MEDICINE

## 2024-04-27 PROCEDURE — 96375 TX/PRO/DX INJ NEW DRUG ADDON: CPT

## 2024-04-27 PROCEDURE — 82947 ASSAY GLUCOSE BLOOD QUANT: CPT

## 2024-04-27 PROCEDURE — 83605 ASSAY OF LACTIC ACID: CPT

## 2024-04-27 PROCEDURE — 94761 N-INVAS EAR/PLS OXIMETRY MLT: CPT

## 2024-04-27 PROCEDURE — 36415 COLL VENOUS BLD VENIPUNCTURE: CPT | Performed by: EMERGENCY MEDICINE

## 2024-04-27 PROCEDURE — 82962 GLUCOSE BLOOD TEST: CPT

## 2024-04-27 PROCEDURE — 99223 1ST HOSP IP/OBS HIGH 75: CPT | Mod: ,,, | Performed by: INTERNAL MEDICINE

## 2024-04-27 PROCEDURE — 99285 EMERGENCY DEPT VISIT HI MDM: CPT | Mod: ,,, | Performed by: EMERGENCY MEDICINE

## 2024-04-27 PROCEDURE — 96365 THER/PROPH/DIAG IV INF INIT: CPT

## 2024-04-27 PROCEDURE — 94640 AIRWAY INHALATION TREATMENT: CPT

## 2024-04-27 PROCEDURE — 84132 ASSAY OF SERUM POTASSIUM: CPT

## 2024-04-27 RX ORDER — SODIUM CHLORIDE 0.9 % (FLUSH) 0.9 %
10 SYRINGE (ML) INJECTION EVERY 12 HOURS PRN
Status: DISCONTINUED | OUTPATIENT
Start: 2024-04-27 | End: 2024-04-30 | Stop reason: HOSPADM

## 2024-04-27 RX ORDER — IPRATROPIUM BROMIDE AND ALBUTEROL SULFATE 2.5; .5 MG/3ML; MG/3ML
3 SOLUTION RESPIRATORY (INHALATION) 4 TIMES DAILY
Status: DISCONTINUED | OUTPATIENT
Start: 2024-04-27 | End: 2024-04-27

## 2024-04-27 RX ORDER — ACETAMINOPHEN 325 MG/1
650 TABLET ORAL EVERY 4 HOURS PRN
Status: DISCONTINUED | OUTPATIENT
Start: 2024-04-27 | End: 2024-04-30 | Stop reason: HOSPADM

## 2024-04-27 RX ORDER — INSULIN ASPART 100 [IU]/ML
0-10 INJECTION, SOLUTION INTRAVENOUS; SUBCUTANEOUS
Status: DISCONTINUED | OUTPATIENT
Start: 2024-04-27 | End: 2024-04-30 | Stop reason: HOSPADM

## 2024-04-27 RX ORDER — ACETAMINOPHEN 325 MG/1
650 TABLET ORAL
Status: COMPLETED | OUTPATIENT
Start: 2024-04-27 | End: 2024-04-27

## 2024-04-27 RX ORDER — BUDESONIDE 0.5 MG/2ML
0.5 INHALANT ORAL EVERY 12 HOURS
Status: DISCONTINUED | OUTPATIENT
Start: 2024-04-27 | End: 2024-04-30 | Stop reason: HOSPADM

## 2024-04-27 RX ORDER — IPRATROPIUM BROMIDE AND ALBUTEROL SULFATE 2.5; .5 MG/3ML; MG/3ML
3 SOLUTION RESPIRATORY (INHALATION)
Status: DISCONTINUED | OUTPATIENT
Start: 2024-04-27 | End: 2024-04-30 | Stop reason: HOSPADM

## 2024-04-27 RX ORDER — PREDNISONE 5 MG/1
5 TABLET ORAL DAILY
Status: DISCONTINUED | OUTPATIENT
Start: 2024-04-27 | End: 2024-04-30 | Stop reason: HOSPADM

## 2024-04-27 RX ORDER — METOPROLOL SUCCINATE 25 MG/1
25 TABLET, EXTENDED RELEASE ORAL DAILY
Status: DISCONTINUED | OUTPATIENT
Start: 2024-04-27 | End: 2024-04-30 | Stop reason: HOSPADM

## 2024-04-27 RX ORDER — IPRATROPIUM BROMIDE AND ALBUTEROL SULFATE 2.5; .5 MG/3ML; MG/3ML
3 SOLUTION RESPIRATORY (INHALATION)
Status: COMPLETED | OUTPATIENT
Start: 2024-04-27 | End: 2024-04-27

## 2024-04-27 RX ORDER — PROCHLORPERAZINE EDISYLATE 5 MG/ML
5 INJECTION INTRAMUSCULAR; INTRAVENOUS EVERY 6 HOURS PRN
Status: DISCONTINUED | OUTPATIENT
Start: 2024-04-27 | End: 2024-04-30 | Stop reason: HOSPADM

## 2024-04-27 RX ORDER — GLUCAGON 1 MG
1 KIT INJECTION
Status: DISCONTINUED | OUTPATIENT
Start: 2024-04-27 | End: 2024-04-30 | Stop reason: HOSPADM

## 2024-04-27 RX ORDER — FUROSEMIDE 10 MG/ML
40 INJECTION INTRAMUSCULAR; INTRAVENOUS EVERY 12 HOURS
Status: DISCONTINUED | OUTPATIENT
Start: 2024-04-27 | End: 2024-04-27

## 2024-04-27 RX ORDER — LEVOTHYROXINE SODIUM 100 UG/1
100 TABLET ORAL
Status: DISCONTINUED | OUTPATIENT
Start: 2024-04-27 | End: 2024-04-30 | Stop reason: HOSPADM

## 2024-04-27 RX ORDER — ONDANSETRON HYDROCHLORIDE 2 MG/ML
4 INJECTION, SOLUTION INTRAVENOUS EVERY 8 HOURS PRN
Status: DISCONTINUED | OUTPATIENT
Start: 2024-04-27 | End: 2024-04-30 | Stop reason: HOSPADM

## 2024-04-27 RX ORDER — NALOXONE HCL 0.4 MG/ML
0.02 VIAL (ML) INJECTION
Status: DISCONTINUED | OUTPATIENT
Start: 2024-04-27 | End: 2024-04-30 | Stop reason: HOSPADM

## 2024-04-27 RX ORDER — HYDROCODONE BITARTRATE AND ACETAMINOPHEN 10; 325 MG/1; MG/1
1 TABLET ORAL EVERY 8 HOURS PRN
Status: DISCONTINUED | OUTPATIENT
Start: 2024-04-27 | End: 2024-04-30 | Stop reason: HOSPADM

## 2024-04-27 RX ORDER — IBUPROFEN 200 MG
16 TABLET ORAL
Status: DISCONTINUED | OUTPATIENT
Start: 2024-04-27 | End: 2024-04-30 | Stop reason: HOSPADM

## 2024-04-27 RX ORDER — PANTOPRAZOLE SODIUM 40 MG/1
40 TABLET, DELAYED RELEASE ORAL 2 TIMES DAILY
Status: DISCONTINUED | OUTPATIENT
Start: 2024-04-27 | End: 2024-04-30 | Stop reason: HOSPADM

## 2024-04-27 RX ORDER — HEPARIN SODIUM 5000 [USP'U]/ML
5000 INJECTION, SOLUTION INTRAVENOUS; SUBCUTANEOUS EVERY 8 HOURS
Status: DISCONTINUED | OUTPATIENT
Start: 2024-04-27 | End: 2024-04-28

## 2024-04-27 RX ORDER — IPRATROPIUM BROMIDE AND ALBUTEROL SULFATE 2.5; .5 MG/3ML; MG/3ML
3 SOLUTION RESPIRATORY (INHALATION) EVERY 6 HOURS
Status: DISCONTINUED | OUTPATIENT
Start: 2024-04-27 | End: 2024-04-27

## 2024-04-27 RX ORDER — AMLODIPINE BESYLATE 5 MG/1
5 TABLET ORAL DAILY
Status: DISCONTINUED | OUTPATIENT
Start: 2024-04-27 | End: 2024-04-30 | Stop reason: HOSPADM

## 2024-04-27 RX ORDER — HYDRALAZINE HYDROCHLORIDE 50 MG/1
50 TABLET, FILM COATED ORAL 2 TIMES DAILY
Status: DISCONTINUED | OUTPATIENT
Start: 2024-04-27 | End: 2024-04-30 | Stop reason: HOSPADM

## 2024-04-27 RX ORDER — METHYLPREDNISOLONE SOD SUCC 125 MG
125 VIAL (EA) INJECTION
Status: COMPLETED | OUTPATIENT
Start: 2024-04-27 | End: 2024-04-27

## 2024-04-27 RX ORDER — IBUPROFEN 200 MG
24 TABLET ORAL
Status: DISCONTINUED | OUTPATIENT
Start: 2024-04-27 | End: 2024-04-30 | Stop reason: HOSPADM

## 2024-04-27 RX ORDER — BISACODYL 10 MG/1
10 SUPPOSITORY RECTAL DAILY PRN
Status: DISCONTINUED | OUTPATIENT
Start: 2024-04-27 | End: 2024-04-30 | Stop reason: HOSPADM

## 2024-04-27 RX ADMIN — FUROSEMIDE 40 MG: 10 INJECTION, SOLUTION INTRAVENOUS at 12:04

## 2024-04-27 RX ADMIN — LEVOTHYROXINE SODIUM 100 MCG: 100 TABLET ORAL at 06:04

## 2024-04-27 RX ADMIN — HYDRALAZINE HYDROCHLORIDE 50 MG: 50 TABLET ORAL at 09:04

## 2024-04-27 RX ADMIN — IPRATROPIUM BROMIDE AND ALBUTEROL SULFATE 3 ML: .5; 3 SOLUTION RESPIRATORY (INHALATION) at 04:04

## 2024-04-27 RX ADMIN — PANTOPRAZOLE SODIUM 40 MG: 40 TABLET, DELAYED RELEASE ORAL at 12:04

## 2024-04-27 RX ADMIN — HEPARIN SODIUM 5000 UNITS: 5000 INJECTION, SOLUTION INTRAVENOUS; SUBCUTANEOUS at 03:04

## 2024-04-27 RX ADMIN — INSULIN ASPART 10 UNITS: 100 INJECTION, SOLUTION INTRAVENOUS; SUBCUTANEOUS at 04:04

## 2024-04-27 RX ADMIN — ACETAMINOPHEN 650 MG: 325 TABLET ORAL at 09:04

## 2024-04-27 RX ADMIN — HEPARIN SODIUM 5000 UNITS: 5000 INJECTION, SOLUTION INTRAVENOUS; SUBCUTANEOUS at 06:04

## 2024-04-27 RX ADMIN — INSULIN ASPART 3 UNITS: 100 INJECTION, SOLUTION INTRAVENOUS; SUBCUTANEOUS at 09:04

## 2024-04-27 RX ADMIN — INSULIN DETEMIR 30 UNITS: 100 INJECTION, SOLUTION SUBCUTANEOUS at 09:04

## 2024-04-27 RX ADMIN — HYDROCODONE BITARTRATE AND ACETAMINOPHEN 1 TABLET: 10; 325 TABLET ORAL at 06:04

## 2024-04-27 RX ADMIN — AZITHROMYCIN MONOHYDRATE 500 MG: 500 INJECTION, POWDER, LYOPHILIZED, FOR SOLUTION INTRAVENOUS at 06:04

## 2024-04-27 RX ADMIN — INSULIN ASPART 10 UNITS: 100 INJECTION, SOLUTION INTRAVENOUS; SUBCUTANEOUS at 12:04

## 2024-04-27 RX ADMIN — BUDESONIDE 0.5 MG: 0.5 INHALANT RESPIRATORY (INHALATION) at 08:04

## 2024-04-27 RX ADMIN — METOPROLOL SUCCINATE 25 MG: 25 TABLET, EXTENDED RELEASE ORAL at 12:04

## 2024-04-27 RX ADMIN — HEPARIN SODIUM 5000 UNITS: 5000 INJECTION, SOLUTION INTRAVENOUS; SUBCUTANEOUS at 09:04

## 2024-04-27 RX ADMIN — INSULIN ASPART 2 UNITS: 100 INJECTION, SOLUTION INTRAVENOUS; SUBCUTANEOUS at 07:04

## 2024-04-27 RX ADMIN — IPRATROPIUM BROMIDE AND ALBUTEROL SULFATE 3 ML: .5; 3 SOLUTION RESPIRATORY (INHALATION) at 06:04

## 2024-04-27 RX ADMIN — IPRATROPIUM BROMIDE AND ALBUTEROL SULFATE 3 ML: .5; 3 SOLUTION RESPIRATORY (INHALATION) at 12:04

## 2024-04-27 RX ADMIN — IPRATROPIUM BROMIDE AND ALBUTEROL SULFATE 3 ML: .5; 3 SOLUTION RESPIRATORY (INHALATION) at 02:04

## 2024-04-27 RX ADMIN — IPRATROPIUM BROMIDE AND ALBUTEROL SULFATE 3 ML: 2.5; .5 SOLUTION RESPIRATORY (INHALATION) at 08:04

## 2024-04-27 RX ADMIN — PANTOPRAZOLE SODIUM 40 MG: 40 TABLET, DELAYED RELEASE ORAL at 09:04

## 2024-04-27 RX ADMIN — AMLODIPINE BESYLATE 5 MG: 5 TABLET ORAL at 12:04

## 2024-04-27 RX ADMIN — HYDRALAZINE HYDROCHLORIDE 50 MG: 50 TABLET ORAL at 12:04

## 2024-04-27 RX ADMIN — CEFTRIAXONE SODIUM 2 G: 2 INJECTION, POWDER, FOR SOLUTION INTRAMUSCULAR; INTRAVENOUS at 03:04

## 2024-04-27 RX ADMIN — PREDNISONE 5 MG: 5 TABLET ORAL at 12:04

## 2024-04-27 RX ADMIN — METHYLPREDNISOLONE SODIUM SUCCINATE 60 MG: 40 INJECTION, POWDER, FOR SOLUTION INTRAMUSCULAR; INTRAVENOUS at 06:04

## 2024-04-27 RX ADMIN — ACETAMINOPHEN 650 MG: 325 TABLET ORAL at 02:04

## 2024-04-27 RX ADMIN — METHYLPREDNISOLONE SODIUM SUCCINATE 60 MG: 40 INJECTION, POWDER, FOR SOLUTION INTRAMUSCULAR; INTRAVENOUS at 12:04

## 2024-04-27 RX ADMIN — METHYLPREDNISOLONE SODIUM SUCCINATE 125 MG: 125 INJECTION, POWDER, FOR SOLUTION INTRAMUSCULAR; INTRAVENOUS at 02:04

## 2024-04-27 NOTE — ASSESSMENT & PLAN NOTE
Patient developed acute exacerbation of moderate persistent extrinsic asthma secondary to community-acquired pneumonia.  Patient will be started on DuoNeb with budesonide, empiric antibiotics along with IV steroid

## 2024-04-27 NOTE — H&P
[facility] - Emergency Department  Hospital Medicine  History & Physical    Patient Name: Vandana Allison  MRN: [id number]  Patient Class: Emergency  Admission Date: 4/27/2024  Attending Physician:  ELIDA Garcia MD  Primary Care Provider: Cm Larson III, DO         Patient information was obtained from relative(s) and ER records.     Subjective:     Principal Problem:Pneumonia due to infectious organism    Chief Complaint:   Chief Complaint   Patient presents with    Shortness of Breath     Pt c/o SOB and wheezing that started tonight when she woke up. Pt denies CP.         HPI: Patient is a 89-year-old female with a history of moderate persistent asthma, rheumatoid arthritis on prednisone, hypothyroidism, type 2 diabetes with CKD stage 4,  and pulmonary artery hypertension who presented to the emergency room complaining of shortness a breath with wheezing since yesterday.  As per patient's daughter, patient was in her usual state of health until yesterday when she developed dyspnea with wheezing.  She also noticed bilateral lower extremity edema as well for the past few days.  Otherwise patient's daughter denied any associated symptoms such as fever chills cough chest pain palpitation PND or orthopnea.    On presentation, patient was slightly hypoxic on room air but vital signs were otherwise stable and patient was afebrile.  Physical examination was notable for expiratory wheezing and bilateral lower extremity edema.  Initial workup was notable for a presence of CKD stage 4 with serum creatinine of 1.95 which is stable from her baseline, nonfasting blood glucose of 122, leukocytosis with left shift, and chest x-ray demonstrating diffuse bilateral infiltrates suggestive of pneumonia +/- pulmonary edema.  Patient will be admitted for further evaluation and intervention    Past Medical History:   Diagnosis Date    Arthritis     Bleeding external hemorrhoids     Chronic kidney disease, stage 3b      baseline creat 1.5    COPD (chronic obstructive pulmonary disease)     senile emphysema    Diabetes mellitus, type 2     DNR (do not resuscitate) 02/07/2024    discussed with RN Stephenie Christianson present    Essential (primary) hypertension     Hiatal hernia with GERD     Neuropathy     Post-operative hypothyroidism     Severe pulmonary hypertension 10/08/2022    EF  70 % and normal diastolic function       Past Surgical History:   Procedure Laterality Date    BREAST SURGERY      Biopsy    EYE SURGERY      Remove cataracts both eyes    HYSTERECTOMY      NEPHRECTOMY Right 1960    THYROIDECTOMY         Review of patient's allergies indicates:   Allergen Reactions    Aspirin        Current Facility-Administered Medications   Medication Dose Route Frequency Provider Last Rate Last Admin    amLODIPine tablet 5 mg  5 mg Oral Daily Fermin López MD        hydrALAZINE tablet 50 mg  50 mg Oral BID Fermin López MD        HYDROcodone-acetaminophen  mg per tablet 1 tablet  1 tablet Oral Q8H PRN Fermin López MD        levothyroxine tablet 100 mcg  100 mcg Oral Before breakfast Fermin López MD        metoprolol succinate (TOPROL-XL) 24 hr tablet 25 mg  25 mg Oral Daily Fermin López MD        pantoprazole EC tablet 40 mg  40 mg Oral BID Fermin López MD        predniSONE tablet 5 mg  5 mg Oral Daily Fermin López MD         Current Outpatient Medications   Medication Sig Dispense Refill    acetaminophen (TYLENOL) 650 MG TbSR Take 650 mg by mouth every 8 (eight) hours. (Patient not taking: Reported on 4/25/2024)      albuterol (PROVENTIL/VENTOLIN HFA) 90 mcg/actuation inhaler Inhale 2 puffs into the lungs every 4 (four) hours as needed. Rescue 18 g 12    amLODIPine (NORVASC) 5 MG tablet Take 1 tablet (5 mg total) by mouth once daily. 90 tablet 3    budesonide-formoterol 80-4.5 mcg (BREYNA) 80-4.5 mcg/actuation HFAA Inhale 2 puffs into the lungs 2 (two) times daily. 10 g 13    cholecalciferol, vitamin D3, (VITAMIN D3) 125 mcg (5,000 unit) Tab  Take 5,000 Units by mouth once daily.      cyanocobalamin, vitamin B-12, 500 mcg Subl Place 1 tablet under the tongue once daily.      empagliflozin (JARDIANCE) 10 mg tablet Take 1 tablet (10 mg total) by mouth once daily. HOLD THIS MEDICATION UNTIL YOU FOLLOW UP WITH YOUR PCP. 90 tablet 3    ferrous sulfate (FEOSOL) 325 mg (65 mg iron) Tab tablet Take 1 tablet (325 mg total) by mouth 3 (three) times a week. On Monday, Wednesday and Friday 36 tablet 3    furosemide (LASIX) 20 MG tablet Take 1 tablet (20 mg total) by mouth once daily. (Patient taking differently: Take 20 mg by mouth 2 (two) times a day.) 90 tablet 3    gabapentin (NEURONTIN) 400 MG capsule TAKE 1 CAPSULE BY MOUTH FOUR TIMES A DAY AS NEEDED 360 capsule 4    hydrALAZINE (APRESOLINE) 25 MG tablet Take 2 tablets (50 mg total) by mouth 2 (two) times daily.      HYDROcodone-acetaminophen (NORCO)  mg per tablet Take 1 tablet by mouth every 8 (eight) hours as needed for Pain. 90 tablet 0    hydrocortisone (ANUSOL-HC) 2.5 % rectal cream Place rectally 2 (two) times daily. 28 g 12    insulin syringe-needle U-100 1 mL 30 gauge x 5/16 Syrg 1 Syringe by Misc.(Non-Drug; Combo Route) route 2 (two) times daily. 100 each 3    lactulose (CHRONULAC) 10 gram/15 mL solution Take 30 mLs (20 g total) by mouth once daily. 946 mL 11    levothyroxine (SYNTHROID) 100 MCG tablet Take 1 tablet (100 mcg total) by mouth before breakfast. 90 tablet 3    magnesium citrate 100 mg Tab Take 400 mg by mouth as needed.      methocarbamoL (ROBAXIN) 750 MG Tab TAKE 2 TABLETS BY MOUTH 4 TIMES A DAY AS NEEDED FOR MUSCLE SPASMS  Strength: 750 mg (Patient taking differently: Take 750 mg by mouth once daily. TAKE 2 TABLETS BY MOUTH 4 TIMES A DAY AS NEEDED FOR MUSCLE SPASMS  Strength: 750 mg) 240 tablet 1    metoprolol succinate (TOPROL-XL) 25 MG 24 hr tablet Take 1 tablet (25 mg total) by mouth once daily. 90 tablet 3    NOVOLIN N NPH U-100 INSULIN 100 unit/mL injection INJECT 40 UNITS  INTO THE SKIN TWICE A DAY (Patient taking differently: Inject into the skin. Self adjust dose) 30 mL 2    omega-3 fatty acids/fish oil (FISH OIL-OMEGA-3 FATTY ACIDS) 300-1,000 mg capsule Take 1 capsule by mouth once daily.      pantoprazole (PROTONIX) 40 MG tablet TAKE 1 TABLET BY MOUTH TWICE A  tablet 3    predniSONE (DELTASONE) 5 MG tablet Take 1 tablet (5 mg total) by mouth once daily. 90 tablet 3    traMADoL (ULTRAM) 50 mg tablet TAKE 1 TABLET BY MOUTH FOUR TIMES A  tablet 4     Family History       Problem Relation (Age of Onset)    Cancer Sister, Brother, Daughter    Diabetes Mother, Sister, Brother, Sister    Heart disease Father          Tobacco Use    Smoking status: Never    Smokeless tobacco: Never   Substance and Sexual Activity    Alcohol use: Never    Drug use: Never    Sexual activity: Not Currently     Birth control/protection: None     Review of Systems   Constitutional:  Negative for chills and fever.   HENT:  Negative for postnasal drip and rhinorrhea.    Eyes:  Negative for itching.   Respiratory:  Positive for shortness of breath and wheezing.    Cardiovascular:  Positive for leg swelling. Negative for chest pain and palpitations.   Gastrointestinal:  Negative for abdominal distention, abdominal pain, diarrhea, nausea and vomiting.   Endocrine: Negative for cold intolerance, heat intolerance, polydipsia, polyphagia and polyuria.   Genitourinary:  Negative for dysuria and hematuria.   Musculoskeletal:  Negative for arthralgias, joint swelling, myalgias, neck pain and neck stiffness.   Skin:  Negative for pallor and rash.   Allergic/Immunologic: Negative for environmental allergies, food allergies and immunocompromised state.   Neurological:  Negative for dizziness, seizures, facial asymmetry, light-headedness and numbness.     Objective:     Vital Signs (Most Recent):  Temp: 98.8 °F (37.1 °C) (04/27/24 0338)  Pulse: 92 (04/27/24 0529)  Resp: 19 (04/27/24 0529)  BP: 138/79 (04/27/24  0529)  SpO2: (!) 93 % (04/27/24 0529) Vital Signs (24h Range):  Temp:  [98.8 °F (37.1 °C)-100 °F (37.8 °C)] 98.8 °F (37.1 °C)  Pulse:  [] 92  Resp:  [16-28] 19  SpO2:  [85 %-97 %] 93 %  BP: (138-192)/() 138/79     Weight: 81.6 kg (179 lb 14.3 oz)  Body mass index is 29.04 kg/m².     Physical Exam  Vitals reviewed.   Constitutional:       General: She is not in acute distress.     Appearance: Normal appearance.   HENT:      Head: Normocephalic and atraumatic.      Right Ear: External ear normal.      Left Ear: External ear normal.   Eyes:      Extraocular Movements: Extraocular movements intact.      Pupils: Pupils are equal, round, and reactive to light.   Cardiovascular:      Rate and Rhythm: Normal rate and regular rhythm.      Pulses: Normal pulses.      Heart sounds: Normal heart sounds. No murmur heard.  Pulmonary:      Effort: Pulmonary effort is normal. No respiratory distress.      Breath sounds: Wheezing present.   Chest:      Chest wall: No tenderness.   Abdominal:      General: Abdomen is flat.      Palpations: Abdomen is soft. There is no mass.      Tenderness: There is no abdominal tenderness. There is no right CVA tenderness or left CVA tenderness.   Musculoskeletal:         General: No swelling or tenderness. Normal range of motion.      Right lower leg: Edema present.      Left lower leg: Edema present.   Skin:     General: Skin is warm and dry.      Capillary Refill: Capillary refill takes less than 2 seconds.   Neurological:      General: No focal deficit present.      Mental Status: She is alert and oriented to person, place, and time. Mental status is at baseline.   Psychiatric:         Mood and Affect: Mood normal.         Thought Content: Thought content normal.               Significant Labs: All pertinent labs within the past 24 hours have been reviewed.    Significant Imaging: I have reviewed all pertinent imaging results/findings within the past 24 hours.  Assessment/Plan:     *  Pneumonia due to infectious organism    Patient will be started on empiric antibiotics consisting of Rocephin and Zithromax      Acute exacerbation of moderate persistent extrinsic asthma    Patient developed acute exacerbation of moderate persistent extrinsic asthma secondary to community-acquired pneumonia.  Patient will be started on DuoNeb with budesonide, empiric antibiotics along with IV steroid      CKD (chronic kidney disease), stage IV    Creatine stable for now. BMP reviewed- noted Estimated Creatinine Clearance: 17.8 mL/min (A) (based on SCr of 1.95 mg/dL (H)). according to latest data. 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  Monitor UOP and serial BMP and adjust therapy as needed. Renally dose meds. Avoid nephrotoxic medications and procedures.    Pulmonary hypertension    Patient has a pulmonary hypertension with PASP of 42 and has been maintained on Lasix 20 mg p.o. b.i.d. However, it appeared that patient has developed acute decompensation of pulmonary hypertension.  Will start patient on Lasix 40 mg IV b.i.d.      Essential (primary) hypertension    Chronic, controlled. Latest blood pressure and vitals reviewed-     Temp:  [98.8 °F (37.1 °C)-100 °F (37.8 °C)]   Pulse:  []   Resp:  [16-28]   BP: (138-192)/()   SpO2:  [85 %-97 %] .   Home meds for hypertension were reviewed and noted below.   Hypertension Medications               amLODIPine (NORVASC) 5 MG tablet Take 1 tablet (5 mg total) by mouth once daily.    furosemide (LASIX) 20 MG tablet Take 1 tablet (20 mg total) by mouth once daily.    hydrALAZINE (APRESOLINE) 25 MG tablet Take 2 tablets (50 mg total) by mouth 2 (two) times daily.    metoprolol succinate (TOPROL-XL) 25 MG 24 hr tablet Take 1 tablet (25 mg total) by mouth once daily.            While in the hospital, will manage blood pressure as follows; Continue home antihypertensive regimen    Will utilize p.r.n. blood pressure medication only if patient's blood  "pressure greater than 180/110 and she develops symptoms such as worsening chest pain or shortness of breath.      Rheumatoid arthritis    Continue present management with prednisone 5 mg daily.  Do not feel the stress dose steroid is required at this time      Diabetes mellitus, type 2    Patient's FSGs are controlled on current medication regimen.  Last A1c reviewed-   Lab Results   Component Value Date    HGBA1C 7.7 (H) 02/07/2024     Most recent fingerstick glucose reviewed- No results for input(s): "POCTGLUCOSE" in the last 24 hours.  Current correctional scale  Low  Maintain anti-hyperglycemic dose as follows-   Antihyperglycemics (From admission, onward)      Low intensity sliding scale insulin          Hold Oral hypoglycemics while patient is in the hospital.        VTE Risk Mitigation (From admission, onward)      Heparin 5000 units subQ q.8 hours                            Fermin López MD  Department of Hospital Medicine  [facility] - Emergency Department          "

## 2024-04-27 NOTE — ASSESSMENT & PLAN NOTE
"  Patient's FSGs are controlled on current medication regimen.  Last A1c reviewed-   Lab Results   Component Value Date    HGBA1C 7.7 (H) 02/07/2024     Most recent fingerstick glucose reviewed- No results for input(s): "POCTGLUCOSE" in the last 24 hours.  Current correctional scale  Low  Maintain anti-hyperglycemic dose as follows-   Antihyperglycemics (From admission, onward)      Low intensity sliding scale insulin          Hold Oral hypoglycemics while patient is in the hospital.    "

## 2024-04-27 NOTE — ED PROVIDER NOTES
Encounter Date: 4/27/2024       History     Chief Complaint   Patient presents with    Shortness of Breath     Pt c/o SOB and wheezing that started tonight when she woke up. Pt denies CP.      89 Y/O FEMALE WITH DYSPNEA THAT STARTED EARLIER YESTERDAY.  SHE HAS SOME COUGH.  SHE ALSO HAS LOWER EXTREMITY EDEMA.  DYSPNEA IS WORSE WITH ANY EXERTION AND WITH LAYING FLAT.        Review of patient's allergies indicates:   Allergen Reactions    Aspirin      Past Medical History:   Diagnosis Date    Arthritis     Bleeding external hemorrhoids     Chronic kidney disease, stage 3b     baseline creat 1.5    COPD (chronic obstructive pulmonary disease)     senile emphysema    Diabetes mellitus, type 2     DNR (do not resuscitate) 02/07/2024    discussed with VITO Christianson present    Essential (primary) hypertension     Hiatal hernia with GERD     Neuropathy     Post-operative hypothyroidism     Severe pulmonary hypertension 10/08/2022    EF  70 % and normal diastolic function     Past Surgical History:   Procedure Laterality Date    BREAST SURGERY      Biopsy    EYE SURGERY      Remove cataracts both eyes    HYSTERECTOMY      NEPHRECTOMY Right 1960    THYROIDECTOMY       Family History   Problem Relation Name Age of Onset    Diabetes Mother Monie Inman         Diabetic    Heart disease Father Lauro Inman     Diabetes Sister Marino Correia     Cancer Sister Marino Correia         Breast cancer    Diabetes Brother Shree Inman     Cancer Brother Shree Inman         Prostate cancer    Cancer Daughter Radha Allison         Uterine cancer    Diabetes Sister Dafne Urena         Diabetic     Social History     Tobacco Use    Smoking status: Never    Smokeless tobacco: Never   Substance Use Topics    Alcohol use: Never    Drug use: Never     Review of Systems   All other systems reviewed and are negative.      Physical Exam     Initial Vitals [04/27/24 0232]   BP Pulse Resp Temp SpO2   (!) 192/102 105 20 100 °F (37.8 °C) (!) 91  %      MAP       --         Physical Exam    Nursing note and vitals reviewed.  Constitutional: She appears well-developed and well-nourished.   HENT:   Head: Normocephalic and atraumatic.   Nose: Nose normal.   Eyes: Conjunctivae and EOM are normal. Pupils are equal, round, and reactive to light.   Neck: Neck supple.   Normal range of motion.  Cardiovascular:  Normal rate, regular rhythm and normal heart sounds.           Pulmonary/Chest: She has wheezes.   Abdominal: Abdomen is soft. Bowel sounds are normal.   Musculoskeletal:         General: Edema present.      Cervical back: Normal range of motion and neck supple.     Neurological: She is alert and oriented to person, place, and time. She has normal strength. GCS score is 15. GCS eye subscore is 4. GCS verbal subscore is 5. GCS motor subscore is 6.   Skin: Skin is warm and dry. Capillary refill takes less than 2 seconds.   Psychiatric: She has a normal mood and affect.         Medical Screening Exam   See Full Note    ED Course   Procedures  Labs Reviewed   COMPREHENSIVE METABOLIC PANEL - Abnormal; Notable for the following components:       Result Value    Glucose 122 (*)     BUN 30 (*)     Creatinine 1.95 (*)     eGFR 23 (*)     All other components within normal limits   NT-PRO NATRIURETIC PEPTIDE - Abnormal; Notable for the following components:    ProBNP 1,616 (*)     All other components within normal limits   MAGNESIUM - Abnormal; Notable for the following components:    Magnesium 2.7 (*)     All other components within normal limits   CBC WITH DIFFERENTIAL - Abnormal; Notable for the following components:    WBC 16.05 (*)     Hemoglobin 11.1 (*)     Hematocrit 36.4 (*)     MCH 25.3 (*)     MCHC 30.5 (*)     RDW 20.7 (*)     MPV 8.2 (*)     Neutrophils % 87.0 (*)     Lymphocytes % 6.0 (*)     Eosinophils % 0.7 (*)     Immature Granulocytes % 0.5 (*)     Neutrophils, Abs 13.95 (*)     Lymphocytes, Absolute 0.96 (*)     Monocytes, Absolute 0.90 (*)      Immature Granulocytes, Absolute 0.08 (*)     All other components within normal limits   TROPONIN I - Normal   CULTURE, BLOOD   CULTURE, BLOOD   CBC W/ AUTO DIFFERENTIAL    Narrative:     The following orders were created for panel order CBC auto differential.  Procedure                               Abnormality         Status                     ---------                               -----------         ------                     CBC with Differential[[id number]]       Abnormal            Final result                 Please view results for these tests on the individual orders.   EXTRA TUBES    Narrative:     The following orders were created for panel order EXTRA TUBES.  Procedure                               Abnormality         Status                     ---------                               -----------         ------                     Light Blue Top Hold[[id number]]                             In process                   Please view results for these tests on the individual orders.   LIGHT BLUE TOP HOLD   EXTRA TUBES    Narrative:     The following orders were created for panel order EXTRA TUBES.  Procedure                               Abnormality         Status                     ---------                               -----------         ------                     Gold Top Hold[[id number]]                                   In process                   Please view results for these tests on the individual orders.   GOLD TOP HOLD          Imaging Results              X-Ray Chest AP Portable (In process)                      Medications   methylPREDNISolone sodium succinate injection 125 mg (125 mg Intravenous Given 4/27/24 0239)   albuterol-ipratropium 2.5 mg-0.5 mg/3 mL nebulizer solution 3 mL (3 mLs Nebulization Given 4/27/24 0237)   acetaminophen tablet 650 mg (650 mg Oral Given 4/27/24 0246)   cefTRIAXone (ROCEPHIN) 2 g in dextrose 5 % in water (D5W) 100 mL IVPB (MB+) (0 g Intravenous Stopped  4/27/24 0356)   albuterol-ipratropium 2.5 mg-0.5 mg/3 mL nebulizer solution 3 mL (3 mLs Nebulization Given 4/27/24 0400)     Medical Decision Making  DYSPNEA AND EDEMA    DDX:  CHF VS PE VS PNEUMONIA VS OTHER    ADMIT    Amount and/or Complexity of Data Reviewed  Labs: ordered. Decision-making details documented in ED Course.  Radiology: ordered. Decision-making details documented in ED Course.  ECG/medicine tests: ordered. Decision-making details documented in ED Course.  Discussion of management or test interpretation with external provider(s): DISCUSSED WITH ADMITTING SERVICE    Risk  OTC drugs.  Prescription drug management.  Decision regarding hospitalization.                                      Clinical Impression:   Final diagnoses:  [R06.00] Dyspnea  [J18.9] Pneumonia due to infectious organism, unspecified laterality, unspecified part of lung (Primary)  [I50.9] Congestive heart failure, unspecified HF chronicity, unspecified heart failure type  [J98.01] Bronchospasm        ED Disposition Condition    Admit Stable                John Calvin MD  05/29/24 5052

## 2024-04-27 NOTE — PROGRESS NOTES
Patient seen earlier today by Dr. López and charged.    Records reviewed.  Ms. Allison  is a 89-year-old (seems much younger and very sharp mentally) presented after awakening during the night short of breath.  States has done this before.  Has history GERD for which she tries not eat before going to bed and sleeps on 2 pillows.  Has had no fever, chest pain or palpitations.  Noted to be hypoxic in the emergency department with some expiratory wheeze and was admitted to hospitalist service for observation.  Currently patient feels back to baseline and does not feel she is having any issues with breathing or congestion.       Nurse reports history of patient having some small amount of bright blood per rectum earlier today since admission.      Patient had upper and lower endoscopy 2022 by Dr. Keon Wood  showed gastric polyps, diffuse diverticulosis and at that time inflamed external hemorrhoids.  Patient does complain of some current dysphagia to solids in lower esophagus area.  Denies issues with constipation.       Will monitor for stability.  Will discuss with GI.     Past medical history:  -diabetes mellitus type 2  -essential hypertension  -chronic kidney disease stage 4  -GERD with hiatal hernia  -hypothyroidism  -mild-to-moderate pulmonary hypertension with recent echocardiogram showing pulmonary artery systolic pressure 42 mm Hg    Addendum:  Reviewed chest x-ray which shows more likely chronic infiltrates.  Infiltrates present more than a year in reviewing old films.  Current x-ray looks much better than 1 done in February this year with similar infiltrates.  Of note CT scan done in February did not look as bad expected based on chest x-ray.  Has what appears to be small effusion in left lung base.  Effusion not noted on CT scan in February this year    KUB shows increased stool especially in rectum.     Talk with patient and granddaughter about elevating her head of bed on blocks about 5 in and  continuing not to eat 4-6 hours before bedtime.

## 2024-04-27 NOTE — ASSESSMENT & PLAN NOTE
Continue present management with prednisone 5 mg daily.  Do not feel the stress dose steroid is required at this time

## 2024-04-27 NOTE — HPI
Patient is a 89-year-old female with a history of moderate persistent asthma, rheumatoid arthritis on prednisone, hypothyroidism, type 2 diabetes with CKD stage 4,  and pulmonary artery hypertension who presented to the emergency room complaining of shortness a breath with wheezing since yesterday.  As per patient's daughter, patient was in her usual state of health until yesterday when she developed dyspnea with wheezing.  She also noticed bilateral lower extremity edema as well for the past few days.  Otherwise patient's daughter denied any associated symptoms such as fever chills cough chest pain palpitation PND or orthopnea.    On presentation, patient was slightly hypoxic on room air but vital signs were otherwise stable and patient was afebrile.  Physical examination was notable for expiratory wheezing and bilateral lower extremity edema.  Initial workup was notable for a presence of CKD stage 4 with serum creatinine of 1.95 which is stable from her baseline, nonfasting blood glucose of 122, leukocytosis with left shift, and chest x-ray demonstrating diffuse bilateral infiltrates suggestive of pneumonia +/- pulmonary edema.  Patient will be admitted for further evaluation and intervention

## 2024-04-27 NOTE — SUBJECTIVE & OBJECTIVE
Past Medical History:   Diagnosis Date    Arthritis     Bleeding external hemorrhoids     Chronic kidney disease, stage 3b     baseline creat 1.5    COPD (chronic obstructive pulmonary disease)     senile emphysema    Diabetes mellitus, type 2     DNR (do not resuscitate) 02/07/2024    discussed with VITO Christianson present    Essential (primary) hypertension     Hiatal hernia with GERD     Neuropathy     Post-operative hypothyroidism     Severe pulmonary hypertension 10/08/2022    EF  70 % and normal diastolic function       Past Surgical History:   Procedure Laterality Date    BREAST SURGERY      Biopsy    EYE SURGERY      Remove cataracts both eyes    HYSTERECTOMY      NEPHRECTOMY Right 1960    THYROIDECTOMY         Review of patient's allergies indicates:   Allergen Reactions    Aspirin        Current Facility-Administered Medications   Medication Dose Route Frequency Provider Last Rate Last Admin    amLODIPine tablet 5 mg  5 mg Oral Daily Fermin López MD        hydrALAZINE tablet 50 mg  50 mg Oral BID Fermin López MD        HYDROcodone-acetaminophen  mg per tablet 1 tablet  1 tablet Oral Q8H PRN Fermin López MD        levothyroxine tablet 100 mcg  100 mcg Oral Before breakfast Fermin López MD        metoprolol succinate (TOPROL-XL) 24 hr tablet 25 mg  25 mg Oral Daily Fermin óLpez MD        pantoprazole EC tablet 40 mg  40 mg Oral BID Fermin López MD        predniSONE tablet 5 mg  5 mg Oral Daily Fermin López MD         Current Outpatient Medications   Medication Sig Dispense Refill    acetaminophen (TYLENOL) 650 MG TbSR Take 650 mg by mouth every 8 (eight) hours. (Patient not taking: Reported on 4/25/2024)      albuterol (PROVENTIL/VENTOLIN HFA) 90 mcg/actuation inhaler Inhale 2 puffs into the lungs every 4 (four) hours as needed. Rescue 18 g 12    amLODIPine (NORVASC) 5 MG tablet Take 1 tablet (5 mg total) by mouth once daily. 90 tablet 3    budesonide-formoterol 80-4.5 mcg (BREYNA) 80-4.5 mcg/actuation HFAA  Inhale 2 puffs into the lungs 2 (two) times daily. 10 g 13    cholecalciferol, vitamin D3, (VITAMIN D3) 125 mcg (5,000 unit) Tab Take 5,000 Units by mouth once daily.      cyanocobalamin, vitamin B-12, 500 mcg Subl Place 1 tablet under the tongue once daily.      empagliflozin (JARDIANCE) 10 mg tablet Take 1 tablet (10 mg total) by mouth once daily. HOLD THIS MEDICATION UNTIL YOU FOLLOW UP WITH YOUR PCP. 90 tablet 3    ferrous sulfate (FEOSOL) 325 mg (65 mg iron) Tab tablet Take 1 tablet (325 mg total) by mouth 3 (three) times a week. On Monday, Wednesday and Friday 36 tablet 3    furosemide (LASIX) 20 MG tablet Take 1 tablet (20 mg total) by mouth once daily. (Patient taking differently: Take 20 mg by mouth 2 (two) times a day.) 90 tablet 3    gabapentin (NEURONTIN) 400 MG capsule TAKE 1 CAPSULE BY MOUTH FOUR TIMES A DAY AS NEEDED 360 capsule 4    hydrALAZINE (APRESOLINE) 25 MG tablet Take 2 tablets (50 mg total) by mouth 2 (two) times daily.      HYDROcodone-acetaminophen (NORCO)  mg per tablet Take 1 tablet by mouth every 8 (eight) hours as needed for Pain. 90 tablet 0    hydrocortisone (ANUSOL-HC) 2.5 % rectal cream Place rectally 2 (two) times daily. 28 g 12    insulin syringe-needle U-100 1 mL 30 gauge x 5/16 Syrg 1 Syringe by Misc.(Non-Drug; Combo Route) route 2 (two) times daily. 100 each 3    lactulose (CHRONULAC) 10 gram/15 mL solution Take 30 mLs (20 g total) by mouth once daily. 946 mL 11    levothyroxine (SYNTHROID) 100 MCG tablet Take 1 tablet (100 mcg total) by mouth before breakfast. 90 tablet 3    magnesium citrate 100 mg Tab Take 400 mg by mouth as needed.      methocarbamoL (ROBAXIN) 750 MG Tab TAKE 2 TABLETS BY MOUTH 4 TIMES A DAY AS NEEDED FOR MUSCLE SPASMS  Strength: 750 mg (Patient taking differently: Take 750 mg by mouth once daily. TAKE 2 TABLETS BY MOUTH 4 TIMES A DAY AS NEEDED FOR MUSCLE SPASMS  Strength: 750 mg) 240 tablet 1    metoprolol succinate (TOPROL-XL) 25 MG 24 hr tablet  Take 1 tablet (25 mg total) by mouth once daily. 90 tablet 3    NOVOLIN N NPH U-100 INSULIN 100 unit/mL injection INJECT 40 UNITS INTO THE SKIN TWICE A DAY (Patient taking differently: Inject into the skin. Self adjust dose) 30 mL 2    omega-3 fatty acids/fish oil (FISH OIL-OMEGA-3 FATTY ACIDS) 300-1,000 mg capsule Take 1 capsule by mouth once daily.      pantoprazole (PROTONIX) 40 MG tablet TAKE 1 TABLET BY MOUTH TWICE A  tablet 3    predniSONE (DELTASONE) 5 MG tablet Take 1 tablet (5 mg total) by mouth once daily. 90 tablet 3    traMADoL (ULTRAM) 50 mg tablet TAKE 1 TABLET BY MOUTH FOUR TIMES A  tablet 4     Family History       Problem Relation (Age of Onset)    Cancer Sister, Brother, Daughter    Diabetes Mother, Sister, Brother, Sister    Heart disease Father          Tobacco Use    Smoking status: Never    Smokeless tobacco: Never   Substance and Sexual Activity    Alcohol use: Never    Drug use: Never    Sexual activity: Not Currently     Birth control/protection: None     Review of Systems   Constitutional:  Negative for chills and fever.   HENT:  Negative for postnasal drip and rhinorrhea.    Eyes:  Negative for itching.   Respiratory:  Positive for shortness of breath and wheezing.    Cardiovascular:  Positive for leg swelling. Negative for chest pain and palpitations.   Gastrointestinal:  Negative for abdominal distention, abdominal pain, diarrhea, nausea and vomiting.   Endocrine: Negative for cold intolerance, heat intolerance, polydipsia, polyphagia and polyuria.   Genitourinary:  Negative for dysuria and hematuria.   Musculoskeletal:  Negative for arthralgias, joint swelling, myalgias, neck pain and neck stiffness.   Skin:  Negative for pallor and rash.   Allergic/Immunologic: Negative for environmental allergies, food allergies and immunocompromised state.   Neurological:  Negative for dizziness, seizures, facial asymmetry, light-headedness and numbness.     Objective:     Vital Signs  (Most Recent):  Temp: 98.8 °F (37.1 °C) (04/27/24 0338)  Pulse: 92 (04/27/24 0529)  Resp: 19 (04/27/24 0529)  BP: 138/79 (04/27/24 0529)  SpO2: (!) 93 % (04/27/24 0529) Vital Signs (24h Range):  Temp:  [98.8 °F (37.1 °C)-100 °F (37.8 °C)] 98.8 °F (37.1 °C)  Pulse:  [] 92  Resp:  [16-28] 19  SpO2:  [85 %-97 %] 93 %  BP: (138-192)/() 138/79     Weight: 81.6 kg (179 lb 14.3 oz)  Body mass index is 29.04 kg/m².     Physical Exam  Vitals reviewed.   Constitutional:       General: She is not in acute distress.     Appearance: Normal appearance.   HENT:      Head: Normocephalic and atraumatic.      Right Ear: External ear normal.      Left Ear: External ear normal.   Eyes:      Extraocular Movements: Extraocular movements intact.      Pupils: Pupils are equal, round, and reactive to light.   Cardiovascular:      Rate and Rhythm: Normal rate and regular rhythm.      Pulses: Normal pulses.      Heart sounds: Normal heart sounds. No murmur heard.  Pulmonary:      Effort: Pulmonary effort is normal. No respiratory distress.      Breath sounds: Wheezing present.   Chest:      Chest wall: No tenderness.   Abdominal:      General: Abdomen is flat.      Palpations: Abdomen is soft. There is no mass.      Tenderness: There is no abdominal tenderness. There is no right CVA tenderness or left CVA tenderness.   Musculoskeletal:         General: No swelling or tenderness. Normal range of motion.      Right lower leg: Edema present.      Left lower leg: Edema present.   Skin:     General: Skin is warm and dry.      Capillary Refill: Capillary refill takes less than 2 seconds.   Neurological:      General: No focal deficit present.      Mental Status: She is alert and oriented to person, place, and time. Mental status is at baseline.   Psychiatric:         Mood and Affect: Mood normal.         Thought Content: Thought content normal.               Significant Labs: All pertinent labs within the past 24 hours have been  reviewed.    Significant Imaging: I have reviewed all pertinent imaging results/findings within the past 24 hours.

## 2024-04-27 NOTE — ASSESSMENT & PLAN NOTE
Creatine stable for now. BMP reviewed- noted Estimated Creatinine Clearance: 17.8 mL/min (A) (based on SCr of 1.95 mg/dL (H)). according to latest data. 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  Monitor UOP and serial BMP and adjust therapy as needed. Renally dose meds. Avoid nephrotoxic medications and procedures.

## 2024-04-27 NOTE — ASSESSMENT & PLAN NOTE
Chronic, controlled. Latest blood pressure and vitals reviewed-     Temp:  [98.8 °F (37.1 °C)-100 °F (37.8 °C)]   Pulse:  []   Resp:  [16-28]   BP: (138-192)/()   SpO2:  [85 %-97 %] .   Home meds for hypertension were reviewed and noted below.   Hypertension Medications               amLODIPine (NORVASC) 5 MG tablet Take 1 tablet (5 mg total) by mouth once daily.    furosemide (LASIX) 20 MG tablet Take 1 tablet (20 mg total) by mouth once daily.    hydrALAZINE (APRESOLINE) 25 MG tablet Take 2 tablets (50 mg total) by mouth 2 (two) times daily.    metoprolol succinate (TOPROL-XL) 25 MG 24 hr tablet Take 1 tablet (25 mg total) by mouth once daily.            While in the hospital, will manage blood pressure as follows; Continue home antihypertensive regimen    Will utilize p.r.n. blood pressure medication only if patient's blood pressure greater than 180/110 and she develops symptoms such as worsening chest pain or shortness of breath.

## 2024-04-27 NOTE — ASSESSMENT & PLAN NOTE
Patient has a pulmonary hypertension with PASP of 42 and has been maintained on Lasix 20 mg p.o. b.i.d. However, it appeared that patient has developed acute decompensation of pulmonary hypertension.  Will start patient on Lasix 40 mg IV b.i.d.

## 2024-04-28 PROBLEM — R06.02 SHORTNESS OF BREATH: Status: ACTIVE | Noted: 2024-04-28

## 2024-04-28 PROBLEM — R13.19 ESOPHAGEAL DYSPHAGIA: Status: ACTIVE | Noted: 2024-04-28

## 2024-04-28 PROBLEM — K62.5 RECTAL BLEEDING: Status: ACTIVE | Noted: 2024-04-28

## 2024-04-28 LAB
ANION GAP SERPL CALCULATED.3IONS-SCNC: 14 MMOL/L (ref 7–16)
BASOPHILS # BLD AUTO: 0 K/UL (ref 0–0.2)
BASOPHILS NFR BLD AUTO: 0 % (ref 0–1)
BUN SERPL-MCNC: 30 MG/DL (ref 7–18)
BUN/CREAT SERPL: 17 (ref 6–20)
CALCIUM SERPL-MCNC: 8.9 MG/DL (ref 8.5–10.1)
CHLORIDE SERPL-SCNC: 105 MMOL/L (ref 98–107)
CO2 SERPL-SCNC: 23 MMOL/L (ref 21–32)
CREAT SERPL-MCNC: 1.79 MG/DL (ref 0.55–1.02)
CRP SERPL-MCNC: 10.2 MG/DL (ref 0–0.8)
DIFFERENTIAL METHOD BLD: ABNORMAL
EGFR (NO RACE VARIABLE) (RUSH/TITUS): 26 ML/MIN/1.73M2
EOSINOPHIL # BLD AUTO: 0 K/UL (ref 0–0.5)
EOSINOPHIL NFR BLD AUTO: 0 % (ref 1–4)
ERYTHROCYTE [DISTWIDTH] IN BLOOD BY AUTOMATED COUNT: 20.7 % (ref 11.5–14.5)
GLUCOSE SERPL-MCNC: 197 MG/DL (ref 70–105)
GLUCOSE SERPL-MCNC: 233 MG/DL (ref 70–105)
GLUCOSE SERPL-MCNC: 265 MG/DL (ref 70–105)
GLUCOSE SERPL-MCNC: 282 MG/DL (ref 70–105)
GLUCOSE SERPL-MCNC: 369 MG/DL (ref 74–106)
HCT VFR BLD AUTO: 30.2 % (ref 38–47)
HGB BLD-MCNC: 9.3 G/DL (ref 12–16)
IMM GRANULOCYTES # BLD AUTO: 0.04 K/UL (ref 0–0.04)
IMM GRANULOCYTES NFR BLD: 0.4 % (ref 0–0.4)
LYMPHOCYTES # BLD AUTO: 0.36 K/UL (ref 1–4.8)
LYMPHOCYTES NFR BLD AUTO: 3.8 % (ref 27–41)
MCH RBC QN AUTO: 25.3 PG (ref 27–31)
MCHC RBC AUTO-ENTMCNC: 30.8 G/DL (ref 32–36)
MCV RBC AUTO: 82.3 FL (ref 80–96)
MONOCYTES # BLD AUTO: 0.61 K/UL (ref 0–0.8)
MONOCYTES NFR BLD AUTO: 6.4 % (ref 2–6)
MPC BLD CALC-MCNC: 9 FL (ref 9.4–12.4)
NEUTROPHILS # BLD AUTO: 8.51 K/UL (ref 1.8–7.7)
NEUTROPHILS NFR BLD AUTO: 89.4 % (ref 53–65)
NRBC # BLD AUTO: 0 X10E3/UL
NRBC, AUTO (.00): 0 %
PLATELET # BLD AUTO: 250 K/UL (ref 150–400)
POTASSIUM SERPL-SCNC: 4.1 MMOL/L (ref 3.5–5.1)
RBC # BLD AUTO: 3.67 M/UL (ref 4.2–5.4)
SODIUM SERPL-SCNC: 138 MMOL/L (ref 136–145)
T4 SERPL-MCNC: 5.3 ΜG/DL (ref 4.8–13.9)
TSH SERPL DL<=0.005 MIU/L-ACNC: 0.24 UIU/ML (ref 0.36–3.74)
WBC # BLD AUTO: 9.52 K/UL (ref 4.5–11)

## 2024-04-28 PROCEDURE — 86140 C-REACTIVE PROTEIN: CPT | Performed by: HOSPITALIST

## 2024-04-28 PROCEDURE — 25000242 PHARM REV CODE 250 ALT 637 W/ HCPCS: Performed by: HOSPITALIST

## 2024-04-28 PROCEDURE — 84443 ASSAY THYROID STIM HORMONE: CPT | Performed by: HOSPITALIST

## 2024-04-28 PROCEDURE — 63600175 PHARM REV CODE 636 W HCPCS: Performed by: INTERNAL MEDICINE

## 2024-04-28 PROCEDURE — 25000003 PHARM REV CODE 250: Performed by: INTERNAL MEDICINE

## 2024-04-28 PROCEDURE — 27000221 HC OXYGEN, UP TO 24 HOURS

## 2024-04-28 PROCEDURE — 80048 BASIC METABOLIC PNL TOTAL CA: CPT | Performed by: INTERNAL MEDICINE

## 2024-04-28 PROCEDURE — 85025 COMPLETE CBC W/AUTO DIFF WBC: CPT | Performed by: INTERNAL MEDICINE

## 2024-04-28 PROCEDURE — 99233 SBSQ HOSP IP/OBS HIGH 50: CPT | Mod: ,,, | Performed by: HOSPITALIST

## 2024-04-28 PROCEDURE — 11000001 HC ACUTE MED/SURG PRIVATE ROOM

## 2024-04-28 PROCEDURE — 84436 ASSAY OF TOTAL THYROXINE: CPT | Performed by: HOSPITALIST

## 2024-04-28 PROCEDURE — 99223 1ST HOSP IP/OBS HIGH 75: CPT | Mod: ,,, | Performed by: INTERNAL MEDICINE

## 2024-04-28 PROCEDURE — 36415 COLL VENOUS BLD VENIPUNCTURE: CPT | Performed by: INTERNAL MEDICINE

## 2024-04-28 PROCEDURE — 94640 AIRWAY INHALATION TREATMENT: CPT

## 2024-04-28 PROCEDURE — 25000242 PHARM REV CODE 250 ALT 637 W/ HCPCS: Performed by: INTERNAL MEDICINE

## 2024-04-28 PROCEDURE — 99900035 HC TECH TIME PER 15 MIN (STAT)

## 2024-04-28 PROCEDURE — 82962 GLUCOSE BLOOD TEST: CPT

## 2024-04-28 PROCEDURE — 94761 N-INVAS EAR/PLS OXIMETRY MLT: CPT

## 2024-04-28 PROCEDURE — 63600175 PHARM REV CODE 636 W HCPCS: Performed by: HOSPITALIST

## 2024-04-28 RX ORDER — POLYETHYLENE GLYCOL 3350 17 G/17G
17 POWDER, FOR SOLUTION ORAL DAILY
Status: DISCONTINUED | OUTPATIENT
Start: 2024-04-28 | End: 2024-04-30 | Stop reason: HOSPADM

## 2024-04-28 RX ORDER — AZITHROMYCIN 250 MG/1
500 TABLET, FILM COATED ORAL DAILY
Status: DISCONTINUED | OUTPATIENT
Start: 2024-04-29 | End: 2024-04-30 | Stop reason: HOSPADM

## 2024-04-28 RX ADMIN — HYDROCODONE BITARTRATE AND ACETAMINOPHEN 1 TABLET: 10; 325 TABLET ORAL at 11:04

## 2024-04-28 RX ADMIN — IPRATROPIUM BROMIDE AND ALBUTEROL SULFATE 3 ML: 2.5; .5 SOLUTION RESPIRATORY (INHALATION) at 02:04

## 2024-04-28 RX ADMIN — PANTOPRAZOLE SODIUM 40 MG: 40 TABLET, DELAYED RELEASE ORAL at 09:04

## 2024-04-28 RX ADMIN — HYDROCODONE BITARTRATE AND ACETAMINOPHEN 1 TABLET: 10; 325 TABLET ORAL at 03:04

## 2024-04-28 RX ADMIN — HYDROCODONE BITARTRATE AND ACETAMINOPHEN 1 TABLET: 10; 325 TABLET ORAL at 09:04

## 2024-04-28 RX ADMIN — AMLODIPINE BESYLATE 5 MG: 5 TABLET ORAL at 09:04

## 2024-04-28 RX ADMIN — LEVOTHYROXINE SODIUM 100 MCG: 100 TABLET ORAL at 06:04

## 2024-04-28 RX ADMIN — INSULIN ASPART 6 UNITS: 100 INJECTION, SOLUTION INTRAVENOUS; SUBCUTANEOUS at 11:04

## 2024-04-28 RX ADMIN — INSULIN ASPART 2 UNITS: 100 INJECTION, SOLUTION INTRAVENOUS; SUBCUTANEOUS at 09:04

## 2024-04-28 RX ADMIN — METOPROLOL SUCCINATE 25 MG: 25 TABLET, EXTENDED RELEASE ORAL at 09:04

## 2024-04-28 RX ADMIN — HYDRALAZINE HYDROCHLORIDE 50 MG: 50 TABLET ORAL at 09:04

## 2024-04-28 RX ADMIN — IPRATROPIUM BROMIDE AND ALBUTEROL SULFATE 3 ML: 2.5; .5 SOLUTION RESPIRATORY (INHALATION) at 08:04

## 2024-04-28 RX ADMIN — POLYETHYLENE GLYCOL 3350 17 G: 17 POWDER, FOR SOLUTION ORAL at 03:04

## 2024-04-28 RX ADMIN — AZITHROMYCIN MONOHYDRATE 500 MG: 500 INJECTION, POWDER, LYOPHILIZED, FOR SOLUTION INTRAVENOUS at 06:04

## 2024-04-28 RX ADMIN — INSULIN DETEMIR 40 UNITS: 100 INJECTION, SOLUTION SUBCUTANEOUS at 09:04

## 2024-04-28 RX ADMIN — BUDESONIDE 0.5 MG: 0.5 INHALANT RESPIRATORY (INHALATION) at 08:04

## 2024-04-28 RX ADMIN — IPRATROPIUM BROMIDE AND ALBUTEROL SULFATE 3 ML: 2.5; .5 SOLUTION RESPIRATORY (INHALATION) at 11:04

## 2024-04-28 RX ADMIN — CEFTRIAXONE SODIUM 1 G: 1 INJECTION, POWDER, FOR SOLUTION INTRAMUSCULAR; INTRAVENOUS at 03:04

## 2024-04-28 RX ADMIN — HEPARIN SODIUM 5000 UNITS: 5000 INJECTION, SOLUTION INTRAVENOUS; SUBCUTANEOUS at 06:04

## 2024-04-28 RX ADMIN — PREDNISONE 5 MG: 5 TABLET ORAL at 09:04

## 2024-04-28 RX ADMIN — BUDESONIDE 0.5 MG: 0.5 INHALANT RESPIRATORY (INHALATION) at 07:04

## 2024-04-28 RX ADMIN — HEPARIN SODIUM 5000 UNITS: 5000 INJECTION, SOLUTION INTRAVENOUS; SUBCUTANEOUS at 01:04

## 2024-04-28 RX ADMIN — INSULIN ASPART 2 UNITS: 100 INJECTION, SOLUTION INTRAVENOUS; SUBCUTANEOUS at 05:04

## 2024-04-28 RX ADMIN — IPRATROPIUM BROMIDE AND ALBUTEROL SULFATE 3 ML: 2.5; .5 SOLUTION RESPIRATORY (INHALATION) at 07:04

## 2024-04-28 NOTE — CONSULTS
CC: dysphagia    HPI 89 y.o. female with asthma, COPD, RA on prednisone, chronic kidney disease stage 4, type 2 diabetes, hypothyroidism, severe pulmonary hypertension who presents initially for evaluation of shortness of breath and was found to have a pneumonia. During her hospitalization she is complained of solid food dysphagia intermittently when she is new for her. In the past she has had EGD with dilation with improvement in symptoms. She denies a sensation of choking but she does have to chew her food well in order to get it down. EGD in 2022 showed normal esophagus, 4 cm hiatal hernia in addition to pedunculated gastric polyps. Colonoscopy 2022 showed pandiverticulosis and inflamed external hemorrhoids without active bleeding. H/H 11.1/36.4. Today 9.3/30.2. BUN 30, Cr 1.95. CRP 10.2.     Medical records reviewed. Additional history supplemented by family at bedside.    Past Medical History:   Diagnosis Date    Arthritis     Bleeding external hemorrhoids     Chronic kidney disease, stage 3b     baseline creat 1.5    COPD (chronic obstructive pulmonary disease)     senile emphysema    Diabetes mellitus, type 2     DNR (do not resuscitate) 02/07/2024    discussed with VITO Christianson present    Essential (primary) hypertension     Hiatal hernia with GERD     Neuropathy     Post-operative hypothyroidism     Severe pulmonary hypertension 10/08/2022    EF  70 % and normal diastolic function     Past Surgical History:   Procedure Laterality Date    BREAST SURGERY      Biopsy    EYE SURGERY      Remove cataracts both eyes    HYSTERECTOMY      NEPHRECTOMY Right 1960    THYROIDECTOMY       Social History  Social History     Tobacco Use    Smoking status: Never    Smokeless tobacco: Never   Substance Use Topics    Alcohol use: Never    Drug use: Never     Family History   Problem Relation Name Age of Onset    Diabetes Mother Monie Storm         Diabetic    Heart disease Father Lauro Storm     Diabetes Sister Marino  "Masood     Cancer Sister Marino Correia         Breast cancer    Diabetes Brother Shree Inman     Cancer Brother Shree Inman         Prostate cancer    Cancer Daughter Radha Allison         Uterine cancer    Diabetes Sister Dafne Urena         Diabetic     Review of Systems  General ROS: negative for chills, fever or weight loss  Cardiovascular ROS: no chest pain, +dyspnea on exertion  Gastrointestinal ROS: +dysphagia to solids, +reflux, no regurgitation or vomiting    Physical Examination  BP (!) 144/79   Pulse 78   Temp 97.9 °F (36.6 °C) (Oral)   Resp 20   Ht 5' 6" (1.676 m)   Wt 81.6 kg (179 lb 14.3 oz)   SpO2 99%   Breastfeeding No   BMI 29.04 kg/m²   General appearance: alert, cooperative, no distress, lying in bed  HENT: NC in place, normocephalic, atraumatic, neck symmetrical, no nasal discharge   Eyes: conjunctivae/corneas clear, PERRL, EOM's intact  Lungs: no labored breathing, symmetric chest wall expansion bilaterally  Heart: regular rate and rhythm without rub; no displacement of the PMI   Abdomen: soft, non-tender; bowel sounds normoactive; no organomegaly  Extremities: extremities symmetric; +LE edema  Integument: Skin color, texture, turgor normal; no rashes; hair distrubution normal  Neurologic: Alert, did not test gait  Psychiatric: no pressured speech; normal affect; no evidence of impaired cognition     Labs:  Lab Results   Component Value Date    WBC 9.52 04/28/2024    HGB 9.3 (L) 04/28/2024    HCT 30.2 (L) 04/28/2024    MCV 82.3 04/28/2024     04/28/2024     CMP  Sodium   Date Value Ref Range Status   04/28/2024 138 136 - 145 mmol/L Final     Potassium   Date Value Ref Range Status   04/28/2024 4.1 3.5 - 5.1 mmol/L Final     Chloride   Date Value Ref Range Status   04/28/2024 105 98 - 107 mmol/L Final     CO2   Date Value Ref Range Status   04/28/2024 23 21 - 32 mmol/L Final     Glucose   Date Value Ref Range Status   04/28/2024 369 (H) 74 - 106 mg/dL Final     BUN "   Date Value Ref Range Status   04/28/2024 30 (H) 7 - 18 mg/dL Final     Creatinine   Date Value Ref Range Status   04/28/2024 1.79 (H) 0.55 - 1.02 mg/dL Final     Calcium   Date Value Ref Range Status   04/28/2024 8.9 8.5 - 10.1 mg/dL Final     Total Protein   Date Value Ref Range Status   04/27/2024 7.2 6.4 - 8.2 g/dL Final     Albumin   Date Value Ref Range Status   04/27/2024 3.7 3.5 - 5.0 g/dL Final     Bilirubin, Total   Date Value Ref Range Status   04/27/2024 0.5 >0.0 - 1.2 mg/dL Final     Alk Phos   Date Value Ref Range Status   04/27/2024 136 55 - 142 U/L Final     AST   Date Value Ref Range Status   04/27/2024 25 15 - 37 U/L Final     ALT   Date Value Ref Range Status   04/27/2024 29 13 - 56 U/L Final     Anion Gap   Date Value Ref Range Status   04/28/2024 14 7 - 16 mmol/L Final     eGFR    Date Value Ref Range Status   09/08/2020 47       eGFR   Date Value Ref Range Status   08/02/2022 25 (L) >=60 mL/min/1.73m² Final     Imaging:  X-Ray Abdomen AP 1 View   Final Result      Moderate left-sided pleural effusion.      Gaseous distension of the small bowel and colon. Mild colonic stool.         Electronically signed by: Gurdeep Quiroga   Date:    04/27/2024   Time:    13:01      X-Ray Chest AP Portable   Final Result      Findings suggest cardiac decompensation and / or pneumonitis.         Electronically signed by: Alex Padgett   Date:    04/27/2024   Time:    08:53          Independently reviewed    Assessment:   Solid food dysphagia  2.   Chronic constipation  3.   Rectal bleeding  4.   Pneumonia   5.   RA on chronic prednisone     Plan:   -Hold heparin  -NPO after midnight  -Plan EGD tomorrow with potential dilation  -Continue PPI BID     -Trial of miralax 17 g PO daily to twice daily for constipation  -Can use hydrocortisone suppositories for management of hemorrhoids and rectal bleeding during hospitalization  -Upon d/c can use TUCKS pads as needed nightly for 7 nights for  hemorrhoidal bleeding and Preparation H suppositories over the counter   -No indication for repeat colonoscopy at this point in time- would treat conservatively    Sai Sruthi Veerisetty, MD [facility]

## 2024-04-28 NOTE — SUBJECTIVE & OBJECTIVE
Interval History:     Review of Systems   Constitutional:  Negative for appetite change, fatigue and fever.   HENT:  Positive for trouble swallowing. Negative for congestion and hearing loss.    Respiratory:  Negative for chest tightness, shortness of breath and wheezing.    Cardiovascular:  Negative for chest pain and palpitations.   Gastrointestinal:  Positive for anal bleeding and blood in stool. Negative for abdominal pain, constipation and nausea.   Genitourinary:  Negative for difficulty urinating and dysuria.   Musculoskeletal:  Negative for back pain and neck stiffness.   Skin:  Negative for pallor and rash.   Neurological:  Negative for dizziness, speech difficulty and headaches.   Psychiatric/Behavioral:  Negative for confusion and suicidal ideas.      Objective:     Vital Signs (Most Recent):  Temp: 97.9 °F (36.6 °C) (04/28/24 1046)  Pulse: 78 (04/28/24 1046)  Resp: 20 (04/28/24 1111)  BP: (!) 144/79 (04/28/24 1046)  SpO2: 99 % (04/28/24 1046) Vital Signs (24h Range):  Temp:  [97.3 °F (36.3 °C)-98.3 °F (36.8 °C)] 97.9 °F (36.6 °C)  Pulse:  [76-91] 78  Resp:  [16-20] 20  SpO2:  [98 %-99 %] 99 %  BP: (126-175)/(69-83) 144/79     Weight: 81.6 kg (179 lb 14.3 oz)  Body mass index is 29.04 kg/m².    Intake/Output Summary (Last 24 hours) at 4/28/2024 1337  Last data filed at 4/28/2024 0851  Gross per 24 hour   Intake 460 ml   Output --   Net 460 ml         Physical Exam  Vitals reviewed.   Constitutional:       General: She is awake. She is not in acute distress.     Appearance: She is well-developed. She is not toxic-appearing.   HENT:      Head: Normocephalic.      Nose: Nose normal.      Mouth/Throat:      Pharynx: Oropharynx is clear.   Eyes:      Extraocular Movements: Extraocular movements intact.      Pupils: Pupils are equal, round, and reactive to light.   Neck:      Thyroid: No thyroid mass.      Vascular: No carotid bruit.   Cardiovascular:      Rate and Rhythm: Normal rate and regular rhythm.       Pulses: Normal pulses.      Heart sounds: Normal heart sounds. No murmur heard.  Pulmonary:      Effort: Pulmonary effort is normal.      Breath sounds: Normal breath sounds and air entry. No wheezing.   Abdominal:      General: Bowel sounds are normal. There is no distension.      Palpations: Abdomen is soft.      Tenderness: There is no abdominal tenderness.   Musculoskeletal:         General: Normal range of motion.      Cervical back: Neck supple. No rigidity.   Skin:     General: Skin is warm.      Coloration: Skin is not jaundiced.      Findings: No lesion.   Neurological:      General: No focal deficit present.      Mental Status: She is alert and oriented to person, place, and time.      Cranial Nerves: No cranial nerve deficit.   Psychiatric:         Attention and Perception: Attention normal.         Mood and Affect: Mood normal.         Behavior: Behavior normal. Behavior is cooperative.         Thought Content: Thought content normal.         Cognition and Memory: Cognition normal.             Significant Labs: All pertinent labs within the past 24 hours have been reviewed.  BMP:   Recent Labs   Lab 04/27/24  0252 04/28/24  0418   * 369*    138   K 3.9 4.1    105   CO2 27 23   BUN 30* 30*   CREATININE 1.95* 1.79*   CALCIUM 9.8 8.9   MG 2.7*  --      CBC:   Recent Labs   Lab 04/27/24  0240 04/27/24  0611 04/28/24  0418   WBC 16.05*  --  9.52   HGB 11.1*  --  9.3*   HCT 36.4* 37 30.2*     --  250     CMP:   Recent Labs   Lab 04/27/24  0252 04/28/24  0418    138   K 3.9 4.1    105   CO2 27 23   * 369*   BUN 30* 30*   CREATININE 1.95* 1.79*   CALCIUM 9.8 8.9   PROT 7.2  --    ALBUMIN 3.7  --    BILITOT 0.5  --    ALKPHOS 136  --    AST 25  --    ALT 29  --    ANIONGAP 10 14       Significant Imaging: I have reviewed all pertinent imaging results/findings within the past 24 hours.    Imaging Results              X-Ray Chest AP Portable (Final result)  Result time  04/27/24 08:53:29      Final result by Alex Padgett II, MD (04/27/24 08:53:29)                   Impression:      Findings suggest cardiac decompensation and / or pneumonitis.      Electronically signed by: Alex Padgett  Date:    04/27/2024  Time:    08:53               Narrative:    EXAMINATION:  XR CHEST AP PORTABLE    CLINICAL HISTORY:  Dyspnea, unspecified    COMPARISON:  7 February 2024    TECHNIQUE:  XR CHEST AP PORTABLE    FINDINGS:  The heart and mediastinum are stable in size and configuration.  The pulmonary vascularity is increased with bilateral increased interstitial lung density.  No other lung infiltrates, effusions, pneumothorax or other abnormality is demonstrated.                                    Intake/Output - Last 3 Shifts         04/26 0700  04/27 0659 04/27 0700  04/28 0659 04/28 0700 04/29 0659    P.O.   360    IV Piggyback 100 100     Total Intake(mL/kg) 100 (1.2) 100 (1.2) 360 (4.4)    Net +100 +100 +360           Urine Occurrence  2 x 1 x    Stool Occurrence  1 x           Microbiology Results (last 7 days)       Procedure Component Value Units Date/Time    Blood culture #2 [[id number]] Collected: 04/27/24 0252    Order Status: Completed Specimen: Blood Updated: 04/28/24 0705     Culture, Blood No Growth At 24 Hours    Blood culture #1 [[id number]] Collected: 04/27/24 0240    Order Status: Completed Specimen: Blood Updated: 04/28/24 0705     Culture, Blood No Growth At 24 Hours

## 2024-04-28 NOTE — ASSESSMENT & PLAN NOTE
Chronic, controlled. Latest blood pressure and vitals reviewed-     Temp:  [97.3 °F (36.3 °C)-98.3 °F (36.8 °C)]   Pulse:  [76-91]   Resp:  [16-20]   BP: (126-175)/(69-83)   SpO2:  [98 %-99 %] .   Home meds for hypertension were reviewed and noted below.   Hypertension Medications               amLODIPine (NORVASC) 5 MG tablet Take 1 tablet (5 mg total) by mouth once daily.    furosemide (LASIX) 20 MG tablet Take 1 tablet (20 mg total) by mouth once daily.    hydrALAZINE (APRESOLINE) 25 MG tablet Take 2 tablets (50 mg total) by mouth 2 (two) times daily.    metoprolol succinate (TOPROL-XL) 25 MG 24 hr tablet Take 1 tablet (25 mg total) by mouth once daily.            While in the hospital, will manage blood pressure as follows; Continue home antihypertensive regimen    Will utilize p.r.n. blood pressure medication only if patient's blood pressure greater than 180/110 and she develops symptoms such as worsening chest pain or shortness of breath.

## 2024-04-28 NOTE — ASSESSMENT & PLAN NOTE
"  Patient's FSGs are controlled on current medication regimen.  Last A1c reviewed-   Lab Results   Component Value Date    HGBA1C 7.7 (H) 02/07/2024     Most recent fingerstick glucose reviewed- No results for input(s): "POCTGLUCOSE" in the last 24 hours.  Current correctional scale  Low  Maintain anti-hyperglycemic dose as follows-   Antihyperglycemics (From admission, onward)      Low intensity sliding scale insulin          Hold Oral hypoglycemics while patient is in the hospital.    04/ 28 adjust insulin as needed.  Blood sugar should improve coming off steroids    "

## 2024-04-28 NOTE — ASSESSMENT & PLAN NOTE
Patient developed acute exacerbation of moderate persistent extrinsic asthma secondary to community-acquired pneumonia.  Patient will be started on DuoNeb with budesonide, empiric antibiotics along with IV steroid    04/28 no shortness a breath for bronchospasm at this time.  Continue with aerosol breathing treatments if needed but stopped extra steroids other than chronic dose takes for rheumatoid arthritis

## 2024-04-28 NOTE — ASSESSMENT & PLAN NOTE
Creatine stable for now. BMP reviewed- noted Estimated Creatinine Clearance: 19.3 mL/min (A) (based on SCr of 1.79 mg/dL (H)). according to latest data. 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  Monitor UOP and serial BMP and adjust therapy as needed. Renally dose meds. Avoid nephrotoxic medications and procedures.

## 2024-04-28 NOTE — PLAN OF CARE
Problem: Adult Inpatient Plan of Care  Goal: Plan of Care Review  Outcome: Progressing  Goal: Patient-Specific Goal (Individualized)  Outcome: Progressing  Goal: Absence of Hospital-Acquired Illness or Injury  Outcome: Progressing  Goal: Optimal Comfort and Wellbeing  Outcome: Progressing  Goal: Readiness for Transition of Care  Outcome: Progressing     Problem: Diabetes Comorbidity  Goal: Blood Glucose Level Within Targeted Range  Outcome: Progressing     Problem: Acute Kidney Injury/Impairment  Goal: Fluid and Electrolyte Balance  Outcome: Progressing  Goal: Improved Oral Intake  Outcome: Progressing  Goal: Effective Renal Function  Outcome: Progressing     Problem: Pneumonia  Goal: Fluid Balance  Outcome: Progressing  Goal: Resolution of Infection Signs and Symptoms  Outcome: Progressing  Goal: Effective Oxygenation and Ventilation  Outcome: Progressing     Problem: Gas Exchange Impaired  Goal: Optimal Gas Exchange  Outcome: Progressing     Problem: Fall Injury Risk  Goal: Absence of Fall and Fall-Related Injury  Outcome: Progressing

## 2024-04-28 NOTE — ASSESSMENT & PLAN NOTE
Patient will be started on empiric antibiotics consisting of Rocephin and Zithromax    04/28 cover for pneumonia but very likely changes on chest x-ray or chronic, chest x-ray actually looks better the February study, interstitial changes have been present for over a year.   Concerned etiologies likely chronic microaspiration in aspiration associated with GERD

## 2024-04-28 NOTE — HOSPITAL COURSE
04/27 Records reviewed.  Ms. Allison  is a 89-year-old (seems much younger and very sharp mentally) presented after awakening during the night short of breath.  States has done this before.  Has history GERD for which she tries not eat before going to bed and sleeps on 2 pillows.  Has had no fever, chest pain or palpitations.  Noted to be hypoxic in the emergency department with some expiratory wheeze and was admitted to hospitalist service for observation.  Currently patient feels back to baseline and does not feel she is having any issues with breathing or congestion.       Nurse reports history of patient having some small amount of bright blood per rectum earlier today since admission.      Patient had upper and lower endoscopy 2022 by Dr. Keon Wood  showed gastric polyps, diffuse diverticulosis and at that time inflamed external hemorrhoids.  Patient does complain of some current dysphagia to solids in lower esophagus area.  Denies issues with constipation.       Will monitor for stability.  Will discuss with GI.      Past medical history:  -diabetes mellitus type 2  -essential hypertension  -chronic kidney disease stage 4  -GERD with hiatal hernia  -hypothyroidism  -mild-to-moderate pulmonary hypertension with recent echocardiogram showing pulmonary artery systolic pressure 42 mm Hg     Addendum:  Reviewed chest x-ray which shows more likely chronic infiltrates.  Infiltrates present more than a year in reviewing old films.  Current x-ray looks much better than 1 done in February this year with similar infiltrates.  Of note CT scan done in February did not look as bad expected based on chest x-ray.  Has what appears to be small effusion in left lung base.  Effusion not noted on CT scan in February this year     KUB shows increased stool especially in rectum.      Talk with patient and granddaughter about elevating her head of bed on blocks about 5 in and continuing not to eat 4-6 hours before  bedtime.    04/28 patient without further episodes.  Seen by GI with plan upper endoscopy in a.m.  patient states she has had some bright blood on her tissues several times over the last several months.  States she is discuss this with Dr. Neo MCGOVERN and felt this was hemorrhoidal.  Has had previous hemorrhoid surgery.  Will proceed with GI evaluation for dysphagia as planned.  Currently no respiratory complaints and good oxygen saturation on room air.  04/29 Esophageal dilation. Family wished to watch pt post procedure and home tomorrow. Recheck O2 sat in am since staying on O2.     04/30 patient looks and feels better.  She is wanting to go home.  Blood sugar was low this a.m..  Did not get Levemir insulin last night and only sliding scale.  Talk with her about her regiment.  She feels she can adjust her insulin.  We talked about if she felt she need to stay another day but she wants to go home and states she can get her blood sugar straight when she gets home.  I have told her to follow up with Dr. Larson and discuss.  See comments about her regiment below.  Discharged    Patient counseled on the importance of keeping all hospital follow up appointments. Stressed compliance with medications, therapies, or devices as prescribed in order to provide for the best health outcomes and decrease the risk of reoccurrence or further progression of issues.    Additionally, patient given written literature regarding their current disease processes and home care recommendations.   Patient given opportunity to ask questions and verbalized understanding of all information discussed.  Reinforced patient's primary care provider can be a big assets in monitoring and organizing issues after discharge.

## 2024-04-28 NOTE — ASSESSMENT & PLAN NOTE
Longstanding intermittent slight bright red blood per rectum felt to be hemorrhoidal. Had colonoscopy in 2022 showing diffuse diverticulosis and external hemorrhoids

## 2024-04-28 NOTE — PROGRESS NOTES
[facility]  [facility] Medicine  Progress Note    Patient Name: Vandana Allison  MRN: [id number]  Patient Class: IP- Inpatient   Admission Date: 4/27/2024  Length of Stay: 1 days  Attending Physician: David Garcia MD  Primary Care Provider: Cm Larson III, DO        Subjective:     Principal Problem:Pneumonia due to infectious organism        HPI:  Patient is a 89-year-old female with a history of moderate persistent asthma, rheumatoid arthritis on prednisone, hypothyroidism, type 2 diabetes with CKD stage 4,  and pulmonary artery hypertension who presented to the emergency room complaining of shortness a breath with wheezing since yesterday.  As per patient's daughter, patient was in her usual state of health until yesterday when she developed dyspnea with wheezing.  She also noticed bilateral lower extremity edema as well for the past few days.  Otherwise patient's daughter denied any associated symptoms such as fever chills cough chest pain palpitation PND or orthopnea.    On presentation, patient was slightly hypoxic on room air but vital signs were otherwise stable and patient was afebrile.  Physical examination was notable for expiratory wheezing and bilateral lower extremity edema.  Initial workup was notable for a presence of CKD stage 4 with serum creatinine of 1.95 which is stable from her baseline, nonfasting blood glucose of 122, leukocytosis with left shift, and chest x-ray demonstrating diffuse bilateral infiltrates suggestive of pneumonia +/- pulmonary edema.  Patient will be admitted for further evaluation and intervention    Overview/Hospital Course:  04/27 Records reviewed.  Ms. Allison  is a 89-year-old (seems much younger and very sharp mentally) presented after awakening during the night short of breath.  States has done this before.  Has history GERD for which she tries not eat before going to bed and sleeps on 2 pillows.  Has had no fever, chest pain or  palpitations.  Noted to be hypoxic in the emergency department with some expiratory wheeze and was admitted to hospitalist service for observation.  Currently patient feels back to baseline and does not feel she is having any issues with breathing or congestion.       Nurse reports history of patient having some small amount of bright blood per rectum earlier today since admission.      Patient had upper and lower endoscopy 2022 by Dr. Keon Wood  showed gastric polyps, diffuse diverticulosis and at that time inflamed external hemorrhoids.  Patient does complain of some current dysphagia to solids in lower esophagus area.  Denies issues with constipation.       Will monitor for stability.  Will discuss with GI.      Past medical history:  -diabetes mellitus type 2  -essential hypertension  -chronic kidney disease stage 4  -GERD with hiatal hernia  -hypothyroidism  -mild-to-moderate pulmonary hypertension with recent echocardiogram showing pulmonary artery systolic pressure 42 mm Hg     Addendum:  Reviewed chest x-ray which shows more likely chronic infiltrates.  Infiltrates present more than a year in reviewing old films.  Current x-ray looks much better than 1 done in February this year with similar infiltrates.  Of note CT scan done in February did not look as bad expected based on chest x-ray.  Has what appears to be small effusion in left lung base.  Effusion not noted on CT scan in February this year     KUB shows increased stool especially in rectum.      Talk with patient and granddaughter about elevating her head of bed on blocks about 5 in and continuing not to eat 4-6 hours before bedtime.    04/28 patient without further episodes.  Seen by GI with plan upper endoscopy in a.m.  patient states she has had some bright blood on her tissues several times over the last several months.  States she is discuss this with Dr. Neo MCGOVERN and felt this was hemorrhoidal.  Has had previous hemorrhoid surgery.  Will  proceed with GI evaluation for dysphagia as planned.  Currently no respiratory complaints and good oxygen saturation on room air.        Interval History:     Review of Systems   Constitutional:  Negative for appetite change, fatigue and fever.   HENT:  Positive for trouble swallowing. Negative for congestion and hearing loss.    Respiratory:  Negative for chest tightness, shortness of breath and wheezing.    Cardiovascular:  Negative for chest pain and palpitations.   Gastrointestinal:  Positive for anal bleeding and blood in stool. Negative for abdominal pain, constipation and nausea.   Genitourinary:  Negative for difficulty urinating and dysuria.   Musculoskeletal:  Negative for back pain and neck stiffness.   Skin:  Negative for pallor and rash.   Neurological:  Negative for dizziness, speech difficulty and headaches.   Psychiatric/Behavioral:  Negative for confusion and suicidal ideas.      Objective:     Vital Signs (Most Recent):  Temp: 97.9 °F (36.6 °C) (04/28/24 1046)  Pulse: 78 (04/28/24 1046)  Resp: 20 (04/28/24 1111)  BP: (!) 144/79 (04/28/24 1046)  SpO2: 99 % (04/28/24 1046) Vital Signs (24h Range):  Temp:  [97.3 °F (36.3 °C)-98.3 °F (36.8 °C)] 97.9 °F (36.6 °C)  Pulse:  [76-91] 78  Resp:  [16-20] 20  SpO2:  [98 %-99 %] 99 %  BP: (126-175)/(69-83) 144/79     Weight: 81.6 kg (179 lb 14.3 oz)  Body mass index is 29.04 kg/m².    Intake/Output Summary (Last 24 hours) at 4/28/2024 1337  Last data filed at 4/28/2024 0851  Gross per 24 hour   Intake 460 ml   Output --   Net 460 ml         Physical Exam  Vitals reviewed.   Constitutional:       General: She is awake. She is not in acute distress.     Appearance: She is well-developed. She is not toxic-appearing.   HENT:      Head: Normocephalic.      Nose: Nose normal.      Mouth/Throat:      Pharynx: Oropharynx is clear.   Eyes:      Extraocular Movements: Extraocular movements intact.      Pupils: Pupils are equal, round, and reactive to light.   Neck:       Thyroid: No thyroid mass.      Vascular: No carotid bruit.   Cardiovascular:      Rate and Rhythm: Normal rate and regular rhythm.      Pulses: Normal pulses.      Heart sounds: Normal heart sounds. No murmur heard.  Pulmonary:      Effort: Pulmonary effort is normal.      Breath sounds: Normal breath sounds and air entry. No wheezing.   Abdominal:      General: Bowel sounds are normal. There is no distension.      Palpations: Abdomen is soft.      Tenderness: There is no abdominal tenderness.   Musculoskeletal:         General: Normal range of motion.      Cervical back: Neck supple. No rigidity.   Skin:     General: Skin is warm.      Coloration: Skin is not jaundiced.      Findings: No lesion.   Neurological:      General: No focal deficit present.      Mental Status: She is alert and oriented to person, place, and time.      Cranial Nerves: No cranial nerve deficit.   Psychiatric:         Attention and Perception: Attention normal.         Mood and Affect: Mood normal.         Behavior: Behavior normal. Behavior is cooperative.         Thought Content: Thought content normal.         Cognition and Memory: Cognition normal.             Significant Labs: All pertinent labs within the past 24 hours have been reviewed.  BMP:   Recent Labs   Lab 04/27/24  0252 04/28/24 0418   * 369*    138   K 3.9 4.1    105   CO2 27 23   BUN 30* 30*   CREATININE 1.95* 1.79*   CALCIUM 9.8 8.9   MG 2.7*  --      CBC:   Recent Labs   Lab 04/27/24  0240 04/27/24  0611 04/28/24 0418   WBC 16.05*  --  9.52   HGB 11.1*  --  9.3*   HCT 36.4* 37 30.2*     --  250     CMP:   Recent Labs   Lab 04/27/24  0252 04/28/24 0418    138   K 3.9 4.1    105   CO2 27 23   * 369*   BUN 30* 30*   CREATININE 1.95* 1.79*   CALCIUM 9.8 8.9   PROT 7.2  --    ALBUMIN 3.7  --    BILITOT 0.5  --    ALKPHOS 136  --    AST 25  --    ALT 29  --    ANIONGAP 10 14       Significant Imaging: I have reviewed all pertinent  imaging results/findings within the past 24 hours.    Imaging Results              X-Ray Chest AP Portable (Final result)  Result time 04/27/24 08:53:29      Final result by Alex Padgett II, MD (04/27/24 08:53:29)                   Impression:      Findings suggest cardiac decompensation and / or pneumonitis.      Electronically signed by: Alex Padgett  Date:    04/27/2024  Time:    08:53               Narrative:    EXAMINATION:  XR CHEST AP PORTABLE    CLINICAL HISTORY:  Dyspnea, unspecified    COMPARISON:  7 February 2024    TECHNIQUE:  XR CHEST AP PORTABLE    FINDINGS:  The heart and mediastinum are stable in size and configuration.  The pulmonary vascularity is increased with bilateral increased interstitial lung density.  No other lung infiltrates, effusions, pneumothorax or other abnormality is demonstrated.                                    Intake/Output - Last 3 Shifts         04/26 0700 04/27 0659 04/27 0700  04/28 0659 04/28 0700 04/29 0659    P.O.   360    IV Piggyback 100 100     Total Intake(mL/kg) 100 (1.2) 100 (1.2) 360 (4.4)    Net +100 +100 +360           Urine Occurrence  2 x 1 x    Stool Occurrence  1 x           Microbiology Results (last 7 days)       Procedure Component Value Units Date/Time    Blood culture #2 [[id number]] Collected: 04/27/24 0252    Order Status: Completed Specimen: Blood Updated: 04/28/24 0705     Culture, Blood No Growth At 24 Hours    Blood culture #1 [[id number]] Collected: 04/27/24 0240    Order Status: Completed Specimen: Blood Updated: 04/28/24 0705     Culture, Blood No Growth At 24 Hours              Assessment/Plan:      * Pneumonia due to infectious organism    Patient will be started on empiric antibiotics consisting of Rocephin and Zithromax    04/28 cover for pneumonia but very likely changes on chest x-ray or chronic, chest x-ray actually looks better the February study, interstitial changes have been present for over a year.   Concerned etiologies  likely chronic microaspiration in aspiration associated with GERD      Rectal bleeding  Longstanding intermittent slight bright red blood per rectum felt to be hemorrhoidal. Had colonoscopy in 2022 showing diffuse diverticulosis and external hemorrhoids      Esophageal dysphagia  Dysphagia to solids with plan EGD by GI  Had previous EGD in 2022 showing gastric polyps      Acute exacerbation of moderate persistent extrinsic asthma    Patient developed acute exacerbation of moderate persistent extrinsic asthma secondary to community-acquired pneumonia.  Patient will be started on DuoNeb with budesonide, empiric antibiotics along with IV steroid    04/28 no shortness a breath for bronchospasm at this time.  Continue with aerosol breathing treatments if needed but stopped extra steroids other than chronic dose takes for rheumatoid arthritis      CKD (chronic kidney disease), stage IV    Creatine stable for now. BMP reviewed- noted Estimated Creatinine Clearance: 19.3 mL/min (A) (based on SCr of 1.79 mg/dL (H)). according to latest data. 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  Monitor UOP and serial BMP and adjust therapy as needed. Renally dose meds. Avoid nephrotoxic medications and procedures.    Pulmonary hypertension    Patient has a pulmonary hypertension with PASP of 42 and has been maintained on Lasix 20 mg p.o. b.i.d. However, it appeared that patient has developed acute decompensation of pulmonary hypertension.  Will start patient on Lasix 40 mg IV b.i.d.      Essential (primary) hypertension    Chronic, controlled. Latest blood pressure and vitals reviewed-     Temp:  [97.3 °F (36.3 °C)-98.3 °F (36.8 °C)]   Pulse:  [76-91]   Resp:  [16-20]   BP: (126-175)/(69-83)   SpO2:  [98 %-99 %] .   Home meds for hypertension were reviewed and noted below.   Hypertension Medications               amLODIPine (NORVASC) 5 MG tablet Take 1 tablet (5 mg total) by mouth once daily.    furosemide (LASIX) 20 MG  "tablet Take 1 tablet (20 mg total) by mouth once daily.    hydrALAZINE (APRESOLINE) 25 MG tablet Take 2 tablets (50 mg total) by mouth 2 (two) times daily.    metoprolol succinate (TOPROL-XL) 25 MG 24 hr tablet Take 1 tablet (25 mg total) by mouth once daily.            While in the hospital, will manage blood pressure as follows; Continue home antihypertensive regimen    Will utilize p.r.n. blood pressure medication only if patient's blood pressure greater than 180/110 and she develops symptoms such as worsening chest pain or shortness of breath.      Rheumatoid arthritis    Continue present management with prednisone 5 mg daily.  Do not feel the stress dose steroid is required at this time      Diabetes mellitus, type 2    Patient's FSGs are controlled on current medication regimen.  Last A1c reviewed-   Lab Results   Component Value Date    HGBA1C 7.7 (H) 02/07/2024     Most recent fingerstick glucose reviewed- No results for input(s): "POCTGLUCOSE" in the last 24 hours.  Current correctional scale  Low  Maintain anti-hyperglycemic dose as follows-   Antihyperglycemics (From admission, onward)      Low intensity sliding scale insulin          Hold Oral hypoglycemics while patient is in the hospital.    04/ 28 adjust insulin as needed.  Blood sugar should improve coming off steroids        VTE Risk Mitigation (From admission, onward)           Ordered     heparin (porcine) injection 5,000 Units  Every 8 hours         04/27/24 0559     IP VTE HIGH RISK PATIENT  Once         04/27/24 0559     Place sequential compression device  Until discontinued         04/27/24 0559                    Discharge Planning   MARTHA:      Code Status: DNR   Is the patient medically ready for discharge?:     Reason for patient still in hospital (select all that apply): Laboratory test, Treatment, Imaging, and Consult recommendations                     David Garcia MD  Department of Hospital Medicine   [facility]

## 2024-04-28 NOTE — PROGRESS NOTES
Pharmacist Intervention IV to PO Note    Vandana Allison is a 89 y.o. female being treated with IV medication azithromycin    Patient Data:    Vital Signs (Most Recent):  Temp: 97.9 °F (36.6 °C) (04/28/24 1046)  Pulse: 78 (04/28/24 1046)  Resp: 20 (04/28/24 1111)  BP: (!) 144/79 (04/28/24 1046)  SpO2: 99 % (04/28/24 1046) Vital Signs (72h Range):  Temp:  [97.3 °F (36.3 °C)-100 °F (37.8 °C)]   Pulse:  []   Resp:  [16-28]   BP: (126-192)/()   SpO2:  [85 %-100 %]      CBC:  Recent Labs   Lab 04/27/24  0240 04/27/24  0611 04/28/24  0418   WBC 16.05*  --  9.52   RBC 4.39  --  3.67*   HGB 11.1*  --  9.3*   HCT 36.4* 37 30.2*     --  250   MCV 82.9  --  82.3   MCH 25.3*  --  25.3*   MCHC 30.5*  --  30.8*     CMP:     Recent Labs   Lab 04/27/24  0252 04/28/24  0418   * 369*   CALCIUM 9.8 8.9   ALBUMIN 3.7  --    PROT 7.2  --     138   K 3.9 4.1   CO2 27 23    105   BUN 30* 30*   CREATININE 1.95* 1.79*   ALKPHOS 136  --    ALT 29  --    AST 25  --    BILITOT 0.5  --        Dietary Orders:  Diet Orders            Diet diabetic 1800 Calorie: Diabetic starting at 04/27 0555            Based on the following criteria, this patient qualifies for intravenous to oral conversion:  [x] The patients gastrointestinal tract is functioning (tolerating medications via oral or enteral route for 24 hours and tolerating food or enteral feeds for 24 hours).  [x] The patient is hemodynamically stable for 24 hours (heart rate <100 beats per minute, systolic blood pressure >99 mm Hg, and respiratory rate <20 breaths per minute).  [x] The patient shows clinical improvement (afebrile for at least 24 hours and white blood cell count downtrending or normalized). Additionally, the patient must be non-neutropenic (absolute neutrophil count >500 cells/mm3).  [x] For antimicrobials, the patient has received IV therapy for at least 24 hours.    IV medication azithromycin will be changed to oral medication  azithromycin    Pharmacist's Name: Sandra Armijo  Pharmacist's Extension: [phone]

## 2024-04-28 NOTE — PLAN OF CARE
[facility]  Initial Discharge Assessment       Primary Care Provider: Cm Larson III, DO    Admission Diagnosis: Bronchospasm [J98.01]  Dyspnea [R06.00]  Chest pain [R07.9]  Pneumonia due to infectious organism, unspecified laterality, unspecified part of lung [J18.9]  Congestive heart failure, unspecified HF chronicity, unspecified heart failure type [I50.9]    Admission Date: 4/27/2024  Expected Discharge Date:     Transition of Care Barriers: (P) None    Payor: [employer] / Plan: [employer] MEDICARE HMO / Product Type: Medicare Advantage /     Extended Emergency Contact Information  Primary Emergency Contact: Claribel Morelos  Mobile Phone: [phone]  Relation: Daughter  Preferred language: English   needed? No    Discharge Plan A: (P) Home with family  Discharge Plan B: (P) Home with family      [id number] [address] [address]  Phone: [phone] Fax: [phone]    [id number] [address] AT [facility]  [address]  [facility] [address]  Phone: [phone] Fax: [phone]    [facility] [address] [place]  [address] [facility] [address]  Phone: [phone] Fax: [phone]      Initial Assessment (most recent)       Adult Discharge Assessment - 04/28/24 1447          Discharge Assessment    Assessment Type Discharge Planning Assessment (P)      Confirmed/corrected address, phone number and insurance Yes (P)      Confirmed Demographics Correct on Facesheet (P)      Source of Information patient;family (P)      Communicated MARTHA with patient/caregiver Date not available/Unable to determine (P)      People in Home child(radha), adult (P)      Do you expect to return to your current living situation? Yes (P)      Do you have help at home or someone to help you manage your care at home? Yes (P)      Who are your caregiver(s) and their phone  number(s)? Claribel Morelos (daughter) [phone] (P)      Prior to hospitilization cognitive status: Alert/Oriented (P)      Current cognitive status: Alert/Oriented (P)      Walking or Climbing Stairs Difficulty yes (P)      Walking or Climbing Stairs ambulation difficulty, requires equipment (P)      Mobility Management Rollator (P)      Dressing/Bathing Difficulty yes (P)      Dressing/Bathing bathing difficulty, assistance 1 person;dressing difficulty, assistance 1 person (P)      Dressing/Bathing Management Requires assistance from daughter (P)      Do you have any problems with: -- (P)    No problems reported    Equipment Currently Used at Home rollator (P)      Readmission within 30 days? No (P)      Patient currently being followed by outpatient case management? No (P)      Do you currently have service(s) that help you manage your care at home? No (P)      Do you take prescription medications? Yes (P)      Do you have prescription coverage? Yes (P)      Coverage [employer] (P)      Do you have any problems affording any of your prescribed medications? No (P)      Is the patient taking medications as prescribed? yes (P)      Who is going to help you get home at discharge? Daughter (P)      How do you get to doctors appointments? family or friend will provide (P)      Are you on dialysis? No (P)      Do you take coumadin? No (P)      Discharge Plan A Home with family (P)      Discharge Plan B Home with family (P)      DME Needed Upon Discharge  none (P)      Discharge Plan discussed with: Patient;Adult children (P)      Transition of Care Barriers None (P)         Physical Activity    On average, how many days per week do you engage in moderate to strenuous exercise (like a brisk walk)? 0 days (P)      On average, how many minutes do you engage in exercise at this level? 0 min (P)         Financial Resource Strain    How hard is it for you to pay for the very basics like food, housing, medical care, and heating?  Not hard at all (P)         Housing Stability    In the last 12 months, was there a time when you were not able to pay the mortgage or rent on time? No (P)      In the past 12 months, how many times have you moved where you were living? 0 (P)      At any time in the past 12 months, were you homeless or living in a shelter (including now)? No (P)         Transportation Needs    In the past 12 months, has lack of transportation kept you from medical appointments or from getting medications? No (P)      In the past 12 months, has lack of transportation kept you from meetings, work, or from getting things needed for daily living? No (P)         Food Insecurity    Within the past 12 months, you worried that your food would run out before you got the money to buy more. Never true (P)      Within the past 12 months, the food you bought just didn't last and you didn't have money to get more. Never true (P)         Stress    Do you feel stress - tense, restless, nervous, or anxious, or unable to sleep at night because your mind is troubled all the time - these days? Not at all (P)         Social Connections    In a typical week, how many times do you talk on the phone with family, friends, or neighbors? More than three times a week (P)      How often do you get together with friends or relatives? More than three times a week (P)      How often do you attend Hinduism or Sikhism services? -- (P)    Pt states she has not been attending Alevism since the covid pandemic    Do you belong to any clubs or organizations such as Hinduism groups, unions, fraternal or athletic groups, or school groups? No (P)      How often do you attend meetings of the clubs or organizations you belong to? Never (P)      Are you , , , , never , or living with a partner?  (P)         Alcohol Use    Q1: How often do you have a drink containing alcohol? Never (P)      Q2: How many drinks containing alcohol do you  have on a typical day when you are drinking? Patient does not drink (P)      Q3: How often do you have six or more drinks on one occasion? Never (P)         OTHER    Name(s) of People in Home Claribel Morelos - Daughter (P)                    SW spoke with pt with family at bedside for her initial discharge assessment.  Pt lives with her daughter and discharge plans are to return home with family when medically ready.  Pt does not have home health and states she has a rollator for use. IM obtained.  SS will continue to follow for discharge needs as they arise.

## 2024-04-29 ENCOUNTER — ANESTHESIA (OUTPATIENT)
Dept: GASTROENTEROLOGY | Facility: HOSPITAL | Age: 89
DRG: 194 | End: 2024-04-29
Payer: COMMERCIAL

## 2024-04-29 ENCOUNTER — ANESTHESIA EVENT (OUTPATIENT)
Dept: GASTROENTEROLOGY | Facility: HOSPITAL | Age: 89
DRG: 194 | End: 2024-04-29
Payer: COMMERCIAL

## 2024-04-29 LAB
ANION GAP SERPL CALCULATED.3IONS-SCNC: 9 MMOL/L (ref 7–16)
BASOPHILS # BLD AUTO: 0.01 K/UL (ref 0–0.2)
BASOPHILS NFR BLD AUTO: 0.1 % (ref 0–1)
BUN SERPL-MCNC: 32 MG/DL (ref 7–18)
BUN/CREAT SERPL: 19 (ref 6–20)
CALCIUM SERPL-MCNC: 9.1 MG/DL (ref 8.5–10.1)
CHLORIDE SERPL-SCNC: 110 MMOL/L (ref 98–107)
CO2 SERPL-SCNC: 27 MMOL/L (ref 21–32)
CREAT SERPL-MCNC: 1.68 MG/DL (ref 0.55–1.02)
DIFFERENTIAL METHOD BLD: ABNORMAL
EGFR (NO RACE VARIABLE) (RUSH/TITUS): 28 ML/MIN/1.73M2
EOSINOPHIL # BLD AUTO: 0.09 K/UL (ref 0–0.5)
EOSINOPHIL NFR BLD AUTO: 0.7 % (ref 1–4)
ERYTHROCYTE [DISTWIDTH] IN BLOOD BY AUTOMATED COUNT: 20.6 % (ref 11.5–14.5)
GLUCOSE SERPL-MCNC: 121 MG/DL (ref 70–105)
GLUCOSE SERPL-MCNC: 170 MG/DL (ref 74–106)
GLUCOSE SERPL-MCNC: 180 MG/DL (ref 70–105)
GLUCOSE SERPL-MCNC: 217 MG/DL (ref 70–105)
GLUCOSE SERPL-MCNC: 58 MG/DL (ref 70–105)
GLUCOSE SERPL-MCNC: 62 MG/DL (ref 70–105)
GLUCOSE SERPL-MCNC: 64 MG/DL (ref 70–105)
GLUCOSE SERPL-MCNC: 65 MG/DL (ref 70–105)
GLUCOSE SERPL-MCNC: 78 MG/DL (ref 70–105)
HCT VFR BLD AUTO: 30 % (ref 38–47)
HGB BLD-MCNC: 9.2 G/DL (ref 12–16)
IMM GRANULOCYTES # BLD AUTO: 0.05 K/UL (ref 0–0.04)
IMM GRANULOCYTES NFR BLD: 0.4 % (ref 0–0.4)
LYMPHOCYTES # BLD AUTO: 0.53 K/UL (ref 1–4.8)
LYMPHOCYTES NFR BLD AUTO: 4.4 % (ref 27–41)
MCH RBC QN AUTO: 25.5 PG (ref 27–31)
MCHC RBC AUTO-ENTMCNC: 30.7 G/DL (ref 32–36)
MCV RBC AUTO: 83.1 FL (ref 80–96)
MONOCYTES # BLD AUTO: 0.65 K/UL (ref 0–0.8)
MONOCYTES NFR BLD AUTO: 5.4 % (ref 2–6)
MPC BLD CALC-MCNC: 8.7 FL (ref 9.4–12.4)
NEUTROPHILS # BLD AUTO: 10.71 K/UL (ref 1.8–7.7)
NEUTROPHILS NFR BLD AUTO: 89 % (ref 53–65)
NRBC # BLD AUTO: 0 X10E3/UL
NRBC, AUTO (.00): 0 %
PLATELET # BLD AUTO: 261 K/UL (ref 150–400)
POTASSIUM SERPL-SCNC: 4.2 MMOL/L (ref 3.5–5.1)
RBC # BLD AUTO: 3.61 M/UL (ref 4.2–5.4)
SODIUM SERPL-SCNC: 142 MMOL/L (ref 136–145)
WBC # BLD AUTO: 12.04 K/UL (ref 4.5–11)

## 2024-04-29 PROCEDURE — 43248 EGD GUIDE WIRE INSERTION: CPT | Mod: ,,, | Performed by: INTERNAL MEDICINE

## 2024-04-29 PROCEDURE — 88342 IMHCHEM/IMCYTCHM 1ST ANTB: CPT | Mod: 26,,, | Performed by: PATHOLOGY

## 2024-04-29 PROCEDURE — 63600175 PHARM REV CODE 636 W HCPCS: Performed by: NURSE ANESTHETIST, CERTIFIED REGISTERED

## 2024-04-29 PROCEDURE — 63600175 PHARM REV CODE 636 W HCPCS: Performed by: HOSPITALIST

## 2024-04-29 PROCEDURE — 11000001 HC ACUTE MED/SURG PRIVATE ROOM

## 2024-04-29 PROCEDURE — 88342 IMHCHEM/IMCYTCHM 1ST ANTB: CPT | Mod: TC,SUR | Performed by: INTERNAL MEDICINE

## 2024-04-29 PROCEDURE — 0DB18ZX EXCISION OF UPPER ESOPHAGUS, VIA NATURAL OR ARTIFICIAL OPENING ENDOSCOPIC, DIAGNOSTIC: ICD-10-PCS | Performed by: INTERNAL MEDICINE

## 2024-04-29 PROCEDURE — 25000242 PHARM REV CODE 250 ALT 637 W/ HCPCS: Performed by: HOSPITALIST

## 2024-04-29 PROCEDURE — 88305 TISSUE EXAM BY PATHOLOGIST: CPT | Mod: 26,,, | Performed by: PATHOLOGY

## 2024-04-29 PROCEDURE — 88312 SPECIAL STAINS GROUP 1: CPT | Mod: 26,,, | Performed by: PATHOLOGY

## 2024-04-29 PROCEDURE — 25000003 PHARM REV CODE 250: Performed by: INTERNAL MEDICINE

## 2024-04-29 PROCEDURE — 43239 EGD BIOPSY SINGLE/MULTIPLE: CPT | Mod: 59,,, | Performed by: INTERNAL MEDICINE

## 2024-04-29 PROCEDURE — 43248 EGD GUIDE WIRE INSERTION: CPT | Performed by: INTERNAL MEDICINE

## 2024-04-29 PROCEDURE — 0DB78ZZ EXCISION OF STOMACH, PYLORUS, VIA NATURAL OR ARTIFICIAL OPENING ENDOSCOPIC: ICD-10-PCS | Performed by: INTERNAL MEDICINE

## 2024-04-29 PROCEDURE — 25000242 PHARM REV CODE 250 ALT 637 W/ HCPCS: Performed by: INTERNAL MEDICINE

## 2024-04-29 PROCEDURE — 27000221 HC OXYGEN, UP TO 24 HOURS

## 2024-04-29 PROCEDURE — 63600175 PHARM REV CODE 636 W HCPCS: Performed by: INTERNAL MEDICINE

## 2024-04-29 PROCEDURE — 85025 COMPLETE CBC W/AUTO DIFF WBC: CPT | Performed by: HOSPITALIST

## 2024-04-29 PROCEDURE — 36415 COLL VENOUS BLD VENIPUNCTURE: CPT | Performed by: HOSPITALIST

## 2024-04-29 PROCEDURE — 99900035 HC TECH TIME PER 15 MIN (STAT)

## 2024-04-29 PROCEDURE — 94761 N-INVAS EAR/PLS OXIMETRY MLT: CPT

## 2024-04-29 PROCEDURE — 82962 GLUCOSE BLOOD TEST: CPT

## 2024-04-29 PROCEDURE — 25000003 PHARM REV CODE 250: Performed by: NURSE ANESTHETIST, CERTIFIED REGISTERED

## 2024-04-29 PROCEDURE — 0D758ZZ DILATION OF ESOPHAGUS, VIA NATURAL OR ARTIFICIAL OPENING ENDOSCOPIC: ICD-10-PCS | Performed by: INTERNAL MEDICINE

## 2024-04-29 PROCEDURE — 43239 EGD BIOPSY SINGLE/MULTIPLE: CPT | Mod: 59 | Performed by: INTERNAL MEDICINE

## 2024-04-29 PROCEDURE — D9220A PRA ANESTHESIA: Mod: ,,, | Performed by: NURSE ANESTHETIST, CERTIFIED REGISTERED

## 2024-04-29 PROCEDURE — 0DB78ZX EXCISION OF STOMACH, PYLORUS, VIA NATURAL OR ARTIFICIAL OPENING ENDOSCOPIC, DIAGNOSTIC: ICD-10-PCS | Performed by: INTERNAL MEDICINE

## 2024-04-29 PROCEDURE — 99232 SBSQ HOSP IP/OBS MODERATE 35: CPT | Mod: ,,, | Performed by: HOSPITALIST

## 2024-04-29 PROCEDURE — 94640 AIRWAY INHALATION TREATMENT: CPT

## 2024-04-29 PROCEDURE — 80048 BASIC METABOLIC PNL TOTAL CA: CPT | Performed by: INTERNAL MEDICINE

## 2024-04-29 RX ORDER — LIDOCAINE HYDROCHLORIDE 20 MG/ML
INJECTION, SOLUTION EPIDURAL; INFILTRATION; INTRACAUDAL; PERINEURAL
Status: DISCONTINUED | OUTPATIENT
Start: 2024-04-29 | End: 2024-04-29

## 2024-04-29 RX ORDER — ETOMIDATE 2 MG/ML
INJECTION INTRAVENOUS
Status: DISCONTINUED | OUTPATIENT
Start: 2024-04-29 | End: 2024-04-29

## 2024-04-29 RX ORDER — PROPOFOL 10 MG/ML
VIAL (ML) INTRAVENOUS
Status: DISCONTINUED | OUTPATIENT
Start: 2024-04-29 | End: 2024-04-29

## 2024-04-29 RX ADMIN — HYDRALAZINE HYDROCHLORIDE 50 MG: 50 TABLET ORAL at 09:04

## 2024-04-29 RX ADMIN — METOPROLOL SUCCINATE 25 MG: 25 TABLET, EXTENDED RELEASE ORAL at 12:04

## 2024-04-29 RX ADMIN — IPRATROPIUM BROMIDE AND ALBUTEROL SULFATE 3 ML: 2.5; .5 SOLUTION RESPIRATORY (INHALATION) at 07:04

## 2024-04-29 RX ADMIN — ETOMIDATE 10 MG: 20 INJECTION, SOLUTION INTRAVENOUS at 10:04

## 2024-04-29 RX ADMIN — BENZOCAINE 1 EACH: 200 SPRAY DENTAL; ORAL; PERIODONTAL at 10:04

## 2024-04-29 RX ADMIN — LIDOCAINE HYDROCHLORIDE 80 MG: 20 INJECTION, SOLUTION INTRAVENOUS at 10:04

## 2024-04-29 RX ADMIN — CEFTRIAXONE SODIUM 1 G: 1 INJECTION, POWDER, FOR SOLUTION INTRAMUSCULAR; INTRAVENOUS at 04:04

## 2024-04-29 RX ADMIN — HYDROCODONE BITARTRATE AND ACETAMINOPHEN 1 TABLET: 10; 325 TABLET ORAL at 01:04

## 2024-04-29 RX ADMIN — BUDESONIDE 0.5 MG: 0.5 INHALANT RESPIRATORY (INHALATION) at 07:04

## 2024-04-29 RX ADMIN — INSULIN ASPART 2 UNITS: 100 INJECTION, SOLUTION INTRAVENOUS; SUBCUTANEOUS at 04:04

## 2024-04-29 RX ADMIN — SODIUM CHLORIDE: 9 INJECTION, SOLUTION INTRAVENOUS at 10:04

## 2024-04-29 RX ADMIN — PROPOFOL 25 MG: 10 INJECTION, EMULSION INTRAVENOUS at 10:04

## 2024-04-29 RX ADMIN — INSULIN DETEMIR 40 UNITS: 100 INJECTION, SOLUTION SUBCUTANEOUS at 09:04

## 2024-04-29 RX ADMIN — PANTOPRAZOLE SODIUM 40 MG: 40 TABLET, DELAYED RELEASE ORAL at 09:04

## 2024-04-29 RX ADMIN — INSULIN ASPART 2 UNITS: 100 INJECTION, SOLUTION INTRAVENOUS; SUBCUTANEOUS at 09:04

## 2024-04-29 RX ADMIN — DEXTROSE MONOHYDRATE 125 ML: 100 INJECTION, SOLUTION INTRAVENOUS at 08:04

## 2024-04-29 RX ADMIN — AMLODIPINE BESYLATE 5 MG: 5 TABLET ORAL at 12:04

## 2024-04-29 RX ADMIN — IPRATROPIUM BROMIDE AND ALBUTEROL SULFATE 3 ML: 2.5; .5 SOLUTION RESPIRATORY (INHALATION) at 01:04

## 2024-04-29 NOTE — PLAN OF CARE
Problem: Pneumonia  Goal: Effective Oxygenation and Ventilation  Outcome: Progressing     Problem: Gas Exchange Impaired  Goal: Optimal Gas Exchange  Outcome: Progressing     Problem: Skin Injury Risk Increased  Goal: Skin Health and Integrity  Outcome: Progressing

## 2024-04-29 NOTE — ANESTHESIA PREPROCEDURE EVALUATION
04/29/2024  Vandana Allison is a 89 y.o., female.      Pre-op Assessment    I have reviewed the Patient Summary Reports.     I have reviewed the Nursing Notes. I have reviewed the NPO Status.   I have reviewed the Medications.     Review of Systems  Anesthesia Hx:  No problems with previous Anesthesia   History of prior surgery of interest to airway management or planning:          Denies Family Hx of Anesthesia complications.    Denies Personal Hx of Anesthesia complications.                    Cardiovascular:     Hypertension       CHF              Shortness of Breath       Congestive Heart Failure (CHF)                Hypertension         Pulmonary:  Pneumonia COPD Asthma  Shortness of breath     Asthma:   Chronic Obstructive Pulmonary Disease (COPD):                  Pulmonary Infection:  Pneumonia.     Renal/:  Chronic Renal Disease        Kidney Function/Disease             Hepatic/GI:    Hiatal Hernia, GERD      Gerd    Hernia, Hiatal Hernia      Neurological:    Neuromuscular Disease,                                 Neuromuscular Disease   Endocrine:  Diabetes Hypothyroidism   Diabetes                   Hypothyroidism            Active Ambulatory Problems     Diagnosis Date Noted    Diabetes mellitus, type 2     Neuropathy     Hyponatremia with excess extracellular fluid volume 02/07/2024    Chronic kidney disease, stage 3b 02/07/2024    Hiatal hernia with GERD 02/07/2024    Rheumatoid arthritis 02/07/2024    Essential (primary) hypertension 02/07/2024    Bleeding external hemorrhoids 02/07/2024    Senile asthenia 02/07/2024    TRISTAN (acute kidney injury) 02/08/2024    Post-operative hypothyroidism 02/08/2024    Tricuspid valve insufficiency 02/08/2024    Moderate persistent asthma without complication 02/20/2024    Pulmonary hypertension 02/20/2024    Congestive heart failure 04/27/2024     Bronchospasm 04/27/2024     Resolved Ambulatory Problems     Diagnosis Date Noted    No Resolved Ambulatory Problems     Past Medical History:   Diagnosis Date    Arthritis     COPD (chronic obstructive pulmonary disease)     DNR (do not resuscitate) 02/07/2024    Severe pulmonary hypertension 10/08/2022      Review of patient's allergies indicates:   Allergen Reactions    Aspirin       No current facility-administered medications on file prior to encounter.     Current Outpatient Medications on File Prior to Encounter   Medication Sig Dispense Refill    acetaminophen (TYLENOL) 650 MG TbSR Take 650 mg by mouth every 8 (eight) hours. (Patient not taking: Reported on 4/25/2024)      albuterol (PROVENTIL/VENTOLIN HFA) 90 mcg/actuation inhaler Inhale 2 puffs into the lungs every 4 (four) hours as needed. Rescue 18 g 12    amLODIPine (NORVASC) 5 MG tablet Take 1 tablet (5 mg total) by mouth once daily. 90 tablet 3    budesonide-formoterol 80-4.5 mcg (BREYNA) 80-4.5 mcg/actuation HFAA Inhale 2 puffs into the lungs 2 (two) times daily. 10 g 13    cholecalciferol, vitamin D3, (VITAMIN D3) 125 mcg (5,000 unit) Tab Take 5,000 Units by mouth once daily.      cyanocobalamin, vitamin B-12, 500 mcg Subl Place 1 tablet under the tongue once daily.      empagliflozin (JARDIANCE) 10 mg tablet Take 1 tablet (10 mg total) by mouth once daily. HOLD THIS MEDICATION UNTIL YOU FOLLOW UP WITH YOUR PCP. 90 tablet 3    ferrous sulfate (FEOSOL) 325 mg (65 mg iron) Tab tablet Take 1 tablet (325 mg total) by mouth 3 (three) times a week. On Monday, Wednesday and Friday 36 tablet 3    furosemide (LASIX) 20 MG tablet Take 1 tablet (20 mg total) by mouth once daily. (Patient taking differently: Take 20 mg by mouth 2 (two) times a day.) 90 tablet 3    gabapentin (NEURONTIN) 400 MG capsule TAKE 1 CAPSULE BY MOUTH FOUR TIMES A DAY AS NEEDED 360 capsule 4    hydrALAZINE (APRESOLINE) 25 MG tablet Take 2 tablets (50 mg total) by mouth 2 (two) times  daily.      HYDROcodone-acetaminophen (NORCO)  mg per tablet Take 1 tablet by mouth every 8 (eight) hours as needed for Pain. 90 tablet 0    hydrocortisone (ANUSOL-HC) 2.5 % rectal cream Place rectally 2 (two) times daily. 28 g 12    insulin syringe-needle U-100 1 mL 30 gauge x 5/16 Syrg 1 Syringe by Misc.(Non-Drug; Combo Route) route 2 (two) times daily. 100 each 3    lactulose (CHRONULAC) 10 gram/15 mL solution Take 30 mLs (20 g total) by mouth once daily. 946 mL 11    levothyroxine (SYNTHROID) 100 MCG tablet Take 1 tablet (100 mcg total) by mouth before breakfast. 90 tablet 3    magnesium citrate 100 mg Tab Take 400 mg by mouth as needed.      methocarbamoL (ROBAXIN) 750 MG Tab TAKE 2 TABLETS BY MOUTH 4 TIMES A DAY AS NEEDED FOR MUSCLE SPASMS  Strength: 750 mg (Patient taking differently: Take 750 mg by mouth once daily. TAKE 2 TABLETS BY MOUTH 4 TIMES A DAY AS NEEDED FOR MUSCLE SPASMS  Strength: 750 mg) 240 tablet 1    metoprolol succinate (TOPROL-XL) 25 MG 24 hr tablet Take 1 tablet (25 mg total) by mouth once daily. 90 tablet 3    NOVOLIN N NPH U-100 INSULIN 100 unit/mL injection INJECT 40 UNITS INTO THE SKIN TWICE A DAY (Patient taking differently: Inject into the skin. Self adjust dose) 30 mL 2    omega-3 fatty acids/fish oil (FISH OIL-OMEGA-3 FATTY ACIDS) 300-1,000 mg capsule Take 1 capsule by mouth once daily.      pantoprazole (PROTONIX) 40 MG tablet TAKE 1 TABLET BY MOUTH TWICE A  tablet 3    predniSONE (DELTASONE) 5 MG tablet Take 1 tablet (5 mg total) by mouth once daily. 90 tablet 3    traMADoL (ULTRAM) 50 mg tablet TAKE 1 TABLET BY MOUTH FOUR TIMES A  tablet 4        Physical Exam  General: Well nourished    Airway:  Mallampati: II   Mouth Opening: Normal  TM Distance: Normal, at least 6 cm  Tongue: Normal  Neck ROM: Normal ROM        Anesthesia Plan  Type of Anesthesia, risks & benefits discussed:    Anesthesia Type: MAC  Intra-op Monitoring Plan: Standard ASA Monitors  Post Op  Pain Control Plan: multimodal analgesia  Induction:  IV  Informed Consent: Informed consent signed with the Patient and all parties understand the risks and agree with anesthesia plan.  All questions answered. Patient consented to blood products? No  ASA Score: 3  Day of Surgery Review of History & Physical: H&P Update referred to the surgeon/provider.I have interviewed and examined the patient. I have reviewed the patient's H&P dated: There are no significant changes.     Ready For Surgery From Anesthesia Perspective.     .

## 2024-04-29 NOTE — TRANSFER OF CARE
"Anesthesia Transfer of Care Note    Patient: Vandana Allison    Procedure(s) Performed: * No procedures listed *    Patient location: GI    Anesthesia Type: general (pt was not Arousable even with painful stimulation during the procedure)    Transport from OR: Transported from OR on room air with adequate spontaneous ventilation    Post pain: adequate analgesia    Post assessment: no apparent anesthetic complications    Post vital signs: stable    Level of consciousness: sedated and responds to stimulation    Nausea/Vomiting: no nausea/vomiting    Complications: none    Transfer of care protocol was followedComments: Good SV continue, NAD noted, VSS, RTRN Dounneb ordered in recovery pt wheezing      Last vitals: Visit Vitals  BP (!) 196/99   Pulse 93   Temp 36.8 °C (98.2 °F) (Oral)   Resp (!) 27   Ht 5' 6" (1.676 m)   Wt 81.2 kg (179 lb)   SpO2 98%   Breastfeeding No   BMI 28.89 kg/m²     "

## 2024-04-29 NOTE — ANESTHESIA POSTPROCEDURE EVALUATION
Anesthesia Post Evaluation    Patient: Vandana Allison    Procedure(s) Performed: * No procedures listed *    Final Anesthesia Type: general (The pt was Not Arousable even with painful stimulation during procedure)      Patient location during evaluation: GI PACU ([facility])  Patient participation: Yes- Able to Participate  Level of consciousness: awake and alert  Post-procedure vital signs: reviewed and stable  Pain management: adequate  Airway patency: patent    PONV status at discharge: No PONV  Anesthetic complications: no      Cardiovascular status: blood pressure returned to baseline, hemodynamically stable and stable  Respiratory status: unassisted  Hydration status: euvolemic  Follow-up not needed.  Comments: Refer to nursing note for pain/suhas score upon discharge from recovery.               Vitals Value Taken Time   /82 04/29/24 1351   Temp 97 04/29/24 1355   Pulse 100 04/29/24 1309   Resp 16 04/29/24 1351   SpO2 95 % 04/29/24 1351         Event Time   Out of Recovery 04/29/2024 11:39:09         Pain/Suhas Score: Pain Rating Prior to Med Admin: 7 (4/29/2024  1:51 PM)  Pain Rating Post Med Admin: 0 (4/28/2024 10:43 PM)  Suhas Score: 10 (4/29/2024 10:43 AM)

## 2024-04-30 VITALS
HEIGHT: 66 IN | HEART RATE: 71 BPM | DIASTOLIC BLOOD PRESSURE: 73 MMHG | OXYGEN SATURATION: 95 % | RESPIRATION RATE: 16 BRPM | SYSTOLIC BLOOD PRESSURE: 164 MMHG | BODY MASS INDEX: 28.77 KG/M2 | WEIGHT: 179 LBS | TEMPERATURE: 98 F

## 2024-04-30 PROBLEM — R06.02 SHORTNESS OF BREATH: Status: RESOLVED | Noted: 2024-04-28 | Resolved: 2024-04-30

## 2024-04-30 PROBLEM — E87.1 HYPONATREMIA WITH EXCESS EXTRACELLULAR FLUID VOLUME: Status: RESOLVED | Noted: 2024-02-07 | Resolved: 2024-04-30

## 2024-04-30 PROBLEM — J18.9 PNEUMONIA DUE TO INFECTIOUS ORGANISM: Status: RESOLVED | Noted: 2024-04-27 | Resolved: 2024-04-30

## 2024-04-30 PROBLEM — J45.41 ACUTE EXACERBATION OF MODERATE PERSISTENT EXTRINSIC ASTHMA: Status: RESOLVED | Noted: 2024-04-27 | Resolved: 2024-04-30

## 2024-04-30 PROBLEM — R13.19 ESOPHAGEAL DYSPHAGIA: Status: RESOLVED | Noted: 2024-04-28 | Resolved: 2024-04-30

## 2024-04-30 PROBLEM — N17.9 AKI (ACUTE KIDNEY INJURY): Status: RESOLVED | Noted: 2024-02-08 | Resolved: 2024-04-30

## 2024-04-30 LAB
ANION GAP SERPL CALCULATED.3IONS-SCNC: 8 MMOL/L (ref 7–16)
BASOPHILS # BLD AUTO: 0.01 K/UL (ref 0–0.2)
BASOPHILS NFR BLD AUTO: 0.1 % (ref 0–1)
BUN SERPL-MCNC: 29 MG/DL (ref 7–18)
BUN/CREAT SERPL: 19 (ref 6–20)
CALCIUM SERPL-MCNC: 8.9 MG/DL (ref 8.5–10.1)
CHLORIDE SERPL-SCNC: 111 MMOL/L (ref 98–107)
CO2 SERPL-SCNC: 28 MMOL/L (ref 21–32)
CREAT SERPL-MCNC: 1.5 MG/DL (ref 0.55–1.02)
DIFFERENTIAL METHOD BLD: ABNORMAL
EGFR (NO RACE VARIABLE) (RUSH/TITUS): 32 ML/MIN/1.73M2
EOSINOPHIL # BLD AUTO: 0.22 K/UL (ref 0–0.5)
EOSINOPHIL NFR BLD AUTO: 2.6 % (ref 1–4)
ERYTHROCYTE [DISTWIDTH] IN BLOOD BY AUTOMATED COUNT: 20.4 % (ref 11.5–14.5)
ESTROGEN SERPL-MCNC: NORMAL PG/ML
GLUCOSE SERPL-MCNC: 111 MG/DL (ref 70–105)
GLUCOSE SERPL-MCNC: 123 MG/DL (ref 74–106)
GLUCOSE SERPL-MCNC: 138 MG/DL (ref 70–105)
GLUCOSE SERPL-MCNC: 138 MG/DL (ref 70–110)
GLUCOSE SERPL-MCNC: 169 MG/DL (ref 70–105)
GLUCOSE SERPL-MCNC: 175 MG/DL (ref 70–105)
GLUCOSE SERPL-MCNC: 40 MG/DL (ref 70–105)
GLUCOSE SERPL-MCNC: 40 MG/DL (ref 70–110)
HCT VFR BLD AUTO: 30.8 % (ref 38–47)
HGB BLD-MCNC: 9.4 G/DL (ref 12–16)
IMM GRANULOCYTES # BLD AUTO: 0.01 K/UL (ref 0–0.04)
IMM GRANULOCYTES NFR BLD: 0.1 % (ref 0–0.4)
INSULIN SERPL-ACNC: NORMAL U[IU]/ML
LAB AP GROSS DESCRIPTION: NORMAL
LAB AP LABORATORY NOTES: NORMAL
LYMPHOCYTES # BLD AUTO: 0.55 K/UL (ref 1–4.8)
LYMPHOCYTES NFR BLD AUTO: 6.5 % (ref 27–41)
MCH RBC QN AUTO: 25.5 PG (ref 27–31)
MCHC RBC AUTO-ENTMCNC: 30.5 G/DL (ref 32–36)
MCV RBC AUTO: 83.5 FL (ref 80–96)
MONOCYTES # BLD AUTO: 0.6 K/UL (ref 0–0.8)
MONOCYTES NFR BLD AUTO: 7.1 % (ref 2–6)
MPC BLD CALC-MCNC: 8.5 FL (ref 9.4–12.4)
NEUTROPHILS # BLD AUTO: 7.07 K/UL (ref 1.8–7.7)
NEUTROPHILS NFR BLD AUTO: 83.6 % (ref 53–65)
NRBC # BLD AUTO: 0 X10E3/UL
NRBC, AUTO (.00): 0 %
PLATELET # BLD AUTO: 232 K/UL (ref 150–400)
POTASSIUM SERPL-SCNC: 4.5 MMOL/L (ref 3.5–5.1)
RBC # BLD AUTO: 3.69 M/UL (ref 4.2–5.4)
SODIUM SERPL-SCNC: 142 MMOL/L (ref 136–145)
T3RU NFR SERPL: NORMAL %
WBC # BLD AUTO: 8.46 K/UL (ref 4.5–11)

## 2024-04-30 PROCEDURE — 99239 HOSP IP/OBS DSCHRG MGMT >30: CPT | Mod: ,,, | Performed by: HOSPITALIST

## 2024-04-30 PROCEDURE — 63600175 PHARM REV CODE 636 W HCPCS: Performed by: INTERNAL MEDICINE

## 2024-04-30 PROCEDURE — 25000003 PHARM REV CODE 250: Performed by: INTERNAL MEDICINE

## 2024-04-30 PROCEDURE — 99900035 HC TECH TIME PER 15 MIN (STAT)

## 2024-04-30 PROCEDURE — 36415 COLL VENOUS BLD VENIPUNCTURE: CPT | Performed by: INTERNAL MEDICINE

## 2024-04-30 PROCEDURE — 94640 AIRWAY INHALATION TREATMENT: CPT

## 2024-04-30 PROCEDURE — 85025 COMPLETE CBC W/AUTO DIFF WBC: CPT | Performed by: HOSPITALIST

## 2024-04-30 PROCEDURE — 94761 N-INVAS EAR/PLS OXIMETRY MLT: CPT

## 2024-04-30 PROCEDURE — 82962 GLUCOSE BLOOD TEST: CPT

## 2024-04-30 PROCEDURE — 25000242 PHARM REV CODE 250 ALT 637 W/ HCPCS: Performed by: INTERNAL MEDICINE

## 2024-04-30 PROCEDURE — 27000221 HC OXYGEN, UP TO 24 HOURS

## 2024-04-30 PROCEDURE — A4216 STERILE WATER/SALINE, 10 ML: HCPCS | Performed by: INTERNAL MEDICINE

## 2024-04-30 PROCEDURE — 25000242 PHARM REV CODE 250 ALT 637 W/ HCPCS: Performed by: HOSPITALIST

## 2024-04-30 PROCEDURE — 63700000 PHARM REV CODE 250 ALT 637 W/O HCPCS: Performed by: HOSPITALIST

## 2024-04-30 PROCEDURE — 80048 BASIC METABOLIC PNL TOTAL CA: CPT | Performed by: INTERNAL MEDICINE

## 2024-04-30 RX ORDER — ALBUTEROL SULFATE 90 UG/1
2 AEROSOL, METERED RESPIRATORY (INHALATION) EVERY 4 HOURS PRN
Qty: 18 G | Refills: 6 | Status: SHIPPED | OUTPATIENT
Start: 2024-04-30 | End: 2024-05-20 | Stop reason: SDUPTHER

## 2024-04-30 RX ORDER — AMOXICILLIN AND CLAVULANATE POTASSIUM 875; 125 MG/1; MG/1
1 TABLET, FILM COATED ORAL 2 TIMES DAILY
Qty: 14 TABLET | Refills: 0 | Status: SHIPPED | OUTPATIENT
Start: 2024-04-30 | End: 2024-05-07

## 2024-04-30 RX ORDER — BUDESONIDE AND FORMOTEROL FUMARATE DIHYDRATE 80; 4.5 UG/1; UG/1
2 AEROSOL RESPIRATORY (INHALATION) 2 TIMES DAILY
Qty: 10 G | Refills: 6 | Status: SHIPPED | OUTPATIENT
Start: 2024-04-30 | End: 2024-05-20 | Stop reason: SDUPTHER

## 2024-04-30 RX ORDER — POLYETHYLENE GLYCOL 3350 17 G/17G
17 POWDER, FOR SOLUTION ORAL DAILY PRN
Qty: 17 PACKET | Refills: 5 | Status: SHIPPED | OUTPATIENT
Start: 2024-04-30

## 2024-04-30 RX ORDER — IPRATROPIUM BROMIDE AND ALBUTEROL SULFATE 2.5; .5 MG/3ML; MG/3ML
3 SOLUTION RESPIRATORY (INHALATION) 4 TIMES DAILY PRN
Qty: 360 ML | Refills: 5 | Status: SHIPPED | OUTPATIENT
Start: 2024-04-30 | End: 2024-10-27

## 2024-04-30 RX ADMIN — DEXTROSE MONOHYDRATE 250 ML: 100 INJECTION, SOLUTION INTRAVENOUS at 08:04

## 2024-04-30 RX ADMIN — IPRATROPIUM BROMIDE AND ALBUTEROL SULFATE 3 ML: 2.5; .5 SOLUTION RESPIRATORY (INHALATION) at 12:04

## 2024-04-30 RX ADMIN — Medication 10 ML: at 08:04

## 2024-04-30 RX ADMIN — METOPROLOL SUCCINATE 25 MG: 25 TABLET, EXTENDED RELEASE ORAL at 08:04

## 2024-04-30 RX ADMIN — POLYETHYLENE GLYCOL 3350 17 G: 17 POWDER, FOR SOLUTION ORAL at 08:04

## 2024-04-30 RX ADMIN — BUDESONIDE 0.5 MG: 0.5 INHALANT RESPIRATORY (INHALATION) at 07:04

## 2024-04-30 RX ADMIN — IPRATROPIUM BROMIDE AND ALBUTEROL SULFATE 3 ML: 2.5; .5 SOLUTION RESPIRATORY (INHALATION) at 04:04

## 2024-04-30 RX ADMIN — IPRATROPIUM BROMIDE AND ALBUTEROL SULFATE 3 ML: 2.5; .5 SOLUTION RESPIRATORY (INHALATION) at 07:04

## 2024-04-30 RX ADMIN — PREDNISONE 5 MG: 5 TABLET ORAL at 08:04

## 2024-04-30 RX ADMIN — LEVOTHYROXINE SODIUM 100 MCG: 100 TABLET ORAL at 05:04

## 2024-04-30 RX ADMIN — PANTOPRAZOLE SODIUM 40 MG: 40 TABLET, DELAYED RELEASE ORAL at 08:04

## 2024-04-30 RX ADMIN — AMLODIPINE BESYLATE 5 MG: 5 TABLET ORAL at 08:04

## 2024-04-30 RX ADMIN — HYDROCODONE BITARTRATE AND ACETAMINOPHEN 1 TABLET: 10; 325 TABLET ORAL at 03:04

## 2024-04-30 RX ADMIN — HYDRALAZINE HYDROCHLORIDE 50 MG: 50 TABLET ORAL at 08:04

## 2024-04-30 RX ADMIN — AZITHROMYCIN DIHYDRATE 500 MG: 250 TABLET ORAL at 08:04

## 2024-04-30 RX ADMIN — CEFTRIAXONE SODIUM 1 G: 1 INJECTION, POWDER, FOR SOLUTION INTRAMUSCULAR; INTRAVENOUS at 03:04

## 2024-04-30 NOTE — ASSESSMENT & PLAN NOTE
Creatine stable for now. BMP reviewed- noted Estimated Creatinine Clearance: 23 mL/min (A) (based on SCr of 1.5 mg/dL (H)). according to latest data. 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  Monitor UOP and serial BMP and adjust therapy as needed. Renally dose meds. Avoid nephrotoxic medications and procedures.

## 2024-04-30 NOTE — NURSING
Discharge instructions reviewed with patient and daughter Claribel Morelos; and copy given to patient. Patient and daughter voiced understanding regarding:meds, appt., signs and symptoms to report to physician.

## 2024-04-30 NOTE — ASSESSMENT & PLAN NOTE
"  Patient's FSGs are controlled on current medication regimen.  Last A1c reviewed-   Lab Results   Component Value Date    HGBA1C 7.7 (H) 02/07/2024     Most recent fingerstick glucose reviewed- No results for input(s): "POCTGLUCOSE" in the last 24 hours.  Current correctional scale  Low  Maintain anti-hyperglycemic dose as follows-   Antihyperglycemics (From admission, onward)      Low intensity sliding scale insulin          Hold Oral hypoglycemics while patient is in the hospital.    04/ 28 adjust insulin as needed.  Blood sugar should improve coming off steroids    04/30 patient states blood sugars been low at times in the morning.  She has been giving her Novolin in insulin more on a sliding scale at home.  States if her sugars less than 200 she has been only giving herself only 20 units of the Novolin N. Talk with her about adjusting regimen but she states she feels comfortable that she will straighten her blood sugar out when she gets home.  She did not wish to stay longer but wants to go home.  I have talked to her about discussing with Dr. Larson in follow up in about 1 week        "

## 2024-04-30 NOTE — DISCHARGE INSTRUCTIONS
Hospitalist Discharge orders  *Notify your healthcare provider if you experience any of the following: temperature >100.4  * Notify your healthcare provider if you experience any of the following: redness, tenderness.    *Notify your healthcare provider if you experience any of the following: difficulty breathing or     increased cough.  *Notify your physician if you experience any persistent nausea, vomiting, or diarrhea or headache  *Notify your physician if you experience any of the following:severe uncontrolled pain;worsening rash, increased confusion or weakness; dizziness, lightheadedness or visual disturbances        Patient to put 4-6 inch blocks under the head of her bed

## 2024-04-30 NOTE — NURSING
Pt reported to nurse she wasn't going to [facility] today and wants her RX delivered to her in her room, [facility] states the RX called in to [facility] [place] needed to be cancelled, Spoke with andrew Garcia to call [facility] and cancel the RX. Spoke with Yue at [facility] in [place], RX cancelled, will call [facility] to fill RX here.

## 2024-04-30 NOTE — ASSESSMENT & PLAN NOTE
Chronic, controlled. Latest blood pressure and vitals reviewed-     Temp:  [97.5 °F (36.4 °C)-98.3 °F (36.8 °C)]   Pulse:  []   Resp:  [16-22]   BP: (129-169)/(69-82)   SpO2:  [90 %-99 %] .   Home meds for hypertension were reviewed and noted below.   Hypertension Medications               amLODIPine (NORVASC) 5 MG tablet Take 1 tablet (5 mg total) by mouth once daily.    furosemide (LASIX) 20 MG tablet Take 1 tablet (20 mg total) by mouth once daily.    hydrALAZINE (APRESOLINE) 25 MG tablet Take 2 tablets (50 mg total) by mouth 2 (two) times daily.    metoprolol succinate (TOPROL-XL) 25 MG 24 hr tablet Take 1 tablet (25 mg total) by mouth once daily.            While in the hospital, will manage blood pressure as follows; Continue home antihypertensive regimen    Will utilize p.r.n. blood pressure medication only if patient's blood pressure greater than 180/110 and she develops symptoms such as worsening chest pain or shortness of breath.

## 2024-04-30 NOTE — ASSESSMENT & PLAN NOTE
Dysphagia to solids with plan EGD by GI  Had previous EGD in 2022 showing gastric polyps    04/29 EGD with dilatation.   04/30 patient felt she swallowed better this a.m. and feels the dilatation Helped

## 2024-04-30 NOTE — ASSESSMENT & PLAN NOTE
Recheck O2 sat on RA in am  04/30 oxygen saturation 94% on room air  Patient will have some chronic infiltrates in that they have been there on all her studies over the last greater than a year.  If continued symptoms could consider CT scan chest to evaluate although CT scan done this March look better than expected and better than patient's chest x-ray.  That is study was done without contrast.   Told her to follow up with her primary care provider and and will leave any referrals to pulmonary to Dr. Larson if felt needed

## 2024-04-30 NOTE — PLAN OF CARE
Problem: Diabetes Comorbidity  Goal: Blood Glucose Level Within Targeted Range  Outcome: Progressing  Intervention: Monitor and Manage Glycemia  Flowsheets (Taken 4/29/2024 2048)  Glycemic Management: blood glucose monitored     Problem: Acute Kidney Injury/Impairment  Goal: Fluid and Electrolyte Balance  Outcome: Progressing  Intervention: Monitor and Manage Fluid and Electrolyte Balance  Flowsheets (Taken 4/29/2024 2048)  Fluid/Electrolyte Management: fluids provided  Goal: Improved Oral Intake  Outcome: Progressing  Goal: Effective Renal Function  Outcome: Progressing     Problem: Pneumonia  Goal: Fluid Balance  Outcome: Progressing  Intervention: Monitor and Manage Fluid Balance  Flowsheets (Taken 4/29/2024 2048)  Fluid/Electrolyte Management: fluids provided

## 2024-04-30 NOTE — SUBJECTIVE & OBJECTIVE
Interval History:     Review of Systems   Constitutional:  Negative for appetite change, fatigue and fever.   HENT:  Positive for trouble swallowing. Negative for congestion and hearing loss.    Respiratory:  Negative for chest tightness, shortness of breath and wheezing.    Cardiovascular:  Negative for chest pain and palpitations.   Gastrointestinal:  Positive for anal bleeding and blood in stool. Negative for abdominal pain, constipation and nausea.   Genitourinary:  Negative for difficulty urinating and dysuria.   Musculoskeletal:  Negative for back pain and neck stiffness.   Skin:  Negative for pallor and rash.   Neurological:  Negative for dizziness, speech difficulty and headaches.   Psychiatric/Behavioral:  Negative for confusion and suicidal ideas.      Objective:     Vital Signs (Most Recent):  Temp: 98 °F (36.7 °C) (04/29/24 2047)  Pulse: 88 (04/29/24 2047)  Resp: 18 (04/29/24 2047)  BP: 138/76 (04/29/24 2047)  SpO2: 98 % (04/29/24 2047) Vital Signs (24h Range):  Temp:  [97.8 °F (36.6 °C)-98.2 °F (36.8 °C)] 98 °F (36.7 °C)  Pulse:  [] 88  Resp:  [15-29] 18  SpO2:  [90 %-100 %] 98 %  BP: (138-196)/() 138/76     Weight: 81.2 kg (179 lb)  Body mass index is 28.89 kg/m².  No intake or output data in the 24 hours ending 04/29/24 2202        Physical Exam  Vitals reviewed.   Constitutional:       General: She is awake. She is not in acute distress.     Appearance: She is well-developed. She is not toxic-appearing.   HENT:      Head: Normocephalic.      Nose: Nose normal.      Mouth/Throat:      Pharynx: Oropharynx is clear.   Eyes:      Extraocular Movements: Extraocular movements intact.      Pupils: Pupils are equal, round, and reactive to light.   Neck:      Thyroid: No thyroid mass.      Vascular: No carotid bruit.   Cardiovascular:      Rate and Rhythm: Normal rate and regular rhythm.      Pulses: Normal pulses.      Heart sounds: Normal heart sounds. No murmur heard.  Pulmonary:      Effort:  Pulmonary effort is normal.      Breath sounds: Normal breath sounds and air entry. No wheezing.   Abdominal:      General: Bowel sounds are normal. There is no distension.      Palpations: Abdomen is soft.      Tenderness: There is no abdominal tenderness.   Musculoskeletal:         General: Normal range of motion.      Cervical back: Neck supple. No rigidity.   Skin:     General: Skin is warm.      Coloration: Skin is not jaundiced.      Findings: No lesion.   Neurological:      General: No focal deficit present.      Mental Status: She is alert and oriented to person, place, and time.      Cranial Nerves: No cranial nerve deficit.   Psychiatric:         Attention and Perception: Attention normal.         Mood and Affect: Mood normal.         Behavior: Behavior normal. Behavior is cooperative.         Thought Content: Thought content normal.         Cognition and Memory: Cognition normal.             Significant Labs: All pertinent labs within the past 24 hours have been reviewed.  BMP:   Recent Labs   Lab 04/29/24  0336   *      K 4.2   *   CO2 27   BUN 32*   CREATININE 1.68*   CALCIUM 9.1     CBC:   Recent Labs   Lab 04/28/24 0418 04/29/24  0336   WBC 9.52 12.04*   HGB 9.3* 9.2*   HCT 30.2* 30.0*    261     CMP:   Recent Labs   Lab 04/28/24  0418 04/29/24  0336    142   K 4.1 4.2    110*   CO2 23 27   * 170*   BUN 30* 32*   CREATININE 1.79* 1.68*   CALCIUM 8.9 9.1   ANIONGAP 14 9       Significant Imaging: I have reviewed all pertinent imaging results/findings within the past 24 hours.    Imaging Results              X-Ray Chest AP Portable (Final result)  Result time 04/27/24 08:53:29      Final result by Alex Padgett II, MD (04/27/24 08:53:29)                   Impression:      Findings suggest cardiac decompensation and / or pneumonitis.      Electronically signed by: Alex Padgett  Date:    04/27/2024  Time:    08:53               Narrative:     EXAMINATION:  XR CHEST AP PORTABLE    CLINICAL HISTORY:  Dyspnea, unspecified    COMPARISON:  7 February 2024    TECHNIQUE:  XR CHEST AP PORTABLE    FINDINGS:  The heart and mediastinum are stable in size and configuration.  The pulmonary vascularity is increased with bilateral increased interstitial lung density.  No other lung infiltrates, effusions, pneumothorax or other abnormality is demonstrated.                                    Intake/Output - Last 3 Shifts         04/28 0700  04/29 0659 04/29 0700  04/30 0659    P.O. 600     IV Piggyback      Total Intake(mL/kg) 600 (7.4)     Net +600           Urine Occurrence 2 x 1 x          Microbiology Results (last 7 days)       Procedure Component Value Units Date/Time    Blood culture #1 [[id number]] Collected: 04/27/24 0240    Order Status: Completed Specimen: Blood Updated: 04/29/24 0708     Culture, Blood No Growth At 48 Hours    Blood culture #2 [[id number]] Collected: 04/27/24 0252    Order Status: Completed Specimen: Blood Updated: 04/29/24 0708     Culture, Blood No Growth At 48 Hours

## 2024-04-30 NOTE — ASSESSMENT & PLAN NOTE
Longstanding intermittent slight bright red blood per rectum felt to be hemorrhoidal. Had colonoscopy in 2022 showing diffuse diverticulosis and external hemorrhoids  04/30 continue to follow up with Dr. Stephens

## 2024-04-30 NOTE — PROGRESS NOTES
[facility]  [facility] Medicine  Progress Note    Patient Name: Vandana Allison  MRN: [id number]  Patient Class: IP- Inpatient   Admission Date: 4/27/2024  Length of Stay: 2 days  Attending Physician: David Garcia MD  Primary Care Provider: Cm Larson III, DO        Subjective:     Principal Problem:Pneumonia due to infectious organism        HPI:  Patient is a 89-year-old female with a history of moderate persistent asthma, rheumatoid arthritis on prednisone, hypothyroidism, type 2 diabetes with CKD stage 4,  and pulmonary artery hypertension who presented to the emergency room complaining of shortness a breath with wheezing since yesterday.  As per patient's daughter, patient was in her usual state of health until yesterday when she developed dyspnea with wheezing.  She also noticed bilateral lower extremity edema as well for the past few days.  Otherwise patient's daughter denied any associated symptoms such as fever chills cough chest pain palpitation PND or orthopnea.    On presentation, patient was slightly hypoxic on room air but vital signs were otherwise stable and patient was afebrile.  Physical examination was notable for expiratory wheezing and bilateral lower extremity edema.  Initial workup was notable for a presence of CKD stage 4 with serum creatinine of 1.95 which is stable from her baseline, nonfasting blood glucose of 122, leukocytosis with left shift, and chest x-ray demonstrating diffuse bilateral infiltrates suggestive of pneumonia +/- pulmonary edema.  Patient will be admitted for further evaluation and intervention    Overview/Hospital Course:  04/27 Records reviewed.  Ms. Allison  is a 89-year-old (seems much younger and very sharp mentally) presented after awakening during the night short of breath.  States has done this before.  Has history GERD for which she tries not eat before going to bed and sleeps on 2 pillows.  Has had no fever, chest pain or  palpitations.  Noted to be hypoxic in the emergency department with some expiratory wheeze and was admitted to hospitalist service for observation.  Currently patient feels back to baseline and does not feel she is having any issues with breathing or congestion.       Nurse reports history of patient having some small amount of bright blood per rectum earlier today since admission.      Patient had upper and lower endoscopy 2022 by Dr. Keon Wood  showed gastric polyps, diffuse diverticulosis and at that time inflamed external hemorrhoids.  Patient does complain of some current dysphagia to solids in lower esophagus area.  Denies issues with constipation.       Will monitor for stability.  Will discuss with GI.      Past medical history:  -diabetes mellitus type 2  -essential hypertension  -chronic kidney disease stage 4  -GERD with hiatal hernia  -hypothyroidism  -mild-to-moderate pulmonary hypertension with recent echocardiogram showing pulmonary artery systolic pressure 42 mm Hg     Addendum:  Reviewed chest x-ray which shows more likely chronic infiltrates.  Infiltrates present more than a year in reviewing old films.  Current x-ray looks much better than 1 done in February this year with similar infiltrates.  Of note CT scan done in February did not look as bad expected based on chest x-ray.  Has what appears to be small effusion in left lung base.  Effusion not noted on CT scan in February this year     KUB shows increased stool especially in rectum.      Talk with patient and granddaughter about elevating her head of bed on blocks about 5 in and continuing not to eat 4-6 hours before bedtime.    04/28 patient without further episodes.  Seen by GI with plan upper endoscopy in a.m.  patient states she has had some bright blood on her tissues several times over the last several months.  States she is discuss this with Dr. Neo MCGOVERN and felt this was hemorrhoidal.  Has had previous hemorrhoid surgery.  Will  proceed with GI evaluation for dysphagia as planned.  Currently no respiratory complaints and good oxygen saturation on room air.  04/29 Esophageal dilation. Family wished to watch pt post procedure and home tomorrow. Recheck O2 sat in am since staying on O2.         Interval History:     Review of Systems   Constitutional:  Negative for appetite change, fatigue and fever.   HENT:  Positive for trouble swallowing. Negative for congestion and hearing loss.    Respiratory:  Negative for chest tightness, shortness of breath and wheezing.    Cardiovascular:  Negative for chest pain and palpitations.   Gastrointestinal:  Positive for anal bleeding and blood in stool. Negative for abdominal pain, constipation and nausea.   Genitourinary:  Negative for difficulty urinating and dysuria.   Musculoskeletal:  Negative for back pain and neck stiffness.   Skin:  Negative for pallor and rash.   Neurological:  Negative for dizziness, speech difficulty and headaches.   Psychiatric/Behavioral:  Negative for confusion and suicidal ideas.      Objective:     Vital Signs (Most Recent):  Temp: 98 °F (36.7 °C) (04/29/24 2047)  Pulse: 88 (04/29/24 2047)  Resp: 18 (04/29/24 2047)  BP: 138/76 (04/29/24 2047)  SpO2: 98 % (04/29/24 2047) Vital Signs (24h Range):  Temp:  [97.8 °F (36.6 °C)-98.2 °F (36.8 °C)] 98 °F (36.7 °C)  Pulse:  [] 88  Resp:  [15-29] 18  SpO2:  [90 %-100 %] 98 %  BP: (138-196)/() 138/76     Weight: 81.2 kg (179 lb)  Body mass index is 28.89 kg/m².  No intake or output data in the 24 hours ending 04/29/24 2202        Physical Exam  Vitals reviewed.   Constitutional:       General: She is awake. She is not in acute distress.     Appearance: She is well-developed. She is not toxic-appearing.   HENT:      Head: Normocephalic.      Nose: Nose normal.      Mouth/Throat:      Pharynx: Oropharynx is clear.   Eyes:      Extraocular Movements: Extraocular movements intact.      Pupils: Pupils are equal, round, and  reactive to light.   Neck:      Thyroid: No thyroid mass.      Vascular: No carotid bruit.   Cardiovascular:      Rate and Rhythm: Normal rate and regular rhythm.      Pulses: Normal pulses.      Heart sounds: Normal heart sounds. No murmur heard.  Pulmonary:      Effort: Pulmonary effort is normal.      Breath sounds: Normal breath sounds and air entry. No wheezing.   Abdominal:      General: Bowel sounds are normal. There is no distension.      Palpations: Abdomen is soft.      Tenderness: There is no abdominal tenderness.   Musculoskeletal:         General: Normal range of motion.      Cervical back: Neck supple. No rigidity.   Skin:     General: Skin is warm.      Coloration: Skin is not jaundiced.      Findings: No lesion.   Neurological:      General: No focal deficit present.      Mental Status: She is alert and oriented to person, place, and time.      Cranial Nerves: No cranial nerve deficit.   Psychiatric:         Attention and Perception: Attention normal.         Mood and Affect: Mood normal.         Behavior: Behavior normal. Behavior is cooperative.         Thought Content: Thought content normal.         Cognition and Memory: Cognition normal.             Significant Labs: All pertinent labs within the past 24 hours have been reviewed.  BMP:   Recent Labs   Lab 04/29/24  0336   *      K 4.2   *   CO2 27   BUN 32*   CREATININE 1.68*   CALCIUM 9.1     CBC:   Recent Labs   Lab 04/28/24  0418 04/29/24  0336   WBC 9.52 12.04*   HGB 9.3* 9.2*   HCT 30.2* 30.0*    261     CMP:   Recent Labs   Lab 04/28/24 0418 04/29/24  0336    142   K 4.1 4.2    110*   CO2 23 27   * 170*   BUN 30* 32*   CREATININE 1.79* 1.68*   CALCIUM 8.9 9.1   ANIONGAP 14 9       Significant Imaging: I have reviewed all pertinent imaging results/findings within the past 24 hours.    Imaging Results              X-Ray Chest AP Portable (Final result)  Result time 04/27/24 08:53:29      Final  result by Alex Padgett II, MD (04/27/24 08:53:29)                   Impression:      Findings suggest cardiac decompensation and / or pneumonitis.      Electronically signed by: Alex Padgett  Date:    04/27/2024  Time:    08:53               Narrative:    EXAMINATION:  XR CHEST AP PORTABLE    CLINICAL HISTORY:  Dyspnea, unspecified    COMPARISON:  7 February 2024    TECHNIQUE:  XR CHEST AP PORTABLE    FINDINGS:  The heart and mediastinum are stable in size and configuration.  The pulmonary vascularity is increased with bilateral increased interstitial lung density.  No other lung infiltrates, effusions, pneumothorax or other abnormality is demonstrated.                                    Intake/Output - Last 3 Shifts         04/28 0700  04/29 0659 04/29 0700 04/30 0659    P.O. 600     IV Piggyback      Total Intake(mL/kg) 600 (7.4)     Net +600           Urine Occurrence 2 x 1 x          Microbiology Results (last 7 days)       Procedure Component Value Units Date/Time    Blood culture #1 [[id number]] Collected: 04/27/24 0240    Order Status: Completed Specimen: Blood Updated: 04/29/24 0708     Culture, Blood No Growth At 48 Hours    Blood culture #2 [[id number]] Collected: 04/27/24 0252    Order Status: Completed Specimen: Blood Updated: 04/29/24 0708     Culture, Blood No Growth At 48 Hours              Assessment/Plan:      * Pneumonia due to infectious organism    Patient will be started on empiric antibiotics consisting of Rocephin and Zithromax    04/28 cover for pneumonia but very likely changes on chest x-ray or chronic, chest x-ray actually looks better the February study, interstitial changes have been present for over a year.   Concerned etiologies likely chronic microaspiration in aspiration associated with GERD      Shortness of breath    Recheck O2 sat on RA in am    Rectal bleeding  Longstanding intermittent slight bright red blood per rectum felt to be hemorrhoidal. Had colonoscopy in 2022  showing diffuse diverticulosis and external hemorrhoids      Esophageal dysphagia  Dysphagia to solids with plan EGD by GI  Had previous EGD in 2022 showing gastric polyps    04/29 EGD with dilatation.       Acute exacerbation of moderate persistent extrinsic asthma    Patient developed acute exacerbation of moderate persistent extrinsic asthma secondary to community-acquired pneumonia.  Patient will be started on DuoNeb with budesonide, empiric antibiotics along with IV steroid    04/28 no shortness a breath for bronchospasm at this time.  Continue with aerosol breathing treatments if needed but stopped extra steroids other than chronic dose takes for rheumatoid arthritis      CKD (chronic kidney disease), stage IV    Creatine stable for now. BMP reviewed- noted Estimated Creatinine Clearance: 19.3 mL/min (A) (based on SCr of 1.79 mg/dL (H)). according to latest data. 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  Monitor UOP and serial BMP and adjust therapy as needed. Renally dose meds. Avoid nephrotoxic medications and procedures.    Pulmonary hypertension    Patient has a pulmonary hypertension with PASP of 42 and has been maintained on Lasix 20 mg p.o. b.i.d. However, it appeared that patient has developed acute decompensation of pulmonary hypertension.  Will start patient on Lasix 40 mg IV b.i.d.      Essential (primary) hypertension    Chronic, controlled. Latest blood pressure and vitals reviewed-     Temp:  [97.3 °F (36.3 °C)-98.3 °F (36.8 °C)]   Pulse:  [76-91]   Resp:  [16-20]   BP: (126-175)/(69-83)   SpO2:  [98 %-99 %] .   Home meds for hypertension were reviewed and noted below.   Hypertension Medications               amLODIPine (NORVASC) 5 MG tablet Take 1 tablet (5 mg total) by mouth once daily.    furosemide (LASIX) 20 MG tablet Take 1 tablet (20 mg total) by mouth once daily.    hydrALAZINE (APRESOLINE) 25 MG tablet Take 2 tablets (50 mg total) by mouth 2 (two) times daily.     "metoprolol succinate (TOPROL-XL) 25 MG 24 hr tablet Take 1 tablet (25 mg total) by mouth once daily.            While in the hospital, will manage blood pressure as follows; Continue home antihypertensive regimen    Will utilize p.r.n. blood pressure medication only if patient's blood pressure greater than 180/110 and she develops symptoms such as worsening chest pain or shortness of breath.      Rheumatoid arthritis    Continue present management with prednisone 5 mg daily.  Do not feel the stress dose steroid is required at this time      Diabetes mellitus, type 2    Patient's FSGs are controlled on current medication regimen.  Last A1c reviewed-   Lab Results   Component Value Date    HGBA1C 7.7 (H) 02/07/2024     Most recent fingerstick glucose reviewed- No results for input(s): "POCTGLUCOSE" in the last 24 hours.  Current correctional scale  Low  Maintain anti-hyperglycemic dose as follows-   Antihyperglycemics (From admission, onward)      Low intensity sliding scale insulin          Hold Oral hypoglycemics while patient is in the hospital.    04/ 28 adjust insulin as needed.  Blood sugar should improve coming off steroids        VTE Risk Mitigation (From admission, onward)           Ordered     IP VTE HIGH RISK PATIENT  Once         04/27/24 0559     Place sequential compression device  Until discontinued         04/27/24 0559                    Discharge Planning   MARTHA: 4/29/2024     Code Status: DNR   Is the patient medically ready for discharge?:     Reason for patient still in hospital (select all that apply): Treatment, Imaging, and Consult recommendations  Discharge Plan A: Home with family                  David Garcia MD  Department of Hospital Medicine   [facility]    "

## 2024-04-30 NOTE — PLAN OF CARE
SS consulted for DME-nebulizer. [facility]. SS notified [facility] of referral as well. [facility] working on referral. MARYCHUY following

## 2024-04-30 NOTE — NURSING
Pt had POCT blood glucose of 35 then 40 upon recheck after drinking orange juice> Pt is alert and oriented and currently eating breakfast. Dextrose 10% being administered and recheck to be done upon bolus completion. No distress noted at this time.

## 2024-04-30 NOTE — ASSESSMENT & PLAN NOTE
Patient will be started on empiric antibiotics consisting of Rocephin and Zithromax    04/28 cover for pneumonia but very likely changes on chest x-ray or chronic, chest x-ray actually looks better the February study, interstitial changes have been present for over a year.   Concerned etiologies likely chronic microaspiration in aspiration associated with GERD    04/30 will continue coverage for pneumonia possible aspiration.  However symptoms were sudden onset that occurred after she would gone to sleep at night and basically resolved by the next day.  Reviewing past x-rays as above stated appears to have some chronic infiltrates.  Had CT chest in March this year that did not look as bad as expected.  Concern patient is having some microaspiration longstanding associated with GERD HH.  States she has been told numerous times over the last years to put blocks on head of bed never done this.  She does try not to eat before going to bed.  I have told her she needs to and place these proximally 5 in blocks of the head of her bed to help use gravity to prevent reflux in the night.  She can discuss this further with Dr. Stephens

## 2024-04-30 NOTE — DISCHARGE SUMMARY
[facility]  [facility] Medicine  Discharge Summary      Patient Name: Vandana Allison  MRN: [id number]  [facility]: [id number]  Patient Class: IP- Inpatient  Admission Date: 4/27/2024  Hospital Length of Stay: 3 days  Discharge Date and Time:  04/30/2024 12:43 PM  Attending Physician: David Garcia MD   Discharging Provider: David Garcia MD  Primary Care Provider: Cm Larson III, DO    Primary Care Team: Networked reference to record PCT     HPI:   Patient is a 89-year-old female with a history of moderate persistent asthma, rheumatoid arthritis on prednisone, hypothyroidism, type 2 diabetes with CKD stage 4,  and pulmonary artery hypertension who presented to the emergency room complaining of shortness a breath with wheezing since yesterday.  As per patient's daughter, patient was in her usual state of health until yesterday when she developed dyspnea with wheezing.  She also noticed bilateral lower extremity edema as well for the past few days.  Otherwise patient's daughter denied any associated symptoms such as fever chills cough chest pain palpitation PND or orthopnea.    On presentation, patient was slightly hypoxic on room air but vital signs were otherwise stable and patient was afebrile.  Physical examination was notable for expiratory wheezing and bilateral lower extremity edema.  Initial workup was notable for a presence of CKD stage 4 with serum creatinine of 1.95 which is stable from her baseline, nonfasting blood glucose of 122, leukocytosis with left shift, and chest x-ray demonstrating diffuse bilateral infiltrates suggestive of pneumonia +/- pulmonary edema.  Patient will be admitted for further evaluation and intervention    * No surgery found *      Hospital Course:   04/27 Records reviewed.  Ms. Allison  is a 89-year-old (seems much younger and very sharp mentally) presented after awakening during the night short of breath.  States has done this before.  Has history  GERD for which she tries not eat before going to bed and sleeps on 2 pillows.  Has had no fever, chest pain or palpitations.  Noted to be hypoxic in the emergency department with some expiratory wheeze and was admitted to hospitalist service for observation.  Currently patient feels back to baseline and does not feel she is having any issues with breathing or congestion.       Nurse reports history of patient having some small amount of bright blood per rectum earlier today since admission.      Patient had upper and lower endoscopy 2022 by Dr. Keon Wood  showed gastric polyps, diffuse diverticulosis and at that time inflamed external hemorrhoids.  Patient does complain of some current dysphagia to solids in lower esophagus area.  Denies issues with constipation.       Will monitor for stability.  Will discuss with GI.      Past medical history:  -diabetes mellitus type 2  -essential hypertension  -chronic kidney disease stage 4  -GERD with hiatal hernia  -hypothyroidism  -mild-to-moderate pulmonary hypertension with recent echocardiogram showing pulmonary artery systolic pressure 42 mm Hg     Addendum:  Reviewed chest x-ray which shows more likely chronic infiltrates.  Infiltrates present more than a year in reviewing old films.  Current x-ray looks much better than 1 done in February this year with similar infiltrates.  Of note CT scan done in February did not look as bad expected based on chest x-ray.  Has what appears to be small effusion in left lung base.  Effusion not noted on CT scan in February this year     KUB shows increased stool especially in rectum.      Talk with patient and granddaughter about elevating her head of bed on blocks about 5 in and continuing not to eat 4-6 hours before bedtime.    04/28 patient without further episodes.  Seen by GI with plan upper endoscopy in a.m.  patient states she has had some bright blood on her tissues several times over the last several months.  States she is  discuss this with Dr. Larson III and felt this was hemorrhoidal.  Has had previous hemorrhoid surgery.  Will proceed with GI evaluation for dysphagia as planned.  Currently no respiratory complaints and good oxygen saturation on room air.  04/29 Esophageal dilation. Family wished to watch pt post procedure and home tomorrow. Recheck O2 sat in am since staying on O2.     04/30 patient looks and feels better.  She is wanting to go home.  Blood sugar was low this a.m..  Did not get Levemir insulin last night and only sliding scale.  Talk with her about her regiment.  She feels she can adjust her insulin.  We talked about if she felt she need to stay another day but she wants to go home and states she can get her blood sugar straight when she gets home.  I have told her to follow up with Dr. Larson and discuss.  See comments about her regiment below.  Discharged    Patient counseled on the importance of keeping all hospital follow up appointments. Stressed compliance with medications, therapies, or devices as prescribed in order to provide for the best health outcomes and decrease the risk of reoccurrence or further progression of issues.    Additionally, patient given written literature regarding their current disease processes and home care recommendations.   Patient given opportunity to ask questions and verbalized understanding of all information discussed.  Reinforced patient's primary care provider can be a big assets in monitoring and organizing issues after discharge.           Goals of Care Treatment Preferences:  Code Status: DNR      Consults:   Consults (From admission, onward)          Status Ordering Provider     Inpatient consult to Gastroenterology  Once        Provider:  Agustin Haywood MD    Completed MARCUS LORENZANA            Pulmonary  * Pneumonia due to infectious organism-resolved as of 4/30/2024    Patient will be started on empiric antibiotics consisting of Rocephin and Zithromax    04/28  cover for pneumonia but very likely changes on chest x-ray or chronic, chest x-ray actually looks better the February study, interstitial changes have been present for over a year.   Concerned etiologies likely chronic microaspiration in aspiration associated with GERD    04/30 will continue coverage for pneumonia possible aspiration.  However symptoms were sudden onset that occurred after she would gone to sleep at night and basically resolved by the next day.  Reviewing past x-rays as above stated appears to have some chronic infiltrates.  Had CT chest in March this year that did not look as bad as expected.  Concern patient is having some microaspiration longstanding associated with GERD HH.  States she has been told numerous times over the last years to put blocks on head of bed never done this.  She does try not to eat before going to bed.  I have told her she needs to and place these proximally 5 in blocks of the head of her bed to help use gravity to prevent reflux in the night.  She can discuss this further with Dr. Stephens     Shortness of breath-resolved as of 4/30/2024    Recheck O2 sat on RA in am  04/30 oxygen saturation 94% on room air  Patient will have some chronic infiltrates in that they have been there on all her studies over the last greater than a year.  If continued symptoms could consider CT scan chest to evaluate although CT scan done this March look better than expected and better than patient's chest x-ray.  That is study was done without contrast.   Told her to follow up with her primary care provider and and will leave any referrals to pulmonary to Dr. Larson if felt needed    Acute exacerbation of moderate persistent extrinsic asthma-resolved as of 4/30/2024    Patient developed acute exacerbation of moderate persistent extrinsic asthma secondary to community-acquired pneumonia.  Patient will be started on DuoNeb with budesonide, empiric antibiotics along with IV steroid    04/28 no  shortness a breath for bronchospasm at this time.  Continue with aerosol breathing treatments if needed but stopped extra steroids other than chronic dose takes for rheumatoid arthritis  04/30 patient feels she does better with nebulizer and will continue other previous inhalers but give nebulizer use on as needed basis    Cardiac/Vascular  Pulmonary hypertension    Patient has a pulmonary hypertension with PASP of 42 and has been maintained on Lasix 20 mg p.o. b.i.d. However, it appeared that patient has developed acute decompensation of pulmonary hypertension.  Will start patient on Lasix 40 mg IV b.i.d.      Essential (primary) hypertension    Chronic, controlled. Latest blood pressure and vitals reviewed-     Temp:  [97.5 °F (36.4 °C)-98.3 °F (36.8 °C)]   Pulse:  []   Resp:  [16-22]   BP: (129-169)/(69-82)   SpO2:  [90 %-99 %] .   Home meds for hypertension were reviewed and noted below.   Hypertension Medications               amLODIPine (NORVASC) 5 MG tablet Take 1 tablet (5 mg total) by mouth once daily.    furosemide (LASIX) 20 MG tablet Take 1 tablet (20 mg total) by mouth once daily.    hydrALAZINE (APRESOLINE) 25 MG tablet Take 2 tablets (50 mg total) by mouth 2 (two) times daily.    metoprolol succinate (TOPROL-XL) 25 MG 24 hr tablet Take 1 tablet (25 mg total) by mouth once daily.            While in the hospital, will manage blood pressure as follows; Continue home antihypertensive regimen    Will utilize p.r.n. blood pressure medication only if patient's blood pressure greater than 180/110 and she develops symptoms such as worsening chest pain or shortness of breath.      Renal/  CKD (chronic kidney disease), stage IV    Creatine stable for now. BMP reviewed- noted Estimated Creatinine Clearance: 23 mL/min (A) (based on SCr of 1.5 mg/dL (H)). according to latest data. 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  Monitor UOP and serial BMP and adjust therapy as needed. Renally dose  "meds. Avoid nephrotoxic medications and procedures.    Immunology/Multi System  Rheumatoid arthritis    Continue present management with prednisone 5 mg daily.  Do not feel the stress dose steroid is required at this time      Endocrine  Diabetes mellitus, type 2    Patient's FSGs are controlled on current medication regimen.  Last A1c reviewed-   Lab Results   Component Value Date    HGBA1C 7.7 (H) 02/07/2024     Most recent fingerstick glucose reviewed- No results for input(s): "POCTGLUCOSE" in the last 24 hours.  Current correctional scale  Low  Maintain anti-hyperglycemic dose as follows-   Antihyperglycemics (From admission, onward)      Low intensity sliding scale insulin          Hold Oral hypoglycemics while patient is in the hospital.    04/ 28 adjust insulin as needed.  Blood sugar should improve coming off steroids    04/30 patient states blood sugars been low at times in the morning.  She has been giving her Novolin in insulin more on a sliding scale at home.  States if her sugars less than 200 she has been only giving herself only 20 units of the Novolin N. Talk with her about adjusting regimen but she states she feels comfortable that she will straighten her blood sugar out when she gets home.  She did not wish to stay longer but wants to go home.  I have talked to her about discussing with Dr. Larson in follow up in about 1 week          GI  Rectal bleeding  Longstanding intermittent slight bright red blood per rectum felt to be hemorrhoidal. Had colonoscopy in 2022 showing diffuse diverticulosis and external hemorrhoids  04/30 continue to follow up with Dr. Stephens    Esophageal dysphagia-resolved as of 4/30/2024  Dysphagia to solids with plan EGD by GI  Had previous EGD in 2022 showing gastric polyps    04/29 EGD with dilatation.   04/30 patient felt she swallowed better this a.m. and feels the dilatation Helped        Final Active Diagnoses:    Diagnosis Date Noted POA    Rectal bleeding [K62.5] " 04/28/2024 Yes    CKD (chronic kidney disease), stage IV [N18.4] 04/27/2024 Yes    Pulmonary hypertension [I27.20] 02/20/2024 Yes    Essential (primary) hypertension [I10] 02/07/2024 Yes    Rheumatoid arthritis [M06.9] 02/07/2024 Yes    Diabetes mellitus, type 2 [E11.9]  Yes      Problems Resolved During this Admission:    Diagnosis Date Noted Date Resolved POA    PRINCIPAL PROBLEM:  Pneumonia due to infectious organism [J18.9] 04/27/2024 04/30/2024 Yes    Esophageal dysphagia [R13.19] 04/28/2024 04/30/2024 Yes    Shortness of breath [R06.02] 04/28/2024 04/30/2024 Yes    Acute exacerbation of moderate persistent extrinsic asthma [J45.41] 04/27/2024 04/30/2024 Yes       Discharged Condition: fair    Disposition: Home or Self Care    Follow Up:   Follow-up Information       Cm Larson III, DO. Schedule an appointment as soon as possible for a visit in 1 week(s).    Specialty: Family Medicine  Contact information:  [address] [place] [address] Primary Care  [place] [address]  [phone]                           Patient Instructions:      ACCEPT - Ambulatory referral/consult to Cardiology   Standing Status: Future   Referral Priority: Routine Referral Type: Consultation   Referral Reason: Specialty Services Required   Requested Specialty: Cardiology   Number of Visits Requested: 1     Diet diabetic     Activity as tolerated       Significant Diagnostic Studies: Labs: BMP:   Recent Labs   Lab 04/29/24  0336 04/30/24  0252   * 123*    142   K 4.2 4.5   * 111*   CO2 27 28   BUN 32* 29*   CREATININE 1.68* 1.50*   CALCIUM 9.1 8.9   , CMP   Recent Labs   Lab 04/29/24  0336 04/30/24  0252    142   K 4.2 4.5   * 111*   CO2 27 28   * 123*   BUN 32* 29*   CREATININE 1.68* 1.50*   CALCIUM 9.1 8.9   ANIONGAP 9 8   , and CBC   Recent Labs   Lab 04/29/24  0336 04/30/24  0252   WBC 12.04* 8.46   HGB 9.2* 9.4*   HCT 30.0* 30.8*    232     Imaging Results              X-Ray  Chest AP Portable (Final result)  Result time 04/27/24 08:53:29      Final result by Alex Padgett II, MD (04/27/24 08:53:29)                   Impression:      Findings suggest cardiac decompensation and / or pneumonitis.      Electronically signed by: Alex Padgett  Date:    04/27/2024  Time:    08:53               Narrative:    EXAMINATION:  XR CHEST AP PORTABLE    CLINICAL HISTORY:  Dyspnea, unspecified    COMPARISON:  7 February 2024    TECHNIQUE:  XR CHEST AP PORTABLE    FINDINGS:  The heart and mediastinum are stable in size and configuration.  The pulmonary vascularity is increased with bilateral increased interstitial lung density.  No other lung infiltrates, effusions, pneumothorax or other abnormality is demonstrated.                                    Intake/Output - Last 3 Shifts         04/28 0700 04/29 0659 04/29 0700 04/30 0659 04/30 0700 05/01 0659    P.O. 600  924    IV Piggyback  100     Total Intake(mL/kg) 600 (7.4) 100 (1.2) 924 (11.4)    Net +600 +100 +924           Urine Occurrence 2 x 1 x           Microbiology Results (last 7 days)       Procedure Component Value Units Date/Time    Blood culture #1 [[id number]] Collected: 04/27/24 0240    Order Status: Completed Specimen: Blood Updated: 04/30/24 0717     Culture, Blood No Growth At 72 Hours    Blood culture #2 [[id number]] Collected: 04/27/24 0252    Order Status: Completed Specimen: Blood Updated: 04/30/24 0717     Culture, Blood No Growth At 72 Hours                Pending Diagnostic Studies:       Procedure Component Value Units Date/Time    EXTRA TUBES [[id number]] Collected: 04/27/24 0252    Order Status: Sent Lab Status: In process Updated: 04/27/24 0256    Specimen: Blood, Venous     Narrative:      The following orders were created for panel order EXTRA TUBES.  Procedure                               Abnormality         Status                     ---------                               -----------         ------                      Gold Top Hold[[id number]]                                   In process                   Please view results for these tests on the individual orders.    EXTRA TUBES [[id number]] Collected: 04/27/24 0240    Order Status: Sent Lab Status: In process Updated: 04/27/24 0246    Specimen: Blood, Venous     Narrative:      The following orders were created for panel order EXTRA TUBES.  Procedure                               Abnormality         Status                     ---------                               -----------         ------                     Light Blue Top Hold[[id number]]                             In process                   Please view results for these tests on the individual orders.           Medications:  Reconciled Home Medications:      Medication List        START taking these medications      acetaminophen 650 MG Tbsr  Commonly known as: TYLENOL  Take 650 mg by mouth every 8 (eight) hours.     albuterol-ipratropium 2.5 mg-0.5 mg/3 mL nebulizer solution  Commonly known as: DUO-NEB  Take 3 mLs by nebulization 4 (four) times daily as needed for Shortness of Breath. Rescue     amoxicillin-clavulanate 875-125mg 875-125 mg per tablet  Commonly known as: AUGMENTIN  Take 1 tablet by mouth 2 (two) times daily. for 7 days     polyethylene glycol 17 gram Pwpk  Commonly known as: GLYCOLAX  Take 17 g by mouth daily as needed for Constipation.            CHANGE how you take these medications      albuterol 90 mcg/actuation inhaler  Commonly known as: PROVENTIL/VENTOLIN HFA  Inhale 2 puffs into the lungs every 4 (four) hours as needed for Shortness of Breath. Rescue  What changed: reasons to take this     furosemide 20 MG tablet  Commonly known as: LASIX  Take 1 tablet (20 mg total) by mouth once daily.  What changed: when to take this     methocarbamoL 750 MG Tab  Commonly known as: ROBAXIN  TAKE 2 TABLETS BY MOUTH 4 TIMES A DAY AS NEEDED FOR MUSCLE SPASMS  Strength: 750 mg  What changed:   how much  to take  how to take this  when to take this     NovoLIN N NPH U-100 Insulin 100 unit/mL injection  Generic drug: insulin NPH  INJECT 40 UNITS INTO THE SKIN TWICE A DAY  What changed: See the new instructions.            CONTINUE taking these medications      amLODIPine 5 MG tablet  Commonly known as: NORVASC  Take 1 tablet (5 mg total) by mouth once daily.     budesonide-formoterol 80-4.5 mcg 80-4.5 mcg/actuation Hfaa  Commonly known as: BREYNA  Inhale 2 puffs into the lungs 2 (two) times daily.     cyanocobalamin (vitamin B-12) 500 mcg Subl  Place 1 tablet under the tongue once daily.     empagliflozin 10 mg tablet  Commonly known as: Jardiance  Take 1 tablet (10 mg total) by mouth once daily. HOLD THIS MEDICATION UNTIL YOU FOLLOW UP WITH YOUR PCP.     ferrous sulfate 325 mg (65 mg iron) Tab tablet  Commonly known as: FEOSOL  Take 1 tablet (325 mg total) by mouth 3 (three) times a week. On Monday, Wednesday and Friday     fish oil-omega-3 fatty acids 300-1,000 mg capsule  Take 1 capsule by mouth once daily.     gabapentin 400 MG capsule  Commonly known as: NEURONTIN  TAKE 1 CAPSULE BY MOUTH FOUR TIMES A DAY AS NEEDED     hydrALAZINE 25 MG tablet  Commonly known as: APRESOLINE  Take 2 tablets (50 mg total) by mouth 2 (two) times daily.     HYDROcodone-acetaminophen  mg per tablet  Commonly known as: NORCO  Take 1 tablet by mouth every 8 (eight) hours as needed for Pain.     hydrocortisone 2.5 % rectal cream  Commonly known as: ANUSOL-HC  Place rectally 2 (two) times daily.     insulin syringe-needle U-100 1 mL 30 gauge x 5/16 Syrg  1 Syringe by Misc.(Non-Drug; Combo Route) route 2 (two) times daily.     lactulose 10 gram/15 mL solution  Commonly known as: CHRONULAC  Take 30 mLs (20 g total) by mouth once daily.     levothyroxine 100 MCG tablet  Commonly known as: SYNTHROID  Take 1 tablet (100 mcg total) by mouth before breakfast.     magnesium citrate 100 mg Tab  Take 400 mg by mouth as needed.     metoprolol  succinate 25 MG 24 hr tablet  Commonly known as: TOPROL-XL  Take 1 tablet (25 mg total) by mouth once daily.     pantoprazole 40 MG tablet  Commonly known as: PROTONIX  TAKE 1 TABLET BY MOUTH TWICE A DAY     predniSONE 5 MG tablet  Commonly known as: DELTASONE  Take 1 tablet (5 mg total) by mouth once daily.     traMADoL 50 mg tablet  Commonly known as: ULTRAM  TAKE 1 TABLET BY MOUTH FOUR TIMES A DAY     VITAMIN D3 125 mcg (5,000 unit) Tab  Generic drug: cholecalciferol (vitamin D3)  Take 5,000 Units by mouth once daily.              Addendum:  Returned to room to answer some additional questions by patient      Indwelling Lines/Drains at time of discharge:   Lines/Drains/Airways       None                   Time spent on the discharge of patient: 45 minutes         David Garcia MD  Department of Hospital Medicine  [facility]

## 2024-04-30 NOTE — ASSESSMENT & PLAN NOTE
Dysphagia to solids with plan EGD by GI  Had previous EGD in 2022 showing gastric polyps    04/29 EGD with dilatation.

## 2024-04-30 NOTE — ASSESSMENT & PLAN NOTE
Patient developed acute exacerbation of moderate persistent extrinsic asthma secondary to community-acquired pneumonia.  Patient will be started on DuoNeb with budesonide, empiric antibiotics along with IV steroid    04/28 no shortness a breath for bronchospasm at this time.  Continue with aerosol breathing treatments if needed but stopped extra steroids other than chronic dose takes for rheumatoid arthritis  04/30 patient feels she does better with nebulizer and will continue other previous inhalers but give nebulizer use on as needed basis

## 2024-04-30 NOTE — PLAN OF CARE
[facility]  Discharge Final Note    Primary Care Provider: Cm Larson III, DO    Expected Discharge Date: 4/30/2024    Final Discharge Note (most recent)       Final Note - 04/30/24 1251          Final Note    Assessment Type Final Discharge Note     Anticipated Discharge Disposition Home or Self Care     What phone number can be called within the next 1-3 days to see how you are doing after discharge? [phone]        Post-Acute Status    Coverage Medicare [employer]     Discharge Delays None known at this time                     Important Message from Medicare  Important Message from Medicare regarding Discharge Appeal Rights: Given to patient/caregiver, Explained to patient/caregiver, Signed/date by patient/caregiver     Date IMM was signed: 04/30/24  Time IMM was signed: 1250    Contact Info       Cm Larson III, DO   Specialty: Family Medicine   Relationship: PCP - General    [address]  [facility]  [place] [address]   Phone: [phone]       Next Steps: Schedule an appointment as soon as possible for a visit in 1 week(s)          Pt dc home with family. IM updated. No other needs at this time.

## 2024-05-01 ENCOUNTER — PATIENT OUTREACH (OUTPATIENT)
Dept: ADMINISTRATIVE | Facility: CLINIC | Age: 89
End: 2024-05-01

## 2024-05-01 NOTE — PROGRESS NOTES
C3 nurse spoke with daughter Claribel Morelos for a TCC post hospital discharge follow up call. The patient has a scheduled [facility] appointment with Cm Larson III,   on 5/7/24 @ 3164.

## 2024-05-02 ENCOUNTER — TELEPHONE (OUTPATIENT)
Dept: ORTHOPEDICS | Facility: HOSPITAL | Age: 89
End: 2024-05-02
Payer: COMMERCIAL

## 2024-05-02 LAB
BACTERIA BLD CULT: NORMAL
BACTERIA BLD CULT: NORMAL

## 2024-05-02 NOTE — TELEPHONE ENCOUNTER
Patients states doing well, no fever, no pain,taking meds as directed,no complaints or concerns voiced

## 2024-05-03 ENCOUNTER — OFFICE VISIT (OUTPATIENT)
Dept: CARDIOLOGY | Facility: CLINIC | Age: 89
End: 2024-05-03
Payer: COMMERCIAL

## 2024-05-03 ENCOUNTER — TELEPHONE (OUTPATIENT)
Dept: ORTHOPEDICS | Facility: HOSPITAL | Age: 89
End: 2024-05-03
Payer: COMMERCIAL

## 2024-05-03 VITALS
BODY MASS INDEX: 29.25 KG/M2 | HEART RATE: 75 BPM | SYSTOLIC BLOOD PRESSURE: 140 MMHG | DIASTOLIC BLOOD PRESSURE: 90 MMHG | OXYGEN SATURATION: 95 % | HEIGHT: 66 IN | WEIGHT: 182 LBS

## 2024-05-03 DIAGNOSIS — I10 ESSENTIAL (PRIMARY) HYPERTENSION: Primary | ICD-10-CM

## 2024-05-03 DIAGNOSIS — R06.02 SHORTNESS OF BREATH: ICD-10-CM

## 2024-05-03 PROCEDURE — 93005 ELECTROCARDIOGRAM TRACING: CPT | Mod: PBBFAC | Performed by: STUDENT IN AN ORGANIZED HEALTH CARE EDUCATION/TRAINING PROGRAM

## 2024-05-03 PROCEDURE — 1159F MED LIST DOCD IN RCRD: CPT | Mod: CPTII,,, | Performed by: STUDENT IN AN ORGANIZED HEALTH CARE EDUCATION/TRAINING PROGRAM

## 2024-05-03 PROCEDURE — 3288F FALL RISK ASSESSMENT DOCD: CPT | Mod: CPTII,,, | Performed by: STUDENT IN AN ORGANIZED HEALTH CARE EDUCATION/TRAINING PROGRAM

## 2024-05-03 PROCEDURE — 93010 ELECTROCARDIOGRAM REPORT: CPT | Mod: S$PBB,,, | Performed by: STUDENT IN AN ORGANIZED HEALTH CARE EDUCATION/TRAINING PROGRAM

## 2024-05-03 PROCEDURE — 1111F DSCHRG MED/CURRENT MED MERGE: CPT | Mod: CPTII,,, | Performed by: STUDENT IN AN ORGANIZED HEALTH CARE EDUCATION/TRAINING PROGRAM

## 2024-05-03 PROCEDURE — 99204 OFFICE O/P NEW MOD 45 MIN: CPT | Mod: S$PBB,,, | Performed by: STUDENT IN AN ORGANIZED HEALTH CARE EDUCATION/TRAINING PROGRAM

## 2024-05-03 PROCEDURE — 99215 OFFICE O/P EST HI 40 MIN: CPT | Mod: PBBFAC,25 | Performed by: STUDENT IN AN ORGANIZED HEALTH CARE EDUCATION/TRAINING PROGRAM

## 2024-05-03 PROCEDURE — 1101F PT FALLS ASSESS-DOCD LE1/YR: CPT | Mod: CPTII,,, | Performed by: STUDENT IN AN ORGANIZED HEALTH CARE EDUCATION/TRAINING PROGRAM

## 2024-05-03 RX ORDER — FUROSEMIDE 40 MG/1
40 TABLET ORAL DAILY
Qty: 30 TABLET | Refills: 11 | Status: SHIPPED | OUTPATIENT
Start: 2024-05-03 | End: 2025-05-03

## 2024-05-03 NOTE — PROGRESS NOTES
PCP: Cm Larson III, DO    Referring Provider: David Garcia MD  [address] [place] [address]  [facility] [place] [facility] [id number]    Reason for Referral: Shortness of breath    Subjective:   Vandana Allison is a 89 y.o. female with hx of asthma, RA on prednisone, hypothyroidism, status post right nephrectomy and type 2 diabetes who presents for a new patient visit.    Recently hospitalized from 04/27 to 4/30 with dyspnea associated with wheezing and lower extremity edema.  During this hospitalization she underwent IV diuresis and was treated for aspiration pneumonia as well.  She had endorsed dysphagia so she underwent EGD and esophageal dilation.  Recent echo 02/07/2024 with improvement in degree of TR and PASP.  Endorses lower extremity edema and NYHA class II GREENE. She is taking lasix 20 mg BID.     Fhx:  Father (unspecified heart disease)  Shx:  Never smoker    EKG 05/03/2024:  Normal sinus rhythm    ECHO Results for orders placed during the hospital encounter of 02/07/24    Echo    Interpretation Summary    Left Ventricle: The left ventricle is normal in size. Normal wall thickness. There is normal systolic function with a visually estimated ejection fraction of 55 - 70%. Ejection fraction by visual approximation is 60%.    Right Ventricle: Normal right ventricular cavity size.    Aortic Valve: The aortic valve is a trileaflet valve. There is mild aortic valve sclerosis. Mildly calcified cusps.    Mitral Valve: There is mild bileaflet sclerosis. There is mild regurgitation with an eccentric jet.    Tricuspid Valve: There is mild regurgitation.    IVC/SVC: Intermediate venous pressure at 8 mmHg.    Pericardium: There is a trivial effusion.     CATH: No results found for this or any previous visit.     Lab Results   Component Value Date     04/30/2024    K 4.5 04/30/2024     (H) 04/30/2024    CO2 28 04/30/2024    BUN 29 (H) 04/30/2024    CREATININE 1.50 (H) 04/30/2024    CALCIUM 8.9  04/30/2024    ANIONGAP 8 04/30/2024    ESTGFRAFRICA 47 09/08/2020    EGFRNONAA 25 (L) 08/02/2022       Lab Results   Component Value Date    CHOL 249 (H) 02/07/2024    CHOL 245 (H) 11/02/2023    CHOL 232 (H) 06/06/2023     Lab Results   Component Value Date    HDL 71 (H) 02/07/2024    HDL 71 (H) 11/02/2023    HDL 73 (H) 06/06/2023     Lab Results   Component Value Date    LDLCALC 137 02/07/2024    LDLCALC 140 11/02/2023    LDLCALC 110 06/06/2023     Lab Results   Component Value Date    TRIG 206 (H) 02/07/2024    TRIG 170 (H) 11/02/2023    TRIG 243 (H) 06/06/2023     Lab Results   Component Value Date    CHOLHDL 3.5 02/07/2024    CHOLHDL 3.5 11/02/2023    CHOLHDL 3.2 06/06/2023       Lab Results   Component Value Date    WBC 8.46 04/30/2024    HGB 9.4 (L) 04/30/2024    HCT 30.8 (L) 04/30/2024    MCV 83.5 04/30/2024     04/30/2024           Current Outpatient Medications:     acetaminophen (TYLENOL) 650 MG TbSR, Take 650 mg by mouth every 8 (eight) hours., Disp: , Rfl:     albuterol (PROVENTIL/VENTOLIN HFA) 90 mcg/actuation inhaler, Inhale 2 puffs into the lungs every 4 (four) hours as needed for Shortness of Breath. Rescue, Disp: 18 g, Rfl: 6    albuterol-ipratropium (DUO-NEB) 2.5 mg-0.5 mg/3 mL nebulizer solution, Take 3 mLs by nebulization 4 (four) times daily as needed for Shortness of Breath. Rescue, Disp: 360 mL, Rfl: 5    amLODIPine (NORVASC) 5 MG tablet, Take 1 tablet (5 mg total) by mouth once daily., Disp: 90 tablet, Rfl: 3    amoxicillin-clavulanate 875-125mg (AUGMENTIN) 875-125 mg per tablet, Take 1 tablet by mouth 2 (two) times daily. for 7 days, Disp: 14 tablet, Rfl: 0    budesonide-formoterol 80-4.5 mcg (BREYNA) 80-4.5 mcg/actuation HFAA, Inhale 2 puffs into the lungs 2 (two) times daily., Disp: 10 g, Rfl: 6    cholecalciferol, vitamin D3, (VITAMIN D3) 125 mcg (5,000 unit) Tab, Take 5,000 Units by mouth once daily., Disp: , Rfl:     cyanocobalamin, vitamin B-12, 500 mcg Subl, Place 1 tablet  under the tongue once daily., Disp: , Rfl:     ferrous sulfate (FEOSOL) 325 mg (65 mg iron) Tab tablet, Take 1 tablet (325 mg total) by mouth 3 (three) times a week. On Monday, Wednesday and Friday, Disp: 36 tablet, Rfl: 3    furosemide (LASIX) 20 MG tablet, Take 1 tablet (20 mg total) by mouth once daily., Disp: 90 tablet, Rfl: 3    gabapentin (NEURONTIN) 400 MG capsule, TAKE 1 CAPSULE BY MOUTH FOUR TIMES A DAY AS NEEDED, Disp: 360 capsule, Rfl: 4    hydrALAZINE (APRESOLINE) 25 MG tablet, Take 2 tablets (50 mg total) by mouth 2 (two) times daily., Disp: , Rfl:     HYDROcodone-acetaminophen (NORCO)  mg per tablet, Take 1 tablet by mouth every 8 (eight) hours as needed for Pain., Disp: 90 tablet, Rfl: 0    hydrocortisone (ANUSOL-HC) 2.5 % rectal cream, Place rectally 2 (two) times daily., Disp: 28 g, Rfl: 12    insulin syringe-needle U-100 1 mL 30 gauge x 5/16 Syrg, 1 Syringe by Misc.(Non-Drug; Combo Route) route 2 (two) times daily., Disp: 100 each, Rfl: 3    lactulose (CHRONULAC) 10 gram/15 mL solution, Take 30 mLs (20 g total) by mouth once daily., Disp: 946 mL, Rfl: 11    levothyroxine (SYNTHROID) 100 MCG tablet, Take 1 tablet (100 mcg total) by mouth before breakfast., Disp: 90 tablet, Rfl: 3    magnesium citrate 100 mg Tab, Take 400 mg by mouth as needed., Disp: , Rfl:     methocarbamoL (ROBAXIN) 750 MG Tab, TAKE 2 TABLETS BY MOUTH 4 TIMES A DAY AS NEEDED FOR MUSCLE SPASMS Strength: 750 mg (Patient taking differently: Take 750 mg by mouth once daily. TAKE 2 TABLETS BY MOUTH 4 TIMES A DAY AS NEEDED FOR MUSCLE SPASMS Strength: 750 mg), Disp: 240 tablet, Rfl: 1    metoprolol succinate (TOPROL-XL) 25 MG 24 hr tablet, Take 1 tablet (25 mg total) by mouth once daily., Disp: 90 tablet, Rfl: 3    NOVOLIN N NPH U-100 INSULIN 100 unit/mL injection, INJECT 40 UNITS INTO THE SKIN TWICE A DAY (Patient taking differently: Inject into the skin. Self adjust dose), Disp: 30 mL, Rfl: 2    omega-3 fatty acids/fish oil (FISH  "OIL-OMEGA-3 FATTY ACIDS) 300-1,000 mg capsule, Take 1 capsule by mouth once daily., Disp: , Rfl:     pantoprazole (PROTONIX) 40 MG tablet, TAKE 1 TABLET BY MOUTH TWICE A DAY, Disp: 180 tablet, Rfl: 3    predniSONE (DELTASONE) 5 MG tablet, Take 1 tablet (5 mg total) by mouth once daily., Disp: 90 tablet, Rfl: 3    traMADoL (ULTRAM) 50 mg tablet, TAKE 1 TABLET BY MOUTH FOUR TIMES A DAY, Disp: 120 tablet, Rfl: 4    empagliflozin (JARDIANCE) 10 mg tablet, Take 1 tablet (10 mg total) by mouth once daily. HOLD THIS MEDICATION UNTIL YOU FOLLOW UP WITH YOUR PCP. (Patient not taking: Reported on 5/1/2024), Disp: 90 tablet, Rfl: 3    furosemide (LASIX) 40 MG tablet, Take 1 tablet (40 mg total) by mouth once daily., Disp: 30 tablet, Rfl: 11    polyethylene glycol (GLYCOLAX) 17 gram PwPk, Take 17 g by mouth daily as needed for Constipation. (Patient not taking: Reported on 5/1/2024), Disp: 17 packet, Rfl: 5    Review of Systems   Constitutional:  Negative for chills, diaphoresis, fever and malaise/fatigue.   Respiratory:  Negative for cough and shortness of breath.    Cardiovascular:  Negative for chest pain, palpitations, orthopnea, claudication, leg swelling and PND.   Gastrointestinal:  Negative for abdominal pain, heartburn, nausea and vomiting.   Neurological:  Negative for dizziness.       Objective:   BP (!) 140/90 (BP Location: Left arm, Patient Position: Sitting)   Pulse 75   Ht 5' 6" (1.676 m)   Wt 82.6 kg (182 lb)   SpO2 95%   BMI 29.38 kg/m²     Physical Exam  Constitutional:       General: She is not in acute distress.     Appearance: Normal appearance.   Neck:      Comments: JVD present  Cardiovascular:      Rate and Rhythm: Normal rate and regular rhythm.      Pulses: Normal pulses.      Heart sounds: Normal heart sounds. No murmur heard.     No friction rub. No gallop.   Pulmonary:      Effort: Pulmonary effort is normal.      Breath sounds: Normal breath sounds. No wheezing or rales.   Musculoskeletal:     "  Cervical back: Neck supple.      Right lower leg: Edema present.      Left lower leg: Edema present.      Comments: 1 +   Skin:     General: Skin is warm and dry.   Neurological:      Mental Status: She is alert.           Assessment:     1. Essential (primary) hypertension  EKG 12-lead    EKG 12-lead      2. Shortness of breath [R06.02]  ACCEPT - Ambulatory referral/consult to Cardiology            Plan:   No problem-specific Assessment & Plan notes found for this encounter.    Pulmonary hypertension  - recent proBNP 1616 -> improved from Feb 2024  - Etiology: possible 2/2 RA  - Given advanced age and that her symptoms are well controlled on diuretics, do not recommend invasive evaluation  - increase Lasix to 40 mg in a.m. and 20 mg in p.m.    Follow-up in 6 months

## 2024-05-06 LAB
OHS QRS DURATION: 92 MS
OHS QTC CALCULATION: 472 MS

## 2024-05-07 ENCOUNTER — OFFICE VISIT (OUTPATIENT)
Dept: PRIMARY CARE CLINIC | Facility: CLINIC | Age: 89
End: 2024-05-07
Payer: COMMERCIAL

## 2024-05-07 VITALS
SYSTOLIC BLOOD PRESSURE: 160 MMHG | BODY MASS INDEX: 28.77 KG/M2 | OXYGEN SATURATION: 96 % | RESPIRATION RATE: 18 BRPM | WEIGHT: 179 LBS | DIASTOLIC BLOOD PRESSURE: 70 MMHG | HEIGHT: 66 IN | HEART RATE: 78 BPM

## 2024-05-07 DIAGNOSIS — N18.32 CHRONIC KIDNEY DISEASE, STAGE 3B: ICD-10-CM

## 2024-05-07 DIAGNOSIS — J45.40 MODERATE PERSISTENT ASTHMA WITHOUT COMPLICATION: ICD-10-CM

## 2024-05-07 DIAGNOSIS — I10 ESSENTIAL (PRIMARY) HYPERTENSION: ICD-10-CM

## 2024-05-07 DIAGNOSIS — G62.9 NEUROPATHY: Primary | ICD-10-CM

## 2024-05-07 DIAGNOSIS — J98.01 BRONCHOSPASM: ICD-10-CM

## 2024-05-07 PROCEDURE — 99214 OFFICE O/P EST MOD 30 MIN: CPT | Mod: ,,, | Performed by: FAMILY MEDICINE

## 2024-05-07 PROCEDURE — 3288F FALL RISK ASSESSMENT DOCD: CPT | Mod: ,,, | Performed by: FAMILY MEDICINE

## 2024-05-07 PROCEDURE — 1101F PT FALLS ASSESS-DOCD LE1/YR: CPT | Mod: ,,, | Performed by: FAMILY MEDICINE

## 2024-05-07 PROCEDURE — 1159F MED LIST DOCD IN RCRD: CPT | Mod: ,,, | Performed by: FAMILY MEDICINE

## 2024-05-07 PROCEDURE — 1111F DSCHRG MED/CURRENT MED MERGE: CPT | Mod: ,,, | Performed by: FAMILY MEDICINE

## 2024-05-07 NOTE — PROGRESS NOTES
Subjective:      Patient ID: Vandana Allison is a 89 y.o. female.    Chief Complaint: Transitional Care (Pneumonia/)    Vandana Allison a 89 y.o. female presents for follow up on all regular problems which are reviewed and discussed.   Tcc visit. Better. Meds reconciled  Problem List Items Addressed This Visit          Neuro    Neuropathy - Primary       Pulmonary    Moderate persistent asthma without complication    Bronchospasm       Cardiac/Vascular    Essential (primary) hypertension       Renal/    Chronic kidney disease, stage 3b    Overview     baseline creat 1.5            Past Medical History:  Past Medical History:   Diagnosis Date    Arthritis     Bleeding external hemorrhoids     Chronic kidney disease, stage 3b     baseline creat 1.5    COPD (chronic obstructive pulmonary disease)     senile emphysema    Diabetes mellitus, type 2     DNR (do not resuscitate) 02/07/2024    discussed with VITO Christianson present    Essential (primary) hypertension     Hiatal hernia with GERD     Neuropathy     Post-operative hypothyroidism     Severe pulmonary hypertension 10/08/2022    EF  70 % and normal diastolic function     Past Surgical History:   Procedure Laterality Date    BREAST SURGERY      Biopsy    EYE SURGERY      Remove cataracts both eyes    HYSTERECTOMY      NEPHRECTOMY Right 1960    THYROIDECTOMY       Review of patient's allergies indicates:   Allergen Reactions    Aspirin      Current Outpatient Medications on File Prior to Visit   Medication Sig Dispense Refill    acetaminophen (TYLENOL) 650 MG TbSR Take 650 mg by mouth every 8 (eight) hours.      albuterol (PROVENTIL/VENTOLIN HFA) 90 mcg/actuation inhaler Inhale 2 puffs into the lungs every 4 (four) hours as needed for Shortness of Breath. Rescue 18 g 6    albuterol-ipratropium (DUO-NEB) 2.5 mg-0.5 mg/3 mL nebulizer solution Take 3 mLs by nebulization 4 (four) times daily as needed for Shortness of Breath. Rescue 360 mL 5    amLODIPine (NORVASC)  5 MG tablet Take 1 tablet (5 mg total) by mouth once daily. 90 tablet 3    budesonide-formoterol 80-4.5 mcg (BREYNA) 80-4.5 mcg/actuation HFAA Inhale 2 puffs into the lungs 2 (two) times daily. 10 g 6    cholecalciferol, vitamin D3, (VITAMIN D3) 125 mcg (5,000 unit) Tab Take 5,000 Units by mouth once daily.      cyanocobalamin, vitamin B-12, 500 mcg Subl Place 1 tablet under the tongue once daily.      ferrous sulfate (FEOSOL) 325 mg (65 mg iron) Tab tablet Take 1 tablet (325 mg total) by mouth 3 (three) times a week. On Monday, Wednesday and Friday 36 tablet 3    furosemide (LASIX) 20 MG tablet Take 1 tablet (20 mg total) by mouth once daily. 90 tablet 3    furosemide (LASIX) 40 MG tablet Take 1 tablet (40 mg total) by mouth once daily. 30 tablet 11    gabapentin (NEURONTIN) 400 MG capsule TAKE 1 CAPSULE BY MOUTH FOUR TIMES A DAY AS NEEDED 360 capsule 4    hydrALAZINE (APRESOLINE) 25 MG tablet Take 2 tablets (50 mg total) by mouth 2 (two) times daily.      HYDROcodone-acetaminophen (NORCO)  mg per tablet Take 1 tablet by mouth every 8 (eight) hours as needed for Pain. 90 tablet 0    hydrocortisone (ANUSOL-HC) 2.5 % rectal cream Place rectally 2 (two) times daily. 28 g 12    insulin syringe-needle U-100 1 mL 30 gauge x 5/16 Syrg 1 Syringe by Misc.(Non-Drug; Combo Route) route 2 (two) times daily. 100 each 3    lactulose (CHRONULAC) 10 gram/15 mL solution Take 30 mLs (20 g total) by mouth once daily. 946 mL 11    levothyroxine (SYNTHROID) 100 MCG tablet Take 1 tablet (100 mcg total) by mouth before breakfast. 90 tablet 3    magnesium citrate 100 mg Tab Take 400 mg by mouth as needed.      methocarbamoL (ROBAXIN) 750 MG Tab TAKE 2 TABLETS BY MOUTH 4 TIMES A DAY AS NEEDED FOR MUSCLE SPASMS  Strength: 750 mg (Patient taking differently: Take 750 mg by mouth once daily. TAKE 2 TABLETS BY MOUTH 4 TIMES A DAY AS NEEDED FOR MUSCLE SPASMS  Strength: 750 mg) 240 tablet 1    metoprolol succinate (TOPROL-XL) 25 MG 24 hr  tablet Take 1 tablet (25 mg total) by mouth once daily. 90 tablet 3    NOVOLIN N NPH U-100 INSULIN 100 unit/mL injection INJECT 40 UNITS INTO THE SKIN TWICE A DAY (Patient taking differently: Inject into the skin. Self adjust dose) 30 mL 2    omega-3 fatty acids/fish oil (FISH OIL-OMEGA-3 FATTY ACIDS) 300-1,000 mg capsule Take 1 capsule by mouth once daily.      pantoprazole (PROTONIX) 40 MG tablet TAKE 1 TABLET BY MOUTH TWICE A  tablet 3    predniSONE (DELTASONE) 5 MG tablet Take 1 tablet (5 mg total) by mouth once daily. 90 tablet 3    traMADoL (ULTRAM) 50 mg tablet TAKE 1 TABLET BY MOUTH FOUR TIMES A  tablet 4    empagliflozin (JARDIANCE) 10 mg tablet Take 1 tablet (10 mg total) by mouth once daily. HOLD THIS MEDICATION UNTIL YOU FOLLOW UP WITH YOUR PCP. (Patient not taking: Reported on 5/1/2024) 90 tablet 3    polyethylene glycol (GLYCOLAX) 17 gram PwPk Take 17 g by mouth daily as needed for Constipation. (Patient not taking: Reported on 5/1/2024) 17 packet 5    [DISCONTINUED] amoxicillin-clavulanate 875-125mg (AUGMENTIN) 875-125 mg per tablet Take 1 tablet by mouth 2 (two) times daily. for 7 days 14 tablet 0     No current facility-administered medications on file prior to visit.     Social History     Socioeconomic History    Marital status:    Tobacco Use    Smoking status: Never    Smokeless tobacco: Never   Substance and Sexual Activity    Alcohol use: Never    Drug use: Never    Sexual activity: Not Currently     Birth control/protection: None     Social Determinants of Health     Financial Resource Strain: Low Risk  (4/28/2024)    Overall Financial Resource Strain (CARDIA)     Difficulty of Paying Living Expenses: Not hard at all   Food Insecurity: No Food Insecurity (4/28/2024)    Hunger Vital Sign     Worried About Running Out of Food in the Last Year: Never true     Ran Out of Food in the Last Year: Never true   Transportation Needs: No Transportation Needs (4/28/2024)    PRAPARE -  "Transportation     Lack of Transportation (Medical): No     Lack of Transportation (Non-Medical): No   Physical Activity: Inactive (4/28/2024)    Exercise Vital Sign     Days of Exercise per Week: 0 days     Minutes of Exercise per Session: 0 min   Stress: No Stress Concern Present (4/28/2024)    [employer] - Occupational Stress Questionnaire     Feeling of Stress : Not at all   Housing Stability: Low Risk  (4/28/2024)    Housing Stability Vital Sign     Unable to Pay for Housing in the Last Year: No     Homeless in the Last Year: No     Family History   Problem Relation Name Age of Onset    Diabetes Mother Monie Inman         Diabetic    Heart disease Father Lauro Inman     Diabetes Sister Marino Correia     Cancer Sister Marino Correia         Breast cancer    Diabetes Brother Shree Inman     Cancer Brother Shree Inman         Prostate cancer    Cancer Daughter Radha Allison         Uterine cancer    Diabetes Sister Dafne Urena         Diabetic       Review of Systems   Constitutional: Negative.  Negative for activity change, appetite change, chills and diaphoresis.   HENT: Negative.  Negative for congestion, ear pain, hearing loss and postnasal drip.    Eyes:  Negative for itching.   Respiratory:  Negative for chest tightness and shortness of breath.    Cardiovascular:  Negative for chest pain.   Gastrointestinal:  Negative for abdominal pain.   Endocrine: Negative for polydipsia.   Genitourinary:  Negative for frequency.   Musculoskeletal:  Negative for back pain.   Neurological:  Negative for headaches.       Objective:     BP (!) 160/70 (BP Location: Left arm)   Pulse 78   Resp 18   Ht 5' 6" (1.676 m)   Wt 81.2 kg (179 lb)   SpO2 96%   BMI 28.89 kg/m²     Physical Exam  Constitutional:       Appearance: Normal appearance. She is obese.   HENT:      Head: Normocephalic and atraumatic.      Right Ear: External ear normal.      Left Ear: External ear normal.      " Nose: Nose normal.      Mouth/Throat:      Mouth: Mucous membranes are moist.      Pharynx: Oropharynx is clear.   Eyes:      Pupils: Pupils are equal, round, and reactive to light.   Cardiovascular:      Rate and Rhythm: Normal rate and regular rhythm.      Heart sounds: Normal heart sounds. No murmur heard.     No gallop.   Pulmonary:      Effort: Pulmonary effort is normal. No respiratory distress.      Breath sounds: Normal breath sounds. No wheezing.   Abdominal:      Palpations: Abdomen is soft.   Musculoskeletal:      Cervical back: Normal range of motion and neck supple.   Skin:     General: Skin is warm and dry.   Neurological:      General: No focal deficit present.      Mental Status: She is alert and oriented to person, place, and time. Mental status is at baseline.   Psychiatric:         Mood and Affect: Mood normal.         Behavior: Behavior normal.         Thought Content: Thought content normal.         Judgment: Judgment normal.         1. Neuropathy    2. Moderate persistent asthma without complication    3. Bronchospasm    4. Essential (primary) hypertension    5. Chronic kidney disease, stage 3b        Plan:     Problem List Items Addressed This Visit          Neuro    Neuropathy - Primary       Pulmonary    Moderate persistent asthma without complication    Bronchospasm       Cardiac/Vascular    Essential (primary) hypertension       Renal/    Chronic kidney disease, stage 3b     No follow-ups on file.  Has fu/ call prn    I am having Vandana Allison maintain her insulin syringe-needle U-100, pantoprazole, methocarbamoL, levothyroxine, furosemide, lactulose, gabapentin, cholecalciferol (vitamin D3), fish oil-omega-3 fatty acids, acetaminophen, cyanocobalamin (vitamin B-12), magnesium citrate, hydrALAZINE, traMADoL, NovoLIN N NPH U-100 Insulin, hydrocortisone, empagliflozin, ferrous sulfate, amLODIPine, predniSONE, metoprolol succinate, HYDROcodone-acetaminophen, polyethylene glycol,  albuterol, budesonide-formoterol 80-4.5 mcg, albuterol-ipratropium, and furosemide.    Vandana was seen today for transitional care.    Diagnoses and all orders for this visit:    Neuropathy    Moderate persistent asthma without complication    Bronchospasm    Essential (primary) hypertension    Chronic kidney disease, stage 3b         [unfilled]  No orders of the defined types were placed in this encounter.

## 2024-05-08 ENCOUNTER — TELEPHONE (OUTPATIENT)
Dept: GASTROENTEROLOGY | Facility: CLINIC | Age: 89
End: 2024-05-08
Payer: COMMERCIAL

## 2024-05-08 LAB
OHS QRS DURATION: 98 MS
OHS QTC CALCULATION: 427 MS

## 2024-05-08 NOTE — TELEPHONE ENCOUNTER
Results called to patients daughter. Verbalized understanding.              ----- Message from Agustin Haywood MD sent at 5/8/2024 10:59 AM CDT -----  Please notify patient that biopsies from EGD were negative. The polyps seen in the stomach were benign.

## 2024-05-13 PROBLEM — K64.4 BLEEDING EXTERNAL HEMORRHOIDS: Status: RESOLVED | Noted: 2024-02-07 | Resolved: 2024-05-13

## 2024-05-20 RX ORDER — FUROSEMIDE 20 MG/1
20 TABLET ORAL 2 TIMES DAILY PRN
Qty: 180 TABLET | Refills: 3 | Status: SHIPPED | OUTPATIENT
Start: 2024-05-20

## 2024-05-21 ENCOUNTER — HOSPITAL ENCOUNTER (EMERGENCY)
Facility: HOSPITAL | Age: 89
Discharge: HOME | End: 2024-05-21
Payer: COMMERCIAL

## 2024-05-21 VITALS
RESPIRATION RATE: 15 BRPM | SYSTOLIC BLOOD PRESSURE: 154 MMHG | TEMPERATURE: 99 F | HEART RATE: 84 BPM | WEIGHT: 175 LBS | BODY MASS INDEX: 28.12 KG/M2 | HEIGHT: 66 IN | DIASTOLIC BLOOD PRESSURE: 80 MMHG | OXYGEN SATURATION: 96 %

## 2024-05-21 DIAGNOSIS — R53.1 WEAKNESS: ICD-10-CM

## 2024-05-21 DIAGNOSIS — I63.9 CEREBELLAR INFARCT: ICD-10-CM

## 2024-05-21 DIAGNOSIS — U07.1 COVID-19 VIRUS DETECTED: ICD-10-CM

## 2024-05-21 DIAGNOSIS — J01.10 ACUTE FRONTAL SINUSITIS, RECURRENCE NOT SPECIFIED: ICD-10-CM

## 2024-05-21 DIAGNOSIS — U07.1 COVID-19: Primary | ICD-10-CM

## 2024-05-21 LAB
ALBUMIN SERPL BCP-MCNC: 2.7 G/DL (ref 3.5–5)
ALBUMIN/GLOB SERPL: 0.8 {RATIO}
ALP SERPL-CCNC: 88 U/L (ref 55–142)
ALT SERPL W P-5'-P-CCNC: 15 U/L (ref 13–56)
ANION GAP SERPL CALCULATED.3IONS-SCNC: 12 MMOL/L (ref 7–16)
AST SERPL W P-5'-P-CCNC: 33 U/L (ref 15–37)
BASOPHILS # BLD AUTO: 0.02 K/UL (ref 0–0.2)
BASOPHILS NFR BLD AUTO: 0.2 % (ref 0–1)
BILIRUB SERPL-MCNC: 0.4 MG/DL (ref ?–1.2)
BILIRUB UR QL STRIP: NEGATIVE
BUN SERPL-MCNC: 31 MG/DL (ref 7–18)
BUN/CREAT SERPL: 17 (ref 6–20)
CALCIUM SERPL-MCNC: 8.8 MG/DL (ref 8.5–10.1)
CHLORIDE SERPL-SCNC: 95 MMOL/L (ref 98–107)
CLARITY UR: CLEAR
CO2 SERPL-SCNC: 27 MMOL/L (ref 21–32)
COLOR UR: ABNORMAL
CREAT SERPL-MCNC: 1.81 MG/DL (ref 0.55–1.02)
DIFFERENTIAL METHOD BLD: ABNORMAL
EGFR (NO RACE VARIABLE) (RUSH/TITUS): 25 ML/MIN/1.73M2
EOSINOPHIL # BLD AUTO: 0.02 K/UL (ref 0–0.5)
EOSINOPHIL NFR BLD AUTO: 0.2 % (ref 1–4)
ERYTHROCYTE [DISTWIDTH] IN BLOOD BY AUTOMATED COUNT: 18.6 % (ref 11.5–14.5)
GLOBULIN SER-MCNC: 3.6 G/DL (ref 2–4)
GLUCOSE SERPL-MCNC: 335 MG/DL (ref 74–106)
GLUCOSE UR STRIP-MCNC: 200 MG/DL
HCT VFR BLD AUTO: 34.1 % (ref 38–47)
HGB BLD-MCNC: 10.6 G/DL (ref 12–16)
IMM GRANULOCYTES # BLD AUTO: 0.06 K/UL (ref 0–0.04)
IMM GRANULOCYTES NFR BLD: 0.5 % (ref 0–0.4)
INFLUENZA A MOLECULAR (OHS): NEGATIVE
INFLUENZA B MOLECULAR (OHS): NEGATIVE
KETONES UR STRIP-SCNC: NEGATIVE MG/DL
LEUKOCYTE ESTERASE UR QL STRIP: NEGATIVE
LYMPHOCYTES # BLD AUTO: 0.37 K/UL (ref 1–4.8)
LYMPHOCYTES NFR BLD AUTO: 3.4 % (ref 27–41)
MAGNESIUM SERPL-MCNC: 2 MG/DL (ref 1.7–2.3)
MCH RBC QN AUTO: 25.2 PG (ref 27–31)
MCHC RBC AUTO-ENTMCNC: 31.1 G/DL (ref 32–36)
MCV RBC AUTO: 81 FL (ref 80–96)
MONOCYTES # BLD AUTO: 0.52 K/UL (ref 0–0.8)
MONOCYTES NFR BLD AUTO: 4.7 % (ref 2–6)
MPC BLD CALC-MCNC: 8.6 FL (ref 9.4–12.4)
MUCOUS, UA: ABNORMAL /LPF
NEUTROPHILS # BLD AUTO: 10 K/UL (ref 1.8–7.7)
NEUTROPHILS NFR BLD AUTO: 91 % (ref 53–65)
NITRITE UR QL STRIP: NEGATIVE
NRBC # BLD AUTO: 0 X10E3/UL
NRBC, AUTO (.00): 0 %
PH UR STRIP: 6.5 PH UNITS
PLATELET # BLD AUTO: 243 K/UL (ref 150–400)
POTASSIUM SERPL-SCNC: 4.2 MMOL/L (ref 3.5–5.1)
PROT SERPL-MCNC: 6.3 G/DL (ref 6.4–8.2)
PROT UR QL STRIP: 300
RBC # BLD AUTO: 4.21 M/UL (ref 4.2–5.4)
RBC # UR STRIP: NEGATIVE /UL
RBC #/AREA URNS HPF: 1 /HPF
SARS-COV-2 RDRP RESP QL NAA+PROBE: POSITIVE
SODIUM SERPL-SCNC: 130 MMOL/L (ref 136–145)
SP GR UR STRIP: 1.01
SQUAMOUS #/AREA URNS LPF: ABNORMAL /HPF
UROBILINOGEN UR STRIP-ACNC: NORMAL MG/DL
WBC # BLD AUTO: 10.99 K/UL (ref 4.5–11)
WBC #/AREA URNS HPF: 3 /HPF

## 2024-05-21 PROCEDURE — 83735 ASSAY OF MAGNESIUM: CPT | Performed by: NURSE PRACTITIONER

## 2024-05-21 PROCEDURE — 81001 URINALYSIS AUTO W/SCOPE: CPT | Performed by: NURSE PRACTITIONER

## 2024-05-21 PROCEDURE — 93010 ELECTROCARDIOGRAM REPORT: CPT | Mod: ,,, | Performed by: INTERNAL MEDICINE

## 2024-05-21 PROCEDURE — 87502 INFLUENZA DNA AMP PROBE: CPT | Performed by: NURSE PRACTITIONER

## 2024-05-21 PROCEDURE — 93005 ELECTROCARDIOGRAM TRACING: CPT

## 2024-05-21 PROCEDURE — 99285 EMERGENCY DEPT VISIT HI MDM: CPT | Mod: 25

## 2024-05-21 PROCEDURE — 80053 COMPREHEN METABOLIC PANEL: CPT | Performed by: NURSE PRACTITIONER

## 2024-05-21 PROCEDURE — 63600175 PHARM REV CODE 636 W HCPCS: Performed by: NURSE PRACTITIONER

## 2024-05-21 PROCEDURE — 96365 THER/PROPH/DIAG IV INF INIT: CPT

## 2024-05-21 PROCEDURE — 36415 COLL VENOUS BLD VENIPUNCTURE: CPT | Performed by: NURSE PRACTITIONER

## 2024-05-21 PROCEDURE — 87635 SARS-COV-2 COVID-19 AMP PRB: CPT | Performed by: NURSE PRACTITIONER

## 2024-05-21 PROCEDURE — 85025 COMPLETE CBC W/AUTO DIFF WBC: CPT | Performed by: NURSE PRACTITIONER

## 2024-05-21 PROCEDURE — 25000003 PHARM REV CODE 250: Performed by: NURSE PRACTITIONER

## 2024-05-21 RX ORDER — AZITHROMYCIN 250 MG/1
250 TABLET, FILM COATED ORAL DAILY
Qty: 6 EACH | Refills: 0 | Status: SHIPPED | OUTPATIENT
Start: 2024-05-21

## 2024-05-21 RX ORDER — ALBUTEROL SULFATE 90 UG/1
2 AEROSOL, METERED RESPIRATORY (INHALATION) EVERY 4 HOURS PRN
Qty: 18 G | Refills: 6 | Status: SHIPPED | OUTPATIENT
Start: 2024-05-21

## 2024-05-21 RX ORDER — CEFDINIR 300 MG/1
300 CAPSULE ORAL 2 TIMES DAILY
Qty: 20 CAPSULE | Refills: 0 | Status: SHIPPED | OUTPATIENT
Start: 2024-05-21 | End: 2024-05-31

## 2024-05-21 RX ORDER — ACETAMINOPHEN 500 MG
1000 TABLET ORAL
Status: COMPLETED | OUTPATIENT
Start: 2024-05-21 | End: 2024-05-21

## 2024-05-21 RX ORDER — BUDESONIDE AND FORMOTEROL FUMARATE DIHYDRATE 80; 4.5 UG/1; UG/1
2 AEROSOL RESPIRATORY (INHALATION) 2 TIMES DAILY
Qty: 10 G | Refills: 6 | Status: SHIPPED | OUTPATIENT
Start: 2024-05-21

## 2024-05-21 RX ADMIN — ACETAMINOPHEN 1000 MG: 500 TABLET ORAL at 07:05

## 2024-05-21 RX ADMIN — SODIUM CHLORIDE 500 ML: 9 INJECTION, SOLUTION INTRAVENOUS at 08:05

## 2024-05-21 RX ADMIN — CEFTRIAXONE SODIUM 1 G: 1 INJECTION, POWDER, FOR SOLUTION INTRAMUSCULAR; INTRAVENOUS at 08:05

## 2024-05-21 NOTE — ED TRIAGE NOTES
Chief Complaint   Patient presents with    Weakness     Pt presents to ed with c/o having weakness for 3 days. Was recently exposed to covid

## 2024-05-21 NOTE — ED PROVIDER NOTES
"Encounter Date: 5/21/2024       History     Chief Complaint   Patient presents with    Weakness     Pt presents to ed with c/o having weakness for 3 days. Was recently exposed to covid     89-year-old female presents to ED for generalized weakness, cough, decreased appetite, and lethargy.  Daughter reports the symptoms started on Saturday evening with worsening of symptoms over the course of the weekend with worse day being yesterday.  Daughter reports the patient was not been eating or drinking and she was having to "force feed" her to get her to eat anything.  She reports increased sleeping and not wanting to do activity unless prompted to include using the bathroom.  Daughter reports the patient was had productive cough that is yellow/green in color.  She states the patient was son came from out of town and started feeling sick prior to leaving.  She states at 1 her brother returned to [place] he tested positive for COVID.  Daughter reports she gave patient a home his on Saturday and it was negative.  Denies nausea/vomiting, fever, chills, chest pain, shortness of breath.    The history is provided by the patient and a relative.     Review of patient's allergies indicates:   Allergen Reactions    Aspirin      Past Medical History:   Diagnosis Date    Arthritis     Bleeding external hemorrhoids     Chronic kidney disease, stage 3b     baseline creat 1.5    COPD (chronic obstructive pulmonary disease)     senile emphysema    Diabetes mellitus, type 2     DNR (do not resuscitate) 02/07/2024    discussed with VITO Christianson present    Essential (primary) hypertension     Hiatal hernia with GERD     Neuropathy     Post-operative hypothyroidism     Severe pulmonary hypertension 10/08/2022    EF  70 % and normal diastolic function     Past Surgical History:   Procedure Laterality Date    BREAST SURGERY      Biopsy    EYE SURGERY      Remove cataracts both eyes    HYSTERECTOMY      NEPHRECTOMY Right 1960    THYROIDECTOMY "       Family History   Problem Relation Name Age of Onset    Diabetes Mother Monie Inman         Diabetic    Heart disease Father Lauro Inman     Diabetes Sister Marino Correia     Cancer Sister Marino Correia         Breast cancer    Diabetes Brother Shree Inman     Cancer Brother Shree Inman         Prostate cancer    Cancer Daughter Radha Allison         Uterine cancer    Diabetes Sister Dafne Urena         Diabetic     Social History     Tobacco Use    Smoking status: Never    Smokeless tobacco: Never   Substance Use Topics    Alcohol use: Never    Drug use: Never     Review of Systems   Constitutional:  Positive for activity change and appetite change. Negative for fever.   HENT:  Negative for sinus pressure and sinus pain.    Respiratory:  Positive for cough. Negative for shortness of breath.    Cardiovascular:  Negative for chest pain and palpitations.   Gastrointestinal:  Negative for nausea and vomiting.   Musculoskeletal:  Negative for arthralgias and gait problem.   Skin:  Negative for color change and wound.   Neurological:  Positive for weakness and headaches.   Hematological:  Negative for adenopathy. Does not bruise/bleed easily.   Psychiatric/Behavioral:  Negative for agitation and confusion.    All other systems reviewed and are negative.      Physical Exam     Initial Vitals [05/21/24 1701]   BP Pulse Resp Temp SpO2   111/75 107 18 99 °F (37.2 °C) (!) 94 %      MAP       --         Physical Exam    Nursing note and vitals reviewed.  Constitutional: She appears well-developed and well-nourished.   HENT:   Head: Normocephalic and atraumatic.   Eyes: EOM are normal. Pupils are equal, round, and reactive to light.   Neck: Neck supple.   Normal range of motion.  Cardiovascular:  Normal rate and regular rhythm.           No murmur heard.  Pulmonary/Chest: She has no wheezes. She has no rhonchi. She has no rales.   Abdominal: Abdomen is soft. She exhibits no distension. There is no abdominal  tenderness.   Musculoskeletal:         General: Edema present. No tenderness.      Cervical back: Normal range of motion and neck supple.      Right lower le+ Edema present.      Left lower le+ Edema present.     Lymphadenopathy:     She has no cervical adenopathy.   Neurological: She is alert and oriented to person, place, and time. No cranial nerve deficit or sensory deficit.   Skin: Skin is warm and dry. Capillary refill takes less than 2 seconds.   Psychiatric: She has a normal mood and affect. Thought content normal.         Medical Screening Exam   See Full Note    ED Course   Procedures  Labs Reviewed   COMPREHENSIVE METABOLIC PANEL - Abnormal; Notable for the following components:       Result Value    Sodium 130 (*)     Chloride 95 (*)     Glucose 335 (*)     BUN 31 (*)     Creatinine 1.81 (*)     Total Protein 6.3 (*)     Albumin 2.7 (*)     eGFR 25 (*)     All other components within normal limits   URINALYSIS, REFLEX TO URINE CULTURE - Abnormal; Notable for the following components:    Protein,  (*)     Glucose,  (*)     All other components within normal limits   CBC WITH DIFFERENTIAL - Abnormal; Notable for the following components:    Hemoglobin 10.6 (*)     Hematocrit 34.1 (*)     MCH 25.2 (*)     MCHC 31.1 (*)     RDW 18.6 (*)     MPV 8.6 (*)     Neutrophils % 91.0 (*)     Lymphocytes % 3.4 (*)     Eosinophils % 0.2 (*)     Immature Granulocytes % 0.5 (*)     Neutrophils, Abs 10.00 (*)     Lymphocytes, Absolute 0.37 (*)     Immature Granulocytes, Absolute 0.06 (*)     All other components within normal limits   SARS-COV-2 RNA AMPLIFICATION, QUAL - Abnormal; Notable for the following components:    SARS COV-2 Molecular Positive (*)     All other components within normal limits   URINALYSIS, MICROSCOPIC - Abnormal; Notable for the following components:    Squamous Epithelial Cells, UA Occasional (*)     Mucous Occasional (*)     All other components within normal limits   INFLUENZA  A & B BY MOLECULAR - Normal   MAGNESIUM - Normal   CBC W/ AUTO DIFFERENTIAL    Narrative:     The following orders were created for panel order CBC auto differential.  Procedure                               Abnormality         Status                     ---------                               -----------         ------                     CBC with Differential[[id number]]       Abnormal            Final result                 Please view results for these tests on the individual orders.          Imaging Results              X-Ray Chest AP Portable (Final result)  Result time 05/21/24 17:51:59      Final result by Gurdeep Quiroga DO (05/21/24 17:51:59)                   Impression:      Prominence of the interstitial lung markings and pulmonary vasculature, correlate with fluid status    Small left pleural effusion      Electronically signed by: Gurdeep Quiroga  Date:    05/21/2024  Time:    17:51               Narrative:    EXAMINATION:  XR CHEST AP PORTABLE    CLINICAL HISTORY:  cough;    TECHNIQUE:  XR CHEST AP PORTABLE    COMPARISON:  04/30/2024    FINDINGS:  No lines or tubes.    Prominence of the interstitial lung markings and pulmonary vasculature, correlate with fluid status    Small left pleural effusion    Cardiac silhouette is similar to comparison exam.    No obvious acute bone findings.                                       CT Head Without Contrast (Final result)  Result time 05/21/24 17:38:22      Final result by Gurdeep Quiroga DO (05/21/24 17:38:22)                   Impression:      Age-indeterminate hypodensity located within the left cerebellar hemisphere could be related to age-indeterminate infarction.    Global brain parenchymal volume loss. Mild chronic small vessel ischemic change.    Fluid located within the pneumatized right occipital lobe.    Layering fluid within the left maxillary sinus.      Electronically signed by: Gurdeep Quiroga  Date:    05/21/2024  Time:    17:38        "        Narrative:    EXAMINATION:  CT HEAD WITHOUT CONTRAST    CLINICAL HISTORY:  Headache, new or worsening (Age >= 50y);    TECHNIQUE:  Multiplanar CT imaging from the vertex to skull the skull base was performed without contrast.    Dose reduction:    The CT exam was performed using one or more dose reduction techniques: Automatic exposure control, automated adjustment of the MA and/or kVP according to patient size, or use of iterative reconstruction technique.    COMPARISON:  2020    FINDINGS:  Global brain parenchymal volume loss.  Mild chronic small vessel ischemic change.    Age-indeterminate hypodensity located within the left cerebellar hemisphere could be related to age-indeterminate infarction.    There is no CT evidence of acute intracranial hemorrhage or other large territorial infarct. No epidural/subdural hematomas.    There is no evidence of hydrocephalus, midline shift or mass effect.    Fluid located within the pneumatized right occipital lobe.    Layering fluid within the left maxillary sinus.                                       Medications   sodium chloride 0.9% bolus 500 mL 500 mL (500 mLs Intravenous New Bag 5/21/24 2017)   cefTRIAXone (Rocephin) 1 g in dextrose 5 % in water (D5W) 100 mL IVPB (MB+) (1 g Intravenous New Bag 5/21/24 2016)   acetaminophen tablet 1,000 mg (1,000 mg Oral Given 5/21/24 1954)     Medical Decision Making  89-year-old female presents to ED for generalized weakness, cough, decreased appetite, and lethargy.  Daughter reports the symptoms started on Saturday evening with worsening of symptoms over the course of the weekend with worse day being yesterday.  Daughter reports the patient was not been eating or drinking and she was having to "force feed" her to get her to eat anything.  She reports increased sleeping and not wanting to do activity unless prompted to include using the bathroom.  Daughter reports the patient was had productive cough that is yellow/green in " color.  She states the patient was son came from out of town and started feeling sick prior to leaving.  She states at 1 her brother returned to [place] he tested positive for COVID.  Daughter reports she gave patient a home his on Saturday and it was negative.  Denies nausea/vomiting, fever, chills, chest pain, shortness of breath.    Labs, diagnostics obtained and reviewed with Dr. Calvin. IV NS, Rocephin; PO Tylenol administered. Prescriptions provided. Discussed findings with daughter and neurology c/s. Reports patient having headaches for months now and attributing it to sinuses.     Amount and/or Complexity of Data Reviewed  Labs: ordered.     Details: Sodium 130 (*)  Chloride 95 (*)  Glucose 335 (*)  BUN 31 (*)  Creatinine 1.81 (*)  Total Protein 6.3 (*)  Albumin 2.7 (*)  eGFR 25 (*)  All other components within normal limits  URINALYSIS, REFLEX TO URINE CULTURE - Abnormal; Notable for the following components:  Protein,  (*)  Glucose,  (*)  All other components within normal limits  CBC WITH DIFFERENTIAL - Abnormal; Notable for the following components:  Hemoglobin 10.6 (*)  Hematocrit 34.1 (*)  MCH 25.2 (*)  MCHC 31.1 (*)  RDW 18.6 (*)  MPV 8.6 (*)  Neutrophils % 91.0 (*)  Lymphocytes % 3.4 (*)  Eosinophils % 0.2 (*)  Immature Granulocytes % 0.5 (*)  Neutrophils, Abs 10.00 (*)  Lymphocytes, Absolute 0.37 (*)  Immature Granulocytes, Absolute 0.06 (*)  All other components within normal limits  SARS-COV-2 RNA AMPLIFICATION, QUAL - Abnormal; Notable for the following components:  SARS COV-2 Molecular Positive (*)  All other components within normal limits  URINALYSIS, MICROSCOPIC - Abnormal; Notable for the following components:  Squamous Epithelial Cells, UA Occasional (*)  Mucous Occasional (*)    Radiology: ordered.     Details: Age-indeterminate hypodensity located within the left cerebellar hemisphere could be related to age-indeterminate infarction.    Global brain parenchymal volume loss.  Mild chronic small vessel ischemic change.    Fluid located within the pneumatized right occipital lobe.    Layering fluid within the left maxillary sinu  ECG/medicine tests: ordered.     Details: ST with PACs    Risk  OTC drugs.  Prescription drug management.                                      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 Provider    azithromycin (Z-CARMELLA) 250 MG tablet Take 1 tablet (250 mg total) by mouth once daily. Take first 2 tablets together, then 1 every day until finished. 6 each 5/21/2024 -- Mamie Zavala FNP    cefdinir (OMNICEF) 300 MG capsule Take 1 capsule (300 mg total) by mouth 2 (two) times daily. for 10 days 20 capsule 5/21/2024 5/31/2024 Mamie Zavala FNP          Follow-up Information    None          Mamie Zavala FNP  05/21/24 2028

## 2024-06-05 LAB
OHS QRS DURATION: 94 MS
OHS QTC CALCULATION: 436 MS

## 2024-06-06 RX ORDER — CALCIUM CARB/VITAMIN D3/VIT K1 500-100-40
1 TABLET,CHEWABLE ORAL 2 TIMES DAILY
Qty: 180 EACH | Refills: 122 | Status: SHIPPED | OUTPATIENT
Start: 2024-06-06

## 2024-06-06 RX ORDER — SYRINGE,SAFETY WITH NEEDLE,1ML 25GX1"
1 SYRINGE (EA) MISCELLANEOUS 2 TIMES DAILY
Qty: 200 EACH | Refills: 3 | Status: CANCELLED | OUTPATIENT
Start: 2024-06-06

## 2024-06-06 NOTE — TELEPHONE ENCOUNTER
----- Message from Kelsea Villegas sent at 2024  1:32 PM CDT -----  insulin syringe-needle U-100 1 mL 30 gauge x  Syrg 100 each 3 2023 -  Si Syringe by Misc.(Non-Drug; Combo Route) route 2 (two) times daily.  Route: Misc.(Non-Drug; Combo Route)    [facility]/pharmacy #[id number] [address] [address]  Phone: [phone] Fax: [phone]  Hours: Not open 24 hours

## 2024-06-17 RX ORDER — HYDROCODONE BITARTRATE AND ACETAMINOPHEN 10; 325 MG/1; MG/1
1 TABLET ORAL EVERY 8 HOURS PRN
Qty: 90 TABLET | Refills: 0 | Status: SHIPPED | OUTPATIENT
Start: 2024-06-17

## 2024-06-17 NOTE — TELEPHONE ENCOUNTER
----- Message from Kelsea Villegas sent at 6/17/2024  9:36 AM CDT -----  HYDROcodone-acetaminophen (NORCO)  mg per tablet 90 tablet 0 4/25/2024   Sig: Take 1 tablet by mouth every 8 (eight) hours as needed for Pain.  Route: Oral    [facility]/pharmacy #[id number] [address] [address]  Phone: [phone] Fax: [phone]  Hours: Not open 24 hours

## 2024-06-27 ENCOUNTER — OFFICE VISIT (OUTPATIENT)
Dept: NEUROLOGY | Facility: CLINIC | Age: 89
End: 2024-06-27
Payer: COMMERCIAL

## 2024-06-27 VITALS
HEIGHT: 66 IN | WEIGHT: 182.38 LBS | HEART RATE: 81 BPM | DIASTOLIC BLOOD PRESSURE: 78 MMHG | SYSTOLIC BLOOD PRESSURE: 168 MMHG | BODY MASS INDEX: 29.31 KG/M2 | OXYGEN SATURATION: 95 %

## 2024-06-27 DIAGNOSIS — I63.9 CEREBELLAR INFARCT: Primary | ICD-10-CM

## 2024-06-27 DIAGNOSIS — I10 HYPERTENSION, UNSPECIFIED TYPE: ICD-10-CM

## 2024-06-27 DIAGNOSIS — E78.5 HYPERLIPIDEMIA LDL GOAL <100: ICD-10-CM

## 2024-06-27 PROCEDURE — 99203 OFFICE O/P NEW LOW 30 MIN: CPT | Mod: S$PBB,,, | Performed by: NURSE PRACTITIONER

## 2024-06-27 PROCEDURE — 3288F FALL RISK ASSESSMENT DOCD: CPT | Mod: CPTII,,, | Performed by: NURSE PRACTITIONER

## 2024-06-27 PROCEDURE — 1159F MED LIST DOCD IN RCRD: CPT | Mod: CPTII,,, | Performed by: NURSE PRACTITIONER

## 2024-06-27 PROCEDURE — 1101F PT FALLS ASSESS-DOCD LE1/YR: CPT | Mod: CPTII,,, | Performed by: NURSE PRACTITIONER

## 2024-06-27 PROCEDURE — 99214 OFFICE O/P EST MOD 30 MIN: CPT | Mod: PBBFAC | Performed by: NURSE PRACTITIONER

## 2024-06-27 PROCEDURE — 99999 PR PBB SHADOW E&M-EST. PATIENT-LVL IV: CPT | Mod: PBBFAC,,, | Performed by: NURSE PRACTITIONER

## 2024-06-27 PROCEDURE — 1125F AMNT PAIN NOTED PAIN PRSNT: CPT | Mod: CPTII,,, | Performed by: NURSE PRACTITIONER

## 2024-06-27 PROCEDURE — 1160F RVW MEDS BY RX/DR IN RCRD: CPT | Mod: CPTII,,, | Performed by: NURSE PRACTITIONER

## 2024-06-27 RX ORDER — PRAVASTATIN SODIUM 10 MG/1
10 TABLET ORAL DAILY
Qty: 90 TABLET | Refills: 3 | Status: SHIPPED | OUTPATIENT
Start: 2024-06-27 | End: 2024-06-27

## 2024-06-27 RX ORDER — PRAVASTATIN SODIUM 10 MG/1
10 TABLET ORAL DAILY
Qty: 90 TABLET | Refills: 3 | Status: SHIPPED | OUTPATIENT
Start: 2024-06-27 | End: 2025-06-27

## 2024-06-27 NOTE — PATIENT INSTRUCTIONS
Reviewed prior CT head  Do not recommend plavix given advanced age and fall risk  Pravastatin 10mg daily

## 2024-06-27 NOTE — PROGRESS NOTES
Subjective:       Patient ID: Vandana Allison is a 89 y.o. female     Chief Complaint:  No chief complaint on file.       Allergies:  Aspirin    Current Medications:    Outpatient Encounter Medications as of 6/27/2024   Medication Sig Dispense Refill    acetaminophen (TYLENOL) 650 MG TbSR Take 650 mg by mouth every 8 (eight) hours.      albuterol (PROVENTIL/VENTOLIN HFA) 90 mcg/actuation inhaler Inhale 2 puffs into the lungs every 4 (four) hours as needed for Shortness of Breath. Rescue 18 g 6    albuterol-ipratropium (DUO-NEB) 2.5 mg-0.5 mg/3 mL nebulizer solution Take 3 mLs by nebulization 4 (four) times daily as needed for Shortness of Breath. Rescue 360 mL 5    amLODIPine (NORVASC) 5 MG tablet Take 1 tablet (5 mg total) by mouth once daily. 90 tablet 3    azithromycin (Z-CARMELLA) 250 MG tablet Take 1 tablet (250 mg total) by mouth once daily. Take first 2 tablets together, then 1 every day until finished. 6 each 0    budesonide-formoterol 80-4.5 mcg (BREYNA) 80-4.5 mcg/actuation HFAA Inhale 2 puffs into the lungs 2 (two) times daily. 10 g 6    cholecalciferol, vitamin D3, (VITAMIN D3) 125 mcg (5,000 unit) Tab Take 5,000 Units by mouth once daily.      cyanocobalamin, vitamin B-12, 500 mcg Subl Place 1 tablet under the tongue once daily.      ferrous sulfate (FEOSOL) 325 mg (65 mg iron) Tab tablet Take 1 tablet (325 mg total) by mouth 3 (three) times a week. On Monday, Wednesday and Friday 36 tablet 3    furosemide (LASIX) 20 MG tablet TAKE 1 TABLET BY MOUTH TWICE A DAY AS NEEDED 180 tablet 3    furosemide (LASIX) 40 MG tablet Take 1 tablet (40 mg total) by mouth once daily. 30 tablet 11    gabapentin (NEURONTIN) 400 MG capsule TAKE 1 CAPSULE BY MOUTH FOUR TIMES A DAY AS NEEDED 360 capsule 4    hydrALAZINE (APRESOLINE) 25 MG tablet Take 2 tablets (50 mg total) by mouth 2 (two) times daily.      HYDROcodone-acetaminophen (NORCO)  mg per tablet Take 1 tablet by mouth every 8 (eight) hours as needed for  "Pain. 90 tablet 0    hydrocortisone (ANUSOL-HC) 2.5 % rectal cream Place rectally 2 (two) times daily. 28 g 12    insulin syringe-needle U-100 (BD INSULIN SYRINGE ULTRA-FINE) 0.3 mL 31 gauge x 5/16" Syrg 1 10exp8 PFU by Misc.(Non-Drug; Combo Route) route 2 (two) times daily. 180 each 122    insulin syringe-needle U-100 1 mL 30 gauge x 5/16 Syrg 1 Syringe by Misc.(Non-Drug; Combo Route) route 2 (two) times daily. 100 each 3    lactulose (CHRONULAC) 10 gram/15 mL solution Take 30 mLs (20 g total) by mouth once daily. 946 mL 11    levothyroxine (SYNTHROID) 100 MCG tablet Take 1 tablet (100 mcg total) by mouth before breakfast. 90 tablet 3    magnesium citrate 100 mg Tab Take 400 mg by mouth as needed.      methocarbamoL (ROBAXIN) 750 MG Tab TAKE 2 TABLETS BY MOUTH 4 TIMES A DAY AS NEEDED FOR MUSCLE SPASMS  Strength: 750 mg (Patient taking differently: Take 750 mg by mouth once daily. TAKE 2 TABLETS BY MOUTH 4 TIMES A DAY AS NEEDED FOR MUSCLE SPASMS  Strength: 750 mg) 240 tablet 1    metoprolol succinate (TOPROL-XL) 25 MG 24 hr tablet Take 1 tablet (25 mg total) by mouth once daily. 90 tablet 3    NOVOLIN N NPH U-100 INSULIN 100 unit/mL injection INJECT 40 UNITS INTO THE SKIN TWICE A DAY (Patient taking differently: Inject into the skin. Self adjust dose) 30 mL 2    omega-3 fatty acids/fish oil (FISH OIL-OMEGA-3 FATTY ACIDS) 300-1,000 mg capsule Take 1 capsule by mouth once daily.      pantoprazole (PROTONIX) 40 MG tablet TAKE 1 TABLET BY MOUTH TWICE A  tablet 3    predniSONE (DELTASONE) 5 MG tablet Take 1 tablet (5 mg total) by mouth once daily. 90 tablet 3    traMADoL (ULTRAM) 50 mg tablet TAKE 1 TABLET BY MOUTH FOUR TIMES A  tablet 4    [] cefdinir (OMNICEF) 300 MG capsule Take 1 capsule (300 mg total) by mouth 2 (two) times daily. for 10 days 20 capsule 0    empagliflozin (JARDIANCE) 10 mg tablet Take 1 tablet (10 mg total) by mouth once daily. HOLD THIS MEDICATION UNTIL YOU FOLLOW UP WITH YOUR " PCP. (Patient not taking: Reported on 5/1/2024) 90 tablet 3    polyethylene glycol (GLYCOLAX) 17 gram PwPk Take 17 g by mouth daily as needed for Constipation. (Patient not taking: Reported on 5/1/2024) 17 packet 5    pravastatin (PRAVACHOL) 10 MG tablet Take 1 tablet (10 mg total) by mouth once daily. 90 tablet 3    [DISCONTINUED] HYDROcodone-acetaminophen (NORCO)  mg per tablet Take 1 tablet by mouth every 8 (eight) hours as needed for Pain. 90 tablet 0     No facility-administered encounter medications on file as of 6/27/2024.       History of Present Illness  89 y/o female new referral to neurology for incidental finding of prior CVA on CT head done in the emergency department    She was seen in the emergency department in May of 2024, presenting for symptoms for weakness, cough, decreased intake, found to have covid.  During the visit they obtained the CT head which reported fair amount of atrophy and area of possible old infarct, though not clearly defined, but certainly not acute.  No history of prior being told had CVA.  Prior imaging CT head in 2020 and 2019, reports do not indicate possible CVA at that time.  She is on HTN management, no cholesterol treatment.  She denies recent falls, though she uses walker to assist with ambulation.           Review of Systems  Review of Systems   Constitutional:  Negative for diaphoresis and fever.   HENT:  Negative for congestion, hearing loss and tinnitus.    Eyes:  Negative for blurred vision, double vision, photophobia, discharge and redness.   Respiratory:  Negative for cough and shortness of breath.    Cardiovascular:  Negative for chest pain.   Gastrointestinal:  Negative for abdominal pain, nausea and vomiting.   Musculoskeletal:  Negative for back pain, joint pain, myalgias and neck pain.   Skin:  Negative for itching and rash.   Neurological:  Positive for headaches. Negative for dizziness, tremors, sensory change, speech change, focal weakness,  seizures, loss of consciousness and weakness.   Psychiatric/Behavioral:  Negative for depression, hallucinations and memory loss. The patient does not have insomnia.    All other systems reviewed and are negative.     Objective:     NEUROLOGICAL EXAMINATION:     MENTAL STATUS   Oriented to person, place, and time.   Attention: normal. Concentration: normal.   Speech: speech is normal   Level of consciousness: alert  Knowledge: good and consistent with education.   Normal comprehension.     CRANIAL NERVES     CN II   Visual fields full to confrontation.   Visual acuity: normal  Right visual field deficit: none  Left visual field deficit: none     CN III, IV, VI   Pupils are equal, round, and reactive to light.  Extraocular motions are normal.   Right pupil: Size: 3 mm. Shape: regular. Reactivity: brisk. Consensual response: intact. Accommodation: intact.   Left pupil: Size: 3 mm. Shape: regular. Reactivity: brisk. Consensual response: intact. Accommodation: intact.   CN III: no CN III palsy  CN VI: no CN VI palsy  Nystagmus: none   Diplopia: none  Upgaze: normal  Downgaze: normal  Conjugate gaze: present  Vestibulo-ocular reflex: present    CN V   Facial sensation intact.   Right facial sensation deficit: none  Left facial sensation deficit: none  Right corneal reflex: normal  Left corneal reflex: normal    CN VII   Facial expression full, symmetric.   Right facial weakness: none  Left facial weakness: none  Right taste: normal  Left taste: normal    CN VIII   CN VIII normal.   Hearing: intact    CN IX, X   CN IX normal.   CN X normal.   Palate: symmetric    CN XI   CN XI normal.   Right sternocleidomastoid strength: normal  Left sternocleidomastoid strength: normal  Right trapezius strength: normal  Left trapezius strength: normal    CN XII   CN XII normal.   Tongue: not atrophic  Fasciculations: absent  Tongue deviation: none    MOTOR EXAM   Muscle bulk: normal  Overall muscle tone: normal  Right arm tone:  normal  Left arm tone: normal  Right arm pronator drift: absent  Left arm pronator drift: absent  Right leg tone: normal  Left leg tone: normal    Strength   Right neck flexion: 5/5  Left neck flexion: 5/5  Right neck extension: 5/5  Left neck extension: 5/5  Right deltoid: 5/5  Left deltoid: 5/5  Right biceps: 5/5  Left biceps: 5/5  Right triceps: 5/5  Left triceps: 5/5  Right wrist flexion: 5/5  Left wrist flexion: 5/5  Right wrist extension: 5/5  Left wrist extension: 5/5  Right interossei: 5/5  Left interossei: 5/5  Right iliopsoas:   Left iliopsoas:   Right quadriceps:   Left quadriceps:   Right hamstrin/5  Left hamstrin/5  Right anterior tibial:   Left anterior tibial:   Right posterior tibial:   Left posterior tibial:   Right gastroc:   Left gastroc: 5/5    REFLEXES     Reflexes   Right brachioradialis: 2+  Left brachioradialis: 2+  Right biceps: 2+  Left biceps: 2+  Right triceps: 2+  Left triceps: 2+  Right patellar: 2+  Left patellar: 2+  Right achilles: 2+  Left achilles: 2+  Right plantar: normal  Left plantar: normal  Right Grande: absent  Left Grande: absent  Right ankle clonus: absent  Left ankle clonus: absent  Right pendular knee jerk: absent  Left pendular knee jerk: absent    SENSORY EXAM   Light touch normal.   Right arm light touch: normal  Left arm light touch: normal  Right leg light touch: normal  Left leg light touch: normal  Vibration normal.   Right arm vibration: normal  Left arm vibration: normal  Right leg vibration: normal  Left leg vibration: normal  Proprioception normal.   Right arm proprioception: normal  Left arm proprioception: normal  Right leg proprioception: normal  Left leg proprioception: normal  Pinprick normal.   Right arm pinprick: normal  Left arm pinprick: normal  Right leg pinprick: normal  Left leg pinprick: normal  Graphesthesia: normal  Romberg: negative  Stereognosis: normal    GAIT AND COORDINATION     Gait  Gait: wide-based      Coordination   Finger to nose coordination: normal  Heel to shin coordination: normal  Tandem walking coordination: normal    Tremor   Resting tremor: absent  Intention tremor: absent  Action tremor: absent       Using walker wheelchair in clinic        Physical Exam  Eyes:      Extraocular Movements: EOM normal.      Pupils: Pupils are equal, round, and reactive to light.   Neurological:      Mental Status: She is oriented to person, place, and time.      Coordination: Finger-Nose-Finger Test, Heel to Shin Test and Romberg Test normal.      Gait: Tandem walk normal.      Deep Tendon Reflexes:      Reflex Scores:       Tricep reflexes are 2+ on the right side and 2+ on the left side.       Bicep reflexes are 2+ on the right side and 2+ on the left side.       Brachioradialis reflexes are 2+ on the right side and 2+ on the left side.       Patellar reflexes are 2+ on the right side and 2+ on the left side.       Achilles reflexes are 2+ on the right side and 2+ on the left side.  Psychiatric:         Speech: Speech normal.          Assessment:     Problem List Items Addressed This Visit          Neuro    Cerebellar infarct - Primary       Cardiac/Vascular    Hypertension    Hyperlipidemia LDL goal <100    Relevant Medications    pravastatin (PRAVACHOL) 10 MG tablet        Primary Diagnosis and ICD10  Cerebellar infarct [I63.9]    Plan:     Patient Instructions   Reviewed prior CT head  Do not recommend plavix given advanced age and fall risk  Pravastatin 10mg daily    There are no discontinued medications.    Requested Prescriptions     Pending Prescriptions Disp Refills    pravastatin (PRAVACHOL) 10 MG tablet 90 tablet 3     Sig: Take 1 tablet (10 mg total) by mouth once daily.     Signed Prescriptions Disp Refills    pravastatin (PRAVACHOL) 10 MG tablet 90 tablet 3     Sig: Take 1 tablet (10 mg total) by mouth once daily.       No orders of the defined types were placed in this encounter.

## 2024-07-15 RX ORDER — HYDROCODONE BITARTRATE AND ACETAMINOPHEN 10; 325 MG/1; MG/1
1 TABLET ORAL EVERY 8 HOURS PRN
Qty: 90 TABLET | Refills: 0 | Status: SHIPPED | OUTPATIENT
Start: 2024-07-15

## 2024-07-15 RX ORDER — PANTOPRAZOLE SODIUM 40 MG/1
TABLET, DELAYED RELEASE ORAL
Qty: 180 TABLET | Refills: 3 | Status: SHIPPED | OUTPATIENT
Start: 2024-07-15

## 2024-07-16 RX ORDER — GABAPENTIN 400 MG/1
CAPSULE ORAL
Qty: 360 CAPSULE | Refills: 4 | Status: SHIPPED | OUTPATIENT
Start: 2024-07-16

## 2024-07-22 RX ORDER — HYDRALAZINE HYDROCHLORIDE 25 MG/1
120 TABLET, FILM COATED ORAL 2 TIMES DAILY
Qty: 120 TABLET | Refills: 3
Start: 2024-07-22 | End: 2024-07-25 | Stop reason: SDUPTHER

## 2024-07-25 RX ORDER — SYRINGE,SAFETY WITH NEEDLE,1ML 25GX1"
1 SYRINGE (EA) MISCELLANEOUS 2 TIMES DAILY
Qty: 100 EACH | Refills: 3 | Status: SHIPPED | OUTPATIENT
Start: 2024-07-25 | End: 2024-10-23

## 2024-07-25 RX ORDER — HYDRALAZINE HYDROCHLORIDE 25 MG/1
50 TABLET, FILM COATED ORAL 2 TIMES DAILY
Start: 2024-07-25

## 2024-07-25 NOTE — PROGRESS NOTES
Subjective:      Patient ID: Vandana Allison is a 89 y.o. female.    Chief Complaint: Follow-up (6mon. Ck-up)    Vandana Allison a 89 y.o. female presents for follow up on all regular problems which are reviewed and discussed.     Problem List Items Addressed This Visit          Neuro    Neuropathy - Primary       Pulmonary    Moderate persistent asthma without complication       Cardiac/Vascular    Essential (primary) hypertension       Renal/    Chronic kidney disease, stage 3b    Overview     baseline creat 1.5            Endocrine    Diabetes mellitus, type 2       Past Medical History:  Past Medical History:   Diagnosis Date    Arthritis     Bleeding external hemorrhoids     Chronic kidney disease, stage 3b     baseline creat 1.5    COPD (chronic obstructive pulmonary disease)     senile emphysema    Diabetes mellitus, type 2     DNR (do not resuscitate) 02/07/2024    discussed with VITO Christianson present    Essential (primary) hypertension     Hiatal hernia with GERD     Neuropathy     Post-operative hypothyroidism     Severe pulmonary hypertension 10/08/2022    EF  70 % and normal diastolic function     Past Surgical History:   Procedure Laterality Date    BREAST SURGERY      Biopsy    EYE SURGERY      Remove cataracts both eyes    HYSTERECTOMY      NEPHRECTOMY Right 1960    THYROIDECTOMY       Review of patient's allergies indicates:   Allergen Reactions    Aspirin      Current Outpatient Medications on File Prior to Visit   Medication Sig Dispense Refill    cholecalciferol, vitamin D3, (VITAMIN D3) 125 mcg (5,000 unit) Tab Take 5,000 Units by mouth once daily.      cyanocobalamin, vitamin B-12, 500 mcg Subl Place 1 tablet under the tongue once daily.      furosemide (LASIX) 20 MG tablet Take 1 tablet (20 mg total) by mouth once daily. 90 tablet 3    gabapentin (NEURONTIN) 400 MG capsule TAKE 1 CAPSULE BY MOUTH FOUR TIMES A DAY AS NEEDED 360 capsule 4    hydrALAZINE (APRESOLINE) 25 MG tablet Take 2  tablets (50 mg total) by mouth 2 (two) times daily.      hydrocortisone (ANUSOL-HC) 2.5 % rectal cream Place rectally 2 (two) times daily. 28 g 12    insulin syringe-needle U-100 1 mL 30 gauge x 5/16 Syrg 1 Syringe by Misc.(Non-Drug; Combo Route) route 2 (two) times daily. 100 each 3    lactulose (CHRONULAC) 10 gram/15 mL solution Take 30 mLs (20 g total) by mouth once daily. 946 mL 11    levothyroxine (SYNTHROID) 100 MCG tablet Take 1 tablet (100 mcg total) by mouth before breakfast. 90 tablet 3    magnesium citrate 100 mg Tab Take 400 mg by mouth as needed.      methocarbamoL (ROBAXIN) 750 MG Tab TAKE 2 TABLETS BY MOUTH 4 TIMES A DAY AS NEEDED FOR MUSCLE SPASMS  Strength: 750 mg (Patient taking differently: Take 750 mg by mouth once daily. TAKE 2 TABLETS BY MOUTH 4 TIMES A DAY AS NEEDED FOR MUSCLE SPASMS  Strength: 750 mg) 240 tablet 1    NOVOLIN N NPH U-100 INSULIN 100 unit/mL injection INJECT 40 UNITS INTO THE SKIN TWICE A DAY (Patient taking differently: Inject into the skin. Self adjust dose) 30 mL 2    omega-3 fatty acids/fish oil (FISH OIL-OMEGA-3 FATTY ACIDS) 300-1,000 mg capsule Take 1 capsule by mouth once daily.      pantoprazole (PROTONIX) 40 MG tablet TAKE 1 TABLET BY MOUTH TWICE A  tablet 3    traMADoL (ULTRAM) 50 mg tablet TAKE 1 TABLET BY MOUTH FOUR TIMES A  tablet 4    acetaminophen (TYLENOL) 650 MG TbSR Take 650 mg by mouth every 8 (eight) hours.       No current facility-administered medications on file prior to visit.     Social History     Socioeconomic History    Marital status:    Tobacco Use    Smoking status: Never    Smokeless tobacco: Never   Substance and Sexual Activity    Alcohol use: Never    Drug use: Never    Sexual activity: Not Currently     Birth control/protection: None     Social Determinants of Health     Financial Resource Strain: Low Risk  (4/28/2024)    Overall Financial Resource Strain (CARDIA)     Difficulty of Paying Living Expenses: Not hard at all    Food Insecurity: No Food Insecurity (4/28/2024)    Hunger Vital Sign     Worried About Running Out of Food in the Last Year: Never true     Ran Out of Food in the Last Year: Never true   Transportation Needs: No Transportation Needs (4/28/2024)    PRAPARE - Transportation     Lack of Transportation (Medical): No     Lack of Transportation (Non-Medical): No   Physical Activity: Inactive (4/28/2024)    Exercise Vital Sign     Days of Exercise per Week: 0 days     Minutes of Exercise per Session: 0 min   Stress: No Stress Concern Present (4/28/2024)    [employer] - Occupational Stress Questionnaire     Feeling of Stress : Not at all   Housing Stability: Low Risk  (4/28/2024)    Housing Stability Vital Sign     Unable to Pay for Housing in the Last Year: No     Homeless in the Last Year: No     Family History   Problem Relation Name Age of Onset    Diabetes Mother Monie Inman         Diabetic    Heart disease Father Lauro Inman     Diabetes Sister Marino Correia     Cancer Sister Marino Correia         Breast cancer    Diabetes Brother Shree Inman     Cancer Brother Shree Inman         Prostate cancer    Cancer Daughter Radha Allison         Uterine cancer    Diabetes Sister Dafne Urena         Diabetic       Review of Systems   Constitutional: Negative.  Negative for activity change, appetite change, chills and diaphoresis.   HENT: Negative.  Negative for congestion, ear pain, hearing loss and postnasal drip.    Eyes:  Negative for itching.   Respiratory:  Negative for chest tightness and shortness of breath.    Cardiovascular:  Negative for chest pain.   Gastrointestinal:  Negative for abdominal pain.   Endocrine: Negative for polydipsia.   Genitourinary:  Negative for frequency.   Musculoskeletal:  Negative for back pain.   Neurological:  Negative for headaches.       Objective:     /78 (BP Location: Left arm, Patient Position: Sitting, BP Method: Large (Manual))    "Pulse 89   Resp 18   Ht 5' 6" (1.676 m)   Wt 81.6 kg (180 lb)   SpO2 95%   BMI 29.05 kg/m²     Physical Exam  Constitutional:       Appearance: Normal appearance. She is obese.   HENT:      Head: Normocephalic and atraumatic.      Right Ear: External ear normal.      Left Ear: External ear normal.      Nose: Nose normal.      Mouth/Throat:      Mouth: Mucous membranes are moist.      Pharynx: Oropharynx is clear.   Eyes:      Pupils: Pupils are equal, round, and reactive to light.   Neck:      Vascular: No carotid bruit.   Cardiovascular:      Rate and Rhythm: Normal rate and regular rhythm.      Heart sounds: Normal heart sounds. No murmur heard.     No gallop.   Pulmonary:      Effort: Pulmonary effort is normal. No respiratory distress.      Breath sounds: Normal breath sounds. No wheezing or rales.   Abdominal:      Palpations: Abdomen is soft.   Musculoskeletal:      Cervical back: Normal range of motion and neck supple.   Skin:     General: Skin is warm and dry.   Neurological:      General: No focal deficit present.      Mental Status: She is alert and oriented to person, place, and time. Mental status is at baseline.   Psychiatric:         Mood and Affect: Mood normal.         Behavior: Behavior normal.         Thought Content: Thought content normal.         Judgment: Judgment normal.         1. Neuropathy    2. Moderate persistent asthma without complication    3. Essential (primary) hypertension    4. Chronic kidney disease, stage 3b    5. Type 2 diabetes mellitus with stage 3b chronic kidney disease, without long-term current use of insulin        Plan:     Problem List Items Addressed This Visit          Neuro    Neuropathy - Primary       Pulmonary    Moderate persistent asthma without complication       Cardiac/Vascular    Essential (primary) hypertension       Renal/    Chronic kidney disease, stage 3b       Endocrine    Diabetes mellitus, type 2     No follow-ups on file.  Refills  " education  Has fu    I have discontinued Vandana Allison's predniSONE. I have also changed her ferrous sulfate and predniSONE. Additionally, I am having her maintain her insulin syringe-needle U-100, pantoprazole, methocarbamoL, levothyroxine, furosemide, lactulose, gabapentin, cholecalciferol (vitamin D3), fish oil-omega-3 fatty acids, acetaminophen, cyanocobalamin (vitamin B-12), magnesium citrate, hydrALAZINE, traMADoL, NovoLIN N NPH U-100 Insulin, hydrocortisone, empagliflozin, amLODIPine, metoprolol succinate, and HYDROcodone-acetaminophen. We administered dexAMETHasone, methylPREDNISolone acetate, and ketorolac.    Vnadana was seen today for follow-up.    Diagnoses and all orders for this visit:    Neuropathy    Moderate persistent asthma without complication    Essential (primary) hypertension    Chronic kidney disease, stage 3b    Type 2 diabetes mellitus with stage 3b chronic kidney disease, without long-term current use of insulin    Other orders  -     empagliflozin (JARDIANCE) 10 mg tablet; Take 1 tablet (10 mg total) by mouth once daily. HOLD THIS MEDICATION UNTIL YOU FOLLOW UP WITH YOUR PCP. (Patient not taking: Reported on 5/1/2024)  -     ferrous sulfate (FEOSOL) 325 mg (65 mg iron) Tab tablet; Take 1 tablet (325 mg total) by mouth 3 (three) times a week. On Monday, Wednesday and Friday  -     amLODIPine (NORVASC) 5 MG tablet; Take 1 tablet (5 mg total) by mouth once daily.  -     predniSONE (DELTASONE) 5 MG tablet; Take 1 tablet (5 mg total) by mouth once daily.  -     metoprolol succinate (TOPROL-XL) 25 MG 24 hr tablet; Take 1 tablet (25 mg total) by mouth once daily.  -     HYDROcodone-acetaminophen (NORCO)  mg per tablet; Take 1 tablet by mouth every 8 (eight) hours as needed for Pain.  -     dexAMETHasone injection 4 mg  -     methylPREDNISolone acetate injection 80 mg  -     Discontinue: ketorolac injection 30 mg  -     ketorolac injection 30 mg      Medications Ordered This Encounter    Medications    amLODIPine (NORVASC) 5 MG tablet     Sig: Take 1 tablet (5 mg total) by mouth once daily.     Dispense:  90 tablet     Refill:  3     .    dexAMETHasone injection 4 mg    empagliflozin (JARDIANCE) 10 mg tablet     Sig: Take 1 tablet (10 mg total) by mouth once daily. HOLD THIS MEDICATION UNTIL YOU FOLLOW UP WITH YOUR PCP.     Dispense:  90 tablet     Refill:  3    ferrous sulfate (FEOSOL) 325 mg (65 mg iron) Tab tablet     Sig: Take 1 tablet (325 mg total) by mouth 3 (three) times a week. On Monday, Wednesday and Friday     Dispense:  36 tablet     Refill:  3    HYDROcodone-acetaminophen (NORCO)  mg per tablet     Sig: Take 1 tablet by mouth every 8 (eight) hours as needed for Pain.     Dispense:  90 tablet     Refill:  0     n/a      Order Specific Question:   I have reviewed the Prescription Drug Monitoring Program (PDMP) database for this patient prior to prescribing the above opioid medication     Answer:   Yes    ketorolac injection 30 mg    methylPREDNISolone acetate injection 80 mg    metoprolol succinate (TOPROL-XL) 25 MG 24 hr tablet     Sig: Take 1 tablet (25 mg total) by mouth once daily.     Dispense:  90 tablet     Refill:  3     .    predniSONE (DELTASONE) 5 MG tablet     Sig: Take 1 tablet (5 mg total) by mouth once daily.     Dispense:  90 tablet     Refill:  3     [unfilled]  No orders of the defined types were placed in this encounter.         Consent: The patient's consent was obtained including but not limited to risks of crusting, scabbing, blistering, scarring, darker or lighter pigmentary change, recurrence, incomplete removal and infection.

## 2024-07-29 PROBLEM — K62.5 RECTAL BLEEDING: Status: RESOLVED | Noted: 2024-04-28 | Resolved: 2024-07-29

## 2024-07-29 RX ORDER — HYDRALAZINE HYDROCHLORIDE 25 MG/1
25 TABLET, FILM COATED ORAL 2 TIMES DAILY
Qty: 180 TABLET | Refills: 3 | Status: SHIPPED | OUTPATIENT
Start: 2024-07-29 | End: 2024-07-31 | Stop reason: SDUPTHER

## 2024-07-31 RX ORDER — HYDRALAZINE HYDROCHLORIDE 25 MG/1
50 TABLET, FILM COATED ORAL 2 TIMES DAILY
Qty: 360 TABLET | Refills: 3 | OUTPATIENT
Start: 2024-07-31

## 2024-07-31 RX ORDER — HYDRALAZINE HYDROCHLORIDE 50 MG/1
50 TABLET, FILM COATED ORAL 2 TIMES DAILY
Qty: 180 TABLET | Refills: 4 | Status: SHIPPED | OUTPATIENT
Start: 2024-07-31

## 2024-08-06 RX ORDER — HUMAN INSULIN 100 [IU]/ML
INJECTION, SUSPENSION SUBCUTANEOUS
Qty: 30 ML | Refills: 11 | Status: SHIPPED | OUTPATIENT
Start: 2024-08-06

## 2024-08-09 DIAGNOSIS — Z71.89 COMPLEX CARE COORDINATION: ICD-10-CM

## 2024-08-12 ENCOUNTER — OFFICE VISIT (OUTPATIENT)
Dept: PRIMARY CARE CLINIC | Facility: CLINIC | Age: 89
End: 2024-08-12
Payer: COMMERCIAL

## 2024-08-12 VITALS
HEIGHT: 66 IN | TEMPERATURE: 99 F | BODY MASS INDEX: 30.05 KG/M2 | WEIGHT: 187 LBS | OXYGEN SATURATION: 96 % | HEART RATE: 85 BPM | DIASTOLIC BLOOD PRESSURE: 70 MMHG | RESPIRATION RATE: 18 BRPM | SYSTOLIC BLOOD PRESSURE: 146 MMHG

## 2024-08-12 DIAGNOSIS — M06.9 RHEUMATOID ARTHRITIS, INVOLVING UNSPECIFIED SITE, UNSPECIFIED WHETHER RHEUMATOID FACTOR PRESENT: ICD-10-CM

## 2024-08-12 DIAGNOSIS — N18.32 CHRONIC KIDNEY DISEASE, STAGE 3B: ICD-10-CM

## 2024-08-12 DIAGNOSIS — J45.40 MODERATE PERSISTENT ASTHMA WITHOUT COMPLICATION: ICD-10-CM

## 2024-08-12 DIAGNOSIS — G62.9 NEUROPATHY: Primary | ICD-10-CM

## 2024-08-12 DIAGNOSIS — I10 HYPERTENSION, UNSPECIFIED TYPE: ICD-10-CM

## 2024-08-12 PROCEDURE — 1101F PT FALLS ASSESS-DOCD LE1/YR: CPT | Mod: ,,, | Performed by: FAMILY MEDICINE

## 2024-08-12 PROCEDURE — 96372 THER/PROPH/DIAG INJ SC/IM: CPT | Mod: ,,, | Performed by: FAMILY MEDICINE

## 2024-08-12 PROCEDURE — 3288F FALL RISK ASSESSMENT DOCD: CPT | Mod: ,,, | Performed by: FAMILY MEDICINE

## 2024-08-12 PROCEDURE — 99214 OFFICE O/P EST MOD 30 MIN: CPT | Mod: 25,,, | Performed by: FAMILY MEDICINE

## 2024-08-12 PROCEDURE — 1159F MED LIST DOCD IN RCRD: CPT | Mod: ,,, | Performed by: FAMILY MEDICINE

## 2024-08-12 PROCEDURE — 1125F AMNT PAIN NOTED PAIN PRSNT: CPT | Mod: ,,, | Performed by: FAMILY MEDICINE

## 2024-08-12 RX ORDER — METHYLPREDNISOLONE ACETATE 80 MG/ML
80 INJECTION, SUSPENSION INTRA-ARTICULAR; INTRALESIONAL; INTRAMUSCULAR; SOFT TISSUE
Status: COMPLETED | OUTPATIENT
Start: 2024-08-12 | End: 2024-08-12

## 2024-08-12 RX ORDER — KETOROLAC TROMETHAMINE 30 MG/ML
30 INJECTION, SOLUTION INTRAMUSCULAR; INTRAVENOUS
Status: COMPLETED | OUTPATIENT
Start: 2024-08-12 | End: 2024-08-12

## 2024-08-12 RX ORDER — B-COMPLEX WITH VITAMIN C
1 TABLET ORAL DAILY
COMMUNITY

## 2024-08-12 RX ORDER — DEXAMETHASONE SODIUM PHOSPHATE 4 MG/ML
4 INJECTION, SOLUTION INTRA-ARTICULAR; INTRALESIONAL; INTRAMUSCULAR; INTRAVENOUS; SOFT TISSUE
Status: COMPLETED | OUTPATIENT
Start: 2024-08-12 | End: 2024-08-12

## 2024-08-12 RX ADMIN — DEXAMETHASONE SODIUM PHOSPHATE 4 MG: 4 INJECTION, SOLUTION INTRA-ARTICULAR; INTRALESIONAL; INTRAMUSCULAR; INTRAVENOUS; SOFT TISSUE at 11:08

## 2024-08-12 RX ADMIN — METHYLPREDNISOLONE ACETATE 80 MG: 80 INJECTION, SUSPENSION INTRA-ARTICULAR; INTRALESIONAL; INTRAMUSCULAR; SOFT TISSUE at 11:08

## 2024-08-12 RX ADMIN — KETOROLAC TROMETHAMINE 30 MG: 30 INJECTION, SOLUTION INTRAMUSCULAR; INTRAVENOUS at 11:08

## 2024-08-12 NOTE — PROGRESS NOTES
Subjective:      Patient ID: Vandana Allison is a 89 y.o. female.    Chief Complaint: Peripheral Neuropathy (3 month f/u.)    Vandana Allison a 89 y.o. female presents for follow up on all regular problems which are reviewed and discussed.     Problem List Items Addressed This Visit          Neuro    Neuropathy - Primary       Pulmonary    Moderate persistent asthma without complication       Cardiac/Vascular    Hypertension       Renal/    Chronic kidney disease, stage 3b    Overview     baseline creat 1.5            Immunology/Multi System    Rheumatoid arthritis       Past Medical History:  Past Medical History:   Diagnosis Date    Arthritis     Bleeding external hemorrhoids     Chronic kidney disease, stage 3b     baseline creat 1.5    COPD (chronic obstructive pulmonary disease)     senile emphysema    Diabetes mellitus, type 2     DNR (do not resuscitate) 02/07/2024    discussed with RN Stephenie Christianson present    Essential (primary) hypertension     Hiatal hernia with GERD     Neuropathy     Post-operative hypothyroidism     Severe pulmonary hypertension 10/08/2022    EF  70 % and normal diastolic function     Past Surgical History:   Procedure Laterality Date    BREAST SURGERY      Biopsy    EYE SURGERY      Remove cataracts both eyes    HYSTERECTOMY      NEPHRECTOMY Right 1960    THYROIDECTOMY       Review of patient's allergies indicates:   Allergen Reactions    Aspirin      Current Outpatient Medications on File Prior to Visit   Medication Sig Dispense Refill    albuterol (PROVENTIL/VENTOLIN HFA) 90 mcg/actuation inhaler Inhale 2 puffs into the lungs every 4 (four) hours as needed for Shortness of Breath. Rescue 18 g 6    albuterol-ipratropium (DUO-NEB) 2.5 mg-0.5 mg/3 mL nebulizer solution Take 3 mLs by nebulization 4 (four) times daily as needed for Shortness of Breath. Rescue 360 mL 5    amLODIPine (NORVASC) 5 MG tablet Take 1 tablet (5 mg total) by mouth once daily. 90 tablet 3    B-complex with  "vitamin C (Z-BEC OR EQUIV) tablet Take 1 tablet by mouth once daily.      budesonide-formoterol 80-4.5 mcg (BREYNA) 80-4.5 mcg/actuation HFAA Inhale 2 puffs into the lungs 2 (two) times daily. 10 g 6    cholecalciferol, vitamin D3, (VITAMIN D3) 125 mcg (5,000 unit) Tab Take 5,000 Units by mouth once daily.      cyanocobalamin, vitamin B-12, 500 mcg Subl Place 1 tablet under the tongue once daily.      ferrous sulfate (FEOSOL) 325 mg (65 mg iron) Tab tablet Take 1 tablet (325 mg total) by mouth 3 (three) times a week. On Monday, Wednesday and Friday 36 tablet 3    furosemide (LASIX) 20 MG tablet TAKE 1 TABLET BY MOUTH TWICE A DAY AS NEEDED 180 tablet 3    furosemide (LASIX) 40 MG tablet Take 1 tablet (40 mg total) by mouth once daily. 30 tablet 11    gabapentin (NEURONTIN) 400 MG capsule TAKE 1 CAPSULE BY MOUTH 4 TIMES A DAY AS NEEDED 360 capsule 4    hydrALAZINE (APRESOLINE) 50 MG tablet Take 1 tablet (50 mg total) by mouth 2 (two) times daily. 180 tablet 4    HYDROcodone-acetaminophen (NORCO)  mg per tablet Take 1 tablet by mouth every 8 (eight) hours as needed for Pain. 90 tablet 0    insulin syringe-needle U-100 (BD INSULIN SYRINGE ULTRA-FINE) 0.3 mL 31 gauge x 5/16" Syrg 1 10exp8 PFU by Misc.(Non-Drug; Combo Route) route 2 (two) times daily. 180 each 122    insulin syringe-needle U-100 (BD INSULIN SYRINGE ULTRA-FINE) 1 mL 31 gauge x 5/16 Syrg 1 Syringe by Misc.(Non-Drug; Combo Route) route 2 (two) times daily. 100 each 3    lactulose (CHRONULAC) 10 gram/15 mL solution Take 30 mLs (20 g total) by mouth once daily. 946 mL 11    levothyroxine (SYNTHROID) 100 MCG tablet Take 1 tablet (100 mcg total) by mouth before breakfast. 90 tablet 3    magnesium citrate 100 mg Tab Take 400 mg by mouth as needed.      metoprolol succinate (TOPROL-XL) 25 MG 24 hr tablet Take 1 tablet (25 mg total) by mouth once daily. 90 tablet 3    NOVOLIN N NPH U-100 INSULIN 100 unit/mL injection INJECT 40 UNITS INTO THE SKIN TWICE A DAY " 30 mL 11    omega-3 fatty acids/fish oil (FISH OIL-OMEGA-3 FATTY ACIDS) 300-1,000 mg capsule Take 1 capsule by mouth once daily.      pantoprazole (PROTONIX) 40 MG tablet TAKE 1 TABLET BY MOUTH TWICE A  tablet 3    pravastatin (PRAVACHOL) 10 MG tablet Take 1 tablet (10 mg total) by mouth once daily. 90 tablet 3    acetaminophen (TYLENOL) 650 MG TbSR Take 650 mg by mouth every 8 (eight) hours.      azithromycin (Z-CARMELLA) 250 MG tablet Take 1 tablet (250 mg total) by mouth once daily. Take first 2 tablets together, then 1 every day until finished. 6 each 0    empagliflozin (JARDIANCE) 10 mg tablet Take 1 tablet (10 mg total) by mouth once daily. HOLD THIS MEDICATION UNTIL YOU FOLLOW UP WITH YOUR PCP. (Patient not taking: Reported on 5/1/2024) 90 tablet 3    hydrocortisone (ANUSOL-HC) 2.5 % rectal cream Place rectally 2 (two) times daily. 28 g 12    methocarbamoL (ROBAXIN) 750 MG Tab TAKE 2 TABLETS BY MOUTH 4 TIMES A DAY AS NEEDED FOR MUSCLE SPASMS  Strength: 750 mg (Patient taking differently: Take 750 mg by mouth once daily. TAKE 2 TABLETS BY MOUTH 4 TIMES A DAY AS NEEDED FOR MUSCLE SPASMS  Strength: 750 mg) 240 tablet 1    polyethylene glycol (GLYCOLAX) 17 gram PwPk Take 17 g by mouth daily as needed for Constipation. (Patient not taking: Reported on 5/1/2024) 17 packet 5    predniSONE (DELTASONE) 5 MG tablet Take 1 tablet (5 mg total) by mouth once daily. 90 tablet 3    traMADoL (ULTRAM) 50 mg tablet TAKE 1 TABLET BY MOUTH FOUR TIMES A  tablet 4     No current facility-administered medications on file prior to visit.     Social History     Socioeconomic History    Marital status:    Tobacco Use    Smoking status: Never    Smokeless tobacco: Never   Substance and Sexual Activity    Alcohol use: Never    Drug use: Never    Sexual activity: Not Currently     Birth control/protection: None     Social Determinants of Health     Financial Resource Strain: Low Risk  (8/5/2024)    Overall Financial  Resource Strain (CARDIA)     Difficulty of Paying Living Expenses: Not very hard   Food Insecurity: No Food Insecurity (8/5/2024)    Hunger Vital Sign     Worried About Running Out of Food in the Last Year: Never true     Ran Out of Food in the Last Year: Never true   Transportation Needs: No Transportation Needs (4/28/2024)    PRAPARE - Transportation     Lack of Transportation (Medical): No     Lack of Transportation (Non-Medical): No   Physical Activity: Inactive (8/5/2024)    Exercise Vital Sign     Days of Exercise per Week: 0 days     Minutes of Exercise per Session: 0 min   Stress: No Stress Concern Present (8/5/2024)    [employer] - Occupational Stress Questionnaire     Feeling of Stress : Not at all   Housing Stability: Low Risk  (8/5/2024)    Housing Stability Vital Sign     Unable to Pay for Housing in the Last Year: No     Homeless in the Last Year: No     Family History   Problem Relation Name Age of Onset    Diabetes Mother Monie Inman         Diabetic    Heart disease Father Lauro nIman     Diabetes Sister Marino Correia     Cancer Sister Marino Correia         Breast cancer    Diabetes Brother Shree Inman     Cancer Brother Shree Inman         Prostate cancer    Cancer Daughter Radha Allison         Uterine cancer    Diabetes Sister Dafne Urena         Diabetic       Review of Systems   Constitutional: Negative.  Negative for activity change, appetite change, chills and diaphoresis.   HENT: Negative.  Negative for congestion, ear pain, hearing loss and postnasal drip.    Eyes:  Negative for itching.   Respiratory:  Negative for chest tightness and shortness of breath.    Cardiovascular:  Negative for chest pain.   Gastrointestinal:  Negative for abdominal pain.   Endocrine: Negative for polydipsia.   Genitourinary:  Negative for frequency.   Musculoskeletal:  Positive for arthralgias and back pain.   Neurological:  Negative for headaches.       Objective:  "    BP (!) 146/70 (BP Location: Left arm, Patient Position: Sitting, BP Method: Large (Manual))   Pulse 85   Temp 98.6 °F (37 °C) (Oral)   Resp 18   Ht 5' 6" (1.676 m)   Wt 84.8 kg (187 lb)   SpO2 96%   BMI 30.18 kg/m²     Physical Exam  Constitutional:       Appearance: Normal appearance. She is obese.   HENT:      Head: Normocephalic and atraumatic.      Right Ear: External ear normal.      Left Ear: External ear normal.      Nose: Nose normal.      Mouth/Throat:      Mouth: Mucous membranes are moist.      Pharynx: Oropharynx is clear.   Eyes:      Pupils: Pupils are equal, round, and reactive to light.   Cardiovascular:      Rate and Rhythm: Normal rate and regular rhythm.      Heart sounds: Normal heart sounds. No murmur heard.     No gallop.   Pulmonary:      Effort: Pulmonary effort is normal. No respiratory distress.      Breath sounds: Normal breath sounds. No wheezing or rales.   Abdominal:      Palpations: Abdomen is soft.   Musculoskeletal:      Cervical back: Normal range of motion and neck supple.   Skin:     General: Skin is warm and dry.   Neurological:      General: No focal deficit present.      Mental Status: She is alert and oriented to person, place, and time. Mental status is at baseline.   Psychiatric:         Mood and Affect: Mood normal.         Behavior: Behavior normal.         Thought Content: Thought content normal.         Judgment: Judgment normal.         1. Neuropathy    2. Moderate persistent asthma without complication    3. Hypertension, unspecified type    4. Chronic kidney disease, stage 3b    5. Rheumatoid arthritis, involving unspecified site, unspecified whether rheumatoid factor present        Plan:     Problem List Items Addressed This Visit          Neuro    Neuropathy - Primary       Pulmonary    Moderate persistent asthma without complication       Cardiac/Vascular    Hypertension       Renal/    Chronic kidney disease, stage 3b       Immunology/Multi System    " Rheumatoid arthritis     No follow-ups on file.    Shots for dx n5 above  I am having Vandana Allison maintain her methocarbamoL, levothyroxine, lactulose, cholecalciferol (vitamin D3), fish oil-omega-3 fatty acids, acetaminophen, cyanocobalamin (vitamin B-12), magnesium citrate, traMADoL, hydrocortisone, empagliflozin, ferrous sulfate, amLODIPine, predniSONE, metoprolol succinate, polyethylene glycol, albuterol-ipratropium, furosemide, furosemide, albuterol, budesonide-formoterol 80-4.5 mcg, azithromycin, insulin syringe-needle U-100, pravastatin, pantoprazole, HYDROcodone-acetaminophen, gabapentin, insulin syringe-needle U-100, hydrALAZINE, NovoLIN N NPH U-100 Insulin, and B-complex with vitamin C. We will continue to administer dexAMETHasone, methylPREDNISolone acetate, and ketorolac.    Vandana was seen today for peripheral neuropathy.    Diagnoses and all orders for this visit:    Neuropathy    Moderate persistent asthma without complication    Hypertension, unspecified type    Chronic kidney disease, stage 3b    Rheumatoid arthritis, involving unspecified site, unspecified whether rheumatoid factor present    Other orders  -     dexAMETHasone injection 4 mg  -     methylPREDNISolone acetate injection 80 mg  -     ketorolac injection 30 mg      Medications Ordered This Encounter   Medications    dexAMETHasone injection 4 mg    ketorolac injection 30 mg    methylPREDNISolone acetate injection 80 mg     [unfilled]  No orders of the defined types were placed in this encounter.

## 2024-08-15 RX ORDER — HYDROCODONE BITARTRATE AND ACETAMINOPHEN 10; 325 MG/1; MG/1
1 TABLET ORAL EVERY 8 HOURS PRN
Qty: 90 TABLET | Refills: 0 | Status: SHIPPED | OUTPATIENT
Start: 2024-08-15

## 2024-08-19 RX ORDER — ALBUTEROL SULFATE 90 UG/1
INHALANT RESPIRATORY (INHALATION)
Qty: 18 G | Refills: 11 | Status: SHIPPED | OUTPATIENT
Start: 2024-08-19

## 2024-09-16 RX ORDER — HYDROCODONE BITARTRATE AND ACETAMINOPHEN 10; 325 MG/1; MG/1
1 TABLET ORAL EVERY 8 HOURS PRN
Qty: 90 TABLET | Refills: 0 | Status: SHIPPED | OUTPATIENT
Start: 2024-09-16

## 2024-10-09 RX ORDER — LEVOTHYROXINE SODIUM 100 UG/1
100 TABLET ORAL
Qty: 90 TABLET | Refills: 3 | Status: SHIPPED | OUTPATIENT
Start: 2024-10-09

## 2024-11-14 ENCOUNTER — OFFICE VISIT (OUTPATIENT)
Dept: FAMILY MEDICINE | Facility: CLINIC | Age: 89
End: 2024-11-14
Payer: COMMERCIAL

## 2024-11-14 ENCOUNTER — OFFICE VISIT (OUTPATIENT)
Dept: NEUROLOGY | Facility: CLINIC | Age: 89
End: 2024-11-14
Payer: COMMERCIAL

## 2024-11-14 VITALS
RESPIRATION RATE: 18 BRPM | TEMPERATURE: 98 F | BODY MASS INDEX: 30.37 KG/M2 | HEART RATE: 71 BPM | WEIGHT: 189 LBS | OXYGEN SATURATION: 95 % | HEIGHT: 66 IN | SYSTOLIC BLOOD PRESSURE: 138 MMHG | DIASTOLIC BLOOD PRESSURE: 76 MMHG

## 2024-11-14 VITALS
OXYGEN SATURATION: 96 % | BODY MASS INDEX: 30.33 KG/M2 | SYSTOLIC BLOOD PRESSURE: 166 MMHG | RESPIRATION RATE: 18 BRPM | WEIGHT: 188.69 LBS | HEART RATE: 77 BPM | DIASTOLIC BLOOD PRESSURE: 78 MMHG | HEIGHT: 66 IN

## 2024-11-14 DIAGNOSIS — M06.9 RHEUMATOID ARTHRITIS INVOLVING MULTIPLE SITES, UNSPECIFIED WHETHER RHEUMATOID FACTOR PRESENT: Primary | ICD-10-CM

## 2024-11-14 DIAGNOSIS — I63.9 CEREBELLAR INFARCT: Primary | ICD-10-CM

## 2024-11-14 DIAGNOSIS — E78.5 HYPERLIPIDEMIA LDL GOAL <100: ICD-10-CM

## 2024-11-14 PROCEDURE — 3288F FALL RISK ASSESSMENT DOCD: CPT | Mod: CPTII,,, | Performed by: NURSE PRACTITIONER

## 2024-11-14 PROCEDURE — 1101F PT FALLS ASSESS-DOCD LE1/YR: CPT | Mod: CPTII,,, | Performed by: NURSE PRACTITIONER

## 2024-11-14 PROCEDURE — 99212 OFFICE O/P EST SF 10 MIN: CPT | Mod: S$PBB,,, | Performed by: NURSE PRACTITIONER

## 2024-11-14 PROCEDURE — 1159F MED LIST DOCD IN RCRD: CPT | Mod: CPTII,,, | Performed by: NURSE PRACTITIONER

## 2024-11-14 PROCEDURE — 1160F RVW MEDS BY RX/DR IN RCRD: CPT | Mod: CPTII,,, | Performed by: NURSE PRACTITIONER

## 2024-11-14 PROCEDURE — 1126F AMNT PAIN NOTED NONE PRSNT: CPT | Mod: CPTII,,, | Performed by: NURSE PRACTITIONER

## 2024-11-14 PROCEDURE — 99215 OFFICE O/P EST HI 40 MIN: CPT | Mod: PBBFAC | Performed by: NURSE PRACTITIONER

## 2024-11-14 PROCEDURE — 99999 PR PBB SHADOW E&M-EST. PATIENT-LVL V: CPT | Mod: PBBFAC,,, | Performed by: NURSE PRACTITIONER

## 2024-11-14 RX ORDER — CYANOCOBALAMIN (VITAMIN B-12) 1000 MCG
1 TABLET, SUBLINGUAL SUBLINGUAL DAILY
Status: CANCELLED | OUTPATIENT
Start: 2024-11-14

## 2024-11-14 RX ORDER — HYDROCODONE BITARTRATE AND ACETAMINOPHEN 10; 325 MG/1; MG/1
1 TABLET ORAL EVERY 6 HOURS PRN
Qty: 30 TABLET | Refills: 0 | Status: SHIPPED | OUTPATIENT
Start: 2024-11-14 | End: 2024-11-14

## 2024-11-14 RX ORDER — PREDNISONE 5 MG/1
5 TABLET ORAL DAILY
Qty: 90 TABLET | Refills: 3 | Status: CANCELLED | OUTPATIENT
Start: 2024-11-14

## 2024-11-14 RX ORDER — ALBUTEROL SULFATE 90 UG/1
2 INHALANT RESPIRATORY (INHALATION) EVERY 4 HOURS PRN
Qty: 18 G | Refills: 11 | Status: CANCELLED | OUTPATIENT
Start: 2024-11-14

## 2024-11-14 RX ORDER — LEVOTHYROXINE SODIUM 100 UG/1
100 TABLET ORAL
Qty: 90 TABLET | Refills: 3 | Status: CANCELLED | OUTPATIENT
Start: 2024-11-14

## 2024-11-14 RX ORDER — LACTULOSE 10 G/15ML
30 SOLUTION ORAL; RECTAL DAILY
Qty: 946 ML | Refills: 11 | Status: CANCELLED | OUTPATIENT
Start: 2024-11-14

## 2024-11-14 RX ORDER — IPRATROPIUM BROMIDE AND ALBUTEROL SULFATE 2.5; .5 MG/3ML; MG/3ML
SOLUTION RESPIRATORY (INHALATION)
Qty: 180 ML | Refills: 9 | Status: CANCELLED | OUTPATIENT
Start: 2024-11-14

## 2024-11-14 RX ORDER — PANTOPRAZOLE SODIUM 40 MG/1
40 TABLET, DELAYED RELEASE ORAL 2 TIMES DAILY
Qty: 180 TABLET | Refills: 3 | Status: CANCELLED | OUTPATIENT
Start: 2024-11-14

## 2024-11-14 RX ORDER — SYRINGE,SAFETY WITH NEEDLE,1ML 25GX1"
1 SYRINGE (EA) MISCELLANEOUS 2 TIMES DAILY
Qty: 100 EACH | Refills: 3 | Status: CANCELLED | OUTPATIENT
Start: 2024-11-14 | End: 2025-02-12

## 2024-11-14 RX ORDER — TRAMADOL HYDROCHLORIDE 50 MG/1
TABLET ORAL
Qty: 120 TABLET | Refills: 4 | Status: CANCELLED | OUTPATIENT
Start: 2024-11-14

## 2024-11-14 RX ORDER — CALCIUM CARB/VITAMIN D3/VIT K1 500-100-40
1 TABLET,CHEWABLE ORAL 2 TIMES DAILY
Qty: 180 EACH | Refills: 122 | Status: CANCELLED | OUTPATIENT
Start: 2024-11-14

## 2024-11-14 RX ORDER — BUDESONIDE AND FORMOTEROL FUMARATE DIHYDRATE 80; 4.5 UG/1; UG/1
2 AEROSOL RESPIRATORY (INHALATION) 2 TIMES DAILY
Qty: 10 G | Refills: 6 | Status: CANCELLED | OUTPATIENT
Start: 2024-11-14

## 2024-11-14 RX ORDER — METOPROLOL SUCCINATE 25 MG/1
25 TABLET, EXTENDED RELEASE ORAL DAILY
Qty: 90 TABLET | Refills: 3 | Status: CANCELLED | OUTPATIENT
Start: 2024-11-14 | End: 2025-11-14

## 2024-11-14 RX ORDER — HYDROCODONE BITARTRATE AND ACETAMINOPHEN 10; 325 MG/1; MG/1
1 TABLET ORAL EVERY 8 HOURS PRN
Qty: 30 TABLET | Refills: 0 | Status: SHIPPED | OUTPATIENT
Start: 2024-11-14

## 2024-11-14 RX ORDER — HUMAN INSULIN 100 [IU]/ML
INJECTION, SUSPENSION SUBCUTANEOUS
Qty: 30 ML | Refills: 11 | Status: CANCELLED | OUTPATIENT
Start: 2024-11-14

## 2024-11-14 RX ORDER — HYDRALAZINE HYDROCHLORIDE 50 MG/1
50 TABLET, FILM COATED ORAL 2 TIMES DAILY
Qty: 180 TABLET | Refills: 4 | Status: CANCELLED | OUTPATIENT
Start: 2024-11-14

## 2024-11-14 RX ORDER — HYDROCORTISONE 25 MG/G
CREAM TOPICAL 2 TIMES DAILY
Qty: 28 G | Refills: 12 | Status: CANCELLED | OUTPATIENT
Start: 2024-11-14

## 2024-11-14 RX ORDER — PRAVASTATIN SODIUM 10 MG/1
10 TABLET ORAL DAILY
Qty: 90 TABLET | Refills: 3 | Status: CANCELLED | OUTPATIENT
Start: 2024-11-14 | End: 2025-11-14

## 2024-11-14 RX ORDER — FUROSEMIDE 20 MG/1
20 TABLET ORAL 2 TIMES DAILY PRN
Qty: 180 TABLET | Refills: 3 | Status: CANCELLED | OUTPATIENT
Start: 2024-11-14

## 2024-11-14 RX ORDER — GABAPENTIN 400 MG/1
CAPSULE ORAL
Qty: 360 CAPSULE | Refills: 4 | Status: CANCELLED | OUTPATIENT
Start: 2024-11-14

## 2024-11-14 RX ORDER — FERROUS SULFATE 325(65) MG
325 TABLET ORAL
Qty: 36 TABLET | Refills: 3 | Status: CANCELLED | OUTPATIENT
Start: 2024-11-15

## 2024-11-14 RX ORDER — METHOCARBAMOL 750 MG/1
TABLET, FILM COATED ORAL
Qty: 240 TABLET | Refills: 1 | Status: CANCELLED | OUTPATIENT
Start: 2024-11-14

## 2024-11-14 RX ORDER — AMLODIPINE BESYLATE 5 MG/1
5 TABLET ORAL DAILY
Qty: 90 TABLET | Refills: 3 | Status: CANCELLED | OUTPATIENT
Start: 2024-11-14

## 2024-11-14 NOTE — PROGRESS NOTES
Subjective:       Patient ID: Vandana Allison is a 89 y.o. female     Chief Complaint:    Chief Complaint   Patient presents with    Follow-up        Allergies:  Aspirin    Current Medications:    Outpatient Encounter Medications as of 11/14/2024   Medication Sig Dispense Refill    acetaminophen (TYLENOL) 650 MG TbSR Take 650 mg by mouth every 8 (eight) hours.      albuterol (PROVENTIL/VENTOLIN HFA) 90 mcg/actuation inhaler INHALE 2 PUFFS BY MOUTH INTO THE LUNGS EVERY 4 HOURS AS NEEDED FOR SHORTNESS OF BREATH / RESCUE 18 g 11    albuterol-ipratropium (DUO-NEB) 2.5 mg-0.5 mg/3 mL nebulizer solution Use one (1) nebule in nebulizer 4 times daily as needed for shortness of breath. 180 mL 9    amLODIPine (NORVASC) 5 MG tablet Take 1 tablet (5 mg total) by mouth once daily. 90 tablet 3    B-complex with vitamin C (Z-BEC OR EQUIV) tablet Take 1 tablet by mouth once daily.      budesonide-formoterol 80-4.5 mcg (BREYNA) 80-4.5 mcg/actuation HFAA Inhale 2 puffs into the lungs 2 (two) times daily. 10 g 6    cholecalciferol, vitamin D3, (VITAMIN D3) 125 mcg (5,000 unit) Tab Take 5,000 Units by mouth once daily.      cyanocobalamin, vitamin B-12, 500 mcg Subl Place 1 tablet under the tongue once daily.      ferrous sulfate (FEOSOL) 325 mg (65 mg iron) Tab tablet Take 1 tablet (325 mg total) by mouth 3 (three) times a week. On Monday, Wednesday and Friday 36 tablet 3    furosemide (LASIX) 20 MG tablet TAKE 1 TABLET BY MOUTH TWICE A DAY AS NEEDED 180 tablet 3    furosemide (LASIX) 40 MG tablet Take 1 tablet (40 mg total) by mouth once daily. 30 tablet 11    gabapentin (NEURONTIN) 400 MG capsule TAKE 1 CAPSULE BY MOUTH 4 TIMES A DAY AS NEEDED 360 capsule 4    hydrALAZINE (APRESOLINE) 50 MG tablet Take 1 tablet (50 mg total) by mouth 2 (two) times daily. 180 tablet 4    HYDROcodone-acetaminophen (NORCO)  mg per tablet Take 1 tablet by mouth every 8 (eight) hours as needed for Pain. 90 tablet 0    hydrocortisone  "(ANUSOL-HC) 2.5 % rectal cream Place rectally 2 (two) times daily. 28 g 12    insulin syringe-needle U-100 (BD INSULIN SYRINGE ULTRA-FINE) 0.3 mL 31 gauge x 5/16" Syrg 1 10exp8 PFU by Misc.(Non-Drug; Combo Route) route 2 (two) times daily. 180 each 122    lactulose (CHRONULAC) 10 gram/15 mL solution Take 30 mLs (20 g total) by mouth once daily. 946 mL 11    levothyroxine (SYNTHROID) 100 MCG tablet TAKE 1 TABLET BY MOUTH BEFORE BREAKFAST. 90 tablet 3    magnesium citrate 100 mg Tab Take 400 mg by mouth as needed.      methocarbamoL (ROBAXIN) 750 MG Tab TAKE 2 TABLETS BY MOUTH 4 TIMES A DAY AS NEEDED FOR MUSCLE SPASMS  Strength: 750 mg (Patient taking differently: Take 750 mg by mouth once daily. TAKE 2 TABLETS BY MOUTH 4 TIMES A DAY AS NEEDED FOR MUSCLE SPASMS  Strength: 750 mg) 240 tablet 1    metoprolol succinate (TOPROL-XL) 25 MG 24 hr tablet Take 1 tablet (25 mg total) by mouth once daily. 90 tablet 3    NOVOLIN N NPH U-100 INSULIN 100 unit/mL injection INJECT 40 UNITS INTO THE SKIN TWICE A DAY 30 mL 11    omega-3 fatty acids/fish oil (FISH OIL-OMEGA-3 FATTY ACIDS) 300-1,000 mg capsule Take 1 capsule by mouth once daily.      pantoprazole (PROTONIX) 40 MG tablet TAKE 1 TABLET BY MOUTH TWICE A  tablet 3    pravastatin (PRAVACHOL) 10 MG tablet Take 1 tablet (10 mg total) by mouth once daily. 90 tablet 3    predniSONE (DELTASONE) 5 MG tablet Take 1 tablet (5 mg total) by mouth once daily. 90 tablet 3    traMADoL (ULTRAM) 50 mg tablet TAKE 1 TABLET BY MOUTH FOUR TIMES A  tablet 4    albuterol-ipratropium (DUO-NEB) 2.5 mg-0.5 mg/3 mL nebulizer solution Take 3 mLs by nebulization 4 (four) times daily as needed for Shortness of Breath. Rescue 360 mL 5    azithromycin (Z-CARMELLA) 250 MG tablet Take 1 tablet (250 mg total) by mouth once daily. Take first 2 tablets together, then 1 every day until finished. 6 each 0    empagliflozin (JARDIANCE) 10 mg tablet Take 1 tablet (10 mg total) by mouth once daily. HOLD " THIS MEDICATION UNTIL YOU FOLLOW UP WITH YOUR PCP. (Patient not taking: Reported on 2024) 90 tablet 3    [] insulin syringe-needle U-100 (BD INSULIN SYRINGE ULTRA-FINE) 1 mL 31 gauge x 5/16 Syrg 1 Syringe by Misc.(Non-Drug; Combo Route) route 2 (two) times daily. 100 each 3    polyethylene glycol (GLYCOLAX) 17 gram PwPk Take 17 g by mouth daily as needed for Constipation. (Patient not taking: Reported on 2024) 17 packet 5     No facility-administered encounter medications on file as of 2024.       History of Present Illness  89 y/o female following in neurology for incidental finding of prior CVA on CT head done in the emergency department    She was seen in the emergency department in May of 2024, presenting for symptoms for weakness, cough, decreased intake, found to have covid.  During the visit they obtained the CT head which reported fair amount of atrophy and area of possible old infarct, though not clearly defined, but certainly not acute.  No history of prior being told had CVA.  Prior imaging CT head in  and , reports do not indicate possible CVA at that time.  She was on HTN management, no cholesterol treatment however  She denied recent falls, though she uses walker to assist with ambulation.  She has aspirin allergy.  Concern about starting plavix given age and mobility complications and ultimately decided against it.           Review of Systems  Review of Systems   Constitutional:  Negative for diaphoresis and fever.   HENT:  Negative for congestion, hearing loss and tinnitus.    Eyes:  Negative for blurred vision, double vision, photophobia, discharge and redness.   Respiratory:  Negative for cough and shortness of breath.    Cardiovascular:  Negative for chest pain.   Gastrointestinal:  Negative for abdominal pain, nausea and vomiting.   Musculoskeletal:  Negative for back pain, joint pain, myalgias and neck pain.   Skin:  Negative for itching and rash.   Neurological:   Positive for headaches. Negative for dizziness, tremors, sensory change, speech change, focal weakness, seizures, loss of consciousness and weakness.   Psychiatric/Behavioral:  Negative for depression, hallucinations and memory loss. The patient does not have insomnia.    All other systems reviewed and are negative.     Objective:     NEUROLOGICAL EXAMINATION:     MENTAL STATUS   Oriented to person, place, and time.   Attention: normal. Concentration: normal.   Speech: speech is normal   Level of consciousness: alert  Knowledge: good and consistent with education.   Normal comprehension.     CRANIAL NERVES     CN II   Visual fields full to confrontation.   Visual acuity: normal  Right visual field deficit: none  Left visual field deficit: none     CN III, IV, VI   Pupils are equal, round, and reactive to light.  Extraocular motions are normal.   Right pupil: Size: 3 mm. Shape: regular. Reactivity: brisk. Consensual response: intact. Accommodation: intact.   Left pupil: Size: 3 mm. Shape: regular. Reactivity: brisk. Consensual response: intact. Accommodation: intact.   CN III: no CN III palsy  CN VI: no CN VI palsy  Nystagmus: none   Diplopia: none  Upgaze: normal  Downgaze: normal  Conjugate gaze: present  Vestibulo-ocular reflex: present    CN V   Facial sensation intact.   Right facial sensation deficit: none  Left facial sensation deficit: none  Right corneal reflex: normal  Left corneal reflex: normal    CN VII   Facial expression full, symmetric.   Right facial weakness: none  Left facial weakness: none  Right taste: normal  Left taste: normal    CN VIII   CN VIII normal.   Hearing: intact    CN IX, X   CN IX normal.   CN X normal.   Palate: symmetric    CN XI   CN XI normal.   Right sternocleidomastoid strength: normal  Left sternocleidomastoid strength: normal  Right trapezius strength: normal  Left trapezius strength: normal    CN XII   CN XII normal.   Tongue: not atrophic  Fasciculations: absent  Tongue  deviation: none    MOTOR EXAM   Muscle bulk: normal  Overall muscle tone: normal  Right arm tone: normal  Left arm tone: normal  Right arm pronator drift: absent  Left arm pronator drift: absent  Right leg tone: normal  Left leg tone: normal    Strength   Right neck flexion: 5/5  Left neck flexion: 5/5  Right neck extension: 5/5  Left neck extension:   Right deltoid:   Left deltoid:   Right biceps:   Left biceps: 5  Right triceps: /5  Left triceps: /  Right wrist flexion:   Left wrist flexion: /  Right wrist extension:   Left wrist extension:   Right interossei:   Left interossei:   Right iliopsoas:   Left iliopsoas:   Right quadriceps:   Left quadriceps:   Right hamstrin/5  Left hamstrin/5  Right anterior tibial:   Left anterior tibial:   Right posterior tibial:   Left posterior tibial:   Right gastroc:   Left gastroc:     REFLEXES     Reflexes   Right brachioradialis: 2+  Left brachioradialis: 2+  Right biceps: 2+  Left biceps: 2+  Right triceps: 2+  Left triceps: 2+  Right patellar: 2+  Left patellar: 2+  Right achilles: 2+  Left achilles: 2+  Right plantar: normal  Left plantar: normal  Right Grande: absent  Left Grande: absent  Right ankle clonus: absent  Left ankle clonus: absent  Right pendular knee jerk: absent  Left pendular knee jerk: absent    SENSORY EXAM   Light touch normal.   Right arm light touch: normal  Left arm light touch: normal  Right leg light touch: normal  Left leg light touch: normal  Vibration normal.   Right arm vibration: normal  Left arm vibration: normal  Right leg vibration: normal  Left leg vibration: normal  Proprioception normal.   Right arm proprioception: normal  Left arm proprioception: normal  Right leg proprioception: normal  Left leg proprioception: normal  Pinprick normal.   Right arm pinprick: normal  Left arm pinprick: normal  Right leg pinprick: normal  Left leg pinprick: normal  Graphesthesia:  normal  Romberg: negative  Stereognosis: normal    GAIT AND COORDINATION     Gait  Gait: wide-based     Coordination   Finger to nose coordination: normal  Heel to shin coordination: normal  Tandem walking coordination: normal    Tremor   Resting tremor: absent  Intention tremor: absent  Action tremor: absent       Using walker wheelchair in clinic        Physical Exam  Eyes:      Extraocular Movements: EOM normal.      Pupils: Pupils are equal, round, and reactive to light.   Neurological:      Mental Status: She is oriented to person, place, and time.      Coordination: Finger-Nose-Finger Test, Heel to Shin Test and Romberg Test normal.      Gait: Tandem walk normal.      Deep Tendon Reflexes:      Reflex Scores:       Tricep reflexes are 2+ on the right side and 2+ on the left side.       Bicep reflexes are 2+ on the right side and 2+ on the left side.       Brachioradialis reflexes are 2+ on the right side and 2+ on the left side.       Patellar reflexes are 2+ on the right side and 2+ on the left side.       Achilles reflexes are 2+ on the right side and 2+ on the left side.  Psychiatric:         Speech: Speech normal.          Assessment:     Problem List Items Addressed This Visit          Neuro    Cerebellar infarct - Primary       Cardiac/Vascular    Hyperlipidemia LDL goal <100          Primary Diagnosis and ICD10  Cerebellar infarct [I63.9]    Plan:     Patient Instructions   Reviewed prior CT head  Do not recommend plavix given advanced age and fall risk as well as recent GI bleed  Pravastatin 10mg daily    There are no discontinued medications.    Requested Prescriptions      No prescriptions requested or ordered in this encounter       No orders of the defined types were placed in this encounter.

## 2024-11-14 NOTE — PATIENT INSTRUCTIONS
Reviewed prior CT head  Do not recommend plavix given advanced age and fall risk as well as recent GI bleed  Pravastatin 10mg daily

## 2024-11-15 ENCOUNTER — OFFICE VISIT (OUTPATIENT)
Dept: CARDIOLOGY | Facility: CLINIC | Age: 89
End: 2024-11-15
Payer: COMMERCIAL

## 2024-11-15 VITALS
BODY MASS INDEX: 30.37 KG/M2 | DIASTOLIC BLOOD PRESSURE: 72 MMHG | WEIGHT: 189 LBS | HEART RATE: 69 BPM | HEIGHT: 66 IN | SYSTOLIC BLOOD PRESSURE: 140 MMHG | OXYGEN SATURATION: 95 %

## 2024-11-15 DIAGNOSIS — R06.02 SHORTNESS OF BREATH: ICD-10-CM

## 2024-11-15 DIAGNOSIS — I10 ESSENTIAL (PRIMARY) HYPERTENSION: Primary | ICD-10-CM

## 2024-11-15 PROCEDURE — 3288F FALL RISK ASSESSMENT DOCD: CPT | Mod: CPTII,,, | Performed by: STUDENT IN AN ORGANIZED HEALTH CARE EDUCATION/TRAINING PROGRAM

## 2024-11-15 PROCEDURE — 99214 OFFICE O/P EST MOD 30 MIN: CPT | Mod: S$PBB,,, | Performed by: STUDENT IN AN ORGANIZED HEALTH CARE EDUCATION/TRAINING PROGRAM

## 2024-11-15 PROCEDURE — 99213 OFFICE O/P EST LOW 20 MIN: CPT | Mod: PBBFAC | Performed by: STUDENT IN AN ORGANIZED HEALTH CARE EDUCATION/TRAINING PROGRAM

## 2024-11-15 PROCEDURE — 1126F AMNT PAIN NOTED NONE PRSNT: CPT | Mod: CPTII,,, | Performed by: STUDENT IN AN ORGANIZED HEALTH CARE EDUCATION/TRAINING PROGRAM

## 2024-11-15 PROCEDURE — 1159F MED LIST DOCD IN RCRD: CPT | Mod: CPTII,,, | Performed by: STUDENT IN AN ORGANIZED HEALTH CARE EDUCATION/TRAINING PROGRAM

## 2024-11-15 PROCEDURE — 1100F PTFALLS ASSESS-DOCD GE2>/YR: CPT | Mod: CPTII,,, | Performed by: STUDENT IN AN ORGANIZED HEALTH CARE EDUCATION/TRAINING PROGRAM

## 2024-11-15 PROCEDURE — 99999 PR PBB SHADOW E&M-EST. PATIENT-LVL III: CPT | Mod: PBBFAC,,, | Performed by: STUDENT IN AN ORGANIZED HEALTH CARE EDUCATION/TRAINING PROGRAM

## 2024-11-15 RX ORDER — FUROSEMIDE 20 MG/1
20 TABLET ORAL 2 TIMES DAILY
Qty: 180 TABLET | Refills: 3 | Status: SHIPPED | OUTPATIENT
Start: 2024-11-15 | End: 2025-11-15

## 2024-11-15 RX ORDER — FUROSEMIDE 40 MG/1
40 TABLET ORAL 2 TIMES DAILY
Qty: 180 TABLET | Refills: 3 | Status: SHIPPED | OUTPATIENT
Start: 2024-11-15 | End: 2025-11-15

## 2024-11-15 NOTE — PROGRESS NOTES
PCP: Cm Larson III, DO    Referring Provider:     Subjective:   Vandana Allison is a 89 y.o. female with hx of asthma, RA on prednisone, hypothyroidism, status post right nephrectomy and type 2 diabetes who presents for follow up    11/15/2024:  Accompanied by daughter.  Stable dyspnea on exertion and lower extremity edema.  Per daughter no significant improvement in edema since increase in Lasix dose.    5/3/24: Presents for a new patient visit.Recently hospitalized from 04/27 to 4/30 with dyspnea associated with wheezing and lower extremity edema.  During this hospitalization she underwent IV diuresis and was treated for aspiration pneumonia as well.  She had endorsed dysphagia so she underwent EGD and esophageal dilation.  Recent echo 02/07/2024 with improvement in degree of TR and PASP.  PASP 42 mm Hg. Endorses lower extremity edema and NYHA class II GREENE. She is taking lasix 20 mg BID.     Fhx:  Father (unspecified heart disease)  Shx:  Never smoker    EKG 05/03/2024:  Normal sinus rhythm    ECHO Results for orders placed during the hospital encounter of 02/07/24    Echo    Interpretation Summary    Left Ventricle: The left ventricle is normal in size. Normal wall thickness. There is normal systolic function with a visually estimated ejection fraction of 55 - 70%. Ejection fraction by visual approximation is 60%.    Right Ventricle: Normal right ventricular cavity size.    Aortic Valve: The aortic valve is a trileaflet valve. There is mild aortic valve sclerosis. Mildly calcified cusps.    Mitral Valve: There is mild bileaflet sclerosis. There is mild regurgitation with an eccentric jet.    Tricuspid Valve: There is mild regurgitation.    IVC/SVC: Intermediate venous pressure at 8 mmHg.    Pericardium: There is a trivial effusion.     CATH: No results found for this or any previous visit.     Lab Results   Component Value Date     (L) 05/21/2024    K 4.2 05/21/2024    CL 95 (L) 05/21/2024    CO2  27 05/21/2024    BUN 31 (H) 05/21/2024    CREATININE 1.81 (H) 05/21/2024    CALCIUM 8.8 05/21/2024    ANIONGAP 12 05/21/2024    ESTGFRAFRICA 47 09/08/2020    EGFRNONAA 25 (L) 08/02/2022       Lab Results   Component Value Date    CHOL 249 (H) 02/07/2024    CHOL 245 (H) 11/02/2023    CHOL 232 (H) 06/06/2023     Lab Results   Component Value Date    HDL 71 (H) 02/07/2024    HDL 71 (H) 11/02/2023    HDL 73 (H) 06/06/2023     Lab Results   Component Value Date    LDLCALC 137 02/07/2024    LDLCALC 140 11/02/2023    LDLCALC 110 06/06/2023     Lab Results   Component Value Date    TRIG 206 (H) 02/07/2024    TRIG 170 (H) 11/02/2023    TRIG 243 (H) 06/06/2023     Lab Results   Component Value Date    CHOLHDL 3.5 02/07/2024    CHOLHDL 3.5 11/02/2023    CHOLHDL 3.2 06/06/2023       Lab Results   Component Value Date    WBC 10.99 05/21/2024    HGB 10.6 (L) 05/21/2024    HCT 34.1 (L) 05/21/2024    MCV 81.0 05/21/2024     05/21/2024           Current Outpatient Medications:     acetaminophen (TYLENOL) 650 MG TbSR, Take 650 mg by mouth every 8 (eight) hours., Disp: , Rfl:     albuterol (PROVENTIL/VENTOLIN HFA) 90 mcg/actuation inhaler, INHALE 2 PUFFS BY MOUTH INTO THE LUNGS EVERY 4 HOURS AS NEEDED FOR SHORTNESS OF BREATH / RESCUE, Disp: 18 g, Rfl: 11    albuterol-ipratropium (DUO-NEB) 2.5 mg-0.5 mg/3 mL nebulizer solution, Use one (1) nebule in nebulizer 4 times daily as needed for shortness of breath., Disp: 180 mL, Rfl: 9    amLODIPine (NORVASC) 5 MG tablet, Take 1 tablet (5 mg total) by mouth once daily., Disp: 90 tablet, Rfl: 3    B-complex with vitamin C (Z-BEC OR EQUIV) tablet, Take 1 tablet by mouth once daily., Disp: , Rfl:     budesonide-formoterol 80-4.5 mcg (BREYNA) 80-4.5 mcg/actuation HFAA, Inhale 2 puffs into the lungs 2 (two) times daily., Disp: 10 g, Rfl: 6    cholecalciferol, vitamin D3, (VITAMIN D3) 125 mcg (5,000 unit) Tab, Take 5,000 Units by mouth once daily., Disp: , Rfl:     cyanocobalamin, vitamin  "B-12, 500 mcg Subl, Place 1 tablet under the tongue once daily., Disp: , Rfl:     ferrous sulfate (FEOSOL) 325 mg (65 mg iron) Tab tablet, Take 1 tablet (325 mg total) by mouth 3 (three) times a week. On Monday, Wednesday and Friday, Disp: 36 tablet, Rfl: 3    furosemide (LASIX) 20 MG tablet, TAKE 1 TABLET BY MOUTH TWICE A DAY AS NEEDED, Disp: 180 tablet, Rfl: 3    gabapentin (NEURONTIN) 400 MG capsule, TAKE 1 CAPSULE BY MOUTH 4 TIMES A DAY AS NEEDED, Disp: 360 capsule, Rfl: 4    hydrALAZINE (APRESOLINE) 50 MG tablet, Take 1 tablet (50 mg total) by mouth 2 (two) times daily., Disp: 180 tablet, Rfl: 4    HYDROcodone-acetaminophen (NORCO)  mg per tablet, Take 1 tablet by mouth every 8 (eight) hours as needed for Pain., Disp: 30 tablet, Rfl: 0    hydrocortisone (ANUSOL-HC) 2.5 % rectal cream, Place rectally 2 (two) times daily., Disp: 28 g, Rfl: 12    insulin syringe-needle U-100 (BD INSULIN SYRINGE ULTRA-FINE) 0.3 mL 31 gauge x 5/16" Syrg, 1 10exp8 PFU by Misc.(Non-Drug; Combo Route) route 2 (two) times daily., Disp: 180 each, Rfl: 122    lactulose (CHRONULAC) 10 gram/15 mL solution, Take 30 mLs (20 g total) by mouth once daily., Disp: 946 mL, Rfl: 11    levothyroxine (SYNTHROID) 100 MCG tablet, TAKE 1 TABLET BY MOUTH BEFORE BREAKFAST., Disp: 90 tablet, Rfl: 3    magnesium citrate 100 mg Tab, Take 400 mg by mouth as needed., Disp: , Rfl:     methocarbamoL (ROBAXIN) 750 MG Tab, TAKE 2 TABLETS BY MOUTH 4 TIMES A DAY AS NEEDED FOR MUSCLE SPASMS Strength: 750 mg, Disp: 240 tablet, Rfl: 1    metoprolol succinate (TOPROL-XL) 25 MG 24 hr tablet, Take 1 tablet (25 mg total) by mouth once daily., Disp: 90 tablet, Rfl: 3    NOVOLIN N NPH U-100 INSULIN 100 unit/mL injection, INJECT 40 UNITS INTO THE SKIN TWICE A DAY, Disp: 30 mL, Rfl: 11    omega-3 fatty acids/fish oil (FISH OIL-OMEGA-3 FATTY ACIDS) 300-1,000 mg capsule, Take 1 capsule by mouth once daily., Disp: , Rfl:     pantoprazole (PROTONIX) 40 MG tablet, TAKE 1 " "TABLET BY MOUTH TWICE A DAY, Disp: 180 tablet, Rfl: 3    pravastatin (PRAVACHOL) 10 MG tablet, Take 1 tablet (10 mg total) by mouth once daily., Disp: 90 tablet, Rfl: 3    predniSONE (DELTASONE) 5 MG tablet, Take 1 tablet (5 mg total) by mouth once daily., Disp: 90 tablet, Rfl: 3    traMADoL (ULTRAM) 50 mg tablet, TAKE 1 TABLET BY MOUTH FOUR TIMES A DAY, Disp: 120 tablet, Rfl: 4    empagliflozin (JARDIANCE) 10 mg tablet, Take 1 tablet (10 mg total) by mouth once daily. HOLD THIS MEDICATION UNTIL YOU FOLLOW UP WITH YOUR PCP. (Patient not taking: Reported on 11/15/2024), Disp: 90 tablet, Rfl: 3    furosemide (LASIX) 20 MG tablet, Take 1 tablet (20 mg total) by mouth 2 (two) times daily., Disp: 180 tablet, Rfl: 3    furosemide (LASIX) 40 MG tablet, Take 1 tablet (40 mg total) by mouth 2 (two) times daily., Disp: 180 tablet, Rfl: 3    polyethylene glycol (GLYCOLAX) 17 gram PwPk, Take 17 g by mouth daily as needed for Constipation. (Patient not taking: Reported on 11/15/2024), Disp: 17 packet, Rfl: 5    Review of Systems   Constitutional:  Negative for chills, diaphoresis, fever and malaise/fatigue.   Respiratory:  Negative for cough and shortness of breath.    Cardiovascular:  Negative for chest pain, palpitations, orthopnea, claudication, leg swelling and PND.   Gastrointestinal:  Negative for abdominal pain, heartburn, nausea and vomiting.   Neurological:  Negative for dizziness.       Objective:   BP (!) 140/72 (BP Location: Left arm, Patient Position: Sitting)   Pulse 69   Ht 5' 6" (1.676 m)   Wt 85.7 kg (189 lb)   SpO2 95%   BMI 30.51 kg/m²     Physical Exam  Constitutional:       General: She is not in acute distress.     Appearance: Normal appearance.   Neck:      Comments: JVD present  Cardiovascular:      Rate and Rhythm: Normal rate and regular rhythm.      Pulses: Normal pulses.      Heart sounds: Normal heart sounds. No murmur heard.     No friction rub. No gallop.   Pulmonary:      Effort: Pulmonary " effort is normal.      Breath sounds: Normal breath sounds. No wheezing or rales.   Musculoskeletal:      Cervical back: Neck supple.      Right lower leg: Edema present.      Left lower leg: Edema present.      Comments: 1 +   Skin:     General: Skin is warm and dry.   Neurological:      Mental Status: She is alert.           Assessment:     1. Essential (primary) hypertension  EKG 12-lead    EKG 12-lead    Basic Metabolic Panel    NT-Pro Natriuretic Peptide      2. Shortness of breath  Basic Metabolic Panel    NT-Pro Natriuretic Peptide            Plan:   No problem-specific Assessment & Plan notes found for this encounter.    Pulmonary hypertension  - PASP improvement noted on last echo  - recent proBNP 1616 -> improved from Feb 2024  - Etiology: possible 2/2 RA  - Given advanced age and that her symptoms are well controlled on diuretics, do not recommend invasive evaluation  - increase Lasix to 60 mg b.i.d.  - Labs on Wednesday 11/27/24    Follow-up in 6 months

## 2024-11-15 NOTE — PROGRESS NOTES
Ozzy De La Garza Jr., MD        PATIENT NAME: Vandana Allison  : 1927  DATE: 24  MRN: [id number]      Billing Provider: Ozzy De La Garza Jr., MD  Level of Service: NM OFFICE/OUTPT VISIT, EST, SOURAV III, 20-29 MIN  Patient PCP Information       Provider PCP Type    Cm Larson III, DO General            Reason for Visit / Chief Complaint: Establish Care (Establish care)       Update PCP  Update Chief Complaint         History of Present Illness / Problem Focused Workflow     Vandana Allison presents to the clinic with Establish Care (Establish care)     Patient presented to establish care. Patient was followed by Dr. Larson but he has since changed his practice. During the visit patient endorsed that she needed to establish care with a PCP due to her pain medicine running out. Patient had no complaints health wise.         Review of Systems     Review of Systems   Constitutional:  Negative for chills and unexpected weight change.   HENT:  Negative for congestion, drooling, ear pain and sore throat.    Eyes:  Negative for visual disturbance.   Cardiovascular:  Positive for leg swelling. Negative for chest pain and palpitations.   Gastrointestinal:  Negative for abdominal pain, nausea and vomiting.   Genitourinary:  Negative for difficulty urinating, dysuria and urgency.   Musculoskeletal:  Positive for arthralgias. Negative for back pain.   Skin:  Negative for rash.   Neurological:  Negative for dizziness, syncope, weakness and headaches.   Psychiatric/Behavioral:  Negative for sleep disturbance.         Medical / Social / Family History     Past Medical History:   Diagnosis Date    Arthritis     Bleeding external hemorrhoids     Chronic kidney disease, stage 3b     baseline creat 1.5    COPD (chronic obstructive pulmonary disease)     senile emphysema    Diabetes mellitus, type 2     DNR (do not resuscitate) 2024    discussed with VITO Christianson present    Essential (primary) hypertension      Hiatal hernia with GERD     Neuropathy     Post-operative hypothyroidism     Severe pulmonary hypertension 10/08/2022    EF  70 % and normal diastolic function       Past Surgical History:   Procedure Laterality Date    BREAST SURGERY      Biopsy    EYE SURGERY      Remove cataracts both eyes    HYSTERECTOMY      NEPHRECTOMY Right 1960    THYROIDECTOMY         Social History  Ms.  reports that she has never smoked. She has never used smokeless tobacco. She reports that she does not drink alcohol and does not use drugs.    Family History  Ms.'s family history includes Cancer in her brother, daughter, and sister; Diabetes in her brother, mother, sister, and sister; Heart disease in her father.    Medications and Allergies     Medications  Outpatient Medications Marked as Taking for the 11/14/24 encounter (Office Visit) with Jr. Ozzy De La Garza MD   Medication Sig Dispense Refill    acetaminophen (TYLENOL) 650 MG TbSR Take 650 mg by mouth every 8 (eight) hours.      albuterol (PROVENTIL/VENTOLIN HFA) 90 mcg/actuation inhaler INHALE 2 PUFFS BY MOUTH INTO THE LUNGS EVERY 4 HOURS AS NEEDED FOR SHORTNESS OF BREATH / RESCUE 18 g 11    albuterol-ipratropium (DUO-NEB) 2.5 mg-0.5 mg/3 mL nebulizer solution Use one (1) nebule in nebulizer 4 times daily as needed for shortness of breath. 180 mL 9    amLODIPine (NORVASC) 5 MG tablet Take 1 tablet (5 mg total) by mouth once daily. 90 tablet 3    B-complex with vitamin C (Z-BEC OR EQUIV) tablet Take 1 tablet by mouth once daily.      budesonide-formoterol 80-4.5 mcg (BREYNA) 80-4.5 mcg/actuation HFAA Inhale 2 puffs into the lungs 2 (two) times daily. 10 g 6    cholecalciferol, vitamin D3, (VITAMIN D3) 125 mcg (5,000 unit) Tab Take 5,000 Units by mouth once daily.      cyanocobalamin, vitamin B-12, 500 mcg Subl Place 1 tablet under the tongue once daily.      ferrous sulfate (FEOSOL) 325 mg (65 mg iron) Tab tablet Take 1 tablet (325 mg total) by mouth 3 (three) times a week. On  "Monday, Wednesday and Friday 36 tablet 3    furosemide (LASIX) 20 MG tablet TAKE 1 TABLET BY MOUTH TWICE A DAY AS NEEDED 180 tablet 3    furosemide (LASIX) 40 MG tablet Take 1 tablet (40 mg total) by mouth once daily. 30 tablet 11    gabapentin (NEURONTIN) 400 MG capsule TAKE 1 CAPSULE BY MOUTH 4 TIMES A DAY AS NEEDED 360 capsule 4    hydrALAZINE (APRESOLINE) 50 MG tablet Take 1 tablet (50 mg total) by mouth 2 (two) times daily. 180 tablet 4    hydrocortisone (ANUSOL-HC) 2.5 % rectal cream Place rectally 2 (two) times daily. 28 g 12    insulin syringe-needle U-100 (BD INSULIN SYRINGE ULTRA-FINE) 0.3 mL 31 gauge x 5/16" Syrg 1 10exp8 PFU by Misc.(Non-Drug; Combo Route) route 2 (two) times daily. 180 each 122    lactulose (CHRONULAC) 10 gram/15 mL solution Take 30 mLs (20 g total) by mouth once daily. 946 mL 11    levothyroxine (SYNTHROID) 100 MCG tablet TAKE 1 TABLET BY MOUTH BEFORE BREAKFAST. 90 tablet 3    magnesium citrate 100 mg Tab Take 400 mg by mouth as needed.      methocarbamoL (ROBAXIN) 750 MG Tab TAKE 2 TABLETS BY MOUTH 4 TIMES A DAY AS NEEDED FOR MUSCLE SPASMS  Strength: 750 mg 240 tablet 1    metoprolol succinate (TOPROL-XL) 25 MG 24 hr tablet Take 1 tablet (25 mg total) by mouth once daily. 90 tablet 3    NOVOLIN N NPH U-100 INSULIN 100 unit/mL injection INJECT 40 UNITS INTO THE SKIN TWICE A DAY 30 mL 11    omega-3 fatty acids/fish oil (FISH OIL-OMEGA-3 FATTY ACIDS) 300-1,000 mg capsule Take 1 capsule by mouth once daily.      pantoprazole (PROTONIX) 40 MG tablet TAKE 1 TABLET BY MOUTH TWICE A  tablet 3    pravastatin (PRAVACHOL) 10 MG tablet Take 1 tablet (10 mg total) by mouth once daily. 90 tablet 3    predniSONE (DELTASONE) 5 MG tablet Take 1 tablet (5 mg total) by mouth once daily. 90 tablet 3    traMADoL (ULTRAM) 50 mg tablet TAKE 1 TABLET BY MOUTH FOUR TIMES A  tablet 4    [DISCONTINUED] HYDROcodone-acetaminophen (NORCO)  mg per tablet Take 1 tablet by mouth every 8 (eight) " hours as needed for Pain. 90 tablet 0       Allergies  Review of patient's allergies indicates:   Allergen Reactions    Aspirin        Physical Examination     Vitals:    11/14/24 1424   BP: 138/76   Pulse: 71   Resp: 18   Temp: 98 °F (36.7 °C)     Physical Exam  Vitals reviewed.   Constitutional:       Appearance: Normal appearance. She is obese.   HENT:      Head: Normocephalic and atraumatic.      Nose: Nose normal.      Mouth/Throat:      Mouth: Mucous membranes are moist.   Eyes:      Extraocular Movements: Extraocular movements intact.   Cardiovascular:      Rate and Rhythm: Normal rate and regular rhythm.      Pulses: Normal pulses.      Heart sounds: Normal heart sounds.   Pulmonary:      Effort: Pulmonary effort is normal.      Breath sounds: Normal breath sounds.   Abdominal:      General: Bowel sounds are normal.      Palpations: Abdomen is soft.      Tenderness: There is no abdominal tenderness.   Musculoskeletal:         General: Tenderness present. Normal range of motion.      Cervical back: Normal range of motion and neck supple.   Skin:     General: Skin is warm.   Neurological:      General: No focal deficit present.      Mental Status: She is alert and oriented to person, place, and time.   Psychiatric:         Mood and Affect: Mood normal.         Behavior: Behavior normal.          Assessment and Plan (including Health Maintenance)      Problem List  Smart Sets  Document Outside HM   :    Plan:       Health Maintenance Due   Topic Date Due    Diabetes Urine Screening  Never done    Pneumococcal Vaccines (Age 65+) (1 of 2 - PCV) Never done    TETANUS VACCINE  Never done    Shingles Vaccine (1 of 2) Never done    RSV Vaccine (Age 60+ and Pregnant patients) (1 - 1-dose 75+ series) Never done    Hemoglobin A1c  08/07/2024    Influenza Vaccine (1) 09/01/2024    COVID-19 Vaccine (4 - 2024-25 season) 09/01/2024       1. Rheumatoid arthritis involving multiple sites, unspecified whether rheumatoid factor  present  Assessment & Plan:  M Patient presented to establish care after former PCP changed practice. Patient was concerned about continued pain management from her PCP.  E Physical exam showed a elderly woman in a wheelchair with chronic hip and knee pain.   A Discussed with patient how controlled substances are prescribed and why we would need to refer her for appropriate management. She was informed that we could handle her other medical issues should they arise.  T Ordered 30 Norco tablets and set up pain management referral. Patient is instructed to make that appointment in order to continue her pain control.      Orders:  -     Discontinue: HYDROcodone-acetaminophen (NORCO)  mg per tablet; Take 1 tablet by mouth every 6 (six) hours as needed for Pain.  Dispense: 30 tablet; Refill: 0  -     HYDROcodone-acetaminophen (NORCO)  mg per tablet; Take 1 tablet by mouth every 8 (eight) hours as needed for Pain.  Dispense: 30 tablet; Refill: 0  -     Ambulatory referral/consult to Pain Clinic; Future; Expected date: 11/21/2024         Health Maintenance Topics with due status: Not Due       Topic Last Completion Date    Lipid Panel 02/07/2024    Eye Exam 08/09/2024       Future Appointments   Date Time Provider Department Center   11/15/2024 11:00 AM Nereyda Croft MD [facility] CARD [facility]   11/25/2024  8:00 AM Lurdes Ayers MD [facility]        There are no Patient Instructions on file for this visit.  No follow-ups on file.     Signature:  Ozzy De La Garza Jr., MD      Date of encounter: 11/14/24

## 2024-11-15 NOTE — ASSESSMENT & PLAN NOTE
M Patient presented to establish care after former PCP changed practice. Patient was concerned about continued pain management from her PCP.  E Physical exam showed a elderly woman in a wheelchair with chronic hip and knee pain.   A Discussed with patient how controlled substances are prescribed and why we would need to refer her for appropriate management. She was informed that we could handle her other medical issues should they arise.  T Ordered 30 Norco tablets and set up pain management referral. Patient is instructed to make that appointment in order to continue her pain control.

## 2024-11-27 ENCOUNTER — OFFICE VISIT (OUTPATIENT)
Dept: FAMILY MEDICINE | Facility: CLINIC | Age: 89
End: 2024-11-27
Payer: COMMERCIAL

## 2024-11-27 VITALS
HEIGHT: 66 IN | OXYGEN SATURATION: 98 % | DIASTOLIC BLOOD PRESSURE: 76 MMHG | WEIGHT: 188 LBS | HEART RATE: 91 BPM | SYSTOLIC BLOOD PRESSURE: 148 MMHG | TEMPERATURE: 98 F | BODY MASS INDEX: 30.22 KG/M2

## 2024-11-27 DIAGNOSIS — I10 ESSENTIAL (PRIMARY) HYPERTENSION: ICD-10-CM

## 2024-11-27 DIAGNOSIS — R06.02 SHORTNESS OF BREATH: ICD-10-CM

## 2024-11-27 DIAGNOSIS — I10 HYPERTENSION, UNSPECIFIED TYPE: Primary | ICD-10-CM

## 2024-11-27 DIAGNOSIS — E11.22 TYPE 2 DIABETES MELLITUS WITH STAGE 3B CHRONIC KIDNEY DISEASE, WITHOUT LONG-TERM CURRENT USE OF INSULIN: ICD-10-CM

## 2024-11-27 DIAGNOSIS — Z23 NEED FOR VACCINATION: ICD-10-CM

## 2024-11-27 DIAGNOSIS — M06.9 RHEUMATOID ARTHRITIS INVOLVING MULTIPLE SITES, UNSPECIFIED WHETHER RHEUMATOID FACTOR PRESENT: ICD-10-CM

## 2024-11-27 DIAGNOSIS — N18.32 TYPE 2 DIABETES MELLITUS WITH STAGE 3B CHRONIC KIDNEY DISEASE, WITHOUT LONG-TERM CURRENT USE OF INSULIN: ICD-10-CM

## 2024-11-27 DIAGNOSIS — N18.4 CKD (CHRONIC KIDNEY DISEASE), STAGE IV: ICD-10-CM

## 2024-11-27 PROBLEM — J44.9 CHRONIC OBSTRUCTIVE PULMONARY DISEASE: Status: ACTIVE | Noted: 2024-11-17

## 2024-11-27 LAB
ANION GAP SERPL CALCULATED.3IONS-SCNC: 17 MMOL/L (ref 7–16)
BASOPHILS # BLD AUTO: 0.05 K/UL (ref 0–0.2)
BASOPHILS NFR BLD AUTO: 0.6 % (ref 0–1)
BUN SERPL-MCNC: 38 MG/DL (ref 10–20)
BUN/CREAT SERPL: 25 (ref 6–20)
CALCIUM SERPL-MCNC: 9.2 MG/DL (ref 8.4–10.2)
CHLORIDE SERPL-SCNC: 95 MMOL/L (ref 98–111)
CHOLEST SERPL-MCNC: 236 MG/DL
CHOLEST/HDLC SERPL: 3.5 {RATIO}
CO2 SERPL-SCNC: 25 MMOL/L (ref 23–31)
CREAT SERPL-MCNC: 1.55 MG/DL (ref 0.55–1.02)
DIFFERENTIAL METHOD BLD: ABNORMAL
EGFR (NO RACE VARIABLE) (RUSH/TITUS): 30 ML/MIN/1.73M2
EOSINOPHIL # BLD AUTO: 0.19 K/UL (ref 0–0.5)
EOSINOPHIL NFR BLD AUTO: 2.1 % (ref 1–4)
ERYTHROCYTE [DISTWIDTH] IN BLOOD BY AUTOMATED COUNT: 18.3 % (ref 11.5–14.5)
EST. AVERAGE GLUCOSE BLD GHB EST-MCNC: 137 MG/DL
GLUCOSE SERPL-MCNC: 109 MG/DL (ref 75–121)
HBA1C MFR BLD HPLC: 6.4 %
HCT VFR BLD AUTO: 33.2 % (ref 38–47)
HDLC SERPL-MCNC: 67 MG/DL (ref 35–60)
HGB BLD-MCNC: 10.1 G/DL (ref 12–16)
IMM GRANULOCYTES # BLD AUTO: 0.02 K/UL (ref 0–0.04)
IMM GRANULOCYTES NFR BLD: 0.2 % (ref 0–0.4)
LDLC SERPL CALC-MCNC: 135 MG/DL
LDLC/HDLC SERPL: 2 {RATIO}
LYMPHOCYTES # BLD AUTO: 1.05 K/UL (ref 1–4.8)
LYMPHOCYTES NFR BLD AUTO: 11.7 % (ref 27–41)
MCH RBC QN AUTO: 25.4 PG (ref 27–31)
MCHC RBC AUTO-ENTMCNC: 30.4 G/DL (ref 32–36)
MCV RBC AUTO: 83.4 FL (ref 80–96)
MONOCYTES # BLD AUTO: 0.65 K/UL (ref 0–0.8)
MONOCYTES NFR BLD AUTO: 7.2 % (ref 2–6)
MPC BLD CALC-MCNC: 8.1 FL (ref 9.4–12.4)
NEUTROPHILS # BLD AUTO: 7.05 K/UL (ref 1.8–7.7)
NEUTROPHILS NFR BLD AUTO: 78.2 % (ref 53–65)
NONHDLC SERPL-MCNC: 169 MG/DL
NRBC # BLD AUTO: 0 X10E3/UL
NRBC, AUTO (.00): 0 %
NT-PROBNP SERPL-MCNC: 5494 PG/ML (ref 1–450)
PLATELET # BLD AUTO: 366 K/UL (ref 150–400)
POTASSIUM SERPL-SCNC: 4.6 MMOL/L (ref 3.5–5.1)
RBC # BLD AUTO: 3.98 M/UL (ref 4.2–5.4)
SODIUM SERPL-SCNC: 132 MMOL/L (ref 136–145)
TRIGL SERPL-MCNC: 171 MG/DL (ref 37–140)
TSH SERPL DL<=0.005 MIU/L-ACNC: 3.34 UIU/ML (ref 0.35–4.94)
VLDLC SERPL-MCNC: 34 MG/DL
WBC # BLD AUTO: 9.01 K/UL (ref 4.5–11)

## 2024-11-27 PROCEDURE — 83036 HEMOGLOBIN GLYCOSYLATED A1C: CPT | Mod: ,,, | Performed by: CLINICAL MEDICAL LABORATORY

## 2024-11-27 PROCEDURE — 85025 COMPLETE CBC W/AUTO DIFF WBC: CPT | Mod: ,,, | Performed by: CLINICAL MEDICAL LABORATORY

## 2024-11-27 PROCEDURE — 80048 BASIC METABOLIC PNL TOTAL CA: CPT | Mod: ,,, | Performed by: CLINICAL MEDICAL LABORATORY

## 2024-11-27 PROCEDURE — 84443 ASSAY THYROID STIM HORMONE: CPT | Mod: ,,, | Performed by: CLINICAL MEDICAL LABORATORY

## 2024-11-27 PROCEDURE — 83880 ASSAY OF NATRIURETIC PEPTIDE: CPT | Mod: ,,, | Performed by: CLINICAL MEDICAL LABORATORY

## 2024-11-27 PROCEDURE — 80061 LIPID PANEL: CPT | Mod: ,,, | Performed by: CLINICAL MEDICAL LABORATORY

## 2024-11-27 NOTE — PROGRESS NOTES
Subjective     Patient ID: Vandana Allison is a 89 y.o. female.    Chief Complaint: Establish Care    Pt presents for a hypertension follow-up.       Review of Systems   Constitutional:  Negative for activity change, appetite change, chills, fatigue and fever.   HENT:  Negative for nasal congestion, ear discharge, nosebleeds, postnasal drip, rhinorrhea, sinus pressure/congestion, sneezing, sore throat and tinnitus.    Eyes:  Negative for pain, discharge, redness and itching.   Respiratory:  Negative for cough, choking, chest tightness, shortness of breath and wheezing.    Cardiovascular:  Negative for chest pain.   Gastrointestinal:  Negative for abdominal distention, abdominal pain, blood in stool, change in bowel habit, constipation, diarrhea, nausea and vomiting.   Genitourinary:  Negative for decreased urine volume, dysuria, flank pain and frequency.   Musculoskeletal:  Negative for back pain and gait problem.   Integumentary:  Negative for wound, breast mass and breast discharge.   Allergic/Immunologic: Negative for immunocompromised state.   Neurological:  Negative for dizziness, light-headedness and headaches.   Psychiatric/Behavioral:  Negative for agitation, behavioral problems and hallucinations.    Breast: Negative for mass         Objective     Physical Exam  Vitals and nursing note reviewed.   Constitutional:       Appearance: Normal appearance.   Cardiovascular:      Rate and Rhythm: Normal rate and regular rhythm.      Heart sounds: Normal heart sounds.   Pulmonary:      Effort: Pulmonary effort is normal.      Breath sounds: Normal breath sounds.   Musculoskeletal:      Comments: Pt is in a wheelchair    Neurological:      Mental Status: She is alert and oriented to person, place, and time.   Psychiatric:         Mood and Affect: Mood normal.         Behavior: Behavior normal.            Assessment and Plan     1. Hypertension, unspecified type    2. CKD (chronic kidney disease), stage IV    3.  Type 2 diabetes mellitus with stage 3b chronic kidney disease, without long-term current use of insulin  -     Hemoglobin A1C; Future; Expected date: 11/27/2024  -     CBC Auto Differential; Future; Expected date: 11/27/2024  -     Comprehensive Metabolic Panel; Future; Expected date: 11/27/2024  -     Lipid Panel; Future; Expected date: 11/27/2024  -     TSH; Future; Expected date: 11/27/2024  -     Microalbumin/Creatinine Ratio, Urine; Future; Expected date: 11/27/2024    4. Need for vaccination  -     influenza (adjuvanted) (Fluad) 45 mcg/0.5 mL IM vaccine (> or = 64 yo) 0.5 mL  -     pneumoc 20-yesi conj-dip cr(PF) (PREVNAR-20 (PF)) injection Syrg 0.5 mL    5. Essential (primary) hypertension  -     NT-Pro Natriuretic Peptide    6. Shortness of breath  -     NT-Pro Natriuretic Peptide    7. Rheumatoid arthritis involving multiple sites, unspecified whether rheumatoid factor present  -     Ambulatory referral/consult to Pain Clinic; Future; Expected date: 12/04/2024        Will call pt with lab results. Daughter is unsure if they are going to establish care with a PCP in [place] or return here but she is requesting a pain tx referral here for med refills.          Follow up in about 6 months (around 5/27/2025).

## 2024-11-28 ENCOUNTER — PATIENT MESSAGE (OUTPATIENT)
Dept: FAMILY MEDICINE | Facility: CLINIC | Age: 89
End: 2024-11-28
Payer: COMMERCIAL

## 2025-02-10 RX ORDER — PEN NEEDLE, DIABETIC 29 G X1/2"
NEEDLE, DISPOSABLE MISCELLANEOUS
Qty: 100 EACH | Refills: 3 | Status: SHIPPED | OUTPATIENT
Start: 2025-02-10

## 2025-05-06 ENCOUNTER — HOSPITAL ENCOUNTER (INPATIENT)
Facility: HOSPITAL | Age: OVER 89
LOS: 8 days | Discharge: NURSING, REHAB OR LONG-TERM CARE | DRG: 418 | End: 2025-05-14
Attending: EMERGENCY MEDICINE
Payer: MEDICARE

## 2025-05-06 DIAGNOSIS — N30.00 ACUTE CYSTITIS WITHOUT HEMATURIA: ICD-10-CM

## 2025-05-06 DIAGNOSIS — R11.0 NAUSEA: ICD-10-CM

## 2025-05-06 DIAGNOSIS — R06.00 DYSPNEA: ICD-10-CM

## 2025-05-06 DIAGNOSIS — K80.00 CALCULUS OF GALLBLADDER WITH ACUTE CHOLECYSTITIS WITHOUT OBSTRUCTION: ICD-10-CM

## 2025-05-06 DIAGNOSIS — R07.9 CHEST PAIN: ICD-10-CM

## 2025-05-06 DIAGNOSIS — K56.41 FECAL IMPACTION: Primary | ICD-10-CM

## 2025-05-06 PROBLEM — K52.9 GASTROENTERITIS: Status: ACTIVE | Noted: 2025-05-06

## 2025-05-06 PROBLEM — K52.9 GASTROENTERITIS: Status: RESOLVED | Noted: 2025-05-06 | Resolved: 2025-05-06

## 2025-05-06 PROBLEM — G89.4 CHRONIC PAIN SYNDROME: Status: ACTIVE | Noted: 2025-05-06

## 2025-05-06 PROBLEM — G89.4 CHRONIC PAIN SYNDROME: Status: RESOLVED | Noted: 2025-05-06 | Resolved: 2025-05-06

## 2025-05-06 LAB
ALBUMIN SERPL BCP-MCNC: 2.1 G/DL (ref 3.4–4.8)
ALBUMIN/GLOB SERPL: 0.5 {RATIO}
ALP SERPL-CCNC: 112 U/L (ref 40–150)
ALT SERPL W P-5'-P-CCNC: 7 U/L
ANION GAP SERPL CALCULATED.3IONS-SCNC: 15 MMOL/L (ref 7–16)
AST SERPL W P-5'-P-CCNC: 22 U/L (ref 11–45)
BACTERIA #/AREA URNS HPF: ABNORMAL /HPF
BASOPHILS # BLD AUTO: 0.04 K/UL (ref 0–0.2)
BASOPHILS NFR BLD AUTO: 0.3 % (ref 0–1)
BILIRUB SERPL-MCNC: 0.3 MG/DL
BILIRUB UR QL STRIP: NEGATIVE
BUN SERPL-MCNC: 29 MG/DL (ref 10–20)
BUN/CREAT SERPL: 19 (ref 6–20)
CALCIUM SERPL-MCNC: 9.8 MG/DL (ref 8.4–10.2)
CHLORIDE SERPL-SCNC: 101 MMOL/L (ref 98–111)
CLARITY UR: ABNORMAL
CO2 SERPL-SCNC: 24 MMOL/L (ref 23–31)
COLOR UR: YELLOW
CREAT SERPL-MCNC: 1.55 MG/DL (ref 0.55–1.02)
DIFFERENTIAL METHOD BLD: ABNORMAL
EGFR (NO RACE VARIABLE) (RUSH/TITUS): 30 ML/MIN/1.73M2
EOSINOPHIL # BLD AUTO: 0.15 K/UL (ref 0–0.5)
EOSINOPHIL NFR BLD AUTO: 1 % (ref 1–4)
ERYTHROCYTE [DISTWIDTH] IN BLOOD BY AUTOMATED COUNT: 19.3 % (ref 11.5–14.5)
GLOBULIN SER-MCNC: 4.4 G/DL (ref 2–4)
GLUCOSE SERPL-MCNC: 102 MG/DL (ref 70–105)
GLUCOSE SERPL-MCNC: 61 MG/DL (ref 70–105)
GLUCOSE SERPL-MCNC: 71 MG/DL (ref 70–105)
GLUCOSE SERPL-MCNC: 95 MG/DL (ref 75–121)
GLUCOSE UR STRIP-MCNC: NORMAL MG/DL
HCT VFR BLD AUTO: 28.7 % (ref 38–47)
HGB BLD-MCNC: 8.9 G/DL (ref 12–16)
HYALINE CASTS #/AREA URNS LPF: ABNORMAL /LPF
IMM GRANULOCYTES # BLD AUTO: 0.1 K/UL (ref 0–0.04)
IMM GRANULOCYTES NFR BLD: 0.7 % (ref 0–0.4)
KETONES UR STRIP-SCNC: NEGATIVE MG/DL
LEUKOCYTE ESTERASE UR QL STRIP: ABNORMAL
LYMPHOCYTES # BLD AUTO: 0.55 K/UL (ref 1–4.8)
LYMPHOCYTES NFR BLD AUTO: 3.8 % (ref 27–41)
MAGNESIUM SERPL-MCNC: 1.9 MG/DL (ref 1.6–2.6)
MCH RBC QN AUTO: 24.7 PG (ref 27–31)
MCHC RBC AUTO-ENTMCNC: 31 G/DL (ref 32–36)
MCV RBC AUTO: 79.5 FL (ref 80–96)
MONOCYTES # BLD AUTO: 0.93 K/UL (ref 0–0.8)
MONOCYTES NFR BLD AUTO: 6.4 % (ref 2–6)
MPC BLD CALC-MCNC: 8.6 FL (ref 9.4–12.4)
NEUTROPHILS # BLD AUTO: 12.81 K/UL (ref 1.8–7.7)
NEUTROPHILS NFR BLD AUTO: 87.8 % (ref 53–65)
NITRITE UR QL STRIP: NEGATIVE
NRBC # BLD AUTO: 0 X10E3/UL
NRBC, AUTO (.00): 0 %
PH UR STRIP: 6.5 PH UNITS
PLATELET # BLD AUTO: 633 K/UL (ref 150–400)
POTASSIUM SERPL-SCNC: 4.9 MMOL/L (ref 3.5–5.1)
PROT SERPL-MCNC: 6.5 G/DL (ref 5.8–7.6)
PROT UR QL STRIP: 70
RBC # BLD AUTO: 3.61 M/UL (ref 4.2–5.4)
RBC # UR STRIP: ABNORMAL /UL
RBC #/AREA URNS HPF: 22 /HPF
SARS-COV-2 RDRP RESP QL NAA+PROBE: NEGATIVE
SODIUM SERPL-SCNC: 135 MMOL/L (ref 136–145)
SP GR UR STRIP: 1.01
SQUAMOUS #/AREA URNS LPF: ABNORMAL /HPF
TRANS CELLS #/AREA URNS LPF: ABNORMAL /LPF
TROPONIN I SERPL HS-MCNC: 6.8 NG/L
UROBILINOGEN UR STRIP-ACNC: NORMAL MG/DL
WBC # BLD AUTO: 14.58 K/UL (ref 4.5–11)
WBC #/AREA URNS HPF: >182 /HPF
WBC CLUMPS, UA: ABNORMAL /HPF
YEAST #/AREA URNS HPF: ABNORMAL /HPF

## 2025-05-06 PROCEDURE — 11000001 HC ACUTE MED/SURG PRIVATE ROOM

## 2025-05-06 PROCEDURE — 85025 COMPLETE CBC W/AUTO DIFF WBC: CPT | Performed by: EMERGENCY MEDICINE

## 2025-05-06 PROCEDURE — 63600175 PHARM REV CODE 636 W HCPCS: Performed by: HOSPITALIST

## 2025-05-06 PROCEDURE — 84484 ASSAY OF TROPONIN QUANT: CPT | Performed by: EMERGENCY MEDICINE

## 2025-05-06 PROCEDURE — 25000003 PHARM REV CODE 250

## 2025-05-06 PROCEDURE — 81003 URINALYSIS AUTO W/O SCOPE: CPT | Performed by: EMERGENCY MEDICINE

## 2025-05-06 PROCEDURE — 82962 GLUCOSE BLOOD TEST: CPT

## 2025-05-06 PROCEDURE — 93005 ELECTROCARDIOGRAM TRACING: CPT

## 2025-05-06 PROCEDURE — 63600175 PHARM REV CODE 636 W HCPCS: Performed by: EMERGENCY MEDICINE

## 2025-05-06 PROCEDURE — 80053 COMPREHEN METABOLIC PANEL: CPT | Performed by: EMERGENCY MEDICINE

## 2025-05-06 PROCEDURE — 27000221 HC OXYGEN, UP TO 24 HOURS

## 2025-05-06 PROCEDURE — 99900035 HC TECH TIME PER 15 MIN (STAT)

## 2025-05-06 PROCEDURE — 94799 UNLISTED PULMONARY SVC/PX: CPT

## 2025-05-06 PROCEDURE — 36415 COLL VENOUS BLD VENIPUNCTURE: CPT | Performed by: EMERGENCY MEDICINE

## 2025-05-06 PROCEDURE — 87086 URINE CULTURE/COLONY COUNT: CPT | Performed by: EMERGENCY MEDICINE

## 2025-05-06 PROCEDURE — 87635 SARS-COV-2 COVID-19 AMP PRB: CPT

## 2025-05-06 PROCEDURE — 63600175 PHARM REV CODE 636 W HCPCS

## 2025-05-06 PROCEDURE — 83735 ASSAY OF MAGNESIUM: CPT | Performed by: EMERGENCY MEDICINE

## 2025-05-06 PROCEDURE — 99223 1ST HOSP IP/OBS HIGH 75: CPT | Mod: AI,,,

## 2025-05-06 RX ORDER — AMLODIPINE BESYLATE 5 MG/1
5 TABLET ORAL DAILY
Status: DISCONTINUED | OUTPATIENT
Start: 2025-05-07 | End: 2025-05-14 | Stop reason: HOSPADM

## 2025-05-06 RX ORDER — HYDRALAZINE HYDROCHLORIDE 50 MG/1
50 TABLET, FILM COATED ORAL 2 TIMES DAILY
Status: DISCONTINUED | OUTPATIENT
Start: 2025-05-06 | End: 2025-05-14 | Stop reason: HOSPADM

## 2025-05-06 RX ORDER — IBUPROFEN 200 MG
24 TABLET ORAL
Status: DISCONTINUED | OUTPATIENT
Start: 2025-05-06 | End: 2025-05-14 | Stop reason: HOSPADM

## 2025-05-06 RX ORDER — NALOXONE HCL 0.4 MG/ML
0.02 VIAL (ML) INJECTION
Status: DISCONTINUED | OUTPATIENT
Start: 2025-05-06 | End: 2025-05-14 | Stop reason: HOSPADM

## 2025-05-06 RX ORDER — INSULIN GLARGINE 100 [IU]/ML
5 INJECTION, SOLUTION SUBCUTANEOUS EVERY 12 HOURS
COMMUNITY

## 2025-05-06 RX ORDER — ENOXAPARIN SODIUM 100 MG/ML
40 INJECTION SUBCUTANEOUS EVERY 24 HOURS
Status: DISCONTINUED | OUTPATIENT
Start: 2025-05-06 | End: 2025-05-07

## 2025-05-06 RX ORDER — SERTRALINE HYDROCHLORIDE 25 MG/1
25 TABLET, FILM COATED ORAL DAILY
COMMUNITY

## 2025-05-06 RX ORDER — METRONIDAZOLE 500 MG/1
500 TABLET ORAL EVERY 8 HOURS
Status: DISCONTINUED | OUTPATIENT
Start: 2025-05-06 | End: 2025-05-06

## 2025-05-06 RX ORDER — TALC
6 POWDER (GRAM) TOPICAL NIGHTLY PRN
Status: DISCONTINUED | OUTPATIENT
Start: 2025-05-06 | End: 2025-05-14 | Stop reason: HOSPADM

## 2025-05-06 RX ORDER — CEFTRIAXONE 2 G/1
2 INJECTION, POWDER, FOR SOLUTION INTRAMUSCULAR; INTRAVENOUS
Status: COMPLETED | OUTPATIENT
Start: 2025-05-06 | End: 2025-05-06

## 2025-05-06 RX ORDER — ONDANSETRON HYDROCHLORIDE 2 MG/ML
4 INJECTION, SOLUTION INTRAVENOUS EVERY 6 HOURS PRN
COMMUNITY

## 2025-05-06 RX ORDER — HYDROCODONE BITARTRATE AND ACETAMINOPHEN 10; 325 MG/1; MG/1
1 TABLET ORAL EVERY 6 HOURS PRN
Refills: 0 | Status: DISCONTINUED | OUTPATIENT
Start: 2025-05-06 | End: 2025-05-14 | Stop reason: HOSPADM

## 2025-05-06 RX ORDER — SODIUM CHLORIDE 0.9 % (FLUSH) 0.9 %
10 SYRINGE (ML) INJECTION EVERY 12 HOURS PRN
Status: DISCONTINUED | OUTPATIENT
Start: 2025-05-06 | End: 2025-05-14 | Stop reason: HOSPADM

## 2025-05-06 RX ORDER — METRONIDAZOLE 500 MG/100ML
500 INJECTION, SOLUTION INTRAVENOUS
Status: DISCONTINUED | OUTPATIENT
Start: 2025-05-07 | End: 2025-05-08

## 2025-05-06 RX ORDER — DEXTROSE MONOHYDRATE AND SODIUM CHLORIDE 5; .9 G/100ML; G/100ML
INJECTION, SOLUTION INTRAVENOUS CONTINUOUS
Status: DISCONTINUED | OUTPATIENT
Start: 2025-05-06 | End: 2025-05-08

## 2025-05-06 RX ORDER — GLUCAGON 1 MG
1 KIT INJECTION
Status: DISCONTINUED | OUTPATIENT
Start: 2025-05-06 | End: 2025-05-14 | Stop reason: HOSPADM

## 2025-05-06 RX ORDER — HYDROCODONE BITARTRATE AND ACETAMINOPHEN 5; 325 MG/1; MG/1
1 TABLET ORAL EVERY 6 HOURS PRN
Refills: 0 | Status: DISCONTINUED | OUTPATIENT
Start: 2025-05-06 | End: 2025-05-14 | Stop reason: HOSPADM

## 2025-05-06 RX ORDER — ACETAMINOPHEN 325 MG/1
650 TABLET ORAL EVERY 8 HOURS PRN
Status: DISCONTINUED | OUTPATIENT
Start: 2025-05-06 | End: 2025-05-14 | Stop reason: HOSPADM

## 2025-05-06 RX ORDER — BUDESONIDE AND FORMOTEROL FUMARATE DIHYDRATE 160; 4.5 UG/1; UG/1
2 AEROSOL RESPIRATORY (INHALATION) 2 TIMES DAILY
COMMUNITY
Start: 2025-04-24

## 2025-05-06 RX ORDER — SODIUM CHLORIDE, SODIUM LACTATE, POTASSIUM CHLORIDE, CALCIUM CHLORIDE 600; 310; 30; 20 MG/100ML; MG/100ML; MG/100ML; MG/100ML
INJECTION, SOLUTION INTRAVENOUS CONTINUOUS
Status: DISCONTINUED | OUTPATIENT
Start: 2025-05-06 | End: 2025-05-06

## 2025-05-06 RX ORDER — IPRATROPIUM BROMIDE AND ALBUTEROL SULFATE 2.5; .5 MG/3ML; MG/3ML
3 SOLUTION RESPIRATORY (INHALATION) EVERY 6 HOURS PRN
Status: DISCONTINUED | OUTPATIENT
Start: 2025-05-06 | End: 2025-05-14 | Stop reason: HOSPADM

## 2025-05-06 RX ORDER — ACETAMINOPHEN 325 MG/1
650 TABLET ORAL EVERY 4 HOURS PRN
Status: DISCONTINUED | OUTPATIENT
Start: 2025-05-06 | End: 2025-05-14 | Stop reason: HOSPADM

## 2025-05-06 RX ORDER — INSULIN ASPART 100 [IU]/ML
0-5 INJECTION, SOLUTION INTRAVENOUS; SUBCUTANEOUS
Status: DISCONTINUED | OUTPATIENT
Start: 2025-05-06 | End: 2025-05-14 | Stop reason: HOSPADM

## 2025-05-06 RX ORDER — INSULIN LISPRO 100 [IU]/ML
3 INJECTION, SOLUTION INTRAVENOUS; SUBCUTANEOUS
COMMUNITY

## 2025-05-06 RX ORDER — ONDANSETRON HYDROCHLORIDE 2 MG/ML
4 INJECTION, SOLUTION INTRAVENOUS EVERY 8 HOURS PRN
Status: DISCONTINUED | OUTPATIENT
Start: 2025-05-06 | End: 2025-05-14 | Stop reason: HOSPADM

## 2025-05-06 RX ORDER — IBUPROFEN 200 MG
16 TABLET ORAL
Status: DISCONTINUED | OUTPATIENT
Start: 2025-05-06 | End: 2025-05-14 | Stop reason: HOSPADM

## 2025-05-06 RX ORDER — GABAPENTIN 100 MG/1
100 CAPSULE ORAL 2 TIMES DAILY
Status: ON HOLD | COMMUNITY
End: 2025-05-14

## 2025-05-06 RX ADMIN — METRONIDAZOLE 500 MG: 500 INJECTION, SOLUTION INTRAVENOUS at 11:05

## 2025-05-06 RX ADMIN — CEFTRIAXONE SODIUM 2 G: 2 INJECTION, POWDER, FOR SOLUTION INTRAMUSCULAR; INTRAVENOUS at 03:05

## 2025-05-06 RX ADMIN — SODIUM CHLORIDE, POTASSIUM CHLORIDE, SODIUM LACTATE AND CALCIUM CHLORIDE: 600; 310; 30; 20 INJECTION, SOLUTION INTRAVENOUS at 07:05

## 2025-05-06 RX ADMIN — ENOXAPARIN SODIUM 40 MG: 40 INJECTION SUBCUTANEOUS at 06:05

## 2025-05-06 RX ADMIN — DEXTROSE AND SODIUM CHLORIDE: 5; 900 INJECTION, SOLUTION INTRAVENOUS at 10:05

## 2025-05-06 RX ADMIN — ACETAMINOPHEN 650 MG: 325 TABLET ORAL at 08:05

## 2025-05-06 NOTE — SUBJECTIVE & OBJECTIVE
"Past Medical History:   Diagnosis Date    Arthritis     Bleeding external hemorrhoids     Chronic kidney disease, stage 3b     baseline creat 1.5    Chronic kidney disease, stage 3b 02/07/2024    baseline creat 1.5      COPD (chronic obstructive pulmonary disease)     senile emphysema    Diabetes mellitus, type 2     DNR (do not resuscitate) 02/07/2024    discussed with VITO Christianson present    Essential (primary) hypertension     Hiatal hernia with GERD     Neuropathy     Post-operative hypothyroidism     Severe pulmonary hypertension 10/08/2022    EF  70 % and normal diastolic function       Past Surgical History:   Procedure Laterality Date    BREAST SURGERY      Biopsy    EYE SURGERY      Remove cataracts both eyes    HYSTERECTOMY      NEPHRECTOMY Right 1960    THYROIDECTOMY         Review of patient's allergies indicates:   Allergen Reactions    Aspirin        No current facility-administered medications on file prior to encounter.     Current Outpatient Medications on File Prior to Encounter   Medication Sig    acetaminophen (TYLENOL) 650 MG TbSR Take 650 mg by mouth every 8 (eight) hours.    albuterol (PROVENTIL/VENTOLIN HFA) 90 mcg/actuation inhaler INHALE 2 PUFFS BY MOUTH INTO THE LUNGS EVERY 4 HOURS AS NEEDED FOR SHORTNESS OF BREATH / RESCUE    albuterol-ipratropium (DUO-NEB) 2.5 mg-0.5 mg/3 mL nebulizer solution Use one (1) nebule in nebulizer 4 times daily as needed for shortness of breath.    amLODIPine (NORVASC) 5 MG tablet Take 1 tablet (5 mg total) by mouth once daily.    B-complex with vitamin C (Z-BEC OR EQUIV) tablet Take 1 tablet by mouth once daily.    BD INSULIN SYRINGE ULTRA-FINE 0.3 mL 31 gauge x 5/16" Syrg USE 1 SYRINGE TWICE A DAY    budesonide-formoterol 80-4.5 mcg (BREYNA) 80-4.5 mcg/actuation HFAA Inhale 2 puffs into the lungs 2 (two) times daily.    cholecalciferol, vitamin D3, (VITAMIN D3) 125 mcg (5,000 unit) Tab Take 5,000 Units by mouth once daily.    cyanocobalamin, vitamin B-12, " 500 mcg Subl Place 1 tablet under the tongue once daily.    ferrous sulfate (FEOSOL) 325 mg (65 mg iron) Tab tablet Take 1 tablet (325 mg total) by mouth 3 (three) times a week. On Monday, Wednesday and Friday    furosemide (LASIX) 20 MG tablet TAKE 1 TABLET BY MOUTH TWICE A DAY AS NEEDED    furosemide (LASIX) 20 MG tablet Take 1 tablet (20 mg total) by mouth 2 (two) times daily.    furosemide (LASIX) 40 MG tablet Take 1 tablet (40 mg total) by mouth 2 (two) times daily.    gabapentin (NEURONTIN) 400 MG capsule TAKE 1 CAPSULE BY MOUTH 4 TIMES A DAY AS NEEDED    hydrALAZINE (APRESOLINE) 50 MG tablet Take 1 tablet (50 mg total) by mouth 2 (two) times daily.    HYDROcodone-acetaminophen (NORCO)  mg per tablet Take 1 tablet by mouth every 8 (eight) hours as needed for Pain.    hydrocortisone (ANUSOL-HC) 2.5 % rectal cream Place rectally 2 (two) times daily.    lactulose (CHRONULAC) 10 gram/15 mL solution Take 30 mLs (20 g total) by mouth once daily.    levothyroxine (SYNTHROID) 100 MCG tablet TAKE 1 TABLET BY MOUTH BEFORE BREAKFAST.    magnesium citrate 100 mg Tab Take 400 mg by mouth as needed.    methocarbamoL (ROBAXIN) 750 MG Tab TAKE 2 TABLETS BY MOUTH 4 TIMES A DAY AS NEEDED FOR MUSCLE SPASMS  Strength: 750 mg    metoprolol succinate (TOPROL-XL) 25 MG 24 hr tablet Take 1 tablet (25 mg total) by mouth once daily.    NOVOLIN N NPH U-100 INSULIN 100 unit/mL injection INJECT 40 UNITS INTO THE SKIN TWICE A DAY    omega-3 fatty acids/fish oil (FISH OIL-OMEGA-3 FATTY ACIDS) 300-1,000 mg capsule Take 1 capsule by mouth once daily.    pantoprazole (PROTONIX) 40 MG tablet TAKE 1 TABLET BY MOUTH TWICE A DAY    pravastatin (PRAVACHOL) 10 MG tablet Take 1 tablet (10 mg total) by mouth once daily.    predniSONE (DELTASONE) 5 MG tablet Take 1 tablet (5 mg total) by mouth once daily.    traMADoL (ULTRAM) 50 mg tablet TAKE 1 TABLET BY MOUTH FOUR TIMES A DAY     Family History       Problem Relation (Age of Onset)    Cancer  Sister, Brother, Daughter    Diabetes Mother, Sister, Brother, Sister    Heart disease Father          Tobacco Use    Smoking status: Never    Smokeless tobacco: Never   Substance and Sexual Activity    Alcohol use: Never    Drug use: Never    Sexual activity: Not Currently     Birth control/protection: None     Review of Systems   All other systems reviewed and are negative.    Objective:     Vital Signs (Most Recent):  Temp: 98.7 °F (37.1 °C) (05/06/25 1141)  Pulse: 97 (05/06/25 1141)  Resp: 20 (05/06/25 1141)  BP: (!) 164/46 (05/06/25 1141)  SpO2: 98 % (05/06/25 1200) Vital Signs (24h Range):  Temp:  [98.7 °F (37.1 °C)] 98.7 °F (37.1 °C)  Pulse:  [97] 97  Resp:  [20] 20  SpO2:  [95 %-98 %] 98 %  BP: (164)/(46) 164/46     Weight: 82.6 kg (182 lb)  Body mass index is 30.29 kg/m².     Physical Exam  Vitals reviewed.   Constitutional:       Appearance: She is ill-appearing.   HENT:      Head: Normocephalic.      Mouth/Throat:      Mouth: Mucous membranes are dry.   Eyes:      Extraocular Movements: Extraocular movements intact.      Pupils: Pupils are equal, round, and reactive to light.   Cardiovascular:      Rate and Rhythm: Normal rate and regular rhythm.   Pulmonary:      Effort: Pulmonary effort is normal.   Abdominal:      General: Bowel sounds are normal. There is distension.      Palpations: Abdomen is soft.      Tenderness: There is abdominal tenderness.   Musculoskeletal:      Cervical back: Normal range of motion.      Right lower leg: No edema.      Left lower leg: No edema.   Skin:     General: Skin is warm and dry.   Neurological:      General: No focal deficit present.      Mental Status: She is alert. Mental status is at baseline.   Psychiatric:         Mood and Affect: Mood normal.              CRANIAL NERVES     CN III, IV, VI   Pupils are equal, round, and reactive to light.       Significant Labs: All pertinent labs within the past 24 hours have been reviewed.  BMP:   Recent Labs   Lab  05/06/25  1320   GLU 95   *   K 4.9      CO2 24   BUN 29*   CREATININE 1.55*   CALCIUM 9.8   MG 1.9     CBC:   Recent Labs   Lab 05/06/25  1320   WBC 14.58*   HGB 8.9*   HCT 28.7*   *     CMP:   Recent Labs   Lab 05/06/25  1320   *   K 4.9      CO2 24   GLU 95   BUN 29*   CREATININE 1.55*   CALCIUM 9.8   PROT 6.5   ALBUMIN 2.1*   BILITOT 0.3   ALKPHOS 112   AST 22   ALT 7   ANIONGAP 15       Significant Imaging: I have reviewed all pertinent imaging results/findings within the past 24 hours.

## 2025-05-06 NOTE — ASSESSMENT & PLAN NOTE
Likely cause of diarrhea  Admit to inpatient  Will start lactulose for now and if problem isn't solved with order enema vs disimpaction  Leukocytosis noted, will start flagyl out of caution

## 2025-05-06 NOTE — ED NOTES
Pt was disimpacted at this time with large amount of stool removed from rectum. Enema attempted after disimpacted but patient was not able to tolerate. Informed provider at this time

## 2025-05-06 NOTE — ASSESSMENT & PLAN NOTE
Patient's COPD is controlled currently.  Patient is currently off COPD Pathway. Continue scheduled inhalers Supplemental oxygen and monitor respiratory status closely.

## 2025-05-06 NOTE — ED PROVIDER NOTES
Encounter Date: 5/6/2025    SCRIBE #1 NOTE: I, Megan Whatley, am scribing for, and in the presence of,  John Calvin MD. I have scribed the entire note.       History     Chief Complaint   Patient presents with    Abdominal Pain    Vomiting     Patient presents to the ED via [facility] from [facility] with c/o abdominal pain and vomiting for the last few days.      This is a 89 y/o female,who presents to the ED with complaints of abdominal pain which started 2 weeks ago. She was transported from [facility]. Pt states she has had abdominal pain which started 2 weeks ago. She notes she started having diarrhea one week ago. There is no hx of a fever, chills, nausea, or vomiting. There are no other complaints/pain in the ED at this time. She has a known hx of CKD, COPD, diabetes, and HTN. She has had a right nephrectomy. There is no hx of smoking on file.     The history is provided by the patient, the EMS personnel, the nursing home and medical records. No  was used.     Review of patient's allergies indicates:   Allergen Reactions    Aspirin      Past Medical History:   Diagnosis Date    Arthritis     Bleeding external hemorrhoids     Chronic kidney disease, stage 3b     baseline creat 1.5    Chronic kidney disease, stage 3b 02/07/2024    baseline creat 1.5      COPD (chronic obstructive pulmonary disease)     senile emphysema    Diabetes mellitus, type 2     DNR (do not resuscitate) 02/07/2024    discussed with VITO Christianson present    Essential (primary) hypertension     Hiatal hernia with GERD     Neuropathy     Post-operative hypothyroidism     Severe pulmonary hypertension 10/08/2022    EF  70 % and normal diastolic function     Past Surgical History:   Procedure Laterality Date    BREAST SURGERY      Biopsy    EYE SURGERY      Remove cataracts both eyes    HYSTERECTOMY      NEPHRECTOMY Right 1960    THYROIDECTOMY       Family History   Problem Relation Name  Age of Onset    Diabetes Mother Monie Inman         Diabetic    Heart disease Father Lauro Inman     Diabetes Sister Marino Correia     Cancer Sister Marino Correia         Breast cancer    Diabetes Brother Shree Inman     Cancer Brother Shree Inman         Prostate cancer    Cancer Daughter Radha Allison         Uterine cancer    Diabetes Sister Dafne Urena         Diabetic     Social History[1]  Review of Systems   Constitutional:  Negative for chills and fever.   Gastrointestinal:  Positive for abdominal pain and diarrhea. Negative for nausea and vomiting.   All other systems reviewed and are negative.      Physical Exam     Initial Vitals [05/06/25 1141]   BP Pulse Resp Temp SpO2   (!) 164/46 97 20 98.7 °F (37.1 °C) 95 %      MAP       --         Physical Exam    Nursing note and vitals reviewed.  Constitutional: She appears well-developed and well-nourished.   HENT:   Head: Normocephalic and atraumatic.   Eyes: Conjunctivae and EOM are normal. Pupils are equal, round, and reactive to light.   Neck: Neck supple.   Normal range of motion.  Cardiovascular:  Normal rate, regular rhythm, normal heart sounds and intact distal pulses.           Pulmonary/Chest: Breath sounds normal.   Abdominal: Abdomen is soft. Bowel sounds are normal. There is abdominal tenderness (Diffuse abdominal tenderness.).   Musculoskeletal:         General: Normal range of motion.      Cervical back: Normal range of motion and neck supple.     Neurological: She is alert and oriented to person, place, and time. She has normal strength.   Skin: Skin is warm and dry. Capillary refill takes less than 2 seconds.   Psychiatric: She has a normal mood and affect. Thought content normal.         ED Course   Procedures  Labs Reviewed   URINALYSIS, REFLEX TO URINE CULTURE - Abnormal       Result Value    Color, UA Yellow      Clarity, UA Extra Turbid      pH, UA 6.5      Leukocytes, UA Large (*)     Nitrites, UA Negative      Protein, UA 70  (*)     Glucose, UA Normal      Ketones, UA Negative      Urobilinogen, UA Normal      Bilirubin, UA Negative      Blood, UA Trace (*)     Specific Gravity, UA 1.013     CBC WITH DIFFERENTIAL - Abnormal    WBC 14.58 (*)     RBC 3.61 (*)     Hemoglobin 8.9 (*)     Hematocrit 28.7 (*)     MCV 79.5 (*)     MCH 24.7 (*)     MCHC 31.0 (*)     RDW 19.3 (*)     Platelet Count 633 (*)     MPV 8.6 (*)     Neutrophils % 87.8 (*)     Lymphocytes % 3.8 (*)     Monocytes % 6.4 (*)     Eosinophils % 1.0      Basophils % 0.3      Immature Granulocytes % 0.7 (*)     nRBC, Auto 0.0      Neutrophils, Abs 12.81 (*)     Lymphocytes, Absolute 0.55 (*)     Monocytes, Absolute 0.93 (*)     Eosinophils, Absolute 0.15      Basophils, Absolute 0.04      Immature Granulocytes, Absolute 0.10 (*)     nRBC, Absolute 0.00      Diff Type Auto     URINALYSIS, MICROSCOPIC - Abnormal    WBC, UA >182 (*)     RBC, UA 22 (*)     Bacteria, UA Moderate (*)     Squamous Epithelial Cells, UA Occasional (*)     WBC Clumps Many (*)     Hyaline Casts, UA 10-25 (*)     Transitional Epithelial Cells, UA Occasional (*)     Yeast, UA Many (*)    CULTURE, URINE   CBC W/ AUTO DIFFERENTIAL    Narrative:     The following orders were created for panel order CBC auto differential.  Procedure                               Abnormality         Status                     ---------                               -----------         ------                     CBC with Differential[[id number]]       Abnormal            Final result                 Please view results for these tests on the individual orders.   COMPREHENSIVE METABOLIC PANEL   TROPONIN I   MAGNESIUM          Imaging Results              CT Renal Stone Study Abd Pelvis WO (In process)                      X-Ray Chest AP Portable (Final result)  Result time 05/06/25 12:43:44      Final result by Lennox Bo MD (05/06/25 12:43:44)                   Impression:      Similar findings when compared with  "05/21/2024.  No new focal consolidation.      Electronically signed by: Lennox Bo MD  Date:    05/06/2025  Time:    12:43               Narrative:    EXAMINATION:  XR CHEST AP PORTABLE    CLINICAL HISTORY:  Provided history is "  Dyspnea, unspecified".    TECHNIQUE:  One view of the chest.    COMPARISON:  05/21/2024.    FINDINGS:  Cardiomediastinal silhouette is stable in size, likely upper limit of normal in size.  Atherosclerotic calcifications overlie the aortic arch.  There are coarse interstitial lung markings similar to prior study.  Small bilateral pleural effusions.  No confluent area of consolidation.  No large pleural effusion.  No distinct pneumothorax.                                       Medications - No data to display  Medical Decision Making  Amount and/or Complexity of Data Reviewed  Labs: ordered.  Radiology: ordered.              Attending Attestation:           Physician Attestation for Scribe:  Physician Attestation Statement for Scribe #1: I, John Calvin MD, reviewed documentation, as scribed by Megan Whatley in my presence, and it is both accurate and complete.                                    Clinical Impression:  Final diagnoses:  [R11.0] Nausea  [R06.00] Dyspnea                   [1]   Social History  Tobacco Use    Smoking status: Never    Smokeless tobacco: Never   Substance Use Topics    Alcohol use: Never    Drug use: Never     "

## 2025-05-06 NOTE — ASSESSMENT & PLAN NOTE
Patient's blood pressure range in the last 24 hours was: BP  Min: 164/46  Max: 164/46.The patient's inpatient anti-hypertensive regimen is listed below:  Current Antihypertensives  amLODIPine tablet 5 mg, Daily, Oral  hydrALAZINE tablet 50 mg, 2 times daily, Oral    Plan  - BP is controlled, no changes needed to their regimen

## 2025-05-06 NOTE — HPI
89 y/o female presents from [facility] with pmh CKD, COPD, diabetes, HTN, right nephrectomy to the ED with complaints of abdominal pain which started 2 weeks ago. Pt states she has had abdominal pain which started 2 weeks ago. She notes she started having diarrhea one week ago. There is no hx of a fever, chills, nausea, or vomiting.    Imaging shows significant stool burden in the rectum. Discussing with ED provider agree with admitting to inpatient and treating for ostipation with possible disimpaction.

## 2025-05-06 NOTE — ASSESSMENT & PLAN NOTE
"Patient's FSGs are controlled on current medication regimen.  Last A1c reviewed-   Lab Results   Component Value Date    HGBA1C 6.4 11/27/2024     Most recent fingerstick glucose reviewed- No results for input(s): "POCTGLUCOSE" in the last 24 hours.  Current correctional scale  High  Maintain anti-hyperglycemic dose as follows-   Antihyperglycemics (From admission, onward)      Start     Stop Route Frequency Ordered    05/06/25 1909  insulin aspart U-100 injection 0-5 Units         -- SubQ Before meals & nightly PRN 05/06/25 1811          Hold Oral hypoglycemics while patient is in the hospital.  "

## 2025-05-06 NOTE — PHARMACY MED REC
"Admission Medication History     The home medication history was taken by Chelsea Ngo.    You may go to "Admission" then "Reconcile Home Medications" tabs to review and/or act upon these items.     The home medication list has been updated by the Pharmacy department.   Please read ALL comments highlighted in yellow.   Please address this information as you see fit.    Feel free to contact us if you have any questions or require assistance.  Medications Added:  Insulin glargine  Insulin lispro  Ondansetron 4 mg/2 ml  Sertraline 25 mg  Budesonide-formoterol 160-4.5 mcg  Medications Updated:  Furosemide 40 mg updated from twice daily to once daily  Gabapentin 400 mg updated to Gabapentin 100 mg      Patient reports no longer taking the following medication(s). The medication(s) listed below were removed from the home medication list. Please reorder if appropriate:  Acetaminophen 650 mg  Albuterol-ipratropium nebulizer solution  Amlodipine 5 mg  B-Complex  Budesonide-formoterol 80-4.5 mcg  Vitamin D3 5,000 iu  Vitamin B12 500 mcg  Furosemide 20 mg  Hydrocodone-acetaminophen  mg  Magnesium Citrate 100 mg  Novolin insulin  Methocarbamol 750 mg  Prednisone 5 mg  Tramadol 50 mg    Medications listed below were obtained from: [employer]- [employer] and Nursing home  (Not in a hospital admission)        Current Outpatient Medications on File Prior to Encounter   Medication Sig Dispense Refill Last Dose/Taking    albuterol (PROVENTIL/VENTOLIN HFA) 90 mcg/actuation inhaler INHALE 2 PUFFS BY MOUTH INTO THE LUNGS EVERY 4 HOURS AS NEEDED FOR SHORTNESS OF BREATH / RESCUE (Patient taking differently: Inhale 2 puffs into the lungs every 6 (six) hours as needed.) 18 g 11 5/6/2025    ferrous sulfate (FEOSOL) 325 mg (65 mg iron) Tab tablet Take 1 tablet (325 mg total) by mouth 3 (three) times a week. On Monday, Wednesday and Friday 36 tablet 3 5/5/2025    gabapentin (NEURONTIN) 100 MG capsule Take 100 mg by mouth 2 (two) " "times daily.   5/6/2025    hydrALAZINE (APRESOLINE) 50 MG tablet Take 1 tablet (50 mg total) by mouth 2 (two) times daily. 180 tablet 4 5/6/2025    hydrocortisone (ANUSOL-HC) 2.5 % rectal cream Place rectally 2 (two) times daily. 28 g 12 5/6/2025    insulin glargine U-100, Lantus, 100 unit/mL injection Inject 5 Units into the skin every 12 (twelve) hours.   5/6/2025    insulin lispro 100 unit/mL injection Inject 3 Units into the skin 3 (three) times daily before meals.   5/6/2025    lactulose (CHRONULAC) 10 gram/15 mL solution Take 30 mLs (20 g total) by mouth once daily. (Patient taking differently: Take 15 mLs by mouth once daily.) 946 mL 11 5/6/2025    levothyroxine (SYNTHROID) 100 MCG tablet TAKE 1 TABLET BY MOUTH BEFORE BREAKFAST. 90 tablet 3 5/6/2025    ondansetron 4 mg/2 mL Soln Inject 4 mg into the vein every 6 (six) hours as needed.   Taking As Needed    pantoprazole (PROTONIX) 40 MG tablet TAKE 1 TABLET BY MOUTH TWICE A  tablet 3 5/6/2025    pravastatin (PRAVACHOL) 10 MG tablet Take 1 tablet (10 mg total) by mouth once daily. 90 tablet 3 5/6/2025    sertraline (ZOLOFT) 25 MG tablet Take 25 mg by mouth once daily.   5/6/2025    SYMBICORT 160-4.5 mcg/actuation HFAA Inhale 2 puffs into the lungs 2 (two) times daily.   5/6/2025    BD INSULIN SYRINGE ULTRA-FINE 0.3 mL 31 gauge x 5/16" Syrg USE 1 SYRINGE TWICE A  each 3     furosemide (LASIX) 40 MG tablet Take 1 tablet (40 mg total) by mouth 2 (two) times daily. (Patient taking differently: Take 40 mg by mouth once daily.) 180 tablet 3     metoprolol succinate (TOPROL-XL) 25 MG 24 hr tablet Take 1 tablet (25 mg total) by mouth once daily. 90 tablet 3     [DISCONTINUED] acetaminophen (TYLENOL) 650 MG TbSR Take 650 mg by mouth every 8 (eight) hours.       [DISCONTINUED] albuterol-ipratropium (DUO-NEB) 2.5 mg-0.5 mg/3 mL nebulizer solution Use one (1) nebule in nebulizer 4 times daily as needed for shortness of breath. 180 mL 9     [DISCONTINUED] " amLODIPine (NORVASC) 5 MG tablet Take 1 tablet (5 mg total) by mouth once daily. 90 tablet 3     [DISCONTINUED] B-complex with vitamin C (Z-BEC OR EQUIV) tablet Take 1 tablet by mouth once daily.       [DISCONTINUED] budesonide-formoterol 80-4.5 mcg (BREYNA) 80-4.5 mcg/actuation HFAA Inhale 2 puffs into the lungs 2 (two) times daily. 10 g 6     [DISCONTINUED] cholecalciferol, vitamin D3, (VITAMIN D3) 125 mcg (5,000 unit) Tab Take 5,000 Units by mouth once daily.       [DISCONTINUED] cyanocobalamin, vitamin B-12, 500 mcg Subl Place 1 tablet under the tongue once daily.       [DISCONTINUED] furosemide (LASIX) 20 MG tablet TAKE 1 TABLET BY MOUTH TWICE A DAY AS NEEDED 180 tablet 3     [DISCONTINUED] furosemide (LASIX) 20 MG tablet Take 1 tablet (20 mg total) by mouth 2 (two) times daily. 180 tablet 3     [DISCONTINUED] gabapentin (NEURONTIN) 400 MG capsule TAKE 1 CAPSULE BY MOUTH 4 TIMES A DAY AS NEEDED 360 capsule 4     [DISCONTINUED] HYDROcodone-acetaminophen (NORCO)  mg per tablet Take 1 tablet by mouth every 8 (eight) hours as needed for Pain. 30 tablet 0     [DISCONTINUED] magnesium citrate 100 mg Tab Take 400 mg by mouth as needed.       [DISCONTINUED] methocarbamoL (ROBAXIN) 750 MG Tab TAKE 2 TABLETS BY MOUTH 4 TIMES A DAY AS NEEDED FOR MUSCLE SPASMS  Strength: 750 mg 240 tablet 1     [DISCONTINUED] NOVOLIN N NPH U-100 INSULIN 100 unit/mL injection INJECT 40 UNITS INTO THE SKIN TWICE A DAY 30 mL 11     [DISCONTINUED] omega-3 fatty acids/fish oil (FISH OIL-OMEGA-3 FATTY ACIDS) 300-1,000 mg capsule Take 1 capsule by mouth once daily.       [DISCONTINUED] predniSONE (DELTASONE) 5 MG tablet Take 1 tablet (5 mg total) by mouth once daily. 90 tablet 3     [DISCONTINUED] traMADoL (ULTRAM) 50 mg tablet TAKE 1 TABLET BY MOUTH FOUR TIMES A  tablet 4          Potential issues to be addressed PRIOR TO DISCHARGE  No issues identified.    Chelsea Ngo  Medication Access Specialist  EXT. [id number]    .

## 2025-05-06 NOTE — ASSESSMENT & PLAN NOTE
Restart home meds  Do not feel that constipation is linked to hypothyroidism as this has been well controlled

## 2025-05-06 NOTE — H&P
[facility] - Emergency Department  Hospital Medicine  History & Physical    Patient Name: Vandana Allison  MRN: [id number]  Patient Class: IP- Inpatient  Admission Date: 5/6/2025  Attending Physician: Fallon Guerin MD   Primary Care Provider: Jennifer Mares FNP         Patient information was obtained from patient and ER records.     Subjective:     Principal Problem:Fecal impaction    Chief Complaint:   Chief Complaint   Patient presents with    Abdominal Pain    Vomiting     Patient presents to the ED via [facility] from [facility] with c/o abdominal pain and vomiting for the last few days.         HPI:  89 y/o female presents from [facility] with pmh CKD, COPD, diabetes, HTN, right nephrectomy to the ED with complaints of abdominal pain which started 2 weeks ago. Pt states she has had abdominal pain which started 2 weeks ago. She notes she started having diarrhea one week ago. There is no hx of a fever, chills, nausea, or vomiting.    Imaging shows significant stool burden in the rectum. Discussing with ED provider agree with admitting to inpatient and treating for ostipation with possible disimpaction.    Past Medical History:   Diagnosis Date    Arthritis     Bleeding external hemorrhoids     Chronic kidney disease, stage 3b     baseline creat 1.5    Chronic kidney disease, stage 3b 02/07/2024    baseline creat 1.5      COPD (chronic obstructive pulmonary disease)     senile emphysema    Diabetes mellitus, type 2     DNR (do not resuscitate) 02/07/2024    discussed with VITO Christianson present    Essential (primary) hypertension     Hiatal hernia with GERD     Neuropathy     Post-operative hypothyroidism     Severe pulmonary hypertension 10/08/2022    EF  70 % and normal diastolic function       Past Surgical History:   Procedure Laterality Date    BREAST SURGERY      Biopsy    EYE SURGERY      Remove cataracts both eyes    HYSTERECTOMY      NEPHRECTOMY Right 1960    THYROIDECTOMY  "        Review of patient's allergies indicates:   Allergen Reactions    Aspirin        No current facility-administered medications on file prior to encounter.     Current Outpatient Medications on File Prior to Encounter   Medication Sig    acetaminophen (TYLENOL) 650 MG TbSR Take 650 mg by mouth every 8 (eight) hours.    albuterol (PROVENTIL/VENTOLIN HFA) 90 mcg/actuation inhaler INHALE 2 PUFFS BY MOUTH INTO THE LUNGS EVERY 4 HOURS AS NEEDED FOR SHORTNESS OF BREATH / RESCUE    albuterol-ipratropium (DUO-NEB) 2.5 mg-0.5 mg/3 mL nebulizer solution Use one (1) nebule in nebulizer 4 times daily as needed for shortness of breath.    amLODIPine (NORVASC) 5 MG tablet Take 1 tablet (5 mg total) by mouth once daily.    B-complex with vitamin C (Z-BEC OR EQUIV) tablet Take 1 tablet by mouth once daily.    BD INSULIN SYRINGE ULTRA-FINE 0.3 mL 31 gauge x 5/16" Syrg USE 1 SYRINGE TWICE A DAY    budesonide-formoterol 80-4.5 mcg (BREYNA) 80-4.5 mcg/actuation HFAA Inhale 2 puffs into the lungs 2 (two) times daily.    cholecalciferol, vitamin D3, (VITAMIN D3) 125 mcg (5,000 unit) Tab Take 5,000 Units by mouth once daily.    cyanocobalamin, vitamin B-12, 500 mcg Subl Place 1 tablet under the tongue once daily.    ferrous sulfate (FEOSOL) 325 mg (65 mg iron) Tab tablet Take 1 tablet (325 mg total) by mouth 3 (three) times a week. On Monday, Wednesday and Friday    furosemide (LASIX) 20 MG tablet TAKE 1 TABLET BY MOUTH TWICE A DAY AS NEEDED    furosemide (LASIX) 20 MG tablet Take 1 tablet (20 mg total) by mouth 2 (two) times daily.    furosemide (LASIX) 40 MG tablet Take 1 tablet (40 mg total) by mouth 2 (two) times daily.    gabapentin (NEURONTIN) 400 MG capsule TAKE 1 CAPSULE BY MOUTH 4 TIMES A DAY AS NEEDED    hydrALAZINE (APRESOLINE) 50 MG tablet Take 1 tablet (50 mg total) by mouth 2 (two) times daily.    HYDROcodone-acetaminophen (NORCO)  mg per tablet Take 1 tablet by mouth every 8 (eight) hours as needed for Pain.    " hydrocortisone (ANUSOL-HC) 2.5 % rectal cream Place rectally 2 (two) times daily.    lactulose (CHRONULAC) 10 gram/15 mL solution Take 30 mLs (20 g total) by mouth once daily.    levothyroxine (SYNTHROID) 100 MCG tablet TAKE 1 TABLET BY MOUTH BEFORE BREAKFAST.    magnesium citrate 100 mg Tab Take 400 mg by mouth as needed.    methocarbamoL (ROBAXIN) 750 MG Tab TAKE 2 TABLETS BY MOUTH 4 TIMES A DAY AS NEEDED FOR MUSCLE SPASMS  Strength: 750 mg    metoprolol succinate (TOPROL-XL) 25 MG 24 hr tablet Take 1 tablet (25 mg total) by mouth once daily.    NOVOLIN N NPH U-100 INSULIN 100 unit/mL injection INJECT 40 UNITS INTO THE SKIN TWICE A DAY    omega-3 fatty acids/fish oil (FISH OIL-OMEGA-3 FATTY ACIDS) 300-1,000 mg capsule Take 1 capsule by mouth once daily.    pantoprazole (PROTONIX) 40 MG tablet TAKE 1 TABLET BY MOUTH TWICE A DAY    pravastatin (PRAVACHOL) 10 MG tablet Take 1 tablet (10 mg total) by mouth once daily.    predniSONE (DELTASONE) 5 MG tablet Take 1 tablet (5 mg total) by mouth once daily.    traMADoL (ULTRAM) 50 mg tablet TAKE 1 TABLET BY MOUTH FOUR TIMES A DAY     Family History       Problem Relation (Age of Onset)    Cancer Sister, Brother, Daughter    Diabetes Mother, Sister, Brother, Sister    Heart disease Father          Tobacco Use    Smoking status: Never    Smokeless tobacco: Never   Substance and Sexual Activity    Alcohol use: Never    Drug use: Never    Sexual activity: Not Currently     Birth control/protection: None     Review of Systems   All other systems reviewed and are negative.    Objective:     Vital Signs (Most Recent):  Temp: 98.7 °F (37.1 °C) (05/06/25 1141)  Pulse: 97 (05/06/25 1141)  Resp: 20 (05/06/25 1141)  BP: (!) 164/46 (05/06/25 1141)  SpO2: 98 % (05/06/25 1200) Vital Signs (24h Range):  Temp:  [98.7 °F (37.1 °C)] 98.7 °F (37.1 °C)  Pulse:  [97] 97  Resp:  [20] 20  SpO2:  [95 %-98 %] 98 %  BP: (164)/(46) 164/46     Weight: 82.6 kg (182 lb)  Body mass index is 30.29  kg/m².     Physical Exam  Vitals reviewed.   Constitutional:       Appearance: She is ill-appearing.   HENT:      Head: Normocephalic.      Mouth/Throat:      Mouth: Mucous membranes are dry.   Eyes:      Extraocular Movements: Extraocular movements intact.      Pupils: Pupils are equal, round, and reactive to light.   Cardiovascular:      Rate and Rhythm: Normal rate and regular rhythm.   Pulmonary:      Effort: Pulmonary effort is normal.   Abdominal:      General: Bowel sounds are normal. There is distension.      Palpations: Abdomen is soft.      Tenderness: There is abdominal tenderness.   Musculoskeletal:      Cervical back: Normal range of motion.      Right lower leg: No edema.      Left lower leg: No edema.   Skin:     General: Skin is warm and dry.   Neurological:      General: No focal deficit present.      Mental Status: She is alert. Mental status is at baseline.   Psychiatric:         Mood and Affect: Mood normal.              CRANIAL NERVES     CN III, IV, VI   Pupils are equal, round, and reactive to light.       Significant Labs: All pertinent labs within the past 24 hours have been reviewed.  BMP:   Recent Labs   Lab 05/06/25  1320   GLU 95   *   K 4.9      CO2 24   BUN 29*   CREATININE 1.55*   CALCIUM 9.8   MG 1.9     CBC:   Recent Labs   Lab 05/06/25  1320   WBC 14.58*   HGB 8.9*   HCT 28.7*   *     CMP:   Recent Labs   Lab 05/06/25  1320   *   K 4.9      CO2 24   GLU 95   BUN 29*   CREATININE 1.55*   CALCIUM 9.8   PROT 6.5   ALBUMIN 2.1*   BILITOT 0.3   ALKPHOS 112   AST 22   ALT 7   ANIONGAP 15       Significant Imaging: I have reviewed all pertinent imaging results/findings within the past 24 hours.  Assessment/Plan:     Assessment & Plan  Fecal impaction  Likely cause of diarrhea  Admit to inpatient  Will start lactulose for now and if problem isn't solved with order enema vs disimpaction  Leukocytosis noted, will start flagyl out of caution       Diabetes  "mellitus, type 2  Patient's FSGs are controlled on current medication regimen.  Last A1c reviewed-   Lab Results   Component Value Date    HGBA1C 6.4 11/27/2024     Most recent fingerstick glucose reviewed- No results for input(s): "POCTGLUCOSE" in the last 24 hours.  Current correctional scale  High  Maintain anti-hyperglycemic dose as follows-   Antihyperglycemics (From admission, onward)      Start     Stop Route Frequency Ordered    05/06/25 1909  insulin aspart U-100 injection 0-5 Units         -- SubQ Before meals & nightly PRN 05/06/25 1811          Hold Oral hypoglycemics while patient is in the hospital.  Hypertension  Patient's blood pressure range in the last 24 hours was: BP  Min: 164/46  Max: 164/46.The patient's inpatient anti-hypertensive regimen is listed below:  Current Antihypertensives  amLODIPine tablet 5 mg, Daily, Oral  hydrALAZINE tablet 50 mg, 2 times daily, Oral    Plan  - BP is controlled, no changes needed to their regimen    Post-operative hypothyroidism  Restart home meds  Do not feel that constipation is linked to hypothyroidism as this has been well controlled    Pulmonary hypertension  Noted  Restart home meds    Chronic obstructive pulmonary disease  Patient's COPD is controlled currently.  Patient is currently off COPD Pathway. Continue scheduled inhalers Supplemental oxygen and monitor respiratory status closely.    VTE Risk Mitigation (From admission, onward)           Ordered     enoxaparin injection 40 mg  Daily         05/06/25 1811     IP VTE HIGH RISK PATIENT  Once         05/06/25 1811     Place sequential compression device  Until discontinued         05/06/25 1811                                    Fallon Guerin MD  Department of Hospital Medicine  [facility] - Emergency Department          "

## 2025-05-07 LAB
ANION GAP SERPL CALCULATED.3IONS-SCNC: 15 MMOL/L (ref 7–16)
BASOPHILS # BLD AUTO: 0.05 K/UL (ref 0–0.2)
BASOPHILS NFR BLD AUTO: 0.4 % (ref 0–1)
BUN SERPL-MCNC: 29 MG/DL (ref 10–20)
BUN/CREAT SERPL: 19 (ref 6–20)
CALCIUM SERPL-MCNC: 8.6 MG/DL (ref 8.4–10.2)
CHLORIDE SERPL-SCNC: 104 MMOL/L (ref 98–111)
CO2 SERPL-SCNC: 24 MMOL/L (ref 23–31)
CREAT SERPL-MCNC: 1.52 MG/DL (ref 0.55–1.02)
DIFFERENTIAL METHOD BLD: ABNORMAL
EGFR (NO RACE VARIABLE) (RUSH/TITUS): 31 ML/MIN/1.73M2
EOSINOPHIL # BLD AUTO: 0.14 K/UL (ref 0–0.5)
EOSINOPHIL NFR BLD AUTO: 1.2 % (ref 1–4)
ERYTHROCYTE [DISTWIDTH] IN BLOOD BY AUTOMATED COUNT: 19.3 % (ref 11.5–14.5)
GLUCOSE SERPL-MCNC: 124 MG/DL (ref 75–121)
GLUCOSE SERPL-MCNC: 136 MG/DL (ref 70–105)
GLUCOSE SERPL-MCNC: 152 MG/DL (ref 70–105)
GLUCOSE SERPL-MCNC: 220 MG/DL (ref 70–105)
GLUCOSE SERPL-MCNC: 228 MG/DL (ref 70–105)
GLUCOSE SERPL-MCNC: 235 MG/DL (ref 70–105)
GLUCOSE SERPL-MCNC: 95 MG/DL (ref 70–105)
HCT VFR BLD AUTO: 24.3 % (ref 38–47)
HGB BLD-MCNC: 7.4 G/DL (ref 12–16)
IMM GRANULOCYTES # BLD AUTO: 0.08 K/UL (ref 0–0.04)
IMM GRANULOCYTES NFR BLD: 0.7 % (ref 0–0.4)
LYMPHOCYTES # BLD AUTO: 0.73 K/UL (ref 1–4.8)
LYMPHOCYTES NFR BLD AUTO: 6 % (ref 27–41)
MCH RBC QN AUTO: 24.7 PG (ref 27–31)
MCHC RBC AUTO-ENTMCNC: 30.5 G/DL (ref 32–36)
MCV RBC AUTO: 81 FL (ref 80–96)
MONOCYTES # BLD AUTO: 1.1 K/UL (ref 0–0.8)
MONOCYTES NFR BLD AUTO: 9.1 % (ref 2–6)
MPC BLD CALC-MCNC: 8.6 FL (ref 9.4–12.4)
NEUTROPHILS # BLD AUTO: 9.98 K/UL (ref 1.8–7.7)
NEUTROPHILS NFR BLD AUTO: 82.6 % (ref 53–65)
NRBC # BLD AUTO: 0 X10E3/UL
NRBC, AUTO (.00): 0 %
OHS QRS DURATION: 90 MS
OHS QTC CALCULATION: 469 MS
PLATELET # BLD AUTO: 537 K/UL (ref 150–400)
POTASSIUM SERPL-SCNC: 4.8 MMOL/L (ref 3.5–5.1)
RBC # BLD AUTO: 3 M/UL (ref 4.2–5.4)
SODIUM SERPL-SCNC: 138 MMOL/L (ref 136–145)
WBC # BLD AUTO: 12.08 K/UL (ref 4.5–11)

## 2025-05-07 PROCEDURE — 80048 BASIC METABOLIC PNL TOTAL CA: CPT

## 2025-05-07 PROCEDURE — 85025 COMPLETE CBC W/AUTO DIFF WBC: CPT

## 2025-05-07 PROCEDURE — 11000001 HC ACUTE MED/SURG PRIVATE ROOM

## 2025-05-07 PROCEDURE — 99232 SBSQ HOSP IP/OBS MODERATE 35: CPT | Mod: ,,,

## 2025-05-07 PROCEDURE — 36415 COLL VENOUS BLD VENIPUNCTURE: CPT

## 2025-05-07 PROCEDURE — 82962 GLUCOSE BLOOD TEST: CPT

## 2025-05-07 PROCEDURE — 25000003 PHARM REV CODE 250

## 2025-05-07 PROCEDURE — 96372 THER/PROPH/DIAG INJ SC/IM: CPT

## 2025-05-07 PROCEDURE — 63600175 PHARM REV CODE 636 W HCPCS: Performed by: HOSPITALIST

## 2025-05-07 PROCEDURE — 63600175 PHARM REV CODE 636 W HCPCS

## 2025-05-07 RX ORDER — NYSTATIN 100000 [USP'U]/ML
500000 SUSPENSION ORAL
Status: DISCONTINUED | OUTPATIENT
Start: 2025-05-07 | End: 2025-05-14 | Stop reason: HOSPADM

## 2025-05-07 RX ORDER — ENOXAPARIN SODIUM 100 MG/ML
30 INJECTION SUBCUTANEOUS EVERY 24 HOURS
Status: DISCONTINUED | OUTPATIENT
Start: 2025-05-07 | End: 2025-05-14 | Stop reason: HOSPADM

## 2025-05-07 RX ADMIN — HYDRALAZINE HYDROCHLORIDE 50 MG: 50 TABLET ORAL at 08:05

## 2025-05-07 RX ADMIN — LACTULOSE 15 G: 20 SOLUTION ORAL at 09:05

## 2025-05-07 RX ADMIN — METRONIDAZOLE 500 MG: 500 INJECTION, SOLUTION INTRAVENOUS at 08:05

## 2025-05-07 RX ADMIN — HYDROCODONE BITARTRATE AND ACETAMINOPHEN 1 TABLET: 10; 325 TABLET ORAL at 08:05

## 2025-05-07 RX ADMIN — INSULIN ASPART 1 UNITS: 100 INJECTION, SOLUTION INTRAVENOUS; SUBCUTANEOUS at 10:05

## 2025-05-07 RX ADMIN — METRONIDAZOLE 500 MG: 500 INJECTION, SOLUTION INTRAVENOUS at 04:05

## 2025-05-07 RX ADMIN — HYDRALAZINE HYDROCHLORIDE 50 MG: 50 TABLET ORAL at 09:05

## 2025-05-07 RX ADMIN — DEXTROSE AND SODIUM CHLORIDE: 5; 900 INJECTION, SOLUTION INTRAVENOUS at 09:05

## 2025-05-07 RX ADMIN — ACETAMINOPHEN 650 MG: 325 TABLET ORAL at 04:05

## 2025-05-07 RX ADMIN — ENOXAPARIN SODIUM 30 MG: 30 INJECTION SUBCUTANEOUS at 04:05

## 2025-05-07 RX ADMIN — LACTULOSE 15 G: 20 SOLUTION ORAL at 03:05

## 2025-05-07 RX ADMIN — AMLODIPINE BESYLATE 5 MG: 5 TABLET ORAL at 08:05

## 2025-05-07 RX ADMIN — NYSTATIN 500000 UNITS: 100000 SUSPENSION ORAL at 09:05

## 2025-05-07 RX ADMIN — LACTULOSE 15 G: 20 SOLUTION ORAL at 08:05

## 2025-05-07 RX ADMIN — DEXTROSE AND SODIUM CHLORIDE: 5; 900 INJECTION, SOLUTION INTRAVENOUS at 08:05

## 2025-05-07 NOTE — ASSESSMENT & PLAN NOTE
Patient's blood pressure range in the last 24 hours was: BP  Min: 72/36  Max: 139/67.The patient's inpatient anti-hypertensive regimen is listed below:  Current Antihypertensives  amLODIPine tablet 5 mg, Daily, Oral  hydrALAZINE tablet 50 mg, 2 times daily, Oral    Plan  - BP is controlled, no changes needed to their regimen

## 2025-05-07 NOTE — PLAN OF CARE
[place] that patient is actually a resident at [facility].  Called [place] to confirm that patient may return upon dc.  She agreed.  Documents faxed.  Will continue to follow for anticipated dc needs.

## 2025-05-07 NOTE — PROGRESS NOTES
[facility]  [facility] Medicine  Progress Note    Patient Name: Vandana Allison  MRN: [id number]  Patient Class: IP- Inpatient   Admission Date: 5/6/2025  Length of Stay: 1 days  Attending Physician: Fallon Guerin MD  Primary Care Provider: Jennifer Mares FNP        Subjective     Principal Problem:Fecal impaction        HPI:   89 y/o female presents from [facility] with pmh CKD, COPD, diabetes, HTN, right nephrectomy to the ED with complaints of abdominal pain which started 2 weeks ago. Pt states she has had abdominal pain which started 2 weeks ago. She notes she started having diarrhea one week ago. There is no hx of a fever, chills, nausea, or vomiting.    Imaging shows significant stool burden in the rectum. Discussing with ED provider agree with admitting to inpatient and treating for ostipation with possible disimpaction.    Overview/Hospital Course:  5/7  - disimpacted yesterday in the ed, did not tolerate enema  - no bowel movements today yet, will advance diet to see if tolerated, continuing lactulose home dose  - noted HGB dropped from 8.9 to 7.4 today but no obvious signs of GI bleed, will trend tomorrow, if continues to drop will order occult blood and consult GI    Past Medical History:   Diagnosis Date    Arthritis     Bleeding external hemorrhoids     Chronic kidney disease, stage 3b     baseline creat 1.5    Chronic kidney disease, stage 3b 02/07/2024    baseline creat 1.5      COPD (chronic obstructive pulmonary disease)     senile emphysema    Diabetes mellitus, type 2     DNR (do not resuscitate) 02/07/2024    discussed with VITO Christianson present    Essential (primary) hypertension     Hiatal hernia with GERD     Neuropathy     Post-operative hypothyroidism     Severe pulmonary hypertension 10/08/2022    EF  70 % and normal diastolic function       Past Surgical History:   Procedure Laterality Date    BREAST SURGERY      Biopsy    EYE SURGERY      Remove cataracts both  "eyes    HYSTERECTOMY      NEPHRECTOMY Right 1960    THYROIDECTOMY         Review of patient's allergies indicates:   Allergen Reactions    Aspirin        No current facility-administered medications on file prior to encounter.     Current Outpatient Medications on File Prior to Encounter   Medication Sig    albuterol (PROVENTIL/VENTOLIN HFA) 90 mcg/actuation inhaler INHALE 2 PUFFS BY MOUTH INTO THE LUNGS EVERY 4 HOURS AS NEEDED FOR SHORTNESS OF BREATH / RESCUE (Patient taking differently: Inhale 2 puffs into the lungs every 6 (six) hours as needed.)    ferrous sulfate (FEOSOL) 325 mg (65 mg iron) Tab tablet Take 1 tablet (325 mg total) by mouth 3 (three) times a week. On Monday, Wednesday and Friday    gabapentin (NEURONTIN) 100 MG capsule Take 100 mg by mouth 2 (two) times daily.    hydrALAZINE (APRESOLINE) 50 MG tablet Take 1 tablet (50 mg total) by mouth 2 (two) times daily.    hydrocortisone (ANUSOL-HC) 2.5 % rectal cream Place rectally 2 (two) times daily.    insulin glargine U-100, Lantus, 100 unit/mL injection Inject 5 Units into the skin every 12 (twelve) hours.    insulin lispro 100 unit/mL injection Inject 3 Units into the skin 3 (three) times daily before meals.    lactulose (CHRONULAC) 10 gram/15 mL solution Take 30 mLs (20 g total) by mouth once daily. (Patient taking differently: Take 15 mLs by mouth once daily.)    levothyroxine (SYNTHROID) 100 MCG tablet TAKE 1 TABLET BY MOUTH BEFORE BREAKFAST.    ondansetron 4 mg/2 mL Soln Inject 4 mg into the vein every 6 (six) hours as needed.    pantoprazole (PROTONIX) 40 MG tablet TAKE 1 TABLET BY MOUTH TWICE A DAY    pravastatin (PRAVACHOL) 10 MG tablet Take 1 tablet (10 mg total) by mouth once daily.    sertraline (ZOLOFT) 25 MG tablet Take 25 mg by mouth once daily.    SYMBICORT 160-4.5 mcg/actuation HFAA Inhale 2 puffs into the lungs 2 (two) times daily.    BD INSULIN SYRINGE ULTRA-FINE 0.3 mL 31 gauge x 5/16" Syrg USE 1 SYRINGE TWICE A DAY    furosemide " (LASIX) 40 MG tablet Take 1 tablet (40 mg total) by mouth 2 (two) times daily. (Patient taking differently: Take 40 mg by mouth once daily.)    metoprolol succinate (TOPROL-XL) 25 MG 24 hr tablet Take 1 tablet (25 mg total) by mouth once daily.     Family History       Problem Relation (Age of Onset)    Cancer Sister, Brother, Daughter    Diabetes Mother, Sister, Brother, Sister    Heart disease Father          Tobacco Use    Smoking status: Never    Smokeless tobacco: Never   Substance and Sexual Activity    Alcohol use: Never    Drug use: Never    Sexual activity: Not Currently     Birth control/protection: None     Review of Systems   All other systems reviewed and are negative.    Objective:     Vital Signs (Most Recent):  Temp: 98.2 °F (36.8 °C) (05/07/25 1132)  Pulse: 82 (05/07/25 1132)  Resp: 20 (05/07/25 0856)  BP: 139/67 (05/07/25 1132)  SpO2: 97 % (05/07/25 1132) Vital Signs (24h Range):  Temp:  [97.4 °F (36.3 °C)-100.7 °F (38.2 °C)] 98.2 °F (36.8 °C)  Pulse:  [71-94] 82  Resp:  [16-20] 20  SpO2:  [96 %-100 %] 97 %  BP: ()/(36-90) 139/67     Weight: 85.6 kg (188 lb 12.8 oz)  Body mass index is 31.42 kg/m².     Physical Exam  Vitals reviewed.   Constitutional:       Appearance: She is ill-appearing.   HENT:      Head: Normocephalic.      Mouth/Throat:      Mouth: Mucous membranes are moist.   Eyes:      Extraocular Movements: Extraocular movements intact.      Pupils: Pupils are equal, round, and reactive to light.   Cardiovascular:      Rate and Rhythm: Normal rate and regular rhythm.   Pulmonary:      Effort: Pulmonary effort is normal.   Abdominal:      General: Abdomen is flat. Bowel sounds are normal.      Palpations: Abdomen is soft.   Musculoskeletal:      Cervical back: Normal range of motion.      Right lower leg: No edema.      Left lower leg: No edema.   Skin:     General: Skin is warm and dry.   Neurological:      General: No focal deficit present.      Mental Status: She is alert. Mental  "status is at baseline.   Psychiatric:         Mood and Affect: Mood normal.              CRANIAL NERVES     CN III, IV, VI   Pupils are equal, round, and reactive to light.       Significant Labs: All pertinent labs within the past 24 hours have been reviewed.  BMP:   Recent Labs   Lab 05/06/25 1320 05/07/25  0527   GLU 95 124*   * 138   K 4.9 4.8    104   CO2 24 24   BUN 29* 29*   CREATININE 1.55* 1.52*   CALCIUM 9.8 8.6   MG 1.9  --      CBC:   Recent Labs   Lab 05/06/25 1320 05/07/25  0527   WBC 14.58* 12.08*   HGB 8.9* 7.4*   HCT 28.7* 24.3*   * 537*     CMP:   Recent Labs   Lab 05/06/25 1320 05/07/25  0527   * 138   K 4.9 4.8    104   CO2 24 24   GLU 95 124*   BUN 29* 29*   CREATININE 1.55* 1.52*   CALCIUM 9.8 8.6   PROT 6.5  --    ALBUMIN 2.1*  --    BILITOT 0.3  --    ALKPHOS 112  --    AST 22  --    ALT 7  --    ANIONGAP 15 15       Significant Imaging: I have reviewed all pertinent imaging results/findings within the past 24 hours.      Assessment & Plan  Fecal impaction  Likely cause of diarrhea  Admit to inpatient  Will start lactulose for now and if problem isn't solved with order enema vs disimpaction  Leukocytosis noted, will start flagyl out of caution       5/7  - disimpacted yesterday in the ed, did not tolerate enema  - no bowel movements today yet, will advance diet to see if tolerated, continuing lactulose home dose  - noted HGB dropped from 8.9 to 7.4 today but no obvious signs of GI bleed, will trend tomorrow, if continues to drop will order occult blood and consult GI  Diabetes mellitus, type 2  Patient's FSGs are controlled on current medication regimen.  Last A1c reviewed-   Lab Results   Component Value Date    HGBA1C 6.4 11/27/2024     Most recent fingerstick glucose reviewed- No results for input(s): "POCTGLUCOSE" in the last 24 hours.  Current correctional scale  High  Maintain anti-hyperglycemic dose as follows-   Antihyperglycemics (From admission, " onward)      Start     Stop Route Frequency Ordered    05/06/25 1909  insulin aspart U-100 injection 0-5 Units         -- SubQ Before meals & nightly PRN 05/06/25 1811          Hold Oral hypoglycemics while patient is in the hospital.  Hypertension  Patient's blood pressure range in the last 24 hours was: BP  Min: 72/36  Max: 139/67.The patient's inpatient anti-hypertensive regimen is listed below:  Current Antihypertensives  amLODIPine tablet 5 mg, Daily, Oral  hydrALAZINE tablet 50 mg, 2 times daily, Oral    Plan  - BP is controlled, no changes needed to their regimen    Post-operative hypothyroidism  Restart home meds  Do not feel that constipation is linked to hypothyroidism as this has been well controlled    Pulmonary hypertension  Noted  Restart home meds    Chronic obstructive pulmonary disease  Patient's COPD is controlled currently.  Patient is currently off COPD Pathway. Continue scheduled inhalers Supplemental oxygen and monitor respiratory status closely.    VTE Risk Mitigation (From admission, onward)           Ordered     enoxaparin injection 30 mg  Daily         05/07/25 1038     IP VTE HIGH RISK PATIENT  Once         05/06/25 1811     Place sequential compression device  Until discontinued         05/06/25 1811                    Discharge Planning   MARTHA:      Code Status: DNR   Medical Readiness for Discharge Date:   Discharge Plan A: Home with family                        Fallon Guerin MD  Department of Hospital Medicine   [facility]

## 2025-05-07 NOTE — PROGRESS NOTES
[facility]  Adult Nutrition  First Assessment Note         Reason for Assessment  Reason For Assessment: identified at risk by screening criteria (MST3)        Assessment and Plan  Patient identified at risk by screening criteria with MST3. Patient is a 89 yo F from [facility] with PMH of CKD, COPD, DM, HTN, right nephrectomy who presented to ED with c/o abdominal pain, vomiting, diarrhea. Patient found to have significant stool burden in rectum. Admitted for treating constipation with possible disimpaction.    Patient is 85.6 kg (188 lb) with a BMI of 31.42 and is obese. Patient was unsure of weight loss and endorsed poor appetite, likely attributable to GI symptoms. Chart review reveals no significant weight loss in the past year. Patient documented to weigh 189 lb x 6 mo ago, 179 lb x 1 yr ago. No evident fat and muscle depletion. It is expected that patient's appetite will improve once fecal impaction resolved.  Per ASPEN guidelines, patient does not meet criteria for protein-calorie malnutrition at this time.    Nutrition Recommendations  Patient is currently on a Clear Liquids Diet. Recommend to advance diet as tolerated. If unable to advance diet x 2 - 3 days, may recommend to consider alternate route of nutrition depending upon goals of care. Although patient has extensive medical history, given her age, advancement to a Regular Diet is most appropriate. Upon discharge, encourage patient to consume adequate fiber and fluids to prevent future impaction.    Last Bowel Movement: 05/06/25    Medications/labs reviewed. RD following.    Learning Needs/Social Determinants of Health    Learning Assessment       05/06/2025 2054 [facility] (5/6/2025 - Present)   Created by Ed Grimaldo, RN - RN (Nurse) Status: Complete                 PRIMARY LEARNER     Primary Learner Name:  Vandana Allison MT - 05/06/2025 2054    Relationship:  Patient MT - 05/06/2025 2054    Does  the primary learner have any barriers to learning?:  Cognitive MT - 05/06/2025 2054    What is the preferred language of the primary learner?:  English MT - 05/06/2025 2054    Is an  required?:  No MT - 05/06/2025 2054    How does the primary learner prefer to learn new concepts?:  Listening MT - 05/06/2025 2054    How often do you need to have someone help you read instructions, pamphlets, or written material from your doctor or pharmacy?:  Always MT - 05/06/2025 2054        CO-LEARNER #1     No question answered        CO-LEARNER #2     No question answered        SPECIAL TOPICS     No question answered        ANSWERED BY:     No question answered        Comments         Edit History       Ed Grimaldo, RN - RN (Nurse)   05/06/2025 2054                          Social Drivers of Health     Tobacco Use: Low Risk  (11/27/2024)    Patient History     Smoking Tobacco Use: Never     Smokeless Tobacco Use: Never     Passive Exposure: Not on file   Alcohol Use: Not At Risk (8/5/2024)    AUDIT-C     Frequency of Alcohol Consumption: Never     Average Number of Drinks: Patient does not drink     Frequency of Binge Drinking: Never   Financial Resource Strain: Patient Unable To Answer (5/6/2025)    Overall Financial Resource Strain (CARDIA)     Difficulty of Paying Living Expenses: Patient unable to answer   Food Insecurity: Patient Unable To Answer (5/6/2025)    Hunger Vital Sign     Worried About Running Out of Food in the Last Year: Patient unable to answer     Ran Out of Food in the Last Year: Patient unable to answer   Transportation Needs: Patient Unable To Answer (5/6/2025)    PRAPARE - Transportation     Lack of Transportation (Medical): Patient unable to answer     Lack of Transportation (Non-Medical): Patient unable to answer   Physical Activity: Inactive (8/5/2024)    Exercise Vital Sign     Days of Exercise per Week: 0 days     Minutes of Exercise per Session: 0 min   Stress: Patient Unable To  Answer (5/6/2025)    [employer] - Occupational Stress Questionnaire     Feeling of Stress : Patient unable to answer   Housing Stability: Patient Unable To Answer (5/6/2025)    Housing Stability Vital Sign     Unable to Pay for Housing in the Last Year: Patient unable to answer     Number of Times Moved in the Last Year: 0     Homeless in the Last Year: Patient unable to answer   Depression: Low Risk  (11/27/2024)    Depression     Last PHQ-4: Flowsheet Data: 0   Utilities: Patient Unable To Answer (5/6/2025)    [facility] Utilities     Threatened with loss of utilities: Patient unable to answer   Health Literacy: Inadequate Health Literacy (5/6/2025)     Health Literacy     Frequency of need for help with medical instructions: Always   Social Isolation: Not on file     Malnutrition  Is Patient Malnourished: No    Nutrition Diagnosis  Altered GI function related to fecal impaction as evidenced by c/o N/V/D  Comments: advance diet as tolerated    Recent Labs   Lab 05/07/25  0527 05/07/25  0729   *  --    POCGLU  --  152*     Comments on Glucose: Glucose elevated. [facility] T2DM    Nutrition Prescription / Recommendations  Recommendation/Intervention: Recommend to advance diet as tolerated. If unable to advance diet x 2 - 3 days, may recommend to consider alternate route of nutrition therapy pending goals of care.  Goals: weight maintenance during admission, resolution of GI symptoms, consuming 50 - 75 % meals, improve skin integrity  Nutrition Goal Status: new  Current Diet Order: Clear Liquids  Nutrition Order Comments: fecal impaction  Chewing or Swallowing Difficulty?: No Chewing or swallowing difficulty  Recommended Diet: Clear Liquid  Recommended Oral Supplement: No Oral Supplements   Is Nutrition Support Recommended: No  Is Nutrition Education Recommended: No - patient is a 89 yo nursing home resident    Monitor and Evaluation  % current Intake: Unknown/ No P.O. intake documented  %  intake to meet estimated needs: 50 - 75 %  Monitor and Evaluation: Food and beverage intake, Protein intake, Carbohydrate intake, Diet order, Weight, Electrolyte and renal panel, Gastrointestinal profile, Glucose/endocrine profile, Inflammatory profile, Lipid profile, Nutrition focused physical findings, Skin    Current Medical Diagnosis and Past Medical History     Past Medical History:   Diagnosis Date    Arthritis     Bleeding external hemorrhoids     Chronic kidney disease, stage 3b     baseline creat 1.5    Chronic kidney disease, stage 3b 02/07/2024    baseline creat 1.5      COPD (chronic obstructive pulmonary disease)     senile emphysema    Diabetes mellitus, type 2     DNR (do not resuscitate) 02/07/2024    discussed with VITO Christianson present    Essential (primary) hypertension     Hiatal hernia with GERD     Neuropathy     Post-operative hypothyroidism     Severe pulmonary hypertension 10/08/2022    EF  70 % and normal diastolic function     Nutrition/Diet History  Food Allergies: NKFA    Lab/Procedures/Meds  Recent Labs   Lab 05/06/25  1320 05/07/25  0527   * 138   K 4.9 4.8   BUN 29* 29*   CREATININE 1.55* 1.52*   CALCIUM 9.8 8.6   ALBUMIN 2.1*  --     104   ALT 7  --    AST 22  --    Note: BUN, Cr elevated (CKD Stage IV)    Last A1c:   Lab Results   Component Value Date    HGBA1C 6.4 11/27/2024   Note: PMH T2DM. Goal for patients with diabetes < 7, goal for frail elderly < 8    Lab Results   Component Value Date    RBC 3.00 (L) 05/07/2025    HGB 7.4 (L) 05/07/2025    HCT 24.3 (L) 05/07/2025    MCV 81.0 05/07/2025    MCH 24.7 (L) 05/07/2025    MCHC 30.5 (L) 05/07/2025    TIBC 368 02/07/2024   Note: H/H low    Pertinent Labs Reviewed: reviewed  Pertinent Medications Reviewed: reviewed    Scheduled Meds:   amLODIPine  5 mg Oral Daily    enoxparin  40 mg Subcutaneous Daily    hydrALAZINE  50 mg Oral BID    lactulose  15 g Oral TID    metroNIDAZOLE IV (PEDS and ADULTS)  500 mg Intravenous Q8H  "  Note: lactulose    Continuous Infusions:   D5 and 0.9% NaCl   Intravenous Continuous 100 mL/hr at 05/07/25 0853 New Bag at 05/07/25 0853     PRN Meds:.  Current Facility-Administered Medications:     acetaminophen, 650 mg, Oral, Q4H PRN    acetaminophen, 650 mg, Oral, Q8H PRN    albuterol-ipratropium, 3 mL, Nebulization, Q6H PRN    dextrose 50%, 12.5 g, Intravenous, PRN    dextrose 50%, 25 g, Intravenous, PRN    glucagon (human recombinant), 1 mg, Intramuscular, PRN    glucose, 16 g, Oral, PRN    glucose, 24 g, Oral, PRN    HYDROcodone-acetaminophen, 1 tablet, Oral, Q6H PRN    HYDROcodone-acetaminophen, 1 tablet, Oral, Q6H PRN    insulin aspart U-100, 0-5 Units, Subcutaneous, QID (AC + HS) PRN    melatonin, 6 mg, Oral, Nightly PRN    naloxone, 0.02 mg, Intravenous, PRN    ondansetron, 4 mg, Intravenous, Q8H PRN    sodium chloride 0.9%, 10 mL, Intravenous, Q12H PRN    Anthropometrics  Height: 5' 5" (165.1 cm)  Height (inches): 65 in  Height Method: Stated  Weight: 85.6 kg (188 lb 12.8 oz)  Weight (lb): 188.8 lb  Weight Method: Bed Scale  Ideal Body Weight (IBW), Female: 125 lb  % Ideal Body Weight, Female (lb): 145.6 %  BMI (Calculated): 31.4       Estimated/Assessed Needs  RMR (Edgar-St. Jeor Equation): 1237.27     Temp: 98.2 °F (36.8 °C)Oral    Weight Used For Calorie Calculations: 85.6 kg (188 lb 11.4 oz)     Energy Calorie Requirements (kcal): 1,712 - 2,140 kcal (20 - 25 kcal/kg ABW)    Weight Used For Protein Calculations: 56.7 kg (125 lb)    Protein Requirements: 57 - 68 g (1 - 1.2 g/kg IBW) (patient requires adequate protein given sacral pressure injury. However, caution with high protein intake given CKD Stage 4 - avoid intake >1.3 g/kg)      Fluid Requirements (mL): 1 mL/kcal (1,712 - 2,140 mL)    CHO Requirement: 45 - 55 % total kcal (T2DM)    Nutrition Follow-Up  RD Follow-up?: Yes    Nutrition Discharge Planning: General healthy diet       Saima Granger MS, RD, LD  Available via Secure Chat  "

## 2025-05-07 NOTE — NURSING
2145- Pt resting in bed. Attempted to give pt PO meds. Pt unable to arouse to verbal, auditory stimuli. Pt awakens shortly to tactile stimuli. Unable to get pt to swallow liquids. Finger stick glucose at time of admission 61, orange juice with sugar given and pt able to drink without difficulty. At current check glucose 102. V/S taken and BP noted of rechecked manual of . Notified MD, waiting further orders. Care ongoing.

## 2025-05-07 NOTE — PLAN OF CARE
[facility]  Initial Discharge Assessment       Primary Care Provider: Jennifer Mares FNP    Admission Diagnosis: Fecal impaction [K56.41]  Nausea [R11.0]  Dyspnea [R06.00]  Chest pain [R07.9]    Admission Date: 5/6/2025  Expected Discharge Date:     Transition of Care Barriers: None    Payor: MEDICARE / Plan: MEDICARE PART A & B / Product Type: Government /     Extended Emergency Contact Information  Primary Emergency Contact: Soraida Osman  Work Phone: [phone]  Mobile Phone: [phone]  Relation: Relative  Preferred language: English   needed? No  Secondary Emergency Contact: Claribel Morelos  Mobile Phone: [phone]  Relation: Daughter  Preferred language: English   needed? No    Discharge Plan A: Home with family  Discharge Plan B: Long-term acute care facility (LTAC), Rehab, Skilled Nursing Facility      [id number] [place] [id number]  Phone: [phone] Fax: [phone]    [id number] [address] AT [facility]  [address]  [facility] [address]  Phone: [phone] Fax: [phone]    [facility]  [address] [place] [address]  Phone: [phone] Fax: [phone]      Initial Assessment (most recent)       Adult Discharge Assessment - 05/07/25 1002          Discharge Assessment    Assessment Type Discharge Planning Assessment     Source of Information patient     Communicated MARTHA with patient/caregiver Date not available/Unable to determine     People in Home child(radha), adult     Do you expect to return to your current living situation? Yes     Do you have help at home or someone to help you manage your care at home? Yes     Who are your caregiver(s) and their phone number(s)? Claribel Morelos, daughter, [phone]     Prior to hospitilization cognitive status: Unable to Assess     Current cognitive status: Alert/Oriented      Walking or Climbing Stairs Difficulty yes     Mobility Management uses a cane and walker     Dressing/Bathing Difficulty no     Home Layout Able to live on 1st floor     Equipment Currently Used at Home cane, straight;walker, rolling;wheelchair;commode     Patient currently being followed by outpatient case management? No     Do you currently have service(s) that help you manage your care at home? No     Do you take prescription medications? Yes     Do you have prescription coverage? Yes     Coverage Medicare     Do you have any problems affording any of your prescribed medications? No     Is the patient taking medications as prescribed? yes     Who is going to help you get home at discharge? family     How do you get to doctors appointments? family or friend will provide     Are you on dialysis? No     Discharge Plan A Home with family     Discharge Plan B Long-term acute care facility (LTAC);Rehab;Skilled Nursing Facility     DME Needed Upon Discharge  none     Discharge Plan discussed with: Patient     Transition of Care Barriers None        Physical Activity    On average, how many days per week do you engage in moderate to strenuous exercise (like a brisk walk)? 0 days     On average, how many minutes do you engage in exercise at this level? 0 min        Financial Resource Strain    How hard is it for you to pay for the very basics like food, housing, medical care, and heating? Not very hard        Housing Stability    In the last 12 months, was there a time when you were not able to pay the mortgage or rent on time? No     At any time in the past 12 months, were you homeless or living in a shelter (including now)? No        Transportation Needs    In the past 12 months, has lack of transportation kept you from medical appointments or from getting medications? No     In the past 12 months, has lack of transportation kept you from meetings, work, or from getting things needed for daily living? No        Food  Insecurity    Within the past 12 months, you worried that your food would run out before you got the money to buy more. Never true     Within the past 12 months, the food you bought just didn't last and you didn't have money to get more. Never true        Alcohol Use    Q1: How often do you have a drink containing alcohol? Never     Q2: How many drinks containing alcohol do you have on a typical day when you are drinking? Patient does not drink     Q3: How often do you have six or more drinks on one occasion? Never        Utilities    In the past 12 months has the electric, gas, oil, or water company threatened to shut off services in your home? No        Health Literacy    How often do you need to have someone help you when you read instructions, pamphlets, or other written material from your doctor or pharmacy? Sometimes        OTHER    Name(s) of People in Home Claribel Morelos, daughter, [phone]                 Met with patient in her room this am.  She stated that she lives with her daughter and plans to return upon dc.  She does not have hh.  DME includes a cane, walker, wc, and bsc.  SDOH completed and IM obtained.  Will continue to follow for anticipated dc needs.

## 2025-05-07 NOTE — PLAN OF CARE
Problem: Adult Inpatient Plan of Care  Goal: Plan of Care Review  Outcome: Not Progressing  Goal: Patient-Specific Goal (Individualized)  Outcome: Not Progressing  Goal: Absence of Hospital-Acquired Illness or Injury  Outcome: Not Progressing  Goal: Optimal Comfort and Wellbeing  Outcome: Not Progressing  Goal: Readiness for Transition of Care  Outcome: Not Progressing     Problem: Diabetes Comorbidity  Goal: Blood Glucose Level Within Targeted Range  Outcome: Not Progressing     Problem: Wound  Goal: Optimal Coping  Outcome: Not Progressing  Goal: Optimal Functional Ability  Outcome: Not Progressing  Goal: Absence of Infection Signs and Symptoms  Outcome: Not Progressing  Goal: Improved Oral Intake  Outcome: Not Progressing  Goal: Optimal Pain Control and Function  Outcome: Not Progressing  Goal: Skin Health and Integrity  Outcome: Not Progressing  Goal: Optimal Wound Healing  Outcome: Not Progressing     Problem: Fall Injury Risk  Goal: Absence of Fall and Fall-Related Injury  Outcome: Not Progressing     Problem: Skin Injury Risk Increased  Goal: Skin Health and Integrity  Outcome: Not Progressing

## 2025-05-07 NOTE — PROGRESS NOTES
Pharmacist Renal Dose Adjustment Note    Vandana Allison is a 89 y.o. female being treated with the medication Lovenox    Patient Data:    Vital Signs (Most Recent):  Temp: 98.2 °F (36.8 °C) (05/07/25 0731)  Pulse: 71 (05/07/25 0731)  Resp: 20 (05/07/25 0856)  BP: (!) 133/90 (05/07/25 0731)  SpO2: 100 % (05/07/25 0731) Vital Signs (72h Range):  Temp:  [97.4 °F (36.3 °C)-100.7 °F (38.2 °C)]   Pulse:  [71-97]   Resp:  [16-20]   BP: ()/(36-90)   SpO2:  [95 %-100 %]      Recent Labs   Lab 05/06/25  1320 05/07/25 0527   CREATININE 1.55* 1.52*     Serum creatinine: 1.52 mg/dL (H) 05/07/25 0527  Estimated creatinine clearance: 22.3 mL/min (A)    Medication:Lovenox dose: 40 mg frequency Q24 will be changed to medication:Lovenox dose:30 mg frequency:Q24    Pharmacist's Name: Rafael Mark  Pharmacist's Extension: [phone]

## 2025-05-07 NOTE — PROGRESS NOTES
[facility]  Wound Care    Patient Name:  Vandana Allison   MRN:  [id number]  Date: 5/7/2025  Diagnosis: Fecal impaction    History:     Past Medical History:   Diagnosis Date    Arthritis     Bleeding external hemorrhoids     Chronic kidney disease, stage 3b     baseline creat 1.5    Chronic kidney disease, stage 3b 02/07/2024    baseline creat 1.5      COPD (chronic obstructive pulmonary disease)     senile emphysema    Diabetes mellitus, type 2     DNR (do not resuscitate) 02/07/2024    discussed with VITO Christianson present    Essential (primary) hypertension     Hiatal hernia with GERD     Neuropathy     Post-operative hypothyroidism     Severe pulmonary hypertension 10/08/2022    EF  70 % and normal diastolic function       Social History[1]    Precautions:     Allergies as of 05/06/2025 - Reviewed 05/06/2025   Allergen Reaction Noted    Aspirin  08/30/2017       WOC Assessment Details/Treatment        05/07/25 1100   WOCN Assessment   WOCN Total Time (mins) 60   Visit Date 05/07/25   Visit Time 1100   Consult Type New   WOCN Speciality Wound   Wound pressure;moisture   Number of Wounds 2   Continence Type Urinary;Fecal   Intervention assessed;applied;chart review;coordination of care   Teaching on-going        Wound 05/06/25 2030 Pressure Injury Sacral spine   Date First Assessed/Time First Assessed: 05/06/25 2030   Present on Original Admission: Yes  Primary Wound Type: Pressure Injury  Location: Sacral spine   Wound Image    Pressure Injury Stage U   Dressing Appearance Dry;Intact;Moist drainage   Drainage Amount Small   Drainage Characteristics/Odor Creamy;Yellow;Malodorous   Appearance Pink;White;Slough;Dry;Eschar   Tissue loss description Full thickness   Black (%), Wound Tissue Color 10 %   Red (%), Wound Tissue Color 10 %   Yellow (%), Wound Tissue Color 80 %   Periwound Area Intact;Moist   Wound Edges Open;Undefined;Irregular   Wound Length (cm) 4 cm   Wound Width (cm) 3 cm   Wound  Depth (cm) 0.2 cm   Wound Volume (cm^3) 1.257 cm^3   Wound Surface Area (cm^2) 9.42 cm^2   Care Cleansed with:;Antimicrobial agent   Dressing Applied;Silver;Hydrofiber;Foam;Island/border   Dressing Change Due 05/10/25        Wound 05/06/25 2035 Pressure Injury Right Heel   Date First Assessed/Time First Assessed: 05/06/25 2035   Primary Wound Type: Pressure Injury  Side: Right  Location: Heel   Wound Image    Pressure Injury Stage U   Dressing Appearance Dry;Intact;Clean   Drainage Amount None   Appearance Intact;Maroon;Purple   Tissue loss description Not applicable   Black (%), Wound Tissue Color 0 %   Red (%), Wound Tissue Color 100 %   Yellow (%), Wound Tissue Color 0 %   Periwound Area Intact;Dry   Wound Edges Undefined   Care Applied:;Povidone iodine   Dressing Applied;Foam;Island/border   Dressing Change Due 05/08/25         WOC Team consulted for sacral and right heel    Narrative: pt lying in bed, resp even and nonlabored, NADN. Pt tolerated wound care well    Active Wounds and Recommendations: clean sacral wound with vashe. Apply aquacel AG and secure with foam border. Change every 3 days and as needed for drainage/soilage. For right heel wound apply betadine daily and cover with foam border.     Goals for Wound Healing: Moisture management, Apply antimicrobial, Reduce pain, Reduce bioburden, and Educate on proper wound management post D/C     Barriers to Wound Healing: diabetes excessive wound moisture and macerated tissue dry wound bed advanced age fragile skin no protective sensation infection    Orders placed.    Thank you for the consult.     We will continue to follow. See additional note under Notes Tab for tentative f/u plan/dates.        05/07/2025       [1]   Social History  Socioeconomic History    Marital status:    Tobacco Use    Smoking status: Never    Smokeless tobacco: Never   Substance and Sexual Activity    Alcohol use: Never    Drug use: Never    Sexual activity: Not Currently      Birth control/protection: None     Social Drivers of Health     Financial Resource Strain: Low Risk  (5/7/2025)    Overall Financial Resource Strain (CARDIA)     Difficulty of Paying Living Expenses: Not very hard   Food Insecurity: No Food Insecurity (5/7/2025)    Hunger Vital Sign     Worried About Running Out of Food in the Last Year: Never true     Ran Out of Food in the Last Year: Never true   Transportation Needs: No Transportation Needs (5/7/2025)    PRAPARE - Transportation     Lack of Transportation (Medical): No     Lack of Transportation (Non-Medical): No   Physical Activity: Inactive (5/7/2025)    Exercise Vital Sign     Days of Exercise per Week: 0 days     Minutes of Exercise per Session: 0 min   Stress: Patient Unable To Answer (5/6/2025)    [employer] - Occupational Stress Questionnaire     Feeling of Stress : Patient unable to answer   Housing Stability: Low Risk  (5/7/2025)    Housing Stability Vital Sign     Unable to Pay for Housing in the Last Year: No     Number of Times Moved in the Last Year: 0     Homeless in the Last Year: No

## 2025-05-07 NOTE — ASSESSMENT & PLAN NOTE
Likely cause of diarrhea  Admit to inpatient  Will start lactulose for now and if problem isn't solved with order enema vs disimpaction  Leukocytosis noted, will start flagyl out of caution       5/7  - disimpacted yesterday in the ed, did not tolerate enema  - no bowel movements today yet, will advance diet to see if tolerated, continuing lactulose home dose  - noted HGB dropped from 8.9 to 7.4 today but no obvious signs of GI bleed, will trend tomorrow, if continues to drop will order occult blood and consult GI

## 2025-05-07 NOTE — SUBJECTIVE & OBJECTIVE
Past Medical History:   Diagnosis Date    Arthritis     Bleeding external hemorrhoids     Chronic kidney disease, stage 3b     baseline creat 1.5    Chronic kidney disease, stage 3b 02/07/2024    baseline creat 1.5      COPD (chronic obstructive pulmonary disease)     senile emphysema    Diabetes mellitus, type 2     DNR (do not resuscitate) 02/07/2024    discussed with VITO Christianson present    Essential (primary) hypertension     Hiatal hernia with GERD     Neuropathy     Post-operative hypothyroidism     Severe pulmonary hypertension 10/08/2022    EF  70 % and normal diastolic function       Past Surgical History:   Procedure Laterality Date    BREAST SURGERY      Biopsy    EYE SURGERY      Remove cataracts both eyes    HYSTERECTOMY      NEPHRECTOMY Right 1960    THYROIDECTOMY         Review of patient's allergies indicates:   Allergen Reactions    Aspirin        No current facility-administered medications on file prior to encounter.     Current Outpatient Medications on File Prior to Encounter   Medication Sig    albuterol (PROVENTIL/VENTOLIN HFA) 90 mcg/actuation inhaler INHALE 2 PUFFS BY MOUTH INTO THE LUNGS EVERY 4 HOURS AS NEEDED FOR SHORTNESS OF BREATH / RESCUE (Patient taking differently: Inhale 2 puffs into the lungs every 6 (six) hours as needed.)    ferrous sulfate (FEOSOL) 325 mg (65 mg iron) Tab tablet Take 1 tablet (325 mg total) by mouth 3 (three) times a week. On Monday, Wednesday and Friday    gabapentin (NEURONTIN) 100 MG capsule Take 100 mg by mouth 2 (two) times daily.    hydrALAZINE (APRESOLINE) 50 MG tablet Take 1 tablet (50 mg total) by mouth 2 (two) times daily.    hydrocortisone (ANUSOL-HC) 2.5 % rectal cream Place rectally 2 (two) times daily.    insulin glargine U-100, Lantus, 100 unit/mL injection Inject 5 Units into the skin every 12 (twelve) hours.    insulin lispro 100 unit/mL injection Inject 3 Units into the skin 3 (three) times daily before meals.    lactulose (CHRONULAC) 10  "gram/15 mL solution Take 30 mLs (20 g total) by mouth once daily. (Patient taking differently: Take 15 mLs by mouth once daily.)    levothyroxine (SYNTHROID) 100 MCG tablet TAKE 1 TABLET BY MOUTH BEFORE BREAKFAST.    ondansetron 4 mg/2 mL Soln Inject 4 mg into the vein every 6 (six) hours as needed.    pantoprazole (PROTONIX) 40 MG tablet TAKE 1 TABLET BY MOUTH TWICE A DAY    pravastatin (PRAVACHOL) 10 MG tablet Take 1 tablet (10 mg total) by mouth once daily.    sertraline (ZOLOFT) 25 MG tablet Take 25 mg by mouth once daily.    SYMBICORT 160-4.5 mcg/actuation HFAA Inhale 2 puffs into the lungs 2 (two) times daily.    BD INSULIN SYRINGE ULTRA-FINE 0.3 mL 31 gauge x 5/16" Syrg USE 1 SYRINGE TWICE A DAY    furosemide (LASIX) 40 MG tablet Take 1 tablet (40 mg total) by mouth 2 (two) times daily. (Patient taking differently: Take 40 mg by mouth once daily.)    metoprolol succinate (TOPROL-XL) 25 MG 24 hr tablet Take 1 tablet (25 mg total) by mouth once daily.     Family History       Problem Relation (Age of Onset)    Cancer Sister, Brother, Daughter    Diabetes Mother, Sister, Brother, Sister    Heart disease Father          Tobacco Use    Smoking status: Never    Smokeless tobacco: Never   Substance and Sexual Activity    Alcohol use: Never    Drug use: Never    Sexual activity: Not Currently     Birth control/protection: None     Review of Systems   All other systems reviewed and are negative.    Objective:     Vital Signs (Most Recent):  Temp: 98.2 °F (36.8 °C) (05/07/25 1132)  Pulse: 82 (05/07/25 1132)  Resp: 20 (05/07/25 0856)  BP: 139/67 (05/07/25 1132)  SpO2: 97 % (05/07/25 1132) Vital Signs (24h Range):  Temp:  [97.4 °F (36.3 °C)-100.7 °F (38.2 °C)] 98.2 °F (36.8 °C)  Pulse:  [71-94] 82  Resp:  [16-20] 20  SpO2:  [96 %-100 %] 97 %  BP: ()/(36-90) 139/67     Weight: 85.6 kg (188 lb 12.8 oz)  Body mass index is 31.42 kg/m².     Physical Exam  Vitals reviewed.   Constitutional:       Appearance: She is " ill-appearing.   HENT:      Head: Normocephalic.      Mouth/Throat:      Mouth: Mucous membranes are moist.   Eyes:      Extraocular Movements: Extraocular movements intact.      Pupils: Pupils are equal, round, and reactive to light.   Cardiovascular:      Rate and Rhythm: Normal rate and regular rhythm.   Pulmonary:      Effort: Pulmonary effort is normal.   Abdominal:      General: Abdomen is flat. Bowel sounds are normal.      Palpations: Abdomen is soft.   Musculoskeletal:      Cervical back: Normal range of motion.      Right lower leg: No edema.      Left lower leg: No edema.   Skin:     General: Skin is warm and dry.   Neurological:      General: No focal deficit present.      Mental Status: She is alert. Mental status is at baseline.   Psychiatric:         Mood and Affect: Mood normal.              CRANIAL NERVES     CN III, IV, VI   Pupils are equal, round, and reactive to light.       Significant Labs: All pertinent labs within the past 24 hours have been reviewed.  BMP:   Recent Labs   Lab 05/06/25  1320 05/07/25  0527   GLU 95 124*   * 138   K 4.9 4.8    104   CO2 24 24   BUN 29* 29*   CREATININE 1.55* 1.52*   CALCIUM 9.8 8.6   MG 1.9  --      CBC:   Recent Labs   Lab 05/06/25 1320 05/07/25  0527   WBC 14.58* 12.08*   HGB 8.9* 7.4*   HCT 28.7* 24.3*   * 537*     CMP:   Recent Labs   Lab 05/06/25 1320 05/07/25  0527   * 138   K 4.9 4.8    104   CO2 24 24   GLU 95 124*   BUN 29* 29*   CREATININE 1.55* 1.52*   CALCIUM 9.8 8.6   PROT 6.5  --    ALBUMIN 2.1*  --    BILITOT 0.3  --    ALKPHOS 112  --    AST 22  --    ALT 7  --    ANIONGAP 15 15       Significant Imaging: I have reviewed all pertinent imaging results/findings within the past 24 hours.

## 2025-05-07 NOTE — HOSPITAL COURSE
5/7  - disimpacted yesterday in the ed, did not tolerate enema  - no bowel movements today yet, will advance diet to see if tolerated, continuing lactulose home dose  - noted HGB dropped from 8.9 to 7.4 today but no obvious signs of GI bleed, will trend tomorrow, if continues to drop will order occult blood and consult GI    5/8  - drop in hgb again, 8.9>7.4>6.9, no obvious bleeding with bowel movements, will order occult blood, transfuse 1 unit  - feeling unwell today, complaining of fatigue and nausea, monitoring  - had 1 BM loose since yesterday    5/9  - no additional bowel movements, not eating well, generally looks unwell  - will start fluids since renal function worsening  - KUB ordered along with occult blood  - monitor closely    5/10  - discussed with surgery, HIDA scan ordered, consulted cardiology for surgery clearance as well in anticipation of possible santo  - in light of possible santo, will add zosyn for abx coverage which is reasonable given continued mild leukocytosis as well    5/11  - awaiting hida scan results    5/12  - surgery following, awaiting hida scan results  - UTI + enterococcus, vanc added, following micro    5/13  - planned for santo today  - when able to tolerate diet and cleared by surgery can discharge back to [facility]  - on vanc for urine culture, I think that it is reasonable to do a shortened course of abx due to patient being completely asymptomatic at time of urine culture, no indication for IV abx outpatient  5/14 discussed with surgery stable for discharge

## 2025-05-08 LAB
ABO + RH BLD: NORMAL
ANION GAP SERPL CALCULATED.3IONS-SCNC: 13 MMOL/L (ref 7–16)
BASOPHILS # BLD AUTO: 0.04 K/UL (ref 0–0.2)
BASOPHILS NFR BLD AUTO: 0.3 % (ref 0–1)
BLD PROD TYP BPU: NORMAL
BLOOD UNIT EXPIRATION DATE: NORMAL
BLOOD UNIT TYPE CODE: 5100
BUN SERPL-MCNC: 26 MG/DL (ref 10–20)
BUN/CREAT SERPL: 19 (ref 6–20)
CALCIUM SERPL-MCNC: 8.3 MG/DL (ref 8.4–10.2)
CHLORIDE SERPL-SCNC: 108 MMOL/L (ref 98–111)
CO2 SERPL-SCNC: 21 MMOL/L (ref 23–31)
CREAT SERPL-MCNC: 1.37 MG/DL (ref 0.55–1.02)
CROSSMATCH INTERPRETATION: NORMAL
DIFFERENTIAL METHOD BLD: ABNORMAL
DISPENSE STATUS: NORMAL
EGFR (NO RACE VARIABLE) (RUSH/TITUS): 35 ML/MIN/1.73M2
EOSINOPHIL # BLD AUTO: 0.39 K/UL (ref 0–0.5)
EOSINOPHIL NFR BLD AUTO: 3.3 % (ref 1–4)
ERYTHROCYTE [DISTWIDTH] IN BLOOD BY AUTOMATED COUNT: 20 % (ref 11.5–14.5)
GLUCOSE SERPL-MCNC: 158 MG/DL (ref 75–121)
GLUCOSE SERPL-MCNC: 196 MG/DL (ref 70–105)
GLUCOSE SERPL-MCNC: 197 MG/DL (ref 70–105)
GLUCOSE SERPL-MCNC: 206 MG/DL (ref 70–105)
GLUCOSE SERPL-MCNC: 207 MG/DL (ref 70–105)
HCT VFR BLD AUTO: 24.4 % (ref 38–47)
HCT VFR BLD AUTO: 26.5 % (ref 38–47)
HGB BLD-MCNC: 6.9 G/DL (ref 12–16)
HGB BLD-MCNC: 8 G/DL (ref 12–16)
IMM GRANULOCYTES # BLD AUTO: 0.1 K/UL (ref 0–0.04)
IMM GRANULOCYTES NFR BLD: 0.8 % (ref 0–0.4)
INDIRECT COOMBS: NORMAL
LYMPHOCYTES # BLD AUTO: 0.69 K/UL (ref 1–4.8)
LYMPHOCYTES NFR BLD AUTO: 5.8 % (ref 27–41)
MCH RBC QN AUTO: 24.2 PG (ref 27–31)
MCHC RBC AUTO-ENTMCNC: 28.3 G/DL (ref 32–36)
MCV RBC AUTO: 85.6 FL (ref 80–96)
MONOCYTES # BLD AUTO: 0.77 K/UL (ref 0–0.8)
MONOCYTES NFR BLD AUTO: 6.4 % (ref 2–6)
MPC BLD CALC-MCNC: 8.6 FL (ref 9.4–12.4)
NEUTROPHILS # BLD AUTO: 10 K/UL (ref 1.8–7.7)
NEUTROPHILS NFR BLD AUTO: 83.4 % (ref 53–65)
NRBC # BLD AUTO: 0 X10E3/UL
NRBC, AUTO (.00): 0 %
PLATELET # BLD AUTO: 536 K/UL (ref 150–400)
POTASSIUM SERPL-SCNC: 4.1 MMOL/L (ref 3.5–5.1)
RBC # BLD AUTO: 2.85 M/UL (ref 4.2–5.4)
RH BLD: NORMAL
SODIUM SERPL-SCNC: 138 MMOL/L (ref 136–145)
SPECIMEN OUTDATE: NORMAL
UNIT NUMBER: NORMAL
WBC # BLD AUTO: 11.99 K/UL (ref 4.5–11)

## 2025-05-08 PROCEDURE — 63600175 PHARM REV CODE 636 W HCPCS: Performed by: HOSPITALIST

## 2025-05-08 PROCEDURE — 82962 GLUCOSE BLOOD TEST: CPT

## 2025-05-08 PROCEDURE — 86900 BLOOD TYPING SEROLOGIC ABO: CPT

## 2025-05-08 PROCEDURE — 25000003 PHARM REV CODE 250

## 2025-05-08 PROCEDURE — P9016 RBC LEUKOCYTES REDUCED: HCPCS

## 2025-05-08 PROCEDURE — 63700000 PHARM REV CODE 250 ALT 637 W/O HCPCS

## 2025-05-08 PROCEDURE — 86923 COMPATIBILITY TEST ELECTRIC: CPT

## 2025-05-08 PROCEDURE — 85018 HEMOGLOBIN: CPT

## 2025-05-08 PROCEDURE — 30233N1 TRANSFUSION OF NONAUTOLOGOUS RED BLOOD CELLS INTO PERIPHERAL VEIN, PERCUTANEOUS APPROACH: ICD-10-PCS | Performed by: STUDENT IN AN ORGANIZED HEALTH CARE EDUCATION/TRAINING PROGRAM

## 2025-05-08 PROCEDURE — 11000001 HC ACUTE MED/SURG PRIVATE ROOM

## 2025-05-08 PROCEDURE — 80048 BASIC METABOLIC PNL TOTAL CA: CPT

## 2025-05-08 PROCEDURE — 99232 SBSQ HOSP IP/OBS MODERATE 35: CPT | Mod: ,,,

## 2025-05-08 PROCEDURE — 85025 COMPLETE CBC W/AUTO DIFF WBC: CPT

## 2025-05-08 PROCEDURE — 36415 COLL VENOUS BLD VENIPUNCTURE: CPT

## 2025-05-08 PROCEDURE — 63600175 PHARM REV CODE 636 W HCPCS

## 2025-05-08 RX ORDER — HYDROCODONE BITARTRATE AND ACETAMINOPHEN 500; 5 MG/1; MG/1
TABLET ORAL
Status: DISCONTINUED | OUTPATIENT
Start: 2025-05-08 | End: 2025-05-14 | Stop reason: HOSPADM

## 2025-05-08 RX ORDER — FLUCONAZOLE 2 MG/ML
200 INJECTION, SOLUTION INTRAVENOUS
Status: DISCONTINUED | OUTPATIENT
Start: 2025-05-08 | End: 2025-05-08

## 2025-05-08 RX ADMIN — LACTULOSE 15 G: 20 SOLUTION ORAL at 08:05

## 2025-05-08 RX ADMIN — NYSTATIN 500000 UNITS: 100000 SUSPENSION ORAL at 09:05

## 2025-05-08 RX ADMIN — NYSTATIN 500000 UNITS: 100000 SUSPENSION ORAL at 04:05

## 2025-05-08 RX ADMIN — AMLODIPINE BESYLATE 5 MG: 5 TABLET ORAL at 09:05

## 2025-05-08 RX ADMIN — HYDROCODONE BITARTRATE AND ACETAMINOPHEN 1 TABLET: 10; 325 TABLET ORAL at 04:05

## 2025-05-08 RX ADMIN — HYDRALAZINE HYDROCHLORIDE 50 MG: 50 TABLET ORAL at 08:05

## 2025-05-08 RX ADMIN — NYSTATIN 500000 UNITS: 100000 SUSPENSION ORAL at 12:05

## 2025-05-08 RX ADMIN — SODIUM CHLORIDE: 900 INJECTION, SOLUTION INTRAVENOUS at 03:05

## 2025-05-08 RX ADMIN — METRONIDAZOLE 500 MG: 500 INJECTION, SOLUTION INTRAVENOUS at 12:05

## 2025-05-08 RX ADMIN — LACTULOSE 15 G: 20 SOLUTION ORAL at 09:05

## 2025-05-08 RX ADMIN — INSULIN ASPART 2 UNITS: 100 INJECTION, SOLUTION INTRAVENOUS; SUBCUTANEOUS at 09:05

## 2025-05-08 RX ADMIN — FLUCONAZOLE 150 MG: 50 TABLET ORAL at 12:05

## 2025-05-08 RX ADMIN — ONDANSETRON 4 MG: 2 INJECTION INTRAMUSCULAR; INTRAVENOUS at 09:05

## 2025-05-08 RX ADMIN — METRONIDAZOLE 500 MG: 500 INJECTION, SOLUTION INTRAVENOUS at 09:05

## 2025-05-08 RX ADMIN — DEXTROSE AND SODIUM CHLORIDE: 5; 900 INJECTION, SOLUTION INTRAVENOUS at 08:05

## 2025-05-08 RX ADMIN — NYSTATIN 500000 UNITS: 100000 SUSPENSION ORAL at 08:05

## 2025-05-08 RX ADMIN — Medication 6 MG: at 08:05

## 2025-05-08 RX ADMIN — HYDRALAZINE HYDROCHLORIDE 50 MG: 50 TABLET ORAL at 09:05

## 2025-05-08 RX ADMIN — HYDROCODONE BITARTRATE AND ACETAMINOPHEN 1 TABLET: 5; 325 TABLET ORAL at 08:05

## 2025-05-08 NOTE — PROGRESS NOTES
[facility]  [facility]  Progress Note    Patient Name: Vandana Allison  MRN: [id number]  Patient Class: IP- Inpatient   Admission Date: 5/6/2025  Length of Stay: 2 days  Attending Physician: Fallon Guerin MD  Primary Care Provider: Jennifer Mares FNP        Subjective     Principal Problem:Fecal impaction        HPI:   89 y/o female presents from [facility] with pmh CKD, COPD, diabetes, HTN, right nephrectomy to the ED with complaints of abdominal pain which started 2 weeks ago. Pt states she has had abdominal pain which started 2 weeks ago. She notes she started having diarrhea one week ago. There is no hx of a fever, chills, nausea, or vomiting.    Imaging shows significant stool burden in the rectum. Discussing with ED provider agree with admitting to inpatient and treating for ostipation with possible disimpaction.    Overview/Hospital Course:  5/7  - disimpacted yesterday in the ed, did not tolerate enema  - no bowel movements today yet, will advance diet to see if tolerated, continuing lactulose home dose  - noted HGB dropped from 8.9 to 7.4 today but no obvious signs of GI bleed, will trend tomorrow, if continues to drop will order occult blood and consult GI    5/8  - drop in hgb again, 8.9>7.4>6.9, no obvious bleeding with bowel movements, will order occult blood, transfuse 1 unit  - feeling unwell today, complaining of fatigue and nausea, monitoring  - had 1 BM loose since yesterday    Past Medical History:   Diagnosis Date    Arthritis     Bleeding external hemorrhoids     Chronic kidney disease, stage 3b     baseline creat 1.5    Chronic kidney disease, stage 3b 02/07/2024    baseline creat 1.5      COPD (chronic obstructive pulmonary disease)     senile emphysema    Diabetes mellitus, type 2     DNR (do not resuscitate) 02/07/2024    discussed with VITO Christianson present    Essential (primary) hypertension     Hiatal hernia with GERD     Neuropathy     Post-operative  hypothyroidism     Severe pulmonary hypertension 10/08/2022    EF  70 % and normal diastolic function       Past Surgical History:   Procedure Laterality Date    BREAST SURGERY      Biopsy    EYE SURGERY      Remove cataracts both eyes    HYSTERECTOMY      NEPHRECTOMY Right 1960    THYROIDECTOMY         Review of patient's allergies indicates:   Allergen Reactions    Aspirin        No current facility-administered medications on file prior to encounter.     Current Outpatient Medications on File Prior to Encounter   Medication Sig    albuterol (PROVENTIL/VENTOLIN HFA) 90 mcg/actuation inhaler INHALE 2 PUFFS BY MOUTH INTO THE LUNGS EVERY 4 HOURS AS NEEDED FOR SHORTNESS OF BREATH / RESCUE (Patient taking differently: Inhale 2 puffs into the lungs every 6 (six) hours as needed.)    ferrous sulfate (FEOSOL) 325 mg (65 mg iron) Tab tablet Take 1 tablet (325 mg total) by mouth 3 (three) times a week. On Monday, Wednesday and Friday    gabapentin (NEURONTIN) 100 MG capsule Take 100 mg by mouth 2 (two) times daily.    hydrALAZINE (APRESOLINE) 50 MG tablet Take 1 tablet (50 mg total) by mouth 2 (two) times daily.    hydrocortisone (ANUSOL-HC) 2.5 % rectal cream Place rectally 2 (two) times daily.    insulin glargine U-100, Lantus, 100 unit/mL injection Inject 5 Units into the skin every 12 (twelve) hours.    insulin lispro 100 unit/mL injection Inject 3 Units into the skin 3 (three) times daily before meals.    lactulose (CHRONULAC) 10 gram/15 mL solution Take 30 mLs (20 g total) by mouth once daily. (Patient taking differently: Take 15 mLs by mouth once daily.)    levothyroxine (SYNTHROID) 100 MCG tablet TAKE 1 TABLET BY MOUTH BEFORE BREAKFAST.    ondansetron 4 mg/2 mL Soln Inject 4 mg into the vein every 6 (six) hours as needed.    pantoprazole (PROTONIX) 40 MG tablet TAKE 1 TABLET BY MOUTH TWICE A DAY    pravastatin (PRAVACHOL) 10 MG tablet Take 1 tablet (10 mg total) by mouth once daily.    sertraline (ZOLOFT) 25 MG  "tablet Take 25 mg by mouth once daily.    SYMBICORT 160-4.5 mcg/actuation HFAA Inhale 2 puffs into the lungs 2 (two) times daily.    BD INSULIN SYRINGE ULTRA-FINE 0.3 mL 31 gauge x 5/16" Syrg USE 1 SYRINGE TWICE A DAY    furosemide (LASIX) 40 MG tablet Take 1 tablet (40 mg total) by mouth 2 (two) times daily. (Patient taking differently: Take 40 mg by mouth once daily.)    metoprolol succinate (TOPROL-XL) 25 MG 24 hr tablet Take 1 tablet (25 mg total) by mouth once daily.     Family History       Problem Relation (Age of Onset)    Cancer Sister, Brother, Daughter    Diabetes Mother, Sister, Brother, Sister    Heart disease Father          Tobacco Use    Smoking status: Never    Smokeless tobacco: Never   Substance and Sexual Activity    Alcohol use: Never    Drug use: Never    Sexual activity: Not Currently     Birth control/protection: None     Review of Systems   All other systems reviewed and are negative.    Objective:     Vital Signs (Most Recent):  Temp: 97.4 °F (36.3 °C) (05/08/25 1132)  Pulse: 84 (05/08/25 1132)  Resp: 18 (05/08/25 1132)  BP: (!) 144/76 (05/08/25 1132)  SpO2: 100 % (05/08/25 1132) Vital Signs (24h Range):  Temp:  [97.4 °F (36.3 °C)-98.7 °F (37.1 °C)] 97.4 °F (36.3 °C)  Pulse:  [] 84  Resp:  [17-18] 18  SpO2:  [98 %-100 %] 100 %  BP: (111-144)/(52-76) 144/76     Weight: 85.6 kg (188 lb 12.8 oz)  Body mass index is 31.42 kg/m².     Physical Exam  Vitals reviewed.   Constitutional:       Appearance: She is ill-appearing.   HENT:      Head: Normocephalic.      Mouth/Throat:      Mouth: Mucous membranes are moist.   Eyes:      Extraocular Movements: Extraocular movements intact.      Pupils: Pupils are equal, round, and reactive to light.   Cardiovascular:      Rate and Rhythm: Normal rate and regular rhythm.   Pulmonary:      Effort: Pulmonary effort is normal.   Abdominal:      General: Abdomen is flat. Bowel sounds are normal.      Palpations: Abdomen is soft.   Musculoskeletal:      " Cervical back: Normal range of motion.      Right lower leg: No edema.      Left lower leg: No edema.   Skin:     General: Skin is warm and dry.   Neurological:      General: No focal deficit present.      Mental Status: She is alert. Mental status is at baseline.   Psychiatric:         Mood and Affect: Mood normal.              CRANIAL NERVES     CN III, IV, VI   Pupils are equal, round, and reactive to light.       Significant Labs: All pertinent labs within the past 24 hours have been reviewed.  BMP:   Recent Labs   Lab 05/08/25  0341   *      K 4.1      CO2 21*   BUN 26*   CREATININE 1.37*   CALCIUM 8.3*     CBC:   Recent Labs   Lab 05/07/25 0527 05/08/25  0341   WBC 12.08* 11.99*   HGB 7.4* 6.9*   HCT 24.3* 24.4*   * 536*     CMP:   Recent Labs   Lab 05/07/25 0527 05/08/25  0341    138   K 4.8 4.1    108   CO2 24 21*   * 158*   BUN 29* 26*   CREATININE 1.52* 1.37*   CALCIUM 8.6 8.3*   ANIONGAP 15 13       Significant Imaging: I have reviewed all pertinent imaging results/findings within the past 24 hours.      Assessment & Plan  Fecal impaction  Likely cause of diarrhea  Admit to inpatient  Will start lactulose for now and if problem isn't solved with order enema vs disimpaction  Leukocytosis noted, will start flagyl out of caution       5/7  - disimpacted yesterday in the ed, did not tolerate enema  - no bowel movements today yet, will advance diet to see if tolerated, continuing lactulose home dose  - noted HGB dropped from 8.9 to 7.4 today but no obvious signs of GI bleed, will trend tomorrow, if continues to drop will order occult blood and consult GI    5/8  - drop in hgb again, 8.9>7.4>6.9, no obvious bleeding with bowel movements, will order occult blood, transfuse 1 unit  - feeling unwell today, complaining of fatigue and nausea, monitoring  - had 1 BM loose since yesterday  Diabetes mellitus, type 2  Patient's FSGs are controlled on current medication  "regimen.  Last A1c reviewed-   Lab Results   Component Value Date    HGBA1C 6.4 11/27/2024     Most recent fingerstick glucose reviewed- No results for input(s): "POCTGLUCOSE" in the last 24 hours.  Current correctional scale  High  Maintain anti-hyperglycemic dose as follows-   Antihyperglycemics (From admission, onward)      Start     Stop Route Frequency Ordered    05/06/25 1909  insulin aspart U-100 injection 0-5 Units         -- SubQ Before meals & nightly PRN 05/06/25 1811          Hold Oral hypoglycemics while patient is in the hospital.  Hypertension  Patient's blood pressure range in the last 24 hours was: BP  Min: 111/64  Max: 144/76.The patient's inpatient anti-hypertensive regimen is listed below:  Current Antihypertensives  amLODIPine tablet 5 mg, Daily, Oral  hydrALAZINE tablet 50 mg, 2 times daily, Oral    Plan  - BP is controlled, no changes needed to their regimen    Post-operative hypothyroidism  Restart home meds  Do not feel that constipation is linked to hypothyroidism as this has been well controlled    Pulmonary hypertension  Noted  Restart home meds    Chronic obstructive pulmonary disease  Patient's COPD is controlled currently.  Patient is currently off COPD Pathway. Continue scheduled inhalers Supplemental oxygen and monitor respiratory status closely.    VTE Risk Mitigation (From admission, onward)           Ordered     enoxaparin injection 30 mg  Daily         05/07/25 1038     IP VTE HIGH RISK PATIENT  Once         05/06/25 1811     Place sequential compression device  Until discontinued         05/06/25 1811                    Discharge Planning   MARTHA:      Code Status: DNR   Medical Readiness for Discharge Date:   Discharge Plan A: Home with family                        Fallno Guerin MD  Department of Hospital Medicine   [facility]    "

## 2025-05-08 NOTE — ASSESSMENT & PLAN NOTE
Likely cause of diarrhea  Admit to inpatient  Will start lactulose for now and if problem isn't solved with order enema vs disimpaction  Leukocytosis noted, will start flagyl out of caution       5/7  - disimpacted yesterday in the ed, did not tolerate enema  - no bowel movements today yet, will advance diet to see if tolerated, continuing lactulose home dose  - noted HGB dropped from 8.9 to 7.4 today but no obvious signs of GI bleed, will trend tomorrow, if continues to drop will order occult blood and consult GI    5/8  - drop in hgb again, 8.9>7.4>6.9, no obvious bleeding with bowel movements, will order occult blood, transfuse 1 unit  - feeling unwell today, complaining of fatigue and nausea, monitoring  - had 1 BM loose since yesterday

## 2025-05-08 NOTE — PROGRESS NOTES
[facility]  Wound Care    Patient Name:  Vandana Allison   MRN:  [id number]  Date: 5/8/2025  Diagnosis: Fecal impaction    History:     Past Medical History:   Diagnosis Date    Arthritis     Bleeding external hemorrhoids     Chronic kidney disease, stage 3b     baseline creat 1.5    Chronic kidney disease, stage 3b 02/07/2024    baseline creat 1.5      COPD (chronic obstructive pulmonary disease)     senile emphysema    Diabetes mellitus, type 2     DNR (do not resuscitate) 02/07/2024    discussed with VITO Christianson present    Essential (primary) hypertension     Hiatal hernia with GERD     Neuropathy     Post-operative hypothyroidism     Severe pulmonary hypertension 10/08/2022    EF  70 % and normal diastolic function       Social History[1]    Precautions:     Allergies as of 05/06/2025 - Reviewed 05/06/2025   Allergen Reaction Noted    Aspirin  08/30/2017       [facility] Assessment Details/Treatment     Narrative: Seen patient for initiation of preventative skin care measures    Patient in bed, Alert. Has wound to Right heel and sacral. Has overlay to bed. Consulted WCC to assess.  Kevin score 13.    WCC consulted         05/08/2025         [1]   Social History  Socioeconomic History    Marital status:    Tobacco Use    Smoking status: Never    Smokeless tobacco: Never   Substance and Sexual Activity    Alcohol use: Never    Drug use: Never    Sexual activity: Not Currently     Birth control/protection: None     Social Drivers of Health     Financial Resource Strain: Low Risk  (5/7/2025)    Overall Financial Resource Strain (CARDIA)     Difficulty of Paying Living Expenses: Not very hard   Food Insecurity: No Food Insecurity (5/7/2025)    Hunger Vital Sign     Worried About Running Out of Food in the Last Year: Never true     Ran Out of Food in the Last Year: Never true   Transportation Needs: No Transportation Needs (5/7/2025)    PRAPARE - Transportation     Lack of Transportation  (Medical): No     Lack of Transportation (Non-Medical): No   Physical Activity: Inactive (5/7/2025)    Exercise Vital Sign     Days of Exercise per Week: 0 days     Minutes of Exercise per Session: 0 min   Stress: Patient Unable To Answer (5/6/2025)    [employer] [place] [employer] - Occupational Stress Questionnaire     Feeling of Stress : Patient unable to answer   Housing Stability: Low Risk  (5/7/2025)    Housing Stability Vital Sign     Unable to Pay for Housing in the Last Year: No     Number of Times Moved in the Last Year: 0     Homeless in the Last Year: No

## 2025-05-08 NOTE — ASSESSMENT & PLAN NOTE
Patient's blood pressure range in the last 24 hours was: BP  Min: 111/64  Max: 144/76.The patient's inpatient anti-hypertensive regimen is listed below:  Current Antihypertensives  amLODIPine tablet 5 mg, Daily, Oral  hydrALAZINE tablet 50 mg, 2 times daily, Oral    Plan  - BP is controlled, no changes needed to their regimen

## 2025-05-08 NOTE — SUBJECTIVE & OBJECTIVE
Past Medical History:   Diagnosis Date    Arthritis     Bleeding external hemorrhoids     Chronic kidney disease, stage 3b     baseline creat 1.5    Chronic kidney disease, stage 3b 02/07/2024    baseline creat 1.5      COPD (chronic obstructive pulmonary disease)     senile emphysema    Diabetes mellitus, type 2     DNR (do not resuscitate) 02/07/2024    discussed with VITO Christianson present    Essential (primary) hypertension     Hiatal hernia with GERD     Neuropathy     Post-operative hypothyroidism     Severe pulmonary hypertension 10/08/2022    EF  70 % and normal diastolic function       Past Surgical History:   Procedure Laterality Date    BREAST SURGERY      Biopsy    EYE SURGERY      Remove cataracts both eyes    HYSTERECTOMY      NEPHRECTOMY Right 1960    THYROIDECTOMY         Review of patient's allergies indicates:   Allergen Reactions    Aspirin        No current facility-administered medications on file prior to encounter.     Current Outpatient Medications on File Prior to Encounter   Medication Sig    albuterol (PROVENTIL/VENTOLIN HFA) 90 mcg/actuation inhaler INHALE 2 PUFFS BY MOUTH INTO THE LUNGS EVERY 4 HOURS AS NEEDED FOR SHORTNESS OF BREATH / RESCUE (Patient taking differently: Inhale 2 puffs into the lungs every 6 (six) hours as needed.)    ferrous sulfate (FEOSOL) 325 mg (65 mg iron) Tab tablet Take 1 tablet (325 mg total) by mouth 3 (three) times a week. On Monday, Wednesday and Friday    gabapentin (NEURONTIN) 100 MG capsule Take 100 mg by mouth 2 (two) times daily.    hydrALAZINE (APRESOLINE) 50 MG tablet Take 1 tablet (50 mg total) by mouth 2 (two) times daily.    hydrocortisone (ANUSOL-HC) 2.5 % rectal cream Place rectally 2 (two) times daily.    insulin glargine U-100, Lantus, 100 unit/mL injection Inject 5 Units into the skin every 12 (twelve) hours.    insulin lispro 100 unit/mL injection Inject 3 Units into the skin 3 (three) times daily before meals.    lactulose (CHRONULAC) 10  "gram/15 mL solution Take 30 mLs (20 g total) by mouth once daily. (Patient taking differently: Take 15 mLs by mouth once daily.)    levothyroxine (SYNTHROID) 100 MCG tablet TAKE 1 TABLET BY MOUTH BEFORE BREAKFAST.    ondansetron 4 mg/2 mL Soln Inject 4 mg into the vein every 6 (six) hours as needed.    pantoprazole (PROTONIX) 40 MG tablet TAKE 1 TABLET BY MOUTH TWICE A DAY    pravastatin (PRAVACHOL) 10 MG tablet Take 1 tablet (10 mg total) by mouth once daily.    sertraline (ZOLOFT) 25 MG tablet Take 25 mg by mouth once daily.    SYMBICORT 160-4.5 mcg/actuation HFAA Inhale 2 puffs into the lungs 2 (two) times daily.    BD INSULIN SYRINGE ULTRA-FINE 0.3 mL 31 gauge x 5/16" Syrg USE 1 SYRINGE TWICE A DAY    furosemide (LASIX) 40 MG tablet Take 1 tablet (40 mg total) by mouth 2 (two) times daily. (Patient taking differently: Take 40 mg by mouth once daily.)    metoprolol succinate (TOPROL-XL) 25 MG 24 hr tablet Take 1 tablet (25 mg total) by mouth once daily.     Family History       Problem Relation (Age of Onset)    Cancer Sister, Brother, Daughter    Diabetes Mother, Sister, Brother, Sister    Heart disease Father          Tobacco Use    Smoking status: Never    Smokeless tobacco: Never   Substance and Sexual Activity    Alcohol use: Never    Drug use: Never    Sexual activity: Not Currently     Birth control/protection: None     Review of Systems   All other systems reviewed and are negative.    Objective:     Vital Signs (Most Recent):  Temp: 97.4 °F (36.3 °C) (05/08/25 1132)  Pulse: 84 (05/08/25 1132)  Resp: 18 (05/08/25 1132)  BP: (!) 144/76 (05/08/25 1132)  SpO2: 100 % (05/08/25 1132) Vital Signs (24h Range):  Temp:  [97.4 °F (36.3 °C)-98.7 °F (37.1 °C)] 97.4 °F (36.3 °C)  Pulse:  [] 84  Resp:  [17-18] 18  SpO2:  [98 %-100 %] 100 %  BP: (111-144)/(52-76) 144/76     Weight: 85.6 kg (188 lb 12.8 oz)  Body mass index is 31.42 kg/m².     Physical Exam  Vitals reviewed.   Constitutional:       Appearance: She " is ill-appearing.   HENT:      Head: Normocephalic.      Mouth/Throat:      Mouth: Mucous membranes are moist.   Eyes:      Extraocular Movements: Extraocular movements intact.      Pupils: Pupils are equal, round, and reactive to light.   Cardiovascular:      Rate and Rhythm: Normal rate and regular rhythm.   Pulmonary:      Effort: Pulmonary effort is normal.   Abdominal:      General: Abdomen is flat. Bowel sounds are normal.      Palpations: Abdomen is soft.   Musculoskeletal:      Cervical back: Normal range of motion.      Right lower leg: No edema.      Left lower leg: No edema.   Skin:     General: Skin is warm and dry.   Neurological:      General: No focal deficit present.      Mental Status: She is alert. Mental status is at baseline.   Psychiatric:         Mood and Affect: Mood normal.              CRANIAL NERVES     CN III, IV, VI   Pupils are equal, round, and reactive to light.       Significant Labs: All pertinent labs within the past 24 hours have been reviewed.  BMP:   Recent Labs   Lab 05/08/25 0341   *      K 4.1      CO2 21*   BUN 26*   CREATININE 1.37*   CALCIUM 8.3*     CBC:   Recent Labs   Lab 05/07/25 0527 05/08/25  0341   WBC 12.08* 11.99*   HGB 7.4* 6.9*   HCT 24.3* 24.4*   * 536*     CMP:   Recent Labs   Lab 05/07/25 0527 05/08/25 0341    138   K 4.8 4.1    108   CO2 24 21*   * 158*   BUN 29* 26*   CREATININE 1.52* 1.37*   CALCIUM 8.6 8.3*   ANIONGAP 15 13       Significant Imaging: I have reviewed all pertinent imaging results/findings within the past 24 hours.

## 2025-05-08 NOTE — PLAN OF CARE
1552 initiated blood transfusion    Problem: Diabetes Comorbidity  Goal: Blood Glucose Level Within Targeted Range  Outcome: Progressing  Intervention: Monitor and Manage Glycemia  Flowsheets (Taken 5/8/2025 1146)  Glycemic Management:   blood glucose monitored   oral hydration promoted     Problem: Wound  Goal: Optimal Coping  Outcome: Progressing  Intervention: Support Patient and Family Response  Flowsheets (Taken 5/8/2025 1146)  Supportive Measures: self-care encouraged  Family/Support System Care: caregiver stress acknowledged  Goal: Optimal Functional Ability  Outcome: Progressing  Intervention: Optimize Functional Ability  Flowsheets (Taken 5/8/2025 1146)  Activity Management: Arm raise - L1  Goal: Absence of Infection Signs and Symptoms  Outcome: Progressing  Intervention: Prevent or Manage Infection  Flowsheets (Taken 5/8/2025 1146)  Infection Management: aseptic technique maintained  Isolation Precautions: precautions maintained  Goal: Improved Oral Intake  Outcome: Progressing  Intervention: Promote and Optimize Oral Intake  Flowsheets (Taken 5/8/2025 1146)  Oral Nutrition Promotion: rest periods promoted  Goal: Optimal Pain Control and Function  Outcome: Progressing  Intervention: Prevent or Manage Pain  Flowsheets (Taken 5/8/2025 1146)  Sleep/Rest Enhancement:   awakenings minimized   regular sleep/rest pattern promoted  Goal: Skin Health and Integrity  Outcome: Progressing  Intervention: Optimize Skin Protection  Flowsheets (Taken 5/8/2025 1146)  Pressure Reduction Techniques:   frequent weight shift encouraged   weight shift assistance provided  Activity Management: Arm raise - L1  Head of Bed (HOB) Positioning: HOB elevated  Goal: Optimal Wound Healing  Outcome: Progressing     Problem: Fall Injury Risk  Goal: Absence of Fall and Fall-Related Injury  Outcome: Progressing  Intervention: Identify and Manage Contributors  Flowsheets (Taken 5/8/2025 1146)  Self-Care Promotion:   BADL personal objects  within reach   BADL personal routines maintained   independence encouraged   adaptive equipment use encouraged     Problem: Skin Injury Risk Increased  Goal: Skin Health and Integrity  Outcome: Progressing  Intervention: Optimize Skin Protection  Flowsheets (Taken 5/8/2025 1146)  Pressure Reduction Techniques:   frequent weight shift encouraged   weight shift assistance provided  Activity Management: Arm raise - L1  Head of Bed (HOB) Positioning: HOB elevated

## 2025-05-09 LAB
ANION GAP SERPL CALCULATED.3IONS-SCNC: 13 MMOL/L (ref 7–16)
BASOPHILS # BLD AUTO: 0.04 K/UL (ref 0–0.2)
BASOPHILS NFR BLD AUTO: 0.3 % (ref 0–1)
BUN SERPL-MCNC: 23 MG/DL (ref 10–20)
BUN/CREAT SERPL: 16 (ref 6–20)
CALCIUM SERPL-MCNC: 8.1 MG/DL (ref 8.4–10.2)
CHLORIDE SERPL-SCNC: 108 MMOL/L (ref 98–111)
CO2 SERPL-SCNC: 21 MMOL/L (ref 23–31)
CREAT SERPL-MCNC: 1.47 MG/DL (ref 0.55–1.02)
DIFFERENTIAL METHOD BLD: ABNORMAL
EGFR (NO RACE VARIABLE) (RUSH/TITUS): 32 ML/MIN/1.73M2
EOSINOPHIL # BLD AUTO: 0.4 K/UL (ref 0–0.5)
EOSINOPHIL NFR BLD AUTO: 3.5 % (ref 1–4)
ERYTHROCYTE [DISTWIDTH] IN BLOOD BY AUTOMATED COUNT: 18.8 % (ref 11.5–14.5)
GLUCOSE SERPL-MCNC: 139 MG/DL (ref 75–121)
GLUCOSE SERPL-MCNC: 143 MG/DL (ref 70–105)
GLUCOSE SERPL-MCNC: 148 MG/DL (ref 70–105)
GLUCOSE SERPL-MCNC: 174 MG/DL (ref 70–105)
GLUCOSE SERPL-MCNC: 175 MG/DL (ref 70–105)
HCT VFR BLD AUTO: 27.3 % (ref 38–47)
HGB BLD-MCNC: 8.2 G/DL (ref 12–16)
IMM GRANULOCYTES # BLD AUTO: 0.11 K/UL (ref 0–0.04)
IMM GRANULOCYTES NFR BLD: 1 % (ref 0–0.4)
LYMPHOCYTES # BLD AUTO: 0.71 K/UL (ref 1–4.8)
LYMPHOCYTES NFR BLD AUTO: 6.2 % (ref 27–41)
MCH RBC QN AUTO: 25.2 PG (ref 27–31)
MCHC RBC AUTO-ENTMCNC: 30 G/DL (ref 32–36)
MCV RBC AUTO: 83.7 FL (ref 80–96)
MONOCYTES # BLD AUTO: 0.85 K/UL (ref 0–0.8)
MONOCYTES NFR BLD AUTO: 7.4 % (ref 2–6)
MPC BLD CALC-MCNC: 8.4 FL (ref 9.4–12.4)
NEUTROPHILS # BLD AUTO: 9.32 K/UL (ref 1.8–7.7)
NEUTROPHILS NFR BLD AUTO: 81.6 % (ref 53–65)
NRBC # BLD AUTO: 0 X10E3/UL
NRBC, AUTO (.00): 0 %
OCCULT BLOOD: NEGATIVE
PLATELET # BLD AUTO: 596 K/UL (ref 150–400)
POTASSIUM SERPL-SCNC: 4.2 MMOL/L (ref 3.5–5.1)
RBC # BLD AUTO: 3.26 M/UL (ref 4.2–5.4)
SODIUM SERPL-SCNC: 138 MMOL/L (ref 136–145)
WBC # BLD AUTO: 11.43 K/UL (ref 4.5–11)

## 2025-05-09 PROCEDURE — 25500020 PHARM REV CODE 255

## 2025-05-09 PROCEDURE — 94761 N-INVAS EAR/PLS OXIMETRY MLT: CPT

## 2025-05-09 PROCEDURE — 63600175 PHARM REV CODE 636 W HCPCS

## 2025-05-09 PROCEDURE — 82272 OCCULT BLD FECES 1-3 TESTS: CPT

## 2025-05-09 PROCEDURE — 82962 GLUCOSE BLOOD TEST: CPT

## 2025-05-09 PROCEDURE — 36415 COLL VENOUS BLD VENIPUNCTURE: CPT

## 2025-05-09 PROCEDURE — 80048 BASIC METABOLIC PNL TOTAL CA: CPT

## 2025-05-09 PROCEDURE — 25000003 PHARM REV CODE 250

## 2025-05-09 PROCEDURE — 63700000 PHARM REV CODE 250 ALT 637 W/O HCPCS

## 2025-05-09 PROCEDURE — 27000221 HC OXYGEN, UP TO 24 HOURS

## 2025-05-09 PROCEDURE — 11000001 HC ACUTE MED/SURG PRIVATE ROOM

## 2025-05-09 PROCEDURE — 99900035 HC TECH TIME PER 15 MIN (STAT)

## 2025-05-09 PROCEDURE — 85025 COMPLETE CBC W/AUTO DIFF WBC: CPT

## 2025-05-09 PROCEDURE — 99232 SBSQ HOSP IP/OBS MODERATE 35: CPT | Mod: ,,,

## 2025-05-09 RX ORDER — IOPAMIDOL 755 MG/ML
100 INJECTION, SOLUTION INTRAVASCULAR
Status: COMPLETED | OUTPATIENT
Start: 2025-05-09 | End: 2025-05-09

## 2025-05-09 RX ORDER — SODIUM CHLORIDE, SODIUM LACTATE, POTASSIUM CHLORIDE, CALCIUM CHLORIDE 600; 310; 30; 20 MG/100ML; MG/100ML; MG/100ML; MG/100ML
INJECTION, SOLUTION INTRAVENOUS CONTINUOUS
Status: DISPENSED | OUTPATIENT
Start: 2025-05-09 | End: 2025-05-10

## 2025-05-09 RX ORDER — DIATRIZOATE MEGLUMINE AND DIATRIZOATE SODIUM 660; 100 MG/ML; MG/ML
30 SOLUTION ORAL; RECTAL
Status: COMPLETED | OUTPATIENT
Start: 2025-05-09 | End: 2025-05-09

## 2025-05-09 RX ADMIN — SODIUM CHLORIDE, POTASSIUM CHLORIDE, SODIUM LACTATE AND CALCIUM CHLORIDE: 600; 310; 30; 20 INJECTION, SOLUTION INTRAVENOUS at 02:05

## 2025-05-09 RX ADMIN — IOPAMIDOL 100 ML: 755 INJECTION, SOLUTION INTRAVENOUS at 07:05

## 2025-05-09 RX ADMIN — LACTULOSE 15 G: 20 SOLUTION ORAL at 08:05

## 2025-05-09 RX ADMIN — AMLODIPINE BESYLATE 5 MG: 5 TABLET ORAL at 08:05

## 2025-05-09 RX ADMIN — NYSTATIN 500000 UNITS: 100000 SUSPENSION ORAL at 05:05

## 2025-05-09 RX ADMIN — HYDROCODONE BITARTRATE AND ACETAMINOPHEN 1 TABLET: 10; 325 TABLET ORAL at 09:05

## 2025-05-09 RX ADMIN — HYDRALAZINE HYDROCHLORIDE 50 MG: 50 TABLET ORAL at 08:05

## 2025-05-09 RX ADMIN — DIATRIZOATE MEGLUMINE AND DIATRIZOATE SODIUM 30 ML: 600; 100 SOLUTION ORAL; RECTAL at 05:05

## 2025-05-09 RX ADMIN — ENOXAPARIN SODIUM 30 MG: 30 INJECTION SUBCUTANEOUS at 05:05

## 2025-05-09 RX ADMIN — NYSTATIN 500000 UNITS: 100000 SUSPENSION ORAL at 09:05

## 2025-05-09 RX ADMIN — NYSTATIN 500000 UNITS: 100000 SUSPENSION ORAL at 12:05

## 2025-05-09 RX ADMIN — FLUCONAZOLE 150 MG: 50 TABLET ORAL at 08:05

## 2025-05-09 RX ADMIN — SODIUM CHLORIDE 500 ML: 9 INJECTION, SOLUTION INTRAVENOUS at 05:05

## 2025-05-09 RX ADMIN — NYSTATIN 500000 UNITS: 100000 SUSPENSION ORAL at 08:05

## 2025-05-09 NOTE — PLAN OF CARE
Problem: Adult Inpatient Plan of Care  Goal: Plan of Care Review  Outcome: Progressing  Goal: Patient-Specific Goal (Individualized)  Outcome: Progressing  Goal: Absence of Hospital-Acquired Illness or Injury  Outcome: Progressing  Goal: Optimal Comfort and Wellbeing  Outcome: Progressing  Goal: Readiness for Transition of Care  Outcome: Progressing     Problem: Diabetes Comorbidity  Goal: Blood Glucose Level Within Targeted Range  Outcome: Progressing     Problem: Wound  Goal: Optimal Coping  Outcome: Progressing  Goal: Optimal Functional Ability  Outcome: Progressing  Goal: Absence of Infection Signs and Symptoms  Outcome: Progressing  Goal: Improved Oral Intake  Outcome: Progressing  Goal: Optimal Pain Control and Function  Outcome: Progressing  Goal: Skin Health and Integrity  Outcome: Progressing  Goal: Optimal Wound Healing  Outcome: Progressing     Problem: Fall Injury Risk  Goal: Absence of Fall and Fall-Related Injury  Outcome: Progressing     Problem: Skin Injury Risk Increased  Goal: Skin Health and Integrity  Outcome: Progressing     Problem: Gas Exchange Impaired  Goal: Optimal Gas Exchange  Outcome: Progressing

## 2025-05-09 NOTE — PLAN OF CARE
05/09/25 1138   Rounds   Attendance Provider;;Nurse;Physical therapist;Pharmacist   Discharge Plan A Return to nursing home   Why the patient remains in the hospital Requires continued medical care   Transition of Care Barriers None     Patient's case reviewed this am.  Not quite ready for dc-possibly tomorrow.  Patient will return to [facility].  Will continue to follow for anticipated dc needs.

## 2025-05-09 NOTE — ASSESSMENT & PLAN NOTE
Patient's blood pressure range in the last 24 hours was: BP  Min: 107/61  Max: 138/72.The patient's inpatient anti-hypertensive regimen is listed below:  Current Antihypertensives  amLODIPine tablet 5 mg, Daily, Oral  hydrALAZINE tablet 50 mg, 2 times daily, Oral    Plan  - BP is controlled, no changes needed to their regimen

## 2025-05-09 NOTE — PLAN OF CARE
Problem: Adult Inpatient Plan of Care  Goal: Plan of Care Review  Outcome: Ongoing  Goal: Patient-Specific Goal (Individualized)  Outcome: Ongoing  Goal: Absence of Hospital-Acquired Illness or Injury  Outcome: Ongoing  Goal: Optimal Comfort and Wellbeing  Outcome: Ongoing  Goal: Readiness for Transition of Care  Outcome: Ongoing     Problem: Diabetes Comorbidity  Goal: Blood Glucose Level Within Targeted Range  Outcome: Ongoing     Problem: Fall Injury Risk  Goal: Absence of Fall and Fall-Related Injury  Outcome: Ongoing     Problem: Skin Injury Risk Increased  Goal: Skin Health and Integrity  Outcome: Ongoing

## 2025-05-09 NOTE — ASSESSMENT & PLAN NOTE
Likely cause of diarrhea  Admit to inpatient  Will start lactulose for now and if problem isn't solved with order enema vs disimpaction  Leukocytosis noted, will start flagyl out of caution       5/7  - disimpacted yesterday in the ed, did not tolerate enema  - no bowel movements today yet, will advance diet to see if tolerated, continuing lactulose home dose  - noted HGB dropped from 8.9 to 7.4 today but no obvious signs of GI bleed, will trend tomorrow, if continues to drop will order occult blood and consult GI    5/8  - drop in hgb again, 8.9>7.4>6.9, no obvious bleeding with bowel movements, will order occult blood, transfuse 1 unit  - feeling unwell today, complaining of fatigue and nausea, monitoring  - had 1 BM loose since yesterday    5/9  - no additional bowel movements, not eating well, generally looks unwell  - will start fluids since renal function worsening  - KUB ordered along with occult blood  - monitor closely

## 2025-05-09 NOTE — SUBJECTIVE & OBJECTIVE
Past Medical History:   Diagnosis Date    Arthritis     Bleeding external hemorrhoids     Chronic kidney disease, stage 3b     baseline creat 1.5    Chronic kidney disease, stage 3b 02/07/2024    baseline creat 1.5      COPD (chronic obstructive pulmonary disease)     senile emphysema    Diabetes mellitus, type 2     DNR (do not resuscitate) 02/07/2024    discussed with VITO Christianson present    Essential (primary) hypertension     Hiatal hernia with GERD     Neuropathy     Post-operative hypothyroidism     Severe pulmonary hypertension 10/08/2022    EF  70 % and normal diastolic function       Past Surgical History:   Procedure Laterality Date    BREAST SURGERY      Biopsy    EYE SURGERY      Remove cataracts both eyes    HYSTERECTOMY      NEPHRECTOMY Right 1960    THYROIDECTOMY         Review of patient's allergies indicates:   Allergen Reactions    Aspirin        No current facility-administered medications on file prior to encounter.     Current Outpatient Medications on File Prior to Encounter   Medication Sig    albuterol (PROVENTIL/VENTOLIN HFA) 90 mcg/actuation inhaler INHALE 2 PUFFS BY MOUTH INTO THE LUNGS EVERY 4 HOURS AS NEEDED FOR SHORTNESS OF BREATH / RESCUE (Patient taking differently: Inhale 2 puffs into the lungs every 6 (six) hours as needed.)    ferrous sulfate (FEOSOL) 325 mg (65 mg iron) Tab tablet Take 1 tablet (325 mg total) by mouth 3 (three) times a week. On Monday, Wednesday and Friday    gabapentin (NEURONTIN) 100 MG capsule Take 100 mg by mouth 2 (two) times daily.    hydrALAZINE (APRESOLINE) 50 MG tablet Take 1 tablet (50 mg total) by mouth 2 (two) times daily.    hydrocortisone (ANUSOL-HC) 2.5 % rectal cream Place rectally 2 (two) times daily.    insulin glargine U-100, Lantus, 100 unit/mL injection Inject 5 Units into the skin every 12 (twelve) hours.    insulin lispro 100 unit/mL injection Inject 3 Units into the skin 3 (three) times daily before meals.    lactulose (CHRONULAC) 10  "gram/15 mL solution Take 30 mLs (20 g total) by mouth once daily. (Patient taking differently: Take 15 mLs by mouth once daily.)    levothyroxine (SYNTHROID) 100 MCG tablet TAKE 1 TABLET BY MOUTH BEFORE BREAKFAST.    ondansetron 4 mg/2 mL Soln Inject 4 mg into the vein every 6 (six) hours as needed.    pantoprazole (PROTONIX) 40 MG tablet TAKE 1 TABLET BY MOUTH TWICE A DAY    pravastatin (PRAVACHOL) 10 MG tablet Take 1 tablet (10 mg total) by mouth once daily.    sertraline (ZOLOFT) 25 MG tablet Take 25 mg by mouth once daily.    SYMBICORT 160-4.5 mcg/actuation HFAA Inhale 2 puffs into the lungs 2 (two) times daily.    BD INSULIN SYRINGE ULTRA-FINE 0.3 mL 31 gauge x 5/16" Syrg USE 1 SYRINGE TWICE A DAY    furosemide (LASIX) 40 MG tablet Take 1 tablet (40 mg total) by mouth 2 (two) times daily. (Patient taking differently: Take 40 mg by mouth once daily.)    metoprolol succinate (TOPROL-XL) 25 MG 24 hr tablet Take 1 tablet (25 mg total) by mouth once daily.     Family History       Problem Relation (Age of Onset)    Cancer Sister, Brother, Daughter    Diabetes Mother, Sister, Brother, Sister    Heart disease Father          Tobacco Use    Smoking status: Never    Smokeless tobacco: Never   Substance and Sexual Activity    Alcohol use: Never    Drug use: Never    Sexual activity: Not Currently     Birth control/protection: None     Review of Systems   All other systems reviewed and are negative.    Objective:     Vital Signs (Most Recent):  Temp: 98.1 °F (36.7 °C) (05/09/25 0825)  Pulse: 90 (05/09/25 1142)  Resp: 18 (05/09/25 1142)  BP: 107/61 (05/09/25 1142)  SpO2: 98 % (05/09/25 1142) Vital Signs (24h Range):  Temp:  [97 °F (36.1 °C)-98.5 °F (36.9 °C)] 98.1 °F (36.7 °C)  Pulse:  [83-90] 90  Resp:  [18-24] 18  SpO2:  [98 %-100 %] 98 %  BP: (107-138)/(61-72) 107/61     Weight: 85.8 kg (189 lb 2.5 oz)  Body mass index is 31.48 kg/m².     Physical Exam  Vitals reviewed.   Constitutional:       Appearance: She is " ill-appearing.   HENT:      Head: Normocephalic.      Mouth/Throat:      Mouth: Mucous membranes are moist.   Eyes:      Extraocular Movements: Extraocular movements intact.      Pupils: Pupils are equal, round, and reactive to light.   Cardiovascular:      Rate and Rhythm: Normal rate and regular rhythm.   Pulmonary:      Effort: Pulmonary effort is normal.   Abdominal:      General: Abdomen is flat. Bowel sounds are normal.      Palpations: Abdomen is soft.   Musculoskeletal:      Cervical back: Normal range of motion.      Right lower leg: No edema.      Left lower leg: No edema.   Skin:     General: Skin is warm and dry.   Neurological:      General: No focal deficit present.      Mental Status: She is alert. Mental status is at baseline.   Psychiatric:         Mood and Affect: Mood normal.              CRANIAL NERVES     CN III, IV, VI   Pupils are equal, round, and reactive to light.       Significant Labs: All pertinent labs within the past 24 hours have been reviewed.  BMP:   Recent Labs   Lab 05/09/25 0524   *      K 4.2      CO2 21*   BUN 23*   CREATININE 1.47*   CALCIUM 8.1*     CBC:   Recent Labs   Lab 05/08/25 0341 05/08/25 2004 05/09/25 0524   WBC 11.99*  --  11.43*   HGB 6.9* 8.0* 8.2*   HCT 24.4* 26.5* 27.3*   *  --  596*     CMP:   Recent Labs   Lab 05/08/25 0341 05/09/25 0524    138   K 4.1 4.2    108   CO2 21* 21*   * 139*   BUN 26* 23*   CREATININE 1.37* 1.47*   CALCIUM 8.3* 8.1*   ANIONGAP 13 13       Significant Imaging: I have reviewed all pertinent imaging results/findings within the past 24 hours.

## 2025-05-09 NOTE — PROGRESS NOTES
[facility]  [facility] Medicine  Progress Note    Patient Name: Vandana Allison  MRN: [id number]  Patient Class: IP- Inpatient   Admission Date: 5/6/2025  Length of Stay: 3 days  Attending Physician: Fallon Guerin MD  Primary Care Provider: Jennifer Mares FNP        Subjective     Principal Problem:Fecal impaction        HPI:   89 y/o female presents from [facility] with pmh CKD, COPD, diabetes, HTN, right nephrectomy to the ED with complaints of abdominal pain which started 2 weeks ago. Pt states she has had abdominal pain which started 2 weeks ago. She notes she started having diarrhea one week ago. There is no hx of a fever, chills, nausea, or vomiting.    Imaging shows significant stool burden in the rectum. Discussing with ED provider agree with admitting to inpatient and treating for ostipation with possible disimpaction.    Overview/Hospital Course:  5/7  - disimpacted yesterday in the ed, did not tolerate enema  - no bowel movements today yet, will advance diet to see if tolerated, continuing lactulose home dose  - noted HGB dropped from 8.9 to 7.4 today but no obvious signs of GI bleed, will trend tomorrow, if continues to drop will order occult blood and consult GI    5/8  - drop in hgb again, 8.9>7.4>6.9, no obvious bleeding with bowel movements, will order occult blood, transfuse 1 unit  - feeling unwell today, complaining of fatigue and nausea, monitoring  - had 1 BM loose since yesterday    5/9  - no additional bowel movements, not eating well, generally looks unwell  - will start fluids since renal function worsening  - KUB ordered along with occult blood  - monitor closely    Past Medical History:   Diagnosis Date    Arthritis     Bleeding external hemorrhoids     Chronic kidney disease, stage 3b     baseline creat 1.5    Chronic kidney disease, stage 3b 02/07/2024    baseline creat 1.5      COPD (chronic obstructive pulmonary disease)     senile emphysema    Diabetes  mellitus, type 2     DNR (do not resuscitate) 02/07/2024    discussed with VITO Christianson present    Essential (primary) hypertension     Hiatal hernia with GERD     Neuropathy     Post-operative hypothyroidism     Severe pulmonary hypertension 10/08/2022    EF  70 % and normal diastolic function       Past Surgical History:   Procedure Laterality Date    BREAST SURGERY      Biopsy    EYE SURGERY      Remove cataracts both eyes    HYSTERECTOMY      NEPHRECTOMY Right 1960    THYROIDECTOMY         Review of patient's allergies indicates:   Allergen Reactions    Aspirin        No current facility-administered medications on file prior to encounter.     Current Outpatient Medications on File Prior to Encounter   Medication Sig    albuterol (PROVENTIL/VENTOLIN HFA) 90 mcg/actuation inhaler INHALE 2 PUFFS BY MOUTH INTO THE LUNGS EVERY 4 HOURS AS NEEDED FOR SHORTNESS OF BREATH / RESCUE (Patient taking differently: Inhale 2 puffs into the lungs every 6 (six) hours as needed.)    ferrous sulfate (FEOSOL) 325 mg (65 mg iron) Tab tablet Take 1 tablet (325 mg total) by mouth 3 (three) times a week. On Monday, Wednesday and Friday    gabapentin (NEURONTIN) 100 MG capsule Take 100 mg by mouth 2 (two) times daily.    hydrALAZINE (APRESOLINE) 50 MG tablet Take 1 tablet (50 mg total) by mouth 2 (two) times daily.    hydrocortisone (ANUSOL-HC) 2.5 % rectal cream Place rectally 2 (two) times daily.    insulin glargine U-100, Lantus, 100 unit/mL injection Inject 5 Units into the skin every 12 (twelve) hours.    insulin lispro 100 unit/mL injection Inject 3 Units into the skin 3 (three) times daily before meals.    lactulose (CHRONULAC) 10 gram/15 mL solution Take 30 mLs (20 g total) by mouth once daily. (Patient taking differently: Take 15 mLs by mouth once daily.)    levothyroxine (SYNTHROID) 100 MCG tablet TAKE 1 TABLET BY MOUTH BEFORE BREAKFAST.    ondansetron 4 mg/2 mL Soln Inject 4 mg into the vein every 6 (six) hours as needed.     "pantoprazole (PROTONIX) 40 MG tablet TAKE 1 TABLET BY MOUTH TWICE A DAY    pravastatin (PRAVACHOL) 10 MG tablet Take 1 tablet (10 mg total) by mouth once daily.    sertraline (ZOLOFT) 25 MG tablet Take 25 mg by mouth once daily.    SYMBICORT 160-4.5 mcg/actuation HFAA Inhale 2 puffs into the lungs 2 (two) times daily.    BD INSULIN SYRINGE ULTRA-FINE 0.3 mL 31 gauge x 5/16" Syrg USE 1 SYRINGE TWICE A DAY    furosemide (LASIX) 40 MG tablet Take 1 tablet (40 mg total) by mouth 2 (two) times daily. (Patient taking differently: Take 40 mg by mouth once daily.)    metoprolol succinate (TOPROL-XL) 25 MG 24 hr tablet Take 1 tablet (25 mg total) by mouth once daily.     Family History       Problem Relation (Age of Onset)    Cancer Sister, Brother, Daughter    Diabetes Mother, Sister, Brother, Sister    Heart disease Father          Tobacco Use    Smoking status: Never    Smokeless tobacco: Never   Substance and Sexual Activity    Alcohol use: Never    Drug use: Never    Sexual activity: Not Currently     Birth control/protection: None     Review of Systems   All other systems reviewed and are negative.    Objective:     Vital Signs (Most Recent):  Temp: 98.1 °F (36.7 °C) (05/09/25 0825)  Pulse: 90 (05/09/25 1142)  Resp: 18 (05/09/25 1142)  BP: 107/61 (05/09/25 1142)  SpO2: 98 % (05/09/25 1142) Vital Signs (24h Range):  Temp:  [97 °F (36.1 °C)-98.5 °F (36.9 °C)] 98.1 °F (36.7 °C)  Pulse:  [83-90] 90  Resp:  [18-24] 18  SpO2:  [98 %-100 %] 98 %  BP: (107-138)/(61-72) 107/61     Weight: 85.8 kg (189 lb 2.5 oz)  Body mass index is 31.48 kg/m².     Physical Exam  Vitals reviewed.   Constitutional:       Appearance: She is ill-appearing.   HENT:      Head: Normocephalic.      Mouth/Throat:      Mouth: Mucous membranes are moist.   Eyes:      Extraocular Movements: Extraocular movements intact.      Pupils: Pupils are equal, round, and reactive to light.   Cardiovascular:      Rate and Rhythm: Normal rate and regular rhythm. "   Pulmonary:      Effort: Pulmonary effort is normal.   Abdominal:      General: Abdomen is flat. Bowel sounds are normal.      Palpations: Abdomen is soft.   Musculoskeletal:      Cervical back: Normal range of motion.      Right lower leg: No edema.      Left lower leg: No edema.   Skin:     General: Skin is warm and dry.   Neurological:      General: No focal deficit present.      Mental Status: She is alert. Mental status is at baseline.   Psychiatric:         Mood and Affect: Mood normal.              CRANIAL NERVES     CN III, IV, VI   Pupils are equal, round, and reactive to light.       Significant Labs: All pertinent labs within the past 24 hours have been reviewed.  BMP:   Recent Labs   Lab 05/09/25 0524   *      K 4.2      CO2 21*   BUN 23*   CREATININE 1.47*   CALCIUM 8.1*     CBC:   Recent Labs   Lab 05/08/25 0341 05/08/25 2004 05/09/25 0524   WBC 11.99*  --  11.43*   HGB 6.9* 8.0* 8.2*   HCT 24.4* 26.5* 27.3*   *  --  596*     CMP:   Recent Labs   Lab 05/08/25 0341 05/09/25 0524    138   K 4.1 4.2    108   CO2 21* 21*   * 139*   BUN 26* 23*   CREATININE 1.37* 1.47*   CALCIUM 8.3* 8.1*   ANIONGAP 13 13       Significant Imaging: I have reviewed all pertinent imaging results/findings within the past 24 hours.      Assessment & Plan  Fecal impaction  Likely cause of diarrhea  Admit to inpatient  Will start lactulose for now and if problem isn't solved with order enema vs disimpaction  Leukocytosis noted, will start flagyl out of caution       5/7  - disimpacted yesterday in the ed, did not tolerate enema  - no bowel movements today yet, will advance diet to see if tolerated, continuing lactulose home dose  - noted HGB dropped from 8.9 to 7.4 today but no obvious signs of GI bleed, will trend tomorrow, if continues to drop will order occult blood and consult GI    5/8  - drop in hgb again, 8.9>7.4>6.9, no obvious bleeding with bowel movements, will order  "occult blood, transfuse 1 unit  - feeling unwell today, complaining of fatigue and nausea, monitoring  - had 1 BM loose since yesterday    5/9  - no additional bowel movements, not eating well, generally looks unwell  - will start fluids since renal function worsening  - KUB ordered along with occult blood  - monitor closely  Diabetes mellitus, type 2  Patient's FSGs are controlled on current medication regimen.  Last A1c reviewed-   Lab Results   Component Value Date    HGBA1C 6.4 11/27/2024     Most recent fingerstick glucose reviewed- No results for input(s): "POCTGLUCOSE" in the last 24 hours.  Current correctional scale  High  Maintain anti-hyperglycemic dose as follows-   Antihyperglycemics (From admission, onward)      Start     Stop Route Frequency Ordered    05/06/25 1909  insulin aspart U-100 injection 0-5 Units         -- SubQ Before meals & nightly PRN 05/06/25 1811          Hold Oral hypoglycemics while patient is in the hospital.  Hypertension  Patient's blood pressure range in the last 24 hours was: BP  Min: 107/61  Max: 138/72.The patient's inpatient anti-hypertensive regimen is listed below:  Current Antihypertensives  amLODIPine tablet 5 mg, Daily, Oral  hydrALAZINE tablet 50 mg, 2 times daily, Oral    Plan  - BP is controlled, no changes needed to their regimen    Post-operative hypothyroidism  Restart home meds  Do not feel that constipation is linked to hypothyroidism as this has been well controlled    Pulmonary hypertension  Noted  Restart home meds    Chronic obstructive pulmonary disease  Patient's COPD is controlled currently.  Patient is currently off COPD Pathway. Continue scheduled inhalers Supplemental oxygen and monitor respiratory status closely.    VTE Risk Mitigation (From admission, onward)           Ordered     enoxaparin injection 30 mg  Daily         05/07/25 1038     IP VTE HIGH RISK PATIENT  Once         05/06/25 1811     Place sequential compression device  Until discontinued "         05/06/25 1811                    Discharge Planning   MARTHA:      Code Status: DNR   Medical Readiness for Discharge Date:   Discharge Plan A: Return to nursing home                        Fallon Guerin MD  Department of Hospital Medicine   [facility]

## 2025-05-10 PROBLEM — K80.20 CALCULUS OF GALLBLADDER WITHOUT BILIARY OBSTRUCTION: Status: ACTIVE | Noted: 2025-05-10

## 2025-05-10 LAB
ALBUMIN SERPL BCP-MCNC: 1.8 G/DL (ref 3.4–4.8)
ALBUMIN/GLOB SERPL: 0.5 {RATIO}
ALP SERPL-CCNC: 100 U/L (ref 40–150)
ALT SERPL W P-5'-P-CCNC: <7 U/L
ANION GAP SERPL CALCULATED.3IONS-SCNC: 14 MMOL/L (ref 7–16)
AST SERPL W P-5'-P-CCNC: 16 U/L (ref 11–45)
BASOPHILS # BLD AUTO: 0.03 K/UL (ref 0–0.2)
BASOPHILS NFR BLD AUTO: 0.2 % (ref 0–1)
BILIRUB SERPL-MCNC: 0.3 MG/DL
BUN SERPL-MCNC: 21 MG/DL (ref 10–20)
BUN/CREAT SERPL: 16 (ref 6–20)
CALCIUM SERPL-MCNC: 8.5 MG/DL (ref 8.4–10.2)
CHLORIDE SERPL-SCNC: 106 MMOL/L (ref 98–111)
CO2 SERPL-SCNC: 21 MMOL/L (ref 23–31)
CREAT SERPL-MCNC: 1.35 MG/DL (ref 0.55–1.02)
DIFFERENTIAL METHOD BLD: ABNORMAL
EGFR (NO RACE VARIABLE) (RUSH/TITUS): 36 ML/MIN/1.73M2
EOSINOPHIL # BLD AUTO: 0.32 K/UL (ref 0–0.5)
EOSINOPHIL NFR BLD AUTO: 2.6 % (ref 1–4)
ERYTHROCYTE [DISTWIDTH] IN BLOOD BY AUTOMATED COUNT: 19.1 % (ref 11.5–14.5)
GLOBULIN SER-MCNC: 3.7 G/DL (ref 2–4)
GLUCOSE SERPL-MCNC: 143 MG/DL (ref 75–121)
GLUCOSE SERPL-MCNC: 146 MG/DL (ref 70–105)
GLUCOSE SERPL-MCNC: 147 MG/DL (ref 70–105)
GLUCOSE SERPL-MCNC: 159 MG/DL (ref 70–105)
GLUCOSE SERPL-MCNC: 161 MG/DL (ref 70–105)
HCT VFR BLD AUTO: 27.3 % (ref 38–47)
HGB BLD-MCNC: 8.3 G/DL (ref 12–16)
IMM GRANULOCYTES # BLD AUTO: 0.15 K/UL (ref 0–0.04)
IMM GRANULOCYTES NFR BLD: 1.2 % (ref 0–0.4)
LYMPHOCYTES # BLD AUTO: 0.87 K/UL (ref 1–4.8)
LYMPHOCYTES NFR BLD AUTO: 7 % (ref 27–41)
MCH RBC QN AUTO: 25.3 PG (ref 27–31)
MCHC RBC AUTO-ENTMCNC: 30.4 G/DL (ref 32–36)
MCV RBC AUTO: 83.2 FL (ref 80–96)
MONOCYTES # BLD AUTO: 0.88 K/UL (ref 0–0.8)
MONOCYTES NFR BLD AUTO: 7 % (ref 2–6)
MPC BLD CALC-MCNC: 8.6 FL (ref 9.4–12.4)
NEUTROPHILS # BLD AUTO: 10.25 K/UL (ref 1.8–7.7)
NEUTROPHILS NFR BLD AUTO: 82 % (ref 53–65)
NRBC # BLD AUTO: 0 X10E3/UL
NRBC, AUTO (.00): 0 %
PLATELET # BLD AUTO: 570 K/UL (ref 150–400)
POTASSIUM SERPL-SCNC: 3.9 MMOL/L (ref 3.5–5.1)
PROT SERPL-MCNC: 5.5 G/DL (ref 5.8–7.6)
RBC # BLD AUTO: 3.28 M/UL (ref 4.2–5.4)
SODIUM SERPL-SCNC: 137 MMOL/L (ref 136–145)
UA COMPLETE W REFLEX CULTURE PNL UR: ABNORMAL
UA COMPLETE W REFLEX CULTURE PNL UR: ABNORMAL
WBC # BLD AUTO: 12.5 K/UL (ref 4.5–11)

## 2025-05-10 PROCEDURE — 25000003 PHARM REV CODE 250

## 2025-05-10 PROCEDURE — 63600175 PHARM REV CODE 636 W HCPCS

## 2025-05-10 PROCEDURE — 99223 1ST HOSP IP/OBS HIGH 75: CPT | Mod: ,,, | Performed by: SURGERY

## 2025-05-10 PROCEDURE — 92610 EVALUATE SWALLOWING FUNCTION: CPT

## 2025-05-10 PROCEDURE — 85025 COMPLETE CBC W/AUTO DIFF WBC: CPT

## 2025-05-10 PROCEDURE — 27000221 HC OXYGEN, UP TO 24 HOURS

## 2025-05-10 PROCEDURE — 99900035 HC TECH TIME PER 15 MIN (STAT)

## 2025-05-10 PROCEDURE — 94761 N-INVAS EAR/PLS OXIMETRY MLT: CPT

## 2025-05-10 PROCEDURE — 36415 COLL VENOUS BLD VENIPUNCTURE: CPT

## 2025-05-10 PROCEDURE — 82962 GLUCOSE BLOOD TEST: CPT

## 2025-05-10 PROCEDURE — 80053 COMPREHEN METABOLIC PANEL: CPT

## 2025-05-10 PROCEDURE — 11000001 HC ACUTE MED/SURG PRIVATE ROOM

## 2025-05-10 PROCEDURE — 63700000 PHARM REV CODE 250 ALT 637 W/O HCPCS

## 2025-05-10 RX ADMIN — ENOXAPARIN SODIUM 30 MG: 30 INJECTION SUBCUTANEOUS at 04:05

## 2025-05-10 RX ADMIN — HYDRALAZINE HYDROCHLORIDE 50 MG: 50 TABLET ORAL at 08:05

## 2025-05-10 RX ADMIN — HYDRALAZINE HYDROCHLORIDE 50 MG: 50 TABLET ORAL at 09:05

## 2025-05-10 RX ADMIN — NYSTATIN 500000 UNITS: 100000 SUSPENSION ORAL at 08:05

## 2025-05-10 RX ADMIN — LACTULOSE 15 G: 20 SOLUTION ORAL at 08:05

## 2025-05-10 RX ADMIN — HYDROCODONE BITARTRATE AND ACETAMINOPHEN 1 TABLET: 5; 325 TABLET ORAL at 03:05

## 2025-05-10 RX ADMIN — HYDROCODONE BITARTRATE AND ACETAMINOPHEN 1 TABLET: 5; 325 TABLET ORAL at 09:05

## 2025-05-10 RX ADMIN — FLUCONAZOLE 150 MG: 50 TABLET ORAL at 08:05

## 2025-05-10 RX ADMIN — AMLODIPINE BESYLATE 5 MG: 5 TABLET ORAL at 08:05

## 2025-05-10 NOTE — PROGRESS NOTES
[facility] Hospital Medicine  Progress Note    Patient Name: Vandana Allison  MRN: [id number]  Patient Class: IP- Inpatient   Admission Date: 5/6/2025  Length of Stay: 4 days  Attending Physician: Fallon Guerin MD  Primary Care Provider: Jennifer Mares FNP        Subjective     Principal Problem:Fecal impaction        HPI:   89 y/o female presents from [facility] with pmh CKD, COPD, diabetes, HTN, right nephrectomy to the ED with complaints of abdominal pain which started 2 weeks ago. Pt states she has had abdominal pain which started 2 weeks ago. She notes she started having diarrhea one week ago. There is no hx of a fever, chills, nausea, or vomiting.    Imaging shows significant stool burden in the rectum. Discussing with ED provider agree with admitting to inpatient and treating for ostipation with possible disimpaction.    Overview/Hospital Course:  5/7  - disimpacted yesterday in the ed, did not tolerate enema  - no bowel movements today yet, will advance diet to see if tolerated, continuing lactulose home dose  - noted HGB dropped from 8.9 to 7.4 today but no obvious signs of GI bleed, will trend tomorrow, if continues to drop will order occult blood and consult GI    5/8  - drop in hgb again, 8.9>7.4>6.9, no obvious bleeding with bowel movements, will order occult blood, transfuse 1 unit  - feeling unwell today, complaining of fatigue and nausea, monitoring  - had 1 BM loose since yesterday    5/9  - no additional bowel movements, not eating well, generally looks unwell  - will start fluids since renal function worsening  - KUB ordered along with occult blood  - monitor closely    5/10  - discussed with surgery, HIDA scan ordered, consulted cardiology for surgery clearance as well in anticipation of possible santo  - in light of possible santo, will add zosyn for abx coverage which is reasonable given continued mild leukocytosis as well    Past Medical History:   Diagnosis Date     Arthritis     Bleeding external hemorrhoids     Chronic kidney disease, stage 3b     baseline creat 1.5    Chronic kidney disease, stage 3b 02/07/2024    baseline creat 1.5      COPD (chronic obstructive pulmonary disease)     senile emphysema    Diabetes mellitus, type 2     DNR (do not resuscitate) 02/07/2024    discussed with VITO Christianson present    Essential (primary) hypertension     Hiatal hernia with GERD     Neuropathy     Post-operative hypothyroidism     Severe pulmonary hypertension 10/08/2022    EF  70 % and normal diastolic function       Past Surgical History:   Procedure Laterality Date    BREAST SURGERY      Biopsy    EYE SURGERY      Remove cataracts both eyes    HYSTERECTOMY      NEPHRECTOMY Right 1960    THYROIDECTOMY         Review of patient's allergies indicates:   Allergen Reactions    Aspirin        No current facility-administered medications on file prior to encounter.     Current Outpatient Medications on File Prior to Encounter   Medication Sig    albuterol (PROVENTIL/VENTOLIN HFA) 90 mcg/actuation inhaler INHALE 2 PUFFS BY MOUTH INTO THE LUNGS EVERY 4 HOURS AS NEEDED FOR SHORTNESS OF BREATH / RESCUE (Patient taking differently: Inhale 2 puffs into the lungs every 6 (six) hours as needed.)    ferrous sulfate (FEOSOL) 325 mg (65 mg iron) Tab tablet Take 1 tablet (325 mg total) by mouth 3 (three) times a week. On Monday, Wednesday and Friday    gabapentin (NEURONTIN) 100 MG capsule Take 100 mg by mouth 2 (two) times daily.    hydrALAZINE (APRESOLINE) 50 MG tablet Take 1 tablet (50 mg total) by mouth 2 (two) times daily.    hydrocortisone (ANUSOL-HC) 2.5 % rectal cream Place rectally 2 (two) times daily.    insulin glargine U-100, Lantus, 100 unit/mL injection Inject 5 Units into the skin every 12 (twelve) hours.    insulin lispro 100 unit/mL injection Inject 3 Units into the skin 3 (three) times daily before meals.    lactulose (CHRONULAC) 10 gram/15 mL solution Take 30 mLs (20 g total)  "by mouth once daily. (Patient taking differently: Take 15 mLs by mouth once daily.)    levothyroxine (SYNTHROID) 100 MCG tablet TAKE 1 TABLET BY MOUTH BEFORE BREAKFAST.    ondansetron 4 mg/2 mL Soln Inject 4 mg into the vein every 6 (six) hours as needed.    pantoprazole (PROTONIX) 40 MG tablet TAKE 1 TABLET BY MOUTH TWICE A DAY    pravastatin (PRAVACHOL) 10 MG tablet Take 1 tablet (10 mg total) by mouth once daily.    sertraline (ZOLOFT) 25 MG tablet Take 25 mg by mouth once daily.    SYMBICORT 160-4.5 mcg/actuation HFAA Inhale 2 puffs into the lungs 2 (two) times daily.    BD INSULIN SYRINGE ULTRA-FINE 0.3 mL 31 gauge x 5/16" Syrg USE 1 SYRINGE TWICE A DAY    furosemide (LASIX) 40 MG tablet Take 1 tablet (40 mg total) by mouth 2 (two) times daily. (Patient taking differently: Take 40 mg by mouth once daily.)    metoprolol succinate (TOPROL-XL) 25 MG 24 hr tablet Take 1 tablet (25 mg total) by mouth once daily.     Family History       Problem Relation (Age of Onset)    Cancer Sister, Brother, Daughter    Diabetes Mother, Sister, Brother, Sister    Heart disease Father          Tobacco Use    Smoking status: Never    Smokeless tobacco: Never   Substance and Sexual Activity    Alcohol use: Never    Drug use: Never    Sexual activity: Not Currently     Birth control/protection: None     Review of Systems   All other systems reviewed and are negative.    Objective:     Vital Signs (Most Recent):  Temp: 98 °F (36.7 °C) (05/10/25 0748)  Pulse: 95 (05/10/25 1230)  Resp: 16 (05/10/25 0748)  BP: 119/62 (05/10/25 1230)  SpO2: 97 % (05/10/25 1230) Vital Signs (24h Range):  Temp:  [98 °F (36.7 °C)-99.2 °F (37.3 °C)] 98 °F (36.7 °C)  Pulse:  [] 95  Resp:  [16-18] 16  SpO2:  [93 %-99 %] 97 %  BP: (119-171)/(62-77) 119/62     Weight: 85.8 kg (189 lb 2.5 oz)  Body mass index is 31.48 kg/m².     Physical Exam  Vitals reviewed.   Constitutional:       Appearance: She is ill-appearing.   HENT:      Head: Normocephalic.      " Mouth/Throat:      Mouth: Mucous membranes are moist.   Eyes:      Extraocular Movements: Extraocular movements intact.      Pupils: Pupils are equal, round, and reactive to light.   Cardiovascular:      Rate and Rhythm: Normal rate and regular rhythm.   Pulmonary:      Effort: Pulmonary effort is normal.   Abdominal:      General: Abdomen is flat. Bowel sounds are normal.      Palpations: Abdomen is soft.   Musculoskeletal:      Cervical back: Normal range of motion.      Right lower leg: No edema.      Left lower leg: No edema.   Skin:     General: Skin is warm and dry.   Neurological:      General: No focal deficit present.      Mental Status: She is alert. Mental status is at baseline.   Psychiatric:         Mood and Affect: Mood normal.              CRANIAL NERVES     CN III, IV, VI   Pupils are equal, round, and reactive to light.       Significant Labs: All pertinent labs within the past 24 hours have been reviewed.  BMP:   Recent Labs   Lab 05/09/25  0524   *      K 4.2      CO2 21*   BUN 23*   CREATININE 1.47*   CALCIUM 8.1*     CBC:   Recent Labs   Lab 05/08/25 2004 05/09/25  0524   WBC  --  11.43*   HGB 8.0* 8.2*   HCT 26.5* 27.3*   PLT  --  596*     CMP:   Recent Labs   Lab 05/09/25 0524      K 4.2      CO2 21*   *   BUN 23*   CREATININE 1.47*   CALCIUM 8.1*   ANIONGAP 13       Significant Imaging: I have reviewed all pertinent imaging results/findings within the past 24 hours.      Assessment & Plan  Fecal impaction  Likely cause of diarrhea  Admit to inpatient  Will start lactulose for now and if problem isn't solved with order enema vs disimpaction  Leukocytosis noted, will start flagyl out of caution       5/7  - disimpacted yesterday in the ed, did not tolerate enema  - no bowel movements today yet, will advance diet to see if tolerated, continuing lactulose home dose  - noted HGB dropped from 8.9 to 7.4 today but no obvious signs of GI bleed, will trend  "tomorrow, if continues to drop will order occult blood and consult GI    5/8  - drop in hgb again, 8.9>7.4>6.9, no obvious bleeding with bowel movements, will order occult blood, transfuse 1 unit  - feeling unwell today, complaining of fatigue and nausea, monitoring  - had 1 BM loose since yesterday    5/9  - no additional bowel movements, not eating well, generally looks unwell  - will start fluids since renal function worsening  - KUB ordered along with occult blood  - monitor closely  Diabetes mellitus, type 2  Patient's FSGs are controlled on current medication regimen.  Last A1c reviewed-   Lab Results   Component Value Date    HGBA1C 6.4 11/27/2024     Most recent fingerstick glucose reviewed- No results for input(s): "POCTGLUCOSE" in the last 24 hours.  Current correctional scale  High  Maintain anti-hyperglycemic dose as follows-   Antihyperglycemics (From admission, onward)      Start     Stop Route Frequency Ordered    05/06/25 1909  insulin aspart U-100 injection 0-5 Units         -- SubQ Before meals & nightly PRN 05/06/25 1811          Hold Oral hypoglycemics while patient is in the hospital.  Hypertension  Patient's blood pressure range in the last 24 hours was: BP  Min: 119/62  Max: 171/73.The patient's inpatient anti-hypertensive regimen is listed below:  Current Antihypertensives  amLODIPine tablet 5 mg, Daily, Oral  hydrALAZINE tablet 50 mg, 2 times daily, Oral    Plan  - BP is controlled, no changes needed to their regimen    Post-operative hypothyroidism  Restart home meds  Do not feel that constipation is linked to hypothyroidism as this has been well controlled    Pulmonary hypertension  Noted  Restart home meds    Chronic obstructive pulmonary disease  Patient's COPD is controlled currently.  Patient is currently off COPD Pathway. Continue scheduled inhalers Supplemental oxygen and monitor respiratory status closely.    Calculus of gallbladder without biliary obstruction  Imaging at admission " did show possible santo but overall clinical picture suggested fecal impaction likely. Due to continued symptoms and poor po intake consulted surgery and repeat imaging showed suspicious gallbladder as source    5/10  - discussed with surgery, HIDA scan ordered, consulted cardiology for surgery clearance as well in anticipation of possible santo  - in light of possible santo, will add zosyn for abx coverage which is reasonable given continued mild leukocytosis as well  VTE Risk Mitigation (From admission, onward)           Ordered     enoxaparin injection 30 mg  Daily         05/07/25 1038     IP VTE HIGH RISK PATIENT  Once         05/06/25 1811     Place sequential compression device  Until discontinued         05/06/25 1811                    Discharge Planning   MARTHA:      Code Status: DNR   Medical Readiness for Discharge Date:   Discharge Plan A: Return to nursing home                        Fallon Guerin MD  Department of Hospital Medicine   [facility]

## 2025-05-10 NOTE — SUBJECTIVE & OBJECTIVE
Past Medical History:   Diagnosis Date    Arthritis     Bleeding external hemorrhoids     Chronic kidney disease, stage 3b     baseline creat 1.5    Chronic kidney disease, stage 3b 02/07/2024    baseline creat 1.5      COPD (chronic obstructive pulmonary disease)     senile emphysema    Diabetes mellitus, type 2     DNR (do not resuscitate) 02/07/2024    discussed with VITO Christianson present    Essential (primary) hypertension     Hiatal hernia with GERD     Neuropathy     Post-operative hypothyroidism     Severe pulmonary hypertension 10/08/2022    EF  70 % and normal diastolic function       Past Surgical History:   Procedure Laterality Date    BREAST SURGERY      Biopsy    EYE SURGERY      Remove cataracts both eyes    HYSTERECTOMY      NEPHRECTOMY Right 1960    THYROIDECTOMY         Review of patient's allergies indicates:   Allergen Reactions    Aspirin        No current facility-administered medications on file prior to encounter.     Current Outpatient Medications on File Prior to Encounter   Medication Sig    albuterol (PROVENTIL/VENTOLIN HFA) 90 mcg/actuation inhaler INHALE 2 PUFFS BY MOUTH INTO THE LUNGS EVERY 4 HOURS AS NEEDED FOR SHORTNESS OF BREATH / RESCUE (Patient taking differently: Inhale 2 puffs into the lungs every 6 (six) hours as needed.)    ferrous sulfate (FEOSOL) 325 mg (65 mg iron) Tab tablet Take 1 tablet (325 mg total) by mouth 3 (three) times a week. On Monday, Wednesday and Friday    gabapentin (NEURONTIN) 100 MG capsule Take 100 mg by mouth 2 (two) times daily.    hydrALAZINE (APRESOLINE) 50 MG tablet Take 1 tablet (50 mg total) by mouth 2 (two) times daily.    hydrocortisone (ANUSOL-HC) 2.5 % rectal cream Place rectally 2 (two) times daily.    insulin glargine U-100, Lantus, 100 unit/mL injection Inject 5 Units into the skin every 12 (twelve) hours.    insulin lispro 100 unit/mL injection Inject 3 Units into the skin 3 (three) times daily before meals.    lactulose (CHRONULAC) 10  "gram/15 mL solution Take 30 mLs (20 g total) by mouth once daily. (Patient taking differently: Take 15 mLs by mouth once daily.)    levothyroxine (SYNTHROID) 100 MCG tablet TAKE 1 TABLET BY MOUTH BEFORE BREAKFAST.    ondansetron 4 mg/2 mL Soln Inject 4 mg into the vein every 6 (six) hours as needed.    pantoprazole (PROTONIX) 40 MG tablet TAKE 1 TABLET BY MOUTH TWICE A DAY    pravastatin (PRAVACHOL) 10 MG tablet Take 1 tablet (10 mg total) by mouth once daily.    sertraline (ZOLOFT) 25 MG tablet Take 25 mg by mouth once daily.    SYMBICORT 160-4.5 mcg/actuation HFAA Inhale 2 puffs into the lungs 2 (two) times daily.    BD INSULIN SYRINGE ULTRA-FINE 0.3 mL 31 gauge x 5/16" Syrg USE 1 SYRINGE TWICE A DAY    furosemide (LASIX) 40 MG tablet Take 1 tablet (40 mg total) by mouth 2 (two) times daily. (Patient taking differently: Take 40 mg by mouth once daily.)    metoprolol succinate (TOPROL-XL) 25 MG 24 hr tablet Take 1 tablet (25 mg total) by mouth once daily.     Family History       Problem Relation (Age of Onset)    Cancer Sister, Brother, Daughter    Diabetes Mother, Sister, Brother, Sister    Heart disease Father          Tobacco Use    Smoking status: Never    Smokeless tobacco: Never   Substance and Sexual Activity    Alcohol use: Never    Drug use: Never    Sexual activity: Not Currently     Birth control/protection: None     Review of Systems   All other systems reviewed and are negative.    Objective:     Vital Signs (Most Recent):  Temp: 98 °F (36.7 °C) (05/10/25 0748)  Pulse: 95 (05/10/25 1230)  Resp: 16 (05/10/25 0748)  BP: 119/62 (05/10/25 1230)  SpO2: 97 % (05/10/25 1230) Vital Signs (24h Range):  Temp:  [98 °F (36.7 °C)-99.2 °F (37.3 °C)] 98 °F (36.7 °C)  Pulse:  [] 95  Resp:  [16-18] 16  SpO2:  [93 %-99 %] 97 %  BP: (119-171)/(62-77) 119/62     Weight: 85.8 kg (189 lb 2.5 oz)  Body mass index is 31.48 kg/m².     Physical Exam  Vitals reviewed.   Constitutional:       Appearance: She is " ill-appearing.   HENT:      Head: Normocephalic.      Mouth/Throat:      Mouth: Mucous membranes are moist.   Eyes:      Extraocular Movements: Extraocular movements intact.      Pupils: Pupils are equal, round, and reactive to light.   Cardiovascular:      Rate and Rhythm: Normal rate and regular rhythm.   Pulmonary:      Effort: Pulmonary effort is normal.   Abdominal:      General: Abdomen is flat. Bowel sounds are normal.      Palpations: Abdomen is soft.   Musculoskeletal:      Cervical back: Normal range of motion.      Right lower leg: No edema.      Left lower leg: No edema.   Skin:     General: Skin is warm and dry.   Neurological:      General: No focal deficit present.      Mental Status: She is alert. Mental status is at baseline.   Psychiatric:         Mood and Affect: Mood normal.              CRANIAL NERVES     CN III, IV, VI   Pupils are equal, round, and reactive to light.       Significant Labs: All pertinent labs within the past 24 hours have been reviewed.  BMP:   Recent Labs   Lab 05/09/25 0524   *      K 4.2      CO2 21*   BUN 23*   CREATININE 1.47*   CALCIUM 8.1*     CBC:   Recent Labs   Lab 05/08/25 2004 05/09/25 0524   WBC  --  11.43*   HGB 8.0* 8.2*   HCT 26.5* 27.3*   PLT  --  596*     CMP:   Recent Labs   Lab 05/09/25 0524      K 4.2      CO2 21*   *   BUN 23*   CREATININE 1.47*   CALCIUM 8.1*   ANIONGAP 13       Significant Imaging: I have reviewed all pertinent imaging results/findings within the past 24 hours.

## 2025-05-10 NOTE — H&P (VIEW-ONLY)
[facility]  General Surgery  Consult Note    Inpatient consult to General Surgery  Consult performed by: Miguel Angel Aragon MD  Consult ordered by: Fallon Guerin MD  Reason for consult: Nausea vomiting  Assessment/Recommendations: Patient with very poor p.o. intake and she is nauseous and epigastric discomfort and vomits.  My suspicion she has got biliary colic.  CT scan does show gallstones but given her age and overall risk will get a HIDA scan just to confirm.  Will discuss the risks and benefits of surgery if that is what she wants.  Consider cardiac risk stratification        Subjective:     Chief Complaint/Reason for Admission:  Nausea vomiting epigastric discomfort    History of Present Illness: 89-year-old female who comes in with few week of poor p.o. intake and initially thought to have some fecal impaction with some abdominal discomfort.  He says every time she eats she gets nauseous and throws up intermittently she has been doing that for the last 2 weeks.  Also her H&H has dropped recently.  History of previous nephrectomy in the past    No current facility-administered medications on file prior to encounter.     Current Outpatient Medications on File Prior to Encounter   Medication Sig    albuterol (PROVENTIL/VENTOLIN HFA) 90 mcg/actuation inhaler INHALE 2 PUFFS BY MOUTH INTO THE LUNGS EVERY 4 HOURS AS NEEDED FOR SHORTNESS OF BREATH / RESCUE (Patient taking differently: Inhale 2 puffs into the lungs every 6 (six) hours as needed.)    ferrous sulfate (FEOSOL) 325 mg (65 mg iron) Tab tablet Take 1 tablet (325 mg total) by mouth 3 (three) times a week. On Monday, Wednesday and Friday    gabapentin (NEURONTIN) 100 MG capsule Take 100 mg by mouth 2 (two) times daily.    hydrALAZINE (APRESOLINE) 50 MG tablet Take 1 tablet (50 mg total) by mouth 2 (two) times daily.    hydrocortisone (ANUSOL-HC) 2.5 % rectal cream Place rectally 2 (two) times daily.    insulin glargine U-100, Lantus,  "100 unit/mL injection Inject 5 Units into the skin every 12 (twelve) hours.    insulin lispro 100 unit/mL injection Inject 3 Units into the skin 3 (three) times daily before meals.    lactulose (CHRONULAC) 10 gram/15 mL solution Take 30 mLs (20 g total) by mouth once daily. (Patient taking differently: Take 15 mLs by mouth once daily.)    levothyroxine (SYNTHROID) 100 MCG tablet TAKE 1 TABLET BY MOUTH BEFORE BREAKFAST.    ondansetron 4 mg/2 mL Soln Inject 4 mg into the vein every 6 (six) hours as needed.    pantoprazole (PROTONIX) 40 MG tablet TAKE 1 TABLET BY MOUTH TWICE A DAY    pravastatin (PRAVACHOL) 10 MG tablet Take 1 tablet (10 mg total) by mouth once daily.    sertraline (ZOLOFT) 25 MG tablet Take 25 mg by mouth once daily.    SYMBICORT 160-4.5 mcg/actuation HFAA Inhale 2 puffs into the lungs 2 (two) times daily.    BD INSULIN SYRINGE ULTRA-FINE 0.3 mL 31 gauge x 5/16" Syrg USE 1 SYRINGE TWICE A DAY    furosemide (LASIX) 40 MG tablet Take 1 tablet (40 mg total) by mouth 2 (two) times daily. (Patient taking differently: Take 40 mg by mouth once daily.)    metoprolol succinate (TOPROL-XL) 25 MG 24 hr tablet Take 1 tablet (25 mg total) by mouth once daily.       Review of patient's allergies indicates:   Allergen Reactions    Aspirin        Past Medical History:   Diagnosis Date    Arthritis     Bleeding external hemorrhoids     Chronic kidney disease, stage 3b     baseline creat 1.5    Chronic kidney disease, stage 3b 02/07/2024    baseline creat 1.5      COPD (chronic obstructive pulmonary disease)     senile emphysema    Diabetes mellitus, type 2     DNR (do not resuscitate) 02/07/2024    discussed with VITO Christianson present    Essential (primary) hypertension     Hiatal hernia with GERD     Neuropathy     Post-operative hypothyroidism     Severe pulmonary hypertension 10/08/2022    EF  70 % and normal diastolic function     Past Surgical History:   Procedure Laterality Date    BREAST SURGERY      Biopsy    " EYE SURGERY      Remove cataracts both eyes    HYSTERECTOMY      NEPHRECTOMY Right 1960    THYROIDECTOMY       Family History       Problem Relation (Age of Onset)    Cancer Sister, Brother, Daughter    Diabetes Mother, Sister, Brother, Sister    Heart disease Father          Tobacco Use    Smoking status: Never    Smokeless tobacco: Never   Substance and Sexual Activity    Alcohol use: Never    Drug use: Never    Sexual activity: Not Currently     Birth control/protection: None     Review of Systems   Constitutional:  Negative for activity change, appetite change, fatigue and fever.   HENT:  Negative for trouble swallowing.    Respiratory:  Negative for cough and shortness of breath.    Cardiovascular:  Negative for chest pain and palpitations.   Gastrointestinal:  Positive for abdominal pain, nausea and vomiting. Negative for abdominal distention, blood in stool, constipation and diarrhea.   Genitourinary:  Negative for flank pain.   Musculoskeletal:  Negative for neck pain and neck stiffness.   Neurological:  Negative for weakness.     Objective:     Vital Signs (Most Recent):  Temp: 98 °F (36.7 °C) (05/10/25 0748)  Pulse: 88 (05/10/25 0748)  Resp: 16 (05/10/25 0748)  BP: 137/67 (05/10/25 0748)  SpO2: 98 % (05/10/25 0748) Vital Signs (24h Range):  Temp:  [98 °F (36.7 °C)-99.2 °F (37.3 °C)] 98 °F (36.7 °C)  Pulse:  [] 88  Resp:  [16-18] 16  SpO2:  [93 %-99 %] 98 %  BP: (107-171)/(61-77) 137/67     Weight: 85.8 kg (189 lb 2.5 oz)  Body mass index is 31.48 kg/m².      Intake/Output Summary (Last 24 hours) at 5/10/2025 1005  Last data filed at 5/9/2025 1649  Gross per 24 hour   Intake 339.14 ml   Output --   Net 339.14 ml       Physical Exam  Constitutional:       General: She is not in acute distress.  HENT:      Head: Normocephalic.   Cardiovascular:      Rate and Rhythm: Normal rate and regular rhythm.      Pulses: Normal pulses.   Pulmonary:      Effort: Pulmonary effort is normal. No respiratory distress.  "     Breath sounds: Normal breath sounds.   Abdominal:      General: Abdomen is flat. There is no distension.      Palpations: Abdomen is soft.      Tenderness: There is no abdominal tenderness.   Musculoskeletal:         General: Normal range of motion.   Skin:     General: Skin is warm.   Neurological:      General: No focal deficit present.      Mental Status: She is oriented to person, place, and time.         Significant Labs:  BMP:   Recent Labs   Lab 05/09/25  0524   *      K 4.2      CO2 21*   BUN 23*   CREATININE 1.47*   CALCIUM 8.1*     Cardiac markers: No results for input(s): "CKMB", "CPKMB", "TROPONINT", "TROPONINI", "MYOGLOBIN" in the last 48 hours.  CBC:   Recent Labs   Lab 05/09/25 0524   WBC 11.43*   RBC 3.26*   HGB 8.2*   HCT 27.3*   *   MCV 83.7   MCH 25.2*   MCHC 30.0*     CMP:   Recent Labs   Lab 05/09/25 0524   *   CALCIUM 8.1*      K 4.2   CO2 21*      BUN 23*   CREATININE 1.47*       Significant Diagnostics:  I have reviewed all pertinent imaging results/findings within the past 24 hours.    Assessment/Plan:     Active Diagnoses:    Diagnosis Date Noted POA    PRINCIPAL PROBLEM:  Fecal impaction [K56.41] 05/06/2025 Yes    Chronic obstructive pulmonary disease [J44.9] 11/17/2024 Yes    Pulmonary hypertension [I27.20] 02/20/2024 Yes    Post-operative hypothyroidism [E89.0] 02/08/2024 Yes    Hypertension [I10] 02/07/2024 Yes    Diabetes mellitus, type 2 [E11.9]  Yes      Problems Resolved During this Admission:    Diagnosis Date Noted Date Resolved POA    Chronic pain syndrome [G89.4] 05/06/2025 05/06/2025 Yes    Gastroenteritis [K52.9] 05/06/2025 05/06/2025 Yes       Thank you for your consult. I will follow-up with patient. Please contact us if you have any additional questions.    Miguel Angel Aragon MD  General Surgery  [facility]  "

## 2025-05-10 NOTE — NURSING
5/9/25 1948    Pt. off the unit for CT.  Pt. transported by Chao PADRON, Nursing Supervisor and VITO Ugarte

## 2025-05-10 NOTE — PT/OT/SLP EVAL
Speech Language Pathology Evaluation  Bedside Swallow    Patient Name:  Vandana Allison   MRN:  [id number]  Admitting Diagnosis: Fecal impaction    Recommendations:                 General Recommendations:  Follow-up not indicated  Diet recommendations:  Mechanical soft, Chopped meat, Thin   Aspiration Precautions: 1 bite/sip at a time, Alternating bites/sips, Assistance with meals, HOB to 90 degrees, Remain upright 30 minutes post meal, Small bites/sips, and Standard aspiration precautions   General Precautions: Standard    Communication strategies:  none    Assessment:     Vandana Allison is a 89 y.o. female with an SLP diagnosis of possible dysphagia.  She presents with passing her BEDSIDE SWALLOW EVALUATION.    History:     Past Medical History:   Diagnosis Date    Arthritis     Bleeding external hemorrhoids     Chronic kidney disease, stage 3b     baseline creat 1.5    Chronic kidney disease, stage 3b 02/07/2024    baseline creat 1.5      COPD (chronic obstructive pulmonary disease)     senile emphysema    Diabetes mellitus, type 2     DNR (do not resuscitate) 02/07/2024    discussed with VITO Christianson present    Essential (primary) hypertension     Hiatal hernia with GERD     Neuropathy     Post-operative hypothyroidism     Severe pulmonary hypertension 10/08/2022    EF  70 % and normal diastolic function       Past Surgical History:   Procedure Laterality Date    BREAST SURGERY      Biopsy    EYE SURGERY      Remove cataracts both eyes    HYSTERECTOMY      NEPHRECTOMY Right 1960    THYROIDECTOMY         Social History: Patient lives in a nursing home.    Prior Intubation HX:  n/a    Modified Barium Swallow: n/a    Chest X-Rays: see chart    Prior diet: unknown.    Occupation/hobbies/homemaking: not stated.    Subjective     Patient lying in bed awake and agreeable to BEDSIDE SWALLOW EVALUATION.  Patient goals: not stated     Pain/Comfort:  Pain Rating 1: 0/10 (No pain reported by pt during  eval.)    Respiratory Status: Nasal cannula, flow 2 L/min    Objective:     Oral Musculature Evaluation  Oral Musculature: general weakness  Dentition: edentulous, other (see comments) (Pt reports that she wears her dentures when she eats.)  Oral Labial Strength and Mobility: WFL  Lingual Strength and Mobility: WFL    Bedside Swallow Eval:   Consistencies Assessed:  Thin liquids No difficulties noted with small trials of water via a spoon or a straw.   Puree No difficulties noted with small bites of apple sauce.      Oral Phase:   WFL    Pharyngeal Phase:   no overt clinical signs/symptoms of aspiration  no overt clinical signs/symptoms of pharyngeal dysphagia    Compensatory Strategies  None    Treatment: Rec a mechanical soft consistency diet with chopped meats and thin liquids as tolerated. SLP discussed with patient's nurse (Yvonne) that patient was requesting just soft foods today and chicken broth.     Goals:   Multidisciplinary Problems       SLP Goals       Not on file                    Plan:     Patient to be seen:      Plan of Care expires:     Plan of Care reviewed with:      SLP Follow-Up:  No       Discharge recommendations:      Barriers to Discharge:  Level of Skilled Assistance Needed return to nursing home    Time Tracking:     SLP Treatment Date:      Speech Start Time:  1025  Speech Stop Time:  1045     Speech Total Time (min):  20 min    Billable Minutes: Eval Swallow and Oral Function 20    05/10/2025

## 2025-05-10 NOTE — ASSESSMENT & PLAN NOTE
Imaging at admission did show possible santo but overall clinical picture suggested fecal impaction likely. Due to continued symptoms and poor po intake consulted surgery and repeat imaging showed suspicious gallbladder as source    5/10  - discussed with surgery, HIDA scan ordered, consulted cardiology for surgery clearance as well in anticipation of possible santo  - in light of possible santo, will add zosyn for abx coverage which is reasonable given continued mild leukocytosis as well

## 2025-05-10 NOTE — ASSESSMENT & PLAN NOTE
Patient's blood pressure range in the last 24 hours was: BP  Min: 119/62  Max: 171/73.The patient's inpatient anti-hypertensive regimen is listed below:  Current Antihypertensives  amLODIPine tablet 5 mg, Daily, Oral  hydrALAZINE tablet 50 mg, 2 times daily, Oral    Plan  - BP is controlled, no changes needed to their regimen

## 2025-05-11 LAB
ALBUMIN SERPL BCP-MCNC: 1.7 G/DL (ref 3.4–4.8)
ALBUMIN/GLOB SERPL: 0.5 {RATIO}
ALP SERPL-CCNC: 94 U/L (ref 40–150)
ALT SERPL W P-5'-P-CCNC: <7 U/L
ANION GAP SERPL CALCULATED.3IONS-SCNC: 11 MMOL/L (ref 7–16)
AST SERPL W P-5'-P-CCNC: 17 U/L (ref 11–45)
BASOPHILS # BLD AUTO: 0.03 K/UL (ref 0–0.2)
BASOPHILS NFR BLD AUTO: 0.3 % (ref 0–1)
BILIRUB SERPL-MCNC: 0.2 MG/DL
BUN SERPL-MCNC: 21 MG/DL (ref 10–20)
BUN/CREAT SERPL: 14 (ref 6–20)
CALCIUM SERPL-MCNC: 8.4 MG/DL (ref 8.4–10.2)
CHLORIDE SERPL-SCNC: 106 MMOL/L (ref 98–111)
CO2 SERPL-SCNC: 21 MMOL/L (ref 23–31)
CREAT SERPL-MCNC: 1.47 MG/DL (ref 0.55–1.02)
DIFFERENTIAL METHOD BLD: ABNORMAL
EGFR (NO RACE VARIABLE) (RUSH/TITUS): 32 ML/MIN/1.73M2
EOSINOPHIL # BLD AUTO: 0.43 K/UL (ref 0–0.5)
EOSINOPHIL NFR BLD AUTO: 4.3 % (ref 1–4)
ERYTHROCYTE [DISTWIDTH] IN BLOOD BY AUTOMATED COUNT: 19.5 % (ref 11.5–14.5)
GLOBULIN SER-MCNC: 3.6 G/DL (ref 2–4)
GLUCOSE SERPL-MCNC: 102 MG/DL (ref 70–105)
GLUCOSE SERPL-MCNC: 120 MG/DL (ref 70–105)
GLUCOSE SERPL-MCNC: 126 MG/DL (ref 70–105)
GLUCOSE SERPL-MCNC: 146 MG/DL (ref 70–105)
GLUCOSE SERPL-MCNC: 93 MG/DL (ref 75–121)
HCT VFR BLD AUTO: 26.4 % (ref 38–47)
HGB BLD-MCNC: 8 G/DL (ref 12–16)
IMM GRANULOCYTES # BLD AUTO: 0.18 K/UL (ref 0–0.04)
IMM GRANULOCYTES NFR BLD: 1.8 % (ref 0–0.4)
LYMPHOCYTES # BLD AUTO: 1.14 K/UL (ref 1–4.8)
LYMPHOCYTES NFR BLD AUTO: 11.5 % (ref 27–41)
MCH RBC QN AUTO: 25.5 PG (ref 27–31)
MCHC RBC AUTO-ENTMCNC: 30.3 G/DL (ref 32–36)
MCV RBC AUTO: 84.1 FL (ref 80–96)
MONOCYTES # BLD AUTO: 0.88 K/UL (ref 0–0.8)
MONOCYTES NFR BLD AUTO: 8.9 % (ref 2–6)
MPC BLD CALC-MCNC: 8.7 FL (ref 9.4–12.4)
NEUTROPHILS # BLD AUTO: 7.28 K/UL (ref 1.8–7.7)
NEUTROPHILS NFR BLD AUTO: 73.2 % (ref 53–65)
NRBC # BLD AUTO: 0 X10E3/UL
NRBC, AUTO (.00): 0 %
PLATELET # BLD AUTO: 566 K/UL (ref 150–400)
POTASSIUM SERPL-SCNC: 4 MMOL/L (ref 3.5–5.1)
PROT SERPL-MCNC: 5.3 G/DL (ref 5.8–7.6)
RBC # BLD AUTO: 3.14 M/UL (ref 4.2–5.4)
SODIUM SERPL-SCNC: 134 MMOL/L (ref 136–145)
WBC # BLD AUTO: 9.94 K/UL (ref 4.5–11)

## 2025-05-11 PROCEDURE — 94761 N-INVAS EAR/PLS OXIMETRY MLT: CPT

## 2025-05-11 PROCEDURE — 36415 COLL VENOUS BLD VENIPUNCTURE: CPT

## 2025-05-11 PROCEDURE — 27000221 HC OXYGEN, UP TO 24 HOURS

## 2025-05-11 PROCEDURE — 99232 SBSQ HOSP IP/OBS MODERATE 35: CPT | Mod: 95,,, | Performed by: SURGERY

## 2025-05-11 PROCEDURE — 11000001 HC ACUTE MED/SURG PRIVATE ROOM

## 2025-05-11 PROCEDURE — 25000003 PHARM REV CODE 250

## 2025-05-11 PROCEDURE — 80053 COMPREHEN METABOLIC PANEL: CPT

## 2025-05-11 PROCEDURE — 63600175 PHARM REV CODE 636 W HCPCS

## 2025-05-11 PROCEDURE — 99900035 HC TECH TIME PER 15 MIN (STAT)

## 2025-05-11 PROCEDURE — 85025 COMPLETE CBC W/AUTO DIFF WBC: CPT

## 2025-05-11 PROCEDURE — 82962 GLUCOSE BLOOD TEST: CPT

## 2025-05-11 RX ADMIN — ENOXAPARIN SODIUM 30 MG: 30 INJECTION SUBCUTANEOUS at 05:05

## 2025-05-11 RX ADMIN — NYSTATIN 500000 UNITS: 100000 SUSPENSION ORAL at 05:05

## 2025-05-11 RX ADMIN — HYDRALAZINE HYDROCHLORIDE 50 MG: 50 TABLET ORAL at 09:05

## 2025-05-11 RX ADMIN — AMLODIPINE BESYLATE 5 MG: 5 TABLET ORAL at 08:05

## 2025-05-11 RX ADMIN — NYSTATIN 500000 UNITS: 100000 SUSPENSION ORAL at 08:05

## 2025-05-11 RX ADMIN — HYDROCODONE BITARTRATE AND ACETAMINOPHEN 1 TABLET: 5; 325 TABLET ORAL at 05:05

## 2025-05-11 RX ADMIN — HYDROCODONE BITARTRATE AND ACETAMINOPHEN 1 TABLET: 5; 325 TABLET ORAL at 08:05

## 2025-05-11 RX ADMIN — VANCOMYCIN HYDROCHLORIDE 1750 MG: 1 INJECTION, POWDER, LYOPHILIZED, FOR SOLUTION INTRAVENOUS at 06:05

## 2025-05-11 NOTE — CONSULTS
[facility]  Cardiology   Consultation     Patient Name: Vandana Allison  MRN: [id number]  Patient Class: IP- Inpatient  Admission Date: 5/6/2025  Attending Physician: Fallon Guerin MD   Primary Care Provider: Jennifer Mares FNP           Patient information was obtained from patient and ER records.      Subjective:      Principal Problem:Fecal impaction     Chief Complaint:        Chief Complaint   Patient presents with    Abdominal Pain    Vomiting       Patient presents to the ED via [facility] from [facility] with c/o abdominal pain and vomiting for the last few days.          HPI:  89 y/o female presents from [facility] with pmh CKD, COPD, diabetes, HTN, right nephrectomy to the ED with complaints of abdominal pain which started 2 weeks ago. Pt states she has had abdominal pain which started 2 weeks ago. She notes she started having diarrhea one week ago. There is no hx of a fever, chills, nausea, or vomiting.     Imaging shows significant stool burden in the rectum. Discussing with ED provider agree with admitting to inpatient and treating for ostipation with possible disimpaction.    Cardiology is consulted to assess cardiac risk for possible Cholecystectomy. HIDA scan is pending to be f'd up by Dr. Aragon (Surgery) who recommends conservative treatments given her age and overall issues.  Pt. Denies chest pain, SOB.  Has really never had any heart issues.  Echo one year ago showed normal LV systolic function and no significant valve issues.PASP 42 mmHg (probably related to COPD).  When asked, patient reports she does not want surgery.  Daughter is in agreement.  I believe both will listen to reason if surgeon believes it to be necessary.          Past Medical History:   Diagnosis Date    Arthritis      Bleeding external hemorrhoids      Chronic kidney disease, stage 3b       baseline creat 1.5    Chronic kidney disease, stage 3b 02/07/2024     baseline creat 1.5      COPD  "(chronic obstructive pulmonary disease)       senile emphysema    Diabetes mellitus, type 2      DNR (do not resuscitate) 02/07/2024     discussed with RN Stephenie Christianson present    Essential (primary) hypertension      Hiatal hernia with GERD      Neuropathy      Post-operative hypothyroidism      Severe pulmonary hypertension 10/08/2022     EF  70 % and normal diastolic function               Past Surgical History:   Procedure Laterality Date    BREAST SURGERY         Biopsy    EYE SURGERY         Remove cataracts both eyes    HYSTERECTOMY        NEPHRECTOMY Right 1960    THYROIDECTOMY                  Review of patient's allergies indicates:   Allergen Reactions    Aspirin           No current facility-administered medications on file prior to encounter.           Current Outpatient Medications on File Prior to Encounter   Medication Sig    acetaminophen (TYLENOL) 650 MG TbSR Take 650 mg by mouth every 8 (eight) hours.    albuterol (PROVENTIL/VENTOLIN HFA) 90 mcg/actuation inhaler INHALE 2 PUFFS BY MOUTH INTO THE LUNGS EVERY 4 HOURS AS NEEDED FOR SHORTNESS OF BREATH / RESCUE    albuterol-ipratropium (DUO-NEB) 2.5 mg-0.5 mg/3 mL nebulizer solution Use one (1) nebule in nebulizer 4 times daily as needed for shortness of breath.    amLODIPine (NORVASC) 5 MG tablet Take 1 tablet (5 mg total) by mouth once daily.    B-complex with vitamin C (Z-BEC OR EQUIV) tablet Take 1 tablet by mouth once daily.    BD INSULIN SYRINGE ULTRA-FINE 0.3 mL 31 gauge x 5/16" Syrg USE 1 SYRINGE TWICE A DAY    budesonide-formoterol 80-4.5 mcg (BREYNA) 80-4.5 mcg/actuation HFAA Inhale 2 puffs into the lungs 2 (two) times daily.    cholecalciferol, vitamin D3, (VITAMIN D3) 125 mcg (5,000 unit) Tab Take 5,000 Units by mouth once daily.    cyanocobalamin, vitamin B-12, 500 mcg Subl Place 1 tablet under the tongue once daily.    ferrous sulfate (FEOSOL) 325 mg (65 mg iron) Tab tablet Take 1 tablet (325 mg total) by mouth 3 (three) times a week. On " Monday, Wednesday and Friday    furosemide (LASIX) 20 MG tablet TAKE 1 TABLET BY MOUTH TWICE A DAY AS NEEDED    furosemide (LASIX) 20 MG tablet Take 1 tablet (20 mg total) by mouth 2 (two) times daily.    furosemide (LASIX) 40 MG tablet Take 1 tablet (40 mg total) by mouth 2 (two) times daily.    gabapentin (NEURONTIN) 400 MG capsule TAKE 1 CAPSULE BY MOUTH 4 TIMES A DAY AS NEEDED    hydrALAZINE (APRESOLINE) 50 MG tablet Take 1 tablet (50 mg total) by mouth 2 (two) times daily.    HYDROcodone-acetaminophen (NORCO)  mg per tablet Take 1 tablet by mouth every 8 (eight) hours as needed for Pain.    hydrocortisone (ANUSOL-HC) 2.5 % rectal cream Place rectally 2 (two) times daily.    lactulose (CHRONULAC) 10 gram/15 mL solution Take 30 mLs (20 g total) by mouth once daily.    levothyroxine (SYNTHROID) 100 MCG tablet TAKE 1 TABLET BY MOUTH BEFORE BREAKFAST.    magnesium citrate 100 mg Tab Take 400 mg by mouth as needed.    methocarbamoL (ROBAXIN) 750 MG Tab TAKE 2 TABLETS BY MOUTH 4 TIMES A DAY AS NEEDED FOR MUSCLE SPASMS  Strength: 750 mg    metoprolol succinate (TOPROL-XL) 25 MG 24 hr tablet Take 1 tablet (25 mg total) by mouth once daily.    NOVOLIN N NPH U-100 INSULIN 100 unit/mL injection INJECT 40 UNITS INTO THE SKIN TWICE A DAY    omega-3 fatty acids/fish oil (FISH OIL-OMEGA-3 FATTY ACIDS) 300-1,000 mg capsule Take 1 capsule by mouth once daily.    pantoprazole (PROTONIX) 40 MG tablet TAKE 1 TABLET BY MOUTH TWICE A DAY    pravastatin (PRAVACHOL) 10 MG tablet Take 1 tablet (10 mg total) by mouth once daily.    predniSONE (DELTASONE) 5 MG tablet Take 1 tablet (5 mg total) by mouth once daily.    traMADoL (ULTRAM) 50 mg tablet TAKE 1 TABLET BY MOUTH FOUR TIMES A DAY      Family History         Problem Relation (Age of Onset)     Cancer Sister, Brother, Daughter     Diabetes Mother, Sister, Brother, Sister     Heart disease Father                   Tobacco Use    Smoking status: Never    Smokeless tobacco: Never    Substance and Sexual Activity    Alcohol use: Never    Drug use: Never    Sexual activity: Not Currently       Birth control/protection: None      Review of Systems   All other systems reviewed and are negative.     Objective:      Vital Signs (Most Recent):  Temp: 98.7 °F (37.1 °C) (05/06/25 1141)  Pulse: 97 (05/06/25 1141)  Resp: 20 (05/06/25 1141)  BP: (!) 164/46 (05/06/25 1141)  SpO2: 98 % (05/06/25 1200) Vital Signs (24h Range):  Temp:  [98.7 °F (37.1 °C)] 98.7 °F (37.1 °C)  Pulse:  [97] 97  Resp:  [20] 20  SpO2:  [95 %-98 %] 98 %  BP: (164)/(46) 164/46      Weight: 82.6 kg (182 lb)  Body mass index is 30.29 kg/m².     Physical Exam  Vitals reviewed.   Constitutional:       Appearance: She is ill-appearing.   HENT:      Head: Normocephalic.      Mouth/Throat:      Mouth: Mucous membranes are dry.   Eyes:      Extraocular Movements: Extraocular movements intact.      Pupils: Pupils are equal, round, and reactive to light.   Cardiovascular:      Rate and Rhythm: Normal rate and regular rhythm with frequent PAC's.  No murmur  Pulmonary:      Effort: Pulmonary effort is normal.   Abdominal:      General: Bowel sounds are normal. There is distension.      Palpations: Abdomen is soft.      Tenderness: There is abdominal tenderness.   Musculoskeletal:      Cervical back: Normal range of motion.      Right lower leg: No edema.      Left lower leg: No edema.   Skin:     General: Skin is warm and dry.   Neurological:      General: No focal deficit present.      Mental Status: She is alert. Mental status is at baseline.   Psychiatric:         Mood and Affect: Mood normal.                  CRANIAL NERVES      CN III, IV, VI   Pupils are equal, round, and reactive to light.        Significant Labs: All pertinent labs within the past 24 hours have been reviewed.  BMP:       Recent Labs   Lab 05/06/25  1320   GLU 95   *   K 4.9      CO2 24   BUN 29*   CREATININE 1.55*   CALCIUM 9.8   MG 1.9      CBC:        Recent Labs   Lab 05/06/25  1320   WBC 14.58*   HGB 8.9*   HCT 28.7*   *      CMP:       Recent Labs   Lab 05/06/25  1320   *   K 4.9      CO2 24   GLU 95   BUN 29*   CREATININE 1.55*   CALCIUM 9.8   PROT 6.5   ALBUMIN 2.1*   BILITOT 0.3   ALKPHOS 112   AST 22   ALT 7   ANIONGAP 15         Updated   Order   05/11/25 1229  POCT glucose  Collected: 05/11/25 1211  Final result    POC Glucose 120 High  mg/dL            05/11/25 0840  POCT glucose  Collected: 05/11/25 0820  Final result    POC Glucose 102 mg/dL            05/11/25 0611  Comprehensive Metabolic Panel  Collected: 05/11/25 0513  Final result  Specimen: Blood    Sodium 134 Low  mmol/L Total Protein 5.3 Low  g/dL   Potassium 4.0 mmol/L Albumin 1.7 Low  g/dL   Chloride 106 mmol/L Globulin 3.6 g/dL   CO2 21 Low  mmol/L A/G Ratio 0.5   Anion Gap 11 mmol/L Bilirubin, Total 0.2 mg/dL   Glucose 93 mg/dL Alk Phos 94 U/L   BUN 21 High  mg/dL ALT <7 U/L   Creatinine 1.47 High  mg/dL AST 17 U/L   BUN/Creatinine Ratio 14 eGFR 32 Low  mL/min/1.73m2    Calcium 8.4 mg/dL            05/11/25 0544  CBC Auto Differential  Collected: 05/11/25 0513  Final result  Specimen: Blood           05/11/25 0544  CBC with Differential  Collected: 05/11/25 0513  Final result  Specimen: Blood    WBC 9.94 K/uL Eosinophils % 4.3 High  %   RBC 3.14 Low  M/uL Basophils % 0.3 %   Hemoglobin 8.0 Low  g/dL Immature Granulocytes % 1.8 High  %   Hematocrit 26.4 Low  % nRBC, Auto 0.0 %   MCV 84.1 fL Neutrophils, Abs 7.28 K/uL   MCH 25.5 Low  pg Lymphocytes, Absolute 1.14 K/uL   MCHC 30.3 Low  g/dL Monocytes, Absolute 0.88 High  K/uL   RDW 19.5 High  % Eosinophils, Absolute 0.43 K/uL   Platelet Count 566 High  K/uL Basophils, Absolute 0.03 K/uL   MPV 8.7 Low  fL Immature Granulocytes, Absolute 0.18 High  K/uL   Neutrophils % 73.2 High  % nRBC, Absolute 0.00 x10e3/uL   Lymphocytes % 11.5 Low  % Diff Type Auto   Monocytes % 8.9 High  %              Significant Imaging: I  "have reviewed all pertinent imaging results/findings within the past 24 hours.    EKG 5/6/25  NSR 89 with LAD, possible old septal mi but probably due to lead placement.  No change from previous.    Assessment/Plan:      Assessment & Plan  Fecal impaction  Likely cause of diarrhea  Admitted to inpatient  Leukocytosis         Diabetes mellitus, type 2  Patient's FSGs are controlled on current medication regimen.  Last A1c reviewed-         Lab Results   Component Value Date     HGBA1C 6.4 11/27/2024      Most recent fingerstick glucose reviewed- No results for input(s): "POCTGLUCOSE" in the last 24 hours.  Current correctional scale  High  Maintain anti-hyperglycemic dose as follows-   Antihyperglycemics (From admission, onward)        Start     Stop Route Frequency Ordered     05/06/25 1909   insulin aspart U-100 injection 0-5 Units         -- SubQ Before meals & nightly PRN 05/06/25 1811             Hold Oral hypoglycemics while patient is in the hospital.  Hypertension  Patient's blood pressure range in the last 24 hours was: BP  Min: 164/46  Max: 164/46.The patient's inpatient anti-hypertensive regimen is listed below:  Current Antihypertensives  amLODIPine tablet 5 mg, Daily, Oral  hydrALAZINE tablet 50 mg, 2 times daily, Oral     Plan  - BP is controlled, no changes needed to their regimen     Post-operative hypothyroidism  Restart home meds  Do not feel that constipation is linked to hypothyroidism as this has been well controlled     Pulmonary hypertension  Noted  Restart home meds     Chronic obstructive pulmonary disease  Patient's COPD is controlled currently.  Patient is currently off COPD Pathway. Continue scheduled inhalers Supplemental oxygen and monitor respiratory status closely.      Cardiac Risk for Abdominal surgery:    10.1% risk of major cardiac event under the revised cardiac risk index for preoperative risk (RCRI).    Consider adding a low dose beta blocker (Toprol XL 25 mg po daily).    VTE " Risk Mitigation (From admission, onward)              Ordered       enoxaparin injection 40 mg  Daily         05/06/25 1811       IP VTE HIGH RISK PATIENT  Once         05/06/25 1811       Place sequential compression device  Until discontinued         05/06/25 1811                           Thank you for the consult on this sweet elderly lady. Reconsult us as needed. Will sign off.         Gopal Tamayo MD  Department of Medicine/Division of Cardiology  [facility]

## 2025-05-11 NOTE — PLAN OF CARE
Problem: Adult Inpatient Plan of Care  Goal: Plan of Care Review  Outcome: Progressing  Goal: Patient-Specific Goal (Individualized)  Outcome: Progressing  Goal: Absence of Hospital-Acquired Illness or Injury  Outcome: Progressing  Goal: Optimal Comfort and Wellbeing  Outcome: Progressing     Problem: Diabetes Comorbidity  Goal: Blood Glucose Level Within Targeted Range  Outcome: Progressing     Problem: Wound  Goal: Optimal Coping  Outcome: Progressing  Goal: Optimal Functional Ability  Outcome: Progressing  Goal: Absence of Infection Signs and Symptoms  Outcome: Progressing  Goal: Improved Oral Intake  Outcome: Progressing

## 2025-05-11 NOTE — ASSESSMENT & PLAN NOTE
Imaging at admission did show possible santo but overall clinical picture suggested fecal impaction likely. Due to continued symptoms and poor po intake consulted surgery and repeat imaging showed suspicious gallbladder as source    5/10  - discussed with surgery, HIDA scan ordered, consulted cardiology for surgery clearance as well in anticipation of possible santo  - in light of possible santo, will add zosyn for abx coverage which is reasonable given continued mild leukocytosis as well    5/11  - no indication for zosyn at this time after further review  - HIDA scan ordered for today  - cardiology completed clearance

## 2025-05-11 NOTE — PROGRESS NOTES
[facility] Hospital Medicine  Progress Note    Patient Name: Vandana Allison  MRN: [id number]  Patient Class: IP- Inpatient   Admission Date: 5/6/2025  Length of Stay: 5 days  Attending Physician: Fallon Guerin MD  Primary Care Provider: Jennifer Mares FNP        Subjective     Principal Problem:Fecal impaction        HPI:   89 y/o female presents from [facility] with pmh CKD, COPD, diabetes, HTN, right nephrectomy to the ED with complaints of abdominal pain which started 2 weeks ago. Pt states she has had abdominal pain which started 2 weeks ago. She notes she started having diarrhea one week ago. There is no hx of a fever, chills, nausea, or vomiting.    Imaging shows significant stool burden in the rectum. Discussing with ED provider agree with admitting to inpatient and treating for ostipation with possible disimpaction.    Overview/Hospital Course:  5/7  - disimpacted yesterday in the ed, did not tolerate enema  - no bowel movements today yet, will advance diet to see if tolerated, continuing lactulose home dose  - noted HGB dropped from 8.9 to 7.4 today but no obvious signs of GI bleed, will trend tomorrow, if continues to drop will order occult blood and consult GI    5/8  - drop in hgb again, 8.9>7.4>6.9, no obvious bleeding with bowel movements, will order occult blood, transfuse 1 unit  - feeling unwell today, complaining of fatigue and nausea, monitoring  - had 1 BM loose since yesterday    5/9  - no additional bowel movements, not eating well, generally looks unwell  - will start fluids since renal function worsening  - KUB ordered along with occult blood  - monitor closely    5/10  - discussed with surgery, HIDA scan ordered, consulted cardiology for surgery clearance as well in anticipation of possible santo  - in light of possible santo, will add zosyn for abx coverage which is reasonable given continued mild leukocytosis as well    5/11  - awaiting hida scan  results    Past Medical History:   Diagnosis Date    Arthritis     Bleeding external hemorrhoids     Chronic kidney disease, stage 3b     baseline creat 1.5    Chronic kidney disease, stage 3b 02/07/2024    baseline creat 1.5      COPD (chronic obstructive pulmonary disease)     senile emphysema    Diabetes mellitus, type 2     DNR (do not resuscitate) 02/07/2024    discussed with VITO Christianson present    Essential (primary) hypertension     Hiatal hernia with GERD     Neuropathy     Post-operative hypothyroidism     Severe pulmonary hypertension 10/08/2022    EF  70 % and normal diastolic function       Past Surgical History:   Procedure Laterality Date    BREAST SURGERY      Biopsy    EYE SURGERY      Remove cataracts both eyes    HYSTERECTOMY      NEPHRECTOMY Right 1960    THYROIDECTOMY         Review of patient's allergies indicates:   Allergen Reactions    Aspirin        No current facility-administered medications on file prior to encounter.     Current Outpatient Medications on File Prior to Encounter   Medication Sig    albuterol (PROVENTIL/VENTOLIN HFA) 90 mcg/actuation inhaler INHALE 2 PUFFS BY MOUTH INTO THE LUNGS EVERY 4 HOURS AS NEEDED FOR SHORTNESS OF BREATH / RESCUE (Patient taking differently: Inhale 2 puffs into the lungs every 6 (six) hours as needed.)    ferrous sulfate (FEOSOL) 325 mg (65 mg iron) Tab tablet Take 1 tablet (325 mg total) by mouth 3 (three) times a week. On Monday, Wednesday and Friday    gabapentin (NEURONTIN) 100 MG capsule Take 100 mg by mouth 2 (two) times daily.    hydrALAZINE (APRESOLINE) 50 MG tablet Take 1 tablet (50 mg total) by mouth 2 (two) times daily.    hydrocortisone (ANUSOL-HC) 2.5 % rectal cream Place rectally 2 (two) times daily.    insulin glargine U-100, Lantus, 100 unit/mL injection Inject 5 Units into the skin every 12 (twelve) hours.    insulin lispro 100 unit/mL injection Inject 3 Units into the skin 3 (three) times daily before meals.    lactulose  "(CHRONULAC) 10 gram/15 mL solution Take 30 mLs (20 g total) by mouth once daily. (Patient taking differently: Take 15 mLs by mouth once daily.)    levothyroxine (SYNTHROID) 100 MCG tablet TAKE 1 TABLET BY MOUTH BEFORE BREAKFAST.    ondansetron 4 mg/2 mL Soln Inject 4 mg into the vein every 6 (six) hours as needed.    pantoprazole (PROTONIX) 40 MG tablet TAKE 1 TABLET BY MOUTH TWICE A DAY    pravastatin (PRAVACHOL) 10 MG tablet Take 1 tablet (10 mg total) by mouth once daily.    sertraline (ZOLOFT) 25 MG tablet Take 25 mg by mouth once daily.    SYMBICORT 160-4.5 mcg/actuation HFAA Inhale 2 puffs into the lungs 2 (two) times daily.    BD INSULIN SYRINGE ULTRA-FINE 0.3 mL 31 gauge x 5/16" Syrg USE 1 SYRINGE TWICE A DAY    furosemide (LASIX) 40 MG tablet Take 1 tablet (40 mg total) by mouth 2 (two) times daily. (Patient taking differently: Take 40 mg by mouth once daily.)    metoprolol succinate (TOPROL-XL) 25 MG 24 hr tablet Take 1 tablet (25 mg total) by mouth once daily.     Family History       Problem Relation (Age of Onset)    Cancer Sister, Brother, Daughter    Diabetes Mother, Sister, Brother, Sister    Heart disease Father          Tobacco Use    Smoking status: Never    Smokeless tobacco: Never   Substance and Sexual Activity    Alcohol use: Never    Drug use: Never    Sexual activity: Not Currently     Birth control/protection: None     Review of Systems   All other systems reviewed and are negative.    Objective:     Vital Signs (Most Recent):  Temp: 98.4 °F (36.9 °C) (05/11/25 1720)  Pulse: 99 (05/11/25 1720)  Resp: 18 (05/11/25 1759)  BP: 125/68 (05/11/25 1720)  SpO2: 98 % (05/11/25 1720) Vital Signs (24h Range):  Temp:  [98 °F (36.7 °C)-98.9 °F (37.2 °C)] 98.4 °F (36.9 °C)  Pulse:  [89-99] 99  Resp:  [16-18] 18  SpO2:  [94 %-99 %] 98 %  BP: ()/(60-70) 125/68     Weight: 85.8 kg (189 lb 2.5 oz)  Body mass index is 31.48 kg/m².     Physical Exam  Vitals reviewed.   Constitutional:       Appearance: " She is ill-appearing.   HENT:      Head: Normocephalic.      Mouth/Throat:      Mouth: Mucous membranes are moist.   Eyes:      Extraocular Movements: Extraocular movements intact.      Pupils: Pupils are equal, round, and reactive to light.   Cardiovascular:      Rate and Rhythm: Normal rate and regular rhythm.   Pulmonary:      Effort: Pulmonary effort is normal.   Abdominal:      General: Abdomen is flat. Bowel sounds are normal.      Palpations: Abdomen is soft.   Musculoskeletal:      Cervical back: Normal range of motion.      Right lower leg: No edema.      Left lower leg: No edema.   Skin:     General: Skin is warm and dry.   Neurological:      General: No focal deficit present.      Mental Status: She is alert. Mental status is at baseline.   Psychiatric:         Mood and Affect: Mood normal.               Significant Labs: All pertinent labs within the past 24 hours have been reviewed.  BMP:   Recent Labs   Lab 05/11/25  0513   GLU 93   *   K 4.0      CO2 21*   BUN 21*   CREATININE 1.47*   CALCIUM 8.4     CBC:   Recent Labs   Lab 05/10/25  1334 05/11/25  0513   WBC 12.50* 9.94   HGB 8.3* 8.0*   HCT 27.3* 26.4*   * 566*     CMP:   Recent Labs   Lab 05/10/25  1335 05/11/25  0513    134*   K 3.9 4.0    106   CO2 21* 21*   * 93   BUN 21* 21*   CREATININE 1.35* 1.47*   CALCIUM 8.5 8.4   PROT 5.5* 5.3*   ALBUMIN 1.8* 1.7*   BILITOT 0.3 0.2   ALKPHOS 100 94   AST 16 17   ALT <7 <7   ANIONGAP 14 11       Significant Imaging: I have reviewed all pertinent imaging results/findings within the past 24 hours.      Assessment & Plan  Fecal impaction  Likely cause of diarrhea  Admit to inpatient  Will start lactulose for now and if problem isn't solved with order enema vs disimpaction  Leukocytosis noted, will start flagyl out of caution       5/7  - disimpacted yesterday in the ed, did not tolerate enema  - no bowel movements today yet, will advance diet to see if tolerated,  "continuing lactulose home dose  - noted HGB dropped from 8.9 to 7.4 today but no obvious signs of GI bleed, will trend tomorrow, if continues to drop will order occult blood and consult GI    5/8  - drop in hgb again, 8.9>7.4>6.9, no obvious bleeding with bowel movements, will order occult blood, transfuse 1 unit  - feeling unwell today, complaining of fatigue and nausea, monitoring  - had 1 BM loose since yesterday    5/9  - no additional bowel movements, not eating well, generally looks unwell  - will start fluids since renal function worsening  - KUB ordered along with occult blood  - monitor closely  Diabetes mellitus, type 2  Patient's FSGs are controlled on current medication regimen.  Last A1c reviewed-   Lab Results   Component Value Date    HGBA1C 6.4 11/27/2024     Most recent fingerstick glucose reviewed- No results for input(s): "POCTGLUCOSE" in the last 24 hours.  Current correctional scale  High  Maintain anti-hyperglycemic dose as follows-   Antihyperglycemics (From admission, onward)      Start     Stop Route Frequency Ordered    05/06/25 1909  insulin aspart U-100 injection 0-5 Units         -- SubQ Before meals & nightly PRN 05/06/25 1811          Hold Oral hypoglycemics while patient is in the hospital.  Hypertension  Patient's blood pressure range in the last 24 hours was: BP  Min: 89/63  Max: 130/70.The patient's inpatient anti-hypertensive regimen is listed below:  Current Antihypertensives  amLODIPine tablet 5 mg, Daily, Oral  hydrALAZINE tablet 50 mg, 2 times daily, Oral    Plan  - BP is controlled, no changes needed to their regimen    Post-operative hypothyroidism  Restart home meds  Do not feel that constipation is linked to hypothyroidism as this has been well controlled    Pulmonary hypertension  Noted  Restart home meds    Chronic obstructive pulmonary disease  Patient's COPD is controlled currently.  Patient is currently off COPD Pathway. Continue scheduled inhalers Supplemental oxygen " and monitor respiratory status closely.    Calculus of gallbladder without biliary obstruction  Imaging at admission did show possible santo but overall clinical picture suggested fecal impaction likely. Due to continued symptoms and poor po intake consulted surgery and repeat imaging showed suspicious gallbladder as source    5/10  - discussed with surgery, HIDA scan ordered, consulted cardiology for surgery clearance as well in anticipation of possible santo  - in light of possible santo, will add zosyn for abx coverage which is reasonable given continued mild leukocytosis as well    5/11  - no indication for zosyn at this time after further review  - HIDA scan ordered for today  - cardiology completed clearance  VTE Risk Mitigation (From admission, onward)           Ordered     enoxaparin injection 30 mg  Daily         05/07/25 1038     IP VTE HIGH RISK PATIENT  Once         05/06/25 1811     Place sequential compression device  Until discontinued         05/06/25 1811                    Discharge Planning   MARTHA:      Code Status: DNR   Medical Readiness for Discharge Date:   Discharge Plan A: Return to nursing home                        Fallon Guerin MD  Department of Hospital Medicine   [facility]

## 2025-05-11 NOTE — PROGRESS NOTES
Initial Pharmacy Consult    Consulted to assist in the management of Vanc therapy in this 89 y/o female with UTI.     Labs: BUN    21            SCR:   1.47            CRCL: 23    Based on the patient's weight of 86kg and the most recent lab data available, the following therapy will be initiated: Vanc 1750mg iv x 1 dose . Will check Vanc random on 5/13 and redose when needed   Will Continue to monitor.    SRINIVAS Hawthorne.Ph.

## 2025-05-11 NOTE — SUBJECTIVE & OBJECTIVE
Past Medical History:   Diagnosis Date    Arthritis     Bleeding external hemorrhoids     Chronic kidney disease, stage 3b     baseline creat 1.5    Chronic kidney disease, stage 3b 02/07/2024    baseline creat 1.5      COPD (chronic obstructive pulmonary disease)     senile emphysema    Diabetes mellitus, type 2     DNR (do not resuscitate) 02/07/2024    discussed with VITO Christianson present    Essential (primary) hypertension     Hiatal hernia with GERD     Neuropathy     Post-operative hypothyroidism     Severe pulmonary hypertension 10/08/2022    EF  70 % and normal diastolic function       Past Surgical History:   Procedure Laterality Date    BREAST SURGERY      Biopsy    EYE SURGERY      Remove cataracts both eyes    HYSTERECTOMY      NEPHRECTOMY Right 1960    THYROIDECTOMY         Review of patient's allergies indicates:   Allergen Reactions    Aspirin        No current facility-administered medications on file prior to encounter.     Current Outpatient Medications on File Prior to Encounter   Medication Sig    albuterol (PROVENTIL/VENTOLIN HFA) 90 mcg/actuation inhaler INHALE 2 PUFFS BY MOUTH INTO THE LUNGS EVERY 4 HOURS AS NEEDED FOR SHORTNESS OF BREATH / RESCUE (Patient taking differently: Inhale 2 puffs into the lungs every 6 (six) hours as needed.)    ferrous sulfate (FEOSOL) 325 mg (65 mg iron) Tab tablet Take 1 tablet (325 mg total) by mouth 3 (three) times a week. On Monday, Wednesday and Friday    gabapentin (NEURONTIN) 100 MG capsule Take 100 mg by mouth 2 (two) times daily.    hydrALAZINE (APRESOLINE) 50 MG tablet Take 1 tablet (50 mg total) by mouth 2 (two) times daily.    hydrocortisone (ANUSOL-HC) 2.5 % rectal cream Place rectally 2 (two) times daily.    insulin glargine U-100, Lantus, 100 unit/mL injection Inject 5 Units into the skin every 12 (twelve) hours.    insulin lispro 100 unit/mL injection Inject 3 Units into the skin 3 (three) times daily before meals.    lactulose (CHRONULAC) 10  "gram/15 mL solution Take 30 mLs (20 g total) by mouth once daily. (Patient taking differently: Take 15 mLs by mouth once daily.)    levothyroxine (SYNTHROID) 100 MCG tablet TAKE 1 TABLET BY MOUTH BEFORE BREAKFAST.    ondansetron 4 mg/2 mL Soln Inject 4 mg into the vein every 6 (six) hours as needed.    pantoprazole (PROTONIX) 40 MG tablet TAKE 1 TABLET BY MOUTH TWICE A DAY    pravastatin (PRAVACHOL) 10 MG tablet Take 1 tablet (10 mg total) by mouth once daily.    sertraline (ZOLOFT) 25 MG tablet Take 25 mg by mouth once daily.    SYMBICORT 160-4.5 mcg/actuation HFAA Inhale 2 puffs into the lungs 2 (two) times daily.    BD INSULIN SYRINGE ULTRA-FINE 0.3 mL 31 gauge x 5/16" Syrg USE 1 SYRINGE TWICE A DAY    furosemide (LASIX) 40 MG tablet Take 1 tablet (40 mg total) by mouth 2 (two) times daily. (Patient taking differently: Take 40 mg by mouth once daily.)    metoprolol succinate (TOPROL-XL) 25 MG 24 hr tablet Take 1 tablet (25 mg total) by mouth once daily.     Family History       Problem Relation (Age of Onset)    Cancer Sister, Brother, Daughter    Diabetes Mother, Sister, Brother, Sister    Heart disease Father          Tobacco Use    Smoking status: Never    Smokeless tobacco: Never   Substance and Sexual Activity    Alcohol use: Never    Drug use: Never    Sexual activity: Not Currently     Birth control/protection: None     Review of Systems   All other systems reviewed and are negative.    Objective:     Vital Signs (Most Recent):  Temp: 98.4 °F (36.9 °C) (05/11/25 1720)  Pulse: 99 (05/11/25 1720)  Resp: 18 (05/11/25 1759)  BP: 125/68 (05/11/25 1720)  SpO2: 98 % (05/11/25 1720) Vital Signs (24h Range):  Temp:  [98 °F (36.7 °C)-98.9 °F (37.2 °C)] 98.4 °F (36.9 °C)  Pulse:  [89-99] 99  Resp:  [16-18] 18  SpO2:  [94 %-99 %] 98 %  BP: ()/(60-70) 125/68     Weight: 85.8 kg (189 lb 2.5 oz)  Body mass index is 31.48 kg/m².     Physical Exam  Vitals reviewed.   Constitutional:       Appearance: She is " ill-appearing.   HENT:      Head: Normocephalic.      Mouth/Throat:      Mouth: Mucous membranes are moist.   Eyes:      Extraocular Movements: Extraocular movements intact.      Pupils: Pupils are equal, round, and reactive to light.   Cardiovascular:      Rate and Rhythm: Normal rate and regular rhythm.   Pulmonary:      Effort: Pulmonary effort is normal.   Abdominal:      General: Abdomen is flat. Bowel sounds are normal.      Palpations: Abdomen is soft.   Musculoskeletal:      Cervical back: Normal range of motion.      Right lower leg: No edema.      Left lower leg: No edema.   Skin:     General: Skin is warm and dry.   Neurological:      General: No focal deficit present.      Mental Status: She is alert. Mental status is at baseline.   Psychiatric:         Mood and Affect: Mood normal.               Significant Labs: All pertinent labs within the past 24 hours have been reviewed.  BMP:   Recent Labs   Lab 05/11/25  0513   GLU 93   *   K 4.0      CO2 21*   BUN 21*   CREATININE 1.47*   CALCIUM 8.4     CBC:   Recent Labs   Lab 05/10/25  1334 05/11/25  0513   WBC 12.50* 9.94   HGB 8.3* 8.0*   HCT 27.3* 26.4*   * 566*     CMP:   Recent Labs   Lab 05/10/25  1335 05/11/25  0513    134*   K 3.9 4.0    106   CO2 21* 21*   * 93   BUN 21* 21*   CREATININE 1.35* 1.47*   CALCIUM 8.5 8.4   PROT 5.5* 5.3*   ALBUMIN 1.8* 1.7*   BILITOT 0.3 0.2   ALKPHOS 100 94   AST 16 17   ALT <7 <7   ANIONGAP 14 11       Significant Imaging: I have reviewed all pertinent imaging results/findings within the past 24 hours.

## 2025-05-11 NOTE — PROGRESS NOTES
[facility]  General Surgery  Progress Note    Subjective:     Interval History:  Patient tolerated some applesauce this morning.  No nausea or vomiting.  Has not gotten her HIDA scan yet it is ordered for today    Post-Op Info:  * No surgery found *          Medications:  Continuous Infusions:  Scheduled Meds:   amLODIPine  5 mg Oral Daily    enoxparin  30 mg Subcutaneous Daily    hydrALAZINE  50 mg Oral BID    lactulose  15 g Oral BID    nystatin  500,000 Units Oral QID (WM & HS)     PRN Meds:  Current Facility-Administered Medications:     0.9%  NaCl infusion (for blood administration), , Intravenous, Q24H PRN    acetaminophen, 650 mg, Oral, Q4H PRN    acetaminophen, 650 mg, Oral, Q8H PRN    albuterol-ipratropium, 3 mL, Nebulization, Q6H PRN    dextrose 50%, 12.5 g, Intravenous, PRN    dextrose 50%, 25 g, Intravenous, PRN    glucagon (human recombinant), 1 mg, Intramuscular, PRN    glucose, 16 g, Oral, PRN    glucose, 24 g, Oral, PRN    HYDROcodone-acetaminophen, 1 tablet, Oral, Q6H PRN    HYDROcodone-acetaminophen, 1 tablet, Oral, Q6H PRN    insulin aspart U-100, 0-5 Units, Subcutaneous, QID (AC + HS) PRN    melatonin, 6 mg, Oral, Nightly PRN    naloxone, 0.02 mg, Intravenous, PRN    ondansetron, 4 mg, Intravenous, Q8H PRN    sodium chloride 0.9%, 10 mL, Intravenous, Q12H PRN     Objective:     Vital Signs (Most Recent):  Temp: 98 °F (36.7 °C) (05/11/25 0830)  Pulse: 97 (05/11/25 0830)  Resp: 18 (05/11/25 0853)  BP: 107/68 (05/11/25 0420)  SpO2: 98 % (05/11/25 0732) Vital Signs (24h Range):  Temp:  [98 °F (36.7 °C)-98.9 °F (37.2 °C)] 98 °F (36.7 °C)  Pulse:  [91-97] 97  Resp:  [16-18] 18  SpO2:  [94 %-99 %] 98 %  BP: ()/(60-81) 107/68     No intake or output data in the 24 hours ending 05/11/25 1012    Physical Exam  Constitutional:       General: She is not in acute distress.  HENT:      Head: Normocephalic.   Cardiovascular:      Rate and Rhythm: Normal rate and regular rhythm.       "Pulses: Normal pulses.   Pulmonary:      Effort: Pulmonary effort is normal. No respiratory distress.      Breath sounds: Normal breath sounds.   Abdominal:      General: Abdomen is flat. There is no distension.      Palpations: Abdomen is soft.      Tenderness: There is no abdominal tenderness.   Musculoskeletal:         General: Normal range of motion.   Skin:     General: Skin is warm.   Neurological:      General: No focal deficit present.      Mental Status: She is oriented to person, place, and time.         Significant Labs:  CBC:   Recent Labs   Lab 05/11/25 0513   WBC 9.94   RBC 3.14*   HGB 8.0*   HCT 26.4*   *   MCV 84.1   MCH 25.5*   MCHC 30.3*     CMP:   Recent Labs   Lab 05/11/25 0513   GLU 93   CALCIUM 8.4   ALBUMIN 1.7*   PROT 5.3*   *   K 4.0   CO2 21*      BUN 21*   CREATININE 1.47*   ALKPHOS 94   ALT <7   AST 17   BILITOT 0.2     Coagulation: No results for input(s): "PT", "INR", "APTT" in the last 48 hours.  Lactic Acid: No results for input(s): "LACTATE" in the last 48 hours.    Significant Diagnostics:  None    Assessment/Plan:     Active Diagnoses:    Diagnosis Date Noted POA    PRINCIPAL PROBLEM:  Fecal impaction [K56.41] 05/06/2025 Yes    Calculus of gallbladder without biliary obstruction [K80.20] 05/10/2025 Yes    Chronic obstructive pulmonary disease [J44.9] 11/17/2024 Yes    Pulmonary hypertension [I27.20] 02/20/2024 Yes    Post-operative hypothyroidism [E89.0] 02/08/2024 Yes    Hypertension [I10] 02/07/2024 Yes    Diabetes mellitus, type 2 [E11.9]  Yes      Problems Resolved During this Admission:    Diagnosis Date Noted Date Resolved POA    Chronic pain syndrome [G89.4] 05/06/2025 05/06/2025 Yes    Gastroenteritis [K52.9] 05/06/2025 05/06/2025 Yes     Will follow up HIDA scan.  Consider conservative treatments given her age and overall issues.  If she continues to have nausea or problems eating and a positive test would consider discussing surgery.    Miguel Angel TAVAREZ" MD Mahesh  General Surgery  [facility]

## 2025-05-11 NOTE — ASSESSMENT & PLAN NOTE
Patient's blood pressure range in the last 24 hours was: BP  Min: 89/63  Max: 130/70.The patient's inpatient anti-hypertensive regimen is listed below:  Current Antihypertensives  amLODIPine tablet 5 mg, Daily, Oral  hydrALAZINE tablet 50 mg, 2 times daily, Oral    Plan  - BP is controlled, no changes needed to their regimen

## 2025-05-12 PROBLEM — N30.00 ACUTE CYSTITIS WITHOUT HEMATURIA: Status: ACTIVE | Noted: 2025-05-12

## 2025-05-12 LAB
ALBUMIN SERPL BCP-MCNC: 1.8 G/DL (ref 3.4–4.8)
ALBUMIN/GLOB SERPL: 0.5 {RATIO}
ALP SERPL-CCNC: 90 U/L (ref 40–150)
ALT SERPL W P-5'-P-CCNC: <7 U/L
ANION GAP SERPL CALCULATED.3IONS-SCNC: 13 MMOL/L (ref 7–16)
AST SERPL W P-5'-P-CCNC: 16 U/L (ref 11–45)
BASOPHILS # BLD AUTO: 0.03 K/UL (ref 0–0.2)
BASOPHILS NFR BLD AUTO: 0.3 % (ref 0–1)
BILIRUB SERPL-MCNC: 0.2 MG/DL
BUN SERPL-MCNC: 20 MG/DL (ref 10–20)
BUN/CREAT SERPL: 11 (ref 6–20)
CALCIUM SERPL-MCNC: 8.1 MG/DL (ref 8.4–10.2)
CHLORIDE SERPL-SCNC: 105 MMOL/L (ref 98–111)
CO2 SERPL-SCNC: 19 MMOL/L (ref 23–31)
CREAT SERPL-MCNC: 1.76 MG/DL (ref 0.55–1.02)
DIFFERENTIAL METHOD BLD: ABNORMAL
EGFR (NO RACE VARIABLE) (RUSH/TITUS): 26 ML/MIN/1.73M2
EOSINOPHIL # BLD AUTO: 0.38 K/UL (ref 0–0.5)
EOSINOPHIL NFR BLD AUTO: 4.3 % (ref 1–4)
ERYTHROCYTE [DISTWIDTH] IN BLOOD BY AUTOMATED COUNT: 19.9 % (ref 11.5–14.5)
GLOBULIN SER-MCNC: 3.6 G/DL (ref 2–4)
GLUCOSE SERPL-MCNC: 104 MG/DL (ref 75–121)
GLUCOSE SERPL-MCNC: 119 MG/DL (ref 70–105)
GLUCOSE SERPL-MCNC: 122 MG/DL (ref 70–105)
GLUCOSE SERPL-MCNC: 131 MG/DL (ref 70–105)
GLUCOSE SERPL-MCNC: 152 MG/DL (ref 70–105)
HCT VFR BLD AUTO: 27.8 % (ref 38–47)
HGB BLD-MCNC: 8.4 G/DL (ref 12–16)
IMM GRANULOCYTES # BLD AUTO: 0.17 K/UL (ref 0–0.04)
IMM GRANULOCYTES NFR BLD: 1.9 % (ref 0–0.4)
LYMPHOCYTES # BLD AUTO: 0.97 K/UL (ref 1–4.8)
LYMPHOCYTES NFR BLD AUTO: 11 % (ref 27–41)
MCH RBC QN AUTO: 25.1 PG (ref 27–31)
MCHC RBC AUTO-ENTMCNC: 30.2 G/DL (ref 32–36)
MCV RBC AUTO: 83.2 FL (ref 80–96)
MONOCYTES # BLD AUTO: 0.68 K/UL (ref 0–0.8)
MONOCYTES NFR BLD AUTO: 7.7 % (ref 2–6)
MPC BLD CALC-MCNC: 8.3 FL (ref 9.4–12.4)
NEUTROPHILS # BLD AUTO: 6.6 K/UL (ref 1.8–7.7)
NEUTROPHILS NFR BLD AUTO: 74.8 % (ref 53–65)
NRBC # BLD AUTO: 0 X10E3/UL
NRBC, AUTO (.00): 0 %
PLATELET # BLD AUTO: 589 K/UL (ref 150–400)
POTASSIUM SERPL-SCNC: 4.2 MMOL/L (ref 3.5–5.1)
PROT SERPL-MCNC: 5.4 G/DL (ref 5.8–7.6)
RBC # BLD AUTO: 3.34 M/UL (ref 4.2–5.4)
SODIUM SERPL-SCNC: 133 MMOL/L (ref 136–145)
WBC # BLD AUTO: 8.83 K/UL (ref 4.5–11)

## 2025-05-12 PROCEDURE — 85025 COMPLETE CBC W/AUTO DIFF WBC: CPT

## 2025-05-12 PROCEDURE — 63600175 PHARM REV CODE 636 W HCPCS

## 2025-05-12 PROCEDURE — 82962 GLUCOSE BLOOD TEST: CPT

## 2025-05-12 PROCEDURE — 80053 COMPREHEN METABOLIC PANEL: CPT

## 2025-05-12 PROCEDURE — A9537 TC99M MEBROFENIN: HCPCS

## 2025-05-12 PROCEDURE — 25000003 PHARM REV CODE 250

## 2025-05-12 PROCEDURE — 99900035 HC TECH TIME PER 15 MIN (STAT)

## 2025-05-12 PROCEDURE — 36415 COLL VENOUS BLD VENIPUNCTURE: CPT

## 2025-05-12 PROCEDURE — 11000001 HC ACUTE MED/SURG PRIVATE ROOM

## 2025-05-12 PROCEDURE — 94761 N-INVAS EAR/PLS OXIMETRY MLT: CPT

## 2025-05-12 RX ORDER — KIT FOR THE PREPARATION OF TECHNETIUM TC 99M MEBROFENIN 45 MG/10ML
5.4 INJECTION, POWDER, LYOPHILIZED, FOR SOLUTION INTRAVENOUS
Status: COMPLETED | OUTPATIENT
Start: 2025-05-12 | End: 2025-05-12

## 2025-05-12 RX ORDER — SINCALIDE 5 UG/5ML
0.02 INJECTION, POWDER, LYOPHILIZED, FOR SOLUTION INTRAVENOUS ONCE
Status: COMPLETED | OUTPATIENT
Start: 2025-05-12 | End: 2025-05-12

## 2025-05-12 RX ADMIN — NYSTATIN 500000 UNITS: 100000 SUSPENSION ORAL at 08:05

## 2025-05-12 RX ADMIN — HYDRALAZINE HYDROCHLORIDE 50 MG: 50 TABLET ORAL at 10:05

## 2025-05-12 RX ADMIN — NYSTATIN 500000 UNITS: 100000 SUSPENSION ORAL at 10:05

## 2025-05-12 RX ADMIN — LACTULOSE 15 G: 20 SOLUTION ORAL at 10:05

## 2025-05-12 RX ADMIN — HYDROCODONE BITARTRATE AND ACETAMINOPHEN 1 TABLET: 10; 325 TABLET ORAL at 08:05

## 2025-05-12 RX ADMIN — NYSTATIN 500000 UNITS: 100000 SUSPENSION ORAL at 05:05

## 2025-05-12 RX ADMIN — Medication 6 MG: at 08:05

## 2025-05-12 RX ADMIN — ENOXAPARIN SODIUM 30 MG: 30 INJECTION SUBCUTANEOUS at 05:05

## 2025-05-12 RX ADMIN — HYDRALAZINE HYDROCHLORIDE 50 MG: 50 TABLET ORAL at 08:05

## 2025-05-12 RX ADMIN — SINCALIDE 1.7 MCG: 5 INJECTION, POWDER, LYOPHILIZED, FOR SOLUTION INTRAVENOUS at 10:05

## 2025-05-12 RX ADMIN — AMLODIPINE BESYLATE 5 MG: 5 TABLET ORAL at 10:05

## 2025-05-12 RX ADMIN — MEBROFENIN 5.4 MILLICURIE: 45 INJECTION, POWDER, LYOPHILIZED, FOR SOLUTION INTRAVENOUS at 08:05

## 2025-05-12 RX ADMIN — ONDANSETRON 4 MG: 2 INJECTION INTRAMUSCULAR; INTRAVENOUS at 08:05

## 2025-05-12 RX ADMIN — NYSTATIN 500000 UNITS: 100000 SUSPENSION ORAL at 01:05

## 2025-05-12 NOTE — ASSESSMENT & PLAN NOTE
Imaging at admission did show possible santo but overall clinical picture suggested fecal impaction likely. Due to continued symptoms and poor po intake consulted surgery and repeat imaging showed suspicious gallbladder as source    5/10  - discussed with surgery, HIDA scan ordered, consulted cardiology for surgery clearance as well in anticipation of possible santo  - in light of possible santo, will add zosyn for abx coverage which is reasonable given continued mild leukocytosis as well    5/11  - no indication for zosyn at this time after further review  - HIDA scan ordered for today  - cardiology completed clearance    5/12  - awaiting hida results

## 2025-05-12 NOTE — SUBJECTIVE & OBJECTIVE
Past Medical History:   Diagnosis Date    Arthritis     Bleeding external hemorrhoids     Chronic kidney disease, stage 3b     baseline creat 1.5    Chronic kidney disease, stage 3b 02/07/2024    baseline creat 1.5      COPD (chronic obstructive pulmonary disease)     senile emphysema    Diabetes mellitus, type 2     DNR (do not resuscitate) 02/07/2024    discussed with VITO Christianson present    Essential (primary) hypertension     Hiatal hernia with GERD     Neuropathy     Post-operative hypothyroidism     Severe pulmonary hypertension 10/08/2022    EF  70 % and normal diastolic function       Past Surgical History:   Procedure Laterality Date    BREAST SURGERY      Biopsy    EYE SURGERY      Remove cataracts both eyes    HYSTERECTOMY      NEPHRECTOMY Right 1960    THYROIDECTOMY         Review of patient's allergies indicates:   Allergen Reactions    Aspirin        No current facility-administered medications on file prior to encounter.     Current Outpatient Medications on File Prior to Encounter   Medication Sig    albuterol (PROVENTIL/VENTOLIN HFA) 90 mcg/actuation inhaler INHALE 2 PUFFS BY MOUTH INTO THE LUNGS EVERY 4 HOURS AS NEEDED FOR SHORTNESS OF BREATH / RESCUE (Patient taking differently: Inhale 2 puffs into the lungs every 6 (six) hours as needed.)    ferrous sulfate (FEOSOL) 325 mg (65 mg iron) Tab tablet Take 1 tablet (325 mg total) by mouth 3 (three) times a week. On Monday, Wednesday and Friday    gabapentin (NEURONTIN) 100 MG capsule Take 100 mg by mouth 2 (two) times daily.    hydrALAZINE (APRESOLINE) 50 MG tablet Take 1 tablet (50 mg total) by mouth 2 (two) times daily.    hydrocortisone (ANUSOL-HC) 2.5 % rectal cream Place rectally 2 (two) times daily.    insulin glargine U-100, Lantus, 100 unit/mL injection Inject 5 Units into the skin every 12 (twelve) hours.    insulin lispro 100 unit/mL injection Inject 3 Units into the skin 3 (three) times daily before meals.    lactulose (CHRONULAC) 10  "gram/15 mL solution Take 30 mLs (20 g total) by mouth once daily. (Patient taking differently: Take 15 mLs by mouth once daily.)    levothyroxine (SYNTHROID) 100 MCG tablet TAKE 1 TABLET BY MOUTH BEFORE BREAKFAST.    ondansetron 4 mg/2 mL Soln Inject 4 mg into the vein every 6 (six) hours as needed.    pantoprazole (PROTONIX) 40 MG tablet TAKE 1 TABLET BY MOUTH TWICE A DAY    pravastatin (PRAVACHOL) 10 MG tablet Take 1 tablet (10 mg total) by mouth once daily.    sertraline (ZOLOFT) 25 MG tablet Take 25 mg by mouth once daily.    SYMBICORT 160-4.5 mcg/actuation HFAA Inhale 2 puffs into the lungs 2 (two) times daily.    BD INSULIN SYRINGE ULTRA-FINE 0.3 mL 31 gauge x 5/16" Syrg USE 1 SYRINGE TWICE A DAY    furosemide (LASIX) 40 MG tablet Take 1 tablet (40 mg total) by mouth 2 (two) times daily. (Patient taking differently: Take 40 mg by mouth once daily.)    metoprolol succinate (TOPROL-XL) 25 MG 24 hr tablet Take 1 tablet (25 mg total) by mouth once daily.     Family History       Problem Relation (Age of Onset)    Cancer Sister, Brother, Daughter    Diabetes Mother, Sister, Brother, Sister    Heart disease Father          Tobacco Use    Smoking status: Never    Smokeless tobacco: Never   Substance and Sexual Activity    Alcohol use: Never    Drug use: Never    Sexual activity: Not Currently     Birth control/protection: None     Review of Systems   All other systems reviewed and are negative.    Objective:     Vital Signs (Most Recent):  Temp: 98.3 °F (36.8 °C) (05/12/25 1130)  Pulse: 91 (05/12/25 1130)  Resp: 18 (05/12/25 1130)  BP: 129/68 (05/12/25 1130)  SpO2: 96 % (05/12/25 1130) Vital Signs (24h Range):  Temp:  [97.3 °F (36.3 °C)-98.5 °F (36.9 °C)] 98.3 °F (36.8 °C)  Pulse:  [88-99] 91  Resp:  [16-18] 18  SpO2:  [96 %-99 %] 96 %  BP: (125-158)/(68-80) 129/68     Weight: 85.8 kg (189 lb 2.5 oz)  Body mass index is 31.48 kg/m².     Physical Exam  Vitals reviewed.   Constitutional:       Appearance: She is " ill-appearing.   HENT:      Head: Normocephalic.      Mouth/Throat:      Mouth: Mucous membranes are moist.   Eyes:      Extraocular Movements: Extraocular movements intact.      Pupils: Pupils are equal, round, and reactive to light.   Cardiovascular:      Rate and Rhythm: Normal rate and regular rhythm.   Pulmonary:      Effort: Pulmonary effort is normal.   Abdominal:      General: Abdomen is flat. Bowel sounds are normal.      Palpations: Abdomen is soft.   Musculoskeletal:      Cervical back: Normal range of motion.      Right lower leg: No edema.      Left lower leg: No edema.   Skin:     General: Skin is warm and dry.   Neurological:      General: No focal deficit present.      Mental Status: She is alert. Mental status is at baseline.   Psychiatric:         Mood and Affect: Mood normal.               Significant Labs: All pertinent labs within the past 24 hours have been reviewed.  BMP:   Recent Labs   Lab 05/12/25  0346      *   K 4.2      CO2 19*   BUN 20   CREATININE 1.76*   CALCIUM 8.1*     CBC:   Recent Labs   Lab 05/11/25  0513 05/12/25  0346   WBC 9.94 8.83   HGB 8.0* 8.4*   HCT 26.4* 27.8*   * 589*     CMP:   Recent Labs   Lab 05/11/25  0513 05/12/25  0346   * 133*   K 4.0 4.2    105   CO2 21* 19*   GLU 93 104   BUN 21* 20   CREATININE 1.47* 1.76*   CALCIUM 8.4 8.1*   PROT 5.3* 5.4*   ALBUMIN 1.7* 1.8*   BILITOT 0.2 0.2   ALKPHOS 94 90   AST 17 16   ALT <7 <7   ANIONGAP 11 13       Significant Imaging: I have reviewed all pertinent imaging results/findings within the past 24 hours.

## 2025-05-12 NOTE — HPI
89-year-old female who comes in with few week of poor p.o. intake and initially thought to have some fecal impaction with some abdominal discomfort.  He says every time she eats she gets nauseous and throws up intermittently she has been doing that for the last 2 weeks.  Also her H&H has dropped recently.  History of previous nephrectomy in the past

## 2025-05-12 NOTE — PHYSICIAN QUERY
Please clarify the nature / etiology of the patient's hematological values:  Acute on chronic blood loss anemia; anemia of CKD

## 2025-05-12 NOTE — PLAN OF CARE
Faxed [facility] updated notes.  Will continue to maintain contact with NH until patient dc.  Will continue to follow for anticipated dc needs.

## 2025-05-12 NOTE — PROGRESS NOTES
[facility]  General Surgery  Progress Note    Subjective:     Interval History: No acute changes overnight.  Patient's HIDA scan was done this AM, results pending.  Reports decreased appetite.  Reports nausea but no vomiting. Last bowel movement was 2 days ago. Patient admits to history of constipation intermittently.  Will continue to monitor patient's progress.      Post-Op Info:  * No surgery found *          Medications:  Continuous Infusions:  Scheduled Meds:   amLODIPine  5 mg Oral Daily    enoxparin  30 mg Subcutaneous Daily    hydrALAZINE  50 mg Oral BID    lactulose  15 g Oral BID    nystatin  500,000 Units Oral QID (WM & HS)     PRN Meds:  Current Facility-Administered Medications:     0.9%  NaCl infusion (for blood administration), , Intravenous, Q24H PRN    acetaminophen, 650 mg, Oral, Q4H PRN    acetaminophen, 650 mg, Oral, Q8H PRN    albuterol-ipratropium, 3 mL, Nebulization, Q6H PRN    dextrose 50%, 12.5 g, Intravenous, PRN    dextrose 50%, 25 g, Intravenous, PRN    glucagon (human recombinant), 1 mg, Intramuscular, PRN    glucose, 16 g, Oral, PRN    glucose, 24 g, Oral, PRN    HYDROcodone-acetaminophen, 1 tablet, Oral, Q6H PRN    HYDROcodone-acetaminophen, 1 tablet, Oral, Q6H PRN    insulin aspart U-100, 0-5 Units, Subcutaneous, QID (AC + HS) PRN    melatonin, 6 mg, Oral, Nightly PRN    naloxone, 0.02 mg, Intravenous, PRN    ondansetron, 4 mg, Intravenous, Q8H PRN    sodium chloride 0.9%, 10 mL, Intravenous, Q12H PRN    vancomycin - pharmacy to dose, , Intravenous, pharmacy to manage frequency     Objective:     Vital Signs (Most Recent):  Temp: 98.3 °F (36.8 °C) (05/12/25 1130)  Pulse: 91 (05/12/25 1130)  Resp: 18 (05/12/25 1130)  BP: 129/68 (05/12/25 1130)  SpO2: 96 % (05/12/25 1130) Vital Signs (24h Range):  Temp:  [97.3 °F (36.3 °C)-98.5 °F (36.9 °C)] 98.3 °F (36.8 °C)  Pulse:  [88-99] 91  Resp:  [16-18] 18  SpO2:  [96 %-99 %] 96 %  BP: (125-158)/(68-80) 129/68     No  "intake or output data in the 24 hours ending 05/12/25 1315    Physical Exam  Vitals and nursing note reviewed.   HENT:      Head: Normocephalic.      Mouth/Throat:      Mouth: Mucous membranes are moist.   Eyes:      Extraocular Movements: Extraocular movements intact.   Cardiovascular:      Rate and Rhythm: Tachycardia present.   Pulmonary:      Effort: Pulmonary effort is normal.   Abdominal:      General: Bowel sounds are normal.      Tenderness: There is abdominal tenderness (epigastric).   Musculoskeletal:         General: Normal range of motion.      Cervical back: Normal range of motion.   Skin:     General: Skin is warm and dry.      Capillary Refill: Capillary refill takes less than 2 seconds.   Neurological:      General: No focal deficit present.      Mental Status: She is alert and oriented to person, place, and time.   Psychiatric:         Mood and Affect: Mood normal.         Significant Labs:  Amylase: No results for input(s): "AMYLASE" in the last 48 hours.  BMP:   Recent Labs   Lab 05/12/25  0346      *   K 4.2      CO2 19*   BUN 20   CREATININE 1.76*   CALCIUM 8.1*     CBC:   Recent Labs   Lab 05/12/25  0346   WBC 8.83   RBC 3.34*   HGB 8.4*   HCT 27.8*   *   MCV 83.2   MCH 25.1*   MCHC 30.2*     CMP:   Recent Labs   Lab 05/12/25  0346      CALCIUM 8.1*   ALBUMIN 1.8*   PROT 5.4*   *   K 4.2   CO2 19*      BUN 20   CREATININE 1.76*   ALKPHOS 90   ALT <7   AST 16   BILITOT 0.2     LFTs:   Recent Labs   Lab 05/12/25  0346   ALT <7   AST 16   ALKPHOS 90   BILITOT 0.2   PROT 5.4*   ALBUMIN 1.8*     Lipase: No results for input(s): "LIPASE" in the last 48 hours.  Recent Lab Results  (Last 5 results in the past 24 hours)        05/12/25  1128   05/12/25  0748   05/12/25  0346   05/11/25  2110   05/11/25  1711        Albumin/Globulin Ratio     0.5           Albumin     1.8           ALP     90           ALT     <7           Anion Gap     13           AST     " 16           Baso #     0.03           Basophil %     0.3           BILIRUBIN TOTAL     0.2           BUN     20           BUN/CREAT RATIO     11           Calcium     8.1           Chloride     105           CO2     19           Creatinine     1.76           Differential Method     Auto           eGFR     26  Comment: Estimated GFR calculated using the CKD-EPI creatinine (2021) equation.           Eos #     0.38           Eos %     4.3           Globulin, Total     3.6           Glucose     104           Hematocrit     27.8           Hemoglobin     8.4           Immature Grans (Abs)     0.17           Immature Granulocytes     1.9           Lymph #     0.97           Lymph %     11.0           MCH     25.1           MCHC     30.2           MCV     83.2           Mono #     0.68           Mono %     7.7           MPV     8.3           Neutrophils, Abs     6.60           Neutrophils Relative     74.8           nRBC     0.0           NUCLEATED RBC ABSOLUTE     0.00           Platelet Count     589           POC Glucose 152   122     146   126       Potassium     4.2           PROTEIN TOTAL     5.4           RBC     3.34           RDW     19.9           Sodium     133           WBC     8.83                                  Significant Diagnostics:  I have reviewed all pertinent imaging results/findings within the past 24 hours.    Assessment/Plan:     Active Diagnoses:    Diagnosis Date Noted POA    PRINCIPAL PROBLEM:  Fecal impaction [K56.41] 05/06/2025 Yes    Calculus of gallbladder without biliary obstruction [K80.20] 05/10/2025 Yes    Chronic obstructive pulmonary disease [J44.9] 11/17/2024 Yes    Pulmonary hypertension [I27.20] 02/20/2024 Yes    Post-operative hypothyroidism [E89.0] 02/08/2024 Yes    Hypertension [I10] 02/07/2024 Yes    Diabetes mellitus, type 2 [E11.9]  Yes      Problems Resolved During this Admission:    Diagnosis Date Noted Date Resolved POA    Chronic pain syndrome [G89.4] 05/06/2025  05/06/2025 Yes    Gastroenteritis [K52.9] 05/06/2025 05/06/2025 Yes         NIKOS Etienne  General Surgery  [facility]

## 2025-05-12 NOTE — PROGRESS NOTES
[facility] Hospital Medicine  Progress Note    Patient Name: Vandana Allison  MRN: [id number]  Patient Class: IP- Inpatient   Admission Date: 5/6/2025  Length of Stay: 6 days  Attending Physician: Fallon Guerin MD  Primary Care Provider: Jennifer Mares FNP        Subjective     Principal Problem:Fecal impaction        HPI:   89 y/o female presents from [facility] with pmh CKD, COPD, diabetes, HTN, right nephrectomy to the ED with complaints of abdominal pain which started 2 weeks ago. Pt states she has had abdominal pain which started 2 weeks ago. She notes she started having diarrhea one week ago. There is no hx of a fever, chills, nausea, or vomiting.    Imaging shows significant stool burden in the rectum. Discussing with ED provider agree with admitting to inpatient and treating for ostipation with possible disimpaction.    Overview/Hospital Course:  5/7  - disimpacted yesterday in the ed, did not tolerate enema  - no bowel movements today yet, will advance diet to see if tolerated, continuing lactulose home dose  - noted HGB dropped from 8.9 to 7.4 today but no obvious signs of GI bleed, will trend tomorrow, if continues to drop will order occult blood and consult GI    5/8  - drop in hgb again, 8.9>7.4>6.9, no obvious bleeding with bowel movements, will order occult blood, transfuse 1 unit  - feeling unwell today, complaining of fatigue and nausea, monitoring  - had 1 BM loose since yesterday    5/9  - no additional bowel movements, not eating well, generally looks unwell  - will start fluids since renal function worsening  - KUB ordered along with occult blood  - monitor closely    5/10  - discussed with surgery, HIDA scan ordered, consulted cardiology for surgery clearance as well in anticipation of possible santo  - in light of possible santo, will add zosyn for abx coverage which is reasonable given continued mild leukocytosis as well    5/11  - awaiting hida scan  results    5/12  - surgery following, awaiting hida scan results  - UTI + enterococcus, vanc added, following micro    Past Medical History:   Diagnosis Date    Arthritis     Bleeding external hemorrhoids     Chronic kidney disease, stage 3b     baseline creat 1.5    Chronic kidney disease, stage 3b 02/07/2024    baseline creat 1.5      COPD (chronic obstructive pulmonary disease)     senile emphysema    Diabetes mellitus, type 2     DNR (do not resuscitate) 02/07/2024    discussed with VITO Christianson present    Essential (primary) hypertension     Hiatal hernia with GERD     Neuropathy     Post-operative hypothyroidism     Severe pulmonary hypertension 10/08/2022    EF  70 % and normal diastolic function       Past Surgical History:   Procedure Laterality Date    BREAST SURGERY      Biopsy    EYE SURGERY      Remove cataracts both eyes    HYSTERECTOMY      NEPHRECTOMY Right 1960    THYROIDECTOMY         Review of patient's allergies indicates:   Allergen Reactions    Aspirin        No current facility-administered medications on file prior to encounter.     Current Outpatient Medications on File Prior to Encounter   Medication Sig    albuterol (PROVENTIL/VENTOLIN HFA) 90 mcg/actuation inhaler INHALE 2 PUFFS BY MOUTH INTO THE LUNGS EVERY 4 HOURS AS NEEDED FOR SHORTNESS OF BREATH / RESCUE (Patient taking differently: Inhale 2 puffs into the lungs every 6 (six) hours as needed.)    ferrous sulfate (FEOSOL) 325 mg (65 mg iron) Tab tablet Take 1 tablet (325 mg total) by mouth 3 (three) times a week. On Monday, Wednesday and Friday    gabapentin (NEURONTIN) 100 MG capsule Take 100 mg by mouth 2 (two) times daily.    hydrALAZINE (APRESOLINE) 50 MG tablet Take 1 tablet (50 mg total) by mouth 2 (two) times daily.    hydrocortisone (ANUSOL-HC) 2.5 % rectal cream Place rectally 2 (two) times daily.    insulin glargine U-100, Lantus, 100 unit/mL injection Inject 5 Units into the skin every 12 (twelve) hours.    insulin lispro  "100 unit/mL injection Inject 3 Units into the skin 3 (three) times daily before meals.    lactulose (CHRONULAC) 10 gram/15 mL solution Take 30 mLs (20 g total) by mouth once daily. (Patient taking differently: Take 15 mLs by mouth once daily.)    levothyroxine (SYNTHROID) 100 MCG tablet TAKE 1 TABLET BY MOUTH BEFORE BREAKFAST.    ondansetron 4 mg/2 mL Soln Inject 4 mg into the vein every 6 (six) hours as needed.    pantoprazole (PROTONIX) 40 MG tablet TAKE 1 TABLET BY MOUTH TWICE A DAY    pravastatin (PRAVACHOL) 10 MG tablet Take 1 tablet (10 mg total) by mouth once daily.    sertraline (ZOLOFT) 25 MG tablet Take 25 mg by mouth once daily.    SYMBICORT 160-4.5 mcg/actuation HFAA Inhale 2 puffs into the lungs 2 (two) times daily.    BD INSULIN SYRINGE ULTRA-FINE 0.3 mL 31 gauge x 5/16" Syrg USE 1 SYRINGE TWICE A DAY    furosemide (LASIX) 40 MG tablet Take 1 tablet (40 mg total) by mouth 2 (two) times daily. (Patient taking differently: Take 40 mg by mouth once daily.)    metoprolol succinate (TOPROL-XL) 25 MG 24 hr tablet Take 1 tablet (25 mg total) by mouth once daily.     Family History       Problem Relation (Age of Onset)    Cancer Sister, Brother, Daughter    Diabetes Mother, Sister, Brother, Sister    Heart disease Father          Tobacco Use    Smoking status: Never    Smokeless tobacco: Never   Substance and Sexual Activity    Alcohol use: Never    Drug use: Never    Sexual activity: Not Currently     Birth control/protection: None     Review of Systems   All other systems reviewed and are negative.    Objective:     Vital Signs (Most Recent):  Temp: 98.3 °F (36.8 °C) (05/12/25 1130)  Pulse: 91 (05/12/25 1130)  Resp: 18 (05/12/25 1130)  BP: 129/68 (05/12/25 1130)  SpO2: 96 % (05/12/25 1130) Vital Signs (24h Range):  Temp:  [97.3 °F (36.3 °C)-98.5 °F (36.9 °C)] 98.3 °F (36.8 °C)  Pulse:  [88-99] 91  Resp:  [16-18] 18  SpO2:  [96 %-99 %] 96 %  BP: (125-158)/(68-80) 129/68     Weight: 85.8 kg (189 lb 2.5 " oz)  Body mass index is 31.48 kg/m².     Physical Exam  Vitals reviewed.   Constitutional:       Appearance: She is ill-appearing.   HENT:      Head: Normocephalic.      Mouth/Throat:      Mouth: Mucous membranes are moist.   Eyes:      Extraocular Movements: Extraocular movements intact.      Pupils: Pupils are equal, round, and reactive to light.   Cardiovascular:      Rate and Rhythm: Normal rate and regular rhythm.   Pulmonary:      Effort: Pulmonary effort is normal.   Abdominal:      General: Abdomen is flat. Bowel sounds are normal.      Palpations: Abdomen is soft.   Musculoskeletal:      Cervical back: Normal range of motion.      Right lower leg: No edema.      Left lower leg: No edema.   Skin:     General: Skin is warm and dry.   Neurological:      General: No focal deficit present.      Mental Status: She is alert. Mental status is at baseline.   Psychiatric:         Mood and Affect: Mood normal.               Significant Labs: All pertinent labs within the past 24 hours have been reviewed.  BMP:   Recent Labs   Lab 05/12/25  0346      *   K 4.2      CO2 19*   BUN 20   CREATININE 1.76*   CALCIUM 8.1*     CBC:   Recent Labs   Lab 05/11/25  0513 05/12/25  0346   WBC 9.94 8.83   HGB 8.0* 8.4*   HCT 26.4* 27.8*   * 589*     CMP:   Recent Labs   Lab 05/11/25  0513 05/12/25  0346   * 133*   K 4.0 4.2    105   CO2 21* 19*   GLU 93 104   BUN 21* 20   CREATININE 1.47* 1.76*   CALCIUM 8.4 8.1*   PROT 5.3* 5.4*   ALBUMIN 1.7* 1.8*   BILITOT 0.2 0.2   ALKPHOS 94 90   AST 17 16   ALT <7 <7   ANIONGAP 11 13       Significant Imaging: I have reviewed all pertinent imaging results/findings within the past 24 hours.      Assessment & Plan  Fecal impaction  Likely cause of diarrhea  Admit to inpatient  Will start lactulose for now and if problem isn't solved with order enema vs disimpaction  Leukocytosis noted, will start flagyl out of caution       5/7  - disimpacted yesterday in the  "ed, did not tolerate enema  - no bowel movements today yet, will advance diet to see if tolerated, continuing lactulose home dose  - noted HGB dropped from 8.9 to 7.4 today but no obvious signs of GI bleed, will trend tomorrow, if continues to drop will order occult blood and consult GI    5/8  - drop in hgb again, 8.9>7.4>6.9, no obvious bleeding with bowel movements, will order occult blood, transfuse 1 unit  - feeling unwell today, complaining of fatigue and nausea, monitoring  - had 1 BM loose since yesterday    5/9  - no additional bowel movements, not eating well, generally looks unwell  - will start fluids since renal function worsening  - KUB ordered along with occult blood  - monitor closely  Diabetes mellitus, type 2  Patient's FSGs are controlled on current medication regimen.  Last A1c reviewed-   Lab Results   Component Value Date    HGBA1C 6.4 11/27/2024     Most recent fingerstick glucose reviewed- No results for input(s): "POCTGLUCOSE" in the last 24 hours.  Current correctional scale  High  Maintain anti-hyperglycemic dose as follows-   Antihyperglycemics (From admission, onward)      Start     Stop Route Frequency Ordered    05/06/25 1909  insulin aspart U-100 injection 0-5 Units         -- SubQ Before meals & nightly PRN 05/06/25 1811          Hold Oral hypoglycemics while patient is in the hospital.  Hypertension  Patient's blood pressure range in the last 24 hours was: BP  Min: 125/68  Max: 158/77.The patient's inpatient anti-hypertensive regimen is listed below:  Current Antihypertensives  amLODIPine tablet 5 mg, Daily, Oral  hydrALAZINE tablet 50 mg, 2 times daily, Oral    Plan  - BP is controlled, no changes needed to their regimen    Post-operative hypothyroidism  Restart home meds  Do not feel that constipation is linked to hypothyroidism as this has been well controlled    Pulmonary hypertension  Noted  Restart home meds    Chronic obstructive pulmonary disease  Patient's COPD is " controlled currently.  Patient is currently off COPD Pathway. Continue scheduled inhalers Supplemental oxygen and monitor respiratory status closely.    Calculus of gallbladder without biliary obstruction  Imaging at admission did show possible santo but overall clinical picture suggested fecal impaction likely. Due to continued symptoms and poor po intake consulted surgery and repeat imaging showed suspicious gallbladder as source    5/10  - discussed with surgery, HIDA scan ordered, consulted cardiology for surgery clearance as well in anticipation of possible santo  - in light of possible santo, will add zosyn for abx coverage which is reasonable given continued mild leukocytosis as well    5/11  - no indication for zosyn at this time after further review  - HIDA scan ordered for today  - cardiology completed clearance    5/12  - awaiting hida results  Acute cystitis without hematuria  +enterococcus, on vanc, following cultures    VTE Risk Mitigation (From admission, onward)           Ordered     enoxaparin injection 30 mg  Daily         05/07/25 1038     IP VTE HIGH RISK PATIENT  Once         05/06/25 1811     Place sequential compression device  Until discontinued         05/06/25 1811                    Discharge Planning   MARTHA:      Code Status: DNR   Medical Readiness for Discharge Date:   Discharge Plan A: Return to nursing home                        Fallon Guerin MD  Department of Hospital Medicine   [facility]

## 2025-05-12 NOTE — ASSESSMENT & PLAN NOTE
Patient's blood pressure range in the last 24 hours was: BP  Min: 125/68  Max: 158/77.The patient's inpatient anti-hypertensive regimen is listed below:  Current Antihypertensives  amLODIPine tablet 5 mg, Daily, Oral  hydrALAZINE tablet 50 mg, 2 times daily, Oral    Plan  - BP is controlled, no changes needed to their regimen

## 2025-05-13 ENCOUNTER — ANESTHESIA (OUTPATIENT)
Dept: SURGERY | Facility: HOSPITAL | Age: OVER 89
DRG: 418 | End: 2025-05-13
Payer: MEDICARE

## 2025-05-13 ENCOUNTER — ANESTHESIA EVENT (OUTPATIENT)
Dept: SURGERY | Facility: HOSPITAL | Age: OVER 89
DRG: 418 | End: 2025-05-13
Payer: MEDICARE

## 2025-05-13 LAB
ALBUMIN SERPL BCP-MCNC: 1.8 G/DL (ref 3.4–4.8)
ALBUMIN/GLOB SERPL: 0.5 {RATIO}
ALP SERPL-CCNC: 91 U/L (ref 40–150)
ALT SERPL W P-5'-P-CCNC: <7 U/L
ANION GAP SERPL CALCULATED.3IONS-SCNC: 16 MMOL/L (ref 7–16)
ANISOCYTOSIS BLD QL SMEAR: ABNORMAL
AST SERPL W P-5'-P-CCNC: 19 U/L (ref 11–45)
BASOPHILS # BLD AUTO: 0.04 K/UL (ref 0–0.2)
BASOPHILS NFR BLD AUTO: 0.4 % (ref 0–1)
BILIRUB SERPL-MCNC: 0.2 MG/DL
BUN SERPL-MCNC: 23 MG/DL (ref 10–20)
BUN/CREAT SERPL: 11 (ref 6–20)
CALCIUM SERPL-MCNC: 8.6 MG/DL (ref 8.4–10.2)
CHLORIDE SERPL-SCNC: 107 MMOL/L (ref 98–111)
CO2 SERPL-SCNC: 16 MMOL/L (ref 23–31)
CREAT SERPL-MCNC: 2.12 MG/DL (ref 0.55–1.02)
DIFFERENTIAL METHOD BLD: ABNORMAL
EGFR (NO RACE VARIABLE) (RUSH/TITUS): 21 ML/MIN/1.73M2
EOSINOPHIL # BLD AUTO: 0.25 K/UL (ref 0–0.5)
EOSINOPHIL NFR BLD AUTO: 2.3 % (ref 1–4)
ERYTHROCYTE [DISTWIDTH] IN BLOOD BY AUTOMATED COUNT: 20.1 % (ref 11.5–14.5)
GLOBULIN SER-MCNC: 3.7 G/DL (ref 2–4)
GLUCOSE SERPL-MCNC: 118 MG/DL (ref 70–105)
GLUCOSE SERPL-MCNC: 123 MG/DL (ref 70–105)
GLUCOSE SERPL-MCNC: 158 MG/DL (ref 70–105)
GLUCOSE SERPL-MCNC: 194 MG/DL (ref 70–105)
GLUCOSE SERPL-MCNC: 94 MG/DL (ref 75–121)
HCT VFR BLD AUTO: 29.1 % (ref 38–47)
HGB BLD-MCNC: 8.7 G/DL (ref 12–16)
HYPOCHROMIA BLD QL SMEAR: ABNORMAL
IMM GRANULOCYTES # BLD AUTO: 0.13 K/UL (ref 0–0.04)
IMM GRANULOCYTES NFR BLD: 1.2 % (ref 0–0.4)
LYMPHOCYTES # BLD AUTO: 0.76 K/UL (ref 1–4.8)
LYMPHOCYTES NFR BLD AUTO: 7.1 % (ref 27–41)
MCH RBC QN AUTO: 25.4 PG (ref 27–31)
MCHC RBC AUTO-ENTMCNC: 29.9 G/DL (ref 32–36)
MCV RBC AUTO: 85.1 FL (ref 80–96)
MONOCYTES # BLD AUTO: 0.79 K/UL (ref 0–0.8)
MONOCYTES NFR BLD AUTO: 7.3 % (ref 2–6)
MPC BLD CALC-MCNC: 9 FL (ref 9.4–12.4)
NEUTROPHILS # BLD AUTO: 8.81 K/UL (ref 1.8–7.7)
NEUTROPHILS NFR BLD AUTO: 81.7 % (ref 53–65)
NRBC # BLD AUTO: 0 X10E3/UL
NRBC, AUTO (.00): 0 %
PLATELET # BLD AUTO: 607 K/UL (ref 150–400)
PLATELET MORPHOLOGY: ABNORMAL
POTASSIUM SERPL-SCNC: 5 MMOL/L (ref 3.5–5.1)
PROT SERPL-MCNC: 5.5 G/DL (ref 5.8–7.6)
RBC # BLD AUTO: 3.42 M/UL (ref 4.2–5.4)
SCHISTOCYTES BLD QL AUTO: ABNORMAL
SODIUM SERPL-SCNC: 134 MMOL/L (ref 136–145)
VANCOMYCIN SERPL-MCNC: 14 ΜG/ML (ref 15–20)
WBC # BLD AUTO: 10.78 K/UL (ref 4.5–11)

## 2025-05-13 PROCEDURE — 80053 COMPREHEN METABOLIC PANEL: CPT

## 2025-05-13 PROCEDURE — 25000003 PHARM REV CODE 250

## 2025-05-13 PROCEDURE — 63600175 PHARM REV CODE 636 W HCPCS

## 2025-05-13 PROCEDURE — 27201423 OPTIME MED/SURG SUP & DEVICES STERILE SUPPLY: Performed by: SURGERY

## 2025-05-13 PROCEDURE — 36000709 HC OR TIME LEV III EA ADD 15 MIN: Performed by: SURGERY

## 2025-05-13 PROCEDURE — 85025 COMPLETE CBC W/AUTO DIFF WBC: CPT

## 2025-05-13 PROCEDURE — 88304 TISSUE EXAM BY PATHOLOGIST: CPT | Mod: 26,,, | Performed by: PATHOLOGY

## 2025-05-13 PROCEDURE — 88304 TISSUE EXAM BY PATHOLOGIST: CPT | Mod: TC,SUR | Performed by: SURGERY

## 2025-05-13 PROCEDURE — 63600175 PHARM REV CODE 636 W HCPCS: Performed by: ANESTHESIOLOGY

## 2025-05-13 PROCEDURE — 63600175 PHARM REV CODE 636 W HCPCS: Performed by: SURGERY

## 2025-05-13 PROCEDURE — 36000708 HC OR TIME LEV III 1ST 15 MIN: Performed by: SURGERY

## 2025-05-13 PROCEDURE — 0FT44ZZ RESECTION OF GALLBLADDER, PERCUTANEOUS ENDOSCOPIC APPROACH: ICD-10-PCS | Performed by: SURGERY

## 2025-05-13 PROCEDURE — 11000001 HC ACUTE MED/SURG PRIVATE ROOM

## 2025-05-13 PROCEDURE — 37000009 HC ANESTHESIA EA ADD 15 MINS: Performed by: SURGERY

## 2025-05-13 PROCEDURE — 94761 N-INVAS EAR/PLS OXIMETRY MLT: CPT

## 2025-05-13 PROCEDURE — 71000033 HC RECOVERY, INTIAL HOUR: Performed by: SURGERY

## 2025-05-13 PROCEDURE — 27000221 HC OXYGEN, UP TO 24 HOURS

## 2025-05-13 PROCEDURE — 99900035 HC TECH TIME PER 15 MIN (STAT)

## 2025-05-13 PROCEDURE — 82962 GLUCOSE BLOOD TEST: CPT

## 2025-05-13 PROCEDURE — 80202 ASSAY OF VANCOMYCIN: CPT

## 2025-05-13 PROCEDURE — 37000008 HC ANESTHESIA 1ST 15 MINUTES: Performed by: SURGERY

## 2025-05-13 PROCEDURE — 36415 COLL VENOUS BLD VENIPUNCTURE: CPT

## 2025-05-13 RX ORDER — OXYCODONE HYDROCHLORIDE 5 MG/1
5 TABLET ORAL
Status: DISCONTINUED | OUTPATIENT
Start: 2025-05-13 | End: 2025-05-13 | Stop reason: HOSPADM

## 2025-05-13 RX ORDER — PROPOFOL 10 MG/ML
VIAL (ML) INTRAVENOUS
Status: DISCONTINUED | OUTPATIENT
Start: 2025-05-13 | End: 2025-05-13

## 2025-05-13 RX ORDER — BUPIVACAINE HYDROCHLORIDE 2.5 MG/ML
INJECTION, SOLUTION EPIDURAL; INFILTRATION; INTRACAUDAL; PERINEURAL
Status: DISCONTINUED | OUTPATIENT
Start: 2025-05-13 | End: 2025-05-13 | Stop reason: HOSPADM

## 2025-05-13 RX ORDER — ONDANSETRON HYDROCHLORIDE 2 MG/ML
4 INJECTION, SOLUTION INTRAVENOUS DAILY PRN
Status: DISCONTINUED | OUTPATIENT
Start: 2025-05-13 | End: 2025-05-13 | Stop reason: HOSPADM

## 2025-05-13 RX ORDER — SODIUM CHLORIDE, SODIUM LACTATE, POTASSIUM CHLORIDE, CALCIUM CHLORIDE 600; 310; 30; 20 MG/100ML; MG/100ML; MG/100ML; MG/100ML
125 INJECTION, SOLUTION INTRAVENOUS CONTINUOUS
Status: DISCONTINUED | OUTPATIENT
Start: 2025-05-13 | End: 2025-05-13

## 2025-05-13 RX ORDER — GLUCAGON 1 MG
1 KIT INJECTION
Status: DISCONTINUED | OUTPATIENT
Start: 2025-05-13 | End: 2025-05-13 | Stop reason: HOSPADM

## 2025-05-13 RX ORDER — DIPHENHYDRAMINE HYDROCHLORIDE 50 MG/ML
12.5 INJECTION, SOLUTION INTRAMUSCULAR; INTRAVENOUS ONCE
Status: COMPLETED | OUTPATIENT
Start: 2025-05-13 | End: 2025-05-13

## 2025-05-13 RX ORDER — DIPHENHYDRAMINE HYDROCHLORIDE 50 MG/ML
25 INJECTION, SOLUTION INTRAMUSCULAR; INTRAVENOUS EVERY 6 HOURS PRN
Status: DISCONTINUED | OUTPATIENT
Start: 2025-05-13 | End: 2025-05-13 | Stop reason: HOSPADM

## 2025-05-13 RX ORDER — MORPHINE SULFATE 10 MG/ML
4 INJECTION INTRAMUSCULAR; INTRAVENOUS; SUBCUTANEOUS EVERY 5 MIN PRN
Status: DISCONTINUED | OUTPATIENT
Start: 2025-05-13 | End: 2025-05-13 | Stop reason: HOSPADM

## 2025-05-13 RX ORDER — LIDOCAINE HYDROCHLORIDE 20 MG/ML
INJECTION, SOLUTION EPIDURAL; INFILTRATION; INTRACAUDAL; PERINEURAL
Status: DISCONTINUED | OUTPATIENT
Start: 2025-05-13 | End: 2025-05-13

## 2025-05-13 RX ORDER — ONDANSETRON HYDROCHLORIDE 2 MG/ML
4 INJECTION, SOLUTION INTRAVENOUS ONCE
Status: COMPLETED | OUTPATIENT
Start: 2025-05-13 | End: 2025-05-13

## 2025-05-13 RX ORDER — DEXAMETHASONE SODIUM PHOSPHATE 4 MG/ML
INJECTION, SOLUTION INTRA-ARTICULAR; INTRALESIONAL; INTRAMUSCULAR; INTRAVENOUS; SOFT TISSUE
Status: DISCONTINUED | OUTPATIENT
Start: 2025-05-13 | End: 2025-05-13

## 2025-05-13 RX ORDER — HYDROMORPHONE HYDROCHLORIDE 2 MG/ML
0.5 INJECTION, SOLUTION INTRAMUSCULAR; INTRAVENOUS; SUBCUTANEOUS EVERY 5 MIN PRN
Status: DISCONTINUED | OUTPATIENT
Start: 2025-05-13 | End: 2025-05-13 | Stop reason: HOSPADM

## 2025-05-13 RX ORDER — IPRATROPIUM BROMIDE AND ALBUTEROL SULFATE 2.5; .5 MG/3ML; MG/3ML
3 SOLUTION RESPIRATORY (INHALATION)
Status: DISCONTINUED | OUTPATIENT
Start: 2025-05-13 | End: 2025-05-13 | Stop reason: HOSPADM

## 2025-05-13 RX ORDER — MEPERIDINE HYDROCHLORIDE 25 MG/ML
25 INJECTION INTRAMUSCULAR; INTRAVENOUS; SUBCUTANEOUS EVERY 10 MIN PRN
Status: DISCONTINUED | OUTPATIENT
Start: 2025-05-13 | End: 2025-05-13 | Stop reason: HOSPADM

## 2025-05-13 RX ORDER — CEFAZOLIN SODIUM 1 G/3ML
INJECTION, POWDER, FOR SOLUTION INTRAMUSCULAR; INTRAVENOUS
Status: DISCONTINUED | OUTPATIENT
Start: 2025-05-13 | End: 2025-05-13

## 2025-05-13 RX ORDER — FENTANYL CITRATE 50 UG/ML
INJECTION, SOLUTION INTRAMUSCULAR; INTRAVENOUS
Status: DISCONTINUED | OUTPATIENT
Start: 2025-05-13 | End: 2025-05-13

## 2025-05-13 RX ORDER — ROCURONIUM BROMIDE 10 MG/ML
INJECTION, SOLUTION INTRAVENOUS
Status: DISCONTINUED | OUTPATIENT
Start: 2025-05-13 | End: 2025-05-13

## 2025-05-13 RX ORDER — ETOMIDATE 2 MG/ML
INJECTION INTRAVENOUS
Status: DISCONTINUED | OUTPATIENT
Start: 2025-05-13 | End: 2025-05-13

## 2025-05-13 RX ORDER — ONDANSETRON HYDROCHLORIDE 2 MG/ML
INJECTION, SOLUTION INTRAVENOUS
Status: DISCONTINUED | OUTPATIENT
Start: 2025-05-13 | End: 2025-05-13

## 2025-05-13 RX ADMIN — LACTULOSE 15 G: 20 SOLUTION ORAL at 09:05

## 2025-05-13 RX ADMIN — ETOMIDATE 15 MG: 2 INJECTION INTRAVENOUS at 11:05

## 2025-05-13 RX ADMIN — SODIUM CHLORIDE: 9 INJECTION, SOLUTION INTRAVENOUS at 11:05

## 2025-05-13 RX ADMIN — ROCURONIUM BROMIDE 40 MG: 10 INJECTION, SOLUTION INTRAVENOUS at 11:05

## 2025-05-13 RX ADMIN — FENTANYL CITRATE 50 MCG: 50 INJECTION, SOLUTION INTRAMUSCULAR; INTRAVENOUS at 11:05

## 2025-05-13 RX ADMIN — FENTANYL CITRATE 25 MCG: 50 INJECTION, SOLUTION INTRAMUSCULAR; INTRAVENOUS at 11:05

## 2025-05-13 RX ADMIN — SUGAMMADEX 200 MG: 100 INJECTION, SOLUTION INTRAVENOUS at 11:05

## 2025-05-13 RX ADMIN — NYSTATIN 500000 UNITS: 100000 SUSPENSION ORAL at 05:05

## 2025-05-13 RX ADMIN — ENOXAPARIN SODIUM 30 MG: 30 INJECTION SUBCUTANEOUS at 05:05

## 2025-05-13 RX ADMIN — ONDANSETRON 4 MG: 2 INJECTION INTRAMUSCULAR; INTRAVENOUS at 11:05

## 2025-05-13 RX ADMIN — SODIUM CHLORIDE 1750 MG: 9 INJECTION, SOLUTION INTRAVENOUS at 07:05

## 2025-05-13 RX ADMIN — DIPHENHYDRAMINE HYDROCHLORIDE 12.5 MG: 50 INJECTION INTRAMUSCULAR; INTRAVENOUS at 12:05

## 2025-05-13 RX ADMIN — CEFAZOLIN 2 G: 1 INJECTION, POWDER, FOR SOLUTION INTRAMUSCULAR; INTRAVENOUS; PARENTERAL at 11:05

## 2025-05-13 RX ADMIN — HYDROCODONE BITARTRATE AND ACETAMINOPHEN 1 TABLET: 10; 325 TABLET ORAL at 09:05

## 2025-05-13 RX ADMIN — HYDROCODONE BITARTRATE AND ACETAMINOPHEN 1 TABLET: 10; 325 TABLET ORAL at 02:05

## 2025-05-13 RX ADMIN — HYDRALAZINE HYDROCHLORIDE 50 MG: 50 TABLET ORAL at 09:05

## 2025-05-13 RX ADMIN — PROPOFOL 10 MG: 10 INJECTION, EMULSION INTRAVENOUS at 11:05

## 2025-05-13 RX ADMIN — ONDANSETRON 4 MG: 2 INJECTION INTRAMUSCULAR; INTRAVENOUS at 12:05

## 2025-05-13 RX ADMIN — LIDOCAINE HYDROCHLORIDE 80 MG: 20 INJECTION, SOLUTION EPIDURAL; INFILTRATION; INTRACAUDAL; PERINEURAL at 11:05

## 2025-05-13 RX ADMIN — DEXAMETHASONE SODIUM PHOSPHATE 4 MG: 4 INJECTION, SOLUTION INTRA-ARTICULAR; INTRALESIONAL; INTRAMUSCULAR; INTRAVENOUS; SOFT TISSUE at 11:05

## 2025-05-13 RX ADMIN — AMLODIPINE BESYLATE 5 MG: 5 TABLET ORAL at 09:05

## 2025-05-13 RX ADMIN — NYSTATIN 500000 UNITS: 100000 SUSPENSION ORAL at 09:05

## 2025-05-13 NOTE — ANESTHESIA PROCEDURE NOTES
Intubation    Date/Time: 5/13/2025 11:18 AM    Performed by: Braden Rodas CRNA  Authorized by: Jose Durham MD    Intubation:     Induction:  Intravenous    Intubated:  Postinduction    Mask Ventilation:  Easy with oral airway    Attempts:  1    Attempted By:  CRNA    Method of Intubation:  Video laryngoscopy    Blade:  Merrill 2    Laryngeal View Grade: Grade IIA - cords partially seen      Difficult Airway Encountered?: No      Complications:  None    Airway Device:  Oral endotracheal tube    Airway Device Size:  7.0    Style/Cuff Inflation:  Cuffed (inflated to minimal occlusive pressure)    Tube secured:  20    Secured at:  The lips    Placement Verified By:  Capnometry    Complicating Factors:  Poor neck/head extension    Findings Post-Intubation:  BS equal bilateral and atraumatic/condition of teeth unchanged

## 2025-05-13 NOTE — SUBJECTIVE & OBJECTIVE
Past Medical History:   Diagnosis Date    Arthritis     Bleeding external hemorrhoids     Chronic kidney disease, stage 3b     baseline creat 1.5    Chronic kidney disease, stage 3b 02/07/2024    baseline creat 1.5      COPD (chronic obstructive pulmonary disease)     senile emphysema    Diabetes mellitus, type 2     DNR (do not resuscitate) 02/07/2024    discussed with VITO Christianson present    Essential (primary) hypertension     Hiatal hernia with GERD     Neuropathy     Post-operative hypothyroidism     Severe pulmonary hypertension 10/08/2022    EF  70 % and normal diastolic function       Past Surgical History:   Procedure Laterality Date    BREAST SURGERY      Biopsy    EYE SURGERY      Remove cataracts both eyes    HYSTERECTOMY      NEPHRECTOMY Right 1960    THYROIDECTOMY         Review of patient's allergies indicates:   Allergen Reactions    Aspirin        No current facility-administered medications on file prior to encounter.     Current Outpatient Medications on File Prior to Encounter   Medication Sig    albuterol (PROVENTIL/VENTOLIN HFA) 90 mcg/actuation inhaler INHALE 2 PUFFS BY MOUTH INTO THE LUNGS EVERY 4 HOURS AS NEEDED FOR SHORTNESS OF BREATH / RESCUE (Patient taking differently: Inhale 2 puffs into the lungs every 6 (six) hours as needed.)    ferrous sulfate (FEOSOL) 325 mg (65 mg iron) Tab tablet Take 1 tablet (325 mg total) by mouth 3 (three) times a week. On Monday, Wednesday and Friday    gabapentin (NEURONTIN) 100 MG capsule Take 100 mg by mouth 2 (two) times daily.    hydrALAZINE (APRESOLINE) 50 MG tablet Take 1 tablet (50 mg total) by mouth 2 (two) times daily.    hydrocortisone (ANUSOL-HC) 2.5 % rectal cream Place rectally 2 (two) times daily.    insulin glargine U-100, Lantus, 100 unit/mL injection Inject 5 Units into the skin every 12 (twelve) hours.    insulin lispro 100 unit/mL injection Inject 3 Units into the skin 3 (three) times daily before meals.    lactulose (CHRONULAC) 10  "gram/15 mL solution Take 30 mLs (20 g total) by mouth once daily. (Patient taking differently: Take 15 mLs by mouth once daily.)    levothyroxine (SYNTHROID) 100 MCG tablet TAKE 1 TABLET BY MOUTH BEFORE BREAKFAST.    ondansetron 4 mg/2 mL Soln Inject 4 mg into the vein every 6 (six) hours as needed.    pantoprazole (PROTONIX) 40 MG tablet TAKE 1 TABLET BY MOUTH TWICE A DAY    pravastatin (PRAVACHOL) 10 MG tablet Take 1 tablet (10 mg total) by mouth once daily.    sertraline (ZOLOFT) 25 MG tablet Take 25 mg by mouth once daily.    SYMBICORT 160-4.5 mcg/actuation HFAA Inhale 2 puffs into the lungs 2 (two) times daily.    BD INSULIN SYRINGE ULTRA-FINE 0.3 mL 31 gauge x 5/16" Syrg USE 1 SYRINGE TWICE A DAY    furosemide (LASIX) 40 MG tablet Take 1 tablet (40 mg total) by mouth 2 (two) times daily. (Patient taking differently: Take 40 mg by mouth once daily.)    metoprolol succinate (TOPROL-XL) 25 MG 24 hr tablet Take 1 tablet (25 mg total) by mouth once daily.     Family History       Problem Relation (Age of Onset)    Cancer Sister, Brother, Daughter    Diabetes Mother, Sister, Brother, Sister    Heart disease Father          Tobacco Use    Smoking status: Never    Smokeless tobacco: Never   Substance and Sexual Activity    Alcohol use: Never    Drug use: Never    Sexual activity: Not Currently     Birth control/protection: None     Review of Systems   All other systems reviewed and are negative.    Objective:     Vital Signs (Most Recent):  Temp: 97.8 °F (36.6 °C) (05/13/25 1200)  Pulse: 97 (05/13/25 1240)  Resp: 16 (05/13/25 1240)  BP: (!) 141/59 (05/13/25 1240)  SpO2: 98 % (05/13/25 1240) Vital Signs (24h Range):  Temp:  [97.3 °F (36.3 °C)-98.7 °F (37.1 °C)] 97.8 °F (36.6 °C)  Pulse:  [85-98] 97  Resp:  [13-18] 16  SpO2:  [96 %-100 %] 98 %  BP: (122-153)/(58-77) 141/59     Weight: 85.8 kg (189 lb 2.5 oz)  Body mass index is 31.48 kg/m².     Physical Exam  Vitals reviewed.   Constitutional:       Appearance: She is " ill-appearing.   HENT:      Head: Normocephalic.      Mouth/Throat:      Mouth: Mucous membranes are moist.   Eyes:      Extraocular Movements: Extraocular movements intact.      Pupils: Pupils are equal, round, and reactive to light.   Cardiovascular:      Rate and Rhythm: Normal rate and regular rhythm.   Pulmonary:      Effort: Pulmonary effort is normal.   Abdominal:      General: Abdomen is flat. Bowel sounds are normal.      Palpations: Abdomen is soft.   Musculoskeletal:      Cervical back: Normal range of motion.      Right lower leg: No edema.      Left lower leg: No edema.   Skin:     General: Skin is warm and dry.   Neurological:      General: No focal deficit present.      Mental Status: She is alert. Mental status is at baseline.   Psychiatric:         Mood and Affect: Mood normal.               Significant Labs: All pertinent labs within the past 24 hours have been reviewed.  BMP:   Recent Labs   Lab 05/13/25  0709   GLU 94   *   K 5.0      CO2 16*   BUN 23*   CREATININE 2.12*   CALCIUM 8.6     CBC:   Recent Labs   Lab 05/12/25  0346 05/13/25  0348   WBC 8.83 10.78   HGB 8.4* 8.7*   HCT 27.8* 29.1*   * 607*     CMP:   Recent Labs   Lab 05/12/25  0346 05/13/25  0709   * 134*   K 4.2 5.0    107   CO2 19* 16*    94   BUN 20 23*   CREATININE 1.76* 2.12*   CALCIUM 8.1* 8.6   PROT 5.4* 5.5*   ALBUMIN 1.8* 1.8*   BILITOT 0.2 0.2   ALKPHOS 90 91   AST 16 19   ALT <7 <7   ANIONGAP 13 16       Significant Imaging: I have reviewed all pertinent imaging results/findings within the past 24 hours.

## 2025-05-13 NOTE — PROGRESS NOTES
05/13/25 1358   Wound Care Follow Up   Wound Care Follow-up? Yes   Wound Care- Next Tentative Visit Date 05/20/25   Follow Up Plan Tania POC

## 2025-05-13 NOTE — ANESTHESIA POSTPROCEDURE EVALUATION
Anesthesia Post Evaluation    Patient: Vandana Allison    Procedure(s) Performed: Procedure(s) (LRB):  CHOLECYSTECTOMY, LAPAROSCOPIC (N/A)    Final Anesthesia Type: general      Patient location during evaluation: PACU  Patient participation: Yes- Able to Participate  Level of consciousness: awake and sedated  Post-procedure vital signs: reviewed and stable  Pain management: adequate  Airway patency: patent    PONV status at discharge: No PONV  Anesthetic complications: no      Cardiovascular status: blood pressure returned to baseline  Respiratory status: unassisted  Hydration status: euvolemic  Follow-up not needed.              Vitals Value Taken Time   /60 05/13/25 12:37   Temp 36.6 °C (97.8 °F) 05/13/25 12:00   Pulse 92 05/13/25 12:41   Resp 13 05/13/25 12:20   SpO2 99 % 05/13/25 12:41   Vitals shown include unfiled device data.      No case tracking events are documented in the log.      Pain/Dulce Score: Pain Rating Prior to Med Admin: 9 (5/13/2025  9:26 AM)  Pain Rating Post Med Admin: 4 (5/13/2025 10:26 AM)  Dulce Score: 9 (5/13/2025 12:00 PM)

## 2025-05-13 NOTE — ASSESSMENT & PLAN NOTE
Patient's blood pressure range in the last 24 hours was: BP  Min: 122/64  Max: 153/62.The patient's inpatient anti-hypertensive regimen is listed below:  Current Antihypertensives  amLODIPine tablet 5 mg, Daily, Oral  hydrALAZINE tablet 50 mg, 2 times daily, Oral    Plan  - BP is controlled, no changes needed to their regimen

## 2025-05-13 NOTE — OP NOTE
[facility] - Periop Services  Surgery Department  Operative Note    SUMMARY     Date of Procedure: 5/13/2025     Procedure: Procedure(s) (LRB):  CHOLECYSTECTOMY, LAPAROSCOPIC (N/A)     Surgeons and Role:     * Miguel Angel Aragon MD - Primary    Assisting Surgeon: None    Pre-Operative Diagnosis: Calculus of gallbladder with acute cholecystitis without obstruction [K80.00]    Post-Operative Diagnosis: Post-Op Diagnosis Codes:     * Calculus of gallbladder with acute cholecystitis without obstruction [K80.00]    Anesthesia: General    Procedures Performed: laparoscopic cholecystectomy    Significant Findings of the Procedure: acute choelcystitis with omental adhesions to gallbladder    Procedure in Detail: After informed consent was obtained patient was brought to the OR and prepped and draped in the usual sterile fashion. We started off by optically inserting a 5 mm trocar at the umbilical area. Pneumoperitoneum was obtained up to 15 cm of mercury of pressure. We then under direct visualization placed an additional 11 mm port in the subxiphoid area and 2 additional 5 mm ports in the right upper quadrant. We then retracted the gallbladder over the liver and began dissecting out the cystic structures. The cystic duct and the artery were the only 2 structures entering the gallbladder and these were clipped twice distally once proximally and cut between. We then used the hook cautery to remove the gallbladder from the gallbladder fossa. No holes were made in the gallbladder nor the liver. We then used an Endo Catch bag to remove the gallbladder. We then inspected our bed and clips and they were all intact and hemostatic. We watched our ports come out.  We then discontinued pneumoperitoneum. The skin was closed with a 4-0 Monocryl in a subcuticular manner and patient tolerated the procedure well.      Complications: No    Estimated Blood Loss (EBL): 15 mL           Implants: * No implants in log *    Specimens:    Specimen (24h ago, onward)       Start     Ordered    05/13/25 1138  Surgical Pathology  RELEASE UPON ORDERING         05/13/25 1138                            Condition: Good    Disposition: PACU - hemodynamically stable.    Attestation: I was present and scrubbed for the entire procedure.

## 2025-05-13 NOTE — PROGRESS NOTES
Pharmacokinetic Assessment Follow Up: IV Vancomycin    Vancomycin serum concentration assessment(s):    The random level was drawn correctly and can be used to guide therapy at this time. The measurement is within the desired definitive target range of 10 to 15 mcg/mL.    Vancomycin Regimen Plan:    Vancomycin 1750 times one will check tr at 1930 5/15     Drug levels (last 3 results):  Recent Labs   Lab Result Units 05/13/25  1723   Vancomycin, Random µg/mL 14.0*       Pharmacy will continue to follow and monitor vancomycin.    Please contact pharmacy at extension [phone] for questions regarding this assessment.    Thank you for the consult,   Lisa Chan       Patient brief summary:  Vandana Allison is a 89 y.o. female initiated on antimicrobial therapy with IV Vancomycin for treatment of urinary tract infection        Drug Allergies:   Review of patient's allergies indicates:   Allergen Reactions    Aspirin        Actual Body Weight:   85 kg     Renal Function:   Estimated Creatinine Clearance: 16 mL/min (A) (based on SCr of 2.12 mg/dL (H)).,     Dialysis Method (if applicable):  N/A    CBC (last 72 hours):  Recent Labs   Lab Result Units 05/11/25  0513 05/12/25  0346 05/13/25  0348   WBC K/uL 9.94 8.83 10.78   Hemoglobin g/dL 8.0* 8.4* 8.7*   Hematocrit % 26.4* 27.8* 29.1*   Platelet Count K/uL 566* 589* 607*   Lymphocytes % % 11.5* 11.0* 7.1*   Monocytes % % 8.9* 7.7* 7.3*   Eosinophils % % 4.3* 4.3* 2.3   Basophils % % 0.3 0.3 0.4   Diff Type  Auto Auto Scan Smear       Metabolic Panel (last 72 hours):  Recent Labs   Lab Result Units 05/11/25  0513 05/12/25  0346 05/13/25  0709   Sodium mmol/L 134* 133* 134*   Potassium mmol/L 4.0 4.2 5.0   Chloride mmol/L 106 105 107   CO2 mmol/L 21* 19* 16*   Glucose mg/dL 93 104 94   BUN mg/dL 21* 20 23*   Creatinine mg/dL 1.47* 1.76* 2.12*   Albumin g/dL 1.7* 1.8* 1.8*   Bilirubin, Total mg/dL 0.2 0.2 0.2   Alk Phos U/L 94 90 91   AST U/L 17 16 19   ALT U/L <7 <7 <7        Vancomycin Administrations:  vancomycin given in the last 96 hours                     vancomycin (VANCOCIN) 1,750 mg in 0.9% NaCl 500 mL IVPB (mg) 1,750 mg New Bag 05/11/25 0467                    Microbiologic Results:  Microbiology Results (last 7 days)       Procedure Component Value Units Date/Time    Urine culture [[id number]]  (Abnormal)  (Susceptibility) Collected: 05/06/25 1232    Order Status: Completed Specimen: Urine, Catheterized (In/Out) Updated: 05/10/25 0646     Culture, Urine >100,000 Yeast     Comment: For further identification, fungus culture recommended. Isolate will be held for 7 days. Notify Micro department at [phone] if fungus culture ordered.         >100,000 Enterococcus faecium    Clostridioides difficile toxin/antigen with reflex to PCR [[id number]]     Order Status: Canceled Specimen: Stool

## 2025-05-13 NOTE — PLAN OF CARE
Problem: Adult Inpatient Plan of Care  Goal: Plan of Care Review  Outcome: Ongoing  Goal: Patient-Specific Goal (Individualized)  Outcome: Ongoing  Goal: Absence of Hospital-Acquired Illness or Injury  Outcome: Ongoing  Goal: Optimal Comfort and Wellbeing  Outcome: Ongoing  Goal: Readiness for Transition of Care  Outcome: Ongoing     Problem: Diabetes Comorbidity  Goal: Blood Glucose Level Within Targeted Range  Outcome: Ongoing     Problem: Wound  Goal: Optimal Coping  Outcome: Ongoing  Goal: Optimal Functional Ability  Outcome: Ongoing  Goal: Absence of Infection Signs and Symptoms  Outcome: Ongoing  Goal: Improved Oral Intake  Outcome: Ongoing  Goal: Optimal Pain Control and Function  Outcome: Ongoing  Goal: Skin Health and Integrity  Outcome: Ongoing  Goal: Optimal Wound Healing  Outcome: Ongoing     Problem: Fall Injury Risk  Goal: Absence of Fall and Fall-Related Injury  Outcome: Ongoing     Problem: Skin Injury Risk Increased  Goal: Skin Health and Integrity  Outcome: Ongoing     Problem: Gas Exchange Impaired  Goal: Optimal Gas Exchange  Outcome: Ongoing

## 2025-05-13 NOTE — PROGRESS NOTES
[facility] - Orthopedic  Wound Care    Patient Name:  Vandana Allison   MRN:  [id number]  Date: 5/13/2025  Diagnosis: Fecal impaction    History:     Past Medical History:   Diagnosis Date    Arthritis     Bleeding external hemorrhoids     Chronic kidney disease, stage 3b     baseline creat 1.5    Chronic kidney disease, stage 3b 02/07/2024    baseline creat 1.5      COPD (chronic obstructive pulmonary disease)     senile emphysema    Diabetes mellitus, type 2     DNR (do not resuscitate) 02/07/2024    discussed with VITO Christianson present    Essential (primary) hypertension     Hiatal hernia with GERD     Neuropathy     Post-operative hypothyroidism     Severe pulmonary hypertension 10/08/2022    EF  70 % and normal diastolic function       Social History[1]    Precautions:     Allergies as of 05/06/2025 - Reviewed 05/06/2025   Allergen Reaction Noted    Aspirin  08/30/2017       WOC Assessment Details/Treatment        05/13/25 0849   WOCN Assessment   WOCN Total Time (mins) 60   Visit Date 05/13/25   Visit Time 0830   Consult Type Follow Up   WOCN Speciality Wound   Wound pressure;moisture   Number of Wounds 2   Continence Type Urinary;Fecal   Intervention chart review;applied;orders   Teaching on-going   Skin Interventions   Device Skin Pressure Protection absorbent pad utilized/changed;adhesive use limited;pressure points protected   Pressure Reduction Devices pressure-redistributing mattress utilized   Pressure Reduction Techniques weight shift assistance provided;heels elevated off bed;positioned off wounds;pressure points protected   Skin Protection incontinence pads utilized;silicone foam dressing in place   Positioning   Body Position turned;right   Head of Bed (HOB) Positioning HOB at 20-30 degrees   Positioning/Transfer Devices pillows;wedge;in use   Pressure Injury Prevention    Check Moisture Management Pad Done   Sacral Foam Dressing Peel back sacral foam dressing, assess skin and reapply    Heel protection technique Foam dressing   Heel preventative measures Peel back dressing/boot, assess skin and reapply        Wound 05/06/25 2030 Pressure Injury Sacral spine   Date First Assessed/Time First Assessed: 05/06/25 2030   Present on Original Admission: Yes  Primary Wound Type: Pressure Injury  Location: Sacral spine   Wound Image    Pressure Injury Stage U   Dressing Appearance Intact;Moist drainage   Drainage Amount Moderate   Drainage Characteristics/Odor Brown;[place]ia;Creamy   Appearance Pink;Tan;Yellow;Slough;Moist   Tissue loss description Full thickness   Periwound Area Dry   Wound Edges Undefined;Irregular   Wound Length (cm) 3.5 cm   Wound Width (cm) 2.7 cm   Wound Depth (cm) 0.1 cm   Wound Volume (cm^3) 0.495 cm^3   Wound Surface Area (cm^2) 7.42 cm^2   Care Cleansed with:;Antimicrobial agent;Wound cleanser   Dressing Changed;Silver;Hydrofiber;Silicone;Island/border;Foam   Periwound Care Absorptive dressing applied;Cleansed with pH balanced cleanser;Dry periwound area maintained;Skin barrier film applied        Wound 05/06/25 2035 Pressure Injury Right Heel   Date First Assessed/Time First Assessed: 05/06/25 2035   Primary Wound Type: Pressure Injury  Side: Right  Location: Heel   Wound Image     Pressure Injury Stage U   Dressing Appearance Intact;Dried drainage   Drainage Amount Scant   Drainage Characteristics/Odor Serosanguineous   Appearance Red;Maroon;Purple;Black;Dry   Periwound Area Dry   Wound Edges Undefined   Care Applied:;Povidone iodine   Dressing Changed;Silicone;Island/border;Foam   Periwound Care Absorptive dressing applied;Cleansed with pH balanced cleanser;Dry periwound area maintained     WOC Team consulted for:  Sacral & R Heel    Narrative:   Patient alert and oriented. Patient resting in bed. Patient tolerated wound care well with no complaints    Active Wounds and Recommendations: Continue POC    Goals for Wound Healing: Apply antimicrobial, Removal of slough/eschar, Reduce  bioburden, and Educate on proper wound management post D/C     Barriers to Wound Healing: multiple co-morbidities necrosis advanced age fragile skin no protective sensation    Orders placed.    Thank you for the consult.     We will continue to follow. See additional note under Notes Tab for tentative f/u plan/dates.    05/13/2025       [1]   Social History  Socioeconomic History    Marital status:    Tobacco Use    Smoking status: Never    Smokeless tobacco: Never   Substance and Sexual Activity    Alcohol use: Never    Drug use: Never    Sexual activity: Not Currently     Birth control/protection: None     Social Drivers of Health     Financial Resource Strain: Low Risk  (5/7/2025)    Overall Financial Resource Strain (CARDIA)     Difficulty of Paying Living Expenses: Not very hard   Food Insecurity: No Food Insecurity (5/7/2025)    Hunger Vital Sign     Worried About Running Out of Food in the Last Year: Never true     Ran Out of Food in the Last Year: Never true   Transportation Needs: No Transportation Needs (5/7/2025)    PRAPARE - Transportation     Lack of Transportation (Medical): No     Lack of Transportation (Non-Medical): No   Physical Activity: Inactive (5/7/2025)    Exercise Vital Sign     Days of Exercise per Week: 0 days     Minutes of Exercise per Session: 0 min   Stress: Patient Unable To Answer (5/6/2025)    [employer] - Occupational Stress Questionnaire     Feeling of Stress : Patient unable to answer   Housing Stability: Low Risk  (5/7/2025)    Housing Stability Vital Sign     Unable to Pay for Housing in the Last Year: No     Number of Times Moved in the Last Year: 0     Homeless in the Last Year: No

## 2025-05-13 NOTE — INTERVAL H&P NOTE
The patient has been examined and the H&P has been reviewed:    I concur with the findings and no changes have occurred since H&P was written.    Surgery risks, benefits and alternative options discussed and understood by patient/family.    With nonfilling of the gallbladder on HIDA scan.  Still not able to eat yesterday.  Had a long discussion with the patient who is in her right state of mind and both of her children and they agree with the plan for laparoscopic cholecystectomy.  They understand she is a high-risk due to her age but overall is relatively healthy.  Risks and benefits of the surgery were explained including risk of bleeding, infection, open operation, need for additional operations if there is any injury to surrounding organs.  They understand also that the DNR will be rescinded temporarily during the perioperative period.  She is agreeable with the plan and all questions were answered.      Active Hospital Problems    Diagnosis  POA    *Fecal impaction [K56.41]  Yes    Acute cystitis without hematuria [N30.00]  Yes    Calculus of gallbladder without biliary obstruction [K80.20]  Yes    Chronic obstructive pulmonary disease [J44.9]  Yes    Pulmonary hypertension [I27.20]  Yes    Post-operative hypothyroidism [E89.0]  Yes    Hypertension [I10]  Yes    Diabetes mellitus, type 2 [E11.9]  Yes      Resolved Hospital Problems    Diagnosis Date Resolved POA    Chronic pain syndrome [G89.4] 05/06/2025 Yes    Gastroenteritis [K52.9] 05/06/2025 Yes

## 2025-05-13 NOTE — PROGRESS NOTES
[facility] Hospital Medicine  Progress Note    Patient Name: Vandana Allison  MRN: [id number]  Patient Class: IP- Inpatient   Admission Date: 5/6/2025  Length of Stay: 7 days  Attending Physician: Fallon Guerin MD  Primary Care Provider: Jennifer Mares FNP        Subjective     Principal Problem:Fecal impaction        HPI:   89 y/o female presents from [facility] with pmh CKD, COPD, diabetes, HTN, right nephrectomy to the ED with complaints of abdominal pain which started 2 weeks ago. Pt states she has had abdominal pain which started 2 weeks ago. She notes she started having diarrhea one week ago. There is no hx of a fever, chills, nausea, or vomiting.    Imaging shows significant stool burden in the rectum. Discussing with ED provider agree with admitting to inpatient and treating for ostipation with possible disimpaction.    Overview/Hospital Course:  5/7  - disimpacted yesterday in the ed, did not tolerate enema  - no bowel movements today yet, will advance diet to see if tolerated, continuing lactulose home dose  - noted HGB dropped from 8.9 to 7.4 today but no obvious signs of GI bleed, will trend tomorrow, if continues to drop will order occult blood and consult GI    5/8  - drop in hgb again, 8.9>7.4>6.9, no obvious bleeding with bowel movements, will order occult blood, transfuse 1 unit  - feeling unwell today, complaining of fatigue and nausea, monitoring  - had 1 BM loose since yesterday    5/9  - no additional bowel movements, not eating well, generally looks unwell  - will start fluids since renal function worsening  - KUB ordered along with occult blood  - monitor closely    5/10  - discussed with surgery, HIDA scan ordered, consulted cardiology for surgery clearance as well in anticipation of possible santo  - in light of possible santo, will add zosyn for abx coverage which is reasonable given continued mild leukocytosis as well    5/11  - awaiting hida scan  results    5/12  - surgery following, awaiting hida scan results  - UTI + enterococcus, vanc added, following micro    5/13  - planned for santo today  - when able to tolerate diet and cleared by surgery can discharge back to [facility]  - on vanc for urine culture, I think that it is reasonable to do a shortened course of abx due to patient being completely asymptomatic at time of urine culture, no indication for IV abx outpatient    Past Medical History:   Diagnosis Date    Arthritis     Bleeding external hemorrhoids     Chronic kidney disease, stage 3b     baseline creat 1.5    Chronic kidney disease, stage 3b 02/07/2024    baseline creat 1.5      COPD (chronic obstructive pulmonary disease)     senile emphysema    Diabetes mellitus, type 2     DNR (do not resuscitate) 02/07/2024    discussed with RN Stephenie Christianson present    Essential (primary) hypertension     Hiatal hernia with GERD     Neuropathy     Post-operative hypothyroidism     Severe pulmonary hypertension 10/08/2022    EF  70 % and normal diastolic function       Past Surgical History:   Procedure Laterality Date    BREAST SURGERY      Biopsy    EYE SURGERY      Remove cataracts both eyes    HYSTERECTOMY      NEPHRECTOMY Right 1960    THYROIDECTOMY         Review of patient's allergies indicates:   Allergen Reactions    Aspirin        No current facility-administered medications on file prior to encounter.     Current Outpatient Medications on File Prior to Encounter   Medication Sig    albuterol (PROVENTIL/VENTOLIN HFA) 90 mcg/actuation inhaler INHALE 2 PUFFS BY MOUTH INTO THE LUNGS EVERY 4 HOURS AS NEEDED FOR SHORTNESS OF BREATH / RESCUE (Patient taking differently: Inhale 2 puffs into the lungs every 6 (six) hours as needed.)    ferrous sulfate (FEOSOL) 325 mg (65 mg iron) Tab tablet Take 1 tablet (325 mg total) by mouth 3 (three) times a week. On Monday, Wednesday and Friday    gabapentin (NEURONTIN) 100 MG capsule Take 100 mg by mouth 2 (two) times  "daily.    hydrALAZINE (APRESOLINE) 50 MG tablet Take 1 tablet (50 mg total) by mouth 2 (two) times daily.    hydrocortisone (ANUSOL-HC) 2.5 % rectal cream Place rectally 2 (two) times daily.    insulin glargine U-100, Lantus, 100 unit/mL injection Inject 5 Units into the skin every 12 (twelve) hours.    insulin lispro 100 unit/mL injection Inject 3 Units into the skin 3 (three) times daily before meals.    lactulose (CHRONULAC) 10 gram/15 mL solution Take 30 mLs (20 g total) by mouth once daily. (Patient taking differently: Take 15 mLs by mouth once daily.)    levothyroxine (SYNTHROID) 100 MCG tablet TAKE 1 TABLET BY MOUTH BEFORE BREAKFAST.    ondansetron 4 mg/2 mL Soln Inject 4 mg into the vein every 6 (six) hours as needed.    pantoprazole (PROTONIX) 40 MG tablet TAKE 1 TABLET BY MOUTH TWICE A DAY    pravastatin (PRAVACHOL) 10 MG tablet Take 1 tablet (10 mg total) by mouth once daily.    sertraline (ZOLOFT) 25 MG tablet Take 25 mg by mouth once daily.    SYMBICORT 160-4.5 mcg/actuation HFAA Inhale 2 puffs into the lungs 2 (two) times daily.    BD INSULIN SYRINGE ULTRA-FINE 0.3 mL 31 gauge x 5/16" Syrg USE 1 SYRINGE TWICE A DAY    furosemide (LASIX) 40 MG tablet Take 1 tablet (40 mg total) by mouth 2 (two) times daily. (Patient taking differently: Take 40 mg by mouth once daily.)    metoprolol succinate (TOPROL-XL) 25 MG 24 hr tablet Take 1 tablet (25 mg total) by mouth once daily.     Family History       Problem Relation (Age of Onset)    Cancer Sister, Brother, Daughter    Diabetes Mother, Sister, Brother, Sister    Heart disease Father          Tobacco Use    Smoking status: Never    Smokeless tobacco: Never   Substance and Sexual Activity    Alcohol use: Never    Drug use: Never    Sexual activity: Not Currently     Birth control/protection: None     Review of Systems   All other systems reviewed and are negative.    Objective:     Vital Signs (Most Recent):  Temp: 97.8 °F (36.6 °C) (05/13/25 1200)  Pulse: 97 " (05/13/25 1240)  Resp: 16 (05/13/25 1240)  BP: (!) 141/59 (05/13/25 1240)  SpO2: 98 % (05/13/25 1240) Vital Signs (24h Range):  Temp:  [97.3 °F (36.3 °C)-98.7 °F (37.1 °C)] 97.8 °F (36.6 °C)  Pulse:  [85-98] 97  Resp:  [13-18] 16  SpO2:  [96 %-100 %] 98 %  BP: (122-153)/(58-77) 141/59     Weight: 85.8 kg (189 lb 2.5 oz)  Body mass index is 31.48 kg/m².     Physical Exam  Vitals reviewed.   Constitutional:       Appearance: She is ill-appearing.   HENT:      Head: Normocephalic.      Mouth/Throat:      Mouth: Mucous membranes are moist.   Eyes:      Extraocular Movements: Extraocular movements intact.      Pupils: Pupils are equal, round, and reactive to light.   Cardiovascular:      Rate and Rhythm: Normal rate and regular rhythm.   Pulmonary:      Effort: Pulmonary effort is normal.   Abdominal:      General: Abdomen is flat. Bowel sounds are normal.      Palpations: Abdomen is soft.   Musculoskeletal:      Cervical back: Normal range of motion.      Right lower leg: No edema.      Left lower leg: No edema.   Skin:     General: Skin is warm and dry.   Neurological:      General: No focal deficit present.      Mental Status: She is alert. Mental status is at baseline.   Psychiatric:         Mood and Affect: Mood normal.               Significant Labs: All pertinent labs within the past 24 hours have been reviewed.  BMP:   Recent Labs   Lab 05/13/25  0709   GLU 94   *   K 5.0      CO2 16*   BUN 23*   CREATININE 2.12*   CALCIUM 8.6     CBC:   Recent Labs   Lab 05/12/25  0346 05/13/25  0348   WBC 8.83 10.78   HGB 8.4* 8.7*   HCT 27.8* 29.1*   * 607*     CMP:   Recent Labs   Lab 05/12/25  0346 05/13/25  0709   * 134*   K 4.2 5.0    107   CO2 19* 16*    94   BUN 20 23*   CREATININE 1.76* 2.12*   CALCIUM 8.1* 8.6   PROT 5.4* 5.5*   ALBUMIN 1.8* 1.8*   BILITOT 0.2 0.2   ALKPHOS 90 91   AST 16 19   ALT <7 <7   ANIONGAP 13 16       Significant Imaging: I have reviewed all pertinent  "imaging results/findings within the past 24 hours.      Assessment & Plan  Fecal impaction  Likely cause of diarrhea  Admit to inpatient  Will start lactulose for now and if problem isn't solved with order enema vs disimpaction  Leukocytosis noted, will start flagyl out of caution       5/7  - disimpacted yesterday in the ed, did not tolerate enema  - no bowel movements today yet, will advance diet to see if tolerated, continuing lactulose home dose  - noted HGB dropped from 8.9 to 7.4 today but no obvious signs of GI bleed, will trend tomorrow, if continues to drop will order occult blood and consult GI    5/8  - drop in hgb again, 8.9>7.4>6.9, no obvious bleeding with bowel movements, will order occult blood, transfuse 1 unit  - feeling unwell today, complaining of fatigue and nausea, monitoring  - had 1 BM loose since yesterday    5/9  - no additional bowel movements, not eating well, generally looks unwell  - will start fluids since renal function worsening  - KUB ordered along with occult blood  - monitor closely  Diabetes mellitus, type 2  Patient's FSGs are controlled on current medication regimen.  Last A1c reviewed-   Lab Results   Component Value Date    HGBA1C 6.4 11/27/2024     Most recent fingerstick glucose reviewed- No results for input(s): "POCTGLUCOSE" in the last 24 hours.  Current correctional scale  High  Maintain anti-hyperglycemic dose as follows-   Antihyperglycemics (From admission, onward)      Start     Stop Route Frequency Ordered    05/06/25 1909  insulin aspart U-100 injection 0-5 Units         -- SubQ Before meals & nightly PRN 05/06/25 1811          Hold Oral hypoglycemics while patient is in the hospital.  Hypertension  Patient's blood pressure range in the last 24 hours was: BP  Min: 122/64  Max: 153/62.The patient's inpatient anti-hypertensive regimen is listed below:  Current Antihypertensives  amLODIPine tablet 5 mg, Daily, Oral  hydrALAZINE tablet 50 mg, 2 times daily, " Oral    Plan  - BP is controlled, no changes needed to their regimen    Post-operative hypothyroidism  Restart home meds  Do not feel that constipation is linked to hypothyroidism as this has been well controlled    Pulmonary hypertension  Noted  Restart home meds    Chronic obstructive pulmonary disease  Patient's COPD is controlled currently.  Patient is currently off COPD Pathway. Continue scheduled inhalers Supplemental oxygen and monitor respiratory status closely.    Calculus of gallbladder without biliary obstruction  Imaging at admission did show possible santo but overall clinical picture suggested fecal impaction likely. Due to continued symptoms and poor po intake consulted surgery and repeat imaging showed suspicious gallbladder as source    5/10  - discussed with surgery, HIDA scan ordered, consulted cardiology for surgery clearance as well in anticipation of possible santo  - in light of possible santo, will add zosyn for abx coverage which is reasonable given continued mild leukocytosis as well    5/11  - no indication for zosyn at this time after further review  - HIDA scan ordered for today  - cardiology completed clearance    5/12  - awaiting hida results      5/13  - planned for santo today  - when able to tolerate diet and cleared by surgery can discharge back to [facility]  Acute cystitis without hematuria  +enterococcus, on vanc, following cultures    5/13  - on vanc for urine culture, I think that it is reasonable to do a shortened course of abx due to patient being completely asymptomatic at time of urine culture, no indication for IV abx outpatient  VTE Risk Mitigation (From admission, onward)           Ordered     enoxaparin injection 30 mg  Daily         05/07/25 1038     IP VTE HIGH RISK PATIENT  Once         05/06/25 1811     Place sequential compression device  Until discontinued         05/06/25 1811                    Discharge Planning   MARTHA:      Code Status: DNR   Medical Readiness  for Discharge Date:   Discharge Plan A: Return to nursing home                        Fallon Guerin MD  Department of Hospital Medicine   [facility]

## 2025-05-13 NOTE — CONSULTS
[facility]  Adult Nutrition  First Assessment Note         Reason for Assessment  Reason For Assessment: consult (wound)        Assessment and Plan    5/13/2025: Consult received and appreciated. Consult for unstageable pressure wound. Patient is currently NPO. Recommend resume diet as soon as possible and add Jasiel BID to aid in wound healing. Also recommend consider addition of 500mg Vitamin C+ 220mg ZnSO4 BID + MVI qd to aid in wound healing. RD following.    5/7/2025: Patient identified at risk by screening criteria with MST3. Patient is a 89 yo F from [facility] with PMH of CKD, COPD, DM, HTN, right nephrectomy who presented to ED with c/o abdominal pain, vomiting, diarrhea. Patient found to have significant stool burden in rectum. Admitted for treating constipation with possible disimpaction.    Patient is 85.6 kg (188 lb) with a BMI of 31.42 and is obese. Patient was unsure of weight loss and endorsed poor appetite, likely attributable to GI symptoms. Chart review reveals no significant weight loss in the past year. Patient documented to weigh 189 lb x 6 mo ago, 179 lb x 1 yr ago. No evident fat and muscle depletion. It is expected that patient's appetite will improve once fecal impaction resolved.  Per ASPEN guidelines, patient does not meet criteria for protein-calorie malnutrition at this time.    Nutrition Recommendations  Patient is currently on a Clear Liquids Diet. Recommend to advance diet as tolerated. If unable to advance diet x 2 - 3 days, may recommend to consider alternate route of nutrition depending upon goals of care. Although patient has extensive medical history, given her age, advancement to a Regular Diet is most appropriate. Upon discharge, encourage patient to consume adequate fiber and fluids to prevent future impaction.    Last Bowel Movement: 05/12/25    Medications/labs reviewed. RD following.    Learning Needs/Social Determinants of Health    Learning  Assessment       05/06/2025 2054 [facility] (5/6/2025 - Present)   Created by Ed Grimaldo RN - RN (Nurse) Status: Complete                 PRIMARY LEARNER     Primary Learner Name:  Vandana Allison MT - 05/06/2025 2054    Relationship:  Patient MT - 05/06/2025 2054    Does the primary learner have any barriers to learning?:  Cognitive MT - 05/06/2025 2054    What is the preferred language of the primary learner?:  English MT - 05/06/2025 2054    Is an  required?:  No MT - 05/06/2025 2054    How does the primary learner prefer to learn new concepts?:  Listening MT - 05/06/2025 2054    How often do you need to have someone help you read instructions, pamphlets, or written material from your doctor or pharmacy?:  Always MT - 05/06/2025 2054        CO-LEARNER #1     No question answered        CO-LEARNER #2     No question answered        SPECIAL TOPICS     No question answered        ANSWERED BY:     No question answered        Comments         Edit History       Ed Grimaldo, RN - RN (Nurse)   05/06/2025 2054                          Social Drivers of Health     Tobacco Use: Low Risk  (11/27/2024)    Patient History     Smoking Tobacco Use: Never     Smokeless Tobacco Use: Never     Passive Exposure: Not on file   Alcohol Use: Not At Risk (5/7/2025)    AUDIT-C     Frequency of Alcohol Consumption: Never     Average Number of Drinks: Patient does not drink     Frequency of Binge Drinking: Never   Financial Resource Strain: Low Risk  (5/7/2025)    Overall Financial Resource Strain (CARDIA)     Difficulty of Paying Living Expenses: Not very hard   Food Insecurity: No Food Insecurity (5/7/2025)    Hunger Vital Sign     Worried About Running Out of Food in the Last Year: Never true     Ran Out of Food in the Last Year: Never true   Transportation Needs: No Transportation Needs (5/7/2025)    PRAPARE - Transportation     Lack of Transportation (Medical): No     Lack of  Transportation (Non-Medical): No   Physical Activity: Inactive (5/7/2025)    Exercise Vital Sign     Days of Exercise per Week: 0 days     Minutes of Exercise per Session: 0 min   Stress: Patient Unable To Answer (5/6/2025)    [employer] - Occupational Stress Questionnaire     Feeling of Stress : Patient unable to answer   Housing Stability: Low Risk  (5/7/2025)    Housing Stability Vital Sign     Unable to Pay for Housing in the Last Year: No     Number of Times Moved in the Last Year: 0     Homeless in the Last Year: No   Depression: Low Risk  (11/27/2024)    Depression     Last PHQ-4: Flowsheet Data: 0   Utilities: Not At Risk (5/7/2025)    [facility] Utilities     Threatened with loss of utilities: No   Health Literacy: Inadequate Health Literacy (5/7/2025)     Health Literacy     Frequency of need for help with medical instructions: Sometimes   Social Isolation: Not on file     Malnutrition  Is Patient Malnourished: No    Nutrition Diagnosis  Altered GI function related to fecal impaction as evidenced by c/o N/V/D  Comments: advance diet as tolerated    Recent Labs   Lab 05/12/25  0346 05/12/25  0748 05/13/25  0745     --   --    POCGLU  --    < > 118*    < > = values in this interval not displayed.     Comments on Glucose: Glucose elevated. [facility] T2DM    Nutrition Prescription / Recommendations  Recommendation/Intervention: Recommend addition of jasiel BID to aid in wound healing once diet resumed. Also recommend consider addition of 500mg Vitamin C + 220mg ZnSO4 BID + MVI qd to aid in wound healing.  Goals: weight maintenance during admission, resolution of GI symptoms, consuming 50 - 75 % meals, improve skin integrity  Nutrition Goal Status: progressing towards goal  Current Diet Order: NPO  Nutrition Order Comments: fecal impaction  Chewing or Swallowing Difficulty?: No Chewing or swallowing difficulty  Recommended Diet: Clear Liquid  Recommended Oral Supplement: Jasiel [90  kcals, 2.5g Protein, 10g Carbs(3g Sugar), 7g L-Arginine, 7g L-Glutamine, Vitamin C 300mg, 9.5mg Zinc] 2 times a day   Is Nutrition Support Recommended: No  Is Nutrition Education Recommended: No - patient is a 89 yo nursing home resident    Monitor and Evaluation  % current Intake: NPO  % intake to meet estimated needs: 50 - 75 %  Monitor and Evaluation: Food and beverage intake, Protein intake, Carbohydrate intake, Diet order, Weight, Electrolyte and renal panel, Gastrointestinal profile, Glucose/endocrine profile, Inflammatory profile, Lipid profile, Nutrition focused physical findings, Skin    Current Medical Diagnosis and Past Medical History     Past Medical History:   Diagnosis Date    Arthritis     Bleeding external hemorrhoids     Chronic kidney disease, stage 3b     baseline creat 1.5    Chronic kidney disease, stage 3b 02/07/2024    baseline creat 1.5      COPD (chronic obstructive pulmonary disease)     senile emphysema    Diabetes mellitus, type 2     DNR (do not resuscitate) 02/07/2024    discussed with VITO Christianson present    Essential (primary) hypertension     Hiatal hernia with GERD     Neuropathy     Post-operative hypothyroidism     Severe pulmonary hypertension 10/08/2022    EF  70 % and normal diastolic function     Nutrition/Diet History  Spiritual, Cultural Beliefs, Holiness Practices, Values that Affect Care: no  Food Allergies: NKFA    Lab/Procedures/Meds  Recent Labs   Lab 05/12/25  0346   *   K 4.2   BUN 20   CREATININE 1.76*   CALCIUM 8.1*   ALBUMIN 1.8*      ALT <7   AST 16   Note: Na+, Alb low. BUN, Cr elevated (CKD Stage IV)    Last A1c:   Lab Results   Component Value Date    HGBA1C 6.4 11/27/2024   Note: PMH T2DM. Goal for patients with diabetes < 7, goal for frail elderly < 8    Lab Results   Component Value Date    RBC 3.42 (L) 05/13/2025    HGB 8.7 (L) 05/13/2025    HCT 29.1 (L) 05/13/2025    MCV 85.1 05/13/2025    MCH 25.4 (L) 05/13/2025    MCHC 29.9 (L) 05/13/2025  "   [facility] 368 02/07/2024   Note: H/H low    Pertinent Labs Reviewed: reviewed  Pertinent Medications Reviewed: reviewed    Scheduled Meds:   amLODIPine  5 mg Oral Daily    enoxparin  30 mg Subcutaneous Daily    hydrALAZINE  50 mg Oral BID    lactulose  15 g Oral BID    nystatin  500,000 Units Oral QID (WM & HS)   Note: lactulose    Continuous Infusions:      PRN Meds:.  Current Facility-Administered Medications:     0.9%  NaCl infusion (for blood administration), , Intravenous, Q24H PRN    acetaminophen, 650 mg, Oral, Q4H PRN    acetaminophen, 650 mg, Oral, Q8H PRN    albuterol-ipratropium, 3 mL, Nebulization, Q6H PRN    dextrose 50%, 12.5 g, Intravenous, PRN    dextrose 50%, 25 g, Intravenous, PRN    glucagon (human recombinant), 1 mg, Intramuscular, PRN    glucose, 16 g, Oral, PRN    glucose, 24 g, Oral, PRN    HYDROcodone-acetaminophen, 1 tablet, Oral, Q6H PRN    HYDROcodone-acetaminophen, 1 tablet, Oral, Q6H PRN    insulin aspart U-100, 0-5 Units, Subcutaneous, QID (AC + HS) PRN    melatonin, 6 mg, Oral, Nightly PRN    naloxone, 0.02 mg, Intravenous, PRN    ondansetron, 4 mg, Intravenous, Q8H PRN    sodium chloride 0.9%, 10 mL, Intravenous, Q12H PRN    vancomycin - pharmacy to dose, , Intravenous, pharmacy to manage frequency    Anthropometrics  Height: 5' 5" (165.1 cm)  Height (inches): 65 in  Height Method: Stated  Weight: 85.8 kg (189 lb 2.5 oz)  Weight (lb): 189.16 lb  Weight Method: Bed Scale  Ideal Body Weight (IBW), Female: 125 lb  % Ideal Body Weight, Female (lb): 145.6 %  BMI (Calculated): 31.5       Estimated/Assessed Needs  RMR (Lyon-St. Jeor Equation): 1238.88     Temp: 97.3 °F (36.3 °C)Oral    Weight Used For Calorie Calculations: 85.6 kg (188 lb 11.4 oz)     Energy Calorie Requirements (kcal): 1,712 - 2,140 kcal (20 - 25 kcal/kg ABW)    Weight Used For Protein Calculations: 56.7 kg (125 lb)    Protein Requirements: 57 - 68 g (1 - 1.2 g/kg IBW) (patient requires adequate protein given sacral " pressure injury. However, caution with high protein intake given CKD Stage 4 - avoid intake >1.3 g/kg)      Fluid Requirements (mL): 1 mL/kcal (1,712 - 2,140 mL)    CHO Requirement: 45 - 55 % total kcal (T2DM)    Nutrition Follow-Up  RD Follow-up?: Yes    Nutrition Discharge Planning: General healthy diet       Taylor Kaur, MS, RD, LD  Available via Secure Chat

## 2025-05-13 NOTE — ASSESSMENT & PLAN NOTE
Imaging at admission did show possible santo but overall clinical picture suggested fecal impaction likely. Due to continued symptoms and poor po intake consulted surgery and repeat imaging showed suspicious gallbladder as source    5/10  - discussed with surgery, HIDA scan ordered, consulted cardiology for surgery clearance as well in anticipation of possible santo  - in light of possible santo, will add zosyn for abx coverage which is reasonable given continued mild leukocytosis as well    5/11  - no indication for zosyn at this time after further review  - HIDA scan ordered for today  - cardiology completed clearance    5/12  - awaiting hida results      5/13  - planned for santo today  - when able to tolerate diet and cleared by surgery can discharge back to [facility]

## 2025-05-13 NOTE — ASSESSMENT & PLAN NOTE
+enterococcus, on vanc, following cultures    5/13  - on vanc for urine culture, I think that it is reasonable to do a shortened course of abx due to patient being completely asymptomatic at time of urine culture, no indication for IV abx outpatient

## 2025-05-13 NOTE — TRANSFER OF CARE
"Anesthesia Transfer of Care Note    Patient: Vandana Allison    Procedure(s) Performed: Procedure(s) (LRB):  CHOLECYSTECTOMY, LAPAROSCOPIC (N/A)    Patient location: PACU    Anesthesia Type: general    Transport from OR: Transported from OR on 6-10 L/min O2 by face mask with adequate spontaneous ventilation    Post pain: adequate analgesia    Post assessment: tolerated procedure well and no apparent anesthetic complications    Post vital signs: stable    Level of consciousness: alert, awake and oriented    Nausea/Vomiting: no nausea/vomiting    Complications: none    Transfer of care protocol was followed      Last vitals: Visit Vitals  BP (!) 148/60   Pulse 86   Temp 36.6 °C (97.8 °F) (Oral)   Resp 15   Ht 5' 5" (1.651 m)   Wt 85.8 kg (189 lb 2.5 oz)   SpO2 97%   Breastfeeding No   BMI 31.48 kg/m²     "

## 2025-05-13 NOTE — OR NURSING
1200 REC'D TO PACU IN STABLE CONDITION. VSS. SATS 100% ON 6L/FM. TITRATED O2 TO 2L/NC. DENIES PAIN AT THIS TIME. DSG TO ABD x4 C/D/I. . WILL CONT TO MONITOR.    1220 UPDATE ON PT STATUS GIVEN TO DAUGHTER VIA TEXT MESSAGE.    1305 RETURNED TO ROOM 456 IN STABLE CONDITION. REPORT GIVEN TO MEGAN PADRON. /66 HR 87 T 97.9 SATS 97% ON 2L/NC. DAUGHTER AT BEDSIDE.

## 2025-05-13 NOTE — ANESTHESIA PREPROCEDURE EVALUATION
05/13/2025  Vandana Allison is a 89 y.o., female.      Pre-op Assessment    I have reviewed the Patient Summary Reports.     I have reviewed the Nursing Notes. I have reviewed the NPO Status.   I have reviewed the Medications.     Review of Systems  Anesthesia Hx:  No problems with previous Anesthesia                Social:  Non-Smoker, No Alcohol Use       Hematology/Oncology:  Hematology Normal   Oncology Normal                                   EENT/Dental:  EENT/Dental Normal           Cardiovascular:     Hypertension Valvular problems/Murmurs      CHF                                   Pulmonary:   COPD, moderate Asthma                    Renal/:  Chronic Renal Disease, CKD                Hepatic/GI:    Hiatal Hernia, GERD                Musculoskeletal:  Musculoskeletal Normal                Neurological:        Chronic Pain Syndrome                         Endocrine:  Diabetes, poorly controlled, type 2 Hypothyroidism          Dermatological:  Skin Normal    Psych:  Psychiatric Normal                    Physical Exam  General: Well nourished    Airway:  Mallampati: II / II  Mouth Opening: Normal  TM Distance: > 6 cm  Tongue: Normal  Neck ROM: Normal ROM    Chest/Lungs:  Clear to auscultation, Normal Respiratory Rate    Heart:  Rate: Normal  Rhythm: Regular Rhythm        Anesthesia Plan  Type of Anesthesia, risks & benefits discussed:    Anesthesia Type: Gen ETT  Intra-op Monitoring Plan: Standard ASA Monitors  Post Op Pain Control Plan: multimodal analgesia  Induction:  IV  Informed Consent: Informed consent signed with the Patient and all parties understand the risks and agree with anesthesia plan.  All questions answered. Patient consented to blood products? Yes  ASA Score: 3  Day of Surgery Review of History & Physical: H&P Update referred to the surgeon/provider.I have interviewed and examined  the patient. I have reviewed the patient's H&P dated:     Ready For Surgery From Anesthesia Perspective.     .

## 2025-05-14 VITALS
WEIGHT: 189.13 LBS | TEMPERATURE: 98 F | OXYGEN SATURATION: 98 % | HEART RATE: 87 BPM | DIASTOLIC BLOOD PRESSURE: 66 MMHG | HEIGHT: 65 IN | BODY MASS INDEX: 31.51 KG/M2 | RESPIRATION RATE: 20 BRPM | SYSTOLIC BLOOD PRESSURE: 111 MMHG

## 2025-05-14 LAB
ESTROGEN SERPL-MCNC: NORMAL PG/ML
GLUCOSE SERPL-MCNC: 205 MG/DL (ref 70–105)
GLUCOSE SERPL-MCNC: 211 MG/DL (ref 70–105)
INSULIN SERPL-ACNC: NORMAL U[IU]/ML
LAB AP GROSS DESCRIPTION: NORMAL
LAB AP LABORATORY NOTES: NORMAL
T3RU NFR SERPL: NORMAL %

## 2025-05-14 PROCEDURE — 25000003 PHARM REV CODE 250

## 2025-05-14 PROCEDURE — 25000003 PHARM REV CODE 250: Performed by: STUDENT IN AN ORGANIZED HEALTH CARE EDUCATION/TRAINING PROGRAM

## 2025-05-14 PROCEDURE — 94761 N-INVAS EAR/PLS OXIMETRY MLT: CPT

## 2025-05-14 PROCEDURE — 99239 HOSP IP/OBS DSCHRG MGMT >30: CPT | Mod: ,,, | Performed by: STUDENT IN AN ORGANIZED HEALTH CARE EDUCATION/TRAINING PROGRAM

## 2025-05-14 PROCEDURE — 82962 GLUCOSE BLOOD TEST: CPT

## 2025-05-14 PROCEDURE — 94799 UNLISTED PULMONARY SVC/PX: CPT

## 2025-05-14 PROCEDURE — 63600175 PHARM REV CODE 636 W HCPCS

## 2025-05-14 PROCEDURE — 27000221 HC OXYGEN, UP TO 24 HOURS

## 2025-05-14 PROCEDURE — 99900035 HC TECH TIME PER 15 MIN (STAT)

## 2025-05-14 RX ORDER — SELENIUM 50 MCG
4 TABLET ORAL
Status: DISCONTINUED | OUTPATIENT
Start: 2025-05-14 | End: 2025-05-14 | Stop reason: HOSPADM

## 2025-05-14 RX ORDER — GABAPENTIN 100 MG/1
100 CAPSULE ORAL NIGHTLY
Start: 2025-05-14

## 2025-05-14 RX ORDER — NYSTATIN 100000 [USP'U]/ML
500000 SUSPENSION ORAL
Qty: 200 ML | Refills: 0 | Status: SHIPPED | OUTPATIENT
Start: 2025-05-14 | End: 2025-05-14

## 2025-05-14 RX ORDER — GABAPENTIN 100 MG/1
100 CAPSULE ORAL NIGHTLY
Start: 2025-05-14 | End: 2025-05-14

## 2025-05-14 RX ORDER — IPRATROPIUM BROMIDE AND ALBUTEROL SULFATE 2.5; .5 MG/3ML; MG/3ML
3 SOLUTION RESPIRATORY (INHALATION) EVERY 6 HOURS PRN
Start: 2025-05-14 | End: 2026-05-14

## 2025-05-14 RX ORDER — LINEZOLID 600 MG/1
600 TABLET, FILM COATED ORAL EVERY 12 HOURS
Start: 2025-05-14 | End: 2025-05-17

## 2025-05-14 RX ORDER — NYSTATIN 100000 [USP'U]/ML
500000 SUSPENSION ORAL
Start: 2025-05-14 | End: 2025-05-24

## 2025-05-14 RX ORDER — SELENIUM 50 MCG
4 TABLET ORAL
Start: 2025-05-14

## 2025-05-14 RX ORDER — LINEZOLID 600 MG/1
600 TABLET, FILM COATED ORAL EVERY 12 HOURS
Qty: 6 TABLET | Refills: 0 | Status: SHIPPED | OUTPATIENT
Start: 2025-05-14 | End: 2025-05-14

## 2025-05-14 RX ORDER — HYDROCODONE BITARTRATE AND ACETAMINOPHEN 5; 325 MG/1; MG/1
1 TABLET ORAL EVERY 6 HOURS PRN
Qty: 15 TABLET | Refills: 0 | Status: SHIPPED | OUTPATIENT
Start: 2025-05-14

## 2025-05-14 RX ORDER — SELENIUM 50 MCG
4 TABLET ORAL
Start: 2025-05-14 | End: 2025-05-14

## 2025-05-14 RX ORDER — LINEZOLID 600 MG/1
600 TABLET, FILM COATED ORAL EVERY 12 HOURS
Status: DISCONTINUED | OUTPATIENT
Start: 2025-05-14 | End: 2025-05-14 | Stop reason: HOSPADM

## 2025-05-14 RX ORDER — IPRATROPIUM BROMIDE AND ALBUTEROL SULFATE 2.5; .5 MG/3ML; MG/3ML
3 SOLUTION RESPIRATORY (INHALATION) EVERY 6 HOURS PRN
Start: 2025-05-14 | End: 2025-05-14

## 2025-05-14 RX ADMIN — AMLODIPINE BESYLATE 5 MG: 5 TABLET ORAL at 08:05

## 2025-05-14 RX ADMIN — LINEZOLID 600 MG: 600 TABLET, FILM COATED ORAL at 08:05

## 2025-05-14 RX ADMIN — LACTULOSE 15 G: 20 SOLUTION ORAL at 08:05

## 2025-05-14 RX ADMIN — HYDROCODONE BITARTRATE AND ACETAMINOPHEN 1 TABLET: 10; 325 TABLET ORAL at 08:05

## 2025-05-14 RX ADMIN — HYDRALAZINE HYDROCHLORIDE 50 MG: 50 TABLET ORAL at 08:05

## 2025-05-14 RX ADMIN — ACETAMINOPHEN 650 MG: 325 TABLET ORAL at 06:05

## 2025-05-14 RX ADMIN — INSULIN ASPART 2 UNITS: 100 INJECTION, SOLUTION INTRAVENOUS; SUBCUTANEOUS at 08:05

## 2025-05-14 RX ADMIN — NYSTATIN 500000 UNITS: 100000 SUSPENSION ORAL at 08:05

## 2025-05-14 NOTE — DISCHARGE SUMMARY
[facility]  [facility] Medicine  Discharge Summary      Patient Name: Vandana Allison  MRN: [id number]  JOHN: [id number]  Patient Class: IP- Inpatient  Admission Date: 5/6/2025  Hospital Length of Stay: 8 days  Discharge Date and Time: 05/14/2025 10:09 AM  Attending Physician: Sanya Valdovinos DO   Discharging Provider: Sanya Valdovinos DO  Primary Care Provider: Jennifer Mares FNP    Primary Care Team: Networked reference to record PCT     HPI:    89 y/o female presents from [facility] with pmh CKD, COPD, diabetes, HTN, right nephrectomy to the ED with complaints of abdominal pain which started 2 weeks ago. Pt states she has had abdominal pain which started 2 weeks ago. She notes she started having diarrhea one week ago. There is no hx of a fever, chills, nausea, or vomiting.    Imaging shows significant stool burden in the rectum. Discussing with ED provider agree with admitting to inpatient and treating for ostipation with possible disimpaction.    Procedure(s) (LRB):  CHOLECYSTECTOMY, LAPAROSCOPIC (N/A)      Hospital Course:   5/7  - disimpacted yesterday in the ed, did not tolerate enema  - no bowel movements today yet, will advance diet to see if tolerated, continuing lactulose home dose  - noted HGB dropped from 8.9 to 7.4 today but no obvious signs of GI bleed, will trend tomorrow, if continues to drop will order occult blood and consult GI    5/8  - drop in hgb again, 8.9>7.4>6.9, no obvious bleeding with bowel movements, will order occult blood, transfuse 1 unit  - feeling unwell today, complaining of fatigue and nausea, monitoring  - had 1 BM loose since yesterday    5/9  - no additional bowel movements, not eating well, generally looks unwell  - will start fluids since renal function worsening  - KUB ordered along with occult blood  - monitor closely    5/10  - discussed with surgery, HIDA scan ordered, consulted cardiology for surgery clearance as well in anticipation of possible  santo  - in light of possible santo, will add zosyn for abx coverage which is reasonable given continued mild leukocytosis as well    5/11  - awaiting hida scan results    5/12  - surgery following, awaiting hida scan results  - UTI + enterococcus, vanc added, following micro    5/13  - planned for santo today  - when able to tolerate diet and cleared by surgery can discharge back to [facility]  - on vanc for urine culture, I think that it is reasonable to do a shortened course of abx due to patient being completely asymptomatic at time of urine culture, no indication for IV abx outpatient  5/14 discussed with surgery stable for discharge     Goals of Care Treatment Preferences:  Code Status: DNR      SDOH Screening:  The patient was screened for utility difficulties, food insecurity, transport difficulties, housing insecurity, and interpersonal safety and there were no concerns identified this admission.     Consults:   Consults (From admission, onward)          Status Ordering Provider     Inpatient consult to Registered Dietitian/Nutritionist  Once        Provider:  (Not yet assigned)    Completed MICHELINE BUI     Pharmacy to dose Vancomycin consult  Once        Provider:  (Not yet assigned)    Acknowledged MIHCELINE BUI     Inpatient consult to Cardiology  Once        Provider:  (Not yet assigned)    Completed MICHELINE BUI     Inpatient consult to General Surgery  Once        Provider:  Miguel Angel Aragon MD    Completed MICHELINE BUI            Assessment & Plan  Fecal impaction  Likely cause of diarrhea  Admit to inpatient  Will start lactulose for now and if problem isn't solved with order enema vs disimpaction  Leukocytosis noted, will start flagyl out of caution       5/7  - disimpacted yesterday in the ed, did not tolerate enema  - no bowel movements today yet, will advance diet to see if tolerated, continuing lactulose home dose  - noted HGB dropped from 8.9 to 7.4 today but no obvious signs of GI bleed,  "will trend tomorrow, if continues to drop will order occult blood and consult GI    5/8  - drop in hgb again, 8.9>7.4>6.9, no obvious bleeding with bowel movements, will order occult blood, transfuse 1 unit  - feeling unwell today, complaining of fatigue and nausea, monitoring  - had 1 BM loose since yesterday    5/9  - no additional bowel movements, not eating well, generally looks unwell  - will start fluids since renal function worsening  - KUB ordered along with occult blood  - monitor closely  Diabetes mellitus, type 2  Patient's FSGs are controlled on current medication regimen.  Last A1c reviewed-   Lab Results   Component Value Date    HGBA1C 6.4 11/27/2024     Most recent fingerstick glucose reviewed- No results for input(s): "POCTGLUCOSE" in the last 24 hours.  Current correctional scale  High  Maintain anti-hyperglycemic dose as follows-   Antihyperglycemics (From admission, onward)      Start     Stop Route Frequency Ordered    05/06/25 1909  insulin aspart U-100 injection 0-5 Units         -- SubQ Before meals & nightly PRN 05/06/25 1811          Hold Oral hypoglycemics while patient is in the hospital.  Hypertension  Patient's blood pressure range in the last 24 hours was: BP  Min: 121/77  Max: 215/74.The patient's inpatient anti-hypertensive regimen is listed below:  Current Antihypertensives  amLODIPine tablet 5 mg, Daily, Oral  hydrALAZINE tablet 50 mg, 2 times daily, Oral    Plan  - BP is controlled, no changes needed to their regimen    Post-operative hypothyroidism  Restart home meds  Do not feel that constipation is linked to hypothyroidism as this has been well controlled    Pulmonary hypertension  Noted  Restart home meds    Chronic obstructive pulmonary disease  Patient's COPD is controlled currently.  Patient is currently off COPD Pathway. Continue scheduled inhalers Supplemental oxygen and monitor respiratory status closely.    Calculus of gallbladder without biliary obstruction  Imaging at " admission did show possible santo but overall clinical picture suggested fecal impaction likely. Due to continued symptoms and poor po intake consulted surgery and repeat imaging showed suspicious gallbladder as source    5/10  - discussed with surgery, HIDA scan ordered, consulted cardiology for surgery clearance as well in anticipation of possible santo  - in light of possible santo, will add zosyn for abx coverage which is reasonable given continued mild leukocytosis as well    5/11  - no indication for zosyn at this time after further review  - HIDA scan ordered for today  - cardiology completed clearance    5/12  - awaiting hida results      5/13  - planned for santo today  - when able to tolerate diet and cleared by surgery can discharge back to [facility]  Acute cystitis without hematuria  +enterococcus, on vanc, following cultures    5/13  - on vanc for urine culture, I think that it is reasonable to do a shortened course of abx due to patient being completely asymptomatic at time of urine culture, no indication for IV abx outpatient  Final Active Diagnoses:    Diagnosis Date Noted POA    PRINCIPAL PROBLEM:  Fecal impaction [K56.41] 05/06/2025 Yes    Acute cystitis without hematuria [N30.00] 05/12/2025 Yes    Calculus of gallbladder without biliary obstruction [K80.20] 05/10/2025 Yes    Chronic obstructive pulmonary disease [J44.9] 11/17/2024 Yes    Pulmonary hypertension [I27.20] 02/20/2024 Yes    Post-operative hypothyroidism [E89.0] 02/08/2024 Yes    Hypertension [I10] 02/07/2024 Yes    Diabetes mellitus, type 2 [E11.9]  Yes      Problems Resolved During this Admission:    Diagnosis Date Noted Date Resolved POA    Chronic pain syndrome [G89.4] 05/06/2025 05/06/2025 Yes    Gastroenteritis [K52.9] 05/06/2025 05/06/2025 Yes       Discharged Condition: good    Disposition: Home or Self Care    Follow Up:   Follow-up Information       Miguel Angel Aragon MD Follow up in 2 week(s).    Specialties: General  Surgery, Surgery  Contact information:  [address] [place] [id number]  [phone]               Jennifer Mares FNP. Schedule an appointment as soon as possible for a visit in 1 day(s).    Specialties: Family Medicine, Emergency Medicine  Contact information:  [address] [facility] [place] [address]  [phone]                           Patient Instructions:      Activity as tolerated       Significant Diagnostic Studies: Labs: All labs within the past 24 hours have been reviewed    Pending Diagnostic Studies:       None           Medications:  Reconciled Home Medications:      Medication List        START taking these medications      albuterol-ipratropium 2.5 mg-0.5 mg/3 mL nebulizer solution  Commonly known as: DUO-NEB  Take 3 mLs by nebulization every 6 (six) hours as needed for Wheezing. Rescue     HYDROcodone-acetaminophen 5-325 mg per tablet  Commonly known as: NORCO  Take 1 tablet by mouth every 6 (six) hours as needed for Pain.     Lactobacillus acidophilus 500 million cell Cap  Take 4 capsules by mouth 3 (three) times daily with meals.     linezolid 600 mg Tab  Commonly known as: ZYVOX  Take 1 tablet (600 mg total) by mouth every 12 (twelve) hours. for 3 days     nystatin 100,000 unit/mL suspension  Commonly known as: MYCOSTATIN  Take 5 mLs (500,000 Units total) by mouth 4 (four) times daily with meals and nightly. for 10 days            CHANGE how you take these medications      albuterol 90 mcg/actuation inhaler  Commonly known as: PROVENTIL/VENTOLIN HFA  INHALE 2 PUFFS BY MOUTH INTO THE LUNGS EVERY 4 HOURS AS NEEDED FOR SHORTNESS OF BREATH / RESCUE  What changed: See the new instructions.     gabapentin 100 MG capsule  Commonly known as: NEURONTIN  Take 1 capsule (100 mg total) by mouth every evening.  What changed: when to take this     lactulose 20 gram/30 mL Soln  Commonly known as: CHRONULAC  Take 23 mLs (15 g total) by mouth 2 (two) times daily.  What changed:   medication strength  how  "much to take  when to take this            CONTINUE taking these medications      BD INSULIN SYRINGE ULTRA-FINE 0.3 mL 31 gauge x 5/16" Syrg  Generic drug: insulin syringe-needle U-100  USE 1 SYRINGE TWICE A DAY     ferrous sulfate 325 mg (65 mg iron) Tab tablet  Commonly known as: FEOSOL  Take 1 tablet (325 mg total) by mouth 3 (three) times a week. On Monday, Wednesday and Friday     furosemide 40 MG tablet  Commonly known as: LASIX  Take 1 tablet (40 mg total) by mouth 2 (two) times daily.     hydrALAZINE 50 MG tablet  Commonly known as: APRESOLINE  Take 1 tablet (50 mg total) by mouth 2 (two) times daily.     hydrocortisone 2.5 % rectal cream  Commonly known as: ANUSOL-HC  Place rectally 2 (two) times daily.     insulin glargine U-100 (Lantus) 100 unit/mL injection  Inject 5 Units into the skin every 12 (twelve) hours.     insulin lispro 100 unit/mL injection  Inject 3 Units into the skin 3 (three) times daily before meals.     levothyroxine 100 MCG tablet  Commonly known as: SYNTHROID  TAKE 1 TABLET BY MOUTH BEFORE BREAKFAST.     metoprolol succinate 25 MG 24 hr tablet  Commonly known as: TOPROL-XL  Take 1 tablet (25 mg total) by mouth once daily.     ondansetron 4 mg/2 mL Soln  Inject 4 mg into the vein every 6 (six) hours as needed.     pantoprazole 40 MG tablet  Commonly known as: PROTONIX  TAKE 1 TABLET BY MOUTH TWICE A DAY     pravastatin 10 MG tablet  Commonly known as: PRAVACHOL  Take 1 tablet (10 mg total) by mouth once daily.     sertraline 25 MG tablet  Commonly known as: ZOLOFT  Take 25 mg by mouth once daily.     SYMBICORT 160-4.5 mcg/actuation Hfaa  Generic drug: budesonide-formoterol 160-4.5 mcg  Inhale 2 puffs into the lungs 2 (two) times daily.              Indwelling Lines/Drains at time of discharge:   Lines/Drains/Airways       Airway  Duration                  Airway - Non-Surgical 05/13/25 1118 <1 day                    Time spent on the discharge of patient: 35 minutes         Sanya " DO Bonifacio  Department of Hospital Medicine  [facility]

## 2025-05-14 NOTE — NURSING
1200 attempted to call report to selena muñiz @[phone]. Per CM, pt is approved to return today and is s/o by general surgery as well as hospital medicine.

## 2025-05-14 NOTE — PLAN OF CARE
Called Kyleigh at [facility] and notified her of patient's dc today and plan to return to [facility].  Noted documents are faxed.  Kyleigh was agreeable.  No further needs noted.

## 2025-05-14 NOTE — PROGRESS NOTES
[facility]  General Surgery  Progress Note    Subjective:     Interval History: Patient was sleeping on exam, but easily aroused.  Abdomen is soft and non-distended.  No nausea or vomiting. She's had bowel movement this morning.  No complaint of pain, reports only soreness.  Patient was evaluated by myself and Dr Aragon this morning. Patient is okay to discharge medicine deems appropriate.  She will follow up in general surgery clinic in 2 weeks.  Will send rx for norco 5mg to use as needed for pain.  Patient is encourage to increase water and fiber content in diet to prevent constipation while using pain medication.      Post-Op Info:  Procedure(s) (LRB):  CHOLECYSTECTOMY, LAPAROSCOPIC (N/A)   1 Day Post-Op      Medications:  Continuous Infusions:  Scheduled Meds:   amLODIPine  5 mg Oral Daily    enoxparin  30 mg Subcutaneous Daily    hydrALAZINE  50 mg Oral BID    lactulose  15 g Oral BID    linezolid  600 mg Oral Q12H    nystatin  500,000 Units Oral QID (WM & HS)     PRN Meds:  Current Facility-Administered Medications:     0.9%  NaCl infusion (for blood administration), , Intravenous, Q24H PRN    acetaminophen, 650 mg, Oral, Q4H PRN    acetaminophen, 650 mg, Oral, Q8H PRN    albuterol-ipratropium, 3 mL, Nebulization, Q6H PRN    dextrose 50%, 12.5 g, Intravenous, PRN    dextrose 50%, 25 g, Intravenous, PRN    glucagon (human recombinant), 1 mg, Intramuscular, PRN    glucose, 16 g, Oral, PRN    glucose, 24 g, Oral, PRN    HYDROcodone-acetaminophen, 1 tablet, Oral, Q6H PRN    HYDROcodone-acetaminophen, 1 tablet, Oral, Q6H PRN    insulin aspart U-100, 0-5 Units, Subcutaneous, QID (AC + HS) PRN    melatonin, 6 mg, Oral, Nightly PRN    naloxone, 0.02 mg, Intravenous, PRN    ondansetron, 4 mg, Intravenous, Q8H PRN    sodium chloride 0.9%, 10 mL, Intravenous, Q12H PRN     Objective:     Vital Signs (Most Recent):  Temp: 98 °F (36.7 °C) (05/14/25 0732)  Pulse: 91 (05/14/25 0732)  Resp: 18 (05/14/25  0851)  BP: 126/70 (05/14/25 0732)  SpO2: 99 % (05/14/25 0732) Vital Signs (24h Range):  Temp:  [97.5 °F (36.4 °C)-98.3 °F (36.8 °C)] 98 °F (36.7 °C)  Pulse:  [] 91  Resp:  [13-20] 18  SpO2:  [95 %-99 %] 99 %  BP: (121-215)/() 126/70       Intake/Output Summary (Last 24 hours) at 5/14/2025 0950  Last data filed at 5/13/2025 1740  Gross per 24 hour   Intake 490 ml   Output 15 ml   Net 475 ml       Physical Exam  Vitals reviewed.   HENT:      Head: Normocephalic.      Nose: Nose normal.      Mouth/Throat:      Mouth: Mucous membranes are moist.   Eyes:      Extraocular Movements: Extraocular movements intact.   Cardiovascular:      Rate and Rhythm: Tachycardia present.   Pulmonary:      Effort: Pulmonary effort is normal.   Abdominal:      General: Bowel sounds are normal.      Palpations: Abdomen is soft.      Tenderness: There is abdominal tenderness (over incision sites as expected post operatively.  post op incision noted dry and intact.).   Musculoskeletal:         General: Normal range of motion.      Cervical back: Normal range of motion.   Skin:     General: Skin is warm and dry.      Capillary Refill: Capillary refill takes less than 2 seconds.   Neurological:      General: No focal deficit present.      Mental Status: She is alert and oriented to person, place, and time.   Psychiatric:         Mood and Affect: Mood normal.         Significant Labs:  BMP:   Recent Labs   Lab 05/13/25  0709   GLU 94   *   K 5.0      CO2 16*   BUN 23*   CREATININE 2.12*   CALCIUM 8.6     CBC:   Recent Labs   Lab 05/13/25  0348   WBC 10.78   RBC 3.42*   HGB 8.7*   HCT 29.1*   *   MCV 85.1   MCH 25.4*   MCHC 29.9*     CMP:   Recent Labs   Lab 05/13/25  0709   GLU 94   CALCIUM 8.6   ALBUMIN 1.8*   PROT 5.5*   *   K 5.0   CO2 16*      BUN 23*   CREATININE 2.12*   ALKPHOS 91   ALT <7   AST 19   BILITOT 0.2     Recent Lab Results  (Last 5 results in the past 24 hours)        05/14/25  0765    05/13/25  2233   05/13/25  1723   05/13/25  1711   05/13/25  1209        POC Glucose 205   194     158   123       Vancomycin, Random     14.0                                All pertinent labs from the last 24 hours have been reviewed.    Significant Diagnostics:  I have reviewed all pertinent imaging results/findings within the past 24 hours.    Assessment/Plan:     Active Diagnoses:    Diagnosis Date Noted POA    PRINCIPAL PROBLEM:  Fecal impaction [K56.41] 05/06/2025 Yes    Acute cystitis without hematuria [N30.00] 05/12/2025 Yes    Calculus of gallbladder without biliary obstruction [K80.20] 05/10/2025 Yes    Chronic obstructive pulmonary disease [J44.9] 11/17/2024 Yes    Pulmonary hypertension [I27.20] 02/20/2024 Yes    Post-operative hypothyroidism [E89.0] 02/08/2024 Yes    Hypertension [I10] 02/07/2024 Yes    Diabetes mellitus, type 2 [E11.9]  Yes      Problems Resolved During this Admission:    Diagnosis Date Noted Date Resolved POA    Chronic pain syndrome [G89.4] 05/06/2025 05/06/2025 Yes    Gastroenteritis [K52.9] 05/06/2025 05/06/2025 Yes         Kavya Rodas, NIKOS  General Surgery  [facility]

## 2025-05-14 NOTE — ASSESSMENT & PLAN NOTE
Patient's blood pressure range in the last 24 hours was: BP  Min: 121/77  Max: 215/74.The patient's inpatient anti-hypertensive regimen is listed below:  Current Antihypertensives  amLODIPine tablet 5 mg, Daily, Oral  hydrALAZINE tablet 50 mg, 2 times daily, Oral    Plan  - BP is controlled, no changes needed to their regimen

## 2025-05-14 NOTE — ASSESSMENT & PLAN NOTE
Imaging at admission did show possible santo but overall clinical picture suggested fecal impaction likely. Due to continued symptoms and poor po intake consulted surgery and repeat imaging showed suspicious gallbladder as source    5/10  - discussed with surgery, HIDA scan ordered, consulted cardiology for surgery clearance as well in anticipation of possible santo  - in light of possible santo, will add zosyn for abx coverage which is reasonable given continued mild leukocytosis as well    5/11  - no indication for zosyn at this time after further review  - HIDA scan ordered for today  - cardiology completed clearance    5/12  - awaiting hida results      5/13  - planned for santo today  - when able to tolerate diet and cleared by surgery can discharge back to [facility]

## 2025-05-15 NOTE — PLAN OF CARE
[facility]  Discharge Final Note    Primary Care Provider: Jennifer Mares FNP    Expected Discharge Date: 5/14/2025    Final Discharge Note (most recent)       Final Note - 05/15/25 0813          Final Note    Assessment Type Final Discharge Note     Anticipated Discharge Disposition Planned Readmission - Skilled Nursing Facility         Post-Acute Status    Discharge Delays None known at this time                     Important Message from Medicare  Important Message from Medicare regarding Discharge Appeal Rights: Given to patient/caregiver, Explained to patient/caregiver, Signed/date by patient/caregiver     Date IMM was signed: 05/14/25  Time IMM was signed: 0815    Contact Info       Jennifer Mares FNP   Specialty: Family Medicine, Emergency Medicine   Relationship: PCP - General    [address] [facility] [address]   Phone: [phone]       Next Steps: Schedule an appointment as soon as possible for a visit in 1 day(s)    Instructions: Doctor will see patient at nursing home    Kavya Rodas FNP   Specialty: General Surgery    [address] [place] [address]   Phone: [phone]       Next Steps: Follow up in 2 week(s)    Instructions: Please follow up on May 29 at 2:30 pm          Patient discharged to return to [facility].  [facility] notified of patient return.  Updated documents faxed.  IM updated.  No further needs noted.

## 2025-05-21 ENCOUNTER — HOSPITAL ENCOUNTER (INPATIENT)
Facility: HOSPITAL | Age: OVER 89
LOS: 4 days | Discharge: NURSING, REHAB OR LONG-TERM CARE | DRG: 853 | End: 2025-05-26
Attending: STUDENT IN AN ORGANIZED HEALTH CARE EDUCATION/TRAINING PROGRAM
Payer: MEDICARE

## 2025-05-21 DIAGNOSIS — D72.829 LEUKOCYTOSIS: ICD-10-CM

## 2025-05-21 DIAGNOSIS — A41.9 SEVERE SEPSIS: Primary | ICD-10-CM

## 2025-05-21 DIAGNOSIS — J18.9 PNEUMONIA OF LEFT LOWER LOBE DUE TO INFECTIOUS ORGANISM: ICD-10-CM

## 2025-05-21 DIAGNOSIS — R60.0 BILATERAL LOWER EXTREMITY EDEMA: ICD-10-CM

## 2025-05-21 DIAGNOSIS — R07.9 CHEST PAIN: ICD-10-CM

## 2025-05-21 DIAGNOSIS — R65.20 SEVERE SEPSIS: Primary | ICD-10-CM

## 2025-05-21 PROBLEM — L89.159 SACRAL DECUBITUS ULCER: Status: ACTIVE | Noted: 2025-05-21

## 2025-05-21 PROBLEM — E87.1 HYPONATREMIA: Status: ACTIVE | Noted: 2025-05-21

## 2025-05-21 PROBLEM — D64.9 ANEMIA: Status: ACTIVE | Noted: 2025-05-21

## 2025-05-21 PROBLEM — E11.40 DIABETIC NEUROPATHY: Status: ACTIVE | Noted: 2025-05-21

## 2025-05-21 PROBLEM — J15.9 BACTERIAL PNEUMONIA: Status: ACTIVE | Noted: 2024-04-27

## 2025-05-21 PROBLEM — J90 PLEURAL EFFUSION: Status: ACTIVE | Noted: 2025-05-21

## 2025-05-21 LAB
ALBUMIN SERPL BCP-MCNC: 1.7 G/DL (ref 3.4–4.8)
ALBUMIN/GLOB SERPL: 0.4 {RATIO}
ALP SERPL-CCNC: 93 U/L (ref 40–150)
ALT SERPL W P-5'-P-CCNC: <7 U/L
ANION GAP SERPL CALCULATED.3IONS-SCNC: 13 MMOL/L (ref 7–16)
ANISOCYTOSIS BLD QL SMEAR: ABNORMAL
AST SERPL W P-5'-P-CCNC: 16 U/L (ref 11–45)
BACTERIA #/AREA URNS HPF: ABNORMAL /HPF
BASOPHILS # BLD AUTO: 0.04 K/UL (ref 0–0.2)
BASOPHILS NFR BLD AUTO: 0.2 % (ref 0–1)
BILIRUB SERPL-MCNC: 0.4 MG/DL
BILIRUB UR QL STRIP: NEGATIVE
BUN SERPL-MCNC: 22 MG/DL (ref 10–20)
BUN/CREAT SERPL: 15 (ref 6–20)
CALCIUM SERPL-MCNC: 8.2 MG/DL (ref 8.4–10.2)
CHLORIDE SERPL-SCNC: 104 MMOL/L (ref 98–111)
CLARITY UR: ABNORMAL
CO2 SERPL-SCNC: 19 MMOL/L (ref 23–31)
COLOR UR: YELLOW
CREAT SERPL-MCNC: 1.46 MG/DL (ref 0.55–1.02)
CRENATED CELLS: ABNORMAL
DIFFERENTIAL METHOD BLD: ABNORMAL
EGFR (NO RACE VARIABLE) (RUSH/TITUS): 32 ML/MIN/1.73M2
EOSINOPHIL # BLD AUTO: 0.07 K/UL (ref 0–0.5)
EOSINOPHIL NFR BLD AUTO: 0.4 % (ref 1–4)
ERYTHROCYTE [DISTWIDTH] IN BLOOD BY AUTOMATED COUNT: 21.3 % (ref 11.5–14.5)
GLOBULIN SER-MCNC: 3.9 G/DL (ref 2–4)
GLUCOSE SERPL-MCNC: 128 MG/DL (ref 75–121)
GLUCOSE SERPL-MCNC: 169 MG/DL (ref 70–105)
GLUCOSE UR STRIP-MCNC: NORMAL MG/DL
HCT VFR BLD AUTO: 26.7 % (ref 38–47)
HGB BLD-MCNC: 8.4 G/DL (ref 12–16)
IMM GRANULOCYTES # BLD AUTO: 0.14 K/UL (ref 0–0.04)
IMM GRANULOCYTES NFR BLD: 0.8 % (ref 0–0.4)
KETONES UR STRIP-SCNC: NEGATIVE MG/DL
LACTATE SERPL-SCNC: 0.9 MMOL/L (ref 0.5–2.2)
LEUKOCYTE ESTERASE UR QL STRIP: ABNORMAL
LYMPHOCYTES # BLD AUTO: 0.78 K/UL (ref 1–4.8)
LYMPHOCYTES NFR BLD AUTO: 4.6 % (ref 27–41)
MCH RBC QN AUTO: 25.2 PG (ref 27–31)
MCHC RBC AUTO-ENTMCNC: 31.5 G/DL (ref 32–36)
MCV RBC AUTO: 80.2 FL (ref 80–96)
MONOCYTES # BLD AUTO: 1.33 K/UL (ref 0–0.8)
MONOCYTES NFR BLD AUTO: 7.8 % (ref 2–6)
MPC BLD CALC-MCNC: 8.9 FL (ref 9.4–12.4)
MUCOUS, UA: ABNORMAL /LPF
NEUTROPHILS # BLD AUTO: 14.68 K/UL (ref 1.8–7.7)
NEUTROPHILS NFR BLD AUTO: 86.2 % (ref 53–65)
NITRITE UR QL STRIP: NEGATIVE
NRBC # BLD AUTO: 0 X10E3/UL
NRBC, AUTO (.00): 0 %
NT-PROBNP SERPL-MCNC: 3589 PG/ML (ref 1–450)
OVALOCYTES BLD QL SMEAR: ABNORMAL
PH UR STRIP: 6.5 PH UNITS
PLATELET # BLD AUTO: 444 K/UL (ref 150–400)
PLATELET MORPHOLOGY: ABNORMAL
POTASSIUM SERPL-SCNC: 4.3 MMOL/L (ref 3.5–5.1)
PROT SERPL-MCNC: 5.6 G/DL (ref 5.8–7.6)
PROT UR QL STRIP: 100
RBC # BLD AUTO: 3.33 M/UL (ref 4.2–5.4)
RBC # UR STRIP: NEGATIVE /UL
RBC #/AREA URNS HPF: 2 /HPF
SARS-COV-2 RDRP RESP QL NAA+PROBE: NEGATIVE
SODIUM SERPL-SCNC: 132 MMOL/L (ref 136–145)
SP GR UR STRIP: 1.01
SQUAMOUS #/AREA URNS LPF: ABNORMAL /HPF
TRANS CELLS #/AREA URNS LPF: ABNORMAL /LPF
UROBILINOGEN UR STRIP-ACNC: NORMAL MG/DL
WBC # BLD AUTO: 17.04 K/UL (ref 4.5–11)
WBC #/AREA URNS HPF: 29 /HPF

## 2025-05-21 PROCEDURE — 87086 URINE CULTURE/COLONY COUNT: CPT | Performed by: STUDENT IN AN ORGANIZED HEALTH CARE EDUCATION/TRAINING PROGRAM

## 2025-05-21 PROCEDURE — 96372 THER/PROPH/DIAG INJ SC/IM: CPT | Performed by: STUDENT IN AN ORGANIZED HEALTH CARE EDUCATION/TRAINING PROGRAM

## 2025-05-21 PROCEDURE — 25000242 PHARM REV CODE 250 ALT 637 W/ HCPCS: Performed by: STUDENT IN AN ORGANIZED HEALTH CARE EDUCATION/TRAINING PROGRAM

## 2025-05-21 PROCEDURE — 85025 COMPLETE CBC W/AUTO DIFF WBC: CPT | Performed by: NURSE PRACTITIONER

## 2025-05-21 PROCEDURE — 82962 GLUCOSE BLOOD TEST: CPT

## 2025-05-21 PROCEDURE — 87040 BLOOD CULTURE FOR BACTERIA: CPT | Performed by: NURSE PRACTITIONER

## 2025-05-21 PROCEDURE — 63600175 PHARM REV CODE 636 W HCPCS: Performed by: STUDENT IN AN ORGANIZED HEALTH CARE EDUCATION/TRAINING PROGRAM

## 2025-05-21 PROCEDURE — G0378 HOSPITAL OBSERVATION PER HR: HCPCS

## 2025-05-21 PROCEDURE — 99285 EMERGENCY DEPT VISIT HI MDM: CPT | Mod: 25

## 2025-05-21 PROCEDURE — 96375 TX/PRO/DX INJ NEW DRUG ADDON: CPT

## 2025-05-21 PROCEDURE — 81001 URINALYSIS AUTO W/SCOPE: CPT | Performed by: STUDENT IN AN ORGANIZED HEALTH CARE EDUCATION/TRAINING PROGRAM

## 2025-05-21 PROCEDURE — 83605 ASSAY OF LACTIC ACID: CPT | Performed by: NURSE PRACTITIONER

## 2025-05-21 PROCEDURE — 83880 ASSAY OF NATRIURETIC PEPTIDE: CPT | Performed by: NURSE PRACTITIONER

## 2025-05-21 PROCEDURE — 87635 SARS-COV-2 COVID-19 AMP PRB: CPT | Performed by: NURSE PRACTITIONER

## 2025-05-21 PROCEDURE — 63600175 PHARM REV CODE 636 W HCPCS: Performed by: NURSE PRACTITIONER

## 2025-05-21 PROCEDURE — 80053 COMPREHEN METABOLIC PANEL: CPT | Performed by: NURSE PRACTITIONER

## 2025-05-21 PROCEDURE — 36415 COLL VENOUS BLD VENIPUNCTURE: CPT | Performed by: NURSE PRACTITIONER

## 2025-05-21 PROCEDURE — 25000003 PHARM REV CODE 250: Performed by: NURSE PRACTITIONER

## 2025-05-21 PROCEDURE — 25000003 PHARM REV CODE 250: Performed by: STUDENT IN AN ORGANIZED HEALTH CARE EDUCATION/TRAINING PROGRAM

## 2025-05-21 PROCEDURE — 94640 AIRWAY INHALATION TREATMENT: CPT

## 2025-05-21 PROCEDURE — 99900035 HC TECH TIME PER 15 MIN (STAT)

## 2025-05-21 PROCEDURE — 96365 THER/PROPH/DIAG IV INF INIT: CPT

## 2025-05-21 PROCEDURE — 99223 1ST HOSP IP/OBS HIGH 75: CPT | Mod: AI,,, | Performed by: STUDENT IN AN ORGANIZED HEALTH CARE EDUCATION/TRAINING PROGRAM

## 2025-05-21 RX ORDER — ONDANSETRON HYDROCHLORIDE 2 MG/ML
4 INJECTION, SOLUTION INTRAVENOUS EVERY 6 HOURS PRN
Status: DISCONTINUED | OUTPATIENT
Start: 2025-05-21 | End: 2025-05-26 | Stop reason: HOSPADM

## 2025-05-21 RX ORDER — ACETAMINOPHEN 325 MG/1
650 TABLET ORAL EVERY 8 HOURS PRN
Status: DISCONTINUED | OUTPATIENT
Start: 2025-05-21 | End: 2025-05-26 | Stop reason: HOSPADM

## 2025-05-21 RX ORDER — SERTRALINE HYDROCHLORIDE 25 MG/1
25 TABLET, FILM COATED ORAL DAILY
Status: DISCONTINUED | OUTPATIENT
Start: 2025-05-22 | End: 2025-05-26 | Stop reason: HOSPADM

## 2025-05-21 RX ORDER — MEGESTROL ACETATE 40 MG/ML
10 SUSPENSION ORAL DAILY
COMMUNITY

## 2025-05-21 RX ORDER — HYDRALAZINE HYDROCHLORIDE 50 MG/1
50 TABLET, FILM COATED ORAL 2 TIMES DAILY
Status: DISCONTINUED | OUTPATIENT
Start: 2025-05-21 | End: 2025-05-26 | Stop reason: HOSPADM

## 2025-05-21 RX ORDER — LINEZOLID 2 MG/ML
600 INJECTION, SOLUTION INTRAVENOUS
Status: DISCONTINUED | OUTPATIENT
Start: 2025-05-21 | End: 2025-05-25

## 2025-05-21 RX ORDER — PROMETHAZINE HYDROCHLORIDE 25 MG/1
25 SUPPOSITORY RECTAL EVERY 6 HOURS PRN
Status: DISCONTINUED | OUTPATIENT
Start: 2025-05-21 | End: 2025-05-26 | Stop reason: HOSPADM

## 2025-05-21 RX ORDER — INSULIN GLARGINE 100 [IU]/ML
5 INJECTION, SOLUTION SUBCUTANEOUS EVERY 12 HOURS
Status: DISCONTINUED | OUTPATIENT
Start: 2025-05-21 | End: 2025-05-26 | Stop reason: HOSPADM

## 2025-05-21 RX ORDER — INSULIN ASPART 100 [IU]/ML
0-5 INJECTION, SOLUTION INTRAVENOUS; SUBCUTANEOUS
Status: DISCONTINUED | OUTPATIENT
Start: 2025-05-21 | End: 2025-05-26 | Stop reason: HOSPADM

## 2025-05-21 RX ORDER — HYDROCODONE BITARTRATE AND ACETAMINOPHEN 5; 325 MG/1; MG/1
1 TABLET ORAL EVERY 6 HOURS PRN
Refills: 0 | Status: DISCONTINUED | OUTPATIENT
Start: 2025-05-21 | End: 2025-05-26 | Stop reason: HOSPADM

## 2025-05-21 RX ORDER — TALC
6 POWDER (GRAM) TOPICAL NIGHTLY PRN
Status: DISCONTINUED | OUTPATIENT
Start: 2025-05-21 | End: 2025-05-26 | Stop reason: HOSPADM

## 2025-05-21 RX ORDER — MELOXICAM 7.5 MG/1
7.5 TABLET ORAL DAILY
COMMUNITY

## 2025-05-21 RX ORDER — PRAVASTATIN SODIUM 10 MG/1
10 TABLET ORAL DAILY
Status: DISCONTINUED | OUTPATIENT
Start: 2025-05-22 | End: 2025-05-26 | Stop reason: HOSPADM

## 2025-05-21 RX ORDER — LEVOTHYROXINE SODIUM 100 UG/1
100 TABLET ORAL
Status: DISCONTINUED | OUTPATIENT
Start: 2025-05-22 | End: 2025-05-26 | Stop reason: HOSPADM

## 2025-05-21 RX ORDER — IPRATROPIUM BROMIDE AND ALBUTEROL SULFATE 2.5; .5 MG/3ML; MG/3ML
3 SOLUTION RESPIRATORY (INHALATION) EVERY 6 HOURS PRN
Status: DISCONTINUED | OUTPATIENT
Start: 2025-05-21 | End: 2025-05-26 | Stop reason: HOSPADM

## 2025-05-21 RX ORDER — SELENIUM 50 MCG
4 TABLET ORAL
Status: DISCONTINUED | OUTPATIENT
Start: 2025-05-22 | End: 2025-05-26 | Stop reason: HOSPADM

## 2025-05-21 RX ORDER — IBUPROFEN 200 MG
24 TABLET ORAL
Status: DISCONTINUED | OUTPATIENT
Start: 2025-05-21 | End: 2025-05-26 | Stop reason: HOSPADM

## 2025-05-21 RX ORDER — SIMETHICONE 80 MG
1 TABLET,CHEWABLE ORAL 4 TIMES DAILY PRN
Status: DISCONTINUED | OUTPATIENT
Start: 2025-05-21 | End: 2025-05-26 | Stop reason: HOSPADM

## 2025-05-21 RX ORDER — BUDESONIDE 0.5 MG/2ML
0.5 INHALANT ORAL EVERY 12 HOURS
Status: DISCONTINUED | OUTPATIENT
Start: 2025-05-21 | End: 2025-05-26 | Stop reason: HOSPADM

## 2025-05-21 RX ORDER — GABAPENTIN 100 MG/1
100 CAPSULE ORAL NIGHTLY
Status: DISCONTINUED | OUTPATIENT
Start: 2025-05-21 | End: 2025-05-26 | Stop reason: HOSPADM

## 2025-05-21 RX ORDER — METOPROLOL SUCCINATE 25 MG/1
25 TABLET, EXTENDED RELEASE ORAL DAILY
Status: DISCONTINUED | OUTPATIENT
Start: 2025-05-22 | End: 2025-05-26 | Stop reason: HOSPADM

## 2025-05-21 RX ORDER — HEPARIN SODIUM 5000 [USP'U]/ML
5000 INJECTION, SOLUTION INTRAVENOUS; SUBCUTANEOUS EVERY 8 HOURS
Status: DISCONTINUED | OUTPATIENT
Start: 2025-05-21 | End: 2025-05-26 | Stop reason: HOSPADM

## 2025-05-21 RX ORDER — PANTOPRAZOLE SODIUM 40 MG/1
40 TABLET, DELAYED RELEASE ORAL 2 TIMES DAILY
Status: DISCONTINUED | OUTPATIENT
Start: 2025-05-21 | End: 2025-05-26 | Stop reason: HOSPADM

## 2025-05-21 RX ORDER — CEFTRIAXONE 1 G/1
1 INJECTION, POWDER, FOR SOLUTION INTRAMUSCULAR; INTRAVENOUS
Status: COMPLETED | OUTPATIENT
Start: 2025-05-21 | End: 2025-05-21

## 2025-05-21 RX ORDER — GLUCAGON 1 MG
1 KIT INJECTION
Status: DISCONTINUED | OUTPATIENT
Start: 2025-05-21 | End: 2025-05-26 | Stop reason: HOSPADM

## 2025-05-21 RX ORDER — FLUTICASONE FUROATE AND VILANTEROL 100; 25 UG/1; UG/1
1 POWDER RESPIRATORY (INHALATION) DAILY
Status: DISCONTINUED | OUTPATIENT
Start: 2025-05-22 | End: 2025-05-21

## 2025-05-21 RX ORDER — FUROSEMIDE 10 MG/ML
40 INJECTION INTRAMUSCULAR; INTRAVENOUS ONCE
Status: COMPLETED | OUTPATIENT
Start: 2025-05-21 | End: 2025-05-21

## 2025-05-21 RX ORDER — SODIUM CHLORIDE 0.9 % (FLUSH) 0.9 %
10 SYRINGE (ML) INJECTION EVERY 12 HOURS PRN
Status: DISCONTINUED | OUTPATIENT
Start: 2025-05-21 | End: 2025-05-26 | Stop reason: HOSPADM

## 2025-05-21 RX ORDER — NALOXONE HCL 0.4 MG/ML
0.02 VIAL (ML) INJECTION
Status: DISCONTINUED | OUTPATIENT
Start: 2025-05-21 | End: 2025-05-26 | Stop reason: HOSPADM

## 2025-05-21 RX ORDER — IBUPROFEN 200 MG
16 TABLET ORAL
Status: DISCONTINUED | OUTPATIENT
Start: 2025-05-21 | End: 2025-05-26 | Stop reason: HOSPADM

## 2025-05-21 RX ORDER — CEFEPIME HYDROCHLORIDE 1 G/1
1 INJECTION, POWDER, FOR SOLUTION INTRAMUSCULAR; INTRAVENOUS
Status: DISCONTINUED | OUTPATIENT
Start: 2025-05-21 | End: 2025-05-24

## 2025-05-21 RX ADMIN — HEPARIN SODIUM 5000 UNITS: 5000 INJECTION, SOLUTION INTRAVENOUS; SUBCUTANEOUS at 09:05

## 2025-05-21 RX ADMIN — CEFEPIME 1 G: 1 INJECTION, POWDER, FOR SOLUTION INTRAMUSCULAR; INTRAVENOUS at 06:05

## 2025-05-21 RX ADMIN — FUROSEMIDE 40 MG: 10 INJECTION, SOLUTION INTRAVENOUS at 06:05

## 2025-05-21 RX ADMIN — AZITHROMYCIN MONOHYDRATE 500 MG: 500 INJECTION, POWDER, LYOPHILIZED, FOR SOLUTION INTRAVENOUS at 04:05

## 2025-05-21 RX ADMIN — LACTULOSE 15 G: 20 SOLUTION ORAL at 09:05

## 2025-05-21 RX ADMIN — INSULIN GLARGINE 5 UNITS: 100 INJECTION, SOLUTION SUBCUTANEOUS at 09:05

## 2025-05-21 RX ADMIN — BUDESONIDE INHALATION 0.5 MG: 0.5 SUSPENSION RESPIRATORY (INHALATION) at 07:05

## 2025-05-21 RX ADMIN — GABAPENTIN 100 MG: 100 CAPSULE ORAL at 09:05

## 2025-05-21 RX ADMIN — CEFTRIAXONE SODIUM 1 G: 1 INJECTION, POWDER, FOR SOLUTION INTRAMUSCULAR; INTRAVENOUS at 04:05

## 2025-05-21 RX ADMIN — HYDRALAZINE HYDROCHLORIDE 50 MG: 50 TABLET ORAL at 09:05

## 2025-05-21 RX ADMIN — PANTOPRAZOLE SODIUM 40 MG: 40 TABLET, DELAYED RELEASE ORAL at 09:05

## 2025-05-21 RX ADMIN — LINEZOLID 600 MG: 600 INJECTION, SOLUTION INTRAVENOUS at 06:05

## 2025-05-21 NOTE — H&P
[facility] - Emergency Department  Hospital Medicine  History & Physical    Patient Name: Vandana Allison  MRN: [id number]  Patient Class: OP- Observation  Admission Date: 5/21/2025  Attending Physician: Sanya Valdovinos DO   Primary Care Provider: Jennifer Mares FNP         Patient information was obtained from patient, past medical records, and ER records.     Subjective:     Principal Problem:Severe sepsis    Chief Complaint:   Chief Complaint   Patient presents with    Abnormal Lab     SENT FROM [place] [facility] FOR ELEVATED WBC    Knee Pain     COMPLAINTS OF LEFT KNEE FOR SEVERAL MONTHS    Swelling     IN ALL EXTREMITIES         HPI: 89-year-old  female With a past medical history of CKD, COPD, diabetes, diabetic neuropathy, hypertension, right nephrectomy presents to the ED after she was found to have elevated white count at the nursing home.  She denies any shortness of breath, cough, fever, chills, dysuria.  She does have some left knee pain.  Does have a sacral decubitus.  She has had a poor appetite.  Recently had gallbladder remove with Dr. Maciel.  She reports feeling better currently than she did earlier in the day during my interview.  Chest x-ray was reviewed and showed bilateral pleural effusions that we are new since early May.  Review of systems otherwise negative    Past Medical History:   Diagnosis Date    Arthritis     Bleeding external hemorrhoids     Chronic kidney disease, stage 3b     baseline creat 1.5    Chronic kidney disease, stage 3b 02/07/2024    baseline creat 1.5      COPD (chronic obstructive pulmonary disease)     senile emphysema    Diabetes mellitus, type 2     DNR (do not resuscitate) 02/07/2024    discussed with VITO Christianson present    Essential (primary) hypertension     Hiatal hernia with GERD     Neuropathy     Post-operative hypothyroidism     Severe pulmonary hypertension 10/08/2022    EF  70 % and normal diastolic function       Past  "Surgical History:   Procedure Laterality Date    BREAST SURGERY      Biopsy    EYE SURGERY      Remove cataracts both eyes    HYSTERECTOMY      LAPAROSCOPIC CHOLECYSTECTOMY N/A 5/13/2025    Procedure: CHOLECYSTECTOMY, LAPAROSCOPIC;  Surgeon: Miguel Angel Aragon MD;  Location: [facility];  Service: General;  Laterality: N/A;    NEPHRECTOMY Right 1960    THYROIDECTOMY         Review of patient's allergies indicates:   Allergen Reactions    Aspirin        No current facility-administered medications on file prior to encounter.     Current Outpatient Medications on File Prior to Encounter   Medication Sig    albuterol (PROVENTIL/VENTOLIN HFA) 90 mcg/actuation inhaler INHALE 2 PUFFS BY MOUTH INTO THE LUNGS EVERY 4 HOURS AS NEEDED FOR SHORTNESS OF BREATH / RESCUE (Patient taking differently: Inhale 2 puffs into the lungs every 6 (six) hours as needed.)    albuterol-ipratropium (DUO-NEB) 2.5 mg-0.5 mg/3 mL nebulizer solution Take 3 mLs by nebulization every 6 (six) hours as needed for Wheezing. Rescue    BD INSULIN SYRINGE ULTRA-FINE 0.3 mL 31 gauge x 5/16" Syrg USE 1 SYRINGE TWICE A DAY    ferrous sulfate (FEOSOL) 325 mg (65 mg iron) Tab tablet Take 1 tablet (325 mg total) by mouth 3 (three) times a week. On Monday, Wednesday and Friday    furosemide (LASIX) 40 MG tablet Take 1 tablet (40 mg total) by mouth 2 (two) times daily. (Patient taking differently: Take 40 mg by mouth once daily.)    gabapentin (NEURONTIN) 100 MG capsule Take 1 capsule (100 mg total) by mouth every evening.    hydrALAZINE (APRESOLINE) 50 MG tablet Take 1 tablet (50 mg total) by mouth 2 (two) times daily.    HYDROcodone-acetaminophen (NORCO) 5-325 mg per tablet Take 1 tablet by mouth every 6 (six) hours as needed for Pain.    hydrocortisone (ANUSOL-HC) 2.5 % rectal cream Place rectally 2 (two) times daily.    insulin glargine U-100, Lantus, 100 unit/mL injection Inject 5 Units into the skin every 12 (twelve) hours.    insulin lispro 100 unit/mL " injection Inject 3 Units into the skin 3 (three) times daily before meals.    Lactobacillus acidophilus 500 million cell Cap Take 4 capsules by mouth 3 (three) times daily with meals.    lactulose (CHRONULAC) 20 gram/30 mL Soln Take 23 mLs (15 g total) by mouth 2 (two) times daily. Hold if patient having more than 3 Bms daily    levothyroxine (SYNTHROID) 100 MCG tablet TAKE 1 TABLET BY MOUTH BEFORE BREAKFAST.    metoprolol succinate (TOPROL-XL) 25 MG 24 hr tablet Take 1 tablet (25 mg total) by mouth once daily.    nystatin (MYCOSTATIN) 100,000 unit/mL suspension Take 5 mLs (500,000 Units total) by mouth 4 (four) times daily with meals and nightly. for 10 days    ondansetron 4 mg/2 mL Soln Inject 4 mg into the vein every 6 (six) hours as needed.    pantoprazole (PROTONIX) 40 MG tablet TAKE 1 TABLET BY MOUTH TWICE A DAY    pravastatin (PRAVACHOL) 10 MG tablet Take 1 tablet (10 mg total) by mouth once daily.    sertraline (ZOLOFT) 25 MG tablet Take 25 mg by mouth once daily.    SYMBICORT 160-4.5 mcg/actuation HFAA Inhale 2 puffs into the lungs 2 (two) times daily.     Family History       Problem Relation (Age of Onset)    Cancer Sister, Brother, Daughter    Diabetes Mother, Sister, Brother, Sister    Heart disease Father          Tobacco Use    Smoking status: Never    Smokeless tobacco: Never   Substance and Sexual Activity    Alcohol use: Never    Drug use: Never    Sexual activity: Not Currently     Birth control/protection: None     Review of Systems   Constitutional:  Positive for appetite change and fatigue. Negative for chills, fever and unexpected weight change.   HENT:  Negative for congestion, mouth sores and sore throat.    Eyes:  Negative for photophobia and visual disturbance.   Respiratory:  Negative for cough, chest tightness, shortness of breath and wheezing.    Cardiovascular:  Negative for chest pain, palpitations and leg swelling.   Gastrointestinal:  Negative for abdominal pain, diarrhea, nausea  and vomiting.   Endocrine: Negative for cold intolerance and heat intolerance.   Genitourinary:  Negative for difficulty urinating, dysuria, frequency and urgency.   Musculoskeletal:  Negative for arthralgias, back pain and myalgias.   Skin:  Positive for wound. Negative for pallor and rash.   Neurological:  Negative for tremors, seizures, syncope, weakness, numbness and headaches.   Hematological:  Does not bruise/bleed easily.   Psychiatric/Behavioral:  Negative for agitation, confusion, hallucinations and suicidal ideas.      Objective:     Vital Signs (Most Recent):  Temp: 98.3 °F (36.8 °C) (05/21/25 1238)  Pulse: 85 (05/21/25 1238)  Resp: (!) 24 (05/21/25 1238)  BP: (!) 122/59 (05/21/25 1238)  SpO2: (!) 92 % (05/21/25 1238) Vital Signs (24h Range):  Temp:  [98.3 °F (36.8 °C)] 98.3 °F (36.8 °C)  Pulse:  [85] 85  Resp:  [24] 24  SpO2:  [92 %] 92 %  BP: (122)/(59) 122/59     Weight: 85.8 kg (189 lb 2.4 oz)  Body mass index is 31.48 kg/m².     Physical Exam  Vitals reviewed.   Constitutional:       Appearance: Normal appearance.   HENT:      Head: Normocephalic and atraumatic.      Nose: Nose normal.   Eyes:      Extraocular Movements: Extraocular movements intact.      Conjunctiva/sclera: Conjunctivae normal.   Neck:      Trachea: Trachea normal.   Cardiovascular:      Rate and Rhythm: Normal rate and regular rhythm.      Pulses: Normal pulses.      Heart sounds: Normal heart sounds.   Pulmonary:      Effort: Pulmonary effort is normal.      Breath sounds: Normal breath sounds and air entry.   Abdominal:      General: Bowel sounds are normal.      Palpations: Abdomen is soft.   Musculoskeletal:         General: Signs of injury (Sacral wound) present.      Cervical back: Neck supple.      Comments: Left knee effusion.  Pain with passive range of motion.  Not red not tender to palpation   Skin:     General: Skin is warm and dry.   Neurological:      General: No focal deficit present.      Mental Status: She is alert  and oriented to person, place, and time.      Comments: Grossly normal motor and sensory function without focal deficit appreciated.   Psychiatric:         Mood and Affect: Mood and affect normal.         Behavior: Behavior is cooperative.                Significant Labs: All pertinent labs within the past 24 hours have been reviewed.    Significant Imaging: I have reviewed all pertinent imaging results/findings within the past 24 hours.  Assessment/Plan:     Assessment & Plan  Severe sepsis  This patient does have evidence of infective focus  My overall impression is sepsis.  Source: Respiratory  Antibiotics given-   Antibiotics (72h ago, onward)      Start     Stop Route Frequency Ordered    05/21/25 1845  linezolid 600 mg/300 mL IVPB 600 mg         -- IV Every 12 hours (non-standard times) 05/21/25 1744    05/21/25 1845  ceFEPIme injection 1 g         -- IV Every 12 hours (non-standard times) 05/21/25 1744    05/21/25 1515  azithromycin (ZITHROMAX) 500 mg in 0.9% NaCl 250 mL IVPB         05/22/25 0314 IV ED 1 Time 05/21/25 1509          Latest lactate reviewed-  Recent Labs   Lab 05/21/25  1317   LACTATE 0.9     Organ dysfunction indicated by no evidence of end-organ dysfunction    Fluid challenge Contraindicated- Fluid bolus is contraindicated in this patient due to Volume overload due to- hypoalbuminemia.  Large pleural effusion in hypoxic patient     Post- resuscitation assessment Yes - I attest a sepsis perfusion exam was performed within 6 hours of sepsis, severe sepsis, or septic shock presentation, following fluid resuscitation.      Will Not start Pressors- Levophed for MAP of 65  Source control achieved by:  Antibiotics  Lactate okay   Blood cultures ordered  Zyvox for pneumonia in the setting of CKD   Cefepime  Diabetes mellitus, type 2  Patient's FSGs are controlled on current medication regimen.  Last A1c reviewed-   Lab Results   Component Value Date    HGBA1C 6.4 11/27/2024     Most recent  "fingerstick glucose reviewed- No results for input(s): "POCTGLUCOSE" in the last 24 hours.  Current correctional scale  Low  Maintain anti-hyperglycemic dose as follows-   Antihyperglycemics (From admission, onward)      Start     Stop Route Frequency Ordered    05/21/25 2100  insulin glargine U-100 (Lantus) injection 5 Units         -- SubQ Every 12 hours 05/21/25 1737    05/21/25 1836  insulin aspart U-100 injection 0-5 Units         -- SubQ Before meals & nightly PRN 05/21/25 1737          Hold Oral hypoglycemics while patient is in the hospital.  Neuropathy  Secondary to diabetes    Rheumatoid arthritis  Stable    Hypertension  Patient's blood pressure range in the last 24 hours was: BP  Min: 122/59  Max: 122/59.The patient's inpatient anti-hypertensive regimen is listed below:  Current Antihypertensives  hydrALAZINE tablet 50 mg, 2 times daily, Oral  metoprolol succinate (TOPROL-XL) 24 hr tablet 25 mg, Daily, Oral  furosemide injection 40 mg, Once, Intravenous    Plan  - BP is controlled, no changes needed to their regimen  - follow  Senile asthenia  Progressive mobility protocol    Moderate persistent asthma without complication  Resume home inhalers   P.r.n. nebs  Pulmonary hypertension  Respiratory status stable on supplemental oxygen    Bacterial pneumonia  Patient has a diagnosis of pneumonia. The cause of the pneumonia is suspected to be bacterial in etiology but organism is not known. The pneumonia is worsening due to oxygen requirement. The patient has the following signs/symptoms of pneumonia: persistent hypoxia . The patient does have a current oxygen requirement and the patient does not have a home oxygen requirement. I have reviewed the pertinent imaging. The following cultures have been collected: Blood cultures The culture results are listed below.     Current antimicrobial regimen consists of the antibiotics listed below. Will monitor patient closely and continue current treatment plan " unchanged.    Antibiotics (From admission, onward)      Start     Stop Route Frequency Ordered    05/21/25 1845  linezolid 600 mg/300 mL IVPB 600 mg         -- IV Every 12 hours (non-standard times) 05/21/25 1744    05/21/25 1845  ceFEPIme injection 1 g         -- IV Every 12 hours (non-standard times) 05/21/25 1744    05/21/25 1515  azithromycin (ZITHROMAX) 500 mg in 0.9% NaCl 250 mL IVPB         05/22/25 0314 IV ED 1 Time 05/21/25 1509            Microbiology Results (last 7 days)       Procedure Component Value Units Date/Time    Blood Culture #2 [[id number]] Collected: 05/21/25 1317    Order Status: Sent Specimen: Blood Updated: 05/21/25 1323    Blood Culture #1 [[id number]] Collected: 05/21/25 1310    Order Status: Sent Specimen: Blood Updated: 05/21/25 1323          CKD (chronic kidney disease), stage IV  Creatine stable for now. BMP reviewed- noted Estimated Creatinine Clearance: 23.3 mL/min (A) (based on SCr of 1.46 mg/dL (H)). according to latest data. 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  Monitor UOP and serial BMP and adjust therapy as needed. Renally dose meds. Avoid nephrotoxic medications and procedures.  Cerebellar infarct  Remote.  PTOT    Anemia  Anemia is likely due to chronic disease due to Chronic Kidney Disease. Most recent hemoglobin and hematocrit are listed below.  Recent Labs     05/21/25  0530 05/21/25  1310   HGB 7.3* 8.4*   HCT 23.6* 26.7*     Plan  - Monitor serial CBC: Daily  - Transfuse PRBC if patient becomes hemodynamically unstable, symptomatic or H/H drops below 7/21.  - Patient has not received any PRBC transfusions to date  - Patient's anemia is currently stable  - follow  Hyponatremia  Hyponatremia is likely due to renal insufficiency. The patient's most recent sodium results are listed below.  Recent Labs     05/21/25  0530 05/21/25  1310   * 132*     Plan  - Correct the sodium by 4-6mEq in 24 hours.   - Obtain the following studies:  None.  - Will  treat the hyponatremia with fluid restriction  - Monitor sodium Daily.   - Patient hyponatremia is stable  - follow  Diabetic neuropathy  Glycemic control    Pleural effusion  Patient found to have moderate pleural effusion on imaging. I have personally reviewed and interpreted the following imaging: Xray. A thoracentesis was deferred. Most likely etiology includes Pneumonia and hypoalbuminemia. Management to include Diuresis    Sacral decubitus ulcer  Wound care   Antibiotic    VTE Risk Mitigation (From admission, onward)           Ordered     heparin (porcine) injection 5,000 Units  Every 8 hours         05/21/25 1737     IP VTE HIGH RISK PATIENT  Once         05/21/25 1737     Place sequential compression device  Until discontinued         05/21/25 1737                       On 05/21/2025, patient should be placed in hospital observation services under my care.             Sanya Valdovinos DO  Department of Hospital Medicine  [facility] - Emergency Department

## 2025-05-21 NOTE — ED NOTES
Patient turned on left side. Wedged with pillows. Wound noted on sacrum. Pictures placed In chart. Meplex dressing applied

## 2025-05-21 NOTE — ED PROVIDER NOTES
Encounter Date: 5/21/2025       History     Chief Complaint   Patient presents with    Abnormal Lab     SENT FROM [place] [facility] FOR ELEVATED WBC    Knee Pain     COMPLAINTS OF LEFT KNEE FOR SEVERAL MONTHS    Swelling     IN ALL EXTREMITIES      89 year old female presents to ED from NH for elevated WBC on labs. Patient had labs obtained at NH and was noted to have elevation in white count of 16.64. Patient with normal WBC 8 days prior. Patient reports having burning with urination and having an episode of vomiting. Denies chest pain, shortness of breath. She reports having chronic knee pain. Patient is status post lap santo on 5/13. Denies abdominal pain; reports last BM on yesterday.     The history is provided by the patient, the nursing home and medical records.     Review of patient's allergies indicates:   Allergen Reactions    Aspirin      Past Medical History:   Diagnosis Date    Arthritis     Bleeding external hemorrhoids     Chronic kidney disease, stage 3b     baseline creat 1.5    Chronic kidney disease, stage 3b 02/07/2024    baseline creat 1.5      COPD (chronic obstructive pulmonary disease)     senile emphysema    Diabetes mellitus, type 2     DNR (do not resuscitate) 02/07/2024    discussed with VITO Christianson present    Essential (primary) hypertension     Hiatal hernia with GERD     Neuropathy     Post-operative hypothyroidism     Severe pulmonary hypertension 10/08/2022    EF  70 % and normal diastolic function     Past Surgical History:   Procedure Laterality Date    BREAST SURGERY      Biopsy    EYE SURGERY      Remove cataracts both eyes    HYSTERECTOMY      LAPAROSCOPIC CHOLECYSTECTOMY N/A 5/13/2025    Procedure: CHOLECYSTECTOMY, LAPAROSCOPIC;  Surgeon: Miguel Angel Aragon MD;  Location: [facility];  Service: General;  Laterality: N/A;    NEPHRECTOMY Right 1960    THYROIDECTOMY       Family History   Problem Relation Name Age of Onset    Diabetes Mother Monie Inman         Diabetic    Heart  disease Father Lauro Inman     Diabetes Sister Marino Correia     Cancer Sister Marino Correia         Breast cancer    Diabetes Brother Shree Inman     Cancer Brother Shree Inman         Prostate cancer    Cancer Daughter Radha Allison         Uterine cancer    Diabetes Sister Dafne Urena         Diabetic     Social History[1]  Review of Systems   Constitutional:  Negative for chills and fever.   Eyes:  Negative for photophobia and visual disturbance.   Respiratory:  Negative for cough and shortness of breath.    Cardiovascular:  Negative for chest pain and palpitations.   Gastrointestinal:  Positive for vomiting. Negative for abdominal pain and nausea.   Genitourinary:  Positive for dysuria. Negative for difficulty urinating.   Musculoskeletal:  Negative for arthralgias and gait problem.   Skin:  Negative for color change and wound.   Neurological:  Negative for dizziness and weakness.   Hematological:  Negative for adenopathy. Does not bruise/bleed easily.   Psychiatric/Behavioral:  Negative for agitation and confusion.    All other systems reviewed and are negative.      Physical Exam     Initial Vitals [05/21/25 1238]   BP Pulse Resp Temp SpO2   (!) 122/59 85 (!) 24 98.3 °F (36.8 °C) (!) 92 %      MAP       --         Physical Exam    Nursing note and vitals reviewed.  Constitutional: She appears well-developed and well-nourished.   HENT:   Head: Normocephalic and atraumatic.   Eyes: EOM are normal. Pupils are equal, round, and reactive to light.   Neck: Neck supple.   Normal range of motion.  Cardiovascular:  Normal rate and regular rhythm.           No murmur heard.  Pulmonary/Chest: She has no wheezes. She has no rhonchi.   Abdominal: She exhibits no distension. There is no abdominal tenderness.   Musculoskeletal:         General: Edema present. No tenderness.      Right upper arm: Swelling present.      Left upper arm: Swelling present.      Cervical back: Normal range of motion and neck supple.       Comments: 3rd spacing to bilateral arms/hands     Lymphadenopathy:     She has no cervical adenopathy.   Neurological: She is alert and oriented to person, place, and time. No cranial nerve deficit or sensory deficit.   Skin: Skin is warm and dry. Capillary refill takes less than 2 seconds.   Psychiatric: She has a normal mood and affect. Thought content normal.         Medical Screening Exam   See Full Note    ED Course   Procedures  Labs Reviewed   COMPREHENSIVE METABOLIC PANEL - Abnormal       Result Value    Sodium 132 (*)     Potassium 4.3      Chloride 104      CO2 19 (*)     Anion Gap 13      Glucose 128 (*)     BUN 22 (*)     Creatinine 1.46 (*)     BUN/Creatinine Ratio 15      Calcium 8.2 (*)     Total Protein 5.6 (*)     Albumin 1.7 (*)     Globulin 3.9      A/G Ratio 0.4      Bilirubin, Total 0.4      Alk Phos 93      ALT <7      AST 16      eGFR 32 (*)    CBC WITH DIFFERENTIAL - Abnormal    WBC 17.04 (*)     RBC 3.33 (*)     Hemoglobin 8.4 (*)     Hematocrit 26.7 (*)     MCV 80.2      MCH 25.2 (*)     MCHC 31.5 (*)     RDW 21.3 (*)     Platelet Count 444 (*)     MPV 8.9 (*)     Neutrophils % 86.2 (*)     Lymphocytes % 4.6 (*)     Monocytes % 7.8 (*)     Eosinophils % 0.4 (*)     Basophils % 0.2      Immature Granulocytes % 0.8 (*)     nRBC, Auto 0.0      Neutrophils, Abs 14.68 (*)     Lymphocytes, Absolute 0.78 (*)     Monocytes, Absolute 1.33 (*)     Eosinophils, Absolute 0.07      Basophils, Absolute 0.04      Immature Granulocytes, Absolute 0.14 (*)     nRBC, Absolute 0.00      Diff Type Scan Smear      Narrative:     Specimen contains platelet clumps and fibrin.  The platelet count represents the minimum number of free platelets.   CBC MORPHOLOGY - Abnormal    Platelet Morphology Few Large Platelets (*)     Anisocytosis 1+      Crenated Cells Few      Ovalocytes Few     NT-PRO NATRIURETIC PEPTIDE - Abnormal    ProBNP 3,589 (*)    LACTIC ACID, PLASMA - Normal    Lactic Acid 0.9     SARS-COV-2 RNA  AMPLIFICATION, QUAL - Normal    SARS COV-2 Molecular Negative      Narrative:     Negative SARS-CoV results should not be used as the sole basis for treatment or patient management decisions; negative results should be considered in the context of a patient's recent exposures, history and the presene of clinical signs and symptoms consistent with COVID-19.  Negative results should be treated as presumptive and confirmed by molecular assay, if necessary for patient management.   CULTURE, BLOOD   CULTURE, BLOOD   CBC W/ AUTO DIFFERENTIAL    Narrative:     The following orders were created for panel order CBC auto differential.  Procedure                               Abnormality         Status                     ---------                               -----------         ------                     CBC with Differential[[id number]]       Abnormal            Final result                 Please view results for these tests on the individual orders.          Imaging Results              X-Ray Abdomen AP 1 View (KUB) (Final result)  Result time 05/21/25 13:26:55      Final result by Jaswant Crisostomo MD (05/21/25 13:26:55)                   Impression:      No acute abdominal process      Electronically signed by: Jaswant Crisostomo  Date:    05/21/2025  Time:    13:26               Narrative:    EXAMINATION:  XR ABDOMEN AP 1 VIEW    CLINICAL HISTORY:  .  Elevated white blood cell count, unspecified    COMPARISON:  Abdominal x-ray 05/09/2025    TECHNIQUE:  KUB    FINDINGS:  The bowel gas pattern is nonobstructive without gross mass lesion.  Surgical clips overlie the gallbladder fossa.  Osseous structures are unchanged.  Lumbar levoscoliosis.  Moderate lumbar spondylosis                                       X-Ray Chest AP Portable (Final result)  Result time 05/21/25 13:25:28      Final result by Jaswant Crisostomo MD (05/21/25 13:25:28)                   Impression:      Evidence of CHF with or without superimposed  left basilar pneumonia      Electronically signed by: Jaswant Crisostomo  Date:    05/21/2025  Time:    13:25               Narrative:    EXAMINATION:  XR CHEST AP PORTABLE    CLINICAL HISTORY:  .  Elevated white blood cell count, unspecified    COMPARISON:  May 6, 2025    TECHNIQUE:  AP portable chest is submitted    FINDINGS:  There is cardiomegaly.  Pulmonary vasculature is slightly prominent.  No mediastinal mass is seen.    There is moderate left and mild right pleural effusion.  There is some atelectasis/infiltrate/edema in the lower lungs bilaterally, left more than right.    Osseous structures are similar.  There is scattered mild thoracic spondylosis                                       Medications   albuterol-ipratropium 2.5 mg-0.5 mg/3 mL nebulizer solution 3 mL (has no administration in time range)   gabapentin capsule 100 mg (has no administration in time range)   hydrALAZINE tablet 50 mg (has no administration in time range)   HYDROcodone-acetaminophen 5-325 mg per tablet 1 tablet (has no administration in time range)   insulin glargine U-100 (Lantus) injection 5 Units (has no administration in time range)   Lactobacillus acidophilus capsule 4 capsule (has no administration in time range)   lactulose 20 gram/30 mL solution Soln 15 g (has no administration in time range)   levothyroxine tablet 100 mcg (has no administration in time range)   metoprolol succinate (TOPROL-XL) 24 hr tablet 25 mg (has no administration in time range)   ondansetron injection 4 mg (has no administration in time range)   pantoprazole EC tablet 40 mg (has no administration in time range)   pravastatin tablet 10 mg (has no administration in time range)   sertraline tablet 25 mg (has no administration in time range)   sodium chloride 0.9% flush 10 mL (has no administration in time range)   naloxone 0.4 mg/mL injection 0.02 mg (has no administration in time range)   glucose chewable tablet 16 g (has no administration in time range)    glucose chewable tablet 24 g (has no administration in time range)   dextrose 50% injection 12.5 g (has no administration in time range)   dextrose 50% injection 25 g (has no administration in time range)   glucagon (human recombinant) injection 1 mg (has no administration in time range)   heparin (porcine) injection 5,000 Units (has no administration in time range)   promethazine suppository 25 mg (has no administration in time range)   insulin aspart U-100 injection 0-5 Units (has no administration in time range)   acetaminophen tablet 650 mg (has no administration in time range)   melatonin tablet 6 mg (has no administration in time range)   simethicone chewable tablet 80 mg (has no administration in time range)   budesonide nebulizer solution 0.5 mg (has no administration in time range)   linezolid 600 mg/300 mL IVPB 600 mg (600 mg Intravenous New Bag 5/21/25 1851)   ceFEPIme injection 1 g (1 g Intravenous Given 5/21/25 1851)   azithromycin (ZITHROMAX) 500 mg in 0.9% NaCl 250 mL IVPB ( Intravenous Verify Only 5/21/25 1716)   cefTRIAXone injection 1 g (1 g Intravenous Given 5/21/25 1613)   furosemide injection 40 mg (40 mg Intravenous Given 5/21/25 1852)     Medical Decision Making  89 year old female presents to ED from NH for elevated WBC on labs. Patient had labs obtained at NH and was noted to have elevation in white count of 16.64. Patient with normal WBC 8 days prior. Patient reports having burning with urination and having an episode of vomiting. Denies chest pain, shortness of breath. She reports having chronic knee pain. Patient is status post lap santo on 5/13. Denies abdominal pain; reports last BM on yesterday.     Labs, diagnostics ordered and reviewed  Radiologist interpretation reviewed    IV azithromycin, rocephin administered  Case discussed with Dr. Cantu for admission        Amount and/or Complexity of Data Reviewed  Labs: ordered.  Radiology: ordered.    Risk  Prescription drug  management.                                      Clinical Impression:   Final diagnoses:  [D72.829] Leukocytosis  [J18.9] Pneumonia of left lower lobe due to infectious organism        ED Disposition Condition    Observation                     [1]   Social History  Tobacco Use    Smoking status: Never    Smokeless tobacco: Never   Substance Use Topics    Alcohol use: Never    Drug use: Never        Mamie Zavala, [facility]  05/21/25 1946

## 2025-05-21 NOTE — ASSESSMENT & PLAN NOTE
"Patient's FSGs are controlled on current medication regimen.  Last A1c reviewed-   Lab Results   Component Value Date    HGBA1C 6.4 11/27/2024     Most recent fingerstick glucose reviewed- No results for input(s): "POCTGLUCOSE" in the last 24 hours.  Current correctional scale  Low  Maintain anti-hyperglycemic dose as follows-   Antihyperglycemics (From admission, onward)      Start     Stop Route Frequency Ordered    05/21/25 2100  insulin glargine U-100 (Lantus) injection 5 Units         -- SubQ Every 12 hours 05/21/25 1737    05/21/25 1836  insulin aspart U-100 injection 0-5 Units         -- SubQ Before meals & nightly PRN 05/21/25 1737          Hold Oral hypoglycemics while patient is in the hospital.  "

## 2025-05-21 NOTE — HPI
89-year-old  female With a past medical history of CKD, COPD, diabetes, diabetic neuropathy, hypertension, right nephrectomy presents to the ED after she was found to have elevated white count at the nursing home.  She denies any shortness of breath, cough, fever, chills, dysuria.  She does have some left knee pain.  Does have a sacral decubitus.  She has had a poor appetite.  Recently had gallbladder remove with Dr. Maciel.  She reports feeling better currently than she did earlier in the day during my interview.  Chest x-ray was reviewed and showed bilateral pleural effusions that we are new since early May.  Review of systems otherwise negative

## 2025-05-21 NOTE — ASSESSMENT & PLAN NOTE
Hyponatremia is likely due to renal insufficiency. The patient's most recent sodium results are listed below.  Recent Labs     05/21/25  0530 05/21/25  1310   * 132*     Plan  - Correct the sodium by 4-6mEq in 24 hours.   - Obtain the following studies:  None.  - Will treat the hyponatremia with fluid restriction  - Monitor sodium Daily.   - Patient hyponatremia is stable  - follow

## 2025-05-21 NOTE — ASSESSMENT & PLAN NOTE
Patient found to have moderate pleural effusion on imaging. I have personally reviewed and interpreted the following imaging: Xray. A thoracentesis was deferred. Most likely etiology includes Pneumonia and hypoalbuminemia. Management to include Diuresis

## 2025-05-21 NOTE — PHARMACY MED REC
"Admission Medication History     The home medication history was taken by Camila Ortega.    You may go to "Admission" then "Reconcile Home Medications" tabs to review and/or act upon these items.     The home medication list has been updated by the Pharmacy department.   Please read ALL comments highlighted in yellow.   Please address this information as you see fit.    Feel free to contact us if you have any questions or require assistance.        Medications listed below were obtained from: [employer], Medical records, and Nursing home  PTA Medications   Medication Sig    albuterol (PROVENTIL/VENTOLIN HFA) 90 mcg/actuation inhaler INHALE 2 PUFFS BY MOUTH INTO THE LUNGS EVERY 4 HOURS AS NEEDED FOR SHORTNESS OF BREATH / RESCUE (Patient taking differently: Inhale 2 puffs into the lungs every 6 (six) hours as needed.)    albuterol-ipratropium (DUO-NEB) 2.5 mg-0.5 mg/3 mL nebulizer solution Take 3 mLs by nebulization every 6 (six) hours as needed for Wheezing. Rescue    ferrous sulfate (FEOSOL) 325 mg (65 mg iron) Tab tablet Take 1 tablet (325 mg total) by mouth 3 (three) times a week. On Monday, Wednesday and Friday    furosemide (LASIX) 40 MG tablet Take 1 tablet (40 mg total) by mouth 2 (two) times daily.    gabapentin (NEURONTIN) 100 MG capsule Take 1 capsule (100 mg total) by mouth every evening.    hydrALAZINE (APRESOLINE) 50 MG tablet Take 1 tablet (50 mg total) by mouth 2 (two) times daily.    HYDROcodone-acetaminophen (NORCO) 5-325 mg per tablet Take 1 tablet by mouth every 6 (six) hours as needed for Pain.    hydrocortisone (ANUSOL-HC) 2.5 % rectal cream Place rectally 2 (two) times daily.    insulin glargine U-100, Lantus, 100 unit/mL injection Inject 5 Units into the skin every 12 (twelve) hours.    insulin lispro 100 unit/mL injection Inject 3 Units into the skin 3 (three) times daily before meals.    Lactobacillus acidophilus 500 million cell Cap Take 4 capsules by mouth 3 (three) times " "daily with meals.    lactulose (CHRONULAC) 20 gram/30 mL Soln Take 23 mLs (15 g total) by mouth 2 (two) times daily. Hold if patient having more than 3 Bms daily (Patient taking differently: Take 30 mLs by mouth 2 (two) times daily. Hold if patient having more than 3 Bms daily)    levothyroxine (SYNTHROID) 100 MCG tablet TAKE 1 TABLET BY MOUTH BEFORE BREAKFAST.    megestroL (MEGACE) 400 mg/10 mL (40 mg/mL) Susp Take 10 mLs by mouth once daily.    meloxicam (MOBIC) 7.5 MG tablet Take 7.5 mg by mouth once daily.    metoprolol succinate (TOPROL-XL) 25 MG 24 hr tablet Take 1 tablet (25 mg total) by mouth once daily.    nystatin (MYCOSTATIN) 100,000 unit/mL suspension Take 5 mLs (500,000 Units total) by mouth 4 (four) times daily with meals and nightly. for 10 days    ondansetron 4 mg/2 mL Soln Inject 4 mg into the muscle every 6 (six) hours as needed.    pantoprazole (PROTONIX) 40 MG tablet TAKE 1 TABLET BY MOUTH TWICE A DAY    pravastatin (PRAVACHOL) 10 MG tablet Take 1 tablet (10 mg total) by mouth once daily.    sertraline (ZOLOFT) 25 MG tablet Take 25 mg by mouth once daily.    SYMBICORT 160-4.5 mcg/actuation HFAA Inhale 2 puffs into the lungs 2 (two) times daily.    BD INSULIN SYRINGE ULTRA-FINE 0.3 mL 31 gauge x 5/16" Syrg USE 1 SYRINGE TWICE A DAY         Current Outpatient Medications on File Prior to Encounter   Medication Sig Dispense Refill Last Dose/Taking    albuterol (PROVENTIL/VENTOLIN HFA) 90 mcg/actuation inhaler INHALE 2 PUFFS BY MOUTH INTO THE LUNGS EVERY 4 HOURS AS NEEDED FOR SHORTNESS OF BREATH / RESCUE (Patient taking differently: Inhale 2 puffs into the lungs every 6 (six) hours as needed.) 18 g 11 Taking Differently    albuterol-ipratropium (DUO-NEB) 2.5 mg-0.5 mg/3 mL nebulizer solution Take 3 mLs by nebulization every 6 (six) hours as needed for Wheezing. Rescue   Taking As Needed    ferrous sulfate (FEOSOL) 325 mg (65 mg iron) Tab tablet Take 1 tablet (325 mg total) by mouth 3 (three) times a " week. On Monday, Wednesday and Friday 36 tablet 3 5/21/2025    furosemide (LASIX) 40 MG tablet Take 1 tablet (40 mg total) by mouth 2 (two) times daily. 180 tablet 3 5/21/2025    gabapentin (NEURONTIN) 100 MG capsule Take 1 capsule (100 mg total) by mouth every evening.   5/20/2025    hydrALAZINE (APRESOLINE) 50 MG tablet Take 1 tablet (50 mg total) by mouth 2 (two) times daily. 180 tablet 4 5/21/2025    HYDROcodone-acetaminophen (NORCO) 5-325 mg per tablet Take 1 tablet by mouth every 6 (six) hours as needed for Pain. 15 tablet 0 5/21/2025    hydrocortisone (ANUSOL-HC) 2.5 % rectal cream Place rectally 2 (two) times daily. 28 g 12 Taking    insulin glargine U-100, Lantus, 100 unit/mL injection Inject 5 Units into the skin every 12 (twelve) hours.   5/21/2025    insulin lispro 100 unit/mL injection Inject 3 Units into the skin 3 (three) times daily before meals.   5/21/2025    Lactobacillus acidophilus 500 million cell Cap Take 4 capsules by mouth 3 (three) times daily with meals.   5/21/2025    lactulose (CHRONULAC) 20 gram/30 mL Soln Take 23 mLs (15 g total) by mouth 2 (two) times daily. Hold if patient having more than 3 Bms daily (Patient taking differently: Take 30 mLs by mouth 2 (two) times daily. Hold if patient having more than 3 Bms daily)   5/21/2025    levothyroxine (SYNTHROID) 100 MCG tablet TAKE 1 TABLET BY MOUTH BEFORE BREAKFAST. 90 tablet 3 5/21/2025    megestroL (MEGACE) 400 mg/10 mL (40 mg/mL) Susp Take 10 mLs by mouth once daily.   5/21/2025    meloxicam (MOBIC) 7.5 MG tablet Take 7.5 mg by mouth once daily.   5/20/2025    metoprolol succinate (TOPROL-XL) 25 MG 24 hr tablet Take 1 tablet (25 mg total) by mouth once daily. 90 tablet 3 5/21/2025    nystatin (MYCOSTATIN) 100,000 unit/mL suspension Take 5 mLs (500,000 Units total) by mouth 4 (four) times daily with meals and nightly. for 10 days   5/21/2025    ondansetron 4 mg/2 mL Soln Inject 4 mg into the muscle every 6 (six) hours as needed.    "Taking As Needed    pantoprazole (PROTONIX) 40 MG tablet TAKE 1 TABLET BY MOUTH TWICE A  tablet 3 5/21/2025    pravastatin (PRAVACHOL) 10 MG tablet Take 1 tablet (10 mg total) by mouth once daily. 90 tablet 3 5/20/2025    sertraline (ZOLOFT) 25 MG tablet Take 25 mg by mouth once daily.   5/21/2025    SYMBICORT 160-4.5 mcg/actuation HFAA Inhale 2 puffs into the lungs 2 (two) times daily.   5/21/2025    BD INSULIN SYRINGE ULTRA-FINE 0.3 mL 31 gauge x 5/16" Syrg USE 1 SYRINGE TWICE A  each 3        Potential issues to be addressed PRIOR TO DISCHARGE  N/A    Camila Ortega  EXT [id number]                 .          "

## 2025-05-21 NOTE — SUBJECTIVE & OBJECTIVE
"Past Medical History:   Diagnosis Date    Arthritis     Bleeding external hemorrhoids     Chronic kidney disease, stage 3b     baseline creat 1.5    Chronic kidney disease, stage 3b 02/07/2024    baseline creat 1.5      COPD (chronic obstructive pulmonary disease)     senile emphysema    Diabetes mellitus, type 2     DNR (do not resuscitate) 02/07/2024    discussed with VITO Christianson present    Essential (primary) hypertension     Hiatal hernia with GERD     Neuropathy     Post-operative hypothyroidism     Severe pulmonary hypertension 10/08/2022    EF  70 % and normal diastolic function       Past Surgical History:   Procedure Laterality Date    BREAST SURGERY      Biopsy    EYE SURGERY      Remove cataracts both eyes    HYSTERECTOMY      LAPAROSCOPIC CHOLECYSTECTOMY N/A 5/13/2025    Procedure: CHOLECYSTECTOMY, LAPAROSCOPIC;  Surgeon: Miguel Angel Aragon MD;  Location: [facility];  Service: General;  Laterality: N/A;    NEPHRECTOMY Right 1960    THYROIDECTOMY         Review of patient's allergies indicates:   Allergen Reactions    Aspirin        No current facility-administered medications on file prior to encounter.     Current Outpatient Medications on File Prior to Encounter   Medication Sig    albuterol (PROVENTIL/VENTOLIN HFA) 90 mcg/actuation inhaler INHALE 2 PUFFS BY MOUTH INTO THE LUNGS EVERY 4 HOURS AS NEEDED FOR SHORTNESS OF BREATH / RESCUE (Patient taking differently: Inhale 2 puffs into the lungs every 6 (six) hours as needed.)    albuterol-ipratropium (DUO-NEB) 2.5 mg-0.5 mg/3 mL nebulizer solution Take 3 mLs by nebulization every 6 (six) hours as needed for Wheezing. Rescue    BD INSULIN SYRINGE ULTRA-FINE 0.3 mL 31 gauge x 5/16" Syrg USE 1 SYRINGE TWICE A DAY    ferrous sulfate (FEOSOL) 325 mg (65 mg iron) Tab tablet Take 1 tablet (325 mg total) by mouth 3 (three) times a week. On Monday, Wednesday and Friday    furosemide (LASIX) 40 MG tablet Take 1 tablet (40 mg total) by mouth 2 (two) times daily. " (Patient taking differently: Take 40 mg by mouth once daily.)    gabapentin (NEURONTIN) 100 MG capsule Take 1 capsule (100 mg total) by mouth every evening.    hydrALAZINE (APRESOLINE) 50 MG tablet Take 1 tablet (50 mg total) by mouth 2 (two) times daily.    HYDROcodone-acetaminophen (NORCO) 5-325 mg per tablet Take 1 tablet by mouth every 6 (six) hours as needed for Pain.    hydrocortisone (ANUSOL-HC) 2.5 % rectal cream Place rectally 2 (two) times daily.    insulin glargine U-100, Lantus, 100 unit/mL injection Inject 5 Units into the skin every 12 (twelve) hours.    insulin lispro 100 unit/mL injection Inject 3 Units into the skin 3 (three) times daily before meals.    Lactobacillus acidophilus 500 million cell Cap Take 4 capsules by mouth 3 (three) times daily with meals.    lactulose (CHRONULAC) 20 gram/30 mL Soln Take 23 mLs (15 g total) by mouth 2 (two) times daily. Hold if patient having more than 3 Bms daily    levothyroxine (SYNTHROID) 100 MCG tablet TAKE 1 TABLET BY MOUTH BEFORE BREAKFAST.    metoprolol succinate (TOPROL-XL) 25 MG 24 hr tablet Take 1 tablet (25 mg total) by mouth once daily.    nystatin (MYCOSTATIN) 100,000 unit/mL suspension Take 5 mLs (500,000 Units total) by mouth 4 (four) times daily with meals and nightly. for 10 days    ondansetron 4 mg/2 mL Soln Inject 4 mg into the vein every 6 (six) hours as needed.    pantoprazole (PROTONIX) 40 MG tablet TAKE 1 TABLET BY MOUTH TWICE A DAY    pravastatin (PRAVACHOL) 10 MG tablet Take 1 tablet (10 mg total) by mouth once daily.    sertraline (ZOLOFT) 25 MG tablet Take 25 mg by mouth once daily.    SYMBICORT 160-4.5 mcg/actuation HFAA Inhale 2 puffs into the lungs 2 (two) times daily.     Family History       Problem Relation (Age of Onset)    Cancer Sister, Brother, Daughter    Diabetes Mother, Sister, Brother, Sister    Heart disease Father          Tobacco Use    Smoking status: Never    Smokeless tobacco: Never   Substance and Sexual Activity     Alcohol use: Never    Drug use: Never    Sexual activity: Not Currently     Birth control/protection: None     Review of Systems   Constitutional:  Positive for appetite change and fatigue. Negative for chills, fever and unexpected weight change.   HENT:  Negative for congestion, mouth sores and sore throat.    Eyes:  Negative for photophobia and visual disturbance.   Respiratory:  Negative for cough, chest tightness, shortness of breath and wheezing.    Cardiovascular:  Negative for chest pain, palpitations and leg swelling.   Gastrointestinal:  Negative for abdominal pain, diarrhea, nausea and vomiting.   Endocrine: Negative for cold intolerance and heat intolerance.   Genitourinary:  Negative for difficulty urinating, dysuria, frequency and urgency.   Musculoskeletal:  Negative for arthralgias, back pain and myalgias.   Skin:  Positive for wound. Negative for pallor and rash.   Neurological:  Negative for tremors, seizures, syncope, weakness, numbness and headaches.   Hematological:  Does not bruise/bleed easily.   Psychiatric/Behavioral:  Negative for agitation, confusion, hallucinations and suicidal ideas.      Objective:     Vital Signs (Most Recent):  Temp: 98.3 °F (36.8 °C) (05/21/25 1238)  Pulse: 85 (05/21/25 1238)  Resp: (!) 24 (05/21/25 1238)  BP: (!) 122/59 (05/21/25 1238)  SpO2: (!) 92 % (05/21/25 1238) Vital Signs (24h Range):  Temp:  [98.3 °F (36.8 °C)] 98.3 °F (36.8 °C)  Pulse:  [85] 85  Resp:  [24] 24  SpO2:  [92 %] 92 %  BP: (122)/(59) 122/59     Weight: 85.8 kg (189 lb 2.4 oz)  Body mass index is 31.48 kg/m².     Physical Exam  Vitals reviewed.   Constitutional:       Appearance: Normal appearance.   HENT:      Head: Normocephalic and atraumatic.      Nose: Nose normal.   Eyes:      Extraocular Movements: Extraocular movements intact.      Conjunctiva/sclera: Conjunctivae normal.   Neck:      Trachea: Trachea normal.   Cardiovascular:      Rate and Rhythm: Normal rate and regular rhythm.       Pulses: Normal pulses.      Heart sounds: Normal heart sounds.   Pulmonary:      Effort: Pulmonary effort is normal.      Breath sounds: Normal breath sounds and air entry.   Abdominal:      General: Bowel sounds are normal.      Palpations: Abdomen is soft.   Musculoskeletal:         General: Signs of injury (Sacral wound) present.      Cervical back: Neck supple.      Comments: Left knee effusion.  Pain with passive range of motion.  Not red not tender to palpation   Skin:     General: Skin is warm and dry.   Neurological:      General: No focal deficit present.      Mental Status: She is alert and oriented to person, place, and time.      Comments: Grossly normal motor and sensory function without focal deficit appreciated.   Psychiatric:         Mood and Affect: Mood and affect normal.         Behavior: Behavior is cooperative.                Significant Labs: All pertinent labs within the past 24 hours have been reviewed.    Significant Imaging: I have reviewed all pertinent imaging results/findings within the past 24 hours.

## 2025-05-21 NOTE — ASSESSMENT & PLAN NOTE
Anemia is likely due to chronic disease due to Chronic Kidney Disease. Most recent hemoglobin and hematocrit are listed below.  Recent Labs     05/21/25  0530 05/21/25  1310   HGB 7.3* 8.4*   HCT 23.6* 26.7*     Plan  - Monitor serial CBC: Daily  - Transfuse PRBC if patient becomes hemodynamically unstable, symptomatic or H/H drops below 7/21.  - Patient has not received any PRBC transfusions to date  - Patient's anemia is currently stable  - follow

## 2025-05-21 NOTE — ASSESSMENT & PLAN NOTE
Patient's blood pressure range in the last 24 hours was: BP  Min: 122/59  Max: 122/59.The patient's inpatient anti-hypertensive regimen is listed below:  Current Antihypertensives  hydrALAZINE tablet 50 mg, 2 times daily, Oral  metoprolol succinate (TOPROL-XL) 24 hr tablet 25 mg, Daily, Oral  furosemide injection 40 mg, Once, Intravenous    Plan  - BP is controlled, no changes needed to their regimen  - follow

## 2025-05-21 NOTE — ASSESSMENT & PLAN NOTE
Creatine stable for now. BMP reviewed- noted Estimated Creatinine Clearance: 23.3 mL/min (A) (based on SCr of 1.46 mg/dL (H)). according to latest data. 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  Monitor UOP and serial BMP and adjust therapy as needed. Renally dose meds. Avoid nephrotoxic medications and procedures.

## 2025-05-21 NOTE — ASSESSMENT & PLAN NOTE
Patient has a diagnosis of pneumonia. The cause of the pneumonia is suspected to be bacterial in etiology but organism is not known. The pneumonia is worsening due to oxygen requirement. The patient has the following signs/symptoms of pneumonia: persistent hypoxia . The patient does have a current oxygen requirement and the patient does not have a home oxygen requirement. I have reviewed the pertinent imaging. The following cultures have been collected: Blood cultures The culture results are listed below.     Current antimicrobial regimen consists of the antibiotics listed below. Will monitor patient closely and continue current treatment plan unchanged.    Antibiotics (From admission, onward)      Start     Stop Route Frequency Ordered    05/21/25 1845  linezolid 600 mg/300 mL IVPB 600 mg         -- IV Every 12 hours (non-standard times) 05/21/25 1744    05/21/25 1845  ceFEPIme injection 1 g         -- IV Every 12 hours (non-standard times) 05/21/25 1744    05/21/25 1515  azithromycin (ZITHROMAX) 500 mg in 0.9% NaCl 250 mL IVPB         05/22/25 0314 IV ED 1 Time 05/21/25 1509            Microbiology Results (last 7 days)       Procedure Component Value Units Date/Time    Blood Culture #2 [[id number]] Collected: 05/21/25 1317    Order Status: Sent Specimen: Blood Updated: 05/21/25 1323    Blood Culture #1 [[id number]] Collected: 05/21/25 1310    Order Status: Sent Specimen: Blood Updated: 05/21/25 1323

## 2025-05-21 NOTE — ASSESSMENT & PLAN NOTE
This patient does have evidence of infective focus  My overall impression is sepsis.  Source: Respiratory  Antibiotics given-   Antibiotics (72h ago, onward)      Start     Stop Route Frequency Ordered    05/21/25 1845  linezolid 600 mg/300 mL IVPB 600 mg         -- IV Every 12 hours (non-standard times) 05/21/25 1744    05/21/25 1845  ceFEPIme injection 1 g         -- IV Every 12 hours (non-standard times) 05/21/25 1744    05/21/25 1515  azithromycin (ZITHROMAX) 500 mg in 0.9% NaCl 250 mL IVPB         05/22/25 0314 IV ED 1 Time 05/21/25 1509          Latest lactate reviewed-  Recent Labs   Lab 05/21/25  1317   LACTATE 0.9     Organ dysfunction indicated by no evidence of end-organ dysfunction    Fluid challenge Contraindicated- Fluid bolus is contraindicated in this patient due to Volume overload due to- hypoalbuminemia.  Large pleural effusion in hypoxic patient     Post- resuscitation assessment Yes - I attest a sepsis perfusion exam was performed within 6 hours of sepsis, severe sepsis, or septic shock presentation, following fluid resuscitation.      Will Not start Pressors- Levophed for MAP of 65  Source control achieved by:  Antibiotics  Lactate okay   Blood cultures ordered  Zyvox for pneumonia in the setting of CKD   Cefepime

## 2025-05-22 LAB
ANION GAP SERPL CALCULATED.3IONS-SCNC: 15 MMOL/L (ref 7–16)
BASOPHILS # BLD AUTO: 0.05 K/UL (ref 0–0.2)
BASOPHILS NFR BLD AUTO: 0.5 % (ref 0–1)
BUN SERPL-MCNC: 23 MG/DL (ref 10–20)
BUN/CREAT SERPL: 15 (ref 6–20)
CALCIUM SERPL-MCNC: 7.9 MG/DL (ref 8.4–10.2)
CHLORIDE SERPL-SCNC: 103 MMOL/L (ref 98–111)
CO2 SERPL-SCNC: 19 MMOL/L (ref 23–31)
CREAT SERPL-MCNC: 1.51 MG/DL (ref 0.55–1.02)
DIFFERENTIAL METHOD BLD: ABNORMAL
EGFR (NO RACE VARIABLE) (RUSH/TITUS): 31 ML/MIN/1.73M2
EOSINOPHIL # BLD AUTO: 0.18 K/UL (ref 0–0.5)
EOSINOPHIL NFR BLD AUTO: 1.7 % (ref 1–4)
ERYTHROCYTE [DISTWIDTH] IN BLOOD BY AUTOMATED COUNT: 21.2 % (ref 11.5–14.5)
GLUCOSE SERPL-MCNC: 108 MG/DL (ref 70–105)
GLUCOSE SERPL-MCNC: 115 MG/DL (ref 75–121)
GLUCOSE SERPL-MCNC: 167 MG/DL (ref 70–105)
GLUCOSE SERPL-MCNC: 174 MG/DL (ref 70–105)
GLUCOSE SERPL-MCNC: 180 MG/DL (ref 70–105)
HCT VFR BLD AUTO: 24.9 % (ref 38–47)
HGB BLD-MCNC: 7.7 G/DL (ref 12–16)
IMM GRANULOCYTES # BLD AUTO: 0.1 K/UL (ref 0–0.04)
IMM GRANULOCYTES NFR BLD: 0.9 % (ref 0–0.4)
LYMPHOCYTES # BLD AUTO: 0.63 K/UL (ref 1–4.8)
LYMPHOCYTES NFR BLD AUTO: 5.9 % (ref 27–41)
MAGNESIUM SERPL-MCNC: 1.6 MG/DL (ref 1.6–2.6)
MCH RBC QN AUTO: 24.8 PG (ref 27–31)
MCHC RBC AUTO-ENTMCNC: 30.9 G/DL (ref 32–36)
MCV RBC AUTO: 80.1 FL (ref 80–96)
MONOCYTES # BLD AUTO: 0.88 K/UL (ref 0–0.8)
MONOCYTES NFR BLD AUTO: 8.2 % (ref 2–6)
MPC BLD CALC-MCNC: 8.9 FL (ref 9.4–12.4)
NEUTROPHILS # BLD AUTO: 8.83 K/UL (ref 1.8–7.7)
NEUTROPHILS NFR BLD AUTO: 82.8 % (ref 53–65)
NRBC # BLD AUTO: 0 X10E3/UL
NRBC, AUTO (.00): 0 %
PHOSPHATE SERPL-MCNC: 3.4 MG/DL (ref 2.3–4.7)
PLATELET # BLD AUTO: 439 K/UL (ref 150–400)
POTASSIUM SERPL-SCNC: 4.3 MMOL/L (ref 3.5–5.1)
RBC # BLD AUTO: 3.11 M/UL (ref 4.2–5.4)
SODIUM SERPL-SCNC: 133 MMOL/L (ref 136–145)
WBC # BLD AUTO: 10.67 K/UL (ref 4.5–11)

## 2025-05-22 PROCEDURE — 36415 COLL VENOUS BLD VENIPUNCTURE: CPT | Performed by: STUDENT IN AN ORGANIZED HEALTH CARE EDUCATION/TRAINING PROGRAM

## 2025-05-22 PROCEDURE — 27000221 HC OXYGEN, UP TO 24 HOURS

## 2025-05-22 PROCEDURE — 25000003 PHARM REV CODE 250: Performed by: STUDENT IN AN ORGANIZED HEALTH CARE EDUCATION/TRAINING PROGRAM

## 2025-05-22 PROCEDURE — 94640 AIRWAY INHALATION TREATMENT: CPT | Mod: XB

## 2025-05-22 PROCEDURE — 94761 N-INVAS EAR/PLS OXIMETRY MLT: CPT

## 2025-05-22 PROCEDURE — G0378 HOSPITAL OBSERVATION PER HR: HCPCS

## 2025-05-22 PROCEDURE — 83735 ASSAY OF MAGNESIUM: CPT | Performed by: STUDENT IN AN ORGANIZED HEALTH CARE EDUCATION/TRAINING PROGRAM

## 2025-05-22 PROCEDURE — 99900035 HC TECH TIME PER 15 MIN (STAT)

## 2025-05-22 PROCEDURE — 11000001 HC ACUTE MED/SURG PRIVATE ROOM

## 2025-05-22 PROCEDURE — 96372 THER/PROPH/DIAG INJ SC/IM: CPT | Performed by: STUDENT IN AN ORGANIZED HEALTH CARE EDUCATION/TRAINING PROGRAM

## 2025-05-22 PROCEDURE — 82962 GLUCOSE BLOOD TEST: CPT

## 2025-05-22 PROCEDURE — 99232 SBSQ HOSP IP/OBS MODERATE 35: CPT | Mod: ,,,

## 2025-05-22 PROCEDURE — 25000242 PHARM REV CODE 250 ALT 637 W/ HCPCS: Performed by: STUDENT IN AN ORGANIZED HEALTH CARE EDUCATION/TRAINING PROGRAM

## 2025-05-22 PROCEDURE — 84100 ASSAY OF PHOSPHORUS: CPT | Performed by: STUDENT IN AN ORGANIZED HEALTH CARE EDUCATION/TRAINING PROGRAM

## 2025-05-22 PROCEDURE — 63600175 PHARM REV CODE 636 W HCPCS: Performed by: STUDENT IN AN ORGANIZED HEALTH CARE EDUCATION/TRAINING PROGRAM

## 2025-05-22 PROCEDURE — 80048 BASIC METABOLIC PNL TOTAL CA: CPT | Performed by: STUDENT IN AN ORGANIZED HEALTH CARE EDUCATION/TRAINING PROGRAM

## 2025-05-22 PROCEDURE — 85025 COMPLETE CBC W/AUTO DIFF WBC: CPT | Performed by: STUDENT IN AN ORGANIZED HEALTH CARE EDUCATION/TRAINING PROGRAM

## 2025-05-22 RX ADMIN — METOPROLOL SUCCINATE 25 MG: 25 TABLET, EXTENDED RELEASE ORAL at 09:05

## 2025-05-22 RX ADMIN — HEPARIN SODIUM 5000 UNITS: 5000 INJECTION, SOLUTION INTRAVENOUS; SUBCUTANEOUS at 06:05

## 2025-05-22 RX ADMIN — INSULIN GLARGINE 5 UNITS: 100 INJECTION, SOLUTION SUBCUTANEOUS at 09:05

## 2025-05-22 RX ADMIN — Medication 4 CAPSULE: at 08:05

## 2025-05-22 RX ADMIN — Medication 4 CAPSULE: at 06:05

## 2025-05-22 RX ADMIN — LACTULOSE 15 G: 20 SOLUTION ORAL at 09:05

## 2025-05-22 RX ADMIN — HEPARIN SODIUM 5000 UNITS: 5000 INJECTION, SOLUTION INTRAVENOUS; SUBCUTANEOUS at 10:05

## 2025-05-22 RX ADMIN — HYDRALAZINE HYDROCHLORIDE 50 MG: 50 TABLET ORAL at 09:05

## 2025-05-22 RX ADMIN — BUDESONIDE INHALATION 0.5 MG: 0.5 SUSPENSION RESPIRATORY (INHALATION) at 07:05

## 2025-05-22 RX ADMIN — GABAPENTIN 100 MG: 100 CAPSULE ORAL at 10:05

## 2025-05-22 RX ADMIN — PRAVASTATIN SODIUM 10 MG: 10 TABLET ORAL at 09:05

## 2025-05-22 RX ADMIN — LINEZOLID 600 MG: 600 INJECTION, SOLUTION INTRAVENOUS at 06:05

## 2025-05-22 RX ADMIN — LACTULOSE 15 G: 20 SOLUTION ORAL at 10:05

## 2025-05-22 RX ADMIN — CEFEPIME 1 G: 1 INJECTION, POWDER, FOR SOLUTION INTRAMUSCULAR; INTRAVENOUS at 06:05

## 2025-05-22 RX ADMIN — HEPARIN SODIUM 5000 UNITS: 5000 INJECTION, SOLUTION INTRAVENOUS; SUBCUTANEOUS at 01:05

## 2025-05-22 RX ADMIN — PANTOPRAZOLE SODIUM 40 MG: 40 TABLET, DELAYED RELEASE ORAL at 10:05

## 2025-05-22 RX ADMIN — LEVOTHYROXINE SODIUM 100 MCG: 100 TABLET ORAL at 06:05

## 2025-05-22 RX ADMIN — Medication 4 CAPSULE: at 12:05

## 2025-05-22 RX ADMIN — HYDROCODONE BITARTRATE AND ACETAMINOPHEN 1 TABLET: 5; 325 TABLET ORAL at 11:05

## 2025-05-22 RX ADMIN — PANTOPRAZOLE SODIUM 40 MG: 40 TABLET, DELAYED RELEASE ORAL at 09:05

## 2025-05-22 RX ADMIN — SERTRALINE HYDROCHLORIDE 25 MG: 25 TABLET ORAL at 09:05

## 2025-05-22 RX ADMIN — HYDRALAZINE HYDROCHLORIDE 50 MG: 50 TABLET ORAL at 10:05

## 2025-05-22 NOTE — ASSESSMENT & PLAN NOTE
Patient's blood pressure range in the last 24 hours was: BP  Min: 102/52  Max: 150/75.The patient's inpatient anti-hypertensive regimen is listed below:  Current Antihypertensives  hydrALAZINE tablet 50 mg, 2 times daily, Oral  metoprolol succinate (TOPROL-XL) 24 hr tablet 25 mg, Daily, Oral    Plan  - BP is controlled, no changes needed to their regimen  - follow

## 2025-05-22 NOTE — PROGRESS NOTES
[facility] - [facility] Telemetry  Wound Care    Patient Name:  Vandana Allison   MRN:  [id number]  Date: 5/22/2025  Diagnosis: Severe sepsis    History:     Past Medical History:   Diagnosis Date    Arthritis     Bleeding external hemorrhoids     Chronic kidney disease, stage 3b     baseline creat 1.5    Chronic kidney disease, stage 3b 02/07/2024    baseline creat 1.5      COPD (chronic obstructive pulmonary disease)     senile emphysema    Diabetes mellitus, type 2     DNR (do not resuscitate) 02/07/2024    discussed with VITO Christianson present    Essential (primary) hypertension     Hiatal hernia with GERD     Neuropathy     Post-operative hypothyroidism     Severe pulmonary hypertension 10/08/2022    EF  70 % and normal diastolic function       Social History[1]    Precautions:     Allergies as of 05/21/2025 - Reviewed 05/21/2025   Allergen Reaction Noted    Aspirin  08/30/2017       [facility] Assessment Details/Treatment     Narrative: Seen patient for initial preventative skin care measures.  GIANCARLO bed ordered.  Wounds to right heels and sacral.  Foam borders noted to bilateral heels and sacral.  Consult wound care for  any other findings.      05/22/2025        [1]   Social History  Socioeconomic History    Marital status:    Tobacco Use    Smoking status: Never    Smokeless tobacco: Never   Substance and Sexual Activity    Alcohol use: Never    Drug use: Never    Sexual activity: Not Currently     Birth control/protection: None     Social Drivers of Health     Financial Resource Strain: Low Risk  (5/22/2025)    Overall Financial Resource Strain (CARDIA)     Difficulty of Paying Living Expenses: Not very hard   Food Insecurity: No Food Insecurity (5/22/2025)    Hunger Vital Sign     Worried About Running Out of Food in the Last Year: Never true     Ran Out of Food in the Last Year: Never true   Transportation Needs: No Transportation Needs (5/22/2025)    PRAPARE - Transportation     Lack of  Transportation (Medical): No     Lack of Transportation (Non-Medical): No   Physical Activity: Inactive (5/22/2025)    Exercise Vital Sign     Days of Exercise per Week: 0 days     Minutes of Exercise per Session: 0 min   Stress: No Stress Concern Present (5/21/2025)    [place] [employer] - Occupational Stress Questionnaire     Feeling of Stress : Not at all   Housing Stability: Low Risk  (5/22/2025)    Housing Stability Vital Sign     Unable to Pay for Housing in the Last Year: No     Number of Times Moved in the Last Year: 0     Homeless in the Last Year: No

## 2025-05-22 NOTE — ASSESSMENT & PLAN NOTE
Anemia is likely due to chronic disease due to Chronic Kidney Disease. Most recent hemoglobin and hematocrit are listed below.  Recent Labs     05/21/25  0530 05/21/25  1310 05/22/25  0512   HGB 7.3* 8.4* 7.7*   HCT 23.6* 26.7* 24.9*     Plan  - Monitor serial CBC: Daily  - Transfuse PRBC if patient becomes hemodynamically unstable, symptomatic or H/H drops below 7/21.  - Patient has not received any PRBC transfusions to date  - Patient's anemia is currently stable  - follow

## 2025-05-22 NOTE — PLAN OF CARE
Problem: Adult Inpatient Plan of Care  Goal: Plan of Care Review  Outcome: Progressing  Goal: Patient-Specific Goal (Individualized)  Outcome: Progressing  Goal: Absence of Hospital-Acquired Illness or Injury  Outcome: Progressing  Goal: Optimal Comfort and Wellbeing  Outcome: Progressing  Goal: Readiness for Transition of Care  Outcome: Progressing     Problem: Sepsis/Septic Shock  Goal: Optimal Coping  Outcome: Progressing  Goal: Absence of Bleeding  Outcome: Progressing  Goal: Blood Glucose Level Within Targeted Range  Outcome: Progressing  Goal: Absence of Infection Signs and Symptoms  Outcome: Progressing  Goal: Optimal Nutrition Intake  Outcome: Progressing     Problem: Diabetes Comorbidity  Goal: Blood Glucose Level Within Targeted Range  Outcome: Progressing     Problem: Wound  Goal: Optimal Coping  Outcome: Progressing  Goal: Optimal Functional Ability  Outcome: Progressing  Goal: Absence of Infection Signs and Symptoms  Outcome: Progressing  Goal: Improved Oral Intake  Outcome: Progressing  Goal: Optimal Pain Control and Function  Outcome: Progressing  Goal: Skin Health and Integrity  Outcome: Progressing  Goal: Optimal Wound Healing  Outcome: Progressing     Problem: Fall Injury Risk  Goal: Absence of Fall and Fall-Related Injury  Outcome: Progressing     Problem: Skin Injury Risk Increased  Goal: Skin Health and Integrity  Outcome: Progressing

## 2025-05-22 NOTE — ASSESSMENT & PLAN NOTE
This patient does have evidence of infective focus  My overall impression is sepsis.  Source: Respiratory  Antibiotics given-   Antibiotics (72h ago, onward)      Start     Stop Route Frequency Ordered    05/21/25 1845  linezolid 600 mg/300 mL IVPB 600 mg         -- IV Every 12 hours (non-standard times) 05/21/25 1744    05/21/25 1845  ceFEPIme injection 1 g         -- IV Every 12 hours (non-standard times) 05/21/25 1744          Latest lactate reviewed-  Recent Labs   Lab 05/21/25  1317   LACTATE 0.9     Organ dysfunction indicated by no evidence of end-organ dysfunction    Fluid challenge Contraindicated- Fluid bolus is contraindicated in this patient due to Volume overload due to- hypoalbuminemia.  Large pleural effusion in hypoxic patient     Post- resuscitation assessment Yes - I attest a sepsis perfusion exam was performed within 6 hours of sepsis, severe sepsis, or septic shock presentation, following fluid resuscitation.      Will Not start Pressors- Levophed for MAP of 65  Source control achieved by:  Antibiotics  Lactate okay   Blood cultures ordered  Zyvox for pneumonia in the setting of CKD   Cefepime

## 2025-05-22 NOTE — CONSULTS
[facility] Tele[facility]  Adult Nutrition  Consult Note         Reason for Assessment  Reason For Assessment: consult (malnutrition, wounds)        Assessment and Plan     Consult received and appreciated. Consult for malnutrition and wounds. Patient is a 89 yo female admitted 5/21 for severe sepsis.     Patient is 85.2kg with a BMI of 31.26 and is obese. Chart review reveals no significant weight changes in the past six months and patient denies poor PO intakes.  Alb 1.7, likely related to metabolic stress response to sacral decubitus and sepsis. Per ASPEN guidelines, patient does not meet criteria for malnutrition at this time.    Patient is ordered a Regular diet. Recommend continue current diet as tolerated. Encourage good PO intakes. Also recommend addition of Jasiel BID to aid in wound healing and consider addition of 500mg Vitamin C + 220mg ZnSO4 BID + MVI daily to aid in wound healing.     Last Bowel Movement: 05/19/25    Medications/labs reviewed. RD following.    Learning Needs/Social Determinants of Health    Learning Assessment       05/21/2025 2235 [facility] Tele[facility] (5/21/2025 - Present)   Created by Alisha Georges, RN - RN (Nurse) Status: Complete                 PRIMARY LEARNER     Primary Learner Name:  Vandana Allison  - 05/21/2025 2235    Relationship:  Patient RC - 05/21/2025 2235    Does the primary learner have any barriers to learning?:  Cognitive  - 05/21/2025 2235    What is the preferred language of the primary learner?:  English  - 05/21/2025 2235    Is an  required?:  No  - 05/21/2025 2235    How does the primary learner prefer to learn new concepts?:  Listening  - 05/21/2025 2235    How often do you need to have someone help you read instructions, pamphlets, or written material from your doctor or pharmacy?:  Always  - 05/21/2025 2235        CO-LEARNER #1     No question answered        CO-LEARNER #2     No  question answered        SPECIAL TOPICS     No question answered        ANSWERED BY:     No question answered        Comments         Edit History       Alisha Georges, RN - RN (Nurse)   05/21/2025 7551                             Social Drivers of Health     Tobacco Use: Low Risk  (5/21/2025)    Patient History     Smoking Tobacco Use: Never     Smokeless Tobacco Use: Never     Passive Exposure: Not on file   Alcohol Use: Not At Risk (5/7/2025)    AUDIT-C     Frequency of Alcohol Consumption: Never     Average Number of Drinks: Patient does not drink     Frequency of Binge Drinking: Never   Financial Resource Strain: Low Risk  (5/21/2025)    Overall Financial Resource Strain (CARDIA)     Difficulty of Paying Living Expenses: Not hard at all   Food Insecurity: No Food Insecurity (5/21/2025)    Hunger Vital Sign     Worried About Running Out of Food in the Last Year: Never true     Ran Out of Food in the Last Year: Never true   Transportation Needs: No Transportation Needs (5/21/2025)    PRAPARE - Transportation     Lack of Transportation (Medical): No     Lack of Transportation (Non-Medical): No   Physical Activity: Inactive (5/7/2025)    Exercise Vital Sign     Days of Exercise per Week: 0 days     Minutes of Exercise per Session: 0 min   Stress: No Stress Concern Present (5/21/2025)    English [place] [employer] - Occupational Stress Questionnaire     Feeling of Stress : Not at all   Housing Stability: Low Risk  (5/21/2025)    Housing Stability Vital Sign     Unable to Pay for Housing in the Last Year: No     Number of Times Moved in the Last Year: 0     Homeless in the Last Year: No   Depression: Low Risk  (11/27/2024)    Depression     Last PHQ-4: Flowsheet Data: 0   Utilities: Not At Risk (5/21/2025)    [facility] Utilities     Threatened with loss of utilities: No   Health Literacy: Adequate Health Literacy (5/21/2025)     Health Literacy     Frequency of need for help with medical instructions:  Rarely   Recent Concern: Health Literacy - Inadequate Health Literacy (5/7/2025)     Health Literacy     Frequency of need for help with medical instructions: Sometimes   Social Isolation: Not on file          Malnutrition  Is Patient Malnourished: No    Nutrition Diagnosis  Increased Protein Needs related to Wound healing as evidenced by sacral decubitus  Comments: continue Regular diet, add Jasiel BID to aid in wound healing, consider addition of 500mg Vitamin C + 220mg ZnSO4 BID + MVI daily to aid in wound healing.    Recent Labs   Lab 05/22/25  0512 05/22/25  0726     --    POCGLU  --  180*     Comments on Glucose: Glucose elevated. PMH DM2, likely exacerbated by metabolic stress    Nutrition Prescription / Recommendations  Recommendation/Intervention: Recommend continue current diet as tolerated. Encourage good PO intakes. Also recommend addition of Jasiel BID to aid in wound healing and consider addition of 500mg Vitamin C + 220mg ZnSO4 BID + MVI daily to aid in wound healing.  Goals: Weight maintenance during admission, intake 50-75% of meals during admission  Nutrition Goal Status: new  Current Diet Order: Regular  Chewing or Swallowing Difficulty?: No Chewing or swallowing difficulty  Recommended Diet: Regular  Recommended Oral Supplement: Jasiel [90 kcals, 2.5g Protein, 10g Carbs(3g Sugar), 7g L-Arginine, 7g L-Glutamine, Vitamin C 300mg, 9.5mg Zinc] 2 times a day  Is Nutrition Support Recommended: No  Is Nutrition Education Recommended: No    Monitor and Evaluation  % current Intake: New Diet order with no P.O. intake documented currently  % intake to meet estimated needs: 50 - 75 %  Monitor and Evaluation: Food and beverage intake, Diet order, Weight, Electrolyte and renal panel, Gastrointestinal profile, Glucose/endocrine profile, Inflammatory profile, Lipid profile  Food and beverage intake, Diet order, Weight, Electrolyte and renal panel, Gastrointestinal profile, Glucose/endocrine profile,  Inflammatory profile, Lipid profile    Current Medical Diagnosis and Past Medical History  Diagnosis: infection/sepsis  Past Medical History:   Diagnosis Date    Arthritis     Bleeding external hemorrhoids     Chronic kidney disease, stage 3b     baseline creat 1.5    Chronic kidney disease, stage 3b 02/07/2024    baseline creat 1.5      COPD (chronic obstructive pulmonary disease)     senile emphysema    Diabetes mellitus, type 2     DNR (do not resuscitate) 02/07/2024    discussed with VITO Christianson present    Essential (primary) hypertension     Hiatal hernia with GERD     Neuropathy     Post-operative hypothyroidism     Severe pulmonary hypertension 10/08/2022    EF  70 % and normal diastolic function       Nutrition/Diet History  Food Allergies: NKFA    Lab/Procedures/Meds  Recent Labs   Lab 05/21/25  1310 05/22/25  0512   * 133*   K 4.3 4.3   BUN 22* 23*   CREATININE 1.46* 1.51*   CALCIUM 8.2* 7.9*   ALBUMIN 1.7*  --     103   ALT <7  --    AST 16  --    PHOS  --  3.4   Note: Na+, Ca++, Alb low. BUN, Cr elevated. PMH CKD    Last A1c:   Lab Results   Component Value Date    HGBA1C 6.4 11/27/2024     Lab Results   Component Value Date    RBC 3.11 (L) 05/22/2025    HGB 7.7 (L) 05/22/2025    HCT 24.9 (L) 05/22/2025    MCV 80.1 05/22/2025    MCH 24.8 (L) 05/22/2025    MCHC 30.9 (L) 05/22/2025    TIBC 368 02/07/2024   Note: H&H low    Pertinent Labs Reviewed: reviewed  Pertinent Medications Reviewed: reviewed  Scheduled Meds:   budesonide  0.5 mg Nebulization Q12H    ceFEPime IV (PEDS and ADULTS)  1 g Intravenous Q12H    gabapentin  100 mg Oral QHS    heparin (porcine)  5,000 Units Subcutaneous Q8H    hydrALAZINE  50 mg Oral BID    insulin glargine U-100 (Lantus)  5 Units Subcutaneous Q12H    Lactobacillus acidophilus  4 capsule Oral TID WM    lactulose  15 g Oral BID    levothyroxine  100 mcg Oral Before breakfast    linezolid  600 mg Intravenous Q12H    metoprolol succinate  25 mg Oral Daily     "pantoprazole  40 mg Oral BID    pravastatin  10 mg Oral Daily    sertraline  25 mg Oral Daily     Continuous Infusions:  PRN Meds:.  Current Facility-Administered Medications:     acetaminophen, 650 mg, Oral, Q8H PRN    albuterol-ipratropium, 3 mL, Nebulization, Q6H PRN    dextrose 50%, 12.5 g, Intravenous, PRN    dextrose 50%, 25 g, Intravenous, PRN    glucagon (human recombinant), 1 mg, Intramuscular, PRN    glucose, 16 g, Oral, PRN    glucose, 24 g, Oral, PRN    HYDROcodone-acetaminophen, 1 tablet, Oral, Q6H PRN    insulin aspart U-100, 0-5 Units, Subcutaneous, QID (AC + HS) PRN    melatonin, 6 mg, Oral, Nightly PRN    naloxone, 0.02 mg, Intravenous, PRN    ondansetron, 4 mg, Intravenous, Q6H PRN    promethazine, 25 mg, Rectal, Q6H PRN    simethicone, 1 tablet, Oral, QID PRN    sodium chloride 0.9%, 10 mL, Intravenous, Q12H PRN    Anthropometrics  Height: 5' 5" (165.1 cm)  Height (inches): 65 in  Weight: 85.2 kg (187 lb 13.3 oz)  Weight (lb): 187.83 lb  Weight Method: Bed Scale  Ideal Body Weight (IBW), Female: 125 lb  % Ideal Body Weight, Female (lb): 150.26 %  BMI (Calculated): 31.3       Estimated/Assessed Needs  RMR ([place]. Jeor Equation): 1232.88     Temp: 97.6 °F (36.4 °C)Oral  Weight Used For Calorie Calculations: 85.2 kg (187 lb 13.3 oz)     Energy Calorie Requirements (kcal): 1704-2130kcal (20-25kcal/kg)  Weight Used For Protein Calculations: 56.7 kg (125 lb)  Protein Requirements: 68-85g (1.2-1.5g/kg)       RDA Method (mL): 1704       Nutrition by Nursing                            Nutrition Follow-Up  RD Follow-up?: Yes      Nutrition Discharge Planning: General healthy diet             Taylor Kaur, MS, RD, LD  Available via Secure Chat  "

## 2025-05-22 NOTE — ASSESSMENT & PLAN NOTE
Patient has a diagnosis of pneumonia. The cause of the pneumonia is suspected to be bacterial in etiology but organism is not known. The pneumonia is worsening due to oxygen requirement. The patient has the following signs/symptoms of pneumonia: persistent hypoxia . The patient does have a current oxygen requirement and the patient does not have a home oxygen requirement. I have reviewed the pertinent imaging. The following cultures have been collected: Blood cultures The culture results are listed below.     Current antimicrobial regimen consists of the antibiotics listed below. Will monitor patient closely and continue current treatment plan unchanged.    Antibiotics (From admission, onward)      Start     Stop Route Frequency Ordered    05/21/25 1845  linezolid 600 mg/300 mL IVPB 600 mg         -- IV Every 12 hours (non-standard times) 05/21/25 1744 05/21/25 1845  ceFEPIme injection 1 g         -- IV Every 12 hours (non-standard times) 05/21/25 1744            Microbiology Results (last 7 days)       Procedure Component Value Units Date/Time    Urine culture [[id number]] Collected: 05/21/25 1813    Order Status: Completed Specimen: Urine, Catheterized (In/Out) Updated: 05/22/25 0801     Culture, Urine No Growth To Date    Blood Culture #2 [[id number]] Collected: 05/21/25 1317    Order Status: Resulted Specimen: Blood Updated: 05/21/25 1323    Blood Culture #1 [[id number]] Collected: 05/21/25 1310    Order Status: Resulted Specimen: Blood Updated: 05/21/25 1323            5/22  - continuing abx

## 2025-05-22 NOTE — ASSESSMENT & PLAN NOTE
Creatine stable for now. BMP reviewed- noted Estimated Creatinine Clearance: 22.4 mL/min (A) (based on SCr of 1.51 mg/dL (H)). according to latest data. 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  Monitor UOP and serial BMP and adjust therapy as needed. Renally dose meds. Avoid nephrotoxic medications and procedures.

## 2025-05-22 NOTE — PLAN OF CARE
Problem: Adult Inpatient Plan of Care  Goal: Plan of Care Review  Outcome: Progressing  Goal: Patient-Specific Goal (Individualized)  Outcome: Progressing  Goal: Absence of Hospital-Acquired Illness or Injury  Outcome: Progressing  Goal: Optimal Comfort and Wellbeing  Outcome: Progressing  Goal: Readiness for Transition of Care  Outcome: Progressing     Problem: Sepsis/Septic Shock  Goal: Optimal Coping  Outcome: Progressing  Goal: Absence of Bleeding  Outcome: Progressing  Goal: Blood Glucose Level Within Targeted Range  Outcome: Progressing  Goal: Absence of Infection Signs and Symptoms  Outcome: Progressing  Goal: Optimal Nutrition Intake  Outcome: Progressing     Problem: Diabetes Comorbidity  Goal: Blood Glucose Level Within Targeted Range  Outcome: Progressing     Problem: Wound  Goal: Optimal Coping  Outcome: Progressing  Goal: Optimal Functional Ability  Outcome: Progressing  Goal: Absence of Infection Signs and Symptoms  Outcome: Progressing  Goal: Improved Oral Intake  Outcome: Progressing  Goal: Optimal Pain Control and Function  Outcome: Progressing  Goal: Skin Health and Integrity  Outcome: Progressing  Goal: Optimal Wound Healing  Outcome: Progressing     Problem: Fall Injury Risk  Goal: Absence of Fall and Fall-Related Injury  Outcome: Progressing     Problem: Skin Injury Risk Increased  Goal: Skin Health and Integrity  Outcome: Progressing     Problem: Gas Exchange Impaired  Goal: Optimal Gas Exchange  Outcome: Progressing     Problem: Gas Exchange Impaired  Goal: Optimal Gas Exchange  Outcome: Progressing

## 2025-05-22 NOTE — HOSPITAL COURSE
5/22  - states she feels better today but has had subjective chills  - continuing pna treatment    5/23  - at bedside today patient is lethargic, sob, +2 LE edema  - repeat labs including abg, chest xray, lactic acid, cbc, cmp  - bladder scan with 350 retention, rivera placed, will start flowmax  - lasix 40 IV given once  - monitoring closely    5/24  - much better today, likely dc to nursing home tomorrow  - plan to pull rivera in the morning and rescan after 4 hours, if still retaining will dc with rivera    5/25  - rivera pulled today, will dc flowmax, patient to dc to nursing home in the am  - will complete 3 additional days of cipro  5/26  - dc today

## 2025-05-22 NOTE — PROGRESS NOTES
[facility] - [address] [place] [address] Medicine  Progress Note    Patient Name: Vandana Allison  MRN: [id number]  Patient Class: IP- Inpatient   Admission Date: 5/21/2025  Length of Stay: 0 days  Attending Physician: Fallon Guerin MD  Primary Care Provider: Jennifer Mares FNP        Subjective     Principal Problem:Severe sepsis        HPI:  89-year-old  female With a past medical history of CKD, COPD, diabetes, diabetic neuropathy, hypertension, right nephrectomy presents to the ED after she was found to have elevated white count at the nursing home.  She denies any shortness of breath, cough, fever, chills, dysuria.  She does have some left knee pain.  Does have a sacral decubitus.  She has had a poor appetite.  Recently had gallbladder remove with Dr. Maciel.  She reports feeling better currently than she did earlier in the day during my interview.  Chest x-ray was reviewed and showed bilateral pleural effusions that we are new since early May.  Review of systems otherwise negative    Overview/Hospital Course:  5/22  - states she feels better today but has had subjective chills  - continuing pna treatment    Past Medical History:   Diagnosis Date    Arthritis     Bleeding external hemorrhoids     Chronic kidney disease, stage 3b     baseline creat 1.5    Chronic kidney disease, stage 3b 02/07/2024    baseline creat 1.5      COPD (chronic obstructive pulmonary disease)     senile emphysema    Diabetes mellitus, type 2     DNR (do not resuscitate) 02/07/2024    discussed with VITO Christianson present    Essential (primary) hypertension     Hiatal hernia with GERD     Neuropathy     Post-operative hypothyroidism     Severe pulmonary hypertension 10/08/2022    EF  70 % and normal diastolic function       Past Surgical History:   Procedure Laterality Date    BREAST SURGERY      Biopsy    EYE SURGERY      Remove cataracts both eyes    HYSTERECTOMY      LAPAROSCOPIC CHOLECYSTECTOMY  N/A 5/13/2025    Procedure: CHOLECYSTECTOMY, LAPAROSCOPIC;  Surgeon: Miguel Angel Aragon MD;  Location: [facility];  Service: General;  Laterality: N/A;    NEPHRECTOMY Right 1960    THYROIDECTOMY         Review of patient's allergies indicates:   Allergen Reactions    Aspirin        No current facility-administered medications on file prior to encounter.     Current Outpatient Medications on File Prior to Encounter   Medication Sig    albuterol (PROVENTIL/VENTOLIN HFA) 90 mcg/actuation inhaler INHALE 2 PUFFS BY MOUTH INTO THE LUNGS EVERY 4 HOURS AS NEEDED FOR SHORTNESS OF BREATH / RESCUE (Patient taking differently: Inhale 2 puffs into the lungs every 6 (six) hours as needed.)    albuterol-ipratropium (DUO-NEB) 2.5 mg-0.5 mg/3 mL nebulizer solution Take 3 mLs by nebulization every 6 (six) hours as needed for Wheezing. Rescue    ferrous sulfate (FEOSOL) 325 mg (65 mg iron) Tab tablet Take 1 tablet (325 mg total) by mouth 3 (three) times a week. On Monday, Wednesday and Friday    furosemide (LASIX) 40 MG tablet Take 1 tablet (40 mg total) by mouth 2 (two) times daily.    gabapentin (NEURONTIN) 100 MG capsule Take 1 capsule (100 mg total) by mouth every evening.    hydrALAZINE (APRESOLINE) 50 MG tablet Take 1 tablet (50 mg total) by mouth 2 (two) times daily.    HYDROcodone-acetaminophen (NORCO) 5-325 mg per tablet Take 1 tablet by mouth every 6 (six) hours as needed for Pain.    hydrocortisone (ANUSOL-HC) 2.5 % rectal cream Place rectally 2 (two) times daily.    insulin glargine U-100, Lantus, 100 unit/mL injection Inject 5 Units into the skin every 12 (twelve) hours.    insulin lispro 100 unit/mL injection Inject 3 Units into the skin 3 (three) times daily before meals.    Lactobacillus acidophilus 500 million cell Cap Take 4 capsules by mouth 3 (three) times daily with meals.    lactulose (CHRONULAC) 20 gram/30 mL Soln Take 23 mLs (15 g total) by mouth 2 (two) times daily. Hold if patient having more than 3 Bms  "daily (Patient taking differently: Take 30 mLs by mouth 2 (two) times daily. Hold if patient having more than 3 Bms daily)    levothyroxine (SYNTHROID) 100 MCG tablet TAKE 1 TABLET BY MOUTH BEFORE BREAKFAST.    megestroL (MEGACE) 400 mg/10 mL (40 mg/mL) Susp Take 10 mLs by mouth once daily.    meloxicam (MOBIC) 7.5 MG tablet Take 7.5 mg by mouth once daily.    metoprolol succinate (TOPROL-XL) 25 MG 24 hr tablet Take 1 tablet (25 mg total) by mouth once daily.    nystatin (MYCOSTATIN) 100,000 unit/mL suspension Take 5 mLs (500,000 Units total) by mouth 4 (four) times daily with meals and nightly. for 10 days    ondansetron 4 mg/2 mL Soln Inject 4 mg into the muscle every 6 (six) hours as needed.    pantoprazole (PROTONIX) 40 MG tablet TAKE 1 TABLET BY MOUTH TWICE A DAY    pravastatin (PRAVACHOL) 10 MG tablet Take 1 tablet (10 mg total) by mouth once daily.    sertraline (ZOLOFT) 25 MG tablet Take 25 mg by mouth once daily.    SYMBICORT 160-4.5 mcg/actuation HFAA Inhale 2 puffs into the lungs 2 (two) times daily.    BD INSULIN SYRINGE ULTRA-FINE 0.3 mL 31 gauge x 5/16" Syrg USE 1 SYRINGE TWICE A DAY     Family History       Problem Relation (Age of Onset)    Cancer Sister, Brother, Daughter    Diabetes Mother, Sister, Brother, Sister    Heart disease Father          Tobacco Use    Smoking status: Never    Smokeless tobacco: Never   Substance and Sexual Activity    Alcohol use: Never    Drug use: Never    Sexual activity: Not Currently     Birth control/protection: None     Review of Systems   Constitutional:  Positive for appetite change and fatigue. Negative for chills, fever and unexpected weight change.   HENT:  Negative for congestion, mouth sores and sore throat.    Eyes:  Negative for photophobia and visual disturbance.   Respiratory:  Negative for cough, chest tightness, shortness of breath and wheezing.    Cardiovascular:  Negative for chest pain, palpitations and leg swelling.   Gastrointestinal:  Negative " for abdominal pain, diarrhea, nausea and vomiting.   Endocrine: Negative for cold intolerance and heat intolerance.   Genitourinary:  Negative for difficulty urinating, dysuria, frequency and urgency.   Musculoskeletal:  Negative for arthralgias, back pain and myalgias.   Skin:  Positive for wound. Negative for pallor and rash.   Neurological:  Negative for tremors, seizures, syncope, weakness, numbness and headaches.   Hematological:  Does not bruise/bleed easily.   Psychiatric/Behavioral:  Negative for agitation, confusion, hallucinations and suicidal ideas.      Objective:     Vital Signs (Most Recent):  Temp: 97.7 °F (36.5 °C) (05/22/25 1630)  Pulse: 68 (05/22/25 1630)  Resp: 16 (05/22/25 1630)  BP: (!) 113/54 (05/22/25 1630)  SpO2: 99 % (05/22/25 1630) Vital Signs (24h Range):  Temp:  [97.4 °F (36.3 °C)-98.2 °F (36.8 °C)] 97.7 °F (36.5 °C)  Pulse:  [68-84] 68  Resp:  [16-18] 16  SpO2:  [97 %-100 %] 99 %  BP: (102-150)/(52-78) 113/54     Weight: 85.2 kg (187 lb 13.3 oz)  Body mass index is 31.26 kg/m².     Physical Exam  Vitals reviewed.   Constitutional:       Appearance: Normal appearance.   HENT:      Head: Normocephalic and atraumatic.      Nose: Nose normal.   Eyes:      Extraocular Movements: Extraocular movements intact.      Conjunctiva/sclera: Conjunctivae normal.   Neck:      Trachea: Trachea normal.   Cardiovascular:      Rate and Rhythm: Normal rate and regular rhythm.      Pulses: Normal pulses.      Heart sounds: Normal heart sounds.   Pulmonary:      Effort: Pulmonary effort is normal.      Breath sounds: Normal breath sounds and air entry.   Abdominal:      General: Bowel sounds are normal.      Palpations: Abdomen is soft.   Musculoskeletal:         General: Signs of injury (Sacral wound) present.      Cervical back: Neck supple.      Comments: Left knee effusion.  Pain with passive range of motion.  Not red not tender to palpation   Skin:     General: Skin is warm and dry.   Neurological:       "General: No focal deficit present.      Mental Status: She is alert and oriented to person, place, and time.      Comments: Grossly normal motor and sensory function without focal deficit appreciated.   Psychiatric:         Mood and Affect: Mood and affect normal.         Behavior: Behavior is cooperative.                Significant Labs: All pertinent labs within the past 24 hours have been reviewed.    Significant Imaging: I have reviewed all pertinent imaging results/findings within the past 24 hours.      Assessment & Plan  Severe sepsis  This patient does have evidence of infective focus  My overall impression is sepsis.  Source: Respiratory  Antibiotics given-   Antibiotics (72h ago, onward)      Start     Stop Route Frequency Ordered    05/21/25 1845  linezolid 600 mg/300 mL IVPB 600 mg         -- IV Every 12 hours (non-standard times) 05/21/25 1744    05/21/25 1845  ceFEPIme injection 1 g         -- IV Every 12 hours (non-standard times) 05/21/25 1744          Latest lactate reviewed-  Recent Labs   Lab 05/21/25  1317   LACTATE 0.9     Organ dysfunction indicated by no evidence of end-organ dysfunction    Fluid challenge Contraindicated- Fluid bolus is contraindicated in this patient due to Volume overload due to- hypoalbuminemia.  Large pleural effusion in hypoxic patient     Post- resuscitation assessment Yes - I attest a sepsis perfusion exam was performed within 6 hours of sepsis, severe sepsis, or septic shock presentation, following fluid resuscitation.      Will Not start Pressors- Levophed for MAP of 65  Source control achieved by:  Antibiotics  Lactate okay   Blood cultures ordered  Zyvox for pneumonia in the setting of CKD   Cefepime  Diabetes mellitus, type 2  Patient's FSGs are controlled on current medication regimen.  Last A1c reviewed-   Lab Results   Component Value Date    HGBA1C 6.4 11/27/2024     Most recent fingerstick glucose reviewed- No results for input(s): "POCTGLUCOSE" in the last " 24 hours.  Current correctional scale  Low  Maintain anti-hyperglycemic dose as follows-   Antihyperglycemics (From admission, onward)      Start     Stop Route Frequency Ordered    05/21/25 2100  insulin glargine U-100 (Lantus) injection 5 Units         -- SubQ Every 12 hours 05/21/25 1737    05/21/25 1836  insulin aspart U-100 injection 0-5 Units         -- SubQ Before meals & nightly PRN 05/21/25 1737          Hold Oral hypoglycemics while patient is in the hospital.  Neuropathy  Secondary to diabetes    Rheumatoid arthritis  Stable    Hypertension  Patient's blood pressure range in the last 24 hours was: BP  Min: 102/52  Max: 150/75.The patient's inpatient anti-hypertensive regimen is listed below:  Current Antihypertensives  hydrALAZINE tablet 50 mg, 2 times daily, Oral  metoprolol succinate (TOPROL-XL) 24 hr tablet 25 mg, Daily, Oral    Plan  - BP is controlled, no changes needed to their regimen  - follow  Senile asthenia  Progressive mobility protocol    Moderate persistent asthma without complication  Resume home inhalers   P.r.n. nebs  Pulmonary hypertension  Respiratory status stable on supplemental oxygen    Bacterial pneumonia  Patient has a diagnosis of pneumonia. The cause of the pneumonia is suspected to be bacterial in etiology but organism is not known. The pneumonia is worsening due to oxygen requirement. The patient has the following signs/symptoms of pneumonia: persistent hypoxia . The patient does have a current oxygen requirement and the patient does not have a home oxygen requirement. I have reviewed the pertinent imaging. The following cultures have been collected: Blood cultures The culture results are listed below.     Current antimicrobial regimen consists of the antibiotics listed below. Will monitor patient closely and continue current treatment plan unchanged.    Antibiotics (From admission, onward)      Start     Stop Route Frequency Ordered    05/21/25 1845  linezolid 600 mg/300 mL  IVPB 600 mg         -- IV Every 12 hours (non-standard times) 05/21/25 1744    05/21/25 1845  ceFEPIme injection 1 g         -- IV Every 12 hours (non-standard times) 05/21/25 1744            Microbiology Results (last 7 days)       Procedure Component Value Units Date/Time    Urine culture [[id number]] Collected: 05/21/25 1813    Order Status: Completed Specimen: Urine, Catheterized (In/Out) Updated: 05/22/25 0801     Culture, Urine No Growth To Date    Blood Culture #2 [[id number]] Collected: 05/21/25 1317    Order Status: Resulted Specimen: Blood Updated: 05/21/25 1323    Blood Culture #1 [[id number]] Collected: 05/21/25 1310    Order Status: Resulted Specimen: Blood Updated: 05/21/25 1323 5/22  - continuing abx  CKD (chronic kidney disease), stage IV  Creatine stable for now. BMP reviewed- noted Estimated Creatinine Clearance: 22.4 mL/min (A) (based on SCr of 1.51 mg/dL (H)). according to latest data. 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  Monitor UOP and serial BMP and adjust therapy as needed. Renally dose meds. Avoid nephrotoxic medications and procedures.  Cerebellar infarct  Remote.  PTOT    Anemia  Anemia is likely due to chronic disease due to Chronic Kidney Disease. Most recent hemoglobin and hematocrit are listed below.  Recent Labs     05/21/25  0530 05/21/25  1310 05/22/25  0512   HGB 7.3* 8.4* 7.7*   HCT 23.6* 26.7* 24.9*     Plan  - Monitor serial CBC: Daily  - Transfuse PRBC if patient becomes hemodynamically unstable, symptomatic or H/H drops below 7/21.  - Patient has not received any PRBC transfusions to date  - Patient's anemia is currently stable  - follow  Hyponatremia  Hyponatremia is likely due to renal insufficiency. The patient's most recent sodium results are listed below.  Recent Labs     05/21/25  0530 05/21/25  1310 05/22/25  0512   * 132* 133*     Plan  - Correct the sodium by 4-6mEq in 24 hours.   - Obtain the following studies:  None.  - Will  treat the hyponatremia with fluid restriction  - Monitor sodium Daily.   - Patient hyponatremia is stable  - follow  Diabetic neuropathy  Glycemic control    Pleural effusion  Patient found to have moderate pleural effusion on imaging. I have personally reviewed and interpreted the following imaging: Xray. A thoracentesis was deferred. Most likely etiology includes Pneumonia and hypoalbuminemia. Management to include Diuresis    Sacral decubitus ulcer  Wound care   Antibiotic    VTE Risk Mitigation (From admission, onward)           Ordered     heparin (porcine) injection 5,000 Units  Every 8 hours         05/21/25 1737     IP VTE HIGH RISK PATIENT  Once         05/21/25 1737     Place sequential compression device  Until discontinued         05/21/25 1737                    Discharge Planning   MARTHA:      Code Status: DNR   Medical Readiness for Discharge Date:   Discharge Plan A: Return to nursing home                        Fallon Guerin MD  Department of Hospital Medicine   [facility] - [facility] Telemetry

## 2025-05-22 NOTE — SUBJECTIVE & OBJECTIVE
Past Medical History:   Diagnosis Date    Arthritis     Bleeding external hemorrhoids     Chronic kidney disease, stage 3b     baseline creat 1.5    Chronic kidney disease, stage 3b 02/07/2024    baseline creat 1.5      COPD (chronic obstructive pulmonary disease)     senile emphysema    Diabetes mellitus, type 2     DNR (do not resuscitate) 02/07/2024    discussed with VITO Christianson present    Essential (primary) hypertension     Hiatal hernia with GERD     Neuropathy     Post-operative hypothyroidism     Severe pulmonary hypertension 10/08/2022    EF  70 % and normal diastolic function       Past Surgical History:   Procedure Laterality Date    BREAST SURGERY      Biopsy    EYE SURGERY      Remove cataracts both eyes    HYSTERECTOMY      LAPAROSCOPIC CHOLECYSTECTOMY N/A 5/13/2025    Procedure: CHOLECYSTECTOMY, LAPAROSCOPIC;  Surgeon: Miguel Angel Aragon MD;  Location: [facility];  Service: General;  Laterality: N/A;    NEPHRECTOMY Right 1960    THYROIDECTOMY         Review of patient's allergies indicates:   Allergen Reactions    Aspirin        No current facility-administered medications on file prior to encounter.     Current Outpatient Medications on File Prior to Encounter   Medication Sig    albuterol (PROVENTIL/VENTOLIN HFA) 90 mcg/actuation inhaler INHALE 2 PUFFS BY MOUTH INTO THE LUNGS EVERY 4 HOURS AS NEEDED FOR SHORTNESS OF BREATH / RESCUE (Patient taking differently: Inhale 2 puffs into the lungs every 6 (six) hours as needed.)    albuterol-ipratropium (DUO-NEB) 2.5 mg-0.5 mg/3 mL nebulizer solution Take 3 mLs by nebulization every 6 (six) hours as needed for Wheezing. Rescue    ferrous sulfate (FEOSOL) 325 mg (65 mg iron) Tab tablet Take 1 tablet (325 mg total) by mouth 3 (three) times a week. On Monday, Wednesday and Friday    furosemide (LASIX) 40 MG tablet Take 1 tablet (40 mg total) by mouth 2 (two) times daily.    gabapentin (NEURONTIN) 100 MG capsule Take 1 capsule (100 mg total) by mouth every  Report given to Monique at this time, caregivers at bedside, tray for lunch arrived and patient unable to eat it, so caregiver calling to reorder lunch at this time.   "evening.    hydrALAZINE (APRESOLINE) 50 MG tablet Take 1 tablet (50 mg total) by mouth 2 (two) times daily.    HYDROcodone-acetaminophen (NORCO) 5-325 mg per tablet Take 1 tablet by mouth every 6 (six) hours as needed for Pain.    hydrocortisone (ANUSOL-HC) 2.5 % rectal cream Place rectally 2 (two) times daily.    insulin glargine U-100, Lantus, 100 unit/mL injection Inject 5 Units into the skin every 12 (twelve) hours.    insulin lispro 100 unit/mL injection Inject 3 Units into the skin 3 (three) times daily before meals.    Lactobacillus acidophilus 500 million cell Cap Take 4 capsules by mouth 3 (three) times daily with meals.    lactulose (CHRONULAC) 20 gram/30 mL Soln Take 23 mLs (15 g total) by mouth 2 (two) times daily. Hold if patient having more than 3 Bms daily (Patient taking differently: Take 30 mLs by mouth 2 (two) times daily. Hold if patient having more than 3 Bms daily)    levothyroxine (SYNTHROID) 100 MCG tablet TAKE 1 TABLET BY MOUTH BEFORE BREAKFAST.    megestroL (MEGACE) 400 mg/10 mL (40 mg/mL) Susp Take 10 mLs by mouth once daily.    meloxicam (MOBIC) 7.5 MG tablet Take 7.5 mg by mouth once daily.    metoprolol succinate (TOPROL-XL) 25 MG 24 hr tablet Take 1 tablet (25 mg total) by mouth once daily.    nystatin (MYCOSTATIN) 100,000 unit/mL suspension Take 5 mLs (500,000 Units total) by mouth 4 (four) times daily with meals and nightly. for 10 days    ondansetron 4 mg/2 mL Soln Inject 4 mg into the muscle every 6 (six) hours as needed.    pantoprazole (PROTONIX) 40 MG tablet TAKE 1 TABLET BY MOUTH TWICE A DAY    pravastatin (PRAVACHOL) 10 MG tablet Take 1 tablet (10 mg total) by mouth once daily.    sertraline (ZOLOFT) 25 MG tablet Take 25 mg by mouth once daily.    SYMBICORT 160-4.5 mcg/actuation HFAA Inhale 2 puffs into the lungs 2 (two) times daily.    BD INSULIN SYRINGE ULTRA-FINE 0.3 mL 31 gauge x 5/16" Syrg USE 1 SYRINGE TWICE A DAY     Family History       Problem Relation (Age of Onset) "    Cancer Sister, Brother, Daughter    Diabetes Mother, Sister, Brother, Sister    Heart disease Father          Tobacco Use    Smoking status: Never    Smokeless tobacco: Never   Substance and Sexual Activity    Alcohol use: Never    Drug use: Never    Sexual activity: Not Currently     Birth control/protection: None     Review of Systems   Constitutional:  Positive for appetite change and fatigue. Negative for chills, fever and unexpected weight change.   HENT:  Negative for congestion, mouth sores and sore throat.    Eyes:  Negative for photophobia and visual disturbance.   Respiratory:  Negative for cough, chest tightness, shortness of breath and wheezing.    Cardiovascular:  Negative for chest pain, palpitations and leg swelling.   Gastrointestinal:  Negative for abdominal pain, diarrhea, nausea and vomiting.   Endocrine: Negative for cold intolerance and heat intolerance.   Genitourinary:  Negative for difficulty urinating, dysuria, frequency and urgency.   Musculoskeletal:  Negative for arthralgias, back pain and myalgias.   Skin:  Positive for wound. Negative for pallor and rash.   Neurological:  Negative for tremors, seizures, syncope, weakness, numbness and headaches.   Hematological:  Does not bruise/bleed easily.   Psychiatric/Behavioral:  Negative for agitation, confusion, hallucinations and suicidal ideas.      Objective:     Vital Signs (Most Recent):  Temp: 97.7 °F (36.5 °C) (05/22/25 1630)  Pulse: 68 (05/22/25 1630)  Resp: 16 (05/22/25 1630)  BP: (!) 113/54 (05/22/25 1630)  SpO2: 99 % (05/22/25 1630) Vital Signs (24h Range):  Temp:  [97.4 °F (36.3 °C)-98.2 °F (36.8 °C)] 97.7 °F (36.5 °C)  Pulse:  [68-84] 68  Resp:  [16-18] 16  SpO2:  [97 %-100 %] 99 %  BP: (102-150)/(52-78) 113/54     Weight: 85.2 kg (187 lb 13.3 oz)  Body mass index is 31.26 kg/m².     Physical Exam  Vitals reviewed.   Constitutional:       Appearance: Normal appearance.   HENT:      Head: Normocephalic and atraumatic.      Nose:  Nose normal.   Eyes:      Extraocular Movements: Extraocular movements intact.      Conjunctiva/sclera: Conjunctivae normal.   Neck:      Trachea: Trachea normal.   Cardiovascular:      Rate and Rhythm: Normal rate and regular rhythm.      Pulses: Normal pulses.      Heart sounds: Normal heart sounds.   Pulmonary:      Effort: Pulmonary effort is normal.      Breath sounds: Normal breath sounds and air entry.   Abdominal:      General: Bowel sounds are normal.      Palpations: Abdomen is soft.   Musculoskeletal:         General: Signs of injury (Sacral wound) present.      Cervical back: Neck supple.      Comments: Left knee effusion.  Pain with passive range of motion.  Not red not tender to palpation   Skin:     General: Skin is warm and dry.   Neurological:      General: No focal deficit present.      Mental Status: She is alert and oriented to person, place, and time.      Comments: Grossly normal motor and sensory function without focal deficit appreciated.   Psychiatric:         Mood and Affect: Mood and affect normal.         Behavior: Behavior is cooperative.                Significant Labs: All pertinent labs within the past 24 hours have been reviewed.    Significant Imaging: I have reviewed all pertinent imaging results/findings within the past 24 hours.

## 2025-05-22 NOTE — PLAN OF CARE
[facility]  Initial Discharge Assessment       Primary Care Provider: Jennifer Mares FNP    Admission Diagnosis: Leukocytosis [D72.829]  Chest pain [R07.9]  Severe sepsis [A41.9, R65.20]    Admission Date: 5/21/2025  Expected Discharge Date:     Transition of Care Barriers: None    Payor: MEDICARE / Plan: MEDICARE PART A & B / Product Type: Government /     Extended Emergency Contact Information  Primary Emergency Contact: DawsonSoraida  Work Phone: [phone]  Mobile Phone: [phone]  Relation: Relative  Preferred language: English   needed? No  Secondary Emergency Contact: Claribel Morelos  Mobile Phone: [phone]  Relation: Daughter  Preferred language: English   needed? No    Discharge Plan A: Return to nursing home  Discharge Plan B: Long-term acute care facility (LTAC), Rehab      [id number] [place] [address]  Phone: [phone] Fax: [phone]    [id number] [address] AT [facility]  [address]  [facility] [address]  Phone: [phone] Fax: [phone]    [facility]  [address] [place] [address]  Phone: [phone] Fax: [phone]      Initial Assessment (most recent)       Adult Discharge Assessment - 05/22/25 1039          Discharge Assessment    Assessment Type Discharge Planning Assessment     Source of Information patient;health record;other (see comments)   Kyleigh at [facility]    Communicated MARTHA with patient/caregiver Date not available/Unable to determine     Facility Arrived From: [facility]     Do you expect to return to your current living situation? Yes     Do you have help at home or someone to help you manage your care at home? --   Patient is a NH resident    Prior to hospitilization cognitive status: Unable to Assess     Current cognitive status: Not Oriented to  Place;Not Oriented to Time     Walking or Climbing Stairs Difficulty yes     Dressing/Bathing Difficulty yes     Home Accessibility wheelchair accessible     Home Layout Able to live on 1st floor     Patient currently being followed by outpatient case management? No     Do you take prescription medications? Yes     Do you have prescription coverage? Yes     Do you have any problems affording any of your prescribed medications? No     Is the patient taking medications as prescribed? yes     Who is going to help you get home at discharge? [facility]     Are you on dialysis? No     Discharge Plan A Return to nursing home     Discharge Plan B Long-term acute care facility (LTAC);Rehab     DME Needed Upon Discharge  none     Discharge Plan discussed with: Caregiver   Kyleigh at [facility] confirmed patient's return upon [place].    Transition of Care Barriers None        Physical Activity    On average, how many days per week do you engage in moderate to strenuous exercise (like a brisk walk)? 0 days     On average, how many minutes do you engage in exercise at this level? 0 min        Financial Resource Strain    How hard is it for you to pay for the very basics like food, housing, medical care, and heating? Not very hard        Housing Stability    In the last 12 months, was there a time when you were not able to pay the mortgage or rent on time? No     At any time in the past 12 months, were you homeless or living in a shelter (including now)? No        Transportation Needs    In the past 12 months, has lack of transportation kept you from medical appointments or from getting medications? No     In the past 12 months, has lack of transportation kept you from meetings, work, or from getting things needed for daily living? No        Food Insecurity    Within the past 12 months, you worried that your food would run out before you got the money to buy more. Never true     Within the past 12 months, the food you bought just  didn't last and you didn't have money to get more. Never true        Alcohol Use    Q1: How often do you have a drink containing alcohol? Never     Q2: How many drinks containing alcohol do you have on a typical day when you are drinking? Patient does not drink     Q3: How often do you have six or more drinks on one occasion? Never        Utilities    In the past 12 months has the electric, gas, oil, or water company threatened to shut off services in your home? No        Health Literacy    How often do you need to have someone help you when you read instructions, pamphlets, or other written material from your doctor or pharmacy? Patient unable to respond                   Met with patient in her room this am.  She was poor historian but was able to confirm being a resident at [facility]. Info obtained from chart.  Talked to Kyleigh at [facility] and she confirmed that patient is to return upon dc.  Will reach out to family to obtain IM as patient reported inability to sign.  Will continue to follow for anticipated dc needs.

## 2025-05-22 NOTE — ASSESSMENT & PLAN NOTE
Hyponatremia is likely due to renal insufficiency. The patient's most recent sodium results are listed below.  Recent Labs     05/21/25  0530 05/21/25  1310 05/22/25  0512   * 132* 133*     Plan  - Correct the sodium by 4-6mEq in 24 hours.   - Obtain the following studies:  None.  - Will treat the hyponatremia with fluid restriction  - Monitor sodium Daily.   - Patient hyponatremia is stable  - follow

## 2025-05-23 LAB
ANION GAP SERPL CALCULATED.3IONS-SCNC: 12 MMOL/L (ref 7–16)
AORTIC ROOT ANNULUS: 3.3 CM
AORTIC VALVE CUSP SEPERATION: 1.68 CM
AV INDEX (PROSTH): 0.52
AV MEAN GRADIENT: 7 MMHG
AV PEAK GRADIENT: 13 MMHG
AV VALVE AREA BY VELOCITY RATIO: 1.7 CM²
AV VALVE AREA: 1.6 CM²
AV VELOCITY RATIO: 0.56
BASOPHILS # BLD AUTO: 0.03 K/UL (ref 0–0.2)
BASOPHILS NFR BLD AUTO: 0.3 % (ref 0–1)
BSA FOR ECHO PROCEDURE: 1.98 M2
BUN SERPL-MCNC: 25 MG/DL (ref 10–20)
BUN/CREAT SERPL: 17 (ref 6–20)
CALCIUM SERPL-MCNC: 7.8 MG/DL (ref 8.4–10.2)
CHLORIDE SERPL-SCNC: 103 MMOL/L (ref 98–111)
CO2 SERPL-SCNC: 18 MMOL/L (ref 23–31)
CREAT SERPL-MCNC: 1.49 MG/DL (ref 0.55–1.02)
CV ECHO LV RWT: 0.49 CM
DIFFERENTIAL METHOD BLD: ABNORMAL
DOP CALC AO PEAK VEL: 1.8 M/S
DOP CALC AO VTI: 36.3 CM
DOP CALC LVOT AREA: 3.1 CM2
DOP CALC LVOT DIAMETER: 2 CM
DOP CALC LVOT PEAK VEL: 1 M/S
DOP CALC LVOT STROKE VOLUME: 59.3 CM3
DOP CALCLVOT PEAK VEL VTI: 18.9 CM
E WAVE DECELERATION TIME: 206 MSEC
E/A RATIO: 2.02
E/E' RATIO: 12 M/S
ECHO LV POSTERIOR WALL: 0.9 CM (ref 0.6–1.1)
EGFR (NO RACE VARIABLE) (RUSH/TITUS): 32 ML/MIN/1.73M2
EOSINOPHIL # BLD AUTO: 0.22 K/UL (ref 0–0.5)
EOSINOPHIL NFR BLD AUTO: 2.2 % (ref 1–4)
ERYTHROCYTE [DISTWIDTH] IN BLOOD BY AUTOMATED COUNT: 21 % (ref 11.5–14.5)
FRACTIONAL SHORTENING: 24.3 % (ref 28–44)
GLUCOSE SERPL-MCNC: 126 MG/DL (ref 70–105)
GLUCOSE SERPL-MCNC: 129 MG/DL (ref 70–105)
GLUCOSE SERPL-MCNC: 70 MG/DL (ref 70–105)
GLUCOSE SERPL-MCNC: 93 MG/DL (ref 75–121)
GLUCOSE SERPL-MCNC: 99 MG/DL (ref 70–105)
HCO3 UR-SCNC: 24.6 MMOL/L (ref 21–28)
HCT VFR BLD AUTO: 24.5 % (ref 38–47)
HGB BLD-MCNC: 7.6 G/DL (ref 12–16)
IMM GRANULOCYTES # BLD AUTO: 0.07 K/UL (ref 0–0.04)
IMM GRANULOCYTES NFR BLD: 0.7 % (ref 0–0.4)
INTERVENTRICULAR SEPTUM: 1.1 CM (ref 0.6–1.1)
IVC DIAMETER: 1.14 CM
LACTATE SERPL-SCNC: 1.2 MMOL/L (ref 0.5–2.2)
LEFT ATRIUM SIZE: 3.8 CM
LEFT INTERNAL DIMENSION IN SYSTOLE: 2.8 CM (ref 2.1–4)
LEFT VENTRICLE DIASTOLIC VOLUME INDEX: 30.57 ML/M2
LEFT VENTRICLE DIASTOLIC VOLUME: 59 ML
LEFT VENTRICLE MASS INDEX: 58.3 G/M2
LEFT VENTRICLE SYSTOLIC VOLUME INDEX: 15 ML/M2
LEFT VENTRICLE SYSTOLIC VOLUME: 29 ML
LEFT VENTRICULAR INTERNAL DIMENSION IN DIASTOLE: 3.7 CM (ref 3.5–6)
LEFT VENTRICULAR MASS: 112.5 G
LV LATERAL E/E' RATIO: 13 M/S
LV SEPTAL E/E' RATIO: 11.4 M/S
LVED V (TEICH): 59.34 ML
LVES V (TEICH): 29.11 ML
LVOT MG: 1.97 MMHG
LVOT MV: 0.66 CM/S
LYMPHOCYTES # BLD AUTO: 0.61 K/UL (ref 1–4.8)
LYMPHOCYTES NFR BLD AUTO: 6 % (ref 27–41)
MAGNESIUM SERPL-MCNC: 1.5 MG/DL (ref 1.6–2.6)
MCH RBC QN AUTO: 25.4 PG (ref 27–31)
MCHC RBC AUTO-ENTMCNC: 31 G/DL (ref 32–36)
MCV RBC AUTO: 81.9 FL (ref 80–96)
MONOCYTES # BLD AUTO: 1.1 K/UL (ref 0–0.8)
MONOCYTES NFR BLD AUTO: 10.8 % (ref 2–6)
MPC BLD CALC-MCNC: 8.9 FL (ref 9.4–12.4)
MV PEAK A VEL: 0.45 M/S
MV PEAK E VEL: 0.91 M/S
NEUTROPHILS # BLD AUTO: 8.17 K/UL (ref 1.8–7.7)
NEUTROPHILS NFR BLD AUTO: 80 % (ref 53–65)
NRBC # BLD AUTO: 0 X10E3/UL
NRBC, AUTO (.00): 0 %
OHS CV RV/LV RATIO: 0.68 CM
PCO2 BLDA: 38 MMHG (ref 35–48)
PH SMN: 7.42 [PH] (ref 7.35–7.45)
PHOSPHATE SERPL-MCNC: 3.1 MG/DL (ref 2.3–4.7)
PISA TR MAX VEL: 3.1 M/S
PLATELET # BLD AUTO: 433 K/UL (ref 150–400)
PO2 BLDA: 98 MMHG (ref 83–108)
POC BASE EXCESS: 0.3 MMOL/L (ref -2–3)
POC SATURATED O2: 98 % (ref 95–98)
POTASSIUM SERPL-SCNC: 3.7 MMOL/L (ref 3.5–5.1)
PV PEAK GRADIENT: 4 MMHG
PV PEAK VELOCITY: 1.06 M/S
RA VOL SYS: 27.71 ML
RBC # BLD AUTO: 2.99 M/UL (ref 4.2–5.4)
RIGHT ATRIAL AREA: 12.8 CM2
RIGHT ATRIUM END SYSTOLIC VOLUME APICAL 4 CHAMBER INDEX BSA: 14.69 ML/M2
RIGHT ATRIUM VOLUME AREA LENGTH APICAL 4 CHAMBER: 28.35 ML
RIGHT VENTRICLE DIASTOLIC BASEL DIMENSION: 2.5 CM
RIGHT VENTRICLE DIASTOLIC LENGTH: 7.4 CM
RIGHT VENTRICLE DIASTOLIC MID DIMENSION: 2.3 CM
RIGHT VENTRICULAR LENGTH IN DIASTOLE (APICAL 4-CHAMBER VIEW): 7.39 CM
RV MID DIAMA: 2.27 CM
SODIUM SERPL-SCNC: 129 MMOL/L (ref 136–145)
TDI LATERAL: 0.07 M/S
TDI SEPTAL: 0.08 M/S
TDI: 0.08 M/S
TR MAX PG: 38 MMHG
TRICUSPID ANNULAR PLANE SYSTOLIC EXCURSION: 2.3 CM
WBC # BLD AUTO: 10.2 K/UL (ref 4.5–11)
Z-SCORE OF LEFT VENTRICULAR DIMENSION IN END DIASTOLE: -3.9
Z-SCORE OF LEFT VENTRICULAR DIMENSION IN END SYSTOLE: -1.45

## 2025-05-23 PROCEDURE — 25000003 PHARM REV CODE 250: Performed by: STUDENT IN AN ORGANIZED HEALTH CARE EDUCATION/TRAINING PROGRAM

## 2025-05-23 PROCEDURE — 82803 BLOOD GASES ANY COMBINATION: CPT

## 2025-05-23 PROCEDURE — 63600175 PHARM REV CODE 636 W HCPCS

## 2025-05-23 PROCEDURE — 36600 WITHDRAWAL OF ARTERIAL BLOOD: CPT

## 2025-05-23 PROCEDURE — 94640 AIRWAY INHALATION TREATMENT: CPT

## 2025-05-23 PROCEDURE — 11000001 HC ACUTE MED/SURG PRIVATE ROOM

## 2025-05-23 PROCEDURE — 99900035 HC TECH TIME PER 15 MIN (STAT)

## 2025-05-23 PROCEDURE — 85025 COMPLETE CBC W/AUTO DIFF WBC: CPT | Performed by: STUDENT IN AN ORGANIZED HEALTH CARE EDUCATION/TRAINING PROGRAM

## 2025-05-23 PROCEDURE — 99233 SBSQ HOSP IP/OBS HIGH 50: CPT | Mod: ,,,

## 2025-05-23 PROCEDURE — 36415 COLL VENOUS BLD VENIPUNCTURE: CPT | Performed by: STUDENT IN AN ORGANIZED HEALTH CARE EDUCATION/TRAINING PROGRAM

## 2025-05-23 PROCEDURE — 83735 ASSAY OF MAGNESIUM: CPT | Performed by: STUDENT IN AN ORGANIZED HEALTH CARE EDUCATION/TRAINING PROGRAM

## 2025-05-23 PROCEDURE — 27000221 HC OXYGEN, UP TO 24 HOURS

## 2025-05-23 PROCEDURE — 84100 ASSAY OF PHOSPHORUS: CPT | Performed by: STUDENT IN AN ORGANIZED HEALTH CARE EDUCATION/TRAINING PROGRAM

## 2025-05-23 PROCEDURE — 82962 GLUCOSE BLOOD TEST: CPT

## 2025-05-23 PROCEDURE — 80048 BASIC METABOLIC PNL TOTAL CA: CPT | Performed by: STUDENT IN AN ORGANIZED HEALTH CARE EDUCATION/TRAINING PROGRAM

## 2025-05-23 PROCEDURE — 63600175 PHARM REV CODE 636 W HCPCS: Performed by: STUDENT IN AN ORGANIZED HEALTH CARE EDUCATION/TRAINING PROGRAM

## 2025-05-23 PROCEDURE — 36415 COLL VENOUS BLD VENIPUNCTURE: CPT

## 2025-05-23 PROCEDURE — 94761 N-INVAS EAR/PLS OXIMETRY MLT: CPT

## 2025-05-23 PROCEDURE — 87641 MR-STAPH DNA AMP PROBE: CPT

## 2025-05-23 PROCEDURE — 83605 ASSAY OF LACTIC ACID: CPT

## 2025-05-23 PROCEDURE — 25000242 PHARM REV CODE 250 ALT 637 W/ HCPCS: Performed by: STUDENT IN AN ORGANIZED HEALTH CARE EDUCATION/TRAINING PROGRAM

## 2025-05-23 RX ORDER — TAMSULOSIN HYDROCHLORIDE 0.4 MG/1
0.4 CAPSULE ORAL DAILY
Status: DISCONTINUED | OUTPATIENT
Start: 2025-05-24 | End: 2025-05-26 | Stop reason: HOSPADM

## 2025-05-23 RX ORDER — FUROSEMIDE 10 MG/ML
40 INJECTION INTRAMUSCULAR; INTRAVENOUS ONCE
Status: COMPLETED | OUTPATIENT
Start: 2025-05-23 | End: 2025-05-23

## 2025-05-23 RX ADMIN — SERTRALINE HYDROCHLORIDE 25 MG: 25 TABLET ORAL at 09:05

## 2025-05-23 RX ADMIN — CEFEPIME 1 G: 1 INJECTION, POWDER, FOR SOLUTION INTRAMUSCULAR; INTRAVENOUS at 05:05

## 2025-05-23 RX ADMIN — HEPARIN SODIUM 5000 UNITS: 5000 INJECTION, SOLUTION INTRAVENOUS; SUBCUTANEOUS at 01:05

## 2025-05-23 RX ADMIN — BUDESONIDE INHALATION 0.5 MG: 0.5 SUSPENSION RESPIRATORY (INHALATION) at 07:05

## 2025-05-23 RX ADMIN — HYDROCODONE BITARTRATE AND ACETAMINOPHEN 1 TABLET: 5; 325 TABLET ORAL at 10:05

## 2025-05-23 RX ADMIN — HEPARIN SODIUM 5000 UNITS: 5000 INJECTION, SOLUTION INTRAVENOUS; SUBCUTANEOUS at 05:05

## 2025-05-23 RX ADMIN — PANTOPRAZOLE SODIUM 40 MG: 40 TABLET, DELAYED RELEASE ORAL at 09:05

## 2025-05-23 RX ADMIN — METOPROLOL SUCCINATE 25 MG: 25 TABLET, EXTENDED RELEASE ORAL at 09:05

## 2025-05-23 RX ADMIN — Medication 4 CAPSULE: at 01:05

## 2025-05-23 RX ADMIN — Medication 4 CAPSULE: at 08:05

## 2025-05-23 RX ADMIN — Medication 4 CAPSULE: at 05:05

## 2025-05-23 RX ADMIN — PRAVASTATIN SODIUM 10 MG: 10 TABLET ORAL at 09:05

## 2025-05-23 RX ADMIN — HEPARIN SODIUM 5000 UNITS: 5000 INJECTION, SOLUTION INTRAVENOUS; SUBCUTANEOUS at 10:05

## 2025-05-23 RX ADMIN — LINEZOLID 600 MG: 600 INJECTION, SOLUTION INTRAVENOUS at 06:05

## 2025-05-23 RX ADMIN — LEVOTHYROXINE SODIUM 100 MCG: 100 TABLET ORAL at 05:05

## 2025-05-23 RX ADMIN — INSULIN GLARGINE 5 UNITS: 100 INJECTION, SOLUTION SUBCUTANEOUS at 09:05

## 2025-05-23 RX ADMIN — CEFEPIME 1 G: 1 INJECTION, POWDER, FOR SOLUTION INTRAMUSCULAR; INTRAVENOUS at 06:05

## 2025-05-23 RX ADMIN — FUROSEMIDE 40 MG: 10 INJECTION, SOLUTION INTRAVENOUS at 01:05

## 2025-05-23 RX ADMIN — HYDRALAZINE HYDROCHLORIDE 50 MG: 50 TABLET ORAL at 09:05

## 2025-05-23 NOTE — PLAN OF CARE
CM obtained IM at this time per patient's daughter, Claribel.  CM will continue to follow for DC needs as they arise.

## 2025-05-23 NOTE — ASSESSMENT & PLAN NOTE
Patient has a diagnosis of pneumonia. The cause of the pneumonia is suspected to be bacterial in etiology but organism is not known. The pneumonia is worsening due to oxygen requirement. The patient has the following signs/symptoms of pneumonia: persistent hypoxia . The patient does have a current oxygen requirement and the patient does not have a home oxygen requirement. I have reviewed the pertinent imaging. The following cultures have been collected: Blood cultures The culture results are listed below.     Current antimicrobial regimen consists of the antibiotics listed below. Will monitor patient closely and continue current treatment plan unchanged.    Antibiotics (From admission, onward)      Start     Stop Route Frequency Ordered    05/21/25 1845  linezolid 600 mg/300 mL IVPB 600 mg         -- IV Every 12 hours (non-standard times) 05/21/25 1744 05/21/25 1845  ceFEPIme injection 1 g         -- IV Every 12 hours (non-standard times) 05/21/25 1744            Microbiology Results (last 7 days)       Procedure Component Value Units Date/Time    Urine culture [[id number]]  (Abnormal) Collected: 05/21/25 1813    Order Status: Completed Specimen: Urine, Catheterized (In/Out) Updated: 05/23/25 1015     Culture, Urine >100,000 Gram-negative Bacilli     Comment: Identification and Susceptibility to Follow       Blood Culture #1 [[id number]] Collected: 05/21/25 1310    Order Status: Completed Specimen: Blood Updated: 05/23/25 0643     Culture, Blood No Growth At 24 Hours    Blood Culture #2 [[id number]] Collected: 05/21/25 1317    Order Status: Completed Specimen: Blood Updated: 05/23/25 0643     Culture, Blood No Growth At 24 Hours            5/22  - continuing abx    5/23  - at bedside today patient is lethargic, sob, +2 LE edema  - repeat labs including abg, chest xray, lactic acid, cbc, cmp  - bladder scan with 350 retention, rivera placed, will start flowmax  - lasix 40 IV given once  - monitoring closely

## 2025-05-23 NOTE — SUBJECTIVE & OBJECTIVE
Past Medical History:   Diagnosis Date    Arthritis     Bleeding external hemorrhoids     Chronic kidney disease, stage 3b     baseline creat 1.5    Chronic kidney disease, stage 3b 02/07/2024    baseline creat 1.5      COPD (chronic obstructive pulmonary disease)     senile emphysema    Diabetes mellitus, type 2     DNR (do not resuscitate) 02/07/2024    discussed with VITO Christianson present    Essential (primary) hypertension     Hiatal hernia with GERD     Neuropathy     Post-operative hypothyroidism     Severe pulmonary hypertension 10/08/2022    EF  70 % and normal diastolic function       Past Surgical History:   Procedure Laterality Date    BREAST SURGERY      Biopsy    EYE SURGERY      Remove cataracts both eyes    HYSTERECTOMY      LAPAROSCOPIC CHOLECYSTECTOMY N/A 5/13/2025    Procedure: CHOLECYSTECTOMY, LAPAROSCOPIC;  Surgeon: Miguel Angel Aragon MD;  Location: [facility];  Service: General;  Laterality: N/A;    NEPHRECTOMY Right 1960    THYROIDECTOMY         Review of patient's allergies indicates:   Allergen Reactions    Aspirin        No current facility-administered medications on file prior to encounter.     Current Outpatient Medications on File Prior to Encounter   Medication Sig    albuterol (PROVENTIL/VENTOLIN HFA) 90 mcg/actuation inhaler INHALE 2 PUFFS BY MOUTH INTO THE LUNGS EVERY 4 HOURS AS NEEDED FOR SHORTNESS OF BREATH / RESCUE (Patient taking differently: Inhale 2 puffs into the lungs every 6 (six) hours as needed.)    albuterol-ipratropium (DUO-NEB) 2.5 mg-0.5 mg/3 mL nebulizer solution Take 3 mLs by nebulization every 6 (six) hours as needed for Wheezing. Rescue    ferrous sulfate (FEOSOL) 325 mg (65 mg iron) Tab tablet Take 1 tablet (325 mg total) by mouth 3 (three) times a week. On Monday, Wednesday and Friday    furosemide (LASIX) 40 MG tablet Take 1 tablet (40 mg total) by mouth 2 (two) times daily.    gabapentin (NEURONTIN) 100 MG capsule Take 1 capsule (100 mg total) by mouth every  "evening.    hydrALAZINE (APRESOLINE) 50 MG tablet Take 1 tablet (50 mg total) by mouth 2 (two) times daily.    HYDROcodone-acetaminophen (NORCO) 5-325 mg per tablet Take 1 tablet by mouth every 6 (six) hours as needed for Pain.    hydrocortisone (ANUSOL-HC) 2.5 % rectal cream Place rectally 2 (two) times daily.    insulin glargine U-100, Lantus, 100 unit/mL injection Inject 5 Units into the skin every 12 (twelve) hours.    insulin lispro 100 unit/mL injection Inject 3 Units into the skin 3 (three) times daily before meals.    Lactobacillus acidophilus 500 million cell Cap Take 4 capsules by mouth 3 (three) times daily with meals.    lactulose (CHRONULAC) 20 gram/30 mL Soln Take 23 mLs (15 g total) by mouth 2 (two) times daily. Hold if patient having more than 3 Bms daily (Patient taking differently: Take 30 mLs by mouth 2 (two) times daily. Hold if patient having more than 3 Bms daily)    levothyroxine (SYNTHROID) 100 MCG tablet TAKE 1 TABLET BY MOUTH BEFORE BREAKFAST.    megestroL (MEGACE) 400 mg/10 mL (40 mg/mL) Susp Take 10 mLs by mouth once daily.    meloxicam (MOBIC) 7.5 MG tablet Take 7.5 mg by mouth once daily.    metoprolol succinate (TOPROL-XL) 25 MG 24 hr tablet Take 1 tablet (25 mg total) by mouth once daily.    nystatin (MYCOSTATIN) 100,000 unit/mL suspension Take 5 mLs (500,000 Units total) by mouth 4 (four) times daily with meals and nightly. for 10 days    ondansetron 4 mg/2 mL Soln Inject 4 mg into the muscle every 6 (six) hours as needed.    pantoprazole (PROTONIX) 40 MG tablet TAKE 1 TABLET BY MOUTH TWICE A DAY    pravastatin (PRAVACHOL) 10 MG tablet Take 1 tablet (10 mg total) by mouth once daily.    sertraline (ZOLOFT) 25 MG tablet Take 25 mg by mouth once daily.    SYMBICORT 160-4.5 mcg/actuation HFAA Inhale 2 puffs into the lungs 2 (two) times daily.    BD INSULIN SYRINGE ULTRA-FINE 0.3 mL 31 gauge x 5/16" Syrg USE 1 SYRINGE TWICE A DAY     Family History       Problem Relation (Age of Onset) "    Cancer Sister, Brother, Daughter    Diabetes Mother, Sister, Brother, Sister    Heart disease Father          Tobacco Use    Smoking status: Never    Smokeless tobacco: Never   Substance and Sexual Activity    Alcohol use: Never    Drug use: Never    Sexual activity: Not Currently     Birth control/protection: None     Review of Systems   Constitutional:  Positive for appetite change and fatigue. Negative for chills, fever and unexpected weight change.   HENT:  Negative for congestion, mouth sores and sore throat.    Eyes:  Negative for photophobia and visual disturbance.   Respiratory:  Negative for cough, chest tightness, shortness of breath and wheezing.    Cardiovascular:  Negative for chest pain, palpitations and leg swelling.   Gastrointestinal:  Negative for abdominal pain, diarrhea, nausea and vomiting.   Endocrine: Negative for cold intolerance and heat intolerance.   Genitourinary:  Negative for difficulty urinating, dysuria, frequency and urgency.   Musculoskeletal:  Negative for arthralgias, back pain and myalgias.   Skin:  Positive for wound. Negative for pallor and rash.   Neurological:  Negative for tremors, seizures, syncope, weakness, numbness and headaches.   Hematological:  Does not bruise/bleed easily.   Psychiatric/Behavioral:  Negative for agitation, confusion, hallucinations and suicidal ideas.      Objective:     Vital Signs (Most Recent):  Temp: 98.4 °F (36.9 °C) (05/23/25 1141)  Pulse: 76 (05/23/25 1141)  Resp: 16 (05/23/25 1141)  BP: 124/66 (05/23/25 1141)  SpO2: 100 % (05/23/25 1141) Vital Signs (24h Range):  Temp:  [97.5 °F (36.4 °C)-98.4 °F (36.9 °C)] 98.4 °F (36.9 °C)  Pulse:  [68-76] 76  Resp:  [16-20] 16  SpO2:  [96 %-100 %] 100 %  BP: (113-129)/(52-66) 124/66     Weight: 85.2 kg (187 lb 13.3 oz)  Body mass index is 31.26 kg/m².     Physical Exam  Vitals and nursing note reviewed.   Constitutional:       Appearance: She is ill-appearing.   HENT:      Head: Normocephalic and  atraumatic.      Nose: Nose normal.   Eyes:      Extraocular Movements: Extraocular movements intact.      Conjunctiva/sclera: Conjunctivae normal.   Neck:      Trachea: Trachea normal.   Cardiovascular:      Rate and Rhythm: Normal rate and regular rhythm.      Pulses: Normal pulses.      Heart sounds: Normal heart sounds.   Pulmonary:      Effort: Pulmonary effort is normal.      Breath sounds: Normal air entry. Rales present.   Abdominal:      General: Bowel sounds are normal.      Palpations: Abdomen is soft.   Musculoskeletal:         General: Signs of injury (Sacral wound) present.      Cervical back: Neck supple.      Right lower leg: Edema present.      Left lower leg: Edema present.      Comments: Left knee effusion.  Pain with passive range of motion.  Not red not tender to palpation   Skin:     General: Skin is warm and dry.   Neurological:      General: No focal deficit present.      Mental Status: She is alert. She is disoriented.      Comments: Grossly normal motor and sensory function without focal deficit appreciated.   Psychiatric:         Mood and Affect: Affect normal.         Behavior: Behavior is cooperative.                Significant Labs: All pertinent labs within the past 24 hours have been reviewed.  BMP:   Recent Labs   Lab 05/23/25  0321   GLU 93   *   K 3.7      CO2 18*   BUN 25*   CREATININE 1.49*   CALCIUM 7.8*   MG 1.5*     CBC:   Recent Labs   Lab 05/22/25  0512 05/23/25  0321   WBC 10.67 10.20   HGB 7.7* 7.6*   HCT 24.9* 24.5*   * 433*     CMP:   Recent Labs   Lab 05/22/25  0512 05/23/25  0321   * 129*   K 4.3 3.7    103   CO2 19* 18*    93   BUN 23* 25*   CREATININE 1.51* 1.49*   CALCIUM 7.9* 7.8*   ANIONGAP 15 12       Significant Imaging: I have reviewed all pertinent imaging results/findings within the past 24 hours.

## 2025-05-23 NOTE — ASSESSMENT & PLAN NOTE
Creatine stable for now. BMP reviewed- noted Estimated Creatinine Clearance: 22.7 mL/min (A) (based on SCr of 1.49 mg/dL (H)). according to latest data. 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  Monitor UOP and serial BMP and adjust therapy as needed. Renally dose meds. Avoid nephrotoxic medications and procedures.

## 2025-05-23 NOTE — PLAN OF CARE
Problem: Adult Inpatient Plan of Care  Goal: Plan of Care Review  Outcome: Progressing  Goal: Patient-Specific Goal (Individualized)  Outcome: Progressing  Goal: Absence of Hospital-Acquired Illness or Injury  Outcome: Progressing  Goal: Optimal Comfort and Wellbeing  Outcome: Progressing  Goal: Readiness for Transition of Care  Outcome: Progressing     Problem: Sepsis/Septic Shock  Goal: Optimal Coping  Outcome: Progressing  Goal: Absence of Bleeding  Outcome: Progressing  Goal: Blood Glucose Level Within Targeted Range  Outcome: Progressing  Goal: Absence of Infection Signs and Symptoms  Outcome: Progressing  Goal: Optimal Nutrition Intake  Outcome: Progressing     Problem: Diabetes Comorbidity  Goal: Blood Glucose Level Within Targeted Range  Outcome: Progressing     Problem: Wound  Goal: Optimal Coping  Outcome: Progressing  Goal: Optimal Functional Ability  Outcome: Progressing  Goal: Absence of Infection Signs and Symptoms  Outcome: Progressing  Goal: Improved Oral Intake  Outcome: Progressing  Goal: Optimal Pain Control and Function  Outcome: Progressing  Goal: Skin Health and Integrity  Outcome: Progressing  Goal: Optimal Wound Healing  Outcome: Progressing     Problem: Fall Injury Risk  Goal: Absence of Fall and Fall-Related Injury  Outcome: Progressing     Problem: Skin Injury Risk Increased  Goal: Skin Health and Integrity  Outcome: Progressing     Problem: Gas Exchange Impaired  Goal: Optimal Gas Exchange  Outcome: Progressing     Problem: Infection  Goal: Absence of Infection Signs and Symptoms  Outcome: Progressing

## 2025-05-23 NOTE — SUBJECTIVE & OBJECTIVE
No current facility-administered medications on file prior to encounter.     Current Outpatient Medications on File Prior to Encounter   Medication Sig    albuterol (PROVENTIL/VENTOLIN HFA) 90 mcg/actuation inhaler INHALE 2 PUFFS BY MOUTH INTO THE LUNGS EVERY 4 HOURS AS NEEDED FOR SHORTNESS OF BREATH / RESCUE (Patient taking differently: Inhale 2 puffs into the lungs every 6 (six) hours as needed.)    albuterol-ipratropium (DUO-NEB) 2.5 mg-0.5 mg/3 mL nebulizer solution Take 3 mLs by nebulization every 6 (six) hours as needed for Wheezing. Rescue    ferrous sulfate (FEOSOL) 325 mg (65 mg iron) Tab tablet Take 1 tablet (325 mg total) by mouth 3 (three) times a week. On Monday, Wednesday and Friday    furosemide (LASIX) 40 MG tablet Take 1 tablet (40 mg total) by mouth 2 (two) times daily.    gabapentin (NEURONTIN) 100 MG capsule Take 1 capsule (100 mg total) by mouth every evening.    hydrALAZINE (APRESOLINE) 50 MG tablet Take 1 tablet (50 mg total) by mouth 2 (two) times daily.    HYDROcodone-acetaminophen (NORCO) 5-325 mg per tablet Take 1 tablet by mouth every 6 (six) hours as needed for Pain.    hydrocortisone (ANUSOL-HC) 2.5 % rectal cream Place rectally 2 (two) times daily.    insulin glargine U-100, Lantus, 100 unit/mL injection Inject 5 Units into the skin every 12 (twelve) hours.    insulin lispro 100 unit/mL injection Inject 3 Units into the skin 3 (three) times daily before meals.    Lactobacillus acidophilus 500 million cell Cap Take 4 capsules by mouth 3 (three) times daily with meals.    lactulose (CHRONULAC) 20 gram/30 mL Soln Take 23 mLs (15 g total) by mouth 2 (two) times daily. Hold if patient having more than 3 Bms daily (Patient taking differently: Take 30 mLs by mouth 2 (two) times daily. Hold if patient having more than 3 Bms daily)    levothyroxine (SYNTHROID) 100 MCG tablet TAKE 1 TABLET BY MOUTH BEFORE BREAKFAST.    megestroL (MEGACE) 400 mg/10 mL (40 mg/mL) Susp Take 10 mLs by mouth once  "daily.    meloxicam (MOBIC) 7.5 MG tablet Take 7.5 mg by mouth once daily.    metoprolol succinate (TOPROL-XL) 25 MG 24 hr tablet Take 1 tablet (25 mg total) by mouth once daily.    nystatin (MYCOSTATIN) 100,000 unit/mL suspension Take 5 mLs (500,000 Units total) by mouth 4 (four) times daily with meals and nightly. for 10 days    ondansetron 4 mg/2 mL Soln Inject 4 mg into the muscle every 6 (six) hours as needed.    pantoprazole (PROTONIX) 40 MG tablet TAKE 1 TABLET BY MOUTH TWICE A DAY    pravastatin (PRAVACHOL) 10 MG tablet Take 1 tablet (10 mg total) by mouth once daily.    sertraline (ZOLOFT) 25 MG tablet Take 25 mg by mouth once daily.    SYMBICORT 160-4.5 mcg/actuation HFAA Inhale 2 puffs into the lungs 2 (two) times daily.    BD INSULIN SYRINGE ULTRA-FINE 0.3 mL 31 gauge x 5/16" Syrg USE 1 SYRINGE TWICE A DAY       Review of patient's allergies indicates:   Allergen Reactions    Aspirin        Past Medical History:   Diagnosis Date    Arthritis     Bleeding external hemorrhoids     Chronic kidney disease, stage 3b     baseline creat 1.5    Chronic kidney disease, stage 3b 02/07/2024    baseline creat 1.5      COPD (chronic obstructive pulmonary disease)     senile emphysema    Diabetes mellitus, type 2     DNR (do not resuscitate) 02/07/2024    discussed with VITO Christianson present    Essential (primary) hypertension     Hiatal hernia with GERD     Neuropathy     Post-operative hypothyroidism     Severe pulmonary hypertension 10/08/2022    EF  70 % and normal diastolic function     Past Surgical History:   Procedure Laterality Date    BREAST SURGERY      Biopsy    EYE SURGERY      Remove cataracts both eyes    HYSTERECTOMY      LAPAROSCOPIC CHOLECYSTECTOMY N/A 5/13/2025    Procedure: CHOLECYSTECTOMY, LAPAROSCOPIC;  Surgeon: Miguel Angel Aragon MD;  Location: [facility], [place];  Service: General;  Laterality: N/A;    NEPHRECTOMY Right 1960    THYROIDECTOMY       Family History       Problem Relation (Age of Onset) "    Cancer Sister, Brother, Daughter    Diabetes Mother, Sister, Brother, Sister    Heart disease Father          Tobacco Use    Smoking status: Never    Smokeless tobacco: Never   Substance and Sexual Activity    Alcohol use: Never    Drug use: Never    Sexual activity: Not Currently     Birth control/protection: None     Review of Systems   Constitutional:  Positive for activity change.   Skin:  Positive for wound (Sacral wound with drainage in odor per wound care).   Neurological:  Positive for weakness.   All other systems reviewed and are negative.    Objective:     Vital Signs (Most Recent):  Temp: 98.4 °F (36.9 °C) (05/23/25 1141)  Pulse: 76 (05/23/25 1141)  Resp: 16 (05/23/25 1141)  BP: 124/66 (05/23/25 1141)  SpO2: 100 % (05/23/25 1141) Vital Signs (24h Range):  Temp:  [97.5 °F (36.4 °C)-98.4 °F (36.9 °C)] 98.4 °F (36.9 °C)  Pulse:  [68-76] 76  Resp:  [16-20] 16  SpO2:  [96 %-100 %] 100 %  BP: (113-129)/(52-66) 124/66     Weight: 85.2 kg (187 lb 13.3 oz)  Body mass index is 31.26 kg/m².     Physical Exam  Vitals and nursing note reviewed.   Constitutional:       Appearance: She is obese. She is ill-appearing.   HENT:      Head: Normocephalic.      Nose: Nose normal.      Mouth/Throat:      Mouth: Mucous membranes are moist.   Eyes:      Extraocular Movements: Extraocular movements intact.   Cardiovascular:      Rate and Rhythm: Normal rate.   Pulmonary:      Effort: Pulmonary effort is normal.   Abdominal:      Palpations: Abdomen is soft.   Musculoskeletal:         General: Normal range of motion.      Cervical back: Normal range of motion.   Skin:     General: Skin is warm and dry.      Capillary Refill: Capillary refill takes 2 to 3 seconds.      Findings: No erythema.      Comments: Quarter size decubitus, no drainage or odor noted on exam.  (wound has been cleaned by wound care prior to my examination of wound.)    Neurological:      Mental Status: She is alert and oriented to person, place, and time.    Psychiatric:         Mood and Affect: Mood normal.            I have reviewed all pertinent lab results within the past 24 hours.  CBC:   Recent Labs   Lab 05/23/25  0321   WBC 10.20   RBC 2.99*   HGB 7.6*   HCT 24.5*   *   MCV 81.9   MCH 25.4*   MCHC 31.0*     BMP:   Recent Labs   Lab 05/23/25  0321   GLU 93   *   K 3.7      CO2 18*   BUN 25*   CREATININE 1.49*   CALCIUM 7.8*   MG 1.5*     CMP:   Recent Labs   Lab 05/21/25  1310 05/22/25  0512 05/23/25  0321   *   < > 93   CALCIUM 8.2*   < > 7.8*   ALBUMIN 1.7*  --   --    PROT 5.6*  --   --    *   < > 129*   K 4.3   < > 3.7   CO2 19*   < > 18*      < > 103   BUN 22*   < > 25*   CREATININE 1.46*   < > 1.49*   ALKPHOS 93  --   --    ALT <7  --   --    AST 16  --   --    BILITOT 0.4  --   --     < > = values in this interval not displayed.     Microbiology Results (last 7 days)       Procedure Component Value Units Date/Time    Urine culture [[id number]]  (Abnormal) Collected: 05/21/25 1813    Order Status: Completed Specimen: Urine, Catheterized (In/Out) Updated: 05/23/25 1015     Culture, Urine >100,000 Gram-negative Bacilli     Comment: Identification and Susceptibility to Follow       Blood Culture #1 [[id number]] Collected: 05/21/25 1310    Order Status: Completed Specimen: Blood Updated: 05/23/25 0643     Culture, Blood No Growth At 24 Hours    Blood Culture #2 [[id number]] Collected: 05/21/25 1317    Order Status: Completed Specimen: Blood Updated: 05/23/25 0643     Culture, Blood No Growth At 24 Hours          Specimen (24h ago, onward)      None          Recent Labs   Lab 05/21/25 1813   COLORU Yellow   SPECGRAV 1.015   PHUR 6.5   PROTEINUA 100*   BACTERIA Occasional*   NITRITE Negative   LEUKOCYTESUR Moderate*   UROBILINOGEN Normal       Significant Diagnostics:  I have reviewed all pertinent imaging results/findings within the past 24 hours.

## 2025-05-23 NOTE — CONSULTS
[facility] - [facility] Medical Telemetry  General Surgery  Consult Note    Patient Name: Vandana Allison  MRN: [id number]  Code Status: DNR  Admission Date: 5/21/2025  Hospital Length of Stay: 1 days  Attending Physician: Fallon Guerin MD  Primary Care Provider: Jennifer Mares FNP    Patient information was obtained from patient, past medical records, ER records, and primary team.     Consults  Subjective:     Principal Problem: Severe sepsis    History of Present Illness: Patient admitted to medicine for UTI/ sepsis. Patient had lap cholecystectomy per Dr Aragon on 05/13/25.  She returned to nursing home on discharge.  General surgery was consulted by wound care to evaluate sacral wound.  Wound care reported purulent drainage with odor to sacral wound.  On exam wound has been cleaned and ointment applied.  That has no odor.  There is a quarter-sized decubitus noted.  No erythema/induration noted.  Continue daily wound care per nursing staff daily.  Will discuss wound with Dr Love for further recommendations.  Patient was sleeping on exam but it was easily aroused.  She complains of being cold.  Patient is more lethargic and does not interact during exam as with previous admission.  Discussed with nursing staff who states definite change in mental status today more lethargic.  She reports Dr. Guerin is aware and additional test has been ordered to evaluate change in mental status.  Right arm is also noted warm to touch and swollen.  Patient reports pain when the arm is moved.  Edema is extends up past the elbow into the upper arm.  Areas of bruising at the wrist and forearm are noted.  We will add venous Doppler right upper extremity to rule out DVT.     No current facility-administered medications on file prior to encounter.     Current Outpatient Medications on File Prior to Encounter   Medication Sig    albuterol (PROVENTIL/VENTOLIN HFA) 90 mcg/actuation inhaler INHALE 2 PUFFS BY MOUTH INTO THE LUNGS  EVERY 4 HOURS AS NEEDED FOR SHORTNESS OF BREATH / RESCUE (Patient taking differently: Inhale 2 puffs into the lungs every 6 (six) hours as needed.)    albuterol-ipratropium (DUO-NEB) 2.5 mg-0.5 mg/3 mL nebulizer solution Take 3 mLs by nebulization every 6 (six) hours as needed for Wheezing. Rescue    ferrous sulfate (FEOSOL) 325 mg (65 mg iron) Tab tablet Take 1 tablet (325 mg total) by mouth 3 (three) times a week. On Monday, Wednesday and Friday    furosemide (LASIX) 40 MG tablet Take 1 tablet (40 mg total) by mouth 2 (two) times daily.    gabapentin (NEURONTIN) 100 MG capsule Take 1 capsule (100 mg total) by mouth every evening.    hydrALAZINE (APRESOLINE) 50 MG tablet Take 1 tablet (50 mg total) by mouth 2 (two) times daily.    HYDROcodone-acetaminophen (NORCO) 5-325 mg per tablet Take 1 tablet by mouth every 6 (six) hours as needed for Pain.    hydrocortisone (ANUSOL-HC) 2.5 % rectal cream Place rectally 2 (two) times daily.    insulin glargine U-100, Lantus, 100 unit/mL injection Inject 5 Units into the skin every 12 (twelve) hours.    insulin lispro 100 unit/mL injection Inject 3 Units into the skin 3 (three) times daily before meals.    Lactobacillus acidophilus 500 million cell Cap Take 4 capsules by mouth 3 (three) times daily with meals.    lactulose (CHRONULAC) 20 gram/30 mL Soln Take 23 mLs (15 g total) by mouth 2 (two) times daily. Hold if patient having more than 3 Bms daily (Patient taking differently: Take 30 mLs by mouth 2 (two) times daily. Hold if patient having more than 3 Bms daily)    levothyroxine (SYNTHROID) 100 MCG tablet TAKE 1 TABLET BY MOUTH BEFORE BREAKFAST.    megestroL (MEGACE) 400 mg/10 mL (40 mg/mL) Susp Take 10 mLs by mouth once daily.    meloxicam (MOBIC) 7.5 MG tablet Take 7.5 mg by mouth once daily.    metoprolol succinate (TOPROL-XL) 25 MG 24 hr tablet Take 1 tablet (25 mg total) by mouth once daily.    nystatin (MYCOSTATIN) 100,000 unit/mL suspension Take 5 mLs (500,000 Units  "total) by mouth 4 (four) times daily with meals and nightly. for 10 days    ondansetron 4 mg/2 mL Soln Inject 4 mg into the muscle every 6 (six) hours as needed.    pantoprazole (PROTONIX) 40 MG tablet TAKE 1 TABLET BY MOUTH TWICE A DAY    pravastatin (PRAVACHOL) 10 MG tablet Take 1 tablet (10 mg total) by mouth once daily.    sertraline (ZOLOFT) 25 MG tablet Take 25 mg by mouth once daily.    SYMBICORT 160-4.5 mcg/actuation HFAA Inhale 2 puffs into the lungs 2 (two) times daily.    BD INSULIN SYRINGE ULTRA-FINE 0.3 mL 31 gauge x 5/16" Syrg USE 1 SYRINGE TWICE A DAY       Review of patient's allergies indicates:   Allergen Reactions    Aspirin        Past Medical History:   Diagnosis Date    Arthritis     Bleeding external hemorrhoids     Chronic kidney disease, stage 3b     baseline creat 1.5    Chronic kidney disease, stage 3b 02/07/2024    baseline creat 1.5      COPD (chronic obstructive pulmonary disease)     senile emphysema    Diabetes mellitus, type 2     DNR (do not resuscitate) 02/07/2024    discussed with VITO Christianson present    Essential (primary) hypertension     Hiatal hernia with GERD     Neuropathy     Post-operative hypothyroidism     Severe pulmonary hypertension 10/08/2022    EF  70 % and normal diastolic function     Past Surgical History:   Procedure Laterality Date    BREAST SURGERY      Biopsy    EYE SURGERY      Remove cataracts both eyes    HYSTERECTOMY      LAPAROSCOPIC CHOLECYSTECTOMY N/A 5/13/2025    Procedure: CHOLECYSTECTOMY, LAPAROSCOPIC;  Surgeon: Miguel Angel Aragon MD;  Location: [facility], [place];  Service: General;  Laterality: N/A;    NEPHRECTOMY Right 1960    THYROIDECTOMY       Family History       Problem Relation (Age of Onset)    Cancer Sister, Brother, Daughter    Diabetes Mother, Sister, Brother, Sister    Heart disease Father          Tobacco Use    Smoking status: Never    Smokeless tobacco: Never   Substance and Sexual Activity    Alcohol use: Never    Drug use: Never    " Sexual activity: Not Currently     Birth control/protection: None     Review of Systems   Constitutional:  Positive for activity change.   Skin:  Positive for wound (Sacral wound with drainage in odor per wound care).   Neurological:  Positive for weakness.   All other systems reviewed and are negative.    Objective:     Vital Signs (Most Recent):  Temp: 98.4 °F (36.9 °C) (05/23/25 1141)  Pulse: 76 (05/23/25 1141)  Resp: 16 (05/23/25 1141)  BP: 124/66 (05/23/25 1141)  SpO2: 100 % (05/23/25 1141) Vital Signs (24h Range):  Temp:  [97.5 °F (36.4 °C)-98.4 °F (36.9 °C)] 98.4 °F (36.9 °C)  Pulse:  [68-76] 76  Resp:  [16-20] 16  SpO2:  [96 %-100 %] 100 %  BP: (113-129)/(52-66) 124/66     Weight: 85.2 kg (187 lb 13.3 oz)  Body mass index is 31.26 kg/m².     Physical Exam  Vitals and nursing note reviewed.   Constitutional:       Appearance: She is obese. She is ill-appearing.   HENT:      Head: Normocephalic.      Nose: Nose normal.      Mouth/Throat:      Mouth: Mucous membranes are moist.   Eyes:      Extraocular Movements: Extraocular movements intact.   Cardiovascular:      Rate and Rhythm: Normal rate.   Pulmonary:      Effort: Pulmonary effort is normal.   Abdominal:      Palpations: Abdomen is soft.   Musculoskeletal:         General: Normal range of motion.      Cervical back: Normal range of motion.   Skin:     General: Skin is warm and dry.      Capillary Refill: Capillary refill takes 2 to 3 seconds.      Findings: No erythema.      Comments: Quarter size decubitus, no drainage or odor noted on exam.  (wound has been cleaned by wound care prior to my examination of wound.)    Neurological:      Mental Status: She is alert and oriented to person, place, and time.   Psychiatric:         Mood and Affect: Mood normal.            I have reviewed all pertinent lab results within the past 24 hours.  CBC:   Recent Labs   Lab 05/23/25  0321   WBC 10.20   RBC 2.99*   HGB 7.6*   HCT 24.5*   *   MCV 81.9   MCH 25.4*    MCHC 31.0*     BMP:   Recent Labs   Lab 05/23/25  0321   GLU 93   *   K 3.7      CO2 18*   BUN 25*   CREATININE 1.49*   CALCIUM 7.8*   MG 1.5*     CMP:   Recent Labs   Lab 05/21/25  1310 05/22/25  0512 05/23/25  0321   *   < > 93   CALCIUM 8.2*   < > 7.8*   ALBUMIN 1.7*  --   --    PROT 5.6*  --   --    *   < > 129*   K 4.3   < > 3.7   CO2 19*   < > 18*      < > 103   BUN 22*   < > 25*   CREATININE 1.46*   < > 1.49*   ALKPHOS 93  --   --    ALT <7  --   --    AST 16  --   --    BILITOT 0.4  --   --     < > = values in this interval not displayed.     Microbiology Results (last 7 days)       Procedure Component Value Units Date/Time    Urine culture [[id number]]  (Abnormal) Collected: 05/21/25 1813    Order Status: Completed Specimen: Urine, Catheterized (In/Out) Updated: 05/23/25 1015     Culture, Urine >100,000 Gram-negative Bacilli     Comment: Identification and Susceptibility to Follow       Blood Culture #1 [[id number]] Collected: 05/21/25 1310    Order Status: Completed Specimen: Blood Updated: 05/23/25 0643     Culture, Blood No Growth At 24 Hours    Blood Culture #2 [[id number]] Collected: 05/21/25 1317    Order Status: Completed Specimen: Blood Updated: 05/23/25 0643     Culture, Blood No Growth At 24 Hours          Specimen (24h ago, onward)      None          Recent Labs   Lab 05/21/25 1813   COLORU Yellow   SPECGRAV 1.015   PHUR 6.5   PROTEINUA 100*   BACTERIA Occasional*   NITRITE Negative   LEUKOCYTESUR Moderate*   UROBILINOGEN Normal       Significant Diagnostics:  I have reviewed all pertinent imaging results/findings within the past 24 hours.    Assessment/Plan:     No notes have been filed under this hospital service.  Service: General Surgery    VTE Risk Mitigation (From admission, onward)           Ordered     heparin (porcine) injection 5,000 Units  Every 8 hours         05/21/25 1737     IP VTE HIGH RISK PATIENT  Once         05/21/25 1737     Place sequential  compression device  Until discontinued         05/21/25 4919                    Thank you for your consult. I will follow-up with patient. Please contact us if you have any additional questions.    Kavya Rodas, NIKOS  General Surgery  [facility] - [address] [place] [address]

## 2025-05-23 NOTE — ASSESSMENT & PLAN NOTE
Patient's blood pressure range in the last 24 hours was: BP  Min: 113/57  Max: 129/65.The patient's inpatient anti-hypertensive regimen is listed below:  Current Antihypertensives  hydrALAZINE tablet 50 mg, 2 times daily, Oral  metoprolol succinate (TOPROL-XL) 24 hr tablet 25 mg, Daily, Oral    Plan  - BP is controlled, no changes needed to their regimen  - follow

## 2025-05-23 NOTE — ASSESSMENT & PLAN NOTE
Hyponatremia is likely due to renal insufficiency. The patient's most recent sodium results are listed below.  Recent Labs     05/21/25  1310 05/22/25  0512 05/23/25  0321   * 133* 129*     Plan  - Correct the sodium by 4-6mEq in 24 hours.   - Obtain the following studies:  None.  - Will treat the hyponatremia with fluid restriction  - Monitor sodium Daily.   - Patient hyponatremia is stable  - follow

## 2025-05-23 NOTE — PLAN OF CARE
Problem: Adult Inpatient Plan of Care  Goal: Plan of Care Review  Outcome: Progressing  Goal: Patient-Specific Goal (Individualized)  Outcome: Progressing  Goal: Absence of Hospital-Acquired Illness or Injury  Outcome: Progressing  Goal: Optimal Comfort and Wellbeing  Outcome: Progressing  Goal: Readiness for Transition of Care  Outcome: Progressing     Problem: Sepsis/Septic Shock  Goal: Optimal Coping  Outcome: Progressing  Goal: Absence of Bleeding  Outcome: Progressing  Goal: Blood Glucose Level Within Targeted Range  Outcome: Progressing  Goal: Absence of Infection Signs and Symptoms  Outcome: Progressing  Goal: Optimal Nutrition Intake  Outcome: Progressing     Problem: Diabetes Comorbidity  Goal: Blood Glucose Level Within Targeted Range  Outcome: Progressing     Problem: Wound  Goal: Optimal Coping  Outcome: Progressing  Goal: Optimal Functional Ability  Outcome: Progressing  Goal: Absence of Infection Signs and Symptoms  Outcome: Progressing  Goal: Improved Oral Intake  Outcome: Progressing  Goal: Optimal Pain Control and Function  Outcome: Progressing  Goal: Skin Health and Integrity  Outcome: Progressing  Goal: Optimal Wound Healing  Outcome: Progressing     Problem: Fall Injury Risk  Goal: Absence of Fall and Fall-Related Injury  Outcome: Progressing     Problem: Skin Injury Risk Increased  Goal: Skin Health and Integrity  Outcome: Progressing     Problem: Gas Exchange Impaired  Goal: Optimal Gas Exchange  Outcome: Progressing

## 2025-05-23 NOTE — PROGRESS NOTES
[facility] - [address] [place] [address] Medicine  Progress Note    Patient Name: Vandana Allison  MRN: [id number]  Patient Class: IP- Inpatient   Admission Date: 5/21/2025  Length of Stay: 1 days  Attending Physician: Fallon Guerin MD  Primary Care Provider: Jennifer Mares FNP        Subjective     Principal Problem:Severe sepsis        HPI:  89-year-old  female With a past medical history of CKD, COPD, diabetes, diabetic neuropathy, hypertension, right nephrectomy presents to the ED after she was found to have elevated white count at the nursing home.  She denies any shortness of breath, cough, fever, chills, dysuria.  She does have some left knee pain.  Does have a sacral decubitus.  She has had a poor appetite.  Recently had gallbladder remove with Dr. Maciel.  She reports feeling better currently than she did earlier in the day during my interview.  Chest x-ray was reviewed and showed bilateral pleural effusions that we are new since early May.  Review of systems otherwise negative    Overview/Hospital Course:  5/22  - states she feels better today but has had subjective chills  - continuing pna treatment    5/23  - at bedside today patient is lethargic, sob, +2 LE edema  - repeat labs including abg, chest xray, lactic acid, cbc, cmp  - bladder scan with 350 retention, rivera placed, will start flowmax  - lasix 40 IV given once  - monitoring closely    Past Medical History:   Diagnosis Date    Arthritis     Bleeding external hemorrhoids     Chronic kidney disease, stage 3b     baseline creat 1.5    Chronic kidney disease, stage 3b 02/07/2024    baseline creat 1.5      COPD (chronic obstructive pulmonary disease)     senile emphysema    Diabetes mellitus, type 2     DNR (do not resuscitate) 02/07/2024    discussed with VITO Christianson present    Essential (primary) hypertension     Hiatal hernia with GERD     Neuropathy     Post-operative hypothyroidism     Severe pulmonary  hypertension 10/08/2022    EF  70 % and normal diastolic function       Past Surgical History:   Procedure Laterality Date    BREAST SURGERY      Biopsy    EYE SURGERY      Remove cataracts both eyes    HYSTERECTOMY      LAPAROSCOPIC CHOLECYSTECTOMY N/A 5/13/2025    Procedure: CHOLECYSTECTOMY, LAPAROSCOPIC;  Surgeon: Miguel Angel Aragon MD;  Location: [facility];  Service: General;  Laterality: N/A;    NEPHRECTOMY Right 1960    THYROIDECTOMY         Review of patient's allergies indicates:   Allergen Reactions    Aspirin        No current facility-administered medications on file prior to encounter.     Current Outpatient Medications on File Prior to Encounter   Medication Sig    albuterol (PROVENTIL/VENTOLIN HFA) 90 mcg/actuation inhaler INHALE 2 PUFFS BY MOUTH INTO THE LUNGS EVERY 4 HOURS AS NEEDED FOR SHORTNESS OF BREATH / RESCUE (Patient taking differently: Inhale 2 puffs into the lungs every 6 (six) hours as needed.)    albuterol-ipratropium (DUO-NEB) 2.5 mg-0.5 mg/3 mL nebulizer solution Take 3 mLs by nebulization every 6 (six) hours as needed for Wheezing. Rescue    ferrous sulfate (FEOSOL) 325 mg (65 mg iron) Tab tablet Take 1 tablet (325 mg total) by mouth 3 (three) times a week. On Monday, Wednesday and Friday    furosemide (LASIX) 40 MG tablet Take 1 tablet (40 mg total) by mouth 2 (two) times daily.    gabapentin (NEURONTIN) 100 MG capsule Take 1 capsule (100 mg total) by mouth every evening.    hydrALAZINE (APRESOLINE) 50 MG tablet Take 1 tablet (50 mg total) by mouth 2 (two) times daily.    HYDROcodone-acetaminophen (NORCO) 5-325 mg per tablet Take 1 tablet by mouth every 6 (six) hours as needed for Pain.    hydrocortisone (ANUSOL-HC) 2.5 % rectal cream Place rectally 2 (two) times daily.    insulin glargine U-100, Lantus, 100 unit/mL injection Inject 5 Units into the skin every 12 (twelve) hours.    insulin lispro 100 unit/mL injection Inject 3 Units into the skin 3 (three) times daily before meals.  "   Lactobacillus acidophilus 500 million cell Cap Take 4 capsules by mouth 3 (three) times daily with meals.    lactulose (CHRONULAC) 20 gram/30 mL Soln Take 23 mLs (15 g total) by mouth 2 (two) times daily. Hold if patient having more than 3 Bms daily (Patient taking differently: Take 30 mLs by mouth 2 (two) times daily. Hold if patient having more than 3 Bms daily)    levothyroxine (SYNTHROID) 100 MCG tablet TAKE 1 TABLET BY MOUTH BEFORE BREAKFAST.    megestroL (MEGACE) 400 mg/10 mL (40 mg/mL) Susp Take 10 mLs by mouth once daily.    meloxicam (MOBIC) 7.5 MG tablet Take 7.5 mg by mouth once daily.    metoprolol succinate (TOPROL-XL) 25 MG 24 hr tablet Take 1 tablet (25 mg total) by mouth once daily.    nystatin (MYCOSTATIN) 100,000 unit/mL suspension Take 5 mLs (500,000 Units total) by mouth 4 (four) times daily with meals and nightly. for 10 days    ondansetron 4 mg/2 mL Soln Inject 4 mg into the muscle every 6 (six) hours as needed.    pantoprazole (PROTONIX) 40 MG tablet TAKE 1 TABLET BY MOUTH TWICE A DAY    pravastatin (PRAVACHOL) 10 MG tablet Take 1 tablet (10 mg total) by mouth once daily.    sertraline (ZOLOFT) 25 MG tablet Take 25 mg by mouth once daily.    SYMBICORT 160-4.5 mcg/actuation HFAA Inhale 2 puffs into the lungs 2 (two) times daily.    BD INSULIN SYRINGE ULTRA-FINE 0.3 mL 31 gauge x 5/16" Syrg USE 1 SYRINGE TWICE A DAY     Family History       Problem Relation (Age of Onset)    Cancer Sister, Brother, Daughter    Diabetes Mother, Sister, Brother, Sister    Heart disease Father          Tobacco Use    Smoking status: Never    Smokeless tobacco: Never   Substance and Sexual Activity    Alcohol use: Never    Drug use: Never    Sexual activity: Not Currently     Birth control/protection: None     Review of Systems   Constitutional:  Positive for appetite change and fatigue. Negative for chills, fever and unexpected weight change.   HENT:  Negative for congestion, mouth sores and sore throat.  "   Eyes:  Negative for photophobia and visual disturbance.   Respiratory:  Negative for cough, chest tightness, shortness of breath and wheezing.    Cardiovascular:  Negative for chest pain, palpitations and leg swelling.   Gastrointestinal:  Negative for abdominal pain, diarrhea, nausea and vomiting.   Endocrine: Negative for cold intolerance and heat intolerance.   Genitourinary:  Negative for difficulty urinating, dysuria, frequency and urgency.   Musculoskeletal:  Negative for arthralgias, back pain and myalgias.   Skin:  Positive for wound. Negative for pallor and rash.   Neurological:  Negative for tremors, seizures, syncope, weakness, numbness and headaches.   Hematological:  Does not bruise/bleed easily.   Psychiatric/Behavioral:  Negative for agitation, confusion, hallucinations and suicidal ideas.      Objective:     Vital Signs (Most Recent):  Temp: 98.4 °F (36.9 °C) (05/23/25 1141)  Pulse: 76 (05/23/25 1141)  Resp: 16 (05/23/25 1141)  BP: 124/66 (05/23/25 1141)  SpO2: 100 % (05/23/25 1141) Vital Signs (24h Range):  Temp:  [97.5 °F (36.4 °C)-98.4 °F (36.9 °C)] 98.4 °F (36.9 °C)  Pulse:  [68-76] 76  Resp:  [16-20] 16  SpO2:  [96 %-100 %] 100 %  BP: (113-129)/(52-66) 124/66     Weight: 85.2 kg (187 lb 13.3 oz)  Body mass index is 31.26 kg/m².     Physical Exam  Vitals and nursing note reviewed.   Constitutional:       Appearance: She is ill-appearing.   HENT:      Head: Normocephalic and atraumatic.      Nose: Nose normal.   Eyes:      Extraocular Movements: Extraocular movements intact.      Conjunctiva/sclera: Conjunctivae normal.   Neck:      Trachea: Trachea normal.   Cardiovascular:      Rate and Rhythm: Normal rate and regular rhythm.      Pulses: Normal pulses.      Heart sounds: Normal heart sounds.   Pulmonary:      Effort: Pulmonary effort is normal.      Breath sounds: Normal air entry. Rales present.   Abdominal:      General: Bowel sounds are normal.      Palpations: Abdomen is soft.    Musculoskeletal:         General: Signs of injury (Sacral wound) present.      Cervical back: Neck supple.      Right lower leg: Edema present.      Left lower leg: Edema present.      Comments: Left knee effusion.  Pain with passive range of motion.  Not red not tender to palpation   Skin:     General: Skin is warm and dry.   Neurological:      General: No focal deficit present.      Mental Status: She is alert. She is disoriented.      Comments: Grossly normal motor and sensory function without focal deficit appreciated.   Psychiatric:         Mood and Affect: Affect normal.         Behavior: Behavior is cooperative.                Significant Labs: All pertinent labs within the past 24 hours have been reviewed.  BMP:   Recent Labs   Lab 05/23/25  0321   GLU 93   *   K 3.7      CO2 18*   BUN 25*   CREATININE 1.49*   CALCIUM 7.8*   MG 1.5*     CBC:   Recent Labs   Lab 05/22/25  0512 05/23/25  0321   WBC 10.67 10.20   HGB 7.7* 7.6*   HCT 24.9* 24.5*   * 433*     CMP:   Recent Labs   Lab 05/22/25  0512 05/23/25  0321   * 129*   K 4.3 3.7    103   CO2 19* 18*    93   BUN 23* 25*   CREATININE 1.51* 1.49*   CALCIUM 7.9* 7.8*   ANIONGAP 15 12       Significant Imaging: I have reviewed all pertinent imaging results/findings within the past 24 hours.      Assessment & Plan  Severe sepsis  This patient does have evidence of infective focus  My overall impression is sepsis.  Source: Respiratory  Antibiotics given-   Antibiotics (72h ago, onward)      Start     Stop Route Frequency Ordered    05/21/25 1845  linezolid 600 mg/300 mL IVPB 600 mg         -- IV Every 12 hours (non-standard times) 05/21/25 1744    05/21/25 1845  ceFEPIme injection 1 g         -- IV Every 12 hours (non-standard times) 05/21/25 1744          Latest lactate reviewed-  Recent Labs   Lab 05/23/25  1117   LACTATE 1.2     Organ dysfunction indicated by no evidence of end-organ dysfunction    Fluid challenge  "Contraindicated- Fluid bolus is contraindicated in this patient due to Volume overload due to- hypoalbuminemia.  Large pleural effusion in hypoxic patient     Post- resuscitation assessment Yes - I attest a sepsis perfusion exam was performed within 6 hours of sepsis, severe sepsis, or septic shock presentation, following fluid resuscitation.      Will Not start Pressors- Levophed for MAP of 65  Source control achieved by:  Antibiotics  Lactate okay   Blood cultures ordered  Zyvox for pneumonia in the setting of CKD   Cefepime  Diabetes mellitus, type 2  Patient's FSGs are controlled on current medication regimen.  Last A1c reviewed-   Lab Results   Component Value Date    HGBA1C 6.4 11/27/2024     Most recent fingerstick glucose reviewed- No results for input(s): "POCTGLUCOSE" in the last 24 hours.  Current correctional scale  Low  Maintain anti-hyperglycemic dose as follows-   Antihyperglycemics (From admission, onward)      Start     Stop Route Frequency Ordered    05/21/25 2100  insulin glargine U-100 (Lantus) injection 5 Units         -- SubQ Every 12 hours 05/21/25 1737    05/21/25 1836  insulin aspart U-100 injection 0-5 Units         -- SubQ Before meals & nightly PRN 05/21/25 1737          Hold Oral hypoglycemics while patient is in the hospital.  Neuropathy  Secondary to diabetes    Rheumatoid arthritis  Stable    Hypertension  Patient's blood pressure range in the last 24 hours was: BP  Min: 113/57  Max: 129/65.The patient's inpatient anti-hypertensive regimen is listed below:  Current Antihypertensives  hydrALAZINE tablet 50 mg, 2 times daily, Oral  metoprolol succinate (TOPROL-XL) 24 hr tablet 25 mg, Daily, Oral    Plan  - BP is controlled, no changes needed to their regimen  - follow  Senile asthenia  Progressive mobility protocol    Moderate persistent asthma without complication  Resume home inhalers   P.r.n. nebs  Pulmonary hypertension  Respiratory status stable on supplemental oxygen    Bacterial " pneumonia  Patient has a diagnosis of pneumonia. The cause of the pneumonia is suspected to be bacterial in etiology but organism is not known. The pneumonia is worsening due to oxygen requirement. The patient has the following signs/symptoms of pneumonia: persistent hypoxia . The patient does have a current oxygen requirement and the patient does not have a home oxygen requirement. I have reviewed the pertinent imaging. The following cultures have been collected: Blood cultures The culture results are listed below.     Current antimicrobial regimen consists of the antibiotics listed below. Will monitor patient closely and continue current treatment plan unchanged.    Antibiotics (From admission, onward)      Start     Stop Route Frequency Ordered    05/21/25 1845  linezolid 600 mg/300 mL IVPB 600 mg         -- IV Every 12 hours (non-standard times) 05/21/25 1744 05/21/25 1845  ceFEPIme injection 1 g         -- IV Every 12 hours (non-standard times) 05/21/25 1744            Microbiology Results (last 7 days)       Procedure Component Value Units Date/Time    Urine culture [[id number]]  (Abnormal) Collected: 05/21/25 1813    Order Status: Completed Specimen: Urine, Catheterized (In/Out) Updated: 05/23/25 1015     Culture, Urine >100,000 Gram-negative Bacilli     Comment: Identification and Susceptibility to Follow       Blood Culture #1 [[id number]] Collected: 05/21/25 1310    Order Status: Completed Specimen: Blood Updated: 05/23/25 0643     Culture, Blood No Growth At 24 Hours    Blood Culture #2 [[id number]] Collected: 05/21/25 1317    Order Status: Completed Specimen: Blood Updated: 05/23/25 0643     Culture, Blood No Growth At 24 Hours            5/22  - continuing abx    5/23  - at bedside today patient is lethargic, sob, +2 LE edema  - repeat labs including abg, chest xray, lactic acid, cbc, cmp  - bladder scan with 350 retention, rivera placed, will start flowmax  - lasix 40 IV given once  - monitoring  closely  CKD (chronic kidney disease), stage IV  Creatine stable for now. BMP reviewed- noted Estimated Creatinine Clearance: 22.7 mL/min (A) (based on SCr of 1.49 mg/dL (H)). according to latest data. 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  Monitor UOP and serial BMP and adjust therapy as needed. Renally dose meds. Avoid nephrotoxic medications and procedures.  Cerebellar infarct  Remote.  PTOT    Anemia  Anemia is likely due to chronic disease due to Chronic Kidney Disease. Most recent hemoglobin and hematocrit are listed below.  Recent Labs     05/21/25  1310 05/22/25  0512 05/23/25  0321   HGB 8.4* 7.7* 7.6*   HCT 26.7* 24.9* 24.5*     Plan  - Monitor serial CBC: Daily  - Transfuse PRBC if patient becomes hemodynamically unstable, symptomatic or H/H drops below 7/21.  - Patient has not received any PRBC transfusions to date  - Patient's anemia is currently stable  - follow  Hyponatremia  Hyponatremia is likely due to renal insufficiency. The patient's most recent sodium results are listed below.  Recent Labs     05/21/25  1310 05/22/25  0512 05/23/25  0321   * 133* 129*     Plan  - Correct the sodium by 4-6mEq in 24 hours.   - Obtain the following studies:  None.  - Will treat the hyponatremia with fluid restriction  - Monitor sodium Daily.   - Patient hyponatremia is stable  - follow  Diabetic neuropathy  Glycemic control    Pleural effusion  Patient found to have moderate pleural effusion on imaging. I have personally reviewed and interpreted the following imaging: Xray. A thoracentesis was deferred. Most likely etiology includes Pneumonia and hypoalbuminemia. Management to include Diuresis    Sacral decubitus ulcer  Wound care   Antibiotic    VTE Risk Mitigation (From admission, onward)           Ordered     heparin (porcine) injection 5,000 Units  Every 8 hours         05/21/25 1737     IP VTE HIGH RISK PATIENT  Once         05/21/25 1737     Place sequential compression device  Until  discontinued         05/21/25 1737                    Discharge Planning   MARTHA:      Code Status: DNR   Medical Readiness for Discharge Date:   Discharge Plan A: Return to nursing home                        Fallon Guerin MD  Department of Hospital Medicine   [facility] - [facility] Telemetry

## 2025-05-23 NOTE — NURSING
1100 MD at bedside, made aware of patients change. Patient appears more lethargic, abdominal breathing. STAT ABGS/ lactic ordered per MD. Patients daughter contacted, to inform of change. Will continue to monitor.

## 2025-05-23 NOTE — HPI
Patient admitted to medicine for UTI/ sepsis. Patient had lap cholecystectomy per Dr Aragon on 05/13/25.  She returned to nursing home on discharge.  General surgery was consulted by wound care to evaluate sacral wound.  Wound care reported purulent drainage with odor to sacral wound.  On exam wound has been cleaned and ointment applied.  That has no odor.  There is a quarter-sized decubitus noted.  No erythema/induration noted.  Continue daily wound care per nursing staff daily.  Will discuss wound with Dr Love for further recommendations.  Patient was sleeping on exam but it was easily aroused.  She complains of being cold.  Patient is more lethargic and does not interact during exam as with previous admission.  Discussed with nursing staff who states definite change in mental status today more lethargic.  She reports Dr. Guerin is aware and additional test has been ordered to evaluate change in mental status.  Right arm is also noted warm to touch and swollen.  Patient reports pain when the arm is moved.  Edema is extends up past the elbow into the upper arm.  Areas of bruising at the wrist and forearm are noted.  We will add venous Doppler right upper extremity to rule out DVT.

## 2025-05-23 NOTE — ASSESSMENT & PLAN NOTE
Anemia is likely due to chronic disease due to Chronic Kidney Disease. Most recent hemoglobin and hematocrit are listed below.  Recent Labs     05/21/25  1310 05/22/25  0512 05/23/25  0321   HGB 8.4* 7.7* 7.6*   HCT 26.7* 24.9* 24.5*     Plan  - Monitor serial CBC: Daily  - Transfuse PRBC if patient becomes hemodynamically unstable, symptomatic or H/H drops below 7/21.  - Patient has not received any PRBC transfusions to date  - Patient's anemia is currently stable  - follow

## 2025-05-23 NOTE — ASSESSMENT & PLAN NOTE
This patient does have evidence of infective focus  My overall impression is sepsis.  Source: Respiratory  Antibiotics given-   Antibiotics (72h ago, onward)      Start     Stop Route Frequency Ordered    05/21/25 1845  linezolid 600 mg/300 mL IVPB 600 mg         -- IV Every 12 hours (non-standard times) 05/21/25 1744    05/21/25 1845  ceFEPIme injection 1 g         -- IV Every 12 hours (non-standard times) 05/21/25 1744          Latest lactate reviewed-  Recent Labs   Lab 05/23/25  1117   LACTATE 1.2     Organ dysfunction indicated by no evidence of end-organ dysfunction    Fluid challenge Contraindicated- Fluid bolus is contraindicated in this patient due to Volume overload due to- hypoalbuminemia.  Large pleural effusion in hypoxic patient     Post- resuscitation assessment Yes - I attest a sepsis perfusion exam was performed within 6 hours of sepsis, severe sepsis, or septic shock presentation, following fluid resuscitation.      Will Not start Pressors- Levophed for MAP of 65  Source control achieved by:  Antibiotics  Lactate okay   Blood cultures ordered  Zyvox for pneumonia in the setting of CKD   Cefepime

## 2025-05-24 LAB
ANION GAP SERPL CALCULATED.3IONS-SCNC: 15 MMOL/L (ref 7–16)
BASOPHILS # BLD AUTO: 0.04 K/UL (ref 0–0.2)
BASOPHILS NFR BLD AUTO: 0.4 % (ref 0–1)
BUN SERPL-MCNC: 26 MG/DL (ref 10–20)
BUN/CREAT SERPL: 17 (ref 6–20)
CALCIUM SERPL-MCNC: 8 MG/DL (ref 8.4–10.2)
CHLORIDE SERPL-SCNC: 102 MMOL/L (ref 98–111)
CO2 SERPL-SCNC: 18 MMOL/L (ref 23–31)
CREAT SERPL-MCNC: 1.57 MG/DL (ref 0.55–1.02)
DIFFERENTIAL METHOD BLD: ABNORMAL
EGFR (NO RACE VARIABLE) (RUSH/TITUS): 30 ML/MIN/1.73M2
EOSINOPHIL # BLD AUTO: 0.21 K/UL (ref 0–0.5)
EOSINOPHIL NFR BLD AUTO: 2.3 % (ref 1–4)
ERYTHROCYTE [DISTWIDTH] IN BLOOD BY AUTOMATED COUNT: 21.1 % (ref 11.5–14.5)
GLUCOSE SERPL-MCNC: 108 MG/DL (ref 70–105)
GLUCOSE SERPL-MCNC: 109 MG/DL (ref 70–105)
GLUCOSE SERPL-MCNC: 120 MG/DL (ref 70–105)
GLUCOSE SERPL-MCNC: 152 MG/DL (ref 70–105)
GLUCOSE SERPL-MCNC: 53 MG/DL (ref 70–105)
GLUCOSE SERPL-MCNC: 96 MG/DL (ref 75–121)
GLUCOSE SERPL-MCNC: 99 MG/DL (ref 70–105)
HCT VFR BLD AUTO: 23.8 % (ref 38–47)
HGB BLD-MCNC: 7.4 G/DL (ref 12–16)
IMM GRANULOCYTES # BLD AUTO: 0.09 K/UL (ref 0–0.04)
IMM GRANULOCYTES NFR BLD: 1 % (ref 0–0.4)
LYMPHOCYTES # BLD AUTO: 0.62 K/UL (ref 1–4.8)
LYMPHOCYTES NFR BLD AUTO: 6.8 % (ref 27–41)
MAGNESIUM SERPL-MCNC: 1.5 MG/DL (ref 1.6–2.6)
MCH RBC QN AUTO: 24.9 PG (ref 27–31)
MCHC RBC AUTO-ENTMCNC: 31.1 G/DL (ref 32–36)
MCV RBC AUTO: 80.1 FL (ref 80–96)
METHICILLIN RESISTANT STAPHYLOCOCCUS AUREUS: POSITIVE
MONOCYTES # BLD AUTO: 0.77 K/UL (ref 0–0.8)
MONOCYTES NFR BLD AUTO: 8.5 % (ref 2–6)
MPC BLD CALC-MCNC: 9.1 FL (ref 9.4–12.4)
NEUTROPHILS # BLD AUTO: 7.38 K/UL (ref 1.8–7.7)
NEUTROPHILS NFR BLD AUTO: 81 % (ref 53–65)
NRBC # BLD AUTO: 0 X10E3/UL
NRBC, AUTO (.00): 0 %
PHOSPHATE SERPL-MCNC: 2.9 MG/DL (ref 2.3–4.7)
PLATELET # BLD AUTO: 439 K/UL (ref 150–400)
POTASSIUM SERPL-SCNC: 3.9 MMOL/L (ref 3.5–5.1)
RBC # BLD AUTO: 2.97 M/UL (ref 4.2–5.4)
SODIUM SERPL-SCNC: 131 MMOL/L (ref 136–145)
UA COMPLETE W REFLEX CULTURE PNL UR: ABNORMAL
WBC # BLD AUTO: 9.11 K/UL (ref 4.5–11)

## 2025-05-24 PROCEDURE — 25000003 PHARM REV CODE 250: Performed by: STUDENT IN AN ORGANIZED HEALTH CARE EDUCATION/TRAINING PROGRAM

## 2025-05-24 PROCEDURE — 63600175 PHARM REV CODE 636 W HCPCS

## 2025-05-24 PROCEDURE — 99900035 HC TECH TIME PER 15 MIN (STAT)

## 2025-05-24 PROCEDURE — 94640 AIRWAY INHALATION TREATMENT: CPT

## 2025-05-24 PROCEDURE — 0JB70ZZ EXCISION OF BACK SUBCUTANEOUS TISSUE AND FASCIA, OPEN APPROACH: ICD-10-PCS | Performed by: SURGERY

## 2025-05-24 PROCEDURE — 27000221 HC OXYGEN, UP TO 24 HOURS

## 2025-05-24 PROCEDURE — 83735 ASSAY OF MAGNESIUM: CPT | Performed by: STUDENT IN AN ORGANIZED HEALTH CARE EDUCATION/TRAINING PROGRAM

## 2025-05-24 PROCEDURE — 85025 COMPLETE CBC W/AUTO DIFF WBC: CPT | Performed by: STUDENT IN AN ORGANIZED HEALTH CARE EDUCATION/TRAINING PROGRAM

## 2025-05-24 PROCEDURE — 25000242 PHARM REV CODE 250 ALT 637 W/ HCPCS: Performed by: STUDENT IN AN ORGANIZED HEALTH CARE EDUCATION/TRAINING PROGRAM

## 2025-05-24 PROCEDURE — 80048 BASIC METABOLIC PNL TOTAL CA: CPT | Performed by: STUDENT IN AN ORGANIZED HEALTH CARE EDUCATION/TRAINING PROGRAM

## 2025-05-24 PROCEDURE — 99232 SBSQ HOSP IP/OBS MODERATE 35: CPT | Mod: ,,,

## 2025-05-24 PROCEDURE — 82962 GLUCOSE BLOOD TEST: CPT

## 2025-05-24 PROCEDURE — 63600175 PHARM REV CODE 636 W HCPCS: Performed by: STUDENT IN AN ORGANIZED HEALTH CARE EDUCATION/TRAINING PROGRAM

## 2025-05-24 PROCEDURE — 94761 N-INVAS EAR/PLS OXIMETRY MLT: CPT

## 2025-05-24 PROCEDURE — 84100 ASSAY OF PHOSPHORUS: CPT | Performed by: STUDENT IN AN ORGANIZED HEALTH CARE EDUCATION/TRAINING PROGRAM

## 2025-05-24 PROCEDURE — 25000003 PHARM REV CODE 250

## 2025-05-24 PROCEDURE — 36415 COLL VENOUS BLD VENIPUNCTURE: CPT | Performed by: STUDENT IN AN ORGANIZED HEALTH CARE EDUCATION/TRAINING PROGRAM

## 2025-05-24 PROCEDURE — 11000001 HC ACUTE MED/SURG PRIVATE ROOM

## 2025-05-24 RX ORDER — MUPIROCIN 20 MG/G
OINTMENT TOPICAL 2 TIMES DAILY
Status: DISCONTINUED | OUTPATIENT
Start: 2025-05-24 | End: 2025-05-26 | Stop reason: HOSPADM

## 2025-05-24 RX ORDER — CIPROFLOXACIN 2 MG/ML
200 INJECTION, SOLUTION INTRAVENOUS
Status: DISCONTINUED | OUTPATIENT
Start: 2025-05-24 | End: 2025-05-25

## 2025-05-24 RX ADMIN — LINEZOLID 600 MG: 600 INJECTION, SOLUTION INTRAVENOUS at 06:05

## 2025-05-24 RX ADMIN — DEXTROSE MONOHYDRATE 25 G: 25 INJECTION, SOLUTION INTRAVENOUS at 12:05

## 2025-05-24 RX ADMIN — CIPROFLOXACIN 200 MG: 200 INJECTION, SOLUTION INTRAVENOUS at 11:05

## 2025-05-24 RX ADMIN — CEFEPIME 1 G: 1 INJECTION, POWDER, FOR SOLUTION INTRAMUSCULAR; INTRAVENOUS at 06:05

## 2025-05-24 RX ADMIN — HYDRALAZINE HYDROCHLORIDE 50 MG: 50 TABLET ORAL at 09:05

## 2025-05-24 RX ADMIN — Medication 4 CAPSULE: at 05:05

## 2025-05-24 RX ADMIN — MUPIROCIN: 20 OINTMENT TOPICAL at 09:05

## 2025-05-24 RX ADMIN — BUDESONIDE INHALATION 0.5 MG: 0.5 SUSPENSION RESPIRATORY (INHALATION) at 07:05

## 2025-05-24 RX ADMIN — HEPARIN SODIUM 5000 UNITS: 5000 INJECTION, SOLUTION INTRAVENOUS; SUBCUTANEOUS at 02:05

## 2025-05-24 RX ADMIN — PANTOPRAZOLE SODIUM 40 MG: 40 TABLET, DELAYED RELEASE ORAL at 09:05

## 2025-05-24 RX ADMIN — CIPROFLOXACIN 200 MG: 200 INJECTION, SOLUTION INTRAVENOUS at 12:05

## 2025-05-24 RX ADMIN — HEPARIN SODIUM 5000 UNITS: 5000 INJECTION, SOLUTION INTRAVENOUS; SUBCUTANEOUS at 09:05

## 2025-05-24 RX ADMIN — METOPROLOL SUCCINATE 25 MG: 25 TABLET, EXTENDED RELEASE ORAL at 08:05

## 2025-05-24 RX ADMIN — HEPARIN SODIUM 5000 UNITS: 5000 INJECTION, SOLUTION INTRAVENOUS; SUBCUTANEOUS at 06:05

## 2025-05-24 RX ADMIN — HYDROCODONE BITARTRATE AND ACETAMINOPHEN 1 TABLET: 5; 325 TABLET ORAL at 09:05

## 2025-05-24 RX ADMIN — Medication 4 CAPSULE: at 11:05

## 2025-05-24 RX ADMIN — LACTULOSE 15 G: 20 SOLUTION ORAL at 08:05

## 2025-05-24 RX ADMIN — INSULIN GLARGINE 5 UNITS: 100 INJECTION, SOLUTION SUBCUTANEOUS at 09:05

## 2025-05-24 RX ADMIN — MUPIROCIN: 20 OINTMENT TOPICAL at 11:05

## 2025-05-24 RX ADMIN — TAMSULOSIN HYDROCHLORIDE 0.4 MG: 0.4 CAPSULE ORAL at 08:05

## 2025-05-24 RX ADMIN — LINEZOLID 600 MG: 600 INJECTION, SOLUTION INTRAVENOUS at 05:05

## 2025-05-24 RX ADMIN — PRAVASTATIN SODIUM 10 MG: 10 TABLET ORAL at 08:05

## 2025-05-24 RX ADMIN — SERTRALINE HYDROCHLORIDE 25 MG: 25 TABLET ORAL at 09:05

## 2025-05-24 RX ADMIN — LACTULOSE 15 G: 20 SOLUTION ORAL at 09:05

## 2025-05-24 RX ADMIN — Medication 4 CAPSULE: at 08:05

## 2025-05-24 RX ADMIN — HYDRALAZINE HYDROCHLORIDE 50 MG: 50 TABLET ORAL at 08:05

## 2025-05-24 RX ADMIN — GABAPENTIN 100 MG: 100 CAPSULE ORAL at 09:05

## 2025-05-24 NOTE — NURSING
Patient laying in the bed HOB resp even and unlabored aroused to voice and touch  skin warm dry lines in place. Able to follow commands. Alert to name able to tell me her family members name that came to visit. Reposition for comfort safety measures. Able to drink water without difficulty. Bed low side rails up

## 2025-05-24 NOTE — PLAN OF CARE
Problem: Adult Inpatient Plan of Care  Goal: Plan of Care Review  Outcome: Not Progressing  Goal: Patient-Specific Goal (Individualized)  Outcome: Not Progressing  Goal: Absence of Hospital-Acquired Illness or Injury  Outcome: Not Progressing  Goal: Optimal Comfort and Wellbeing  Outcome: Not Progressing  Goal: Readiness for Transition of Care  Outcome: Not Progressing     Problem: Sepsis/Septic Shock  Goal: Optimal Coping  Outcome: Not Progressing  Goal: Absence of Bleeding  Outcome: Not Progressing  Goal: Blood Glucose Level Within Targeted Range  Outcome: Not Progressing  Goal: Absence of Infection Signs and Symptoms  Outcome: Not Progressing  Goal: Optimal Nutrition Intake  Outcome: Not Progressing     Problem: Diabetes Comorbidity  Goal: Blood Glucose Level Within Targeted Range  Outcome: Not Progressing     Problem: Wound  Goal: Optimal Coping  Outcome: Not Progressing  Goal: Optimal Functional Ability  Outcome: Not Progressing  Goal: Absence of Infection Signs and Symptoms  Outcome: Not Progressing  Goal: Improved Oral Intake  Outcome: Not Progressing  Goal: Optimal Pain Control and Function  Outcome: Not Progressing  Goal: Skin Health and Integrity  Outcome: Not Progressing  Goal: Optimal Wound Healing  Outcome: Not Progressing     Problem: Fall Injury Risk  Goal: Absence of Fall and Fall-Related Injury  Outcome: Not Progressing     Problem: Skin Injury Risk Increased  Goal: Skin Health and Integrity  Outcome: Not Progressing     Problem: Gas Exchange Impaired  Goal: Optimal Gas Exchange  Outcome: Not Progressing     Problem: Infection  Goal: Absence of Infection Signs and Symptoms  Outcome: Not Progressing

## 2025-05-24 NOTE — NURSING
Patient laying in the bed reposition HOB elevated 30-45 aroused to voice and touch able to voice name and follow simple commands, squeeze hands, turn head right and left skin warm dry wound noted. No signs of discomfort noted Informed about care treatment medication and safety measures. Position for comfort. Bed low side rails up. Bed low side rails up.     0900 Patient aroused to voice and touch. Resp even and unlabored skin warm dry. Patient able to voice name and follow commands. Patient able to swallow fluids without difficulty HOB elevated. States she does not anything to eat. Informed about importance of eating and nutrition. Needs are met. Provide care safety measures in place.     1140 Patient alert able to follow commands reposition for comfort resp even and unlabored Patient HOB elevated ate 2 bites of food and drink fluids without difficulty. Position for comfort. Lines in place. No signs of discomfort noted. Bed low side rails up     1315 Family at the bedside speaking with family no signs of discomfort noted Bed low side rails up.

## 2025-05-24 NOTE — NURSING
"Received Pt and Pt has had a turn in LOC per off going nurse and myself as in assessment.  Pt will speak but not as awake and "spunky" in demeanor.  Pt is refusing to drink anything offered or eat.  Per consulting with offgoing nurse, this Pt B/P has been low throughout the day and sugar has been low as well, which is unusual.  I have consulted our on call hospitalist, Dr. Maribel Christensen and it is agreed per Dr. López orders via secured chat to hold the Lantus and the Hydralazine medications.   Pt is unable to swallow the other medicines, they are held as well.  The family was at bedside during this consultation and agrees with all matters in holding the medications as they are concerned on choking hazards as well. Pt is safe, in bed provided protection from pressure points, bed in low position, door open and room near nurses station as Pt is unable to use the call light, side rails up, secured.  "

## 2025-05-25 LAB
GLUCOSE SERPL-MCNC: 103 MG/DL (ref 70–105)
GLUCOSE SERPL-MCNC: 146 MG/DL (ref 70–105)
GLUCOSE SERPL-MCNC: 84 MG/DL (ref 70–105)
GLUCOSE SERPL-MCNC: 99 MG/DL (ref 70–105)

## 2025-05-25 PROCEDURE — 25000242 PHARM REV CODE 250 ALT 637 W/ HCPCS: Performed by: STUDENT IN AN ORGANIZED HEALTH CARE EDUCATION/TRAINING PROGRAM

## 2025-05-25 PROCEDURE — 25000003 PHARM REV CODE 250: Performed by: STUDENT IN AN ORGANIZED HEALTH CARE EDUCATION/TRAINING PROGRAM

## 2025-05-25 PROCEDURE — 63600175 PHARM REV CODE 636 W HCPCS

## 2025-05-25 PROCEDURE — 27000221 HC OXYGEN, UP TO 24 HOURS

## 2025-05-25 PROCEDURE — 94761 N-INVAS EAR/PLS OXIMETRY MLT: CPT

## 2025-05-25 PROCEDURE — 82962 GLUCOSE BLOOD TEST: CPT

## 2025-05-25 PROCEDURE — 63600175 PHARM REV CODE 636 W HCPCS: Performed by: STUDENT IN AN ORGANIZED HEALTH CARE EDUCATION/TRAINING PROGRAM

## 2025-05-25 PROCEDURE — 11000001 HC ACUTE MED/SURG PRIVATE ROOM

## 2025-05-25 PROCEDURE — 25000003 PHARM REV CODE 250

## 2025-05-25 PROCEDURE — 99900035 HC TECH TIME PER 15 MIN (STAT)

## 2025-05-25 PROCEDURE — 99232 SBSQ HOSP IP/OBS MODERATE 35: CPT | Mod: ,,,

## 2025-05-25 PROCEDURE — 94640 AIRWAY INHALATION TREATMENT: CPT

## 2025-05-25 RX ORDER — CIPROFLOXACIN 2 MG/ML
400 INJECTION, SOLUTION INTRAVENOUS
Status: DISCONTINUED | OUTPATIENT
Start: 2025-05-25 | End: 2025-05-25

## 2025-05-25 RX ADMIN — BUDESONIDE INHALATION 0.5 MG: 0.5 SUSPENSION RESPIRATORY (INHALATION) at 07:05

## 2025-05-25 RX ADMIN — METOPROLOL SUCCINATE 25 MG: 25 TABLET, EXTENDED RELEASE ORAL at 09:05

## 2025-05-25 RX ADMIN — SERTRALINE HYDROCHLORIDE 25 MG: 25 TABLET ORAL at 09:05

## 2025-05-25 RX ADMIN — CIPROFLOXACIN 200 MG: 200 INJECTION, SOLUTION INTRAVENOUS at 11:05

## 2025-05-25 RX ADMIN — PRAVASTATIN SODIUM 10 MG: 10 TABLET ORAL at 09:05

## 2025-05-25 RX ADMIN — LINEZOLID 600 MG: 600 INJECTION, SOLUTION INTRAVENOUS at 06:05

## 2025-05-25 RX ADMIN — IPRATROPIUM BROMIDE AND ALBUTEROL SULFATE 3 ML: .5; 3 SOLUTION RESPIRATORY (INHALATION) at 08:05

## 2025-05-25 RX ADMIN — Medication 4 CAPSULE: at 09:05

## 2025-05-25 RX ADMIN — HYDROCODONE BITARTRATE AND ACETAMINOPHEN 1 TABLET: 5; 325 TABLET ORAL at 08:05

## 2025-05-25 RX ADMIN — HEPARIN SODIUM 5000 UNITS: 5000 INJECTION, SOLUTION INTRAVENOUS; SUBCUTANEOUS at 04:05

## 2025-05-25 RX ADMIN — HYDRALAZINE HYDROCHLORIDE 50 MG: 50 TABLET ORAL at 09:05

## 2025-05-25 RX ADMIN — PANTOPRAZOLE SODIUM 40 MG: 40 TABLET, DELAYED RELEASE ORAL at 09:05

## 2025-05-25 RX ADMIN — TAMSULOSIN HYDROCHLORIDE 0.4 MG: 0.4 CAPSULE ORAL at 09:05

## 2025-05-25 RX ADMIN — LINEZOLID 600 MG: 600 INJECTION, SOLUTION INTRAVENOUS at 04:05

## 2025-05-25 RX ADMIN — PANTOPRAZOLE SODIUM 40 MG: 40 TABLET, DELAYED RELEASE ORAL at 08:05

## 2025-05-25 RX ADMIN — ONDANSETRON 4 MG: 2 INJECTION INTRAMUSCULAR; INTRAVENOUS at 08:05

## 2025-05-25 RX ADMIN — LACTULOSE 15 G: 20 SOLUTION ORAL at 09:05

## 2025-05-25 RX ADMIN — MUPIROCIN: 20 OINTMENT TOPICAL at 08:05

## 2025-05-25 RX ADMIN — LACTULOSE 15 G: 20 SOLUTION ORAL at 08:05

## 2025-05-25 RX ADMIN — BUDESONIDE INHALATION 0.5 MG: 0.5 SUSPENSION RESPIRATORY (INHALATION) at 08:05

## 2025-05-25 RX ADMIN — HYDRALAZINE HYDROCHLORIDE 50 MG: 50 TABLET ORAL at 08:05

## 2025-05-25 RX ADMIN — MUPIROCIN: 20 OINTMENT TOPICAL at 09:05

## 2025-05-25 RX ADMIN — HEPARIN SODIUM 5000 UNITS: 5000 INJECTION, SOLUTION INTRAVENOUS; SUBCUTANEOUS at 06:05

## 2025-05-25 RX ADMIN — GABAPENTIN 100 MG: 100 CAPSULE ORAL at 08:05

## 2025-05-25 RX ADMIN — Medication 4 CAPSULE: at 11:05

## 2025-05-25 RX ADMIN — HEPARIN SODIUM 5000 UNITS: 5000 INJECTION, SOLUTION INTRAVENOUS; SUBCUTANEOUS at 10:05

## 2025-05-25 RX ADMIN — LEVOTHYROXINE SODIUM 100 MCG: 100 TABLET ORAL at 06:05

## 2025-05-25 RX ADMIN — Medication 4 CAPSULE: at 04:05

## 2025-05-25 NOTE — ASSESSMENT & PLAN NOTE
Patient has a diagnosis of pneumonia. The cause of the pneumonia is suspected to be bacterial in etiology but organism is not known. The pneumonia is worsening due to oxygen requirement. The patient has the following signs/symptoms of pneumonia: persistent hypoxia . The patient does have a current oxygen requirement and the patient does not have a home oxygen requirement. I have reviewed the pertinent imaging. The following cultures have been collected: Blood cultures The culture results are listed below.     Current antimicrobial regimen consists of the antibiotics listed below. Will monitor patient closely and continue current treatment plan unchanged.    Antibiotics (From admission, onward)      Start     Stop Route Frequency Ordered    05/24/25 1130  ciprofloxacin (CIPRO) 200mg/100ml D5W IVPB IVPB 200 mg         -- IV Every 12 hours (non-standard times) 05/24/25 1003    05/24/25 1115  mupirocin 2 % ointment         05/29/25 0859 Nasl 2 times daily 05/24/25 1012    05/21/25 1845  linezolid 600 mg/300 mL IVPB 600 mg         -- IV Every 12 hours (non-standard times) 05/21/25 1744            Microbiology Results (last 7 days)       Procedure Component Value Units Date/Time    Urine culture [[id number]]  (Abnormal)  (Susceptibility) Collected: 05/21/25 1813    Order Status: Completed Specimen: Urine, Catheterized (In/Out) Updated: 05/24/25 0759     Culture, Urine >100,000 Pseudomonas aeruginosa    Blood Culture #1 [[id number]] Collected: 05/21/25 1310    Order Status: Completed Specimen: Blood Updated: 05/24/25 0607     Culture, Blood No Growth At 48 Hours    Blood Culture #2 [[id number]] Collected: 05/21/25 1317    Order Status: Completed Specimen: Blood Updated: 05/24/25 0607     Culture, Blood No Growth At 48 Hours            5/22  - continuing abx    5/23  - at bedside today patient is lethargic, sob, +2 LE edema  - repeat labs including abg, chest xray, lactic acid, cbc, cmp  - bladder scan with 350 retention,  rivera placed, will start flowmax  - lasix 40 IV given once  - monitoring closely

## 2025-05-25 NOTE — PROGRESS NOTES
[facility] - [address] [place] [address] Medicine  Progress Note    Patient Name: Vandana Allison  MRN: [id number]  Patient Class: IP- Inpatient   Admission Date: 5/21/2025  Length of Stay: 2 days  Attending Physician: Fallon Guerin MD  Primary Care Provider: Jennifer Mares FNP        Subjective     Principal Problem:Severe sepsis        HPI:  89-year-old  female With a past medical history of CKD, COPD, diabetes, diabetic neuropathy, hypertension, right nephrectomy presents to the ED after she was found to have elevated white count at the nursing home.  She denies any shortness of breath, cough, fever, chills, dysuria.  She does have some left knee pain.  Does have a sacral decubitus.  She has had a poor appetite.  Recently had gallbladder remove with Dr. Maciel.  She reports feeling better currently than she did earlier in the day during my interview.  Chest x-ray was reviewed and showed bilateral pleural effusions that we are new since early May.  Review of systems otherwise negative    Overview/Hospital Course:  5/22  - states she feels better today but has had subjective chills  - continuing pna treatment    5/23  - at bedside today patient is lethargic, sob, +2 LE edema  - repeat labs including abg, chest xray, lactic acid, cbc, cmp  - bladder scan with 350 retention, rivera placed, will start flowmax  - lasix 40 IV given once  - monitoring closely    5/24  - much better today, likely dc to nursing home tomorrow  - plan to pull rivera in the morning and rescan after 4 hours, if still retaining will dc with rivera    Past Medical History:   Diagnosis Date    Arthritis     Bleeding external hemorrhoids     Chronic kidney disease, stage 3b     baseline creat 1.5    Chronic kidney disease, stage 3b 02/07/2024    baseline creat 1.5      COPD (chronic obstructive pulmonary disease)     senile emphysema    Diabetes mellitus, type 2     DNR (do not resuscitate) 02/07/2024     discussed with VITO Christianson present    Essential (primary) hypertension     Hiatal hernia with GERD     Neuropathy     Post-operative hypothyroidism     Severe pulmonary hypertension 10/08/2022    EF  70 % and normal diastolic function       Past Surgical History:   Procedure Laterality Date    BREAST SURGERY      Biopsy    EYE SURGERY      Remove cataracts both eyes    HYSTERECTOMY      LAPAROSCOPIC CHOLECYSTECTOMY N/A 5/13/2025    Procedure: CHOLECYSTECTOMY, LAPAROSCOPIC;  Surgeon: Miguel Angel Aragon MD;  Location: [facility];  Service: General;  Laterality: N/A;    NEPHRECTOMY Right 1960    THYROIDECTOMY         Review of patient's allergies indicates:   Allergen Reactions    Aspirin        No current facility-administered medications on file prior to encounter.     Current Outpatient Medications on File Prior to Encounter   Medication Sig    albuterol (PROVENTIL/VENTOLIN HFA) 90 mcg/actuation inhaler INHALE 2 PUFFS BY MOUTH INTO THE LUNGS EVERY 4 HOURS AS NEEDED FOR SHORTNESS OF BREATH / RESCUE (Patient taking differently: Inhale 2 puffs into the lungs every 6 (six) hours as needed.)    albuterol-ipratropium (DUO-NEB) 2.5 mg-0.5 mg/3 mL nebulizer solution Take 3 mLs by nebulization every 6 (six) hours as needed for Wheezing. Rescue    ferrous sulfate (FEOSOL) 325 mg (65 mg iron) Tab tablet Take 1 tablet (325 mg total) by mouth 3 (three) times a week. On Monday, Wednesday and Friday    furosemide (LASIX) 40 MG tablet Take 1 tablet (40 mg total) by mouth 2 (two) times daily.    gabapentin (NEURONTIN) 100 MG capsule Take 1 capsule (100 mg total) by mouth every evening.    hydrALAZINE (APRESOLINE) 50 MG tablet Take 1 tablet (50 mg total) by mouth 2 (two) times daily.    HYDROcodone-acetaminophen (NORCO) 5-325 mg per tablet Take 1 tablet by mouth every 6 (six) hours as needed for Pain.    hydrocortisone (ANUSOL-HC) 2.5 % rectal cream Place rectally 2 (two) times daily.    insulin glargine U-100, Lantus, 100 unit/mL  "injection Inject 5 Units into the skin every 12 (twelve) hours.    insulin lispro 100 unit/mL injection Inject 3 Units into the skin 3 (three) times daily before meals.    Lactobacillus acidophilus 500 million cell Cap Take 4 capsules by mouth 3 (three) times daily with meals.    lactulose (CHRONULAC) 20 gram/30 mL Soln Take 23 mLs (15 g total) by mouth 2 (two) times daily. Hold if patient having more than 3 Bms daily (Patient taking differently: Take 30 mLs by mouth 2 (two) times daily. Hold if patient having more than 3 Bms daily)    levothyroxine (SYNTHROID) 100 MCG tablet TAKE 1 TABLET BY MOUTH BEFORE BREAKFAST.    megestroL (MEGACE) 400 mg/10 mL (40 mg/mL) Susp Take 10 mLs by mouth once daily.    meloxicam (MOBIC) 7.5 MG tablet Take 7.5 mg by mouth once daily.    metoprolol succinate (TOPROL-XL) 25 MG 24 hr tablet Take 1 tablet (25 mg total) by mouth once daily.    nystatin (MYCOSTATIN) 100,000 unit/mL suspension Take 5 mLs (500,000 Units total) by mouth 4 (four) times daily with meals and nightly. for 10 days    ondansetron 4 mg/2 mL Soln Inject 4 mg into the muscle every 6 (six) hours as needed.    pantoprazole (PROTONIX) 40 MG tablet TAKE 1 TABLET BY MOUTH TWICE A DAY    pravastatin (PRAVACHOL) 10 MG tablet Take 1 tablet (10 mg total) by mouth once daily.    sertraline (ZOLOFT) 25 MG tablet Take 25 mg by mouth once daily.    SYMBICORT 160-4.5 mcg/actuation HFAA Inhale 2 puffs into the lungs 2 (two) times daily.    BD INSULIN SYRINGE ULTRA-FINE 0.3 mL 31 gauge x 5/16" Syrg USE 1 SYRINGE TWICE A DAY     Family History       Problem Relation (Age of Onset)    Cancer Sister, Brother, Daughter    Diabetes Mother, Sister, Brother, Sister    Heart disease Father          Tobacco Use    Smoking status: Never    Smokeless tobacco: Never   Substance and Sexual Activity    Alcohol use: Never    Drug use: Never    Sexual activity: Not Currently     Birth control/protection: None     Review of Systems   Constitutional:  " Positive for appetite change and fatigue. Negative for chills, fever and unexpected weight change.   HENT:  Negative for congestion, mouth sores and sore throat.    Eyes:  Negative for photophobia and visual disturbance.   Respiratory:  Negative for cough, chest tightness, shortness of breath and wheezing.    Cardiovascular:  Negative for chest pain, palpitations and leg swelling.   Gastrointestinal:  Negative for abdominal pain, diarrhea, nausea and vomiting.   Endocrine: Negative for cold intolerance and heat intolerance.   Genitourinary:  Negative for difficulty urinating, dysuria, frequency and urgency.   Musculoskeletal:  Negative for arthralgias, back pain and myalgias.   Skin:  Positive for wound. Negative for pallor and rash.   Neurological:  Negative for tremors, seizures, syncope, weakness, numbness and headaches.   Hematological:  Does not bruise/bleed easily.   Psychiatric/Behavioral:  Negative for agitation, confusion, hallucinations and suicidal ideas.      Objective:     Vital Signs (Most Recent):  Temp: 98.4 °F (36.9 °C) (05/24/25 1621)  Pulse: 70 (05/24/25 1621)  Resp: 16 (05/24/25 1621)  BP: 115/62 (05/24/25 1621)  SpO2: 99 % (05/24/25 1621) Vital Signs (24h Range):  Temp:  [98.2 °F (36.8 °C)-99 °F (37.2 °C)] 98.4 °F (36.9 °C)  Pulse:  [68-77] 70  Resp:  [16-20] 16  SpO2:  [97 %-100 %] 99 %  BP: (102-123)/(49-66) 115/62     Weight: 85.2 kg (187 lb 13.3 oz)  Body mass index is 31.26 kg/m².     Physical Exam  Vitals and nursing note reviewed.   Constitutional:       Appearance: She is ill-appearing.   HENT:      Head: Normocephalic and atraumatic.      Nose: Nose normal.   Eyes:      Extraocular Movements: Extraocular movements intact.      Conjunctiva/sclera: Conjunctivae normal.   Neck:      Trachea: Trachea normal.   Cardiovascular:      Rate and Rhythm: Normal rate and regular rhythm.      Pulses: Normal pulses.      Heart sounds: Normal heart sounds.   Pulmonary:      Effort: Pulmonary effort is  normal.      Breath sounds: Normal air entry. Rales present.   Abdominal:      General: Bowel sounds are normal.      Palpations: Abdomen is soft.   Musculoskeletal:         General: Signs of injury (Sacral wound) present.      Cervical back: Neck supple.      Right lower leg: Edema present.      Left lower leg: Edema present.      Comments: Left knee effusion.  Pain with passive range of motion.  Not red not tender to palpation   Skin:     General: Skin is warm and dry.   Neurological:      General: No focal deficit present.      Mental Status: She is alert. She is disoriented.      Comments: Grossly normal motor and sensory function without focal deficit appreciated.   Psychiatric:         Mood and Affect: Affect normal.         Behavior: Behavior is cooperative.                Significant Labs: All pertinent labs within the past 24 hours have been reviewed.  BMP:   Recent Labs   Lab 05/24/25  0813   GLU 96   *   K 3.9      CO2 18*   BUN 26*   CREATININE 1.57*   CALCIUM 8.0*   MG 1.5*     CBC:   Recent Labs   Lab 05/23/25  0321 05/24/25  0813   WBC 10.20 9.11   HGB 7.6* 7.4*   HCT 24.5* 23.8*   * 439*     CMP:   Recent Labs   Lab 05/23/25  0321 05/24/25  0813   * 131*   K 3.7 3.9    102   CO2 18* 18*   GLU 93 96   BUN 25* 26*   CREATININE 1.49* 1.57*   CALCIUM 7.8* 8.0*   ANIONGAP 12 15       Significant Imaging: I have reviewed all pertinent imaging results/findings within the past 24 hours.      Assessment & Plan  Severe sepsis  This patient does have evidence of infective focus  My overall impression is sepsis.  Source: Respiratory  Antibiotics given-   Antibiotics (72h ago, onward)      Start     Stop Route Frequency Ordered    05/24/25 1130  ciprofloxacin (CIPRO) 200mg/100ml D5W IVPB IVPB 200 mg         -- IV Every 12 hours (non-standard times) 05/24/25 1003    05/24/25 1115  mupirocin 2 % ointment         05/29/25 0859 Nasl 2 times daily 05/24/25 1012    05/21/25 1845   "linezolid 600 mg/300 mL IVPB 600 mg         -- IV Every 12 hours (non-standard times) 05/21/25 1744          Latest lactate reviewed-  Recent Labs   Lab 05/23/25  1117   LACTATE 1.2     Organ dysfunction indicated by no evidence of end-organ dysfunction    Fluid challenge Contraindicated- Fluid bolus is contraindicated in this patient due to Volume overload due to- hypoalbuminemia.  Large pleural effusion in hypoxic patient     Post- resuscitation assessment Yes - I attest a sepsis perfusion exam was performed within 6 hours of sepsis, severe sepsis, or septic shock presentation, following fluid resuscitation.      Will Not start Pressors- Levophed for MAP of 65  Source control achieved by:  Antibiotics  Lactate okay   Blood cultures ordered  Zyvox for pneumonia in the setting of CKD   Cefepime  Diabetes mellitus, type 2  Patient's FSGs are controlled on current medication regimen.  Last A1c reviewed-   Lab Results   Component Value Date    HGBA1C 6.4 11/27/2024     Most recent fingerstick glucose reviewed- No results for input(s): "POCTGLUCOSE" in the last 24 hours.  Current correctional scale  Low  Maintain anti-hyperglycemic dose as follows-   Antihyperglycemics (From admission, onward)      Start     Stop Route Frequency Ordered    05/21/25 2100  insulin glargine U-100 (Lantus) injection 5 Units         -- SubQ Every 12 hours 05/21/25 1737    05/21/25 1836  insulin aspart U-100 injection 0-5 Units         -- SubQ Before meals & nightly PRN 05/21/25 1737          Hold Oral hypoglycemics while patient is in the hospital.  Neuropathy  Secondary to diabetes    Rheumatoid arthritis  Stable    Hypertension  Patient's blood pressure range in the last 24 hours was: BP  Min: 102/49  Max: 123/66.The patient's inpatient anti-hypertensive regimen is listed below:  Current Antihypertensives  hydrALAZINE tablet 50 mg, 2 times daily, Oral  metoprolol succinate (TOPROL-XL) 24 hr tablet 25 mg, Daily, Oral    Plan  - BP is " controlled, no changes needed to their regimen  - follow  Senile asthenia  Progressive mobility protocol    Moderate persistent asthma without complication  Resume home inhalers   P.r.n. nebs  Pulmonary hypertension  Respiratory status stable on supplemental oxygen    Bacterial pneumonia  Patient has a diagnosis of pneumonia. The cause of the pneumonia is suspected to be bacterial in etiology but organism is not known. The pneumonia is worsening due to oxygen requirement. The patient has the following signs/symptoms of pneumonia: persistent hypoxia . The patient does have a current oxygen requirement and the patient does not have a home oxygen requirement. I have reviewed the pertinent imaging. The following cultures have been collected: Blood cultures The culture results are listed below.     Current antimicrobial regimen consists of the antibiotics listed below. Will monitor patient closely and continue current treatment plan unchanged.    Antibiotics (From admission, onward)      Start     Stop Route Frequency Ordered    05/24/25 1130  ciprofloxacin (CIPRO) 200mg/100ml D5W IVPB IVPB 200 mg         -- IV Every 12 hours (non-standard times) 05/24/25 1003    05/24/25 1115  mupirocin 2 % ointment         05/29/25 0859 Nasl 2 times daily 05/24/25 1012    05/21/25 1845  linezolid 600 mg/300 mL IVPB 600 mg         -- IV Every 12 hours (non-standard times) 05/21/25 1744            Microbiology Results (last 7 days)       Procedure Component Value Units Date/Time    Urine culture [[id number]]  (Abnormal)  (Susceptibility) Collected: 05/21/25 1813    Order Status: Completed Specimen: Urine, Catheterized (In/Out) Updated: 05/24/25 0759     Culture, Urine >100,000 Pseudomonas aeruginosa    Blood Culture #1 [[id number]] Collected: 05/21/25 1310    Order Status: Completed Specimen: Blood Updated: 05/24/25 0607     Culture, Blood No Growth At 48 Hours    Blood Culture #2 [[id number]] Collected: 05/21/25 1317    Order Status:  Completed Specimen: Blood Updated: 05/24/25 0607     Culture, Blood No Growth At 48 Hours            5/22  - continuing abx    5/23  - at bedside today patient is lethargic, sob, +2 LE edema  - repeat labs including abg, chest xray, lactic acid, cbc, cmp  - bladder scan with 350 retention, rivera placed, will start flowmax  - lasix 40 IV given once  - monitoring closely  CKD (chronic kidney disease), stage IV  Creatine stable for now. BMP reviewed- noted Estimated Creatinine Clearance: 21.6 mL/min (A) (based on SCr of 1.57 mg/dL (H)). according to latest data. 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  Monitor UOP and serial BMP and adjust therapy as needed. Renally dose meds. Avoid nephrotoxic medications and procedures.  Cerebellar infarct  Remote.  PTOT    Anemia  Anemia is likely due to chronic disease due to Chronic Kidney Disease. Most recent hemoglobin and hematocrit are listed below.  Recent Labs     05/22/25  0512 05/23/25  0321 05/24/25  0813   HGB 7.7* 7.6* 7.4*   HCT 24.9* 24.5* 23.8*     Plan  - Monitor serial CBC: Daily  - Transfuse PRBC if patient becomes hemodynamically unstable, symptomatic or H/H drops below 7/21.  - Patient has not received any PRBC transfusions to date  - Patient's anemia is currently stable  - follow  Hyponatremia  Hyponatremia is likely due to renal insufficiency. The patient's most recent sodium results are listed below.  Recent Labs     05/22/25  0512 05/23/25  0321 05/24/25  0813   * 129* 131*     Plan  - Correct the sodium by 4-6mEq in 24 hours.   - Obtain the following studies:  None.  - Will treat the hyponatremia with fluid restriction  - Monitor sodium Daily.   - Patient hyponatremia is stable  - follow  Diabetic neuropathy  Glycemic control    Pleural effusion  Patient found to have moderate pleural effusion on imaging. I have personally reviewed and interpreted the following imaging: Xray. A thoracentesis was deferred. Most likely etiology includes  Pneumonia and hypoalbuminemia. Management to include Diuresis    Sacral decubitus ulcer  Wound care   Antibiotic    VTE Risk Mitigation (From admission, onward)           Ordered     heparin (porcine) injection 5,000 Units  Every 8 hours         05/21/25 1737     IP VTE HIGH RISK PATIENT  Once         05/21/25 1737     Place sequential compression device  Until discontinued         05/21/25 1737                    Discharge Planning   MARTHA:      Code Status: DNR   Medical Readiness for Discharge Date:   Discharge Plan A: Return to nursing home                        Fallon Guerin MD  Department of Hospital Medicine   [facility] - [facility] Telemetry

## 2025-05-25 NOTE — PROGRESS NOTES
Evaluated wound at bedside  There was some necrotic fat and fascia which was debrided sharply, excising this tissue with scissors and forceps  Wound packed open with dry gauze.  Continue to monitor wound for any further need for debridement

## 2025-05-25 NOTE — ASSESSMENT & PLAN NOTE
Hyponatremia is likely due to renal insufficiency. The patient's most recent sodium results are listed below.  Recent Labs     05/22/25  0512 05/23/25  0321 05/24/25  0813   * 129* 131*     Plan  - Correct the sodium by 4-6mEq in 24 hours.   - Obtain the following studies:  None.  - Will treat the hyponatremia with fluid restriction  - Monitor sodium Daily.   - Patient hyponatremia is stable  - follow

## 2025-05-25 NOTE — ASSESSMENT & PLAN NOTE
Creatine stable for now. BMP reviewed- noted Estimated Creatinine Clearance: 21.6 mL/min (A) (based on SCr of 1.57 mg/dL (H)). according to latest data. 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  Monitor UOP and serial BMP and adjust therapy as needed. Renally dose meds. Avoid nephrotoxic medications and procedures.

## 2025-05-25 NOTE — SUBJECTIVE & OBJECTIVE
Past Medical History:   Diagnosis Date    Arthritis     Bleeding external hemorrhoids     Chronic kidney disease, stage 3b     baseline creat 1.5    Chronic kidney disease, stage 3b 02/07/2024    baseline creat 1.5      COPD (chronic obstructive pulmonary disease)     senile emphysema    Diabetes mellitus, type 2     DNR (do not resuscitate) 02/07/2024    discussed with VITO Christianson present    Essential (primary) hypertension     Hiatal hernia with GERD     Neuropathy     Post-operative hypothyroidism     Severe pulmonary hypertension 10/08/2022    EF  70 % and normal diastolic function       Past Surgical History:   Procedure Laterality Date    BREAST SURGERY      Biopsy    EYE SURGERY      Remove cataracts both eyes    HYSTERECTOMY      LAPAROSCOPIC CHOLECYSTECTOMY N/A 5/13/2025    Procedure: CHOLECYSTECTOMY, LAPAROSCOPIC;  Surgeon: Miguel Angel Aragon MD;  Location: [facility];  Service: General;  Laterality: N/A;    NEPHRECTOMY Right 1960    THYROIDECTOMY         Review of patient's allergies indicates:   Allergen Reactions    Aspirin        No current facility-administered medications on file prior to encounter.     Current Outpatient Medications on File Prior to Encounter   Medication Sig    albuterol (PROVENTIL/VENTOLIN HFA) 90 mcg/actuation inhaler INHALE 2 PUFFS BY MOUTH INTO THE LUNGS EVERY 4 HOURS AS NEEDED FOR SHORTNESS OF BREATH / RESCUE (Patient taking differently: Inhale 2 puffs into the lungs every 6 (six) hours as needed.)    albuterol-ipratropium (DUO-NEB) 2.5 mg-0.5 mg/3 mL nebulizer solution Take 3 mLs by nebulization every 6 (six) hours as needed for Wheezing. Rescue    ferrous sulfate (FEOSOL) 325 mg (65 mg iron) Tab tablet Take 1 tablet (325 mg total) by mouth 3 (three) times a week. On Monday, Wednesday and Friday    furosemide (LASIX) 40 MG tablet Take 1 tablet (40 mg total) by mouth 2 (two) times daily.    gabapentin (NEURONTIN) 100 MG capsule Take 1 capsule (100 mg total) by mouth every  "evening.    hydrALAZINE (APRESOLINE) 50 MG tablet Take 1 tablet (50 mg total) by mouth 2 (two) times daily.    HYDROcodone-acetaminophen (NORCO) 5-325 mg per tablet Take 1 tablet by mouth every 6 (six) hours as needed for Pain.    hydrocortisone (ANUSOL-HC) 2.5 % rectal cream Place rectally 2 (two) times daily.    insulin glargine U-100, Lantus, 100 unit/mL injection Inject 5 Units into the skin every 12 (twelve) hours.    insulin lispro 100 unit/mL injection Inject 3 Units into the skin 3 (three) times daily before meals.    Lactobacillus acidophilus 500 million cell Cap Take 4 capsules by mouth 3 (three) times daily with meals.    lactulose (CHRONULAC) 20 gram/30 mL Soln Take 23 mLs (15 g total) by mouth 2 (two) times daily. Hold if patient having more than 3 Bms daily (Patient taking differently: Take 30 mLs by mouth 2 (two) times daily. Hold if patient having more than 3 Bms daily)    levothyroxine (SYNTHROID) 100 MCG tablet TAKE 1 TABLET BY MOUTH BEFORE BREAKFAST.    megestroL (MEGACE) 400 mg/10 mL (40 mg/mL) Susp Take 10 mLs by mouth once daily.    meloxicam (MOBIC) 7.5 MG tablet Take 7.5 mg by mouth once daily.    metoprolol succinate (TOPROL-XL) 25 MG 24 hr tablet Take 1 tablet (25 mg total) by mouth once daily.    nystatin (MYCOSTATIN) 100,000 unit/mL suspension Take 5 mLs (500,000 Units total) by mouth 4 (four) times daily with meals and nightly. for 10 days    ondansetron 4 mg/2 mL Soln Inject 4 mg into the muscle every 6 (six) hours as needed.    pantoprazole (PROTONIX) 40 MG tablet TAKE 1 TABLET BY MOUTH TWICE A DAY    pravastatin (PRAVACHOL) 10 MG tablet Take 1 tablet (10 mg total) by mouth once daily.    sertraline (ZOLOFT) 25 MG tablet Take 25 mg by mouth once daily.    SYMBICORT 160-4.5 mcg/actuation HFAA Inhale 2 puffs into the lungs 2 (two) times daily.    BD INSULIN SYRINGE ULTRA-FINE 0.3 mL 31 gauge x 5/16" Syrg USE 1 SYRINGE TWICE A DAY     Family History       Problem Relation (Age of Onset) "    Cancer Sister, Brother, Daughter    Diabetes Mother, Sister, Brother, Sister    Heart disease Father          Tobacco Use    Smoking status: Never    Smokeless tobacco: Never   Substance and Sexual Activity    Alcohol use: Never    Drug use: Never    Sexual activity: Not Currently     Birth control/protection: None     Review of Systems   Constitutional:  Positive for appetite change and fatigue. Negative for chills, fever and unexpected weight change.   HENT:  Negative for congestion, mouth sores and sore throat.    Eyes:  Negative for photophobia and visual disturbance.   Respiratory:  Negative for cough, chest tightness, shortness of breath and wheezing.    Cardiovascular:  Negative for chest pain, palpitations and leg swelling.   Gastrointestinal:  Negative for abdominal pain, diarrhea, nausea and vomiting.   Endocrine: Negative for cold intolerance and heat intolerance.   Genitourinary:  Negative for difficulty urinating, dysuria, frequency and urgency.   Musculoskeletal:  Negative for arthralgias, back pain and myalgias.   Skin:  Positive for wound. Negative for pallor and rash.   Neurological:  Negative for tremors, seizures, syncope, weakness, numbness and headaches.   Hematological:  Does not bruise/bleed easily.   Psychiatric/Behavioral:  Negative for agitation, confusion, hallucinations and suicidal ideas.      Objective:     Vital Signs (Most Recent):  Temp: 98.4 °F (36.9 °C) (05/24/25 1621)  Pulse: 70 (05/24/25 1621)  Resp: 16 (05/24/25 1621)  BP: 115/62 (05/24/25 1621)  SpO2: 99 % (05/24/25 1621) Vital Signs (24h Range):  Temp:  [98.2 °F (36.8 °C)-99 °F (37.2 °C)] 98.4 °F (36.9 °C)  Pulse:  [68-77] 70  Resp:  [16-20] 16  SpO2:  [97 %-100 %] 99 %  BP: (102-123)/(49-66) 115/62     Weight: 85.2 kg (187 lb 13.3 oz)  Body mass index is 31.26 kg/m².     Physical Exam  Vitals and nursing note reviewed.   Constitutional:       Appearance: She is ill-appearing.   HENT:      Head: Normocephalic and  atraumatic.      Nose: Nose normal.   Eyes:      Extraocular Movements: Extraocular movements intact.      Conjunctiva/sclera: Conjunctivae normal.   Neck:      Trachea: Trachea normal.   Cardiovascular:      Rate and Rhythm: Normal rate and regular rhythm.      Pulses: Normal pulses.      Heart sounds: Normal heart sounds.   Pulmonary:      Effort: Pulmonary effort is normal.      Breath sounds: Normal air entry. Rales present.   Abdominal:      General: Bowel sounds are normal.      Palpations: Abdomen is soft.   Musculoskeletal:         General: Signs of injury (Sacral wound) present.      Cervical back: Neck supple.      Right lower leg: Edema present.      Left lower leg: Edema present.      Comments: Left knee effusion.  Pain with passive range of motion.  Not red not tender to palpation   Skin:     General: Skin is warm and dry.   Neurological:      General: No focal deficit present.      Mental Status: She is alert. She is disoriented.      Comments: Grossly normal motor and sensory function without focal deficit appreciated.   Psychiatric:         Mood and Affect: Affect normal.         Behavior: Behavior is cooperative.                Significant Labs: All pertinent labs within the past 24 hours have been reviewed.  BMP:   Recent Labs   Lab 05/24/25  0813   GLU 96   *   K 3.9      CO2 18*   BUN 26*   CREATININE 1.57*   CALCIUM 8.0*   MG 1.5*     CBC:   Recent Labs   Lab 05/23/25  0321 05/24/25  0813   WBC 10.20 9.11   HGB 7.6* 7.4*   HCT 24.5* 23.8*   * 439*     CMP:   Recent Labs   Lab 05/23/25  0321 05/24/25  0813   * 131*   K 3.7 3.9    102   CO2 18* 18*   GLU 93 96   BUN 25* 26*   CREATININE 1.49* 1.57*   CALCIUM 7.8* 8.0*   ANIONGAP 12 15       Significant Imaging: I have reviewed all pertinent imaging results/findings within the past 24 hours.

## 2025-05-25 NOTE — PROGRESS NOTES
Pharmacist Renal Dose Adjustment Note    Vandana Allison is a 89 y.o. female being treated with the medication ciprofloxacin    Patient Data:    Vital Signs (Most Recent):  Temp: 98.7 °F (37.1 °C) (05/25/25 1110)  Pulse: 62 (05/25/25 1110)  Resp: 16 (05/25/25 1110)  BP: (!) 124/56 (05/25/25 1110)  SpO2: 99 % (05/25/25 1110) Vital Signs (72h Range):  Temp:  [97.4 °F (36.3 °C)-99 °F (37.2 °C)]   Pulse:  [61-77]   Resp:  [13-20]   BP: ()/(49-66)   SpO2:  [96 %-100 %]      Recent Labs   Lab 05/22/25  0512 05/23/25  0321 05/24/25  0813   CREATININE 1.51* 1.49* 1.57*     Serum creatinine: 1.57 mg/dL (H) 05/24/25 0813  Estimated creatinine clearance: 21.6 mL/min (A)    Medication:ciprofloxacin dose: 200 mg frequency q12h will be changed to medication:ciprofloxacin dose:400 mg frequency:q12h    Pharmacist's Name: Sandra Armijo  Pharmacist's Extension: [phone]

## 2025-05-25 NOTE — ASSESSMENT & PLAN NOTE
Patient's blood pressure range in the last 24 hours was: BP  Min: 102/49  Max: 123/66.The patient's inpatient anti-hypertensive regimen is listed below:  Current Antihypertensives  hydrALAZINE tablet 50 mg, 2 times daily, Oral  metoprolol succinate (TOPROL-XL) 24 hr tablet 25 mg, Daily, Oral    Plan  - BP is controlled, no changes needed to their regimen  - follow

## 2025-05-25 NOTE — NURSING
Doctor order to remove rivera and monitor output. Informed the patient of removal of rivera and the benefits of fluid intact. Rivera removed cath tip intact urinary drainage noted to bag. No signs of discomfort noted. No redness no edema to site. Reposition for comfort and safety. Bed low side rails up

## 2025-05-25 NOTE — ASSESSMENT & PLAN NOTE
This patient does have evidence of infective focus  My overall impression is sepsis.  Source: Respiratory  Antibiotics given-   Antibiotics (72h ago, onward)      Start     Stop Route Frequency Ordered    05/24/25 1130  ciprofloxacin (CIPRO) 200mg/100ml D5W IVPB IVPB 200 mg         -- IV Every 12 hours (non-standard times) 05/24/25 1003    05/24/25 1115  mupirocin 2 % ointment         05/29/25 0859 Nasl 2 times daily 05/24/25 1012    05/21/25 1845  linezolid 600 mg/300 mL IVPB 600 mg         -- IV Every 12 hours (non-standard times) 05/21/25 1744          Latest lactate reviewed-  Recent Labs   Lab 05/23/25  1117   LACTATE 1.2     Organ dysfunction indicated by no evidence of end-organ dysfunction    Fluid challenge Contraindicated- Fluid bolus is contraindicated in this patient due to Volume overload due to- hypoalbuminemia.  Large pleural effusion in hypoxic patient     Post- resuscitation assessment Yes - I attest a sepsis perfusion exam was performed within 6 hours of sepsis, severe sepsis, or septic shock presentation, following fluid resuscitation.      Will Not start Pressors- Levophed for MAP of 65  Source control achieved by:  Antibiotics  Lactate okay   Blood cultures ordered  Zyvox for pneumonia in the setting of CKD   Cefepime

## 2025-05-25 NOTE — ASSESSMENT & PLAN NOTE
Anemia is likely due to chronic disease due to Chronic Kidney Disease. Most recent hemoglobin and hematocrit are listed below.  Recent Labs     05/22/25  0512 05/23/25  0321 05/24/25  0813   HGB 7.7* 7.6* 7.4*   HCT 24.9* 24.5* 23.8*     Plan  - Monitor serial CBC: Daily  - Transfuse PRBC if patient becomes hemodynamically unstable, symptomatic or H/H drops below 7/21.  - Patient has not received any PRBC transfusions to date  - Patient's anemia is currently stable  - follow

## 2025-05-26 VITALS
HEIGHT: 65 IN | HEART RATE: 80 BPM | DIASTOLIC BLOOD PRESSURE: 65 MMHG | SYSTOLIC BLOOD PRESSURE: 131 MMHG | TEMPERATURE: 98 F | BODY MASS INDEX: 31.29 KG/M2 | RESPIRATION RATE: 17 BRPM | WEIGHT: 187.81 LBS | OXYGEN SATURATION: 98 %

## 2025-05-26 LAB — GLUCOSE SERPL-MCNC: 124 MG/DL (ref 70–105)

## 2025-05-26 PROCEDURE — 25000003 PHARM REV CODE 250

## 2025-05-26 PROCEDURE — 27000221 HC OXYGEN, UP TO 24 HOURS

## 2025-05-26 PROCEDURE — 25000242 PHARM REV CODE 250 ALT 637 W/ HCPCS: Performed by: STUDENT IN AN ORGANIZED HEALTH CARE EDUCATION/TRAINING PROGRAM

## 2025-05-26 PROCEDURE — 94761 N-INVAS EAR/PLS OXIMETRY MLT: CPT

## 2025-05-26 PROCEDURE — 94640 AIRWAY INHALATION TREATMENT: CPT

## 2025-05-26 PROCEDURE — 99900035 HC TECH TIME PER 15 MIN (STAT)

## 2025-05-26 PROCEDURE — 82962 GLUCOSE BLOOD TEST: CPT

## 2025-05-26 PROCEDURE — 99239 HOSP IP/OBS DSCHRG MGMT >30: CPT | Mod: ,,,

## 2025-05-26 PROCEDURE — 63600175 PHARM REV CODE 636 W HCPCS: Performed by: STUDENT IN AN ORGANIZED HEALTH CARE EDUCATION/TRAINING PROGRAM

## 2025-05-26 PROCEDURE — 25000003 PHARM REV CODE 250: Performed by: STUDENT IN AN ORGANIZED HEALTH CARE EDUCATION/TRAINING PROGRAM

## 2025-05-26 RX ORDER — CIPROFLOXACIN 750 MG/1
750 TABLET, FILM COATED ORAL EVERY 12 HOURS
Qty: 6 TABLET | Refills: 0 | Status: SHIPPED | OUTPATIENT
Start: 2025-05-26 | End: 2025-05-29

## 2025-05-26 RX ADMIN — PRAVASTATIN SODIUM 10 MG: 10 TABLET ORAL at 09:05

## 2025-05-26 RX ADMIN — LEVOTHYROXINE SODIUM 100 MCG: 100 TABLET ORAL at 06:05

## 2025-05-26 RX ADMIN — LACTULOSE 15 G: 20 SOLUTION ORAL at 09:05

## 2025-05-26 RX ADMIN — METOPROLOL SUCCINATE 25 MG: 25 TABLET, EXTENDED RELEASE ORAL at 09:05

## 2025-05-26 RX ADMIN — HYDRALAZINE HYDROCHLORIDE 50 MG: 50 TABLET ORAL at 09:05

## 2025-05-26 RX ADMIN — Medication 4 CAPSULE: at 09:05

## 2025-05-26 RX ADMIN — BUDESONIDE INHALATION 0.5 MG: 0.5 SUSPENSION RESPIRATORY (INHALATION) at 07:05

## 2025-05-26 RX ADMIN — SERTRALINE HYDROCHLORIDE 25 MG: 25 TABLET ORAL at 09:05

## 2025-05-26 RX ADMIN — INSULIN GLARGINE 5 UNITS: 100 INJECTION, SOLUTION SUBCUTANEOUS at 09:05

## 2025-05-26 RX ADMIN — TAMSULOSIN HYDROCHLORIDE 0.4 MG: 0.4 CAPSULE ORAL at 09:05

## 2025-05-26 RX ADMIN — CIPROFOLXACIN 750 MG: 250 TABLET ORAL at 09:05

## 2025-05-26 RX ADMIN — HEPARIN SODIUM 5000 UNITS: 5000 INJECTION, SOLUTION INTRAVENOUS; SUBCUTANEOUS at 06:05

## 2025-05-26 RX ADMIN — MUPIROCIN: 20 OINTMENT TOPICAL at 09:05

## 2025-05-26 RX ADMIN — PANTOPRAZOLE SODIUM 40 MG: 40 TABLET, DELAYED RELEASE ORAL at 09:05

## 2025-05-26 NOTE — ASSESSMENT & PLAN NOTE
Patient has a diagnosis of pneumonia. The cause of the pneumonia is suspected to be bacterial in etiology but organism is not known. The pneumonia is worsening due to oxygen requirement. The patient has the following signs/symptoms of pneumonia: persistent hypoxia . The patient does have a current oxygen requirement and the patient does not have a home oxygen requirement. I have reviewed the pertinent imaging. The following cultures have been collected: Blood cultures The culture results are listed below.     Current antimicrobial regimen consists of the antibiotics listed below. Will monitor patient closely and continue current treatment plan unchanged.    Antibiotics (From admission, onward)      Start     Stop Route Frequency Ordered    05/25/25 2330  ciprofloxacin (CIPRO)400mg/200ml D5W IVPB 400 mg         -- IV Every 12 hours (non-standard times) 05/25/25 1516    05/24/25 1115  mupirocin 2 % ointment         05/29/25 0859 Nasl 2 times daily 05/24/25 1012    05/21/25 1845  linezolid 600 mg/300 mL IVPB 600 mg         -- IV Every 12 hours (non-standard times) 05/21/25 1744            Microbiology Results (last 7 days)       Procedure Component Value Units Date/Time    Blood Culture #1 [[id number]] Collected: 05/21/25 1310    Order Status: Completed Specimen: Blood Updated: 05/25/25 0723     Culture, Blood No Growth At 72 Hours    Blood Culture #2 [[id number]] Collected: 05/21/25 1317    Order Status: Completed Specimen: Blood Updated: 05/25/25 0723     Culture, Blood No Growth At 72 Hours    Urine culture [[id number]]  (Abnormal)  (Susceptibility) Collected: 05/21/25 1813    Order Status: Completed Specimen: Urine, Catheterized (In/Out) Updated: 05/24/25 0759     Culture, Urine >100,000 Pseudomonas aeruginosa            5/22  - continuing abx    5/23  - at bedside today patient is lethargic, sob, +2 LE edema  - repeat labs including abg, chest xray, lactic acid, cbc, cmp  - bladder scan with 350 retention, rivera  placed, will start flowmax  - lasix 40 IV given once  - monitoring closely

## 2025-05-26 NOTE — ASSESSMENT & PLAN NOTE
Patient's blood pressure range in the last 24 hours was: BP  Min: 98/56  Max: 127/56.The patient's inpatient anti-hypertensive regimen is listed below:  Current Antihypertensives  hydrALAZINE tablet 50 mg, 2 times daily, Oral  metoprolol succinate (TOPROL-XL) 24 hr tablet 25 mg, Daily, Oral    Plan  - BP is controlled, no changes needed to their regimen  - follow

## 2025-05-26 NOTE — ASSESSMENT & PLAN NOTE
Patient has a diagnosis of pneumonia. The cause of the pneumonia is suspected to be bacterial in etiology but organism is not known. The pneumonia is worsening due to oxygen requirement. The patient has the following signs/symptoms of pneumonia: persistent hypoxia . The patient does have a current oxygen requirement and the patient does not have a home oxygen requirement. I have reviewed the pertinent imaging. The following cultures have been collected: Blood cultures The culture results are listed below.     Current antimicrobial regimen consists of the antibiotics listed below. Will monitor patient closely and continue current treatment plan unchanged.    Antibiotics (From admission, onward)      Start     Stop Route Frequency Ordered    05/26/25 0900  ciprofloxacin HCl tablet 750 mg         05/29/25 0859 Oral Daily 05/25/25 2047    05/24/25 1115  mupirocin 2 % ointment         05/29/25 0859 Nasl 2 times daily 05/24/25 1012            Microbiology Results (last 7 days)       Procedure Component Value Units Date/Time    Blood Culture #1 [[id number]] Collected: 05/21/25 1310    Order Status: Completed Specimen: Blood Updated: 05/25/25 0723     Culture, Blood No Growth At 72 Hours    Blood Culture #2 [[id number]] Collected: 05/21/25 1317    Order Status: Completed Specimen: Blood Updated: 05/25/25 0723     Culture, Blood No Growth At 72 Hours    Urine culture [[id number]]  (Abnormal)  (Susceptibility) Collected: 05/21/25 1813    Order Status: Completed Specimen: Urine, Catheterized (In/Out) Updated: 05/24/25 0759     Culture, Urine >100,000 Pseudomonas aeruginosa            5/22  - continuing abx    5/23  - at bedside today patient is lethargic, sob, +2 LE edema  - repeat labs including abg, chest xray, lactic acid, cbc, cmp  - bladder scan with 350 retention, rivera placed, will start flowmax  - lasix 40 IV given once  - monitoring closely

## 2025-05-26 NOTE — SUBJECTIVE & OBJECTIVE
Past Medical History:   Diagnosis Date    Arthritis     Bleeding external hemorrhoids     Chronic kidney disease, stage 3b     baseline creat 1.5    Chronic kidney disease, stage 3b 02/07/2024    baseline creat 1.5      COPD (chronic obstructive pulmonary disease)     senile emphysema    Diabetes mellitus, type 2     DNR (do not resuscitate) 02/07/2024    discussed with VITO Christianson present    Essential (primary) hypertension     Hiatal hernia with GERD     Neuropathy     Post-operative hypothyroidism     Severe pulmonary hypertension 10/08/2022    EF  70 % and normal diastolic function       Past Surgical History:   Procedure Laterality Date    BREAST SURGERY      Biopsy    EYE SURGERY      Remove cataracts both eyes    HYSTERECTOMY      LAPAROSCOPIC CHOLECYSTECTOMY N/A 5/13/2025    Procedure: CHOLECYSTECTOMY, LAPAROSCOPIC;  Surgeon: Miguel Angel Aragon MD;  Location: [facility];  Service: General;  Laterality: N/A;    NEPHRECTOMY Right 1960    THYROIDECTOMY         Review of patient's allergies indicates:   Allergen Reactions    Aspirin        No current facility-administered medications on file prior to encounter.     Current Outpatient Medications on File Prior to Encounter   Medication Sig    albuterol (PROVENTIL/VENTOLIN HFA) 90 mcg/actuation inhaler INHALE 2 PUFFS BY MOUTH INTO THE LUNGS EVERY 4 HOURS AS NEEDED FOR SHORTNESS OF BREATH / RESCUE (Patient taking differently: Inhale 2 puffs into the lungs every 6 (six) hours as needed.)    albuterol-ipratropium (DUO-NEB) 2.5 mg-0.5 mg/3 mL nebulizer solution Take 3 mLs by nebulization every 6 (six) hours as needed for Wheezing. Rescue    ferrous sulfate (FEOSOL) 325 mg (65 mg iron) Tab tablet Take 1 tablet (325 mg total) by mouth 3 (three) times a week. On Monday, Wednesday and Friday    furosemide (LASIX) 40 MG tablet Take 1 tablet (40 mg total) by mouth 2 (two) times daily.    gabapentin (NEURONTIN) 100 MG capsule Take 1 capsule (100 mg total) by mouth every  "evening.    hydrALAZINE (APRESOLINE) 50 MG tablet Take 1 tablet (50 mg total) by mouth 2 (two) times daily.    HYDROcodone-acetaminophen (NORCO) 5-325 mg per tablet Take 1 tablet by mouth every 6 (six) hours as needed for Pain.    hydrocortisone (ANUSOL-HC) 2.5 % rectal cream Place rectally 2 (two) times daily.    insulin glargine U-100, Lantus, 100 unit/mL injection Inject 5 Units into the skin every 12 (twelve) hours.    insulin lispro 100 unit/mL injection Inject 3 Units into the skin 3 (three) times daily before meals.    Lactobacillus acidophilus 500 million cell Cap Take 4 capsules by mouth 3 (three) times daily with meals.    lactulose (CHRONULAC) 20 gram/30 mL Soln Take 23 mLs (15 g total) by mouth 2 (two) times daily. Hold if patient having more than 3 Bms daily (Patient taking differently: Take 30 mLs by mouth 2 (two) times daily. Hold if patient having more than 3 Bms daily)    levothyroxine (SYNTHROID) 100 MCG tablet TAKE 1 TABLET BY MOUTH BEFORE BREAKFAST.    megestroL (MEGACE) 400 mg/10 mL (40 mg/mL) Susp Take 10 mLs by mouth once daily.    meloxicam (MOBIC) 7.5 MG tablet Take 7.5 mg by mouth once daily.    metoprolol succinate (TOPROL-XL) 25 MG 24 hr tablet Take 1 tablet (25 mg total) by mouth once daily.    [] nystatin (MYCOSTATIN) 100,000 unit/mL suspension Take 5 mLs (500,000 Units total) by mouth 4 (four) times daily with meals and nightly. for 10 days    ondansetron 4 mg/2 mL Soln Inject 4 mg into the muscle every 6 (six) hours as needed.    pantoprazole (PROTONIX) 40 MG tablet TAKE 1 TABLET BY MOUTH TWICE A DAY    pravastatin (PRAVACHOL) 10 MG tablet Take 1 tablet (10 mg total) by mouth once daily.    sertraline (ZOLOFT) 25 MG tablet Take 25 mg by mouth once daily.    SYMBICORT 160-4.5 mcg/actuation HFAA Inhale 2 puffs into the lungs 2 (two) times daily.    BD INSULIN SYRINGE ULTRA-FINE 0.3 mL 31 gauge x 5/16" Syrg USE 1 SYRINGE TWICE A DAY     Family History       Problem Relation (Age " of Onset)    Cancer Sister, Brother, Daughter    Diabetes Mother, Sister, Brother, Sister    Heart disease Father          Tobacco Use    Smoking status: Never    Smokeless tobacco: Never   Substance and Sexual Activity    Alcohol use: Never    Drug use: Never    Sexual activity: Not Currently     Birth control/protection: None     Review of Systems   Constitutional:  Positive for appetite change and fatigue. Negative for chills, fever and unexpected weight change.   HENT:  Negative for congestion, mouth sores and sore throat.    Eyes:  Negative for photophobia and visual disturbance.   Respiratory:  Negative for cough, chest tightness, shortness of breath and wheezing.    Cardiovascular:  Negative for chest pain, palpitations and leg swelling.   Gastrointestinal:  Negative for abdominal pain, diarrhea, nausea and vomiting.   Endocrine: Negative for cold intolerance and heat intolerance.   Genitourinary:  Negative for difficulty urinating, dysuria, frequency and urgency.   Musculoskeletal:  Negative for arthralgias, back pain and myalgias.   Skin:  Positive for wound. Negative for pallor and rash.   Neurological:  Negative for tremors, seizures, syncope, weakness, numbness and headaches.   Hematological:  Does not bruise/bleed easily.   Psychiatric/Behavioral:  Negative for agitation, confusion, hallucinations and suicidal ideas.      Objective:     Vital Signs (Most Recent):  Temp: 98.8 °F (37.1 °C) (05/25/25 1637)  Pulse: 84 (05/25/25 2016)  Resp: 16 (05/25/25 2038)  BP: 117/63 (05/25/25 1637)  SpO2: 96 % (05/25/25 2016) Vital Signs (24h Range):  Temp:  [97.6 °F (36.4 °C)-98.8 °F (37.1 °C)] 98.8 °F (37.1 °C)  Pulse:  [61-86] 84  Resp:  [16-18] 16  SpO2:  [95 %-100 %] 96 %  BP: ()/(56-63) 117/63     Weight: 85.2 kg (187 lb 13.3 oz)  Body mass index is 31.26 kg/m².     Physical Exam  Vitals and nursing note reviewed.   Constitutional:       Appearance: She is ill-appearing.   HENT:      Head: Normocephalic and  atraumatic.      Nose: Nose normal.   Eyes:      Extraocular Movements: Extraocular movements intact.      Conjunctiva/sclera: Conjunctivae normal.   Neck:      Trachea: Trachea normal.   Cardiovascular:      Rate and Rhythm: Normal rate and regular rhythm.      Pulses: Normal pulses.      Heart sounds: Normal heart sounds.   Pulmonary:      Effort: Pulmonary effort is normal.      Breath sounds: Normal air entry. Rales present.   Abdominal:      General: Bowel sounds are normal.      Palpations: Abdomen is soft.   Musculoskeletal:         General: Signs of injury (Sacral wound) present.      Cervical back: Neck supple.      Right lower leg: Edema present.      Left lower leg: Edema present.      Comments: Left knee effusion.  Pain with passive range of motion.  Not red not tender to palpation   Skin:     General: Skin is warm and dry.   Neurological:      General: No focal deficit present.      Mental Status: She is alert. She is disoriented.      Comments: Grossly normal motor and sensory function without focal deficit appreciated.   Psychiatric:         Mood and Affect: Affect normal.         Behavior: Behavior is cooperative.                Significant Labs: All pertinent labs within the past 24 hours have been reviewed.  BMP:   Recent Labs   Lab 05/24/25  0813   GLU 96   *   K 3.9      CO2 18*   BUN 26*   CREATININE 1.57*   CALCIUM 8.0*   MG 1.5*     CBC:   Recent Labs   Lab 05/24/25  0813   WBC 9.11   HGB 7.4*   HCT 23.8*   *     CMP:   Recent Labs   Lab 05/24/25  0813   *   K 3.9      CO2 18*   GLU 96   BUN 26*   CREATININE 1.57*   CALCIUM 8.0*   ANIONGAP 15       Significant Imaging: I have reviewed all pertinent imaging results/findings within the past 24 hours.

## 2025-05-26 NOTE — ASSESSMENT & PLAN NOTE
This patient does have evidence of infective focus  My overall impression is sepsis.  Source: Respiratory  Antibiotics given-   Antibiotics (72h ago, onward)      Start     Stop Route Frequency Ordered    05/26/25 0900  ciprofloxacin HCl tablet 750 mg         05/29/25 0859 Oral Daily 05/25/25 2047    05/24/25 1115  mupirocin 2 % ointment         05/29/25 0859 Nasl 2 times daily 05/24/25 1012          Latest lactate reviewed-  Recent Labs   Lab 05/23/25  1117   LACTATE 1.2     Organ dysfunction indicated by no evidence of end-organ dysfunction    Fluid challenge Contraindicated- Fluid bolus is contraindicated in this patient due to Volume overload due to- hypoalbuminemia.  Large pleural effusion in hypoxic patient     Post- resuscitation assessment Yes - I attest a sepsis perfusion exam was performed within 6 hours of sepsis, severe sepsis, or septic shock presentation, following fluid resuscitation.      Will Not start Pressors- Levophed for MAP of 65  Source control achieved by:  Antibiotics  Lactate okay   Blood cultures ordered  Zyvox for pneumonia in the setting of CKD   Cefepime

## 2025-05-26 NOTE — ASSESSMENT & PLAN NOTE
Patient's blood pressure range in the last 24 hours was: BP  Min: 115/55  Max: 154/79.The patient's inpatient anti-hypertensive regimen is listed below:  Current Antihypertensives  hydrALAZINE tablet 50 mg, 2 times daily, Oral  metoprolol succinate (TOPROL-XL) 24 hr tablet 25 mg, Daily, Oral    Plan  - BP is controlled, no changes needed to their regimen  - follow

## 2025-05-26 NOTE — NURSING
Transferred off unit via stretcher, no s/s of distress or c/o pain at time of transfer. IV removed, bandage applied.  All belongings with patient and EMS staff at time of transfer. Report called to [facility]. Wound care provided this shift per charge RN and MD. Pt tolerated well.

## 2025-05-26 NOTE — ASSESSMENT & PLAN NOTE
Anemia is likely due to chronic disease due to Chronic Kidney Disease. Most recent hemoglobin and hematocrit are listed below.  Recent Labs     05/23/25  0321 05/24/25  0813   HGB 7.6* 7.4*   HCT 24.5* 23.8*     Plan  - Monitor serial CBC: Daily  - Transfuse PRBC if patient becomes hemodynamically unstable, symptomatic or H/H drops below 7/21.  - Patient has not received any PRBC transfusions to date  - Patient's anemia is currently stable  - follow

## 2025-05-26 NOTE — ASSESSMENT & PLAN NOTE
Anemia is likely due to chronic disease due to Chronic Kidney Disease. Most recent hemoglobin and hematocrit are listed below.  Recent Labs     05/24/25  0813   HGB 7.4*   HCT 23.8*     Plan  - Monitor serial CBC: Daily  - Transfuse PRBC if patient becomes hemodynamically unstable, symptomatic or H/H drops below 7/21.  - Patient has not received any PRBC transfusions to date  - Patient's anemia is currently stable  - follow

## 2025-05-26 NOTE — ASSESSMENT & PLAN NOTE
This patient does have evidence of infective focus  My overall impression is sepsis.  Source: Respiratory  Antibiotics given-   Antibiotics (72h ago, onward)      Start     Stop Route Frequency Ordered    05/25/25 2330  ciprofloxacin (CIPRO)400mg/200ml D5W IVPB 400 mg         -- IV Every 12 hours (non-standard times) 05/25/25 1516    05/24/25 1115  mupirocin 2 % ointment         05/29/25 0859 Nasl 2 times daily 05/24/25 1012    05/21/25 1845  linezolid 600 mg/300 mL IVPB 600 mg         -- IV Every 12 hours (non-standard times) 05/21/25 1744          Latest lactate reviewed-  Recent Labs   Lab 05/23/25  1117   LACTATE 1.2     Organ dysfunction indicated by no evidence of end-organ dysfunction    Fluid challenge Contraindicated- Fluid bolus is contraindicated in this patient due to Volume overload due to- hypoalbuminemia.  Large pleural effusion in hypoxic patient     Post- resuscitation assessment Yes - I attest a sepsis perfusion exam was performed within 6 hours of sepsis, severe sepsis, or septic shock presentation, following fluid resuscitation.      Will Not start Pressors- Levophed for MAP of 65  Source control achieved by:  Antibiotics  Lactate okay   Blood cultures ordered  Zyvox for pneumonia in the setting of CKD   Cefepime

## 2025-05-26 NOTE — DISCHARGE SUMMARY
[facility] - [address] [place] [address] Medicine  Discharge Summary      Patient Name: Vandana Allison  MRN: [id number]  JOHN: [id number]  Patient Class: IP- Inpatient  Admission Date: 5/21/2025  Hospital Length of Stay: 4 days  Discharge Date and Time: 05/26/2025 6:34 AM  Attending Physician: Fallon Guerin MD   Discharging Provider: Fallon Guerin MD  Primary Care Provider: Jennifer Mares FNP    Primary Care Team: Networked reference to record PCT     HPI:   89-year-old  female With a past medical history of CKD, COPD, diabetes, diabetic neuropathy, hypertension, right nephrectomy presents to the ED after she was found to have elevated white count at the nursing home.  She denies any shortness of breath, cough, fever, chills, dysuria.  She does have some left knee pain.  Does have a sacral decubitus.  She has had a poor appetite.  Recently had gallbladder remove with Dr. Maciel.  She reports feeling better currently than she did earlier in the day during my interview.  Chest x-ray was reviewed and showed bilateral pleural effusions that we are new since early May.  Review of systems otherwise negative    * No surgery found *      Hospital Course:   5/22  - states she feels better today but has had subjective chills  - continuing pna treatment    5/23  - at bedside today patient is lethargic, sob, +2 LE edema  - repeat labs including abg, chest xray, lactic acid, cbc, cmp  - bladder scan with 350 retention, rivera placed, will start flowmax  - lasix 40 IV given once  - monitoring closely    5/24  - much better today, likely dc to nursing home tomorrow  - plan to pull rivera in the morning and rescan after 4 hours, if still retaining will dc with rivera    5/25  - rivera pulled today, will dc flowmax, patient to dc to nursing home in the am  - will complete 3 additional days of cipro  5/26  - dc today     Goals of Care Treatment Preferences:  Code Status: DNR      SDOH  "Screening:  The patient was screened for utility difficulties, food insecurity, transport difficulties, housing insecurity, and interpersonal safety and there were no concerns identified this admission.     Consults:   Consults (From admission, onward)          Status Ordering Provider     Inpatient consult to Registered Dietitian/Nutritionist  Once        Provider:  (Not yet assigned)    Completed JO-ANN CABRERA     Inpatient consult to Registered Dietitian/Nutritionist  Once        Provider:  (Not yet assigned)    Completed JO-ANN CABRERA            Assessment & Plan  Severe sepsis  This patient does have evidence of infective focus  My overall impression is sepsis.  Source: Respiratory  Antibiotics given-   Antibiotics (72h ago, onward)      Start     Stop Route Frequency Ordered    05/26/25 0900  ciprofloxacin HCl tablet 750 mg         05/29/25 0859 Oral Daily 05/25/25 2047    05/24/25 1115  mupirocin 2 % ointment         05/29/25 0859 Nasl 2 times daily 05/24/25 1012          Latest lactate reviewed-  Recent Labs   Lab 05/23/25  1117   LACTATE 1.2     Organ dysfunction indicated by no evidence of end-organ dysfunction    Fluid challenge Contraindicated- Fluid bolus is contraindicated in this patient due to Volume overload due to- hypoalbuminemia.  Large pleural effusion in hypoxic patient     Post- resuscitation assessment Yes - I attest a sepsis perfusion exam was performed within 6 hours of sepsis, severe sepsis, or septic shock presentation, following fluid resuscitation.      Will Not start Pressors- Levophed for MAP of 65  Source control achieved by:  Antibiotics  Lactate okay   Blood cultures ordered  Zyvox for pneumonia in the setting of CKD   Cefepime  Diabetes mellitus, type 2  Patient's FSGs are controlled on current medication regimen.  Last A1c reviewed-   Lab Results   Component Value Date    HGBA1C 6.4 11/27/2024     Most recent fingerstick glucose reviewed- No results for input(s): "POCTGLUCOSE" " in the last 24 hours.  Current correctional scale  Low  Maintain anti-hyperglycemic dose as follows-   Antihyperglycemics (From admission, onward)      Start     Stop Route Frequency Ordered    05/21/25 2100  insulin glargine U-100 (Lantus) injection 5 Units         -- SubQ Every 12 hours 05/21/25 1737    05/21/25 1836  insulin aspart U-100 injection 0-5 Units         -- SubQ Before meals & nightly PRN 05/21/25 1737          Hold Oral hypoglycemics while patient is in the hospital.  Neuropathy  Secondary to diabetes    Rheumatoid arthritis  Stable    Hypertension  Patient's blood pressure range in the last 24 hours was: BP  Min: 115/55  Max: 154/79.The patient's inpatient anti-hypertensive regimen is listed below:  Current Antihypertensives  hydrALAZINE tablet 50 mg, 2 times daily, Oral  metoprolol succinate (TOPROL-XL) 24 hr tablet 25 mg, Daily, Oral    Plan  - BP is controlled, no changes needed to their regimen  - follow  Senile asthenia  Progressive mobility protocol    Moderate persistent asthma without complication  Resume home inhalers   P.r.n. nebs  Pulmonary hypertension  Respiratory status stable on supplemental oxygen    Bacterial pneumonia  Patient has a diagnosis of pneumonia. The cause of the pneumonia is suspected to be bacterial in etiology but organism is not known. The pneumonia is worsening due to oxygen requirement. The patient has the following signs/symptoms of pneumonia: persistent hypoxia . The patient does have a current oxygen requirement and the patient does not have a home oxygen requirement. I have reviewed the pertinent imaging. The following cultures have been collected: Blood cultures The culture results are listed below.     Current antimicrobial regimen consists of the antibiotics listed below. Will monitor patient closely and continue current treatment plan unchanged.    Antibiotics (From admission, onward)      Start     Stop Route Frequency Ordered    05/26/25 0900  ciprofloxacin  HCl tablet 750 mg         05/29/25 0859 Oral Daily 05/25/25 2047 05/24/25 1115  mupirocin 2 % ointment         05/29/25 0859 Nasl 2 times daily 05/24/25 1012            Microbiology Results (last 7 days)       Procedure Component Value Units Date/Time    Blood Culture #1 [[id number]] Collected: 05/21/25 1310    Order Status: Completed Specimen: Blood Updated: 05/25/25 0723     Culture, Blood No Growth At 72 Hours    Blood Culture #2 [[id number]] Collected: 05/21/25 1317    Order Status: Completed Specimen: Blood Updated: 05/25/25 0723     Culture, Blood No Growth At 72 Hours    Urine culture [[id number]]  (Abnormal)  (Susceptibility) Collected: 05/21/25 1813    Order Status: Completed Specimen: Urine, Catheterized (In/Out) Updated: 05/24/25 0759     Culture, Urine >100,000 Pseudomonas aeruginosa            5/22  - continuing abx    5/23  - at bedside today patient is lethargic, sob, +2 LE edema  - repeat labs including abg, chest xray, lactic acid, cbc, cmp  - bladder scan with 350 retention, rivera placed, will start flowmax  - lasix 40 IV given once  - monitoring closely  CKD (chronic kidney disease), stage IV  Creatine stable for now. BMP reviewed- noted Estimated Creatinine Clearance: 21.6 mL/min (A) (based on SCr of 1.57 mg/dL (H)). according to latest data. 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  Monitor UOP and serial BMP and adjust therapy as needed. Renally dose meds. Avoid nephrotoxic medications and procedures.  Cerebellar infarct  Remote.  PTOT    Anemia  Anemia is likely due to chronic disease due to Chronic Kidney Disease. Most recent hemoglobin and hematocrit are listed below.  Recent Labs     05/24/25  0813   HGB 7.4*   HCT 23.8*     Plan  - Monitor serial CBC: Daily  - Transfuse PRBC if patient becomes hemodynamically unstable, symptomatic or H/H drops below 7/21.  - Patient has not received any PRBC transfusions to date  - Patient's anemia is currently stable  -  follow  Hyponatremia  Hyponatremia is likely due to renal insufficiency. The patient's most recent sodium results are listed below.  Recent Labs     05/24/25  0813   *     Plan  - Correct the sodium by 4-6mEq in 24 hours.   - Obtain the following studies:  None.  - Will treat the hyponatremia with fluid restriction  - Monitor sodium Daily.   - Patient hyponatremia is stable  - follow  Diabetic neuropathy  Glycemic control    Pleural effusion  Patient found to have moderate pleural effusion on imaging. I have personally reviewed and interpreted the following imaging: Xray. A thoracentesis was deferred. Most likely etiology includes Pneumonia and hypoalbuminemia. Management to include Diuresis    Sacral decubitus ulcer  Wound care   Antibiotic    Final Active Diagnoses:    Diagnosis Date Noted POA    PRINCIPAL PROBLEM:  Severe sepsis [A41.9, R65.20] 05/21/2025 Yes     Chronic    Anemia [D64.9] 05/21/2025 Yes    Hyponatremia [E87.1] 05/21/2025 Yes    Diabetic neuropathy [E11.40] 05/21/2025 Yes    Pleural effusion [J90] 05/21/2025 Yes    Sacral decubitus ulcer [L89.159] 05/21/2025 Yes    Cerebellar infarct [I63.9] 06/27/2024 Yes    CKD (chronic kidney disease), stage IV [N18.4] 04/27/2024 Yes    Bacterial pneumonia [J15.9] 04/27/2024 Yes    Moderate persistent asthma without complication [J45.40] 02/20/2024 Yes    Pulmonary hypertension [I27.20] 02/20/2024 Yes    Rheumatoid arthritis [M06.9] 02/07/2024 Yes    Hypertension [I10] 02/07/2024 Yes    Senile asthenia [R54] 02/07/2024 Yes    Neuropathy [G62.9]  Yes    Diabetes mellitus, type 2 [E11.9]  Yes      Problems Resolved During this Admission:       Discharged Condition: stable    Disposition: Another Health Care Inst*    Follow Up:   Follow-up Information       Jennifer Mares FNP Follow up in 1 week(s).    Specialties: Family Medicine, Emergency Medicine  Contact information:  [address] [facility] [address] [phone]                        "    Patient Instructions:   No discharge procedures on file.    Significant Diagnostic Studies: N/A    Pending Diagnostic Studies:       None           Medications:  Reconciled Home Medications:      Medication List        START taking these medications      ciprofloxacin HCl 750 MG tablet  Commonly known as: CIPRO  Take 1 tablet (750 mg total) by mouth every 12 (twelve) hours. for 3 days            CHANGE how you take these medications      albuterol 90 mcg/actuation inhaler  Commonly known as: PROVENTIL/VENTOLIN HFA  INHALE 2 PUFFS BY MOUTH INTO THE LUNGS EVERY 4 HOURS AS NEEDED FOR SHORTNESS OF BREATH / RESCUE  What changed: See the new instructions.     lactulose 20 gram/30 mL Soln  Commonly known as: CHRONULAC  Take 23 mLs (15 g total) by mouth 2 (two) times daily. Hold if patient having more than 3 Bms daily  What changed: how much to take            CONTINUE taking these medications      albuterol-ipratropium 2.5 mg-0.5 mg/3 mL nebulizer solution  Commonly known as: DUO-NEB  Take 3 mLs by nebulization every 6 (six) hours as needed for Wheezing. Rescue     BD INSULIN SYRINGE ULTRA-FINE 0.3 mL 31 gauge x 5/16" Syrg  Generic drug: insulin syringe-needle U-100  USE 1 SYRINGE TWICE A DAY     ferrous sulfate 325 mg (65 mg iron) Tab tablet  Commonly known as: FEOSOL  Take 1 tablet (325 mg total) by mouth 3 (three) times a week. On Monday, Wednesday and Friday     furosemide 40 MG tablet  Commonly known as: LASIX  Take 1 tablet (40 mg total) by mouth 2 (two) times daily.     gabapentin 100 MG capsule  Commonly known as: NEURONTIN  Take 1 capsule (100 mg total) by mouth every evening.     hydrALAZINE 50 MG tablet  Commonly known as: APRESOLINE  Take 1 tablet (50 mg total) by mouth 2 (two) times daily.     HYDROcodone-acetaminophen 5-325 mg per tablet  Commonly known as: NORCO  Take 1 tablet by mouth every 6 (six) hours as needed for Pain.     hydrocortisone 2.5 % rectal cream  Commonly known as: ANUSOL-  Place " rectally 2 (two) times daily.     insulin glargine U-100 (Lantus) 100 unit/mL injection  Inject 5 Units into the skin every 12 (twelve) hours.     insulin lispro 100 unit/mL injection  Inject 3 Units into the skin 3 (three) times daily before meals.     Lactobacillus acidophilus 500 million cell Cap  Take 4 capsules by mouth 3 (three) times daily with meals.     levothyroxine 100 MCG tablet  Commonly known as: SYNTHROID  TAKE 1 TABLET BY MOUTH BEFORE BREAKFAST.     megestroL 400 mg/10 mL (40 mg/mL) Susp  Commonly known as: MEGACE  Take 10 mLs by mouth once daily.     meloxicam 7.5 MG tablet  Commonly known as: MOBIC  Take 7.5 mg by mouth once daily.     metoprolol succinate 25 MG 24 hr tablet  Commonly known as: TOPROL-XL  Take 1 tablet (25 mg total) by mouth once daily.     ondansetron 4 mg/2 mL Soln  Inject 4 mg into the muscle every 6 (six) hours as needed.     pantoprazole 40 MG tablet  Commonly known as: PROTONIX  TAKE 1 TABLET BY MOUTH TWICE A DAY     pravastatin 10 MG tablet  Commonly known as: PRAVACHOL  Take 1 tablet (10 mg total) by mouth once daily.     sertraline 25 MG tablet  Commonly known as: ZOLOFT  Take 25 mg by mouth once daily.     SYMBICORT 160-4.5 mcg/actuation aa  Generic drug: budesonide-formoterol 160-4.5 mcg  Inhale 2 puffs into the lungs 2 (two) times daily.            ASK your doctor about these medications      nystatin 100,000 unit/mL suspension  Commonly known as: MYCOSTATIN  Take 5 mLs (500,000 Units total) by mouth 4 (four) times daily with meals and nightly. for 10 days  Ask about: Should I take this medication?              Indwelling Lines/Drains at time of discharge:   Lines/Drains/Airways       None                   Time spent on the discharge of patient: 45 minutes         Fallon Guerin MD  Department of Hospital Medicine  [facility] - [facility] Telemetry

## 2025-05-26 NOTE — ASSESSMENT & PLAN NOTE
Hyponatremia is likely due to renal insufficiency. The patient's most recent sodium results are listed below.  Recent Labs     05/23/25  0321 05/24/25  0813   * 131*     Plan  - Correct the sodium by 4-6mEq in 24 hours.   - Obtain the following studies:  None.  - Will treat the hyponatremia with fluid restriction  - Monitor sodium Daily.   - Patient hyponatremia is stable  - follow

## 2025-05-26 NOTE — PROGRESS NOTES
Pharmacist Renal Dose Adjustment Note    Vandana Allison is a 89 y.o. female being treated with the medication ciprofloxacin    Patient Data:    Vital Signs (Most Recent):  Temp: 98.8 °F (37.1 °C) (05/25/25 1637)  Pulse: 84 (05/25/25 2016)  Resp: 16 (05/25/25 2038)  BP: 117/63 (05/25/25 1637)  SpO2: 96 % (05/25/25 2016) Vital Signs (72h Range):  Temp:  [97.4 °F (36.3 °C)-99 °F (37.2 °C)]   Pulse:  [61-86]   Resp:  [13-20]   BP: ()/(49-66)   SpO2:  [95 %-100 %]      Recent Labs   Lab 05/22/25  0512 05/23/25  0321 05/24/25  0813   CREATININE 1.51* 1.49* 1.57*     Serum creatinine: 1.57 mg/dL (H) 05/24/25 0813  Estimated creatinine clearance: 21.6 mL/min (A)    Medication:ciprofloxacin dose: 750 mg frequency q12h will be changed to medication:ciprofloxacin dose:750 mg frequency:q24h    Pharmacist's Name: Sandra Armijo  Pharmacist's Extension: [phone]

## 2025-05-26 NOTE — ASSESSMENT & PLAN NOTE
Hyponatremia is likely due to renal insufficiency. The patient's most recent sodium results are listed below.  Recent Labs     05/24/25  0813   *     Plan  - Correct the sodium by 4-6mEq in 24 hours.   - Obtain the following studies:  None.  - Will treat the hyponatremia with fluid restriction  - Monitor sodium Daily.   - Patient hyponatremia is stable  - follow

## 2025-05-26 NOTE — PROGRESS NOTES
[facility] - [address] [place] [address] Medicine  Progress Note    Patient Name: Vandana Allison  MRN: [id number]  Patient Class: IP- Inpatient   Admission Date: 5/21/2025  Length of Stay: 3 days  Attending Physician: Fallon Guerin MD  Primary Care Provider: Jennifer Mares FNP        Subjective     Principal Problem:Severe sepsis        HPI:  89-year-old  female With a past medical history of CKD, COPD, diabetes, diabetic neuropathy, hypertension, right nephrectomy presents to the ED after she was found to have elevated white count at the nursing home.  She denies any shortness of breath, cough, fever, chills, dysuria.  She does have some left knee pain.  Does have a sacral decubitus.  She has had a poor appetite.  Recently had gallbladder remove with Dr. Maciel.  She reports feeling better currently than she did earlier in the day during my interview.  Chest x-ray was reviewed and showed bilateral pleural effusions that we are new since early May.  Review of systems otherwise negative    Overview/Hospital Course:  5/22  - states she feels better today but has had subjective chills  - continuing pna treatment    5/23  - at bedside today patient is lethargic, sob, +2 LE edema  - repeat labs including abg, chest xray, lactic acid, cbc, cmp  - bladder scan with 350 retention, rivera placed, will start flowmax  - lasix 40 IV given once  - monitoring closely    5/24  - much better today, likely dc to nursing home tomorrow  - plan to pull rivera in the morning and rescan after 4 hours, if still retaining will dc with rivera    5/25  - rivera pulled today, will dc flowmax, patient to dc to nursing home in the am  - will complete 3 additional days of cipro    Past Medical History:   Diagnosis Date    Arthritis     Bleeding external hemorrhoids     Chronic kidney disease, stage 3b     baseline creat 1.5    Chronic kidney disease, stage 3b 02/07/2024    baseline creat 1.5      COPD (chronic  obstructive pulmonary disease)     senile emphysema    Diabetes mellitus, type 2     DNR (do not resuscitate) 02/07/2024    discussed with VITO Christianson present    Essential (primary) hypertension     Hiatal hernia with GERD     Neuropathy     Post-operative hypothyroidism     Severe pulmonary hypertension 10/08/2022    EF  70 % and normal diastolic function       Past Surgical History:   Procedure Laterality Date    BREAST SURGERY      Biopsy    EYE SURGERY      Remove cataracts both eyes    HYSTERECTOMY      LAPAROSCOPIC CHOLECYSTECTOMY N/A 5/13/2025    Procedure: CHOLECYSTECTOMY, LAPAROSCOPIC;  Surgeon: Miguel Angel Aragon MD;  Location: [facility];  Service: General;  Laterality: N/A;    NEPHRECTOMY Right 1960    THYROIDECTOMY         Review of patient's allergies indicates:   Allergen Reactions    Aspirin        No current facility-administered medications on file prior to encounter.     Current Outpatient Medications on File Prior to Encounter   Medication Sig    albuterol (PROVENTIL/VENTOLIN HFA) 90 mcg/actuation inhaler INHALE 2 PUFFS BY MOUTH INTO THE LUNGS EVERY 4 HOURS AS NEEDED FOR SHORTNESS OF BREATH / RESCUE (Patient taking differently: Inhale 2 puffs into the lungs every 6 (six) hours as needed.)    albuterol-ipratropium (DUO-NEB) 2.5 mg-0.5 mg/3 mL nebulizer solution Take 3 mLs by nebulization every 6 (six) hours as needed for Wheezing. Rescue    ferrous sulfate (FEOSOL) 325 mg (65 mg iron) Tab tablet Take 1 tablet (325 mg total) by mouth 3 (three) times a week. On Monday, Wednesday and Friday    furosemide (LASIX) 40 MG tablet Take 1 tablet (40 mg total) by mouth 2 (two) times daily.    gabapentin (NEURONTIN) 100 MG capsule Take 1 capsule (100 mg total) by mouth every evening.    hydrALAZINE (APRESOLINE) 50 MG tablet Take 1 tablet (50 mg total) by mouth 2 (two) times daily.    HYDROcodone-acetaminophen (NORCO) 5-325 mg per tablet Take 1 tablet by mouth every 6 (six) hours as needed for Pain.     "hydrocortisone (ANUSOL-HC) 2.5 % rectal cream Place rectally 2 (two) times daily.    insulin glargine U-100, Lantus, 100 unit/mL injection Inject 5 Units into the skin every 12 (twelve) hours.    insulin lispro 100 unit/mL injection Inject 3 Units into the skin 3 (three) times daily before meals.    Lactobacillus acidophilus 500 million cell Cap Take 4 capsules by mouth 3 (three) times daily with meals.    lactulose (CHRONULAC) 20 gram/30 mL Soln Take 23 mLs (15 g total) by mouth 2 (two) times daily. Hold if patient having more than 3 Bms daily (Patient taking differently: Take 30 mLs by mouth 2 (two) times daily. Hold if patient having more than 3 Bms daily)    levothyroxine (SYNTHROID) 100 MCG tablet TAKE 1 TABLET BY MOUTH BEFORE BREAKFAST.    megestroL (MEGACE) 400 mg/10 mL (40 mg/mL) Susp Take 10 mLs by mouth once daily.    meloxicam (MOBIC) 7.5 MG tablet Take 7.5 mg by mouth once daily.    metoprolol succinate (TOPROL-XL) 25 MG 24 hr tablet Take 1 tablet (25 mg total) by mouth once daily.    [] nystatin (MYCOSTATIN) 100,000 unit/mL suspension Take 5 mLs (500,000 Units total) by mouth 4 (four) times daily with meals and nightly. for 10 days    ondansetron 4 mg/2 mL Soln Inject 4 mg into the muscle every 6 (six) hours as needed.    pantoprazole (PROTONIX) 40 MG tablet TAKE 1 TABLET BY MOUTH TWICE A DAY    pravastatin (PRAVACHOL) 10 MG tablet Take 1 tablet (10 mg total) by mouth once daily.    sertraline (ZOLOFT) 25 MG tablet Take 25 mg by mouth once daily.    SYMBICORT 160-4.5 mcg/actuation HFAA Inhale 2 puffs into the lungs 2 (two) times daily.    BD INSULIN SYRINGE ULTRA-FINE 0.3 mL 31 gauge x 5/16" Syrg USE 1 SYRINGE TWICE A DAY     Family History       Problem Relation (Age of Onset)    Cancer Sister, Brother, Daughter    Diabetes Mother, Sister, Brother, Sister    Heart disease Father          Tobacco Use    Smoking status: Never    Smokeless tobacco: Never   Substance and Sexual Activity    Alcohol " use: Never    Drug use: Never    Sexual activity: Not Currently     Birth control/protection: None     Review of Systems   Constitutional:  Positive for appetite change and fatigue. Negative for chills, fever and unexpected weight change.   HENT:  Negative for congestion, mouth sores and sore throat.    Eyes:  Negative for photophobia and visual disturbance.   Respiratory:  Negative for cough, chest tightness, shortness of breath and wheezing.    Cardiovascular:  Negative for chest pain, palpitations and leg swelling.   Gastrointestinal:  Negative for abdominal pain, diarrhea, nausea and vomiting.   Endocrine: Negative for cold intolerance and heat intolerance.   Genitourinary:  Negative for difficulty urinating, dysuria, frequency and urgency.   Musculoskeletal:  Negative for arthralgias, back pain and myalgias.   Skin:  Positive for wound. Negative for pallor and rash.   Neurological:  Negative for tremors, seizures, syncope, weakness, numbness and headaches.   Hematological:  Does not bruise/bleed easily.   Psychiatric/Behavioral:  Negative for agitation, confusion, hallucinations and suicidal ideas.      Objective:     Vital Signs (Most Recent):  Temp: 98.8 °F (37.1 °C) (05/25/25 1637)  Pulse: 84 (05/25/25 2016)  Resp: 16 (05/25/25 2038)  BP: 117/63 (05/25/25 1637)  SpO2: 96 % (05/25/25 2016) Vital Signs (24h Range):  Temp:  [97.6 °F (36.4 °C)-98.8 °F (37.1 °C)] 98.8 °F (37.1 °C)  Pulse:  [61-86] 84  Resp:  [16-18] 16  SpO2:  [95 %-100 %] 96 %  BP: ()/(56-63) 117/63     Weight: 85.2 kg (187 lb 13.3 oz)  Body mass index is 31.26 kg/m².     Physical Exam  Vitals and nursing note reviewed.   Constitutional:       Appearance: She is ill-appearing.   HENT:      Head: Normocephalic and atraumatic.      Nose: Nose normal.   Eyes:      Extraocular Movements: Extraocular movements intact.      Conjunctiva/sclera: Conjunctivae normal.   Neck:      Trachea: Trachea normal.   Cardiovascular:      Rate and Rhythm:  Normal rate and regular rhythm.      Pulses: Normal pulses.      Heart sounds: Normal heart sounds.   Pulmonary:      Effort: Pulmonary effort is normal.      Breath sounds: Normal air entry. Rales present.   Abdominal:      General: Bowel sounds are normal.      Palpations: Abdomen is soft.   Musculoskeletal:         General: Signs of injury (Sacral wound) present.      Cervical back: Neck supple.      Right lower leg: Edema present.      Left lower leg: Edema present.      Comments: Left knee effusion.  Pain with passive range of motion.  Not red not tender to palpation   Skin:     General: Skin is warm and dry.   Neurological:      General: No focal deficit present.      Mental Status: She is alert. She is disoriented.      Comments: Grossly normal motor and sensory function without focal deficit appreciated.   Psychiatric:         Mood and Affect: Affect normal.         Behavior: Behavior is cooperative.                Significant Labs: All pertinent labs within the past 24 hours have been reviewed.  BMP:   Recent Labs   Lab 05/24/25  0813   GLU 96   *   K 3.9      CO2 18*   BUN 26*   CREATININE 1.57*   CALCIUM 8.0*   MG 1.5*     CBC:   Recent Labs   Lab 05/24/25  0813   WBC 9.11   HGB 7.4*   HCT 23.8*   *     CMP:   Recent Labs   Lab 05/24/25  0813   *   K 3.9      CO2 18*   GLU 96   BUN 26*   CREATININE 1.57*   CALCIUM 8.0*   ANIONGAP 15       Significant Imaging: I have reviewed all pertinent imaging results/findings within the past 24 hours.      Assessment & Plan  Severe sepsis  This patient does have evidence of infective focus  My overall impression is sepsis.  Source: Respiratory  Antibiotics given-   Antibiotics (72h ago, onward)      Start     Stop Route Frequency Ordered    05/25/25 2330  ciprofloxacin (CIPRO)400mg/200ml D5W IVPB 400 mg         -- IV Every 12 hours (non-standard times) 05/25/25 1516    05/24/25 1115  mupirocin 2 % ointment         05/29/25 0859 Nasl 2  "times daily 05/24/25 1012    05/21/25 1845  linezolid 600 mg/300 mL IVPB 600 mg         -- IV Every 12 hours (non-standard times) 05/21/25 1744          Latest lactate reviewed-  Recent Labs   Lab 05/23/25  1117   LACTATE 1.2     Organ dysfunction indicated by no evidence of end-organ dysfunction    Fluid challenge Contraindicated- Fluid bolus is contraindicated in this patient due to Volume overload due to- hypoalbuminemia.  Large pleural effusion in hypoxic patient     Post- resuscitation assessment Yes - I attest a sepsis perfusion exam was performed within 6 hours of sepsis, severe sepsis, or septic shock presentation, following fluid resuscitation.      Will Not start Pressors- Levophed for MAP of 65  Source control achieved by:  Antibiotics  Lactate okay   Blood cultures ordered  Zyvox for pneumonia in the setting of CKD   Cefepime  Diabetes mellitus, type 2  Patient's FSGs are controlled on current medication regimen.  Last A1c reviewed-   Lab Results   Component Value Date    HGBA1C 6.4 11/27/2024     Most recent fingerstick glucose reviewed- No results for input(s): "POCTGLUCOSE" in the last 24 hours.  Current correctional scale  Low  Maintain anti-hyperglycemic dose as follows-   Antihyperglycemics (From admission, onward)      Start     Stop Route Frequency Ordered    05/21/25 2100  insulin glargine U-100 (Lantus) injection 5 Units         -- SubQ Every 12 hours 05/21/25 1737    05/21/25 1836  insulin aspart U-100 injection 0-5 Units         -- SubQ Before meals & nightly PRN 05/21/25 1737          Hold Oral hypoglycemics while patient is in the hospital.  Neuropathy  Secondary to diabetes    Rheumatoid arthritis  Stable    Hypertension  Patient's blood pressure range in the last 24 hours was: BP  Min: 98/56  Max: 127/56.The patient's inpatient anti-hypertensive regimen is listed below:  Current Antihypertensives  hydrALAZINE tablet 50 mg, 2 times daily, Oral  metoprolol succinate (TOPROL-XL) 24 hr tablet " 25 mg, Daily, Oral    Plan  - BP is controlled, no changes needed to their regimen  - follow  Senile asthenia  Progressive mobility protocol    Moderate persistent asthma without complication  Resume home inhalers   P.r.n. nebs  Pulmonary hypertension  Respiratory status stable on supplemental oxygen    Bacterial pneumonia  Patient has a diagnosis of pneumonia. The cause of the pneumonia is suspected to be bacterial in etiology but organism is not known. The pneumonia is worsening due to oxygen requirement. The patient has the following signs/symptoms of pneumonia: persistent hypoxia . The patient does have a current oxygen requirement and the patient does not have a home oxygen requirement. I have reviewed the pertinent imaging. The following cultures have been collected: Blood cultures The culture results are listed below.     Current antimicrobial regimen consists of the antibiotics listed below. Will monitor patient closely and continue current treatment plan unchanged.    Antibiotics (From admission, onward)      Start     Stop Route Frequency Ordered    05/25/25 2330  ciprofloxacin (CIPRO)400mg/200ml D5W IVPB 400 mg         -- IV Every 12 hours (non-standard times) 05/25/25 1516    05/24/25 1115  mupirocin 2 % ointment         05/29/25 0859 Nasl 2 times daily 05/24/25 1012    05/21/25 1845  linezolid 600 mg/300 mL IVPB 600 mg         -- IV Every 12 hours (non-standard times) 05/21/25 1744            Microbiology Results (last 7 days)       Procedure Component Value Units Date/Time    Blood Culture #1 [[id number]] Collected: 05/21/25 1310    Order Status: Completed Specimen: Blood Updated: 05/25/25 0723     Culture, Blood No Growth At 72 Hours    Blood Culture #2 [[id number]] Collected: 05/21/25 1317    Order Status: Completed Specimen: Blood Updated: 05/25/25 0723     Culture, Blood No Growth At 72 Hours    Urine culture [[id number]]  (Abnormal)  (Susceptibility) Collected: 05/21/25 1813    Order Status:  Completed Specimen: Urine, Catheterized (In/Out) Updated: 05/24/25 0759     Culture, Urine >100,000 Pseudomonas aeruginosa            5/22  - continuing abx    5/23  - at bedside today patient is lethargic, sob, +2 LE edema  - repeat labs including abg, chest xray, lactic acid, cbc, cmp  - bladder scan with 350 retention, rivera placed, will start flowmax  - lasix 40 IV given once  - monitoring closely  CKD (chronic kidney disease), stage IV  Creatine stable for now. BMP reviewed- noted Estimated Creatinine Clearance: 21.6 mL/min (A) (based on SCr of 1.57 mg/dL (H)). according to latest data. 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  Monitor UOP and serial BMP and adjust therapy as needed. Renally dose meds. Avoid nephrotoxic medications and procedures.  Cerebellar infarct  Remote.  PTOT    Anemia  Anemia is likely due to chronic disease due to Chronic Kidney Disease. Most recent hemoglobin and hematocrit are listed below.  Recent Labs     05/23/25  0321 05/24/25  0813   HGB 7.6* 7.4*   HCT 24.5* 23.8*     Plan  - Monitor serial CBC: Daily  - Transfuse PRBC if patient becomes hemodynamically unstable, symptomatic or H/H drops below 7/21.  - Patient has not received any PRBC transfusions to date  - Patient's anemia is currently stable  - follow  Hyponatremia  Hyponatremia is likely due to renal insufficiency. The patient's most recent sodium results are listed below.  Recent Labs     05/23/25  0321 05/24/25  0813   * 131*     Plan  - Correct the sodium by 4-6mEq in 24 hours.   - Obtain the following studies:  None.  - Will treat the hyponatremia with fluid restriction  - Monitor sodium Daily.   - Patient hyponatremia is stable  - follow  Diabetic neuropathy  Glycemic control    Pleural effusion  Patient found to have moderate pleural effusion on imaging. I have personally reviewed and interpreted the following imaging: Xray. A thoracentesis was deferred. Most likely etiology includes Pneumonia and  hypoalbuminemia. Management to include Diuresis    Sacral decubitus ulcer  Wound care   Antibiotic    VTE Risk Mitigation (From admission, onward)           Ordered     heparin (porcine) injection 5,000 Units  Every 8 hours         05/21/25 1737     IP VTE HIGH RISK PATIENT  Once         05/21/25 1737     Place sequential compression device  Until discontinued         05/21/25 1737                    Discharge Planning   MARTHA:      Code Status: DNR   Medical Readiness for Discharge Date:   Discharge Plan A: Return to nursing home                        Fallon Guerin MD  Department of Hospital Medicine   [facility] - [facility]

## 2025-05-27 ENCOUNTER — TELEPHONE (OUTPATIENT)
Dept: FAMILY MEDICINE | Facility: CLINIC | Age: OVER 89
End: 2025-05-27
Payer: MEDICARE

## 2025-05-27 ENCOUNTER — HOSPITAL ENCOUNTER (EMERGENCY)
Facility: HOSPITAL | Age: OVER 89
Discharge: HOME | End: 2025-05-27
Attending: FAMILY MEDICINE
Payer: MEDICARE

## 2025-05-27 VITALS
TEMPERATURE: 97 F | HEIGHT: 65 IN | SYSTOLIC BLOOD PRESSURE: 130 MMHG | OXYGEN SATURATION: 99 % | RESPIRATION RATE: 15 BRPM | BODY MASS INDEX: 30.51 KG/M2 | HEART RATE: 86 BPM | DIASTOLIC BLOOD PRESSURE: 61 MMHG | WEIGHT: 183.13 LBS

## 2025-05-27 DIAGNOSIS — Z13.6 SCREENING FOR CARDIOVASCULAR CONDITION: ICD-10-CM

## 2025-05-27 DIAGNOSIS — R41.82 ALTERED MENTAL STATUS, UNSPECIFIED ALTERED MENTAL STATUS TYPE: Primary | ICD-10-CM

## 2025-05-27 LAB
ALBUMIN SERPL BCP-MCNC: 1.9 G/DL (ref 3.4–4.8)
ALBUMIN/GLOB SERPL: 0.5 {RATIO}
ALP SERPL-CCNC: 105 U/L (ref 40–150)
ALT SERPL W P-5'-P-CCNC: <7 U/L
ANION GAP SERPL CALCULATED.3IONS-SCNC: 14 MMOL/L (ref 7–16)
AST SERPL W P-5'-P-CCNC: 14 U/L (ref 11–45)
BACTERIA #/AREA URNS HPF: ABNORMAL /HPF
BACTERIA BLD CULT: NORMAL
BACTERIA BLD CULT: NORMAL
BASOPHILS # BLD AUTO: 0.03 K/UL (ref 0–0.2)
BASOPHILS NFR BLD AUTO: 0.3 % (ref 0–1)
BILIRUB SERPL-MCNC: 0.3 MG/DL
BILIRUB UR QL STRIP: NEGATIVE
BUN SERPL-MCNC: 27 MG/DL (ref 10–20)
BUN/CREAT SERPL: 16 (ref 6–20)
CALCIUM SERPL-MCNC: 8.3 MG/DL (ref 8.4–10.2)
CHLORIDE SERPL-SCNC: 103 MMOL/L (ref 98–111)
CLARITY UR: CLEAR
CO2 SERPL-SCNC: 19 MMOL/L (ref 23–31)
COLOR UR: ABNORMAL
CREAT SERPL-MCNC: 1.7 MG/DL (ref 0.55–1.02)
DIFFERENTIAL METHOD BLD: ABNORMAL
EGFR (NO RACE VARIABLE) (RUSH/TITUS): 27 ML/MIN/1.73M2
EOSINOPHIL # BLD AUTO: 0.23 K/UL (ref 0–0.5)
EOSINOPHIL NFR BLD AUTO: 2 % (ref 1–4)
ERYTHROCYTE [DISTWIDTH] IN BLOOD BY AUTOMATED COUNT: 21.1 % (ref 11.5–14.5)
GLOBULIN SER-MCNC: 3.7 G/DL (ref 2–4)
GLUCOSE SERPL-MCNC: 72 MG/DL (ref 75–121)
GLUCOSE UR STRIP-MCNC: NORMAL MG/DL
HCO3 UR-SCNC: 24.6 MMOL/L (ref 21–28)
HCT VFR BLD AUTO: 26.4 % (ref 38–47)
HCT VFR BLD CALC: 28 % (ref 35–51)
HGB BLD-MCNC: 8.2 G/DL (ref 12–16)
IMM GRANULOCYTES # BLD AUTO: 0.06 K/UL (ref 0–0.04)
IMM GRANULOCYTES NFR BLD: 0.5 % (ref 0–0.4)
KETONES UR STRIP-SCNC: NEGATIVE MG/DL
LACTATE SERPL-SCNC: 1 MMOL/L (ref 0.5–2.2)
LDH SERPL L TO P-CCNC: 0.5 MMOL/L (ref 0.3–1.2)
LEUKOCYTE ESTERASE UR QL STRIP: NEGATIVE
LYMPHOCYTES # BLD AUTO: 0.48 K/UL (ref 1–4.8)
LYMPHOCYTES NFR BLD AUTO: 4.2 % (ref 27–41)
MCH RBC QN AUTO: 25.2 PG (ref 27–31)
MCHC RBC AUTO-ENTMCNC: 31.1 G/DL (ref 32–36)
MCV RBC AUTO: 81 FL (ref 80–96)
MONOCYTES # BLD AUTO: 0.41 K/UL (ref 0–0.8)
MONOCYTES NFR BLD AUTO: 3.6 % (ref 2–6)
MPC BLD CALC-MCNC: 8.7 FL (ref 9.4–12.4)
NEUTROPHILS # BLD AUTO: 10.09 K/UL (ref 1.8–7.7)
NEUTROPHILS NFR BLD AUTO: 89.4 % (ref 53–65)
NITRITE UR QL STRIP: NEGATIVE
NRBC # BLD AUTO: 0 X10E3/UL
NRBC, AUTO (.00): 0 %
PCO2 BLDA: 38 MMHG (ref 35–48)
PH SMN: 7.42 [PH] (ref 7.35–7.45)
PH UR STRIP: 5.5 PH UNITS
PLATELET # BLD AUTO: 520 K/UL (ref 150–400)
PO2 BLDA: 55 MMHG (ref 83–108)
POC BASE EXCESS: 0.2 MMOL/L (ref -2–3)
POC CO2: 25.8 MMOL/L
POC IONIZED CALCIUM: 1.16 MMOL/L (ref 1.15–1.35)
POC SATURATED O2: 89 % (ref 95–98)
POCT GLUCOSE: 74 MG/DL (ref 60–95)
POTASSIUM BLD-SCNC: 3.8 MMOL/L (ref 3.4–4.5)
POTASSIUM SERPL-SCNC: 4.1 MMOL/L (ref 3.5–5.1)
PROT SERPL-MCNC: 5.6 G/DL (ref 5.8–7.6)
PROT UR QL STRIP: 100
RBC # BLD AUTO: 3.26 M/UL (ref 4.2–5.4)
RBC # UR STRIP: NEGATIVE /UL
RBC #/AREA URNS HPF: 1 /HPF
SODIUM BLD-SCNC: 125 MMOL/L (ref 136–145)
SODIUM SERPL-SCNC: 132 MMOL/L (ref 136–145)
SP GR UR STRIP: 1.01
SQUAMOUS #/AREA URNS LPF: ABNORMAL /HPF
TROPONIN I SERPL HS-MCNC: 2.8 NG/L
UROBILINOGEN UR STRIP-ACNC: NORMAL MG/DL
WBC # BLD AUTO: 11.3 K/UL (ref 4.5–11)
WBC #/AREA URNS HPF: 3 /HPF

## 2025-05-27 PROCEDURE — 87040 BLOOD CULTURE FOR BACTERIA: CPT | Performed by: FAMILY MEDICINE

## 2025-05-27 PROCEDURE — 82962 GLUCOSE BLOOD TEST: CPT

## 2025-05-27 PROCEDURE — 84295 ASSAY OF SERUM SODIUM: CPT

## 2025-05-27 PROCEDURE — 96360 HYDRATION IV INFUSION INIT: CPT

## 2025-05-27 PROCEDURE — 80053 COMPREHEN METABOLIC PANEL: CPT | Performed by: FAMILY MEDICINE

## 2025-05-27 PROCEDURE — 82947 ASSAY GLUCOSE BLOOD QUANT: CPT

## 2025-05-27 PROCEDURE — 85014 HEMATOCRIT: CPT

## 2025-05-27 PROCEDURE — 82330 ASSAY OF CALCIUM: CPT

## 2025-05-27 PROCEDURE — 25000003 PHARM REV CODE 250: Performed by: EMERGENCY MEDICINE

## 2025-05-27 PROCEDURE — 99285 EMERGENCY DEPT VISIT HI MDM: CPT | Mod: 25

## 2025-05-27 PROCEDURE — 83605 ASSAY OF LACTIC ACID: CPT

## 2025-05-27 PROCEDURE — 84132 ASSAY OF SERUM POTASSIUM: CPT

## 2025-05-27 PROCEDURE — 81003 URINALYSIS AUTO W/O SCOPE: CPT | Performed by: FAMILY MEDICINE

## 2025-05-27 PROCEDURE — 36415 COLL VENOUS BLD VENIPUNCTURE: CPT | Performed by: FAMILY MEDICINE

## 2025-05-27 PROCEDURE — 84484 ASSAY OF TROPONIN QUANT: CPT | Performed by: FAMILY MEDICINE

## 2025-05-27 PROCEDURE — 85025 COMPLETE CBC W/AUTO DIFF WBC: CPT | Performed by: FAMILY MEDICINE

## 2025-05-27 PROCEDURE — 82803 BLOOD GASES ANY COMBINATION: CPT

## 2025-05-27 PROCEDURE — 93005 ELECTROCARDIOGRAM TRACING: CPT

## 2025-05-27 PROCEDURE — 83605 ASSAY OF LACTIC ACID: CPT | Performed by: FAMILY MEDICINE

## 2025-05-27 RX ADMIN — SODIUM CHLORIDE 1000 ML: 0.9 INJECTION, SOLUTION INTRAVENOUS at 06:05

## 2025-05-27 NOTE — PLAN OF CARE
[facility] - [facility] Tele[facility]  Discharge Final Note    Primary Care Provider: Jennifer Mares FNP    Expected Discharge Date: 5/26/2025    Final Discharge Note (most recent)       Final Note - 05/27/25 0815          Final Note    Assessment Type Final Discharge Note     Anticipated Discharge Disposition Planned Readmission - Skilled Nursing Facility         Post-Acute Status    Discharge Delays None known at this time                     Important Message from Medicare  Important Message from Medicare regarding Discharge Appeal Rights: Given to patient/caregiver, Explained to patient/caregiver, Signed/date by patient/caregiver     Date IMM was signed: 05/26/25  Time IMM was signed: 0830    Contact Info       Jennifer Mares FNP   Specialty: Family Medicine, Emergency Medicine   Relationship: PCP - General    [address] [facility] [place] [address]   Phone: [phone]       Next Steps: Follow up in 1 week(s)    Instructions: Office is closed today 5/26 for holiday. Will call facility with follow-up appointment on 5/27 Tuesday        Patient discharged to return to [facility]. Documents faxed.   IM updated.  No further needs noted.

## 2025-05-27 NOTE — ED PROVIDER NOTES
Encounter Date: 5/27/2025    SCRIBE #1 NOTE: I, Celso Whatley, am scribing for, and in the presence of,  Juan oYungblood DO. I have scribed the entire note.       History     Chief Complaint   Patient presents with    Altered Mental Status     Pt sent from [facility] for AMS that started at 1030 this morning.      This is a 89 y/o female, who presents to the ED via EMS for evaluation. She was transported from [facility] after nursing home staff noticed the pt was more altered than normal. There is no hx of a fever. There are no other complaints/pain in the ED at this time. She has a known hx of COPD, CKD, diabetes, and HTN. There is no smoking hx on file. There is no cardiac surgery on file.     The history is provided by the nursing home, the EMS personnel and medical records. No  was used.     Review of patient's allergies indicates:   Allergen Reactions    Aspirin      Past Medical History:   Diagnosis Date    Arthritis     Bleeding external hemorrhoids     Chronic kidney disease, stage 3b 02/07/2024    baseline creat 1.5      COPD (chronic obstructive pulmonary disease)     senile emphysema    Diabetes mellitus, type 2     DNR (do not resuscitate) 02/07/2024    discussed with VITO Christianson present    Essential (primary) hypertension     Hiatal hernia with GERD     Neuropathy     Post-operative hypothyroidism     Severe pulmonary hypertension 10/08/2022    EF  70 % and normal diastolic function     Past Surgical History:   Procedure Laterality Date    BREAST SURGERY      Biopsy    EYE SURGERY      Remove cataracts both eyes    HYSTERECTOMY      LAPAROSCOPIC CHOLECYSTECTOMY N/A 05/13/2025    Procedure: CHOLECYSTECTOMY, LAPAROSCOPIC;  Surgeon: Miguel Angel Aragon MD;  Location: [facility], [place];  Service: General;  Laterality: N/A;    NEPHRECTOMY Right 1960    THYROIDECTOMY       Family History   Problem Relation Name Age of Onset    Diabetes Mother Monie Inman          Diabetic    Heart disease Father Lauro Inman     Diabetes Sister Marino Correia     Cancer Sister Marino Correia         Breast cancer    Diabetes Brother Shree Inman     Cancer Brother Shree Inamn         Prostate cancer    Cancer Daughter Radha Allison         Uterine cancer    Diabetes Sister Dafne Urena         Diabetic     Social History[1]  Review of Systems   Unable to perform ROS: Mental status change   All other systems reviewed and are negative.      Physical Exam     Initial Vitals [05/27/25 1229]   BP Pulse Resp Temp SpO2   123/63 75 17 97.3 °F (36.3 °C) (!) 94 %      MAP       --         Physical Exam    Nursing note and vitals reviewed.  Constitutional: She appears well-developed and well-nourished.   HENT:   Head: Normocephalic and atraumatic.   Eyes: Conjunctivae and EOM are normal. Pupils are equal, round, and reactive to light.   Neck: Neck supple.   Normal range of motion.  Cardiovascular:  Normal rate, regular rhythm, normal heart sounds and intact distal pulses.           Pulmonary/Chest: Breath sounds normal.   Abdominal: Abdomen is soft. Bowel sounds are normal.   Musculoskeletal:         General: Normal range of motion.      Cervical back: Normal range of motion and neck supple.     Neurological: She is alert. She has normal strength.   Skin: Skin is warm and dry. Capillary refill takes less than 2 seconds.   Psychiatric: She has a normal mood and affect. Thought content normal.         ED Course   Procedures  Labs Reviewed   COMPREHENSIVE METABOLIC PANEL - Abnormal       Result Value    Sodium 132 (*)     Potassium 4.1      Chloride 103      CO2 19 (*)     Anion Gap 14      Glucose 72 (*)     BUN 27 (*)     Creatinine 1.70 (*)     BUN/Creatinine Ratio 16      Calcium 8.3 (*)     Total Protein 5.6 (*)     Albumin 1.9 (*)     Globulin 3.7      A/G Ratio 0.5      Bilirubin, Total 0.3      Alk Phos 105      ALT <7      AST 14      eGFR 27 (*)    URINALYSIS, REFLEX TO URINE CULTURE -  Abnormal    Color, UA Light Yellow      Clarity, UA Clear      pH, UA 5.5      Leukocytes, UA Negative      Nitrites, UA Negative      Protein,  (*)     Glucose, UA Normal      Ketones, UA Negative      Urobilinogen, UA Normal      Bilirubin, UA Negative      Blood, UA Negative      Specific Gravity, UA 1.014     CBC WITH DIFFERENTIAL - Abnormal    WBC 11.30 (*)     RBC 3.26 (*)     Hemoglobin 8.2 (*)     Hematocrit 26.4 (*)     MCV 81.0      MCH 25.2 (*)     MCHC 31.1 (*)     RDW 21.1 (*)     Platelet Count 520 (*)     MPV 8.7 (*)     Neutrophils % 89.4 (*)     Lymphocytes % 4.2 (*)     Monocytes % 3.6      Eosinophils % 2.0      Basophils % 0.3      Immature Granulocytes % 0.5 (*)     nRBC, Auto 0.0      Neutrophils, Abs 10.09 (*)     Lymphocytes, Absolute 0.48 (*)     Monocytes, Absolute 0.41      Eosinophils, Absolute 0.23      Basophils, Absolute 0.03      Immature Granulocytes, Absolute 0.06 (*)     nRBC, Absolute 0.00      Diff Type Auto     URINALYSIS, MICROSCOPIC - Abnormal    WBC, UA 3      RBC, UA 1      Bacteria, UA Occasional (*)     Squamous Epithelial Cells, UA Occasional (*)    LACTIC ACID, PLASMA - Normal    Lactic Acid 1.0     TROPONIN I - Normal    Troponin I High Sensitivity 2.8     CULTURE, BLOOD    Culture, Blood No Growth at 5 days     CULTURE, BLOOD    Culture, Blood No Growth at 5 days     CBC W/ AUTO DIFFERENTIAL    Narrative:     The following orders were created for panel order CBC auto differential.  Procedure                               Abnormality         Status                     ---------                               -----------         ------                     CBC with Differential[[id number]]       Abnormal            Final result                 Please view results for these tests on the individual orders.   EXTRA TUBES    Narrative:     The following orders were created for panel order EXTRA TUBES.  Procedure                               Abnormality         Status                      ---------                               -----------         ------                     Light Blue Top Hold[[id number]]                             In process                   Please view results for these tests on the individual orders.   LIGHT BLUE TOP HOLD   POCT GLUCOSE MONITORING CONTINUOUS    POC Glucose 79          ECG Results              EKG 12-lead (Final result)        Collection Time Result Time QRS Duration [facility] QTC Calculation    05/27/25 12:23:29 06/01/25 19:25:22 114 465                     Final result by Interface, Lab In [facility] (06/01/25 19:25:31)                   Narrative:    Test Reason : Z13.6,    Vent. Rate :  81 BPM     Atrial Rate :    BPM     P-R Int :   0 ms          QRS Dur : 114 ms      QT Int : 428 ms       P-R-T Axes :   0 -61  80 degrees    QTcB Int : 465 ms    Atrial fibrillation  Borderline prolonged QT interval  Marked left axis deviation  Poor R wave progression  Abnormal ECG    Confirmed by Toño Rizvi ([id number]) on 6/1/2025 7:25:20 PM    Referred By: AAAREFERRAL SELF           Confirmed By: Rehmat Dmitri                                  Imaging Results              CT Head Without Contrast (Final result)  Result time 05/27/25 15:51:39      Final result by Leslie Dykes MD (05/27/25 15:51:39)                   Impression:      No acute intracranial abnormalities.    Age related volume loss with mild chronic periventricular small vessel disease    Stable encephalomalacia in left cerebellar hemisphere      Electronically signed by: Leslie Dykes  Date:    05/27/2025  Time:    15:51               Narrative:    EXAMINATION:  CT HEAD WITHOUT CONTRAST    CLINICAL HISTORY:  Mental status change, unknown cause;.    TECHNIQUE:  Noninfusion images were obtained from the skull base to the vertex.    All CT scans at this facility utilize dose modulation, iterative reconstruction, and/or weight based dosing when appropriate to reduce radiation dose to as low as  reasonably achievable    CMS MANDATED QUALITY DATA - CT RADIATION - 436    COMPARISON:  05/21/2024    FINDINGS:  There is no intracranial mass, hemorrhage, or midline shift. Ventricles and sulci are mildly prominent. There are no pathologic extra-axial fluid collections.    There is no evidence of cortical ischemic change. Changes of mild chronic microvascular white matter disease are noted. Cerebellum and brainstem are normal.  Stable encephalomalacia in the left cerebellar hemisphere.  The globes and extraocular muscles are unremarkable.  The calvarium is intact.    Stable opacification of the pneumatized right occipital bone the paranasal sinuses and mastoid air cells are otherwise clear                                       X-Ray Chest AP Portable (Final result)  Result time 05/27/25 15:56:57      Final result by Leslie Dykes MD (05/27/25 15:56:57)                   Impression:      Cardiomegaly with small bilateral pleural effusions, left greater than right    Stable faint ground-glass opacities within the lung bases.  Findings may be secondary to pulmonary edema      Electronically signed by: Leslie Dykes  Date:    05/27/2025  Time:    15:56               Narrative:    EXAMINATION:  XR CHEST AP PORTABLE    CLINICAL HISTORY:  Sepsis;    FINDINGS:  Portable chest at 14:18 hours is compared to 05/23/2025 shows stable cardiomegaly.    There is a small left pleural effusion there is a tiny right pleural effusion.  There are faint ground-glass opacities within the lung bases.  The upper lobes are clear.  There are degenerative changes of the right shoulder.  There are degenerative changes of the spine and right shoulder.                                       Medications   sodium chloride 0.9% bolus 1,000 mL 1,000 mL (0 mLs Intravenous Stopped 5/27/25 1941)     Medical Decision Making  This is a 89 y/o female, who presents to the ED via EMS for evaluation. She was transported from [facility]  after nursing home staff noticed the pt was more altered than normal. There is no hx of a fever. There are no other complaints/pain in the ED at this time. She has a known hx of COPD, CKD, diabetes, and HTN. There is no smoking hx on file. There is no cardiac surgery on file.   DDX:  METABOLIC / DEHYDRATION VS INFECTIOUS VS OTHER.  OUTPATIENT FOLLOW UP.        Amount and/or Complexity of Data Reviewed  Labs: ordered. Decision-making details documented in ED Course.  Radiology: ordered. Decision-making details documented in ED Course.  ECG/medicine tests: ordered. Decision-making details documented in ED Course.              Attending Attestation:           Physician Attestation for Scribe:  Physician Attestation Statement for Scribe #1: I, Juan Youngblood, DO, reviewed documentation, as scribed by Megan Whatley in my presence, and it is both accurate and complete.                                    Clinical Impression:  Final diagnoses:  [Z13.6] Screening for cardiovascular condition  [R41.82] Altered mental status, unspecified altered mental status type (Primary)          ED Disposition Condition    Discharge Stable          ED Prescriptions    None       Follow-up Information       Follow up With Specialties Details Why Contact Info    Jennifer Mares, [facility], Emergency Medicine  As needed [address] [place] [address] [facility] [address]  [phone]                     [1]   Social History  Tobacco Use    Smoking status: Never    Smokeless tobacco: Never   Substance Use Topics    Alcohol use: Never    Drug use: Never        John Calvin MD  06/26/25 3148

## 2025-05-28 LAB — GLUCOSE SERPL-MCNC: 79 MG/DL (ref 70–105)

## 2025-05-29 ENCOUNTER — OFFICE VISIT (OUTPATIENT)
Dept: SURGERY | Facility: CLINIC | Age: OVER 89
End: 2025-05-29
Payer: MEDICARE

## 2025-05-29 DIAGNOSIS — Z90.49 STATUS POST LAPAROSCOPIC CHOLECYSTECTOMY: Primary | ICD-10-CM

## 2025-05-29 PROCEDURE — 99212 OFFICE O/P EST SF 10 MIN: CPT | Mod: PBBFAC | Performed by: NURSE PRACTITIONER

## 2025-05-29 PROCEDURE — 99999 PR PBB SHADOW E&M-EST. PATIENT-LVL II: CPT | Mod: PBBFAC,,, | Performed by: NURSE PRACTITIONER

## 2025-05-29 PROCEDURE — 99024 POSTOP FOLLOW-UP VISIT: CPT | Mod: ,,, | Performed by: NURSE PRACTITIONER

## 2025-06-01 LAB
OHS QRS DURATION: 114 MS
OHS QTC CALCULATION: 465 MS

## 2025-06-02 LAB
BACTERIA BLD CULT: NORMAL
BACTERIA BLD CULT: NORMAL

## 2025-06-11 NOTE — PROGRESS NOTES
Patient presents to general surgery clinic for 2 week post op follow up of laparoscopic cholecystectomy per .. Patient is doing well today. She is brought to clinic per EMS with family member at bedside. Patient has been recently re-admitted for UTI.  Her symptoms hae improved.   Post op incision sites are noted to be healing.  There is no redness or edema.  No drainage.  Patient is tolerating regular diet.  Patient has not had any nausea or vomiting.  Reports regular bowel movements without diarrhea.  Patient is encouraged to resume regular daily activities as tolerated.  Instructed to follow up with General surgery clinic as needed.  Patient verbalizes understanding and agrees with plan of care.

## (undated) DEVICE — TROCAR KII FIOS ZTHREAD 11X100

## (undated) DEVICE — TROCAR ENDO Z THREAD KII 5X100

## (undated) DEVICE — STRIP MEDI WND CLSR 1/2X4IN

## (undated) DEVICE — IRRIGATOR ENDOSCOPY DISP.

## (undated) DEVICE — ELECTRODE LAPSCP L HOOK 36CM

## (undated) DEVICE — GLOVE SENSICARE PI SURG 6.5

## (undated) DEVICE — SOL NACL IRR 1000ML BTL

## (undated) DEVICE — WARMER BLUE HEAT SCOPE 3-12MM

## (undated) DEVICE — BAG TISS RETRV MONARCH 10MM

## (undated) DEVICE — CORD LAP 10 DISP

## (undated) DEVICE — APPLIER CLIP ENDO MED/LG 10MM

## (undated) DEVICE — GLOVE SENSICARE PI SURG 7

## (undated) DEVICE — ADHESIVE MASTISOL VIAL 48/BX

## (undated) DEVICE — SUT MONOCYRL 4-0 PS2 UND

## (undated) DEVICE — SCISSOR 5MMX35CM DIRECT DRIVE

## (undated) DEVICE — SYR 10CC LUER LOCK

## (undated) DEVICE — CANNULA LAP SEAL Z THRD 5X100

## (undated) DEVICE — Device

## (undated) DEVICE — GLOVE SENSICARE PI GRN 6.5